# Patient Record
Sex: MALE | Race: WHITE | Employment: UNEMPLOYED | URBAN - METROPOLITAN AREA
[De-identification: names, ages, dates, MRNs, and addresses within clinical notes are randomized per-mention and may not be internally consistent; named-entity substitution may affect disease eponyms.]

---

## 2018-12-28 ENCOUNTER — HOSPITAL ENCOUNTER (OUTPATIENT)
Age: 68
Discharge: HOME | End: 2018-12-30
Payer: MEDICARE

## 2018-12-28 LAB — PROSTATE SPECIFIC ANTIGEN: 4.37 NG/ML (ref 0–4)

## 2018-12-28 PROCEDURE — 84153 ASSAY OF PSA TOTAL: CPT

## 2019-01-18 ENCOUNTER — HOSPITAL ENCOUNTER (EMERGENCY)
Age: 69
Discharge: HOME | End: 2019-01-19
Attending: EMERGENCY MEDICINE
Payer: MEDICARE

## 2019-01-18 ENCOUNTER — APPOINTMENT (OUTPATIENT)
Dept: GENERAL RADIOLOGY | Age: 69
End: 2019-01-18
Payer: MEDICARE

## 2019-01-18 ENCOUNTER — APPOINTMENT (OUTPATIENT)
Dept: CT IMAGING | Age: 69
End: 2019-01-18
Payer: MEDICARE

## 2019-01-18 DIAGNOSIS — R55 SYNCOPE AND COLLAPSE: Primary | ICD-10-CM

## 2019-01-18 DIAGNOSIS — E86.0 DEHYDRATION: ICD-10-CM

## 2019-01-18 LAB
ANION GAP SERPL CALCULATED.3IONS-SCNC: 12 MMOL/L (ref 7–16)
BASOPHILS ABSOLUTE: 0.06 E9/L (ref 0–0.2)
BASOPHILS RELATIVE PERCENT: 0.4 % (ref 0–2)
BUN BLDV-MCNC: 18 MG/DL (ref 8–23)
CALCIUM SERPL-MCNC: 9.2 MG/DL (ref 8.6–10.2)
CHLORIDE BLD-SCNC: 101 MMOL/L (ref 98–107)
CO2: 26 MMOL/L (ref 22–29)
CREAT SERPL-MCNC: 1 MG/DL (ref 0.7–1.2)
D DIMER: <200 NG/ML DDU
EKG ATRIAL RATE: 107 BPM
EKG P AXIS: 77 DEGREES
EKG P-R INTERVAL: 168 MS
EKG Q-T INTERVAL: 430 MS
EKG QRS DURATION: 146 MS
EKG QTC CALCULATION (BAZETT): 574 MS
EKG R AXIS: 138 DEGREES
EKG T AXIS: 47 DEGREES
EKG VENTRICULAR RATE: 107 BPM
EOSINOPHILS ABSOLUTE: 0.06 E9/L (ref 0.05–0.5)
EOSINOPHILS RELATIVE PERCENT: 0.4 % (ref 0–6)
GFR AFRICAN AMERICAN: >60
GFR NON-AFRICAN AMERICAN: >60 ML/MIN/1.73
GLUCOSE BLD-MCNC: 96 MG/DL (ref 74–99)
HCT VFR BLD CALC: 46.9 % (ref 37–54)
HEMOGLOBIN: 15.2 G/DL (ref 12.5–16.5)
IMMATURE GRANULOCYTES #: 0.1 E9/L
IMMATURE GRANULOCYTES %: 0.7 % (ref 0–5)
LYMPHOCYTES ABSOLUTE: 1.44 E9/L (ref 1.5–4)
LYMPHOCYTES RELATIVE PERCENT: 10.8 % (ref 20–42)
MCH RBC QN AUTO: 29.6 PG (ref 26–35)
MCHC RBC AUTO-ENTMCNC: 32.4 % (ref 32–34.5)
MCV RBC AUTO: 91.4 FL (ref 80–99.9)
MONOCYTES ABSOLUTE: 1.01 E9/L (ref 0.1–0.95)
MONOCYTES RELATIVE PERCENT: 7.6 % (ref 2–12)
NEUTROPHILS ABSOLUTE: 10.7 E9/L (ref 1.8–7.3)
NEUTROPHILS RELATIVE PERCENT: 80.1 % (ref 43–80)
PDW BLD-RTO: 15.9 FL (ref 11.5–15)
PLATELET # BLD: 162 E9/L (ref 130–450)
PMV BLD AUTO: 10.9 FL (ref 7–12)
POTASSIUM REFLEX MAGNESIUM: 4.4 MMOL/L (ref 3.5–5)
RBC # BLD: 5.13 E12/L (ref 3.8–5.8)
SODIUM BLD-SCNC: 139 MMOL/L (ref 132–146)
TROPONIN: <0.01 NG/ML (ref 0–0.03)
WBC # BLD: 13.4 E9/L (ref 4.5–11.5)

## 2019-01-18 PROCEDURE — 70450 CT HEAD/BRAIN W/O DYE: CPT

## 2019-01-18 PROCEDURE — 85025 COMPLETE CBC W/AUTO DIFF WBC: CPT

## 2019-01-18 PROCEDURE — 99284 EMERGENCY DEPT VISIT MOD MDM: CPT

## 2019-01-18 PROCEDURE — 84484 ASSAY OF TROPONIN QUANT: CPT

## 2019-01-18 PROCEDURE — 36415 COLL VENOUS BLD VENIPUNCTURE: CPT

## 2019-01-18 PROCEDURE — 80048 BASIC METABOLIC PNL TOTAL CA: CPT

## 2019-01-18 PROCEDURE — 71045 X-RAY EXAM CHEST 1 VIEW: CPT

## 2019-01-18 PROCEDURE — 2580000003 HC RX 258: Performed by: EMERGENCY MEDICINE

## 2019-01-18 PROCEDURE — 85378 FIBRIN DEGRADE SEMIQUANT: CPT

## 2019-01-18 RX ORDER — 0.9 % SODIUM CHLORIDE 0.9 %
1000 INTRAVENOUS SOLUTION INTRAVENOUS ONCE
Status: COMPLETED | OUTPATIENT
Start: 2019-01-18 | End: 2019-01-19

## 2019-01-18 RX ADMIN — SODIUM CHLORIDE 1000 ML: 9 INJECTION, SOLUTION INTRAVENOUS at 21:20

## 2019-01-19 VITALS
TEMPERATURE: 97 F | HEART RATE: 98 BPM | RESPIRATION RATE: 16 BRPM | SYSTOLIC BLOOD PRESSURE: 132 MMHG | DIASTOLIC BLOOD PRESSURE: 88 MMHG | WEIGHT: 220 LBS | BODY MASS INDEX: 32.58 KG/M2 | HEIGHT: 69 IN | OXYGEN SATURATION: 96 %

## 2019-01-30 ENCOUNTER — APPOINTMENT (OUTPATIENT)
Dept: CT IMAGING | Age: 69
DRG: 194 | End: 2019-01-30
Payer: MEDICARE

## 2019-01-30 ENCOUNTER — HOSPITAL ENCOUNTER (INPATIENT)
Age: 69
LOS: 1 days | Discharge: HOME | DRG: 194 | End: 2019-02-01
Attending: EMERGENCY MEDICINE | Admitting: INTERNAL MEDICINE
Payer: MEDICARE

## 2019-01-30 ENCOUNTER — APPOINTMENT (OUTPATIENT)
Dept: GENERAL RADIOLOGY | Age: 69
DRG: 194 | End: 2019-01-30
Payer: MEDICARE

## 2019-01-30 DIAGNOSIS — J18.9 PNEUMONIA DUE TO ORGANISM: ICD-10-CM

## 2019-01-30 DIAGNOSIS — R07.81 PLEURITIC CHEST PAIN: Primary | ICD-10-CM

## 2019-01-30 LAB
ALBUMIN SERPL-MCNC: 4.1 G/DL (ref 3.5–5.2)
ALP BLD-CCNC: 99 U/L (ref 40–129)
ALT SERPL-CCNC: 34 U/L (ref 0–40)
ANION GAP SERPL CALCULATED.3IONS-SCNC: 15 MMOL/L (ref 7–16)
AST SERPL-CCNC: 19 U/L (ref 0–39)
BILIRUB SERPL-MCNC: 0.6 MG/DL (ref 0–1.2)
BUN BLDV-MCNC: 18 MG/DL (ref 8–23)
CALCIUM SERPL-MCNC: 9.3 MG/DL (ref 8.6–10.2)
CHLORIDE BLD-SCNC: 99 MMOL/L (ref 98–107)
CO2: 25 MMOL/L (ref 22–29)
CREAT SERPL-MCNC: 1 MG/DL (ref 0.7–1.2)
EKG ATRIAL RATE: 125 BPM
EKG P AXIS: 67 DEGREES
EKG P-R INTERVAL: 154 MS
EKG Q-T INTERVAL: 298 MS
EKG QRS DURATION: 138 MS
EKG QTC CALCULATION (BAZETT): 430 MS
EKG R AXIS: 133 DEGREES
EKG T AXIS: -12 DEGREES
EKG VENTRICULAR RATE: 125 BPM
GFR AFRICAN AMERICAN: >60
GFR NON-AFRICAN AMERICAN: >60 ML/MIN/1.73
GLUCOSE BLD-MCNC: 110 MG/DL (ref 74–99)
HCT VFR BLD CALC: 46.2 % (ref 37–54)
HEMOGLOBIN: 14.9 G/DL (ref 12.5–16.5)
INR BLD: 1
MCH RBC QN AUTO: 29.6 PG (ref 26–35)
MCHC RBC AUTO-ENTMCNC: 32.3 % (ref 32–34.5)
MCV RBC AUTO: 91.7 FL (ref 80–99.9)
PDW BLD-RTO: 15.9 FL (ref 11.5–15)
PLATELET # BLD: 157 E9/L (ref 130–450)
PMV BLD AUTO: 10.3 FL (ref 7–12)
POTASSIUM SERPL-SCNC: 4.3 MMOL/L (ref 3.5–5)
PRO-BNP: 111 PG/ML (ref 0–125)
PROTHROMBIN TIME: 11.2 SEC (ref 9.3–12.4)
RBC # BLD: 5.04 E12/L (ref 3.8–5.8)
SODIUM BLD-SCNC: 139 MMOL/L (ref 132–146)
TOTAL PROTEIN: 6.9 G/DL (ref 6.4–8.3)
TROPONIN: <0.01 NG/ML (ref 0–0.03)
WBC # BLD: 20.4 E9/L (ref 4.5–11.5)

## 2019-01-30 PROCEDURE — 36415 COLL VENOUS BLD VENIPUNCTURE: CPT

## 2019-01-30 PROCEDURE — 71045 X-RAY EXAM CHEST 1 VIEW: CPT

## 2019-01-30 PROCEDURE — 99285 EMERGENCY DEPT VISIT HI MDM: CPT

## 2019-01-30 PROCEDURE — 87040 BLOOD CULTURE FOR BACTERIA: CPT

## 2019-01-30 PROCEDURE — 85610 PROTHROMBIN TIME: CPT

## 2019-01-30 PROCEDURE — 83880 ASSAY OF NATRIURETIC PEPTIDE: CPT

## 2019-01-30 PROCEDURE — 71275 CT ANGIOGRAPHY CHEST: CPT

## 2019-01-30 PROCEDURE — 2580000003 HC RX 258: Performed by: EMERGENCY MEDICINE

## 2019-01-30 PROCEDURE — 6360000004 HC RX CONTRAST MEDICATION: Performed by: RADIOLOGY

## 2019-01-30 PROCEDURE — 83605 ASSAY OF LACTIC ACID: CPT

## 2019-01-30 PROCEDURE — 85027 COMPLETE CBC AUTOMATED: CPT

## 2019-01-30 PROCEDURE — 84484 ASSAY OF TROPONIN QUANT: CPT

## 2019-01-30 PROCEDURE — 80053 COMPREHEN METABOLIC PANEL: CPT

## 2019-01-30 PROCEDURE — 93005 ELECTROCARDIOGRAM TRACING: CPT | Performed by: EMERGENCY MEDICINE

## 2019-01-30 PROCEDURE — 87502 INFLUENZA DNA AMP PROBE: CPT

## 2019-01-30 RX ORDER — SODIUM CHLORIDE 9 MG/ML
INJECTION, SOLUTION INTRAVENOUS CONTINUOUS
Status: DISCONTINUED | OUTPATIENT
Start: 2019-01-30 | End: 2019-02-01 | Stop reason: HOSPADM

## 2019-01-30 RX ADMIN — IOPAMIDOL 75 ML: 755 INJECTION, SOLUTION INTRAVENOUS at 23:50

## 2019-01-30 RX ADMIN — SODIUM CHLORIDE: 9 INJECTION, SOLUTION INTRAVENOUS at 23:07

## 2019-01-31 PROBLEM — N40.0 BPH (BENIGN PROSTATIC HYPERPLASIA): Status: ACTIVE | Noted: 2019-01-31

## 2019-01-31 PROBLEM — E78.5 HLD (HYPERLIPIDEMIA): Status: ACTIVE | Noted: 2019-01-31

## 2019-01-31 PROBLEM — J44.1 COPD EXACERBATION (HCC): Status: ACTIVE | Noted: 2019-01-31

## 2019-01-31 PROBLEM — I10 HTN (HYPERTENSION): Status: ACTIVE | Noted: 2019-01-31

## 2019-01-31 PROBLEM — J18.9 PNEUMONIA: Status: ACTIVE | Noted: 2019-01-31

## 2019-01-31 LAB
ANION GAP SERPL CALCULATED.3IONS-SCNC: 14 MMOL/L (ref 7–16)
BUN BLDV-MCNC: 14 MG/DL (ref 8–23)
CALCIUM SERPL-MCNC: 8.8 MG/DL (ref 8.6–10.2)
CHLORIDE BLD-SCNC: 99 MMOL/L (ref 98–107)
CO2: 22 MMOL/L (ref 22–29)
CREAT SERPL-MCNC: 0.9 MG/DL (ref 0.7–1.2)
FILM ARRAY ADENOVIRUS: NORMAL
FILM ARRAY BORDETELLA PERTUSSIS: NORMAL
FILM ARRAY CHLAMYDOPHILIA PNEUMONIAE: NORMAL
FILM ARRAY CORONAVIRUS 229E: NORMAL
FILM ARRAY CORONAVIRUS HKU1: NORMAL
FILM ARRAY CORONAVIRUS NL63: NORMAL
FILM ARRAY CORONAVIRUS OC43: NORMAL
FILM ARRAY INFLUENZA A VIRUS 09H1: NORMAL
FILM ARRAY INFLUENZA A VIRUS H1: NORMAL
FILM ARRAY INFLUENZA A VIRUS H3: NORMAL
FILM ARRAY INFLUENZA A VIRUS: NORMAL
FILM ARRAY INFLUENZA B: NORMAL
FILM ARRAY METAPNEUMOVIRUS: NORMAL
FILM ARRAY MYCOPLASMA PNEUMONIAE: NORMAL
FILM ARRAY PARAINFLUENZA VIRUS 1: NORMAL
FILM ARRAY PARAINFLUENZA VIRUS 2: NORMAL
FILM ARRAY PARAINFLUENZA VIRUS 3: NORMAL
FILM ARRAY PARAINFLUENZA VIRUS 4: NORMAL
FILM ARRAY RESPIRATORY SYNCITIAL VIRUS: NORMAL
FILM ARRAY RHINOVIRUS/ENTEROVIRUS: NORMAL
GFR AFRICAN AMERICAN: >60
GFR NON-AFRICAN AMERICAN: >60 ML/MIN/1.73
GLUCOSE BLD-MCNC: 148 MG/DL (ref 74–99)
HCT VFR BLD CALC: 40.5 % (ref 37–54)
HEMOGLOBIN: 13.3 G/DL (ref 12.5–16.5)
INFLUENZA A BY PCR: NOT DETECTED
INFLUENZA B BY PCR: NOT DETECTED
LACTIC ACID: 2.1 MMOL/L (ref 0.5–2.2)
LACTIC ACID: 4.3 MMOL/L (ref 0.5–2.2)
LV EF: 70 %
LVEF MODALITY: NORMAL
MCH RBC QN AUTO: 29.4 PG (ref 26–35)
MCHC RBC AUTO-ENTMCNC: 32.8 % (ref 32–34.5)
MCV RBC AUTO: 89.6 FL (ref 80–99.9)
PDW BLD-RTO: 16 FL (ref 11.5–15)
PLATELET # BLD: 147 E9/L (ref 130–450)
PMV BLD AUTO: 11 FL (ref 7–12)
POTASSIUM REFLEX MAGNESIUM: 4.4 MMOL/L (ref 3.5–5)
RBC # BLD: 4.52 E12/L (ref 3.8–5.8)
SODIUM BLD-SCNC: 135 MMOL/L (ref 132–146)
WBC # BLD: 18.6 E9/L (ref 4.5–11.5)

## 2019-01-31 PROCEDURE — 2580000003 HC RX 258: Performed by: INTERNAL MEDICINE

## 2019-01-31 PROCEDURE — 87581 M.PNEUMON DNA AMP PROBE: CPT

## 2019-01-31 PROCEDURE — 6370000000 HC RX 637 (ALT 250 FOR IP): Performed by: INTERNAL MEDICINE

## 2019-01-31 PROCEDURE — 94640 AIRWAY INHALATION TREATMENT: CPT

## 2019-01-31 PROCEDURE — 87798 DETECT AGENT NOS DNA AMP: CPT

## 2019-01-31 PROCEDURE — 94664 DEMO&/EVAL PT USE INHALER: CPT

## 2019-01-31 PROCEDURE — 93306 TTE W/DOPPLER COMPLETE: CPT

## 2019-01-31 PROCEDURE — 2500000003 HC RX 250 WO HCPCS: Performed by: EMERGENCY MEDICINE

## 2019-01-31 PROCEDURE — 6360000002 HC RX W HCPCS: Performed by: EMERGENCY MEDICINE

## 2019-01-31 PROCEDURE — 87486 CHLMYD PNEUM DNA AMP PROBE: CPT

## 2019-01-31 PROCEDURE — 2500000003 HC RX 250 WO HCPCS: Performed by: INTERNAL MEDICINE

## 2019-01-31 PROCEDURE — 80048 BASIC METABOLIC PNL TOTAL CA: CPT

## 2019-01-31 PROCEDURE — 2060000000 HC ICU INTERMEDIATE R&B

## 2019-01-31 PROCEDURE — 36415 COLL VENOUS BLD VENIPUNCTURE: CPT

## 2019-01-31 PROCEDURE — 87633 RESP VIRUS 12-25 TARGETS: CPT

## 2019-01-31 PROCEDURE — 96374 THER/PROPH/DIAG INJ IV PUSH: CPT

## 2019-01-31 PROCEDURE — 83605 ASSAY OF LACTIC ACID: CPT

## 2019-01-31 PROCEDURE — 2580000003 HC RX 258: Performed by: EMERGENCY MEDICINE

## 2019-01-31 PROCEDURE — 85027 COMPLETE CBC AUTOMATED: CPT

## 2019-01-31 RX ORDER — SODIUM CHLORIDE 0.9 % (FLUSH) 0.9 %
10 SYRINGE (ML) INJECTION PRN
Status: DISCONTINUED | OUTPATIENT
Start: 2019-01-31 | End: 2019-02-01 | Stop reason: HOSPADM

## 2019-01-31 RX ORDER — ALBUTEROL SULFATE 90 UG/1
2 AEROSOL, METERED RESPIRATORY (INHALATION) EVERY 6 HOURS PRN
Status: ON HOLD | COMMUNITY
End: 2019-05-18 | Stop reason: HOSPADM

## 2019-01-31 RX ORDER — SODIUM CHLORIDE 0.9 % (FLUSH) 0.9 %
10 SYRINGE (ML) INJECTION EVERY 12 HOURS SCHEDULED
Status: DISCONTINUED | OUTPATIENT
Start: 2019-01-31 | End: 2019-02-01 | Stop reason: HOSPADM

## 2019-01-31 RX ORDER — MULTIVITAMIN WITH IRON
250 TABLET ORAL DAILY
COMMUNITY
End: 2021-05-24

## 2019-01-31 RX ORDER — SIMVASTATIN 40 MG
40 TABLET ORAL DAILY
COMMUNITY
End: 2022-09-03

## 2019-01-31 RX ORDER — PREDNISONE 20 MG/1
20 TABLET ORAL 2 TIMES DAILY
Status: DISCONTINUED | OUTPATIENT
Start: 2019-01-31 | End: 2019-02-01 | Stop reason: HOSPADM

## 2019-01-31 RX ORDER — SIMVASTATIN 10 MG
10 TABLET ORAL DAILY
Status: DISCONTINUED | OUTPATIENT
Start: 2019-01-31 | End: 2019-02-01 | Stop reason: HOSPADM

## 2019-01-31 RX ORDER — TAMSULOSIN HYDROCHLORIDE 0.4 MG/1
0.4 CAPSULE ORAL DAILY
Status: DISCONTINUED | OUTPATIENT
Start: 2019-01-31 | End: 2019-02-01 | Stop reason: HOSPADM

## 2019-01-31 RX ORDER — TAMSULOSIN HYDROCHLORIDE 0.4 MG/1
0.4 CAPSULE ORAL DAILY
COMMUNITY
End: 2019-05-17

## 2019-01-31 RX ORDER — MAGNESIUM 30 MG
30 TABLET ORAL DAILY
Status: DISCONTINUED | OUTPATIENT
Start: 2019-01-31 | End: 2019-02-01 | Stop reason: HOSPADM

## 2019-01-31 RX ORDER — SIMVASTATIN 10 MG
10 TABLET ORAL DAILY
Status: DISCONTINUED | OUTPATIENT
Start: 2019-01-31 | End: 2019-01-31 | Stop reason: CLARIF

## 2019-01-31 RX ORDER — 0.9 % SODIUM CHLORIDE 0.9 %
1000 INTRAVENOUS SOLUTION INTRAVENOUS ONCE
Status: COMPLETED | OUTPATIENT
Start: 2019-01-31 | End: 2019-01-31

## 2019-01-31 RX ORDER — LISINOPRIL 10 MG/1
10 TABLET ORAL DAILY
Status: DISCONTINUED | OUTPATIENT
Start: 2019-01-31 | End: 2019-02-01 | Stop reason: HOSPADM

## 2019-01-31 RX ORDER — ALBUTEROL SULFATE 0.63 MG/3ML
1 SOLUTION RESPIRATORY (INHALATION) EVERY 6 HOURS PRN
Status: DISCONTINUED | OUTPATIENT
Start: 2019-01-31 | End: 2019-02-01 | Stop reason: HOSPADM

## 2019-01-31 RX ORDER — IPRATROPIUM BROMIDE AND ALBUTEROL SULFATE 2.5; .5 MG/3ML; MG/3ML
1 SOLUTION RESPIRATORY (INHALATION)
Status: DISCONTINUED | OUTPATIENT
Start: 2019-01-31 | End: 2019-02-01 | Stop reason: HOSPADM

## 2019-01-31 RX ORDER — ALBUTEROL SULFATE 2.5 MG/3ML
1 SOLUTION RESPIRATORY (INHALATION) EVERY 6 HOURS PRN
Status: ON HOLD | COMMUNITY
End: 2022-05-27 | Stop reason: HOSPADM

## 2019-01-31 RX ORDER — LISINOPRIL 20 MG/1
20 TABLET ORAL DAILY
Status: ON HOLD | COMMUNITY
End: 2021-07-20 | Stop reason: SDUPTHER

## 2019-01-31 RX ORDER — HYDROCODONE BITARTRATE AND ACETAMINOPHEN 5; 325 MG/1; MG/1
1 TABLET ORAL EVERY 4 HOURS PRN
Status: DISCONTINUED | OUTPATIENT
Start: 2019-01-31 | End: 2019-02-01 | Stop reason: HOSPADM

## 2019-01-31 RX ORDER — ACETAMINOPHEN 325 MG/1
650 TABLET ORAL EVERY 4 HOURS PRN
Status: DISCONTINUED | OUTPATIENT
Start: 2019-01-31 | End: 2019-02-01 | Stop reason: HOSPADM

## 2019-01-31 RX ORDER — GUAIFENESIN/DEXTROMETHORPHAN 100-10MG/5
5 SYRUP ORAL EVERY 4 HOURS PRN
Status: DISCONTINUED | OUTPATIENT
Start: 2019-01-31 | End: 2019-02-01 | Stop reason: HOSPADM

## 2019-01-31 RX ADMIN — LISINOPRIL 10 MG: 10 TABLET ORAL at 09:39

## 2019-01-31 RX ADMIN — SIMVASTATIN 10 MG: 10 TABLET, FILM COATED ORAL at 09:39

## 2019-01-31 RX ADMIN — DOXYCYCLINE 100 MG: 100 INJECTION, POWDER, LYOPHILIZED, FOR SOLUTION INTRAVENOUS at 01:32

## 2019-01-31 RX ADMIN — DOXYCYCLINE 100 MG: 100 INJECTION, POWDER, LYOPHILIZED, FOR SOLUTION INTRAVENOUS at 14:30

## 2019-01-31 RX ADMIN — PREDNISONE 20 MG: 20 TABLET ORAL at 11:06

## 2019-01-31 RX ADMIN — IPRATROPIUM BROMIDE AND ALBUTEROL SULFATE 1 AMPULE: .5; 3 SOLUTION RESPIRATORY (INHALATION) at 21:47

## 2019-01-31 RX ADMIN — IPRATROPIUM BROMIDE AND ALBUTEROL SULFATE 1 AMPULE: .5; 3 SOLUTION RESPIRATORY (INHALATION) at 17:33

## 2019-01-31 RX ADMIN — HYDROCODONE BITARTRATE AND ACETAMINOPHEN 1 TABLET: 5; 325 TABLET ORAL at 02:59

## 2019-01-31 RX ADMIN — IPRATROPIUM BROMIDE AND ALBUTEROL SULFATE 1 AMPULE: .5; 3 SOLUTION RESPIRATORY (INHALATION) at 10:04

## 2019-01-31 RX ADMIN — IPRATROPIUM BROMIDE AND ALBUTEROL SULFATE 1 AMPULE: .5; 3 SOLUTION RESPIRATORY (INHALATION) at 13:53

## 2019-01-31 RX ADMIN — PREDNISONE 20 MG: 20 TABLET ORAL at 20:16

## 2019-01-31 RX ADMIN — GUAIFENESIN AND DEXTROMETHORPHAN 5 ML: 100; 10 SYRUP ORAL at 22:11

## 2019-01-31 RX ADMIN — TAMSULOSIN HYDROCHLORIDE 0.4 MG: 0.4 CAPSULE ORAL at 09:39

## 2019-01-31 RX ADMIN — SODIUM CHLORIDE 1000 ML: 9 INJECTION, SOLUTION INTRAVENOUS at 00:44

## 2019-01-31 RX ADMIN — HYDROCODONE BITARTRATE AND ACETAMINOPHEN 1 TABLET: 5; 325 TABLET ORAL at 11:06

## 2019-01-31 RX ADMIN — CEFTRIAXONE SODIUM 1 G: 1 INJECTION, POWDER, FOR SOLUTION INTRAMUSCULAR; INTRAVENOUS at 00:43

## 2019-01-31 ASSESSMENT — PAIN DESCRIPTION - PAIN TYPE
TYPE: ACUTE PAIN
TYPE: ACUTE PAIN

## 2019-01-31 ASSESSMENT — PAIN SCALES - GENERAL
PAINLEVEL_OUTOF10: 6
PAINLEVEL_OUTOF10: 8
PAINLEVEL_OUTOF10: 0

## 2019-01-31 ASSESSMENT — PAIN - FUNCTIONAL ASSESSMENT
PAIN_FUNCTIONAL_ASSESSMENT: PREVENTS OR INTERFERES SOME ACTIVE ACTIVITIES AND ADLS
PAIN_FUNCTIONAL_ASSESSMENT: PREVENTS OR INTERFERES SOME ACTIVE ACTIVITIES AND ADLS

## 2019-01-31 ASSESSMENT — PAIN DESCRIPTION - ORIENTATION
ORIENTATION: LEFT;UPPER
ORIENTATION: LEFT;UPPER

## 2019-01-31 ASSESSMENT — PAIN DESCRIPTION - LOCATION
LOCATION: ABDOMEN
LOCATION: ABDOMEN

## 2019-01-31 ASSESSMENT — PAIN DESCRIPTION - FREQUENCY: FREQUENCY: CONTINUOUS

## 2019-02-01 VITALS
WEIGHT: 188.31 LBS | OXYGEN SATURATION: 98 % | TEMPERATURE: 97.8 F | SYSTOLIC BLOOD PRESSURE: 126 MMHG | RESPIRATION RATE: 16 BRPM | BODY MASS INDEX: 28.54 KG/M2 | HEIGHT: 68 IN | HEART RATE: 102 BPM | DIASTOLIC BLOOD PRESSURE: 77 MMHG

## 2019-02-01 LAB
ANION GAP SERPL CALCULATED.3IONS-SCNC: 14 MMOL/L (ref 7–16)
BUN BLDV-MCNC: 16 MG/DL (ref 8–23)
CALCIUM SERPL-MCNC: 9.2 MG/DL (ref 8.6–10.2)
CHLORIDE BLD-SCNC: 100 MMOL/L (ref 98–107)
CO2: 22 MMOL/L (ref 22–29)
CREAT SERPL-MCNC: 0.9 MG/DL (ref 0.7–1.2)
GFR AFRICAN AMERICAN: >60
GFR NON-AFRICAN AMERICAN: >60 ML/MIN/1.73
GLUCOSE BLD-MCNC: 147 MG/DL (ref 74–99)
HCT VFR BLD CALC: 37.2 % (ref 37–54)
HEMOGLOBIN: 12.1 G/DL (ref 12.5–16.5)
MCH RBC QN AUTO: 29.6 PG (ref 26–35)
MCHC RBC AUTO-ENTMCNC: 32.5 % (ref 32–34.5)
MCV RBC AUTO: 91 FL (ref 80–99.9)
PDW BLD-RTO: 15.9 FL (ref 11.5–15)
PLATELET # BLD: 138 E9/L (ref 130–450)
PMV BLD AUTO: 11.2 FL (ref 7–12)
POTASSIUM REFLEX MAGNESIUM: 4.6 MMOL/L (ref 3.5–5)
RBC # BLD: 4.09 E12/L (ref 3.8–5.8)
SODIUM BLD-SCNC: 136 MMOL/L (ref 132–146)
WBC # BLD: 13.8 E9/L (ref 4.5–11.5)

## 2019-02-01 PROCEDURE — 6370000000 HC RX 637 (ALT 250 FOR IP): Performed by: INTERNAL MEDICINE

## 2019-02-01 PROCEDURE — 2580000003 HC RX 258: Performed by: INTERNAL MEDICINE

## 2019-02-01 PROCEDURE — 94640 AIRWAY INHALATION TREATMENT: CPT

## 2019-02-01 PROCEDURE — 2500000003 HC RX 250 WO HCPCS: Performed by: INTERNAL MEDICINE

## 2019-02-01 PROCEDURE — 85027 COMPLETE CBC AUTOMATED: CPT

## 2019-02-01 PROCEDURE — 80048 BASIC METABOLIC PNL TOTAL CA: CPT

## 2019-02-01 PROCEDURE — 6360000002 HC RX W HCPCS: Performed by: INTERNAL MEDICINE

## 2019-02-01 PROCEDURE — 36415 COLL VENOUS BLD VENIPUNCTURE: CPT

## 2019-02-01 RX ORDER — GUAIFENESIN/DEXTROMETHORPHAN 100-10MG/5
5 SYRUP ORAL EVERY 4 HOURS PRN
Qty: 120 ML | Refills: 0 | Status: SHIPPED | OUTPATIENT
Start: 2019-02-01 | End: 2019-02-11

## 2019-02-01 RX ORDER — AMOXICILLIN 250 MG
2 CAPSULE ORAL DAILY PRN
Qty: 60 TABLET | Refills: 1 | Status: SHIPPED | OUTPATIENT
Start: 2019-02-01 | End: 2019-05-17

## 2019-02-01 RX ORDER — SODIUM PHOSPHATE, DIBASIC AND SODIUM PHOSPHATE, MONOBASIC 7; 19 G/133ML; G/133ML
1 ENEMA RECTAL
Status: DISCONTINUED | OUTPATIENT
Start: 2019-02-01 | End: 2019-02-01 | Stop reason: HOSPADM

## 2019-02-01 RX ORDER — POLYETHYLENE GLYCOL 3350 17 G/17G
17 POWDER, FOR SOLUTION ORAL ONCE
Status: COMPLETED | OUTPATIENT
Start: 2019-02-01 | End: 2019-02-01

## 2019-02-01 RX ORDER — PREDNISONE 20 MG/1
20 TABLET ORAL 2 TIMES DAILY
Qty: 10 TABLET | Refills: 0 | Status: SHIPPED | OUTPATIENT
Start: 2019-02-01 | End: 2019-02-11

## 2019-02-01 RX ORDER — LEVOFLOXACIN 750 MG/1
750 TABLET ORAL DAILY
Qty: 5 TABLET | Refills: 0 | Status: SHIPPED | OUTPATIENT
Start: 2019-02-01 | End: 2019-02-06

## 2019-02-01 RX ADMIN — SIMVASTATIN 10 MG: 10 TABLET, FILM COATED ORAL at 09:12

## 2019-02-01 RX ADMIN — LISINOPRIL 10 MG: 10 TABLET ORAL at 09:12

## 2019-02-01 RX ADMIN — DOXYCYCLINE 100 MG: 100 INJECTION, POWDER, LYOPHILIZED, FOR SOLUTION INTRAVENOUS at 01:04

## 2019-02-01 RX ADMIN — POLYETHYLENE GLYCOL 3350 17 G: 17 POWDER, FOR SOLUTION ORAL at 12:52

## 2019-02-01 RX ADMIN — DOXYCYCLINE 100 MG: 100 INJECTION, POWDER, LYOPHILIZED, FOR SOLUTION INTRAVENOUS at 12:52

## 2019-02-01 RX ADMIN — TAMSULOSIN HYDROCHLORIDE 0.4 MG: 0.4 CAPSULE ORAL at 09:12

## 2019-02-01 RX ADMIN — GUAIFENESIN AND DEXTROMETHORPHAN 5 ML: 100; 10 SYRUP ORAL at 17:45

## 2019-02-01 RX ADMIN — GUAIFENESIN AND DEXTROMETHORPHAN 5 ML: 100; 10 SYRUP ORAL at 02:00

## 2019-02-01 RX ADMIN — IPRATROPIUM BROMIDE AND ALBUTEROL SULFATE 1 AMPULE: .5; 3 SOLUTION RESPIRATORY (INHALATION) at 09:31

## 2019-02-01 RX ADMIN — IPRATROPIUM BROMIDE AND ALBUTEROL SULFATE 1 AMPULE: .5; 3 SOLUTION RESPIRATORY (INHALATION) at 17:26

## 2019-02-01 RX ADMIN — CEFTRIAXONE SODIUM 1 G: 1 INJECTION, POWDER, FOR SOLUTION INTRAMUSCULAR; INTRAVENOUS at 01:04

## 2019-02-01 RX ADMIN — GUAIFENESIN AND DEXTROMETHORPHAN 5 ML: 100; 10 SYRUP ORAL at 06:07

## 2019-02-01 RX ADMIN — PREDNISONE 20 MG: 20 TABLET ORAL at 09:12

## 2019-02-01 RX ADMIN — IPRATROPIUM BROMIDE AND ALBUTEROL SULFATE 1 AMPULE: .5; 3 SOLUTION RESPIRATORY (INHALATION) at 13:44

## 2019-02-01 ASSESSMENT — PAIN SCALES - GENERAL
PAINLEVEL_OUTOF10: 0
PAINLEVEL_OUTOF10: 0

## 2019-02-02 ENCOUNTER — CARE COORDINATION (OUTPATIENT)
Dept: CASE MANAGEMENT | Age: 69
End: 2019-02-02

## 2019-02-05 LAB
BLOOD CULTURE, ROUTINE: NORMAL
CULTURE, BLOOD 2: NORMAL

## 2019-02-15 ENCOUNTER — CARE COORDINATION (OUTPATIENT)
Dept: CASE MANAGEMENT | Age: 69
End: 2019-02-15

## 2019-02-17 PROBLEM — E86.0 DEHYDRATION: Status: RESOLVED | Noted: 2019-01-18 | Resolved: 2019-02-17

## 2019-03-05 ENCOUNTER — CARE COORDINATION (OUTPATIENT)
Dept: CASE MANAGEMENT | Age: 69
End: 2019-03-05

## 2019-03-27 ENCOUNTER — CARE COORDINATION (OUTPATIENT)
Dept: CASE MANAGEMENT | Age: 69
End: 2019-03-27

## 2019-04-01 ENCOUNTER — HOSPITAL ENCOUNTER (OUTPATIENT)
Age: 69
Discharge: HOME | End: 2019-04-03
Payer: MEDICARE

## 2019-04-01 LAB — PROSTATE SPECIFIC ANTIGEN: 5.32 NG/ML (ref 0–4)

## 2019-04-01 PROCEDURE — 84153 ASSAY OF PSA TOTAL: CPT

## 2019-04-09 ENCOUNTER — CARE COORDINATION (OUTPATIENT)
Dept: CASE MANAGEMENT | Age: 69
End: 2019-04-09

## 2019-04-09 NOTE — CARE COORDINATION
[facility] Transitions Follow Up Call    2019    Patient: Griffin Louis  Patient : 1950   MRN: <[id number]>  Reason for Admission:   Discharge Date: 19 RARS: Readmission Risk Score: 13             Care Transitions Subsequent and Final Call    Subsequent and Final Calls  Care Transitions Interventions  Other Interventions:          Attempted to contact Pt for BPCI transitions call. Contact information left to  requesting call back at the earliest convenience. Adan Kramer RN BSN   Care Transitions Coordinator  [phone]       Follow Up  No future appointments.     Adan Kramer RN

## 2019-04-22 ENCOUNTER — CARE COORDINATION (OUTPATIENT)
Dept: CASE MANAGEMENT | Age: 69
End: 2019-04-22

## 2019-05-01 ENCOUNTER — CARE COORDINATION (OUTPATIENT)
Dept: CASE MANAGEMENT | Age: 69
End: 2019-05-01

## 2019-05-01 NOTE — CARE COORDINATION
Attempted to reach pt for BPCI-A follow up call. Left message requesting call back.     Niyah Garcia RN  Care Transition Coordinator  [phone]

## 2019-05-08 ENCOUNTER — CARE COORDINATION (OUTPATIENT)
Dept: CASE MANAGEMENT | Age: 69
End: 2019-05-08

## 2019-05-17 ENCOUNTER — HOSPITAL ENCOUNTER (OUTPATIENT)
Age: 69
Setting detail: OBSERVATION
Discharge: HOME | End: 2019-05-18
Attending: EMERGENCY MEDICINE | Admitting: INTERNAL MEDICINE
Payer: MEDICARE

## 2019-05-17 ENCOUNTER — APPOINTMENT (OUTPATIENT)
Dept: GENERAL RADIOLOGY | Age: 69
End: 2019-05-17
Payer: MEDICARE

## 2019-05-17 ENCOUNTER — APPOINTMENT (OUTPATIENT)
Dept: CT IMAGING | Age: 69
End: 2019-05-17
Payer: MEDICARE

## 2019-05-17 DIAGNOSIS — J18.9 PNEUMONIA DUE TO ORGANISM: ICD-10-CM

## 2019-05-17 DIAGNOSIS — R55 SYNCOPE AND COLLAPSE: Primary | ICD-10-CM

## 2019-05-17 PROBLEM — J44.9 COPD (CHRONIC OBSTRUCTIVE PULMONARY DISEASE) (HCC): Status: ACTIVE | Noted: 2019-01-31

## 2019-05-17 LAB
ANION GAP SERPL CALCULATED.3IONS-SCNC: 14 MMOL/L (ref 7–16)
BASOPHILS ABSOLUTE: 0 E9/L (ref 0–0.2)
BASOPHILS RELATIVE PERCENT: 0.3 % (ref 0–2)
BUN BLDV-MCNC: 25 MG/DL (ref 8–23)
CALCIUM SERPL-MCNC: 9.1 MG/DL (ref 8.6–10.2)
CHLORIDE BLD-SCNC: 98 MMOL/L (ref 98–107)
CO2: 24 MMOL/L (ref 22–29)
CREAT SERPL-MCNC: 1.1 MG/DL (ref 0.7–1.2)
EOSINOPHILS ABSOLUTE: 0.18 E9/L (ref 0.05–0.5)
EOSINOPHILS RELATIVE PERCENT: 0.9 % (ref 0–6)
GFR AFRICAN AMERICAN: >60
GFR NON-AFRICAN AMERICAN: >60 ML/MIN/1.73
GLUCOSE BLD-MCNC: 148 MG/DL (ref 74–99)
HCT VFR BLD CALC: 46.2 % (ref 37–54)
HEMOGLOBIN: 15.4 G/DL (ref 12.5–16.5)
LYMPHOCYTES ABSOLUTE: 0.39 E9/L (ref 1.5–4)
LYMPHOCYTES RELATIVE PERCENT: 1.8 % (ref 20–42)
MCH RBC QN AUTO: 29.6 PG (ref 26–35)
MCHC RBC AUTO-ENTMCNC: 33.3 % (ref 32–34.5)
MCV RBC AUTO: 88.8 FL (ref 80–99.9)
MONOCYTES ABSOLUTE: 0.79 E9/L (ref 0.1–0.95)
MONOCYTES RELATIVE PERCENT: 3.5 % (ref 2–12)
NEUTROPHILS ABSOLUTE: 18.52 E9/L (ref 1.8–7.3)
NEUTROPHILS RELATIVE PERCENT: 93.9 % (ref 43–80)
PDW BLD-RTO: 14.6 FL (ref 11.5–15)
PLATELET # BLD: 208 E9/L (ref 130–450)
PMV BLD AUTO: 11.1 FL (ref 7–12)
POTASSIUM REFLEX MAGNESIUM: 4.4 MMOL/L (ref 3.5–5)
PROCALCITONIN: 0.07 NG/ML (ref 0–0.08)
RBC # BLD: 5.2 E12/L (ref 3.8–5.8)
RBC # BLD: NORMAL 10*6/UL
SODIUM BLD-SCNC: 136 MMOL/L (ref 132–146)
TROPONIN: <0.01 NG/ML (ref 0–0.03)
WBC # BLD: 19.7 E9/L (ref 4.5–11.5)

## 2019-05-17 PROCEDURE — 96361 HYDRATE IV INFUSION ADD-ON: CPT

## 2019-05-17 PROCEDURE — 84145 PROCALCITONIN (PCT): CPT

## 2019-05-17 PROCEDURE — 84484 ASSAY OF TROPONIN QUANT: CPT

## 2019-05-17 PROCEDURE — 93010 ELECTROCARDIOGRAM REPORT: CPT | Performed by: INTERNAL MEDICINE

## 2019-05-17 PROCEDURE — 96367 TX/PROPH/DG ADDL SEQ IV INF: CPT

## 2019-05-17 PROCEDURE — 85025 COMPLETE CBC W/AUTO DIFF WBC: CPT

## 2019-05-17 PROCEDURE — G0378 HOSPITAL OBSERVATION PER HR: HCPCS

## 2019-05-17 PROCEDURE — 36415 COLL VENOUS BLD VENIPUNCTURE: CPT

## 2019-05-17 PROCEDURE — 99285 EMERGENCY DEPT VISIT HI MDM: CPT

## 2019-05-17 PROCEDURE — 2580000003 HC RX 258: Performed by: STUDENT IN AN ORGANIZED HEALTH CARE EDUCATION/TRAINING PROGRAM

## 2019-05-17 PROCEDURE — 80048 BASIC METABOLIC PNL TOTAL CA: CPT

## 2019-05-17 PROCEDURE — 71045 X-RAY EXAM CHEST 1 VIEW: CPT

## 2019-05-17 PROCEDURE — 6360000002 HC RX W HCPCS: Performed by: STUDENT IN AN ORGANIZED HEALTH CARE EDUCATION/TRAINING PROGRAM

## 2019-05-17 PROCEDURE — 87040 BLOOD CULTURE FOR BACTERIA: CPT

## 2019-05-17 PROCEDURE — 2580000003 HC RX 258: Performed by: INTERNAL MEDICINE

## 2019-05-17 PROCEDURE — 93005 ELECTROCARDIOGRAM TRACING: CPT | Performed by: STUDENT IN AN ORGANIZED HEALTH CARE EDUCATION/TRAINING PROGRAM

## 2019-05-17 PROCEDURE — 96365 THER/PROPH/DIAG IV INF INIT: CPT

## 2019-05-17 PROCEDURE — 70450 CT HEAD/BRAIN W/O DYE: CPT

## 2019-05-17 PROCEDURE — 6370000000 HC RX 637 (ALT 250 FOR IP): Performed by: INTERNAL MEDICINE

## 2019-05-17 PROCEDURE — 94640 AIRWAY INHALATION TREATMENT: CPT

## 2019-05-17 RX ORDER — ADENOSINE 3 MG/ML
6 INJECTION, SOLUTION INTRAVENOUS ONCE
Status: DISCONTINUED | OUTPATIENT
Start: 2019-05-17 | End: 2019-05-17

## 2019-05-17 RX ORDER — ACETAMINOPHEN 325 MG/1
650 TABLET ORAL EVERY 4 HOURS PRN
Status: DISCONTINUED | OUTPATIENT
Start: 2019-05-17 | End: 2019-05-18 | Stop reason: HOSPADM

## 2019-05-17 RX ORDER — SODIUM CHLORIDE 0.9 % (FLUSH) 0.9 %
10 SYRINGE (ML) INJECTION PRN
Status: DISCONTINUED | OUTPATIENT
Start: 2019-05-17 | End: 2019-05-18 | Stop reason: HOSPADM

## 2019-05-17 RX ORDER — IPRATROPIUM BROMIDE AND ALBUTEROL SULFATE 2.5; .5 MG/3ML; MG/3ML
1 SOLUTION RESPIRATORY (INHALATION)
Status: DISCONTINUED | OUTPATIENT
Start: 2019-05-17 | End: 2019-05-18 | Stop reason: HOSPADM

## 2019-05-17 RX ORDER — ONDANSETRON 2 MG/ML
4 INJECTION INTRAMUSCULAR; INTRAVENOUS EVERY 6 HOURS PRN
Status: DISCONTINUED | OUTPATIENT
Start: 2019-05-17 | End: 2019-05-18 | Stop reason: HOSPADM

## 2019-05-17 RX ORDER — SODIUM CHLORIDE 9 MG/ML
INJECTION, SOLUTION INTRAVENOUS CONTINUOUS
Status: DISCONTINUED | OUTPATIENT
Start: 2019-05-17 | End: 2019-05-18 | Stop reason: HOSPADM

## 2019-05-17 RX ORDER — PREDNISONE 10 MG/1
10 TABLET ORAL DAILY
Status: ON HOLD | COMMUNITY
End: 2019-05-18 | Stop reason: HOSPADM

## 2019-05-17 RX ORDER — SODIUM CHLORIDE 0.9 % (FLUSH) 0.9 %
10 SYRINGE (ML) INJECTION EVERY 12 HOURS SCHEDULED
Status: DISCONTINUED | OUTPATIENT
Start: 2019-05-17 | End: 2019-05-18 | Stop reason: HOSPADM

## 2019-05-17 RX ADMIN — SODIUM CHLORIDE: 9 INJECTION, SOLUTION INTRAVENOUS at 16:05

## 2019-05-17 RX ADMIN — IPRATROPIUM BROMIDE AND ALBUTEROL SULFATE 1 AMPULE: .5; 3 SOLUTION RESPIRATORY (INHALATION) at 18:01

## 2019-05-17 RX ADMIN — AZITHROMYCIN DIHYDRATE 500 MG: 500 INJECTION, POWDER, LYOPHILIZED, FOR SOLUTION INTRAVENOUS at 14:50

## 2019-05-17 RX ADMIN — CEFTRIAXONE SODIUM 1 G: 1 INJECTION, POWDER, FOR SOLUTION INTRAMUSCULAR; INTRAVENOUS at 14:05

## 2019-05-17 RX ADMIN — IPRATROPIUM BROMIDE AND ALBUTEROL SULFATE 1 AMPULE: .5; 3 SOLUTION RESPIRATORY (INHALATION) at 22:37

## 2019-05-17 ASSESSMENT — ENCOUNTER SYMPTOMS
DIFFICULTY BREATHING: 0
COUGH: 1
SHORTNESS OF BREATH: 0
VOMITING: 0
RECTAL BLEEDING: 0
COLOR CHANGE: 0
BACK PAIN: 0
NAUSEA: 0
VISUAL CHANGE: 0

## 2019-05-17 ASSESSMENT — PAIN SCALES - GENERAL
PAINLEVEL_OUTOF10: 0
PAINLEVEL_OUTOF10: 0

## 2019-05-17 NOTE — CARE COORDINATION
Social Work /Discharge Planning:    Pt presents to the ED secondary to loss of consciousness at home. SW met with pt who was alert and oriented x3. Pt reports he was painting his deck at home, passed out, and woke up on the ground. Pt called EMS and came to ED. Pt reports this also happened while he was painting 1 week ago. Pt reports hx of syncopal episodes in January, came to this hospital and also went to [facility] and no one was really able to find what was causing him to lose consciousness. Pt and wife live in a 1 floor home with 1 step at entrance. Pt reports he has no DME and no hx of HHC/BOY. Pt reports he is normally independent and drives. Pt's PCP  Is Dr Danica Gottlieb and he uses [facility] for short term prescriptions and [facility] for long term prescriptions. Pt reports plan will be to return home. Assigned SW/CM to follow for any discharge needs.

## 2019-05-17 NOTE — PROGRESS NOTES
Orthostatic blood pressures completed. 11/76 and 108 lying, 107/73 and 107 sitting and 120/68 and 115 standing.

## 2019-05-17 NOTE — ED PROVIDER NOTES
Patient is a 76-year-old gentleman presents to ER today after having syncopal event at home. Patient's wife is at bedside and states that she witnessed the event. Per patient's wife, patient was painting the wall when he lost consciousness. She states that he had similar issues years ago and was diagnosed with \"neurocardiogenic\" issues. Patient states that he was fine for many years but that for the last 2 weeks or so she's been having in her minute episodes once again. Patient also states that he has been coughing on and off for the last 2 weeks. Patient denies any other symptoms at this time including nausea, vomiting, diarrhea, chest pain, lightheadedness, shortness of breath. Loss of Consciousness   Episode history:  Multiple  Most recent episode: Today  Timing:  Intermittent  Progression:  Waxing and waning  Chronicity:  Recurrent  Witnessed: yes    Relieved by:  None tried  Worsened by:  Nothing  Ineffective treatments:  None tried  Associated symptoms: no anxiety, no chest pain, no confusion, no diaphoresis, no difficulty breathing, no dizziness, no fever, no focal sensory loss, no focal weakness, no headaches, no malaise/fatigue, no nausea, no palpitations, no recent fall, no recent injury, no recent surgery, no rectal bleeding, no seizures, no shortness of breath, no visual change, no vomiting and no weakness        Review of Systems   Constitutional: Negative for diaphoresis, fever and malaise/fatigue. HENT: Negative for drooling. Eyes: Negative for visual disturbance. Respiratory: Positive for cough. Negative for shortness of breath. Cardiovascular: Positive for syncope. Negative for chest pain and palpitations. Gastrointestinal: Negative for nausea and vomiting. Endocrine: Negative for polyuria. Genitourinary: Negative for flank pain. Musculoskeletal: Negative for arthralgias and back pain. Skin: Negative for color change.    Allergic/Immunologic: Negative for immunocompromised state.   Neurological: Positive for syncope. Negative for dizziness, focal weakness, seizures, weakness and headaches. Hematological: Negative for adenopathy. Does not bruise/bleed easily. Psychiatric/Behavioral: Negative. Negative for confusion. Physical Exam   Constitutional: He is oriented to person, place, and time. He appears well-developed and well-nourished. He is cooperative. Patient covered in paint   HENT:   Head: Normocephalic and atraumatic. Right Ear: Hearing and external ear normal.   Left Ear: Hearing and external ear normal.   Nose: Nose normal.   Mouth/Throat: Uvula is midline and oropharynx is clear and moist.   Eyes: Conjunctivae and lids are normal.   Neck: Trachea normal, full passive range of motion without pain and phonation normal.   Cardiovascular: Normal rate, regular rhythm and normal heart sounds. Pulmonary/Chest: Effort normal and breath sounds normal.   Abdominal: Soft. Normal appearance. Neurological: He is alert and oriented to person, place, and time. Skin: Skin is warm, dry and intact. Psychiatric: He has a normal mood and affect. His speech is normal and behavior is normal. Judgment and thought content normal. Cognition and memory are normal.       Procedures    MDM  Number of Diagnoses or Management Options  Pneumonia due to organism:   Syncope and collapse:   Diagnosis management comments: Patient's labwork is a refill for leukocytosis, patient's chest x-ray also didn't show some bibasilar infiltrates, patient to be treated empirically for community acquired pneumonia. Patient also admitted for syncope workup. I spoken to Dr. Unruly Armas and he is agreeable to this at this time. I spoke to patient and his wife and they both verbalized understanding of plan and are agreeable to admission at this time. ED Course as of May 17 1454   Fri May 17, 2019   1301 Pt feeling better with IVF. Informed of all results so far and need for admit.  Pt states he is hungry - will have nurse order a lunch tray. Await labs and call back from Hospitalist     [MP]      ED Course User Index  [MP] [place] WallaceandriabettymaryDO       ED Course as of May 17 1714   Fri May 17, 2019   1301 Pt feeling better with IVF. Informed of all results so far and need for admit. Pt states he is hungry - will have nurse order a lunch tray. Await labs and call back from Hospitalist     [MP]      ED Course User Index  [MP] [place] AquinobettyDO mary       --------------------------------------------- PAST HISTORY ---------------------------------------------  Past Medical History:  has a past medical history of COPD (chronic obstructive pulmonary disease) ([facility] [address] Past Surgical History:  has no past surgical history on file. Social History:  reports that he has never smoked. He has never used smokeless tobacco. He reports that he does not drink alcohol or use drugs. Family History: family history is not on file. The patients home medications have been reviewed. Allergies: Patient has no known allergies.     -------------------------------------------------- RESULTS -------------------------------------------------    LABS:  Results for orders placed or performed during the hospital encounter of 05/17/19   CBC auto differential   Result Value Ref Range    WBC 19.7 (H) 4.5 - 11.5 E9/L    RBC 5.20 3.80 - 5.80 E12/L    Hemoglobin 15.4 12.5 - 16.5 g/dL    Hematocrit 46.2 37.0 - 54.0 %    MCV 88.8 80.0 - 99.9 fL    MCH 29.6 26.0 - 35.0 pg    MCHC 33.3 32.0 - 34.5 %    RDW 14.6 11.5 - 15.0 fL    Platelets 507 505 - 423 E9/L    MPV 11.1 7.0 - 12.0 fL    Neutrophils % 93.9 (H) 43.0 - 80.0 %    Lymphocytes % 1.8 (L) 20.0 - 42.0 %    Monocytes % 3.5 2.0 - 12.0 %    Eosinophils % 0.9 0.0 - 6.0 %    Basophils % 0.3 0.0 - 2.0 %    Neutrophils # 18.52 (H) 1.80 - 7.30 E9/L    Lymphocytes # 0.39 (L) 1.50 - 4.00 E9/L    Monocytes # 0.79 0.10 - 0.95 E9/L    Eosinophils # 0.18 0.05 - 0.50 E9/L    Basophils # 0.00 0.00 - 0.20 E9/L    RBC Morphology Normal    Basic Metabolic Panel w/ Reflex to MG   Result Value Ref Range    Sodium 136 132 - 146 mmol/L    Potassium reflex Magnesium 4.4 3.5 - 5.0 mmol/L    Chloride 98 98 - 107 mmol/L    CO2 24 22 - 29 mmol/L    Anion Gap 14 7 - 16 mmol/L    Glucose 148 (H) 74 - 99 mg/dL    BUN 25 (H) 8 - 23 mg/dL    CREATININE 1.1 0.7 - 1.2 mg/dL    GFR Non-African American >60 >=60 mL/min/1.73    GFR African American >60     Calcium 9.1 8.6 - 10.2 mg/dL   Troponin   Result Value Ref Range    Troponin <0.01 0.00 - 0.03 ng/mL       RADIOLOGY:  CT HEAD WO CONTRAST   Final Result   Mild to moderate atrophy. Nothing active otherwise. XR CHEST PORTABLE   Final Result   1. Nonspecific bibasilar airspace disease, findings can be seen in   infiltrate/pneumonia and/or atelectasis. 2.. Vascular calcifications thoracic aorta. EKG: This EKG is signed and interpreted by me. Rate: 101  Rhythm: Sinus with occasional PVCs  Interpretation: right bundle branch block and sinus rhythm  Comparison: stable as compared to patient's most recent EKG and no previous EKG available      ------------------------- NURSING NOTES AND VITALS REVIEWED ---------------------------  Date / Time Roomed:  5/17/2019 10:48 AM  ED Bed Assignment:  [id number]    The nursing notes within the ED encounter and vital signs as below have been reviewed.      Patient Vitals for the past 24 hrs:   BP Temp Temp src Pulse Resp SpO2 Height Weight   05/17/19 1452 128/74 98.1 °F (36.7 °C) Oral 108 18 96 % -- --   05/17/19 1228 -- -- -- 95 16 96 % -- --   05/17/19 1049 (!) 148/102 96.5 °F (35.8 °C) Infrared 105 20 95 % 5' 8\" (1.727 m) 192 lb (87.1 kg)       Oxygen Saturation Interpretation: Normal    ------------------------------------------ PROGRESS NOTES ------------------------------------------  Re-evaluation(s):  Time: 1400  Patients symptoms show no change  Repeat physical examination is not changed    Counseling:  I have spoken with the patient and discussed todays results, in addition to providing specific details for the plan of care and counseling regarding the diagnosis and prognosis. Their questions are answered at this time and they are agreeable with the plan of admission.    --------------------------------- ADDITIONAL PROVIDER NOTES ---------------------------------  Consultations:  Time: 1430. Spoke with Dr. Chip Jarrell. Discussed case. They will admit the patient. This patient's ED course included: a personal history and physicial examination    This patient has remained hemodynamically stable during their ED course. Diagnosis:  1. Syncope and collapse    2. Pneumonia due to organism        Disposition:  Patient's disposition: Admit to telemetry  Patient's condition is stable.          Lalita Course, DO  Resident  05/17/19 0764

## 2019-05-17 NOTE — ED NOTES
Bed: 16  Expected date:   Expected time:   Means of arrival:   Comments:  ems     Margret Bosworth, RN  05/17/19 1047

## 2019-05-18 VITALS
SYSTOLIC BLOOD PRESSURE: 147 MMHG | DIASTOLIC BLOOD PRESSURE: 85 MMHG | WEIGHT: 192 LBS | TEMPERATURE: 97.8 F | BODY MASS INDEX: 29.1 KG/M2 | OXYGEN SATURATION: 96 % | RESPIRATION RATE: 18 BRPM | HEART RATE: 89 BPM | HEIGHT: 68 IN

## 2019-05-18 LAB
ANION GAP SERPL CALCULATED.3IONS-SCNC: 6 MMOL/L (ref 7–16)
BUN BLDV-MCNC: 21 MG/DL (ref 8–23)
CALCIUM SERPL-MCNC: 8.7 MG/DL (ref 8.6–10.2)
CHLORIDE BLD-SCNC: 100 MMOL/L (ref 98–107)
CHOLESTEROL, TOTAL: 148 MG/DL (ref 0–199)
CO2: 30 MMOL/L (ref 22–29)
CREAT SERPL-MCNC: 1.1 MG/DL (ref 0.7–1.2)
EKG ATRIAL RATE: 101 BPM
EKG P AXIS: 77 DEGREES
EKG P-R INTERVAL: 178 MS
EKG Q-T INTERVAL: 372 MS
EKG QRS DURATION: 140 MS
EKG QTC CALCULATION (BAZETT): 482 MS
EKG R AXIS: 133 DEGREES
EKG T AXIS: 37 DEGREES
EKG VENTRICULAR RATE: 101 BPM
GFR AFRICAN AMERICAN: >60
GFR NON-AFRICAN AMERICAN: >60 ML/MIN/1.73
GLUCOSE BLD-MCNC: 118 MG/DL (ref 74–99)
HBA1C MFR BLD: 5.8 % (ref 4–5.6)
HCT VFR BLD CALC: 43.5 % (ref 37–54)
HDLC SERPL-MCNC: 74 MG/DL
HEMOGLOBIN: 13.9 G/DL (ref 12.5–16.5)
LDL CHOLESTEROL CALCULATED: 64 MG/DL (ref 0–99)
MCH RBC QN AUTO: 29 PG (ref 26–35)
MCHC RBC AUTO-ENTMCNC: 32 % (ref 32–34.5)
MCV RBC AUTO: 90.6 FL (ref 80–99.9)
PDW BLD-RTO: 14.8 FL (ref 11.5–15)
PLATELET # BLD: 178 E9/L (ref 130–450)
PMV BLD AUTO: 10.9 FL (ref 7–12)
POTASSIUM REFLEX MAGNESIUM: 4.8 MMOL/L (ref 3.5–5)
PROCALCITONIN: 0.2 NG/ML (ref 0–0.08)
RBC # BLD: 4.8 E12/L (ref 3.8–5.8)
SODIUM BLD-SCNC: 136 MMOL/L (ref 132–146)
T4 FREE: 1.21 NG/DL (ref 0.93–1.7)
TRIGL SERPL-MCNC: 52 MG/DL (ref 0–149)
TSH SERPL DL<=0.05 MIU/L-ACNC: 0.8 UIU/ML (ref 0.27–4.2)
VLDLC SERPL CALC-MCNC: 10 MG/DL
WBC # BLD: 15.7 E9/L (ref 4.5–11.5)

## 2019-05-18 PROCEDURE — 80061 LIPID PANEL: CPT

## 2019-05-18 PROCEDURE — 6370000000 HC RX 637 (ALT 250 FOR IP): Performed by: INTERNAL MEDICINE

## 2019-05-18 PROCEDURE — 97165 OT EVAL LOW COMPLEX 30 MIN: CPT

## 2019-05-18 PROCEDURE — 99223 1ST HOSP IP/OBS HIGH 75: CPT | Performed by: INTERNAL MEDICINE

## 2019-05-18 PROCEDURE — 94640 AIRWAY INHALATION TREATMENT: CPT

## 2019-05-18 PROCEDURE — 80048 BASIC METABOLIC PNL TOTAL CA: CPT

## 2019-05-18 PROCEDURE — 84145 PROCALCITONIN (PCT): CPT

## 2019-05-18 PROCEDURE — G0378 HOSPITAL OBSERVATION PER HR: HCPCS

## 2019-05-18 PROCEDURE — APPSS180 APP SPLIT SHARED TIME > 60 MINUTES: Performed by: NURSE PRACTITIONER

## 2019-05-18 PROCEDURE — 87088 URINE BACTERIA CULTURE: CPT

## 2019-05-18 PROCEDURE — 96361 HYDRATE IV INFUSION ADD-ON: CPT

## 2019-05-18 PROCEDURE — 92610 EVALUATE SWALLOWING FUNCTION: CPT

## 2019-05-18 PROCEDURE — 36415 COLL VENOUS BLD VENIPUNCTURE: CPT

## 2019-05-18 PROCEDURE — 85027 COMPLETE CBC AUTOMATED: CPT

## 2019-05-18 PROCEDURE — 84439 ASSAY OF FREE THYROXINE: CPT

## 2019-05-18 PROCEDURE — 83036 HEMOGLOBIN GLYCOSYLATED A1C: CPT

## 2019-05-18 PROCEDURE — 84443 ASSAY THYROID STIM HORMONE: CPT

## 2019-05-18 RX ORDER — PREDNISONE 10 MG/1
10 TABLET ORAL DAILY
Status: DISCONTINUED | OUTPATIENT
Start: 2019-05-18 | End: 2019-05-18 | Stop reason: HOSPADM

## 2019-05-18 RX ORDER — LANOLIN ALCOHOL/MO/W.PET/CERES
400 CREAM (GRAM) TOPICAL DAILY
Status: DISCONTINUED | OUTPATIENT
Start: 2019-05-18 | End: 2019-05-18 | Stop reason: HOSPADM

## 2019-05-18 RX ORDER — METOPROLOL SUCCINATE 50 MG/1
50 TABLET, EXTENDED RELEASE ORAL DAILY
Status: DISCONTINUED | OUTPATIENT
Start: 2019-05-18 | End: 2019-05-18 | Stop reason: HOSPADM

## 2019-05-18 RX ORDER — METOPROLOL SUCCINATE 50 MG/1
50 TABLET, EXTENDED RELEASE ORAL DAILY
Qty: 30 TABLET | Refills: 3 | Status: ON HOLD | OUTPATIENT
Start: 2019-05-18 | End: 2021-07-21 | Stop reason: HOSPADM

## 2019-05-18 RX ORDER — LISINOPRIL 20 MG/1
20 TABLET ORAL DAILY
Status: DISCONTINUED | OUTPATIENT
Start: 2019-05-19 | End: 2019-05-18 | Stop reason: HOSPADM

## 2019-05-18 RX ORDER — ALBUTEROL SULFATE 0.63 MG/3ML
1 SOLUTION RESPIRATORY (INHALATION) EVERY 6 HOURS PRN
Status: DISCONTINUED | OUTPATIENT
Start: 2019-05-18 | End: 2019-05-18 | Stop reason: HOSPADM

## 2019-05-18 RX ORDER — AMOXICILLIN AND CLAVULANATE POTASSIUM 875; 125 MG/1; MG/1
1 TABLET, FILM COATED ORAL 2 TIMES DAILY
Qty: 20 TABLET | Refills: 0 | Status: SHIPPED | OUTPATIENT
Start: 2019-05-18 | End: 2019-05-28

## 2019-05-18 RX ADMIN — MAGNESIUM GLUCONATE 500 MG ORAL TABLET 400 MG: 500 TABLET ORAL at 11:26

## 2019-05-18 RX ADMIN — GLYCOPYRROLATE AND FORMOTEROL FUMARATE 2 PUFF: 9; 4.8 AEROSOL, METERED RESPIRATORY (INHALATION) at 11:26

## 2019-05-18 RX ADMIN — METOPROLOL SUCCINATE 50 MG: 50 TABLET, FILM COATED, EXTENDED RELEASE ORAL at 14:13

## 2019-05-18 RX ADMIN — IPRATROPIUM BROMIDE AND ALBUTEROL SULFATE 1 AMPULE: .5; 3 SOLUTION RESPIRATORY (INHALATION) at 09:13

## 2019-05-18 ASSESSMENT — PAIN SCALES - GENERAL: PAINLEVEL_OUTOF10: 0

## 2019-05-18 NOTE — PROGRESS NOTES
SPEECH/LANGUAGE PATHOLOGY  BEDSIDE SWALLOWING EVALUATION    PATIENT NAME:  Anaya Morales      :  1950      TODAY'S DATE:  2019    SUMMARY OF EVALUATION     DYSPHAGIA DIAGNOSIS:  With functional limits      DIET RECOMMENDATIONS: Regular consistency solids with regular consistency liquids     FEEDING RECOMMENDATIONS:     Assistance level:  Set-up is required for all oral intake      Compensatory strategies recommended: No strategies are recommended at this time    THERAPY RECOMMENDATIONS:      Dysphagia therapy is not recommended                  PROCEDURE     Consistencies Administered During the Evaluation   Liquids: thin liquid   Solids:  pureed foods and solid foods      Method of Intake:   cup, straw, spoon  Self fed, Fed by clinician      Position:   Seated, upright                  RESULTS     Oral Stage: The oral stage of swallowing was within functional limits      Pharyngeal Stage:      No signs of aspiration were noted during this evaluation however, silent aspiration cannot be ruled out at bedside. If silent aspiration is suspected, a Videofluoroscopic Study of Swallowing (MBS) is recommended and requires a physician order. The Speech Language Pathologist (SLP) completed education with the patient regarding results of evaluation. Explained that Speech Pathology intervention is not warranted at this time      [x]The admitting diagnosis and active problem list, as listed below have been reviewed prior to initiation of this evaluation.      ADMITTING DIAGNOSIS: Syncope and collapse [R55]     ACTIVE PROBLEM LIST:   Patient Active Problem List   Diagnosis    Syncope    Pneumonia    COPD (chronic obstructive pulmonary disease) ([facility] [address]    BPH (benign prostatic hyperplasia)    HTN (hypertension)    HLD (hyperlipidemia)

## 2019-05-18 NOTE — PROGRESS NOTES
Physical Therapy    PT order received and chart reviewed. Per OT evaluation and RN report, pt is Independent with functional mobility and does not demonstrate any skilled PT needs. Thank you for the opportunity to assist in the care of this patient.   Denisse Boyd, PT, DPT  License PT [id number]

## 2019-05-18 NOTE — CONSULTS
Inpatient Cardiology Consultation      Reason for Consult:  Syncope    Consulting Physician: Dr Maynard Sickle    Requesting Physician:  Dr Chen Tate    Date of Consultation: 5/18/2019    HISTORY OF PRESENT ILLNESS: 77 yo  male seen at [facility] in 4/2019 for syncope with negative tilt table at that time. Evaluation at [facility] in 2012 resulted in diagnosis of neurocardiogenic syncope was told to keep hydrated, and wear compression hose neither of which patient followed. PMH: HTN, HLD, COPD, ex heavy smoker quit in 2012, emphysema, BPH (Flomax stopped 1/2019 thought to be contributing to syncope/dizziness), L hip injection (Cirtisone) 5/2019, TTE 1/2019 EF 70%, stage I DD and no VHD. Had syncopal episode 1/18/2019 while sitting at restaurant was seen in ED and given IVF's and discharged to home. Followed up at [facility] 3/2019 for 48 Holter (unremarkable) and titlt table in 4/2019 negative. 5/10/2018 after doing yard work (not well hydrated) sat down became diaphoretic, dizzy, and shaky and then \"passed out. \" Did not seek medical attention. On 5/17/2019 was on his hands and knees painting deck (ate only toast and drank coffee) became dizzy when trying to get up, diaphoretic and shaky was able to crawl to his phone and MAGO called 911. Received IVF's in ambulance upon arrival to [facility] ED 5/17/2019 was not orthostatic. Bun/Cr 25/1.1--> 21/1.1, pro-calcitonin 0.20, troponin <0.01, WBC 19.7-->15.7, TSH 0/802, HgbA1c 5.8,  Infiltrate on CXR per ED was placed on ATB and told he had pneumonia. Cardiology consulted for syncope. Currently patient sitting at side of bed eating. Reports excessive caffeine intake, minimal water intake and does not wear his compression hose as instructed. Please note: past medical records were reviewed per electronic medical record (EMR) - see detailed reports under Past Medical/ Surgical History. Past Medical/Surgical History:    1. 2 ppd x 30 years quit in 2012  2.  COPD/Emphysema uses Prednisone 10 mg almost daily since CAP in 1/2019  3. HTN  4. HLD  5. Snores but no history of sleep study  6. [facility] 2012 diagnosed with neurocardiogenic syncope  7. BPH previously on Flomax (stopped 1/2019 due to dizziness)  8. L hip injection (cortisone) 5/2019  9. TTE 1/2019 Dr Ivette Renteria: EF 70% Stage I DD. No VHD. TAPSE 1.9  10. 1/18/2019 Sitting at restaurant felt dizzy and passed out (felt similar to episode in 2012)  11. [facility] 1/30/2019-2/1/2019 CAP treated with ATB's  12. 48 hour Holter 3/25/2019 [facility] SR frequent ST with IVCD. Average rate 99. No arrhythmias  13. 4/9/2019 Tilt Table [facility]: negative    Medications Prior to admit:  Prior to Admission medications    Medication Sig Start Date End Date Taking?  Authorizing Provider   predniSONE (DELTASONE) 10 MG tablet Take 10 mg by mouth daily   Yes Historical Provider, MD   simvastatin (ZOCOR) 40 MG tablet Take 40 mg by mouth daily    Yes Historical Provider, MD   lisinopril (PRINIVIL;ZESTRIL) 20 MG tablet Take 20 mg by mouth daily    Yes Historical Provider, MD   albuterol (ACCUNEB) 0.63 MG/3ML nebulizer solution Take 1 ampule by nebulization every 6 hours as needed for Wheezing   Yes Historical Provider, MD   albuterol sulfate HFA (VENTOLIN HFA) 108 (90 Base) MCG/ACT inhaler Inhale 2 puffs into the lungs every 6 hours as needed for Wheezing   Yes Historical Provider, MD   magnesium (MAGNESIUM-OXIDE) 250 MG TABS tablet Take 250 mg by mouth daily    Yes Historical Provider, MD   umeclidinium-vilanterol (ANORO ELLIPTA) 62.5-25 MCG/INH AEPB inhaler Inhale 1 puff into the lungs daily   Yes Historical Provider, MD       Current Medications:    Current Facility-Administered Medications: albuterol (ACCUNEB) nebulizer solution 0.63 mg, 1 ampule, Nebulization, Q6H PRN  magnesium oxide (MAG-OX) tablet 400 mg, 400 mg, Oral, Daily  predniSONE (DELTASONE) tablet 10 mg, 10 mg, Oral, Daily  glycopyrrolate-formoterol (BEVESPI) 9-4.8 MCG/ACT inhaler 2 puff, 2 puff, Inhalation, BID  0.9 % sodium chloride infusion, , Intravenous, Continuous  sodium chloride flush 0.9 % injection 10 mL, 10 mL, Intravenous, 2 times per day  sodium chloride flush 0.9 % injection 10 mL, 10 mL, Intravenous, PRN  magnesium hydroxide (MILK OF MAGNESIA) 400 MG/5ML suspension 30 mL, 30 mL, Oral, Daily PRN  ondansetron (ZOFRAN) injection 4 mg, 4 mg, Intravenous, Q6H PRN  enoxaparin (LOVENOX) injection 40 mg, 40 mg, Subcutaneous, Daily  ipratropium-albuterol (DUONEB) nebulizer solution 1 ampule, 1 ampule, Inhalation, Q4H WA  acetaminophen (TYLENOL) tablet 650 mg, 650 mg, Oral, Q4H PRN    Allergies:  NKDA per patient report    Social History:    Tobacco 2 ppd x 30 years quit in 3053  ETOH: Wine  Illicit Drugs: Denies  Caffeine: 6+ cups of coffee a day. Very little water  Activity: Lives with wife in one story home with basement. Works afternoons at call center. No Assistive devices. Drives. No CURTIS or CP with ADL's  Code Status: Full Code      Family History: Non contributory secondary to age    REVIEW OF SYSTEMS:     · Constitutional: Denies fatigue, fevers, chills or night sweats  · Eyes: Denies visual changes or drainage  · ENT: Denies headaches or hearing loss. No mouth sores or sore throat. No epistaxis   · Cardiovascular: Denies chest pain, pressure or palpitations. No lower extremity swelling. · Respiratory: Denies CURTIS, cough, orthopnea or PND. No hemoptysis   · Gastrointestinal: Denies hematemesis or anorexia. No hematochezia or melena    · Genitourinary: Denies urgency, dysuria or hematuria. · Musculoskeletal: Denies gait disturbance, weakness or joint complaints  · Integumentary: Denies rash, hives or pruritis   · Neurological: +dizziness/diaphorstic, shaky. + LOC. Denies headaches or seizures. No numbness or tingling  · Psychiatric: Denies anxiety or depression. · Endocrine: Denies temperature intolerance. No recent weight change. .  · Hematologic/Lymphatic: Denies abnormal bruising or bleeding.  No swollen lymph CREATININE 1.1 1.1   LABGLOM >60 >60   CALCIUM 9.1 8.7     CARDIAC ENZYMES:  Recent Labs     05/17/19  1124   TROPONINI <0.01   A&P per Dr Howard Nichols  Electronically signed by Carlos Nava. DANIELA Denny on 5/18/2019 at 12:33 PM      I personally and independently saw and examined patient and reviewed all done pertinent laboratory data, imaging studies, ECGs and rhythm strips. I have reviewed and agree with the APN history and physical exam as documented in the above note. Electronically signed by Sue Ashley MD on 5/19/2019 at 7:49 AM     Consulted for Syncope     IMPRESSION:  1. Recurrent syncopal episodes with diagnosis of neurocardiogenic syncope ([facility]) in 2012. With recurrent syncope 1/2019 (4 episodes since). No episodes from 2012 until 1/2019. Negative tilt table ([facility]) 4/2019 and unremarkable 48 hour Holter  2. TTE 1/2019 EF 70% Stage I DD, no VHD. 3. cRBBB with RAD  4. Ex heavy smoker. COPD/Emphysema   5. Chronic prednisone therapy  6. BPH taken off Flomax in 1/2019 (thought to be contributing to syncope)   7. HTN  8. HLD on Zocor  9. 1/30/2019 CAP treated with ATB's. 10. Pre-renal azotemia  11. Excessive caffeine intake  12. Leukocytosis  13. L Hip injection (cortisone) 5/2019     PLAN:   1. Agree with IV hydration  2. Resume Lisinopril  3. Start Toprol XL  4. Patient strongly advised to wear compression hose as previously reccommended  5. Strongly advised to cutting back significantly of caffeine intake  6. Strongly advised to keep self hydrated  7. No additional recommendations from cardiologystadnpoint. No plans for further cardiac testing  8. May be discharged from cardiology standpoint  9.  Cardiology to sign off, please call if needed     Electronically signed by Sue Ashley MD on 5/18/2019 at 12:37 PM

## 2019-05-18 NOTE — H&P
[facility]  History and Physical      CHIEF COMPLAINT:  syncope    Reason for Admission:  As above    History Obtained From:  Patient and wife    PCP :  Awilda Wan MD    [address] [facility] [address] / [place] [address]      HISTORY OF PRESENT ILLNESS:      The patient is a 78 y.o. male passed out at home. He had to call EMS via karli on his iphone. He was painting and was in a sitting position when he felt sweaty with tremors. He the passed out and could not move. He has had no fevers or chills. No purulent cough. He was taking prednisone because his copd was acting up. He sees dr Master Estrada for his copd. He had a similar episode 1 week ago. He also was evaluated 4 years ago for syncope and everything was negative. He was noted to have possible pneumonia on chest x ray and elevated wbc count. Past Medical History:        Diagnosis Date    COPD (chronic obstructive pulmonary disease) ([facility] [address]      Past Surgical History:    History reviewed. No pertinent surgical history. Medications Prior to Admission:    Medications Prior to Admission: predniSONE (DELTASONE) 10 MG tablet, Take 10 mg by mouth daily  simvastatin (ZOCOR) 40 MG tablet, Take 40 mg by mouth daily   lisinopril (PRINIVIL;ZESTRIL) 20 MG tablet, Take 20 mg by mouth daily   albuterol (ACCUNEB) 0.63 MG/3ML nebulizer solution, Take 1 ampule by nebulization every 6 hours as needed for Wheezing  albuterol sulfate HFA (VENTOLIN HFA) 108 (90 Base) MCG/ACT inhaler, Inhale 2 puffs into the lungs every 6 hours as needed for Wheezing  magnesium (MAGNESIUM-OXIDE) 250 MG TABS tablet, Take 250 mg by mouth daily   umeclidinium-vilanterol (ANORO ELLIPTA) 62.5-25 MCG/INH AEPB inhaler, Inhale 1 puff into the lungs daily    Allergies:  Patient has no known allergies.     Social History:   Social History     Socioeconomic History    Marital status:      Spouse name: Not on file    Number of children: Not on file    Years of education: Not on file    Highest education level: Not on file   Occupational History    Not on file   Social Needs    Financial resource strain: Not on file    Food insecurity:     Worry: Not on file     Inability: Not on file    Transportation needs:     Medical: Not on file     Non-medical: Not on file   Tobacco Use    Smoking status: Never Smoker    Smokeless tobacco: Never Used   Substance and Sexual Activity    Alcohol use: No    Drug use: No    Sexual activity: Not on file   Lifestyle    Physical activity:     Days per week: Not on file     Minutes per session: Not on file    Stress: Not on file   Relationships    Social connections:     Talks on phone: Not on file     Gets together: Not on file     Attends Hinduism service: Not on file     Active member of club or organization: Not on file     Attends meetings of clubs or organizations: Not on file     Relationship status: Not on file    Intimate partner violence:     Fear of current or ex partner: Not on file     Emotionally abused: Not on file     Physically abused: Not on file     Forced sexual activity: Not on file   Other Topics Concern    Not on file   Social History Narrative    Not on file         Family History:   History reviewed. No pertinent family history. REVIEW OF SYSTEMS:    General ROS: negative  Hematological and Lymphatic ROS: negative  Endocrine ROS: negative  Respiratory ROS: no cough,  wheezing  or shortness of breath,   Cardiovascular ROS: positive for syncope  Gastrointestinal ROS: no abdominal pain, change in bowel habits, or black or bloody stools  Genito-Urinary ROS: no dysuria, trouble voiding, or hematuria  Neurological ROS: no TIA or stroke symptoms  negative    Vitals:  /66   Pulse 98   Temp 97.4 °F (36.3 °C) (Temporal)   Resp 18   Ht 5' 8\" (1.727 m)   Wt 192 lb (87.1 kg)   SpO2 96%   BMI 29.19 kg/m²     PHYSICAL EXAM:  General:  Awake, alert, oriented X 3. Well developed, well nourished, well groomed.   No apparent distress. HEENT:  Normocephalic, atraumatic. Pupils equal, round, reactive to light. No scleral icterus. No conjunctival injection. Neck:  Supple, no carotid bruits  Heart:  RRR,   Lungs:  CTA bilaterally, bilat symmetrical expansion, no wheeze, rales, or rhonchi  Abdomen: Bowel sounds present, soft, nontender, no masses, no organomegaly, no peritoneal signs  Extremities:  No clubbing, cyanosis, or edema  Skin:  Warm and dry, no open lesions or rash  Neuro:  Cranial nerves 2-12 intact, no focal deficits      DATA:     Recent Results (from the past 24 hour(s))   CBC    Collection Time: 05/18/19  4:19 AM   Result Value Ref Range    WBC 15.7 (H) 4.5 - 11.5 E9/L    RBC 4.80 3.80 - 5.80 E12/L    Hemoglobin 13.9 12.5 - 16.5 g/dL    Hematocrit 43.5 37.0 - 54.0 %    MCV 90.6 80.0 - 99.9 fL    MCH 29.0 26.0 - 35.0 pg    MCHC 32.0 32.0 - 34.5 %    RDW 14.8 11.5 - 15.0 fL    Platelets 714 983 - 870 E9/L    MPV 10.9 7.0 - 12.0 fL   Procalcitonin    Collection Time: 05/18/19  4:19 AM   Result Value Ref Range    Procalcitonin 0.20 (H) 0.00 - 0.08 ng/mL   Basic Metabolic Panel w/ Reflex to MG    Collection Time: 05/18/19  4:19 AM   Result Value Ref Range    Sodium 136 132 - 146 mmol/L    Potassium reflex Magnesium 4.8 3.5 - 5.0 mmol/L    Chloride 100 98 - 107 mmol/L    CO2 30 (H) 22 - 29 mmol/L    Anion Gap 6 (L) 7 - 16 mmol/L    Glucose 118 (H) 74 - 99 mg/dL    BUN 21 8 - 23 mg/dL    CREATININE 1.1 0.7 - 1.2 mg/dL    GFR Non-African American >60 >=60 mL/min/1.73    GFR African American >60     Calcium 8.7 8.6 - 10.2 mg/dL       CT HEAD WO CONTRAST   Final Result   Mild to moderate atrophy. Nothing active otherwise. XR CHEST PORTABLE   Final Result   1. Nonspecific bibasilar airspace disease, findings can be seen in   infiltrate/pneumonia and/or atelectasis. 2.. Vascular calcifications thoracic aorta.               ASSESSMENT :      Principal Problem:    Syncope  Active Problems:    COPD (chronic obstructive

## 2019-05-18 NOTE — CONSULTS
intake  12. Leukocytosis  13. L Hip injection (cortisone) 5/2019    PLAN:   1. Agree with IV hydration  2. Resume Lisinopril  3. Start Toprol XL  4. Patient strongly advised to wear compression hose as previously reccommended  5. Strongly advised to cutting back significantly of caffeine intake  6. Strongly advised to keep self hydrated  7. No additional recommendations from cardiologystadnpoint. No plans for further cardiac testing  8. May be discharged from cardiology standpoint  9.  Cardiology to sign off, please call if needed    Electronically signed by Iker Lou MD on 5/18/2019 at 12:37 PM

## 2019-05-20 LAB
ORGANISM: ABNORMAL
URINE CULTURE, ROUTINE: ABNORMAL
URINE CULTURE, ROUTINE: ABNORMAL

## 2019-05-22 LAB
BLOOD CULTURE, ROUTINE: NORMAL
CULTURE, BLOOD 2: NORMAL

## 2019-06-06 ENCOUNTER — HOSPITAL ENCOUNTER (OUTPATIENT)
Dept: GENERAL RADIOLOGY | Age: 69
Discharge: HOME | End: 2019-06-08
Payer: MEDICARE

## 2019-06-06 ENCOUNTER — HOSPITAL ENCOUNTER (OUTPATIENT)
Age: 69
Discharge: HOME | End: 2019-06-08
Payer: MEDICARE

## 2019-06-06 DIAGNOSIS — R06.02 SOB (SHORTNESS OF BREATH): ICD-10-CM

## 2019-06-06 PROCEDURE — 71046 X-RAY EXAM CHEST 2 VIEWS: CPT

## 2019-06-12 ENCOUNTER — TELEPHONE (OUTPATIENT)
Dept: ADMINISTRATIVE | Age: 69
End: 2019-06-12

## 2019-06-12 NOTE — TELEPHONE ENCOUNTER
Message noted. The Dx of \"neurocardiogenic\" or \"vasovagal\" syncope is not a primary neurologic diagnosis nor is this treated by a neurologist.      Syncope is not normally considered a primary sign of a neurologic disorder (NINDS).

## 2019-06-21 ENCOUNTER — HOSPITAL ENCOUNTER (OUTPATIENT)
Age: 69
Discharge: HOME | End: 2019-06-23
Payer: MEDICARE

## 2019-06-21 LAB — PROSTATE SPECIFIC ANTIGEN: 4.36 NG/ML (ref 0–4)

## 2019-06-21 PROCEDURE — 84153 ASSAY OF PSA TOTAL: CPT

## 2019-08-19 ENCOUNTER — HOSPITAL ENCOUNTER (OUTPATIENT)
Age: 69
Discharge: HOME | End: 2019-08-21
Payer: MEDICARE

## 2019-08-19 LAB — PROSTATE SPECIFIC ANTIGEN: 4.18 NG/ML (ref 0–4)

## 2019-08-19 PROCEDURE — 84153 ASSAY OF PSA TOTAL: CPT

## 2020-01-30 ENCOUNTER — HOSPITAL ENCOUNTER (OUTPATIENT)
Age: 70
Discharge: HOME | End: 2020-01-30
Payer: MEDICARE

## 2020-01-30 LAB
BASOPHILS ABSOLUTE: 0.09 E9/L (ref 0–0.2)
BASOPHILS RELATIVE PERCENT: 0.8 % (ref 0–2)
EOSINOPHILS ABSOLUTE: 0.35 E9/L (ref 0.05–0.5)
EOSINOPHILS RELATIVE PERCENT: 3.3 % (ref 0–6)
HCT VFR BLD CALC: 46 % (ref 37–54)
HEMOGLOBIN: 14.6 G/DL (ref 12.5–16.5)
IMMATURE GRANULOCYTES #: 0.05 E9/L
IMMATURE GRANULOCYTES %: 0.5 % (ref 0–5)
LYMPHOCYTES ABSOLUTE: 1.8 E9/L (ref 1.5–4)
LYMPHOCYTES RELATIVE PERCENT: 16.9 % (ref 20–42)
MCH RBC QN AUTO: 29.2 PG (ref 26–35)
MCHC RBC AUTO-ENTMCNC: 31.7 % (ref 32–34.5)
MCV RBC AUTO: 92 FL (ref 80–99.9)
MONOCYTES ABSOLUTE: 1.15 E9/L (ref 0.1–0.95)
MONOCYTES RELATIVE PERCENT: 10.8 % (ref 2–12)
NEUTROPHILS ABSOLUTE: 7.24 E9/L (ref 1.8–7.3)
NEUTROPHILS RELATIVE PERCENT: 67.7 % (ref 43–80)
PDW BLD-RTO: 14.3 FL (ref 11.5–15)
PLATELET # BLD: 199 E9/L (ref 130–450)
PMV BLD AUTO: 11.4 FL (ref 7–12)
RBC # BLD: 5 E12/L (ref 3.8–5.8)
WBC # BLD: 10.7 E9/L (ref 4.5–11.5)

## 2020-01-30 PROCEDURE — 85025 COMPLETE CBC W/AUTO DIFF WBC: CPT

## 2020-01-30 PROCEDURE — 36415 COLL VENOUS BLD VENIPUNCTURE: CPT

## 2020-10-29 ENCOUNTER — APPOINTMENT (OUTPATIENT)
Dept: CT IMAGING | Age: 70
End: 2020-10-29
Payer: MEDICARE

## 2020-10-29 ENCOUNTER — APPOINTMENT (OUTPATIENT)
Dept: GENERAL RADIOLOGY | Age: 70
End: 2020-10-29
Payer: MEDICARE

## 2020-10-29 ENCOUNTER — HOSPITAL ENCOUNTER (EMERGENCY)
Age: 70
Discharge: HOME | End: 2020-10-29
Attending: EMERGENCY MEDICINE
Payer: MEDICARE

## 2020-10-29 VITALS
SYSTOLIC BLOOD PRESSURE: 132 MMHG | BODY MASS INDEX: 26.98 KG/M2 | WEIGHT: 178 LBS | TEMPERATURE: 96.8 F | OXYGEN SATURATION: 96 % | HEIGHT: 68 IN | RESPIRATION RATE: 16 BRPM | DIASTOLIC BLOOD PRESSURE: 83 MMHG | HEART RATE: 84 BPM

## 2020-10-29 LAB
ACETAMINOPHEN LEVEL: <5 MCG/ML (ref 10–30)
ALBUMIN SERPL-MCNC: 4 G/DL (ref 3.5–5.2)
ALP BLD-CCNC: 93 U/L (ref 40–129)
ALT SERPL-CCNC: 33 U/L (ref 0–40)
AMPHETAMINE SCREEN, URINE: NOT DETECTED
ANION GAP SERPL CALCULATED.3IONS-SCNC: 9 MMOL/L (ref 7–16)
AST SERPL-CCNC: 22 U/L (ref 0–39)
BACTERIA: ABNORMAL /HPF
BARBITURATE SCREEN URINE: NOT DETECTED
BASOPHILS ABSOLUTE: 0.05 E9/L (ref 0–0.2)
BASOPHILS RELATIVE PERCENT: 0.6 % (ref 0–2)
BENZODIAZEPINE SCREEN, URINE: NOT DETECTED
BILIRUB SERPL-MCNC: 0.4 MG/DL (ref 0–1.2)
BILIRUBIN URINE: NEGATIVE
BLOOD, URINE: ABNORMAL
BUN BLDV-MCNC: 20 MG/DL (ref 8–23)
CALCIUM SERPL-MCNC: 9.5 MG/DL (ref 8.6–10.2)
CANNABINOID SCREEN URINE: NOT DETECTED
CHLORIDE BLD-SCNC: 101 MMOL/L (ref 98–107)
CLARITY: CLEAR
CO2: 27 MMOL/L (ref 22–29)
COCAINE METABOLITE SCREEN URINE: NOT DETECTED
COLOR: YELLOW
CREAT SERPL-MCNC: 1 MG/DL (ref 0.7–1.2)
CRYSTALS, UA: ABNORMAL /HPF
EKG ATRIAL RATE: 86 BPM
EKG P AXIS: 74 DEGREES
EKG P-R INTERVAL: 176 MS
EKG Q-T INTERVAL: 390 MS
EKG QRS DURATION: 152 MS
EKG QTC CALCULATION (BAZETT): 466 MS
EKG R AXIS: 127 DEGREES
EKG T AXIS: 38 DEGREES
EKG VENTRICULAR RATE: 86 BPM
EOSINOPHILS ABSOLUTE: 0.1 E9/L (ref 0.05–0.5)
EOSINOPHILS RELATIVE PERCENT: 1.1 % (ref 0–6)
ETHANOL: <10 MG/DL (ref 0–0.08)
FENTANYL SCREEN, URINE: NOT DETECTED
GFR AFRICAN AMERICAN: >60
GFR NON-AFRICAN AMERICAN: >60 ML/MIN/1.73
GLUCOSE BLD-MCNC: 100 MG/DL (ref 74–99)
GLUCOSE URINE: NEGATIVE MG/DL
HCT VFR BLD CALC: 43.4 % (ref 37–54)
HEMOGLOBIN: 14.2 G/DL (ref 12.5–16.5)
IMMATURE GRANULOCYTES #: 0.06 E9/L
IMMATURE GRANULOCYTES %: 0.7 % (ref 0–5)
KETONES, URINE: NEGATIVE MG/DL
LACTIC ACID: 1.8 MMOL/L (ref 0.5–2.2)
LEUKOCYTE ESTERASE, URINE: ABNORMAL
LYMPHOCYTES ABSOLUTE: 1.52 E9/L (ref 1.5–4)
LYMPHOCYTES RELATIVE PERCENT: 17 % (ref 20–42)
Lab: NORMAL
MCH RBC QN AUTO: 30.4 PG (ref 26–35)
MCHC RBC AUTO-ENTMCNC: 32.7 % (ref 32–34.5)
MCV RBC AUTO: 92.9 FL (ref 80–99.9)
METHADONE SCREEN, URINE: NOT DETECTED
MONOCYTES ABSOLUTE: 0.82 E9/L (ref 0.1–0.95)
MONOCYTES RELATIVE PERCENT: 9.2 % (ref 2–12)
NEUTROPHILS ABSOLUTE: 6.4 E9/L (ref 1.8–7.3)
NEUTROPHILS RELATIVE PERCENT: 71.4 % (ref 43–80)
NITRITE, URINE: NEGATIVE
OPIATE SCREEN URINE: NOT DETECTED
OXYCODONE URINE: NOT DETECTED
PDW BLD-RTO: 15.2 FL (ref 11.5–15)
PH UA: 7 (ref 5–9)
PHENCYCLIDINE SCREEN URINE: NOT DETECTED
PLATELET # BLD: 171 E9/L (ref 130–450)
PMV BLD AUTO: 10.9 FL (ref 7–12)
POTASSIUM REFLEX MAGNESIUM: 4.1 MMOL/L (ref 3.5–5)
PROTEIN UA: NEGATIVE MG/DL
RBC # BLD: 4.67 E12/L (ref 3.8–5.8)
RBC UA: ABNORMAL /HPF (ref 0–2)
SALICYLATE, SERUM: <0.3 MG/DL (ref 0–30)
SODIUM BLD-SCNC: 137 MMOL/L (ref 132–146)
SPECIFIC GRAVITY UA: 1.01 (ref 1–1.03)
TOTAL PROTEIN: 6.8 G/DL (ref 6.4–8.3)
TRICYCLIC ANTIDEPRESSANTS SCREEN SERUM: NEGATIVE NG/ML
TROPONIN: <0.01 NG/ML (ref 0–0.03)
UROBILINOGEN, URINE: 0.2 E.U./DL
WBC # BLD: 9 E9/L (ref 4.5–11.5)
WBC UA: ABNORMAL /HPF (ref 0–5)

## 2020-10-29 PROCEDURE — 85025 COMPLETE CBC W/AUTO DIFF WBC: CPT

## 2020-10-29 PROCEDURE — 80307 DRUG TEST PRSMV CHEM ANLYZR: CPT

## 2020-10-29 PROCEDURE — 80053 COMPREHEN METABOLIC PANEL: CPT

## 2020-10-29 PROCEDURE — 83605 ASSAY OF LACTIC ACID: CPT

## 2020-10-29 PROCEDURE — 81001 URINALYSIS AUTO W/SCOPE: CPT

## 2020-10-29 PROCEDURE — 93005 ELECTROCARDIOGRAM TRACING: CPT | Performed by: FAMILY MEDICINE

## 2020-10-29 PROCEDURE — 70450 CT HEAD/BRAIN W/O DYE: CPT

## 2020-10-29 PROCEDURE — 99283 EMERGENCY DEPT VISIT LOW MDM: CPT

## 2020-10-29 PROCEDURE — G0480 DRUG TEST DEF 1-7 CLASSES: HCPCS

## 2020-10-29 PROCEDURE — 71046 X-RAY EXAM CHEST 2 VIEWS: CPT

## 2020-10-29 PROCEDURE — 84484 ASSAY OF TROPONIN QUANT: CPT

## 2020-10-29 ASSESSMENT — ENCOUNTER SYMPTOMS
SHORTNESS OF BREATH: 1
APNEA: 0
PHOTOPHOBIA: 0
DIARRHEA: 0
COUGH: 0
TROUBLE SWALLOWING: 0
SORE THROAT: 0
CONSTIPATION: 0
ABDOMINAL PAIN: 0
BACK PAIN: 0
NAUSEA: 0
CHEST TIGHTNESS: 0
WHEEZING: 0
VOMITING: 0

## 2020-10-29 ASSESSMENT — PAIN - FUNCTIONAL ASSESSMENT: PAIN_FUNCTIONAL_ASSESSMENT: ACTIVITIES ARE NOT PREVENTED

## 2020-10-29 ASSESSMENT — PAIN SCALES - GENERAL: PAINLEVEL_OUTOF10: 4

## 2020-10-29 ASSESSMENT — PAIN DESCRIPTION - DESCRIPTORS: DESCRIPTORS: HEADACHE

## 2020-10-29 ASSESSMENT — PAIN DESCRIPTION - PAIN TYPE: TYPE: ACUTE PAIN

## 2020-10-29 ASSESSMENT — PAIN DESCRIPTION - LOCATION: LOCATION: HEAD

## 2020-10-29 ASSESSMENT — PAIN DESCRIPTION - FREQUENCY: FREQUENCY: CONTINUOUS

## 2020-10-29 NOTE — ED PROVIDER NOTES
Constitutional: Negative for chills and fever. HENT: Negative for drooling, sore throat and trouble swallowing. Eyes: Negative for photophobia and visual disturbance. Respiratory: Positive for shortness of breath (At baseline). Negative for apnea, cough, chest tightness and wheezing. Cardiovascular: Negative for chest pain, palpitations and leg swelling. Gastrointestinal: Negative for abdominal pain, constipation, diarrhea, nausea and vomiting. Genitourinary: Negative for dysuria and hematuria. Musculoskeletal: Negative for arthralgias and back pain. Skin: Negative for wound. Neurological: Positive for seizures (Questionable), syncope and headaches (Mild). Negative for dizziness, tremors, facial asymmetry, speech difficulty, weakness, light-headedness and numbness. Psychiatric/Behavioral: Negative for agitation and confusion. Physical Exam  Vitals signs reviewed. Constitutional:       General: He is not in acute distress. Appearance: Normal appearance. He is not ill-appearing. HENT:      Head: Normocephalic and atraumatic. Mouth/Throat:      Mouth: Mucous membranes are moist.      Pharynx: Oropharynx is clear. No oropharyngeal exudate or posterior oropharyngeal erythema. Comments: No signs of tongue trauma  Eyes:      Extraocular Movements: Extraocular movements intact. Conjunctiva/sclera: Conjunctivae normal.      Pupils: Pupils are equal, round, and reactive to light. Neck:      Musculoskeletal: Normal range of motion. No neck rigidity. Cardiovascular:      Rate and Rhythm: Normal rate and regular rhythm. Heart sounds: Normal heart sounds. No murmur. Pulmonary:      Effort: Pulmonary effort is normal.      Breath sounds: Normal breath sounds. No wheezing. Abdominal:      General: Bowel sounds are normal.      Palpations: Abdomen is soft. Tenderness: There is no abdominal tenderness. There is no guarding.    Musculoskeletal: Normal range of motion. General: No swelling, deformity or signs of injury. Right lower leg: No edema. Left lower leg: No edema. Skin:     General: Skin is warm and dry. Findings: No lesion or rash. Neurological:      General: No focal deficit present. Mental Status: He is alert and oriented to person, place, and time. Cranial Nerves: Cranial nerves are intact. No cranial nerve deficit, dysarthria or facial asymmetry. Sensory: Sensation is intact. No sensory deficit. Motor: Motor function is intact. No weakness, tremor, abnormal muscle tone, seizure activity or pronator drift. Coordination: Coordination normal. Finger-Nose-Finger Test and Heel to Shin Test normal.      Gait: Gait normal.          Patient presents to the ED for possible seizure. Differential diagnoses included but not limited to syncope, seizures, arrhythmia. Workup in the ED revealed EKG unchanged from previous. Lab work unremarkable with a normal lactate. Head CT and chest x-ray showed no acute abnormalities. Patient was asymptomatic in the department and had no further syncopal or seizure-like activity. Drug screen was negative. UDS was sent but likely negative. Urinalysis unremarkable for any infectious process. Given the normal lactate and patient's immediate orientation following the event this was likely a syncopal episode. Patient has a known history of this and does not require inpatient evaluation of the same. Patient continues to be non-toxic on re-evaluation. Findings were discussed with the patient and reasons to immediately return to the ED were articulated to them. They will follow-up with their PMD.             --------------------------------------------- PAST HISTORY ---------------------------------------------  Past Medical History:  has a past medical history of COPD (chronic obstructive pulmonary disease) ([facility] [address] Past Surgical History:  has no past surgical history on file.     Social History:  reports that he has never smoked. He has never used smokeless tobacco. He reports that he does not drink alcohol or use drugs. Family History: family history is not on file. The patients home medications have been reviewed. Allergies: Patient has no known allergies.     -------------------------------------------------- RESULTS -------------------------------------------------  Labs:  Results for orders placed or performed during the hospital encounter of 10/29/20   Lactic Acid, Plasma   Result Value Ref Range    Lactic Acid 1.8 0.5 - 2.2 mmol/L   Comprehensive Metabolic Panel w/ Reflex to MG   Result Value Ref Range    Sodium 137 132 - 146 mmol/L    Potassium reflex Magnesium 4.1 3.5 - 5.0 mmol/L    Chloride 101 98 - 107 mmol/L    CO2 27 22 - 29 mmol/L    Anion Gap 9 7 - 16 mmol/L    Glucose 100 (H) 74 - 99 mg/dL    BUN 20 8 - 23 mg/dL    CREATININE 1.0 0.7 - 1.2 mg/dL    GFR Non-African American >60 >=60 mL/min/1.73    GFR African American >60     Calcium 9.5 8.6 - 10.2 mg/dL    Total Protein 6.8 6.4 - 8.3 g/dL    Alb 4.0 3.5 - 5.2 g/dL    Total Bilirubin 0.4 0.0 - 1.2 mg/dL    Alkaline Phosphatase 93 40 - 129 U/L    ALT 33 0 - 40 U/L    AST 22 0 - 39 U/L   Troponin   Result Value Ref Range    Troponin <0.01 0.00 - 0.03 ng/mL   CBC Auto Differential   Result Value Ref Range    WBC 9.0 4.5 - 11.5 E9/L    RBC 4.67 3.80 - 5.80 E12/L    Hemoglobin 14.2 12.5 - 16.5 g/dL    Hematocrit 43.4 37.0 - 54.0 %    MCV 92.9 80.0 - 99.9 fL    MCH 30.4 26.0 - 35.0 pg    MCHC 32.7 32.0 - 34.5 %    RDW 15.2 (H) 11.5 - 15.0 fL    Platelets 032 359 - 561 E9/L    MPV 10.9 7.0 - 12.0 fL    Neutrophils % 71.4 43.0 - 80.0 %    Immature Granulocytes % 0.7 0.0 - 5.0 %    Lymphocytes % 17.0 (L) 20.0 - 42.0 %    Monocytes % 9.2 2.0 - 12.0 %    Eosinophils % 1.1 0.0 - 6.0 %    Basophils % 0.6 0.0 - 2.0 %    Neutrophils Absolute 6.40 1.80 - 7.30 E9/L    Immature Granulocytes # 0.06 E9/L    Lymphocytes Absolute 1.52 1.50 - 4.00 E9/L    Monocytes Absolute 0.82 0.10 - 0.95 E9/L    Eosinophils Absolute 0.10 0.05 - 0.50 E9/L    Basophils Absolute 0.05 0.00 - 0.20 E9/L   EKG 12 Lead   Result Value Ref Range    Ventricular Rate 86 BPM    Atrial Rate 86 BPM    P-R Interval 176 ms    QRS Duration 152 ms    Q-T Interval 390 ms    QTc Calculation (Bazett) 466 ms    P Axis 74 degrees    R Axis 127 degrees    T Axis 38 degrees       Radiology:  XR CHEST (2 VW)   Final Result   Moderate COPD, unchanged         CT Head WO Contrast   Final Result   1. There is no acute intracranial abnormality. Specifically, there is no   intracranial hemorrhage. 2. Atrophy and periventricular leukomalacia,             ------------------------- NURSING NOTES AND VITALS REVIEWED ---------------------------  Date / Time Roomed:  10/29/2020  8:53 AM  ED Bed Assignment:  22/22    The nursing notes within the ED encounter and vital signs as below have been reviewed. /83   Pulse 84   Temp 96.8 °F (36 °C) (Temporal)   Resp 16   Ht 5' 8\" (1.727 m)   Wt 178 lb (80.7 kg)   SpO2 96%   BMI 27.06 kg/m²   Oxygen Saturation Interpretation: Normal      ------------------------------------------ PROGRESS NOTES ------------------------------------------  11:11 AM EDT  I have spoken with the patient and discussed todays results, in addition to providing specific details for the plan of care and counseling regarding the diagnosis and prognosis. Their questions are answered at this time and they are agreeable with the plan. I discussed at length with them reasons for immediate return here for re evaluation. They will followup with their primary care physician by calling their office tomorrow. --------------------------------- ADDITIONAL PROVIDER NOTES ---------------------------------  At this time the patient is without objective evidence of an acute process requiring hospitalization or inpatient management.   They have remained hemodynamically stable throughout their entire ED visit and are stable for discharge with outpatient follow-up. The plan has been discussed in detail and they are aware of the specific conditions for emergent return, as well as the importance of follow-up. New Prescriptions    No medications on file       Diagnosis:  1. Syncope and collapse        Disposition:  Patient's disposition: Discharge to home  Patient's condition is stable.        Rafia Szymanski MD  Resident  10/29/20 [id number]

## 2020-10-29 NOTE — ED NOTES
Discharge instructions given. Patient verbalizes understanding. No other noted or stated problems at this time. Patient will follow up with primary care.      Jayla Ramos RN  10/29/20 [id number]

## 2021-05-24 NOTE — PROGRESS NOTES
are to spend the night in the hospital.     PARKING INSTRUCTIONS:   [x] Arrival Time:______1130_______  · [x] Parking lot '\"I\"  is located on [facility] (the corner of [facility] and [facility]). To enter, press the button and the gate will lift. A free token will be provided to exit the lot. One car per patient is allowed to park in this lot. All other cars are to park on [address] [place] [address] either in the parking garage or the handicap lot. [] To reach the [facility] lobby from [address] [place] [address], upon entering the hospital, take elevator B to the 3rd floor. EDUCATION INSTRUCTIONS:      [] Knee or hip replacement booklet & exercise pamphlets given. [] Margarita [address] placed in chart. [] Pre-admission Testing educational folder given  [] Incentive Spirometry,coughing & deep breathing exercises reviewed. []Medication information sheet(s)   []Fluoroscopy-Xray used in surgery reviewed with patient. Educational pamphlet placed in chart. []Pain: Post-op pain is normal and to be expected. You will be asked to rate your pain from 0-10(a zero is not acceptable-education is needed). Your post-op pain goal is:  [] Ask your nurse for your pain medication. [] Joint camp offered. [] Joint replacement booklets given. [] Other:___________________________    MEDICATION INSTRUCTIONS:   [x]Bring a complete list of your medications, please write the last time you took the medicine, give this list to the nurse. [x] Take the following medications the morning of surgery with 1-2 ounces of water: toprol [] Stop herbal supplements and vitamins 5 days before your surgery. [] DO NOT take any diabetic medicine the morning of surgery. Follow instructions for insulin the day before surgery. [] If you are diabetic and your blood sugar is low or you feel symptomatic, you may drink 1-2 ounces of apple juice or take a glucose tablet.   The morning of your procedure, you may call the pre-op area if you have concerns about your blood sugar [phone]. [x] Use your inhalers the morning of surgery. Bring your emergency inhaler with you day of surgery. [] Follow physician instructions regarding any blood thinners you may be taking. WHAT TO EXPECT:  [x] The day of surgery you will be greeted and checked in by the [employer].  In addition, you will be registered in the [place] by a Patient Access Representative. Please bring your photo ID and insurance card. A nurse will greet you in accordance to the time you are needed in the pre-op area to prepare you for surgery. Please do not be discouraged if you are not greeted in the order you arrive as there are many variables that are involved in patient preparation. Your patience is greatly appreciated as you wait for your nurse. Please bring in items such as: books, magazines, newspapers, electronics, or any other items  to occupy your time in the waiting area. []  Delays may occur with surgery and staff will make a sincere effort to keep you informed of delays. If any delays occur with your procedure, we apologize ahead of time for your inconvenience as we recognize the value of your time.

## 2021-05-27 ENCOUNTER — ANESTHESIA EVENT (OUTPATIENT)
Dept: ENDOSCOPY | Age: 71
End: 2021-05-27
Payer: MEDICARE

## 2021-05-27 ENCOUNTER — APPOINTMENT (OUTPATIENT)
Dept: GENERAL RADIOLOGY | Age: 71
End: 2021-05-27
Attending: INTERNAL MEDICINE
Payer: MEDICARE

## 2021-05-27 ENCOUNTER — HOSPITAL ENCOUNTER (OUTPATIENT)
Age: 71
Setting detail: OUTPATIENT SURGERY
Discharge: HOME | End: 2021-05-27
Attending: INTERNAL MEDICINE | Admitting: INTERNAL MEDICINE
Payer: MEDICARE

## 2021-05-27 ENCOUNTER — ANESTHESIA (OUTPATIENT)
Dept: ENDOSCOPY | Age: 71
End: 2021-05-27
Payer: MEDICARE

## 2021-05-27 VITALS
SYSTOLIC BLOOD PRESSURE: 134 MMHG | HEART RATE: 82 BPM | WEIGHT: 180 LBS | HEIGHT: 67 IN | OXYGEN SATURATION: 93 % | BODY MASS INDEX: 28.25 KG/M2 | RESPIRATION RATE: 14 BRPM | TEMPERATURE: 97 F | DIASTOLIC BLOOD PRESSURE: 68 MMHG

## 2021-05-27 VITALS — DIASTOLIC BLOOD PRESSURE: 115 MMHG | TEMPERATURE: 98 F | SYSTOLIC BLOOD PRESSURE: 157 MMHG | OXYGEN SATURATION: 92 %

## 2021-05-27 LAB
INR BLD: 1
PROTHROMBIN TIME: 10.9 SEC (ref 9.3–12.4)

## 2021-05-27 PROCEDURE — 2720000010 HC SURG SUPPLY STERILE: Performed by: INTERNAL MEDICINE

## 2021-05-27 PROCEDURE — 7100000001 HC PACU RECOVERY - ADDTL 15 MIN: Performed by: INTERNAL MEDICINE

## 2021-05-27 PROCEDURE — 2500000003 HC RX 250 WO HCPCS

## 2021-05-27 PROCEDURE — 88173 CYTOPATH EVAL FNA REPORT: CPT

## 2021-05-27 PROCEDURE — 6360000002 HC RX W HCPCS

## 2021-05-27 PROCEDURE — 88342 IMHCHEM/IMCYTCHM 1ST ANTB: CPT

## 2021-05-27 PROCEDURE — 2580000003 HC RX 258

## 2021-05-27 PROCEDURE — 7100000011 HC PHASE II RECOVERY - ADDTL 15 MIN: Performed by: INTERNAL MEDICINE

## 2021-05-27 PROCEDURE — 3609020000 HC BRONCHOSCOPY W/EBUS FNA: Performed by: INTERNAL MEDICINE

## 2021-05-27 PROCEDURE — 6370000000 HC RX 637 (ALT 250 FOR IP): Performed by: ANESTHESIOLOGY

## 2021-05-27 PROCEDURE — 94640 AIRWAY INHALATION TREATMENT: CPT

## 2021-05-27 PROCEDURE — 3609155400 HC ADD ON COMPUTER ASSISTED NAVIGATION: Performed by: INTERNAL MEDICINE

## 2021-05-27 PROCEDURE — 71045 X-RAY EXAM CHEST 1 VIEW: CPT

## 2021-05-27 PROCEDURE — 7100000000 HC PACU RECOVERY - FIRST 15 MIN: Performed by: INTERNAL MEDICINE

## 2021-05-27 PROCEDURE — 2709999900 HC NON-CHARGEABLE SUPPLY: Performed by: INTERNAL MEDICINE

## 2021-05-27 PROCEDURE — 3700000000 HC ANESTHESIA ATTENDED CARE: Performed by: INTERNAL MEDICINE

## 2021-05-27 PROCEDURE — 85610 PROTHROMBIN TIME: CPT

## 2021-05-27 PROCEDURE — 3700000001 HC ADD 15 MINUTES (ANESTHESIA): Performed by: INTERNAL MEDICINE

## 2021-05-27 PROCEDURE — 36415 COLL VENOUS BLD VENIPUNCTURE: CPT

## 2021-05-27 PROCEDURE — 88341 IMHCHEM/IMCYTCHM EA ADD ANTB: CPT

## 2021-05-27 PROCEDURE — 7100000010 HC PHASE II RECOVERY - FIRST 15 MIN: Performed by: INTERNAL MEDICINE

## 2021-05-27 PROCEDURE — 3609027000 HC BRONCHOSCOPY: Performed by: INTERNAL MEDICINE

## 2021-05-27 PROCEDURE — 88305 TISSUE EXAM BY PATHOLOGIST: CPT

## 2021-05-27 RX ORDER — ONDANSETRON 2 MG/ML
INJECTION INTRAMUSCULAR; INTRAVENOUS PRN
Status: DISCONTINUED | OUTPATIENT
Start: 2021-05-27 | End: 2021-05-27 | Stop reason: SDUPTHER

## 2021-05-27 RX ORDER — ROCURONIUM BROMIDE 10 MG/ML
INJECTION, SOLUTION INTRAVENOUS PRN
Status: DISCONTINUED | OUTPATIENT
Start: 2021-05-27 | End: 2021-05-27 | Stop reason: SDUPTHER

## 2021-05-27 RX ORDER — IPRATROPIUM BROMIDE AND ALBUTEROL SULFATE 2.5; .5 MG/3ML; MG/3ML
1 SOLUTION RESPIRATORY (INHALATION) ONCE
Status: COMPLETED | OUTPATIENT
Start: 2021-05-27 | End: 2021-05-27

## 2021-05-27 RX ORDER — MEPERIDINE HYDROCHLORIDE 25 MG/ML
12.5 INJECTION INTRAMUSCULAR; INTRAVENOUS; SUBCUTANEOUS EVERY 5 MIN PRN
Status: DISCONTINUED | OUTPATIENT
Start: 2021-05-27 | End: 2021-05-27 | Stop reason: HOSPADM

## 2021-05-27 RX ORDER — DEXAMETHASONE SODIUM PHOSPHATE 10 MG/ML
INJECTION, SOLUTION INTRAMUSCULAR; INTRAVENOUS PRN
Status: DISCONTINUED | OUTPATIENT
Start: 2021-05-27 | End: 2021-05-27 | Stop reason: SDUPTHER

## 2021-05-27 RX ORDER — HYDROCODONE BITARTRATE AND ACETAMINOPHEN 5; 325 MG/1; MG/1
1 TABLET ORAL PRN
Status: DISCONTINUED | OUTPATIENT
Start: 2021-05-27 | End: 2021-05-27 | Stop reason: HOSPADM

## 2021-05-27 RX ORDER — PROMETHAZINE HYDROCHLORIDE 25 MG/ML
6.25 INJECTION, SOLUTION INTRAMUSCULAR; INTRAVENOUS EVERY 10 MIN PRN
Status: DISCONTINUED | OUTPATIENT
Start: 2021-05-27 | End: 2021-05-27 | Stop reason: HOSPADM

## 2021-05-27 RX ORDER — HYDROCODONE BITARTRATE AND ACETAMINOPHEN 5; 325 MG/1; MG/1
2 TABLET ORAL PRN
Status: DISCONTINUED | OUTPATIENT
Start: 2021-05-27 | End: 2021-05-27 | Stop reason: HOSPADM

## 2021-05-27 RX ORDER — PROPOFOL 10 MG/ML
INJECTION, EMULSION INTRAVENOUS PRN
Status: DISCONTINUED | OUTPATIENT
Start: 2021-05-27 | End: 2021-05-27 | Stop reason: SDUPTHER

## 2021-05-27 RX ORDER — SODIUM CHLORIDE 9 MG/ML
INJECTION, SOLUTION INTRAVENOUS CONTINUOUS PRN
Status: DISCONTINUED | OUTPATIENT
Start: 2021-05-27 | End: 2021-05-27 | Stop reason: SDUPTHER

## 2021-05-27 RX ORDER — LIDOCAINE HYDROCHLORIDE 20 MG/ML
INJECTION, SOLUTION INTRAVENOUS PRN
Status: DISCONTINUED | OUTPATIENT
Start: 2021-05-27 | End: 2021-05-27 | Stop reason: SDUPTHER

## 2021-05-27 RX ORDER — NEOSTIGMINE METHYLSULFATE 1 MG/ML
INJECTION, SOLUTION INTRAVENOUS PRN
Status: DISCONTINUED | OUTPATIENT
Start: 2021-05-27 | End: 2021-05-27 | Stop reason: SDUPTHER

## 2021-05-27 RX ORDER — MORPHINE SULFATE 2 MG/ML
2 INJECTION, SOLUTION INTRAMUSCULAR; INTRAVENOUS EVERY 5 MIN PRN
Status: DISCONTINUED | OUTPATIENT
Start: 2021-05-27 | End: 2021-05-27 | Stop reason: HOSPADM

## 2021-05-27 RX ORDER — FENTANYL CITRATE 50 UG/ML
INJECTION, SOLUTION INTRAMUSCULAR; INTRAVENOUS PRN
Status: DISCONTINUED | OUTPATIENT
Start: 2021-05-27 | End: 2021-05-27 | Stop reason: SDUPTHER

## 2021-05-27 RX ORDER — MIDAZOLAM HYDROCHLORIDE 1 MG/ML
INJECTION INTRAMUSCULAR; INTRAVENOUS PRN
Status: DISCONTINUED | OUTPATIENT
Start: 2021-05-27 | End: 2021-05-27 | Stop reason: SDUPTHER

## 2021-05-27 RX ORDER — GLYCOPYRROLATE 1 MG/5 ML
SYRINGE (ML) INTRAVENOUS PRN
Status: DISCONTINUED | OUTPATIENT
Start: 2021-05-27 | End: 2021-05-27 | Stop reason: SDUPTHER

## 2021-05-27 RX ORDER — MORPHINE SULFATE 2 MG/ML
1 INJECTION, SOLUTION INTRAMUSCULAR; INTRAVENOUS EVERY 5 MIN PRN
Status: DISCONTINUED | OUTPATIENT
Start: 2021-05-27 | End: 2021-05-27 | Stop reason: HOSPADM

## 2021-05-27 RX ORDER — PHENYLEPHRINE HCL IN 0.9% NACL 1 MG/10 ML
SYRINGE (ML) INTRAVENOUS PRN
Status: DISCONTINUED | OUTPATIENT
Start: 2021-05-27 | End: 2021-05-27 | Stop reason: SDUPTHER

## 2021-05-27 RX ADMIN — Medication 100 MCG: at 13:52

## 2021-05-27 RX ADMIN — DEXAMETHASONE SODIUM PHOSPHATE 10 MG: 10 INJECTION INTRAMUSCULAR; INTRAVENOUS at 13:45

## 2021-05-27 RX ADMIN — Medication 0.6 MG: at 14:16

## 2021-05-27 RX ADMIN — MIDAZOLAM 2 MG: 1 INJECTION INTRAMUSCULAR; INTRAVENOUS at 14:23

## 2021-05-27 RX ADMIN — Medication 3 MG: at 14:16

## 2021-05-27 RX ADMIN — ROCURONIUM BROMIDE 50 MG: 10 INJECTION, SOLUTION INTRAVENOUS at 13:45

## 2021-05-27 RX ADMIN — IPRATROPIUM BROMIDE AND ALBUTEROL SULFATE 1 AMPULE: .5; 3 SOLUTION RESPIRATORY (INHALATION) at 14:50

## 2021-05-27 RX ADMIN — LIDOCAINE HYDROCHLORIDE 100 MG: 20 INJECTION, SOLUTION INTRAVENOUS at 13:45

## 2021-05-27 RX ADMIN — SODIUM CHLORIDE: 0.9 INJECTION, SOLUTION INTRAVENOUS at 13:42

## 2021-05-27 RX ADMIN — FENTANYL CITRATE 100 MCG: 50 INJECTION, SOLUTION INTRAMUSCULAR; INTRAVENOUS at 13:45

## 2021-05-27 RX ADMIN — PROPOFOL 200 MG: 10 INJECTION, EMULSION INTRAVENOUS at 13:45

## 2021-05-27 RX ADMIN — ONDANSETRON HYDROCHLORIDE 4 MG: 2 INJECTION, SOLUTION INTRAMUSCULAR; INTRAVENOUS at 13:45

## 2021-05-27 RX ADMIN — SUGAMMADEX 163 MG: 100 INJECTION, SOLUTION INTRAVENOUS at 14:19

## 2021-05-27 ASSESSMENT — PULMONARY FUNCTION TESTS
PIF_VALUE: 18
PIF_VALUE: 0
PIF_VALUE: 1
PIF_VALUE: 3
PIF_VALUE: 19
PIF_VALUE: 17
PIF_VALUE: 17
PIF_VALUE: 20
PIF_VALUE: 1
PIF_VALUE: 21
PIF_VALUE: 18
PIF_VALUE: 3
PIF_VALUE: 17
PIF_VALUE: 21
PIF_VALUE: 0
PIF_VALUE: 21
PIF_VALUE: 11
PIF_VALUE: 3
PIF_VALUE: 17
PIF_VALUE: 18
PIF_VALUE: 17
PIF_VALUE: 14

## 2021-05-27 ASSESSMENT — PAIN SCALES - GENERAL
PAINLEVEL_OUTOF10: 0

## 2021-05-27 ASSESSMENT — PAIN - FUNCTIONAL ASSESSMENT: PAIN_FUNCTIONAL_ASSESSMENT: 0-10

## 2021-05-27 NOTE — PROGRESS NOTES
Reviewed discharge instructions with patient and wife. Verbalized understanding. No further questions at this time.

## 2021-05-27 NOTE — ANESTHESIA POSTPROCEDURE EVALUATION
Department of Anesthesiology  Postprocedure Note    Patient: Ramses Mantilla  MRN: [id number]  YOB: 1950  Date of evaluation: 5/28/2021  Time:  10:31 AM     Procedure Summary     Date: 05/27/21 Room / Location: [facility] 03 / [facility]    Anesthesia Start:  Anesthesia Stop:     Procedures:       BRONCHOSCOPY EBUS (N/A )      BRONCHOSCOPY NAVIGATIONAL--CYTOLOGY (N/A ) Diagnosis: (LUNG MASS)    Surgeons: Graham Jackson MD Responsible Provider:     Anesthesia Type: general ASA Status: 2          Anesthesia Type: general    Shin Phase I: Shin Score: 10    Shin Phase II: Shin Score: 10    Last vitals: Reviewed and per EMR flowsheets.        Anesthesia Post Evaluation    Patient location during evaluation: PACU  Patient participation: complete - patient participated  Level of consciousness: awake  Pain score: 3  Airway patency: patent  Nausea & Vomiting: no nausea and no vomiting  Complications: no  Cardiovascular status: blood pressure returned to baseline  Respiratory status: acceptable  Hydration status: euvolemic

## 2021-05-27 NOTE — ANESTHESIA PRE PROCEDURE
Department of Anesthesiology  Preprocedure Note       Name:  Itzel Stevenson   Age:  66 y.o.  :  1950                                          MRN:  [id number]         Date:  2021      Surgeon: Gt Llanes):  Juan Dawkins MD    Procedure: Procedure(s):  BRONCHOSCOPY EBUS  BRONCHOSCOPY NAVIGATIONAL--CYTOLOGY    Medications prior to admission:   Prior to Admission medications    Medication Sig Start Date End Date Taking? Authorizing Provider   metoprolol succinate (TOPROL XL) 50 MG extended release tablet Take 1 tablet by mouth daily 19  Yes Janet Perez MD   simvastatin (ZOCOR) 40 MG tablet Take 40 mg by mouth daily    Yes Historical Provider, MD   lisinopril (PRINIVIL;ZESTRIL) 20 MG tablet Take 20 mg by mouth daily    Yes Historical Provider, MD   umeclidinium-vilanterol (ANORO ELLIPTA) 62.5-25 MCG/INH AEPB inhaler Inhale 1 puff into the lungs daily   Yes Historical Provider, MD   albuterol (ACCUNEB) 0.63 MG/3ML nebulizer solution Take 1 ampule by nebulization every 6 hours as needed for Wheezing    Historical Provider, MD       Current medications:    No current facility-administered medications for this encounter.        Allergies:  No Known Allergies    Problem List:    Patient Active Problem List   Diagnosis Code    Syncope and collapse R55    Pneumonia J18.9    COPD (chronic obstructive pulmonary disease) ([facility] [address] J44.9    BPH (benign prostatic hyperplasia) N40.0    HTN (hypertension) I10    HLD (hyperlipidemia) E78.5       Past Medical History:        Diagnosis Date    COPD (chronic obstructive pulmonary disease) ([facility] [address]     Syncope 2020    dr Ohara Fairly wearing a monitor       Past Surgical History:        Procedure Laterality Date    COLONOSCOPY      HERNIA REPAIR Bilateral 2011    groin        Social History:    Social History     Tobacco Use    Smoking status: Former Smoker     Years: 20.00    Smokeless tobacco: Never Used    Tobacco comment: stop 2011   Substance Use Topics    Alcohol use: Yes     Alcohol/week: 7.0 standard drinks     Types: 7 Glasses of wine per week     Comment: daily                                Counseling given: Not Answered  Comment: stop 2011      Vital Signs (Current):   Vitals:    05/24/21 1133 05/27/21 1205   BP:  (!) 140/89   Pulse:  85   Resp:  16   Temp:  96.9 °F (36.1 °C)   TempSrc:  Temporal   SpO2:  96%   Weight: 180 lb (81.6 kg) 180 lb (81.6 kg)   Height: 5' 7\" (1.702 m) 5' 7\" (1.702 m)                                              BP Readings from Last 3 Encounters:   05/27/21 (!) 140/89   10/29/20 132/83   05/18/19 (!) 147/85       NPO Status: Time of last liquid consumption: 1900                        Time of last solid consumption: 1800                        Date of last liquid consumption: 05/26/21                        Date of last solid food consumption: 05/26/21    BMI:   Wt Readings from Last 3 Encounters:   05/27/21 180 lb (81.6 kg)   10/29/20 178 lb (80.7 kg)   05/17/19 192 lb (87.1 kg)     Body mass index is 28.19 kg/m². CBC:   Lab Results   Component Value Date    WBC 9.0 10/29/2020    RBC 4.67 10/29/2020    HGB 14.2 10/29/2020    HCT 43.4 10/29/2020    MCV 92.9 10/29/2020    RDW 15.2 10/29/2020     10/29/2020       CMP:   Lab Results   Component Value Date     10/29/2020    K 4.1 10/29/2020     10/29/2020    CO2 27 10/29/2020    BUN 20 10/29/2020    CREATININE 1.0 10/29/2020    GFRAA >60 10/29/2020    LABGLOM >60 10/29/2020    GLUCOSE 100 10/29/2020    PROT 6.8 10/29/2020    CALCIUM 9.5 10/29/2020    BILITOT 0.4 10/29/2020    ALKPHOS 93 10/29/2020    AST 22 10/29/2020    ALT 33 10/29/2020       POC Tests: No results for input(s): POCGLU, POCNA, POCK, POCCL, POCBUN, POCHEMO, POCHCT in the last 72 hours.     Coags:   Lab Results   Component Value Date    PROTIME 10.9 05/27/2021    INR 1.0 05/27/2021       HCG (If Applicable): No results found for: PREGTESTUR, PREGSERUM, HCG, HCGQUANT     ABGs: No results

## 2021-05-27 NOTE — PROGRESS NOTES
Message to Dr. Yanique Ugarte regarding CXR result. [id number] [address] Butts for discharge per Dr. Yanique Ugarte.

## 2021-05-27 NOTE — PROGRESS NOTES
Patient and family updated after procedure completion. We will await final pathology and set up for both medical and radiation oncology visits outpatient.     Benton Bhat MD  5/27/2021  3:05 PM

## 2021-05-27 NOTE — H&P
ELLIPTA) 62.5-25 MCG/INH AEPB inhaler Inhale 1 puff into the lungs daily   Yes Historical Provider, MD   albuterol (ACCUNEB) 0.63 MG/3ML nebulizer solution Take 1 ampule by nebulization every 6 hours as needed for Wheezing    Historical Provider, MD       CURRENT MEDICATIONS:  No current facility-administered medications for this encounter.     IV MEDICATIONS:      ALLERGIES:  No Known Allergies    REVIEW OF SYSTEMS:  General ROS:     - Positive For:     - Negative For: chills, fatigue, fever, malaise, night sweats or sleep disturbance   ENT ROS:      - Positive For:     - Negative For: epistaxis, headaches, sinus pain, sneezing or sore throat   Allergy and Immunology ROS:     - Negative For: nasal congestion, postnasal drip or seasonal allergies   Hematological and Lymphatic ROS:      - Negative For: bleeding problems, bruising, fatigue, night sweats or pallor   Respiratory ROS:      - Positive For:       - Negative For: hemoptysis, pleuritic type chest pains  Cardiovascular ROS:      - Positive For:      - Negative For: chest pain, dyspnea on exertion, irregular heartbeat, loss of consciousness, murmur, orthopnea or palpitations   Gastrointestinal ROS:      - Positive For:     - Negative For: abdominal pain, appetite loss, blood in stools, change in bowel habits, change in stools, constipation, diarrhea, hematemesis, melena, nausea/vomiting or stool incontinence   Genito-Urinary ROS:      - Negative For: change in urinary stream, dysuria, hematuria or incontinence   Musculoskeletal ROS:      - Negative For: joint pain, joint stiffness, joint swelling or muscle pain   Neurological ROS:     - Negative For: behavioral changes, confusion, dizziness, headaches, impaired coordination/balance or memory loss   Dermatological ROS:      - Negative For: hair changes, lumps, mole changes, nail changes or pruritus    PHYSICAL EXAMINATION:     VITAL SIGNS:  BP (!) 140/89   Pulse 85   Temp 96.9 °F (36.1 °C) (Temporal)   Resp 16   Ht 5' 7\" (1.702 m)   Wt 180 lb (81.6 kg)   SpO2 96%   BMI 28.19 kg/m²   Wt Readings from Last 3 Encounters:   21 180 lb (81.6 kg)   10/29/20 178 lb (80.7 kg)   19 192 lb (87.1 kg)     Temp Readings from Last 3 Encounters:   21 96.9 °F (36.1 °C) (Temporal)   10/29/20 96.8 °F (36 °C) (Temporal)   19 97.8 °F (36.6 °C) (Temporal)     TMAX:  BP Readings from Last 3 Encounters:   21 (!) 140/89   10/29/20 132/83   19 (!) 147/85     Pulse Readings from Last 3 Encounters:   21 85   10/29/20 84   19 89       CURRENT PULSE OXIMETRY: SpO2: 96 %  24HR PULSE OXIMETRY RANGE: SpO2  Av %  Min: 96 %  Max: 96 %  CVP:      ________________________________________________________________________    VENTILATOR SETTINGS (if applicable):         Vent Information  SpO2: 96 %  Additional Respiratory  Assessments  Pulse: 85  Resp: 16  SpO2: 96 %  ETCO2:  Peak Inspiratory Pressure:  End-Inspiratory Plateau Pressure:    ABG:  No results for input(s): PH, PO2, PCO2, HCO3, BE, O2SAT, METHB, O2HB, COHB, O2CON, HHB, THB in the last 72 hours. ________________________________________________________________________    IV ACCESS:    NUTRITION: No diet orders on file    INTAKE/OUTPUTS:  No intake/output data recorded.   No intake or output data in the 24 hours ending 21 1228    General Appearance: alert and oriented to person, place and time, well-developed and   well-nourished, in no acute distress   Eyes: pupils equal, round, and reactive to light, extraocular eye movements intact, conjunctivae normal and sclera anicteric   Neck: neck supple and non tender without mass, no thyromegaly, no thyroid nodules and no cervical adenopathy   Pulmonary/Chest: decreased breath sounds, no accessory muscles of inspiration, no focal wheezes  Cardiovascular: normal rate, regular rhythm, normal S1 and S2, no murmurs, rubs, clicks or gallops, distal pulses intact, no carotid bruits, no murmurs, no gallops, no carotid bruits and no JVD   Abdomen: soft, non-tender, non-distended, normal bowel sounds, no masses or organomegaly   Extremities: no cyanosis, no clubbing, no edema  Musculoskeletal: normal range of motion, no joint swelling, deformity or tenderness   Neurologic: reflexes normal and symmetric, no cranial nerve deficit noted    LABS/IMAGING:    CBC:  Lab Results   Component Value Date    WBC 9.0 10/29/2020    HGB 14.2 10/29/2020    HCT 43.4 10/29/2020    MCV 92.9 10/29/2020     10/29/2020    LYMPHOPCT 17.0 (L) 10/29/2020    RBC 4.67 10/29/2020    MCH 30.4 10/29/2020    MCHC 32.7 10/29/2020    RDW 15.2 (H) 10/29/2020    NEUTOPHILPCT 71.4 10/29/2020    MONOPCT 9.2 10/29/2020    BASOPCT 0.6 10/29/2020    NEUTROABS 6.40 10/29/2020    LYMPHSABS 1.52 10/29/2020    MONOSABS 0.82 10/29/2020    EOSABS 0.10 10/29/2020    BASOSABS 0.05 10/29/2020       No results for input(s): WBC, HGB, HCT, MCV, PLT in the last 720 hours. BMP:   No results for input(s): NA, K, CL, CO2, PHOS, BUN, CREATININE in the last 72 hours. Invalid input(s): CA    MG: No results found for: MG  Ca/Phos:   Lab Results   Component Value Date    CALCIUM 9.5 10/29/2020     Amylase: No results found for: AMYLASE  Lipase: No results found for: LIPASE  LIVER PROFILE: No results for input(s): AST, ALT, LIPASE, BILIDIR, BILITOT, ALKPHOS in the last 72 hours. Invalid input(s): AMYLASE,  ALB    PT/INR: No results for input(s): PROTIME, INR in the last 72 hours. APTT: No results for input(s): APTT in the last 72 hours.     Cardiac Enzymes:  Lab Results   Component Value Date    TROPONINI <0.01 10/29/2020       Hgb A1C:   Lab Results   Component Value Date    LABA1C 5.8 (H) 05/18/2019     No results found for: EAG  PAOLA: No results found for: PAOLA  ESR: No results found for: SEDRATE  CRP: No results found for: CRP  D Dimer:   Lab Results   Component Value Date    DDIMER <200 01/18/2019     Folate and B12: No results found for: [id number], No results found for: FOLATE    Lactic Acid:   Lab Results   Component Value Date    LACTA 1.8 10/29/2020     Ammonia:   Cortisol:  Thyroid Studies:  Lab Results   Component Value Date    TSH 0.802 05/18/2019     CT Chest 5/6/2021:  3.4 cm left hilar mass noted. ASSESSMENT:  1.) Left Hilar Lymphadenopathy     PLAN:  1.) EBUS bronchoscopy with 11L lymph node sampling    Thank you Susan Newton MD very much for allowing me to see this patient in consultation and follow up. Care reviewed with nursing staff, medical and surgical specialty care, primary care and the patient's family as available. Restraints are ordered when the patient can do harm to him/herself by pulling out devices.     Ajit Carl MD, M.D.

## 2021-05-27 NOTE — OP NOTE
[facility]    Pulmonary/critical care procedure note    Flexible Fiberoptic Bronchoscopy NOTE    Patient: Lee Mckeon    MRN: [id number]  : 1950    Date: 2021  Time: 2:34 PM     Primary Care Physician:      Yareli Moran MD     [phone]    Laboratory Work:  Lab Results   Component Value Date    INR 1.0 2021    INR 1.0 2019    PROTIME 10.9 2021    PROTIME 11.2 2019     Lab Results   Component Value Date    WBC 9.0 10/29/2020    HGB 14.2 10/29/2020    HCT 43.4 10/29/2020    MCV 92.9 10/29/2020     10/29/2020    LYMPHOPCT 17.0 (L) 10/29/2020    RBC 4.67 10/29/2020    MCH 30.4 10/29/2020    MCHC 32.7 10/29/2020    RDW 15.2 (H) 10/29/2020    NEUTOPHILPCT 71.4 10/29/2020    MONOPCT 9.2 10/29/2020    BASOPCT 0.6 10/29/2020    NEUTROABS 6.40 10/29/2020    LYMPHSABS 1.52 10/29/2020    MONOSABS 0.82 10/29/2020    EOSABS 0.10 10/29/2020    BASOSABS 0.05 10/29/2020     Lab Results   Component Value Date     10/29/2020    K 4.1 10/29/2020     10/29/2020    CO2 27 10/29/2020    BUN 20 10/29/2020    CREATININE 1.0 10/29/2020    GLUCOSE 100 10/29/2020    CALCIUM 9.5 10/29/2020      Attending Physician: Dr. Trang Mendez    Assistant(s): Anesthesia Dept, Cytology, Pathology, Endoscopy Team     Pre-Operative Medications: Per Anesthesia Dept     Intra-Operative Medications: Per Anesthesia Dept     Anesthesia: General Anesthesia    Pre-Procedure Diagnosis: Left Hilar Lymphadenopathy     Operation/Procedure:   1.) Flexible Fiberoptic Bronchoscopy  2.) EBUS Bronchoscopy with Lymph Node FNA x Station 11L x 6 Passes    Post-Procedure Diagnosis: Left Hilar Lymphadenopathy, Non Small Cell Carcinoma     Consent: Consent was obtained prior to procedure. Indications, risks, benefits were explained at length.     Indications: Left Hilar Lymphadenopathy    Purpose: Diagnostic, Therapeutic    Procedure Summary:   A time out was performed to confirm both patient and procedure. I had worn a gown with hat, mask, gloves prior to initiation of procedure. The patient was pre-medicated with medications per anesthesia dept. Intra-operatively, any additional medications were given under instruction of anesthesia dept. Bronchoscope was introduced via adapter at end of ET tube.   The trachea was inspected and found to be normal.  The main kenny was sharp.     - The right bronchial tree was inspected and the following were noted:   [a] Right Upper Lobe contained three segments [apical segmental bronchus, posterior segmental bronchus, and anterior segmental bronchus] and was found to be normal.   [b] Bronchus Intermedius was examined and found to be normal.   [c] Right Middle Lobe had 2 segments [lateral segmental bronchus and medial segmental bronchus] and was found to be normal.   [d] Right Lower Lobe including apical segment and basal segmental branches [anterior basal branch, lateral basal branch, and posterior basal branch] was also inspected to the sub-segmental levels and was found to be normal.   [e] The right bronchial tree was found to be without stenosis, circumferential narrowing, intrinsic compressing mass, extrinsic compressing mass, obstruction in the area of the RUL, RML, RLL.     - The left bronchial tree was inspected and the following were noted:   [a] Left Upper Lobe including the apical posterior segmental bronchus and the anterior segmental bronchus were inspected and found to be normal.  [b] Lingula including the superior, inferior bronchopulmonary segments were inspected and found to be normal.   [c] Left Lower Lobe with superior segment was inspected to sub-segmental level [anterior basal segment, lateral basal segment, and posterior basal segment] and found to be normal.   [d] The left bronchial tree was found to be without stenosis, circumferential narrowing, intrinsic compressing mass, extrinsic compressing mass, obstruction in the area of the

## 2021-06-16 ENCOUNTER — HOSPITAL ENCOUNTER (OUTPATIENT)
Dept: RADIATION ONCOLOGY | Age: 71
Discharge: HOME | End: 2021-06-16
Payer: MEDICARE

## 2021-06-16 ENCOUNTER — TELEPHONE (OUTPATIENT)
Dept: CASE MANAGEMENT | Age: 71
End: 2021-06-16

## 2021-06-16 VITALS
HEART RATE: 99 BPM | WEIGHT: 187 LBS | TEMPERATURE: 97.6 F | BODY MASS INDEX: 29.29 KG/M2 | RESPIRATION RATE: 18 BRPM | SYSTOLIC BLOOD PRESSURE: 124 MMHG | DIASTOLIC BLOOD PRESSURE: 86 MMHG | OXYGEN SATURATION: 96 %

## 2021-06-16 DIAGNOSIS — C34.90 MALIGNANT NEOPLASM OF LUNG, UNSPECIFIED LATERALITY, UNSPECIFIED PART OF LUNG (HCC): Primary | ICD-10-CM

## 2021-06-16 PROCEDURE — 99205 OFFICE O/P NEW HI 60 MIN: CPT | Performed by: RADIOLOGY

## 2021-06-16 PROCEDURE — 99205 OFFICE O/P NEW HI 60 MIN: CPT

## 2021-06-16 RX ORDER — PREDNISONE 10 MG/1
10 TABLET ORAL DAILY
Status: ON HOLD | COMMUNITY
End: 2022-05-27 | Stop reason: HOSPADM

## 2021-06-16 SDOH — ECONOMIC STABILITY: HOUSING INSECURITY: PLEASE ASSESS YOUR PATIENT'S LEVEL OF DISTRESS CONCERNING HOUSING (SCALE FROM 1-10): 0 (NONE)

## 2021-06-16 NOTE — PROGRESS NOTES
Jian Horn  1950 66 y.o. Referring Physician:  Dr. Dominic Gordillo    PCP: Sammi Turner MD     Vitals:    06/16/21 0811   BP: 124/86   Pulse: 99   Resp: 18   Temp: 97.6 °F (36.4 °C)   SpO2: 96%        Wt Readings from Last 3 Encounters:   06/16/21 187 lb (84.8 kg)   05/27/21 180 lb (81.6 kg)   10/29/20 178 lb (80.7 kg)        Body mass index is 29.29 kg/m². Chief Complaint: No chief complaint on file. Cancer Staging  No matching staging information was found for the patient. Prior Radiation Therapy? NO    Concurrent Chemo/radiation? NO    Prior Chemotherapy? NO    Prior Hormonal Therapy? NO    Head and Neck Cancer? No, patient does NOT have HN cancer. Current Outpatient Medications   Medication Sig Dispense Refill    predniSONE (DELTASONE) 10 MG tablet Take 10 mg by mouth daily      metoprolol succinate (TOPROL XL) 50 MG extended release tablet Take 1 tablet by mouth daily 30 tablet 3    simvastatin (ZOCOR) 40 MG tablet Take 40 mg by mouth daily       lisinopril (PRINIVIL;ZESTRIL) 20 MG tablet Take 20 mg by mouth daily       albuterol (ACCUNEB) 0.63 MG/3ML nebulizer solution Take 1 ampule by nebulization every 6 hours as needed for Wheezing      umeclidinium-vilanterol (ANORO ELLIPTA) 62.5-25 MCG/INH AEPB inhaler Inhale 1 puff into the lungs daily       No current facility-administered medications for this encounter.        Past Medical History:   Diagnosis Date    COPD (chronic obstructive pulmonary disease) ([facility] [address]     Syncope 09/2020    dr Ivonne Souza wearing a monitor       Past Surgical History:   Procedure Laterality Date    BRONCHOSCOPY N/A 5/27/2021    BRONCHOSCOPY W/EBUS FNA performed by Tania Gilmore MD at [address] N/A 5/27/2021    BRONCHOSCOPY DIAGNOSTIC OR CELL [address] ONLY performed by Tania Gilmore MD at [address] N/A 5/27/2021    BRONCHOSCOPY ADD ON COMPUTER ASSISTED performed by Tania Gilmore MD at [facility]    COLONOSCOPY      HERNIA REPAIR Bilateral 2011 2001    groin        Family History   Problem Relation Age of Onset    Cancer Father         prostate    Cancer Paternal Uncle         anal       Social History     Socioeconomic History    Marital status:      Spouse name: Not on file    Number of children: Not on file    Years of education: Not on file    Highest education level: Not on file   Occupational History    Not on file   Tobacco Use    Smoking status: Former Smoker     Years: 20.00    Smokeless tobacco: Never Used    Tobacco comment: stop 2011   Vaping Use    Vaping Use: Never used   Substance and Sexual Activity    Alcohol use: Yes     Alcohol/week: 1.0 standard drinks     Types: 1 Glasses of wine per week     Comment: daily    Drug use: No    Sexual activity: Not on file   Other Topics Concern    Not on file   Social History Narrative    Not on file     Social Determinants of Health     Financial Resource Strain:     Difficulty of Paying Living Expenses:    Food Insecurity:     Worried About Running Out of Food in the Last Year:     [address] in the Last Year:    Transportation Needs:     Lack of Transportation (Medical):  Lack of Transportation (Non-Medical):    Physical Activity:     Days of Exercise per Week:     Minutes of Exercise per Session:    Stress:     Feeling of Stress :    Social Connections:     Frequency of Communication with Friends and Family:     Frequency of Social Gatherings with Friends and Family:     Attends Hinduism Services:     Active Member of Clubs or Organizations:     Attends Club or Organization Meetings:     Marital Status:    Intimate Partner Violence:     Fear of Current or Ex-Partner:     Emotionally Abused:     Physically Abused:     Sexually Abused:            Occupation: [employer]  Retired:  YES: Patient is retired from [employer]         REVIEW OF SYSTEMS: <<For Level 5, 10 or more systems>>   Approximately 20 0-3 Little or  No Risk 1. Provide assistance as indicated for ambulation activities  2. Reorient confused/cognitively impaired patient  3. Call-light/bell within patient's reach  4. Chair/bed in low position, stretcher/bed with siderails up except when performing patient care activities  5. Educate patient/family/caregiver on falls prevention  6.  Reassess in 12 weeks or with any noted change in patient condition which places them at a risk for a fall   4-6 Moderate Risk 1. Provide assistance as indicated for ambulation activities  2. Reorient confused/cognitively impaired patient  3. Call-light/bell within patient's reach  4. Chair/bed in low position, stretcher/bed with siderails up except when performing patient care activities  5. Educate patient/family/caregiver on falls prevention  6. Falls risk precaution (Yellow sticker Level II) placed on patient chart   7 or   Higher High Risk 1. Place patient in easily observable treatment room  2. Patient attended at all times by family member or staff  3. Provide assistance as indicated for ambulation activities  4. Reorient confused/cognitively impaired patient  5. Call-light/bell within patient's reach  6. Chair/bed in low position, stretcher/bed with siderails up except when performing patient care activities  7. Educate patient/family/caregiver on falls prevention  8. Falls risk precaution (Yellow sticker Level III) placed on patient chart           MALNUTRITION RISK SCREENING ASSESSMENT    Instructions:  Assess the patient and enter the appropriate indicators that are present for nutrition risk identification. Total the numbers entered and assign a risk score. Follow the appropriate action for total score listed below. Assessment   Date  6/16/2021     1. Have you lost weight without trying? 0- No     2. Have you been eating poorly because of a decreased appetite? 0- No   3. Do you have a diagnosis of head and neck cancer? 0- No                                                                                    TOTAL 0          Score of 0-1: No action  Score 2 or greater:  · For Non-Diabetic Patient: Recommend adding Ensure Complete 2 x daily and provide patient with Ensure wellness bag with coupons  · For Diabetic Patient: Recommend adding Glucerna Shake 2 x daily and provide patient with Glucerna Wellness bag with coupons  · Route to the dietitian via [address]    · Are you having difficulty performing daily routine tasks due to fatigue or weakness (ie: bathing/showering, dressing, housework, meal prep, work, , etc): No     · Do you have any arm flexibility/ROM restrictions, swelling or pain that limit activity: No     · Any changes in memory, attention/focus that impact daily activities: No     · Do you avoid participation in leisure/social activity due to weakness, fatigue or pain: No     ARE ANY OF THE ABOVE ARE ANSWERED YES: No          PT ASSESSMENT FOR REFERRAL    · Have you had any recent falls in the past 2 months: No     · Do you have difficulty going up/down stairs:  No     · Are you having difficulty walking: No     · Do you often hold onto furniture/environmental supports or feel off balance when you are walking: No     · Do you need to take rest breaks when you are walking: No     · Any pain on a scale of 1-10 that limits your mobility: No 0/10    ARE ANY OF THE ABOVE ARE ANSWERED YES: No                   PREHAB AUDIOLOGY REFERRAL    - Is patient planned to receive Cisplatin? No. This patient is not planned to start Cisplatin. - Is patient planned to receive radiation therapy that may be directed toward auditory canals or nerves? No. Patient is not planned to start radiation therapy to auditory canals or nerves. - Is patient complaining of new onset hearing loss? No. Patient is not complaining of new onset hearing loss.           Patient education given on radiation therapy to chest. The patient expresses understanding and acceptance of instructions.  Thang Steven RN 6/16/2021 8:16 AM           Thang Steven RN

## 2021-06-16 NOTE — TELEPHONE ENCOUNTER
Met with patient and wife during his initial consultation with Dr. Linda Louis for his recent Lung cancer diagnosis. Introduced myself and explained my role with patients receiving treatment at our center. Patient was friendly and receptive. He will see Dr Fozia Foreman for Medical Oncology consult on 6/17/21. He had PET and CT scans done at [email or web address] Reception working on requesting copy of results. They deny any needs at this time. Copy of his pathology findings given including cancer type. Instructed on next steps including Medical Oncology consult and potential treatments per Dr. Kristen Celeste recommendations and follow up care. Provided patient with  literature  on Patient Resource Lung Cancer, Understanding Lung Cancer, Lung Cancer Nutrition Tips and Community Transportation Resource List. Reviewed resources available including Social Work, Dietician, and Financial Navigator. Provided with my contact information and instructed patient to call me with questions or concerns. Verbalizes understanding. Patient appreciative of visit. Will continue to follow.

## 2021-06-17 ENCOUNTER — TELEPHONE (OUTPATIENT)
Dept: RADIATION ONCOLOGY | Age: 71
End: 2021-06-17

## 2021-06-17 NOTE — TELEPHONE ENCOUNTER
Rad onc nurse called and left a voicemail at the [facility] to request Dr. Ubaldo Odonnell consult note from today. Rad onc nurse Essie Louis  Detailed message with patient name, , the office fax number and phone number. Rad onc nurse asked once they get the VM they can call back to let us know.

## 2021-06-19 NOTE — PROGRESS NOTES
Radiation Oncology      Héctor Méndez  [facility]      Referring Physician: Dr. Nieves Patino MD   Primary Oncologist: pending    Diagnosis: cT2 cN2 NSCLC      Service:  Radiation Oncology consultation performed on 6/19/21        HPI:      Lorena Alejandro is a very pleasant 66year old with lung cancer. Pt has a hx of COPD and in 2019 patient was hospitalized for pneumonia. In April 2021 patient said he started developing the same symptoms he had in 2019 so went and saw his PCP. His PCP ordered a CT of the chest on 5/6/2021 which showed a 3.4cm left hilar mass. On 5/27/2021 Dr. Elidia Cash did a bronchoscopy and did a FNA pathology showing positive for malignant cells poorly differentiated carcinoma consistent with squamous cell carcinoma. Pt had a PET scan at [facility] on 6/11/2021. The patient presents today to discuss fractionated external beam radiation therapy as a component of multidisciplinary, definative management. We reviewed the available medical records including the complete medical history of this pt today prior to consultation. Epic -CE and available scanned documents per the Epic Media tab were reviewed PRN. A complete ROS was also performed today and is noted below. During consultation today I personally discussed the pts workup to date; including but not limited to applicable imaging studies, Pathology reports, and interventions. The NCCN guidelines, as pertaining to the above diagnosis were also recapped for the pt today in brief. Today, Ysabel Flores  notes Sx that include SOB and cough. KPS 70.        -----    Per [address]:      OSH records reviewed. -----    Pathology reviewed:      OSH PATH reviewed.     -----      Past Medical History:   Diagnosis Date    COPD (chronic obstructive pulmonary disease) ([facility] [address]     Syncope 09/2020    dr Ny Smith wearing a monitor       Past Surgical History:   Procedure Laterality Date    BRONCHOSCOPY N/A 5/27/2021    BRONCHOSCOPY W/EBUS FNA performed by Prudence Crawley MD at [address] [facility] N/A 5/27/2021    BRONCHOSCOPY DIAGNOSTIC OR CELL [address] ONLY performed by Prudence Crawley MD at [address] [facility] N/A 5/27/2021    BRONCHOSCOPY ADD ON COMPUTER ASSISTED performed by Prudence Crawley MD at [address] [facility] 2011 2001    groin        Family History   Problem Relation Age of Onset    Cancer Father         prostate    Cancer Paternal Uncle         anal       Current Outpatient Medications   Medication Sig Dispense Refill    predniSONE (DELTASONE) 10 MG tablet Take 10 mg by mouth daily      metoprolol succinate (TOPROL XL) 50 MG extended release tablet Take 1 tablet by mouth daily 30 tablet 3    simvastatin (ZOCOR) 40 MG tablet Take 40 mg by mouth daily       lisinopril (PRINIVIL;ZESTRIL) 20 MG tablet Take 20 mg by mouth daily       albuterol (ACCUNEB) 0.63 MG/3ML nebulizer solution Take 1 ampule by nebulization every 6 hours as needed for Wheezing      umeclidinium-vilanterol (ANORO ELLIPTA) 62.5-25 MCG/INH AEPB inhaler Inhale 1 puff into the lungs daily       No current facility-administered medications for this encounter. No Known Allergies    Social History     Socioeconomic History    Marital status:      Spouse name: None    Number of children: None    Years of education: None    Highest education level: None   Occupational History    None   Tobacco Use    Smoking status: Former Smoker     Years: 20.00    Smokeless tobacco: Never Used    Tobacco comment: stop 2011   Vaping Use    Vaping Use: Never used   Substance and Sexual Activity    Alcohol use:  Yes     Alcohol/week: 1.0 standard drinks     Types: 1 Glasses of wine per week     Comment: daily    Drug use: No    Sexual activity: None   Other Topics Concern    None   Social History Narrative    None     Social Determinants of Health     Financial Resource Strain:     Difficulty of Paying Living Expenses:    Food Insecurity:     Worried About Running Out of Food in the Last Year:     [address] in the Last Year:    Transportation Needs:     Lack of Transportation (Medical):  Lack of Transportation (Non-Medical):    Physical Activity:     Days of Exercise per Week:     Minutes of Exercise per Session:    Stress:     Feeling of Stress :    Social Connections:     Frequency of Communication with Friends and Family:     Frequency of Social Gatherings with Friends and Family:     Attends Alevism Services:     Active Member of Clubs or Organizations:     Attends Club or Organization Meetings:     Marital Status:    Intimate Partner Violence:     Fear of Current or Ex-Partner:     Emotionally Abused:     Physically Abused:     Sexually Abused:            Review of Systems - History obtained from chart review and the patient  General ROS: positive for  - fatigue  Psychological ROS: negative  Ophthalmic ROS: negative  ENT ROS: negative  Allergy and Immunology ROS: negative  Hematological and Lymphatic ROS: negative  Endocrine ROS: negative  Respiratory ROS: positive for - cough and shortness of breath  Cardiovascular ROS: no chest pain or dyspnea on exertion  Gastrointestinal ROS: no abdominal pain, change in bowel habits, or black or bloody stools  Genito-Urinary ROS: no dysuria, trouble voiding, or hematuria  Musculoskeletal ROS: negative  Neurological ROS: no TIA or stroke symptoms  Dermatological ROS: negative        Physical Exam  HENT:      Head: Normocephalic. Right Ear: External ear normal.      Left Ear: External ear normal.      Nose: Nose normal.      Mouth/Throat:      Mouth: Mucous membranes are moist.   Eyes:      Pupils: Pupils are equal, round, and reactive to light. Cardiovascular:      Rate and Rhythm: Normal rate and regular rhythm.       Pulses: based on the Auperin metanalysis. These data demonstrate a benefit of concomitant chemo-RT over sequential chemo->RT on OS (HR 0.84, SS), with absolute benefit at 3-years of 5.7% (18% to 24%), 5-years 4.5% (11% to 15%). Interestingly, there was no difference in PFS (HR 0.9, p=0.07). However a decrease in locoregional progression (HR 0.777, SS), with absolute decrease of 6% at 5 years (35% to 29%) was shown. There was no difference on distant progression (HR 1.04, NS), with 5-year rate of about 40% Inderjit Eaves Oncol 2010 May 1;28(13):8717-9972). Regarding the radiation dose, 60 Gy in 2Gy/fx was established long ago in RTOG 73-01, with several other trials showing a feasibility of much higher doses however with RT alone. Interestingly in the concurrent era, RTOG 0617 tested higher dose arms (Concurrent RT + Carbo/Taxol +/- Cetuximab) these were closed early with \"negative\" results (longer term results and POFA needed), therefor 60 Gy in daily fractions with dual agent chemotherapy can be considered the standard (the Jarred and LAMP trials also assisted in the development of this paradigm). Fractionated external beam radiation therapy may or may not be delivered with intensity modulation +/- image guidance per daily cone beam depending on the specific patient anatomy and dose constraints as described per the RTOG and QUANTEC ---Guy Newton Int J Radiat Oncol Biol Phys. 2010 Mar 1;76(3 Suppl):S10-9. The risks, benefits, alternatives, process and logistics of external beam radiation were reviewed (risks include but are not limited to worsening PULM function, fibrosis, esophagitis, esophageal structure, perforations, bleeding, paralysis and death). We answered all of the patient's questions to the best of our ability. Mone Lynch verbalized understanding and seemed satisfied.  Radiation planning will commence within 3 days; the next step in management being the simulation scan, with external beam radiation to commence in a timely fashion thereafter. It was a pleasure meeting Keller today and we appreciate the referral and opportunity to be involved in his care. We had an extensive discussion today regarding the course to date (including a focused review of theapplicable radiographic and laboratory information), multidisciplinary approach to cancer care, and indications for external beam radiation therapy as a component therein. A literature review and multidisciplinary discussion was performed after seeing this patient due to the complexity of the medical decision making in this case. I personally spent greater than 90 minutes on this case and with this patient. I performed the complete history and physical as above at today's visit, at least 45 minutes was in direct discussion and  regarding disease management.        -sim  -[address] ASAP, concurrent (see PET)        Snoa Mora. Minda Lopez MD [place] [address] Oncology  Cell: [phone]    [facility]:  [facility]: [phone]  [facility]:  [phone]   FAX:    [phone]  [address] [place] [address] Road:  [phone]   FAX:  [phone]        NOTE: This report was transcribed using voice recognition software. Every effort was made to ensure accuracy; however, inadvertent computerized transcription errors may be present.

## 2021-06-21 ENCOUNTER — HOSPITAL ENCOUNTER (OUTPATIENT)
Dept: RADIATION ONCOLOGY | Age: 71
Discharge: HOME | End: 2021-06-21
Attending: RADIOLOGY
Payer: MEDICARE

## 2021-06-21 ENCOUNTER — TELEPHONE (OUTPATIENT)
Dept: RADIATION ONCOLOGY | Age: 71
End: 2021-06-21

## 2021-06-21 PROCEDURE — 77334 RADIATION TREATMENT AID(S): CPT | Performed by: RADIOLOGY

## 2021-06-21 PROCEDURE — 77263 THER RADIOLOGY TX PLNG CPLX: CPT | Performed by: RADIOLOGY

## 2021-06-21 NOTE — TELEPHONE ENCOUNTER
Rad Onc nurse called Dr. Cedrick Rucker office to obtain the office note from 6/17/2021. I left a voicemail with patient information and our office number to please call back at the earliest convenience.

## 2021-07-02 ENCOUNTER — HOSPITAL ENCOUNTER (OUTPATIENT)
Dept: RADIATION ONCOLOGY | Age: 71
Discharge: HOME | End: 2021-07-02
Attending: RADIOLOGY
Payer: MEDICARE

## 2021-07-02 ENCOUNTER — TELEPHONE (OUTPATIENT)
Dept: CASE MANAGEMENT | Age: 71
End: 2021-07-02

## 2021-07-02 PROCEDURE — 77300 RADIATION THERAPY DOSE PLAN: CPT | Performed by: RADIOLOGY

## 2021-07-02 PROCEDURE — 77301 RADIOTHERAPY DOSE PLAN IMRT: CPT | Performed by: RADIOLOGY

## 2021-07-02 PROCEDURE — 77338 DESIGN MLC DEVICE FOR IMRT: CPT | Performed by: RADIOLOGY

## 2021-07-02 NOTE — TELEPHONE ENCOUNTER
Received a call from Aric at the [facility] checking status of start date for concurrent treatment. Talked to [facility], Dosimetry and Dr Angel Ross updated. He reviewed and approved plan. Plan being printed and QA will be run at lunch by Physics and ready for Tuesday start. Updated Aric at [facility] on start date for Tuesday 7/6. Updated RT Doris to schedule patient for start on Tuesday.

## 2021-07-06 ENCOUNTER — HOSPITAL ENCOUNTER (OUTPATIENT)
Dept: RADIATION ONCOLOGY | Age: 71
Discharge: HOME | End: 2021-07-06
Attending: RADIOLOGY
Payer: MEDICARE

## 2021-07-06 PROCEDURE — 77014 PR CT GUIDANCE PLACEMENT RAD THERAPY FIELDS: CPT | Performed by: RADIOLOGY

## 2021-07-06 PROCEDURE — 77386 HC NTSTY MODUL RAD TX DLVR CPLX: CPT | Performed by: RADIOLOGY

## 2021-07-07 ENCOUNTER — HOSPITAL ENCOUNTER (OUTPATIENT)
Dept: RADIATION ONCOLOGY | Age: 71
Discharge: HOME | End: 2021-07-07
Attending: RADIOLOGY
Payer: MEDICARE

## 2021-07-07 ENCOUNTER — TELEPHONE (OUTPATIENT)
Dept: ONCOLOGY | Age: 71
End: 2021-07-07

## 2021-07-07 VITALS
WEIGHT: 187.5 LBS | SYSTOLIC BLOOD PRESSURE: 126 MMHG | BODY MASS INDEX: 29.37 KG/M2 | OXYGEN SATURATION: 97 % | TEMPERATURE: 97.6 F | RESPIRATION RATE: 18 BRPM | HEART RATE: 80 BPM | DIASTOLIC BLOOD PRESSURE: 78 MMHG

## 2021-07-07 DIAGNOSIS — C34.90 MALIGNANT NEOPLASM OF LUNG, UNSPECIFIED LATERALITY, UNSPECIFIED PART OF LUNG (HCC): Primary | ICD-10-CM

## 2021-07-07 PROCEDURE — 77014 PR CT GUIDANCE PLACEMENT RAD THERAPY FIELDS: CPT | Performed by: RADIOLOGY

## 2021-07-07 PROCEDURE — 77386 HC NTSTY MODUL RAD TX DLVR CPLX: CPT | Performed by: RADIOLOGY

## 2021-07-07 PROCEDURE — 99999 PR OFFICE/OUTPT VISIT,PROCEDURE ONLY: CPT | Performed by: RADIOLOGY

## 2021-07-07 NOTE — TELEPHONE ENCOUNTER
Met with pt and pt's wife in conjunction with OTV re: positive distress screen. Pt is 71-year-old male being treated for lung CA. Pt's mood appeared euthymic with full affect, he appeared  A&Ox4, and he was willing/able to participate in session. He appeared appropriately dressed/groomed and was seated in exam room throughout session. Pt reported starting chemo previous date and just completing 2nd radiation tx. Stated that he is feeling well at this time and is doing well managing appointments. Noted that he has good social support system and has no needs at this time. Reviewed role of oncology SW and encouraged pt to notify this provider if needs arise.     Rohit Lopez, MSW, FLORYW-S  Oncology Social Worker

## 2021-07-07 NOTE — PROGRESS NOTES
Yessenia Reyes  7/7/2021  Wt Readings from Last 3 Encounters:   07/07/21 187 lb 8 oz (85 kg)   06/16/21 187 lb (84.8 kg)   05/27/21 180 lb (81.6 kg)     Body mass index is 29.37 kg/m². Treatment Area:CTV L LEFT Lung + nodes    Patient was seen today for weekly visit. Comfort Alteration  KPS:90%  Fatigue: None    Ventilation Alterations  Cough: No  Hemoptysis: No  Mucus Color: na  Dyspnea:  No  O2 Sat: 97%    Nutritional Alteration  Anorexia: No  Nausea: No   Vomiting: No     Skin Alteration   Sensation:no    Radiation Dermatitis:  no    Mucous Membrane Alteration  Voice Changes/ Stridor/Larynx: no  Pharynx & Esophagus: no    Elimination Alterations  Constipation: no  Diarrhea:  no      Emotional  Coping: effective      Injury, potential bleeding or infection: no    Other:no    Lab Results   Component Value Date    WBC 9.0 10/29/2020     10/29/2020         /78   Pulse 80   Temp 97.6 °F (36.4 °C) (Temporal)   Resp 18   Wt 187 lb 8 oz (85 kg)   SpO2 97%   BMI 29.37 kg/m²   BP within normal range? yes     Assessment/Plan: Pt completed 1/30fx and 200/6000cGy. Pt is tolerating tx well thus far.      Alexsandra Chun RN

## 2021-07-08 ENCOUNTER — HOSPITAL ENCOUNTER (OUTPATIENT)
Dept: RADIATION ONCOLOGY | Age: 71
Discharge: HOME | End: 2021-07-08
Attending: RADIOLOGY
Payer: MEDICARE

## 2021-07-08 PROCEDURE — 77014 PR CT GUIDANCE PLACEMENT RAD THERAPY FIELDS: CPT | Performed by: RADIOLOGY

## 2021-07-08 PROCEDURE — 77386 HC NTSTY MODUL RAD TX DLVR CPLX: CPT | Performed by: RADIOLOGY

## 2021-07-09 ENCOUNTER — HOSPITAL ENCOUNTER (OUTPATIENT)
Dept: RADIATION ONCOLOGY | Age: 71
Discharge: HOME | End: 2021-07-09
Attending: RADIOLOGY
Payer: MEDICARE

## 2021-07-09 PROCEDURE — 77014 PR CT GUIDANCE PLACEMENT RAD THERAPY FIELDS: CPT | Performed by: RADIOLOGY

## 2021-07-09 PROCEDURE — 77386 HC NTSTY MODUL RAD TX DLVR CPLX: CPT | Performed by: RADIOLOGY

## 2021-07-12 ENCOUNTER — HOSPITAL ENCOUNTER (OUTPATIENT)
Dept: RADIATION ONCOLOGY | Age: 71
Discharge: HOME | End: 2021-07-12
Attending: RADIOLOGY
Payer: MEDICARE

## 2021-07-12 PROCEDURE — 77427 RADIATION TX MANAGEMENT X5: CPT | Performed by: RADIOLOGY

## 2021-07-12 PROCEDURE — 77014 PR CT GUIDANCE PLACEMENT RAD THERAPY FIELDS: CPT | Performed by: RADIOLOGY

## 2021-07-12 PROCEDURE — 77386 HC NTSTY MODUL RAD TX DLVR CPLX: CPT | Performed by: RADIOLOGY

## 2021-07-12 PROCEDURE — 77336 RADIATION PHYSICS CONSULT: CPT | Performed by: RADIOLOGY

## 2021-07-13 ENCOUNTER — HOSPITAL ENCOUNTER (OUTPATIENT)
Dept: RADIATION ONCOLOGY | Age: 71
Discharge: HOME | End: 2021-07-13
Attending: RADIOLOGY
Payer: MEDICARE

## 2021-07-13 PROCEDURE — 77014 PR CT GUIDANCE PLACEMENT RAD THERAPY FIELDS: CPT | Performed by: RADIOLOGY

## 2021-07-13 PROCEDURE — 77386 HC NTSTY MODUL RAD TX DLVR CPLX: CPT | Performed by: RADIOLOGY

## 2021-07-14 ENCOUNTER — HOSPITAL ENCOUNTER (OUTPATIENT)
Dept: RADIATION ONCOLOGY | Age: 71
Discharge: HOME | End: 2021-07-14
Attending: RADIOLOGY
Payer: MEDICARE

## 2021-07-14 VITALS
WEIGHT: 190.5 LBS | RESPIRATION RATE: 18 BRPM | SYSTOLIC BLOOD PRESSURE: 126 MMHG | HEART RATE: 81 BPM | TEMPERATURE: 97.6 F | BODY MASS INDEX: 29.84 KG/M2 | OXYGEN SATURATION: 98 % | DIASTOLIC BLOOD PRESSURE: 82 MMHG

## 2021-07-14 DIAGNOSIS — C34.90 MALIGNANT NEOPLASM OF LUNG, UNSPECIFIED LATERALITY, UNSPECIFIED PART OF LUNG (HCC): Primary | ICD-10-CM

## 2021-07-14 PROCEDURE — 77386 HC NTSTY MODUL RAD TX DLVR CPLX: CPT | Performed by: RADIOLOGY

## 2021-07-14 PROCEDURE — 99999 PR OFFICE/OUTPT VISIT,PROCEDURE ONLY: CPT | Performed by: RADIOLOGY

## 2021-07-14 PROCEDURE — 77014 PR CT GUIDANCE PLACEMENT RAD THERAPY FIELDS: CPT | Performed by: RADIOLOGY

## 2021-07-14 RX ORDER — SUCRALFATE 1 G/1
1 TABLET ORAL 4 TIMES DAILY
COMMUNITY
End: 2021-09-17

## 2021-07-14 RX ORDER — SUCRALFATE 1 G/1
1 TABLET ORAL 4 TIMES DAILY
Qty: 120 TABLET | Refills: 2 | Status: SHIPPED | OUTPATIENT
Start: 2021-07-14 | End: 2021-09-17

## 2021-07-14 ASSESSMENT — PAIN DESCRIPTION - LOCATION: LOCATION: CHEST

## 2021-07-14 ASSESSMENT — PAIN DESCRIPTION - DESCRIPTORS: DESCRIPTORS: PINS AND NEEDLES

## 2021-07-14 ASSESSMENT — PAIN DESCRIPTION - FREQUENCY: FREQUENCY: INTERMITTENT

## 2021-07-14 ASSESSMENT — PAIN SCALES - GENERAL: PAINLEVEL_OUTOF10: 4

## 2021-07-14 ASSESSMENT — PAIN DESCRIPTION - ORIENTATION: ORIENTATION: LEFT

## 2021-07-14 NOTE — PATIENT INSTRUCTIONS
Continue daily fractionated radiation therapy as scheduled. Please see weekly OTV note and intial consultation letter in [facility] for clinical details. Erica Cogan. Gabriela Evans MD MS [facility]:  [phone]   FAX: [phone]  [address] [place] [address]:  [phone]   FAX:    [phone]  [facility]:  [phone]   FAX:  [phone]  Email: [email or web address]

## 2021-07-14 NOTE — PROGRESS NOTES
DEPARTMENT OF RADIATION ONCOLOGY ON TREATMENT VISIT         7/14/2021      NAME:  Fawn Laguerre    YOB: 1950    Diagnosis: lung cancer    SUBJECTIVE:   Fawn Laguerre has now received fractionated external beam radiation therapy - ongoing. Past medical, surgical, social and family histories reviewed and updated as indicated. Pain: controlled    ALLERGIES:  Patient has no known allergies. Current Outpatient Medications   Medication Sig Dispense Refill    sucralfate (CARAFATE) 1 GM tablet Take 1 tablet by mouth 4 times daily Dissolve tab in 1 tbsp H2O & drink 120 tablet 2    sucralfate (CARAFATE) 1 GM tablet Take 1 g by mouth 4 times daily Dissolve tab in 1 tbsp & drink   120 tabs with 2 refills      predniSONE (DELTASONE) 10 MG tablet Take 10 mg by mouth daily      metoprolol succinate (TOPROL XL) 50 MG extended release tablet Take 1 tablet by mouth daily 30 tablet 3    simvastatin (ZOCOR) 40 MG tablet Take 40 mg by mouth daily       lisinopril (PRINIVIL;ZESTRIL) 20 MG tablet Take 20 mg by mouth daily       albuterol (ACCUNEB) 0.63 MG/3ML nebulizer solution Take 1 ampule by nebulization every 6 hours as needed for Wheezing      umeclidinium-vilanterol (ANORO ELLIPTA) 62.5-25 MCG/INH AEPB inhaler Inhale 1 puff into the lungs daily       No current facility-administered medications for this encounter. OBJECTIVE:  Alert and fully ambulatory. Pleasant and conversant. Physical Examination: General appearance - alert, well appearing, and in no distress. Wt Readings from Last 3 Encounters:   07/14/21 190 lb 8 oz (86.4 kg)   07/07/21 187 lb 8 oz (85 kg)   06/16/21 187 lb (84.8 kg)         ASSESSMENT/PLAN:     Patient is tolerating treatments well with expected toxicities. RBA were reviewed prior to first fraction and PRN. Current and planned dose reviewed. Goals of treatment and potential side effects were reviewed with the patient PRN.  Treatment imaging has been personally reviewed for accuracy and precision. Questions answered to apparent satisfaction. Treatments will continue as planned. Marylen Pass.  Essence Ordonez MD MS JOHNNYR  Radiation Oncologist        [facility] ([address] [place] [address]): [phone] /// FAX: [phone]  [facility]): [phone] /// FAX: [phone]  [address] [place] [address]): [phone] /// FAX: [phone]

## 2021-07-14 NOTE — PROGRESS NOTES
Cristina Ice  7/14/2021  Wt Readings from Last 3 Encounters:   07/14/21 190 lb 8 oz (86.4 kg)   07/07/21 187 lb 8 oz (85 kg)   06/16/21 187 lb (84.8 kg)     Body mass index is 29.84 kg/m². Treatment Area:CTV L Lung + nodes    Patient was seen today for weekly visit. Comfort Alteration  KPS:90%  Fatigue: Mild    Ventilation Alterations  Cough: Yes  Hemoptysis: No  Mucus Color: clear  Dyspnea: No  O2 Sat: 97%    Nutritional Alteration  Anorexia: No  Nausea: No   Vomiting: No     Skin Alteration   Sensation:no    Radiation Dermatitis:  no    Mucous Membrane Alteration  Voice Changes/ Stridor/Larynx: yes - hoarsed  Pharynx & Esophagus: yes, difficulty swallowing liquids    Elimination Alterations  Constipation: no  Diarrhea:  no      Emotional  Coping: effective      Injury, potential bleeding or infection: no    Other:no    Lab Results   Component Value Date    WBC 9.0 10/29/2020     10/29/2020         /82   Pulse 81   Temp 97.6 °F (36.4 °C) (Temporal)   Resp 18   Wt 190 lb 8 oz (86.4 kg)   SpO2 98%   BMI 29.84 kg/m²   BP within normal range? yes      Assessment/Plan: Pt completed 7/30fx and 1400/6000cGy. Pt is toelrating tx well thus far.     Che Garcia RN

## 2021-07-15 ENCOUNTER — HOSPITAL ENCOUNTER (OUTPATIENT)
Dept: RADIATION ONCOLOGY | Age: 71
Discharge: HOME | End: 2021-07-15
Attending: RADIOLOGY
Payer: MEDICARE

## 2021-07-15 PROCEDURE — 77386 HC NTSTY MODUL RAD TX DLVR CPLX: CPT | Performed by: RADIOLOGY

## 2021-07-15 PROCEDURE — 77014 PR CT GUIDANCE PLACEMENT RAD THERAPY FIELDS: CPT | Performed by: RADIOLOGY

## 2021-07-16 ENCOUNTER — HOSPITAL ENCOUNTER (OUTPATIENT)
Dept: RADIATION ONCOLOGY | Age: 71
Discharge: HOME | End: 2021-07-16
Attending: RADIOLOGY
Payer: MEDICARE

## 2021-07-16 PROCEDURE — 77014 PR CT GUIDANCE PLACEMENT RAD THERAPY FIELDS: CPT | Performed by: RADIOLOGY

## 2021-07-16 PROCEDURE — 77386 HC NTSTY MODUL RAD TX DLVR CPLX: CPT | Performed by: RADIOLOGY

## 2021-07-19 ENCOUNTER — HOSPITAL ENCOUNTER (INPATIENT)
Age: 71
LOS: 2 days | Discharge: HOME | DRG: 243 | End: 2021-07-21
Attending: EMERGENCY MEDICINE | Admitting: INTERNAL MEDICINE
Payer: MEDICARE

## 2021-07-19 ENCOUNTER — APPOINTMENT (OUTPATIENT)
Dept: CT IMAGING | Age: 71
DRG: 243 | End: 2021-07-19
Payer: MEDICARE

## 2021-07-19 ENCOUNTER — APPOINTMENT (OUTPATIENT)
Dept: GENERAL RADIOLOGY | Age: 71
DRG: 243 | End: 2021-07-19
Payer: MEDICARE

## 2021-07-19 ENCOUNTER — HOSPITAL ENCOUNTER (OUTPATIENT)
Dept: RADIATION ONCOLOGY | Age: 71
Discharge: HOME | End: 2021-07-19
Attending: RADIOLOGY
Payer: MEDICARE

## 2021-07-19 DIAGNOSIS — R55 SYNCOPE AND COLLAPSE: ICD-10-CM

## 2021-07-19 DIAGNOSIS — I44.2 INTERMITTENT COMPLETE HEART BLOCK (HCC): Primary | ICD-10-CM

## 2021-07-19 LAB
ALBUMIN SERPL-MCNC: 3.6 G/DL (ref 3.5–5.2)
ALP BLD-CCNC: 100 U/L (ref 40–129)
ALT SERPL-CCNC: 98 U/L (ref 0–40)
ANION GAP SERPL CALCULATED.3IONS-SCNC: 14 MMOL/L (ref 7–16)
ANISOCYTOSIS: ABNORMAL
AST SERPL-CCNC: 124 U/L (ref 0–39)
BASOPHILS ABSOLUTE: 0.01 E9/L (ref 0–0.2)
BASOPHILS RELATIVE PERCENT: 0.2 % (ref 0–2)
BILIRUB SERPL-MCNC: 0.5 MG/DL (ref 0–1.2)
BUN BLDV-MCNC: 28 MG/DL (ref 6–23)
CALCIUM SERPL-MCNC: 8.9 MG/DL (ref 8.6–10.2)
CHLORIDE BLD-SCNC: 97 MMOL/L (ref 98–107)
CO2: 22 MMOL/L (ref 22–29)
CREAT SERPL-MCNC: 1.3 MG/DL (ref 0.7–1.2)
EOSINOPHILS ABSOLUTE: 0.02 E9/L (ref 0.05–0.5)
EOSINOPHILS RELATIVE PERCENT: 0.4 % (ref 0–6)
GFR AFRICAN AMERICAN: >60
GFR NON-AFRICAN AMERICAN: 54 ML/MIN/1.73
GLUCOSE BLD-MCNC: 223 MG/DL (ref 74–99)
HCT VFR BLD CALC: 38.4 % (ref 37–54)
HEMOGLOBIN: 12.7 G/DL (ref 12.5–16.5)
IMMATURE GRANULOCYTES #: 0.09 E9/L
IMMATURE GRANULOCYTES %: 1.7 % (ref 0–5)
LYMPHOCYTES ABSOLUTE: 0.29 E9/L (ref 1.5–4)
LYMPHOCYTES RELATIVE PERCENT: 5.4 % (ref 20–42)
MAGNESIUM: 1.6 MG/DL (ref 1.6–2.6)
MCH RBC QN AUTO: 29.7 PG (ref 26–35)
MCHC RBC AUTO-ENTMCNC: 33.1 % (ref 32–34.5)
MCV RBC AUTO: 89.9 FL (ref 80–99.9)
MONOCYTES ABSOLUTE: 0.24 E9/L (ref 0.1–0.95)
MONOCYTES RELATIVE PERCENT: 4.5 % (ref 2–12)
NEUTROPHILS ABSOLUTE: 4.71 E9/L (ref 1.8–7.3)
NEUTROPHILS RELATIVE PERCENT: 87.8 % (ref 43–80)
PDW BLD-RTO: 16.2 FL (ref 11.5–15)
PLATELET # BLD: 115 E9/L (ref 130–450)
PMV BLD AUTO: 11.7 FL (ref 7–12)
POLYCHROMASIA: ABNORMAL
POTASSIUM SERPL-SCNC: 4.5 MMOL/L (ref 3.5–5)
PRO-BNP: 129 PG/ML (ref 0–125)
RBC # BLD: 4.27 E12/L (ref 3.8–5.8)
SODIUM BLD-SCNC: 133 MMOL/L (ref 132–146)
TOTAL PROTEIN: 5.9 G/DL (ref 6.4–8.3)
TROPONIN, HIGH SENSITIVITY: 12 NG/L (ref 0–11)
WBC # BLD: 5.4 E9/L (ref 4.5–11.5)

## 2021-07-19 PROCEDURE — 71045 X-RAY EXAM CHEST 1 VIEW: CPT

## 2021-07-19 PROCEDURE — 77336 RADIATION PHYSICS CONSULT: CPT | Performed by: RADIOLOGY

## 2021-07-19 PROCEDURE — 2140000000 HC CCU INTERMEDIATE R&B

## 2021-07-19 PROCEDURE — 83880 ASSAY OF NATRIURETIC PEPTIDE: CPT

## 2021-07-19 PROCEDURE — 77386 HC NTSTY MODUL RAD TX DLVR CPLX: CPT | Performed by: RADIOLOGY

## 2021-07-19 PROCEDURE — 72125 CT NECK SPINE W/O DYE: CPT

## 2021-07-19 PROCEDURE — 70450 CT HEAD/BRAIN W/O DYE: CPT

## 2021-07-19 PROCEDURE — 99284 EMERGENCY DEPT VISIT MOD MDM: CPT

## 2021-07-19 PROCEDURE — 80053 COMPREHEN METABOLIC PANEL: CPT

## 2021-07-19 PROCEDURE — 85025 COMPLETE CBC W/AUTO DIFF WBC: CPT

## 2021-07-19 PROCEDURE — 83735 ASSAY OF MAGNESIUM: CPT

## 2021-07-19 PROCEDURE — 93005 ELECTROCARDIOGRAM TRACING: CPT | Performed by: EMERGENCY MEDICINE

## 2021-07-19 PROCEDURE — 77014 PR CT GUIDANCE PLACEMENT RAD THERAPY FIELDS: CPT | Performed by: RADIOLOGY

## 2021-07-19 PROCEDURE — 84484 ASSAY OF TROPONIN QUANT: CPT

## 2021-07-19 PROCEDURE — 77427 RADIATION TX MANAGEMENT X5: CPT | Performed by: RADIOLOGY

## 2021-07-19 NOTE — ED PROVIDER NOTES
[facility]  Department of Emergency Medicine   ED  Encounter Note  Admit Date/RoomTime: 2021  5:50 PM  ED Room: 6306/6306-A    NAME: Meg Angulo  : 1950  MRN: [id number]     Chief Complaint:  Loss of Consciousness (x2 once in front of ems. fall at home after syncope. unknown head injury. had radiation today for lung CA. Per ems patient was also went into SVT. )    History of Present Illness       Meg Angulo is a 66 y.o. old male who presents to the emergency department for evaluation of syncope. He is currently being treated for lung disease and had a radiation treatment today. He states he is supposed to have chemotherapy tomorrow does have a Mediport. He sees Dr. Berenice Mann at the [facility] and Dr. Ledesma for radiation. He states he has had recurrent issues with vasovagal syncope and had a loop recorder placed by Dr. Tessie Giraldo several months ago. He states that today, he walked into the kitchen to get some ice cream and next thing he knew, he woke up on the ground. He had no prodromal symptoms. He had no chest pain or shortness of breath. No abdominal pain, vomiting or diarrhea. He states that he must of hit his head on the tile but does not have a headache and denies any pain from the fall. His wife called EMS and he had an additional syncopal event en route. Syncopal Phase:     []   At Rest     [x]   With Exetion     []   With Standing     []   Incontinence     []   Seizure Activity           Post-Syncopal / Recovery Phase:  rapid. ..  ROS   Pertinent positives and negatives are stated within HPI, all other systems reviewed and are negative. Past Medical History:  has a past medical history of COPD (chronic obstructive pulmonary disease) (HCC) and Syncope.     Surgical History:  has a past surgical history that includes Colonoscopy; hernia repair (Bilateral, 2011); bronchoscopy (N/A, 2021); bronchoscopy (N/A, 2021); and bronchoscopy 0.4 0.0 - 6.0 %    Basophils % 0.2 0.0 - 2.0 %    Neutrophils Absolute 4.71 1.80 - 7.30 E9/L    Immature Granulocytes # 0.09 E9/L    Lymphocytes Absolute 0.29 (L) 1.50 - 4.00 E9/L    Monocytes Absolute 0.24 0.10 - 0.95 E9/L    Eosinophils Absolute 0.02 (L) 0.05 - 0.50 E9/L    Basophils Absolute 0.01 0.00 - 0.20 E9/L    Anisocytosis 1+     Polychromasia 1+    Comprehensive Metabolic Panel   Result Value Ref Range    Sodium 133 132 - 146 mmol/L    Potassium 4.5 3.5 - 5.0 mmol/L    Chloride 97 (L) 98 - 107 mmol/L    CO2 22 22 - 29 mmol/L    Anion Gap 14 7 - 16 mmol/L    Glucose 223 (H) 74 - 99 mg/dL    BUN 28 (H) 6 - 23 mg/dL    CREATININE 1.3 (H) 0.7 - 1.2 mg/dL    GFR Non-African American 54 >=60 mL/min/1.73    GFR African American >60     Calcium 8.9 8.6 - 10.2 mg/dL    Total Protein 5.9 (L) 6.4 - 8.3 g/dL    Albumin 3.6 3.5 - 5.2 g/dL    Total Bilirubin 0.5 0.0 - 1.2 mg/dL    Alkaline Phosphatase 100 40 - 129 U/L    ALT 98 (H) 0 - 40 U/L     (H) 0 - 39 U/L   Troponin   Result Value Ref Range    Troponin, High Sensitivity 12 (H) 0 - 11 ng/L   Brain Natriuretic Peptide   Result Value Ref Range    Pro- (H) 0 - 125 pg/mL   Magnesium   Result Value Ref Range    Magnesium 1.6 1.6 - 2.6 mg/dL     Imaging: All Radiology results interpreted by Radiologist unless otherwise noted. CT HEAD WO CONTRAST   Final Result   No acute intracranial abnormality. CT CERVICAL SPINE WO CONTRAST   Final Result   No evidence of acute cervical spine fracture. Chronic and degenerative changes of the cervical spine. XR CHEST PORTABLE   Final Result   No radiographic evidence of acute cardiopulmonary disease. Mild hyperinflation, consistent with COPD. EKG #1:  Interpreted by emergency department attending physician unless otherwise noted.     7/19/21  Time: 18:07    Rhythm: sinus tachycardia  Rate: 100-110  Conduction: right bundle branch block (complete)  ST Segments: no acute change  T Waves: no acute change    Clinical Impression: non-specific EKG  Comparison to Prior tracings:  Today's ECG is unchanged from previous tracings. ED Course / Medical Decision Making     Medications   albuterol (ACCUNEB) nebulizer solution 0.63 mg (has no administration in time range)   lisinopril (PRINIVIL;ZESTRIL) tablet 20 mg (has no administration in time range)   predniSONE (DELTASONE) tablet 10 mg (has no administration in time range)   atorvastatin (LIPITOR) tablet 20 mg (has no administration in time range)   sucralfate (CARAFATE) tablet 1 g (has no administration in time range)   ipratropium (ATROVENT) 0.02 % nebulizer solution 0.5 mg (has no administration in time range)   Arformoterol Tartrate (BROVANA) nebulizer solution 15 mcg (has no administration in time range)        Re-Evaluations:  7/19/21      Patients condition remains unchanged. Consultations:             IP CONSULT TO ELECTROPHYSIOLOGY    Procedures:   none    MDM: Patient presents to the ED for evaluation after 2 syncopal events today. His loop recorder was interrogated and he actually was in complete heart block during the episodes. Currently, he is in sinus tachycardia. Blood pressure stable. He is completely asymptomatic at this time. CT head and neck negative for acute traumatic injury. Likely, he will need a pacemaker. Patient admitted by Dr. Kimberly Palacio for further management. Plan of Care/Counseling:  Denis Lazo DO reviewed today's visit with the patient in addition to providing specific details for the plan of care and counseling regarding the diagnosis and prognosis. Questions are answered at this time and are agreeable with the plan. Assessment      1. Intermittent complete heart block ([address]    2. Syncope and collapse      This patient's ED course included: a personal history and physicial examination  This patient has remained hemodynamically stable during their ED course. Plan   Admit to Telemetry Unit.   Patient condition is stable. New Medications     Current Discharge Medication List        Electronically signed by Daniella Lee DO   DD: 7/19/21  **This report was transcribed using voice recognition software. Every effort was made to ensure accuracy; however, inadvertent computerized transcription errors may be present.   END OF PROVIDER NOTE        Daniella Lee DO  07/20/21 [id number]

## 2021-07-19 NOTE — ED NOTES
Bed: 21  Expected date:   Expected time:   Means of arrival:   Comments:  ems     Torri Sexton RN  07/19/21 [id number]

## 2021-07-20 ENCOUNTER — TELEPHONE (OUTPATIENT)
Dept: CASE MANAGEMENT | Age: 71
End: 2021-07-20

## 2021-07-20 ENCOUNTER — ANESTHESIA (OUTPATIENT)
Dept: CARDIAC CATH/INVASIVE PROCEDURES | Age: 71
DRG: 243 | End: 2021-07-20
Payer: MEDICARE

## 2021-07-20 ENCOUNTER — ANESTHESIA EVENT (OUTPATIENT)
Dept: CARDIAC CATH/INVASIVE PROCEDURES | Age: 71
DRG: 243 | End: 2021-07-20
Payer: MEDICARE

## 2021-07-20 ENCOUNTER — APPOINTMENT (OUTPATIENT)
Dept: GENERAL RADIOLOGY | Age: 71
DRG: 243 | End: 2021-07-20
Payer: MEDICARE

## 2021-07-20 VITALS — DIASTOLIC BLOOD PRESSURE: 84 MMHG | OXYGEN SATURATION: 97 % | SYSTOLIC BLOOD PRESSURE: 116 MMHG

## 2021-07-20 PROBLEM — Z95.0 S/P CARDIAC PACEMAKER PROCEDURE: Status: ACTIVE | Noted: 2021-07-20

## 2021-07-20 LAB
EKG ATRIAL RATE: 108 BPM
EKG P AXIS: 75 DEGREES
EKG P-R INTERVAL: 182 MS
EKG Q-T INTERVAL: 352 MS
EKG QRS DURATION: 146 MS
EKG QTC CALCULATION (BAZETT): 471 MS
EKG R AXIS: 105 DEGREES
EKG T AXIS: 36 DEGREES
EKG VENTRICULAR RATE: 108 BPM
LV EF: 71 %
LVEF MODALITY: NORMAL

## 2021-07-20 PROCEDURE — 33208 INSRT HEART PM ATRIAL & VENT: CPT | Performed by: INTERNAL MEDICINE

## 2021-07-20 PROCEDURE — 93010 ELECTROCARDIOGRAM REPORT: CPT | Performed by: INTERNAL MEDICINE

## 2021-07-20 PROCEDURE — 6360000002 HC RX W HCPCS: Performed by: NURSE ANESTHETIST, CERTIFIED REGISTERED

## 2021-07-20 PROCEDURE — C1898 LEAD, PMKR, OTHER THAN TRANS: HCPCS

## 2021-07-20 PROCEDURE — 6360000002 HC RX W HCPCS: Performed by: INTERNAL MEDICINE

## 2021-07-20 PROCEDURE — 02HK0JZ INSERTION OF PACEMAKER LEAD INTO RIGHT VENTRICLE, OPEN APPROACH: ICD-10-PCS | Performed by: INTERNAL MEDICINE

## 2021-07-20 PROCEDURE — 0JPT32Z REMOVAL OF MONITORING DEVICE FROM TRUNK SUBCUTANEOUS TISSUE AND FASCIA, PERCUTANEOUS APPROACH: ICD-10-PCS | Performed by: INTERNAL MEDICINE

## 2021-07-20 PROCEDURE — 2780000010 HC IMPLANT OTHER

## 2021-07-20 PROCEDURE — 2580000003 HC RX 258: Performed by: INTERNAL MEDICINE

## 2021-07-20 PROCEDURE — 94640 AIRWAY INHALATION TREATMENT: CPT

## 2021-07-20 PROCEDURE — 6360000002 HC RX W HCPCS

## 2021-07-20 PROCEDURE — 2580000003 HC RX 258: Performed by: NURSE ANESTHETIST, CERTIFIED REGISTERED

## 2021-07-20 PROCEDURE — 2500000003 HC RX 250 WO HCPCS

## 2021-07-20 PROCEDURE — C1785 PMKR, DUAL, RATE-RESP: HCPCS

## 2021-07-20 PROCEDURE — 71045 X-RAY EXAM CHEST 1 VIEW: CPT

## 2021-07-20 PROCEDURE — 93306 TTE W/DOPPLER COMPLETE: CPT

## 2021-07-20 PROCEDURE — 33286 RMVL SUBQ CAR RHYTHM MNTR: CPT | Performed by: INTERNAL MEDICINE

## 2021-07-20 PROCEDURE — C1894 INTRO/SHEATH, NON-LASER: HCPCS

## 2021-07-20 PROCEDURE — 02H63JZ INSERTION OF PACEMAKER LEAD INTO RIGHT ATRIUM, PERCUTANEOUS APPROACH: ICD-10-PCS | Performed by: INTERNAL MEDICINE

## 2021-07-20 PROCEDURE — 2709999900 HC NON-CHARGEABLE SUPPLY

## 2021-07-20 PROCEDURE — 2140000000 HC CCU INTERMEDIATE R&B

## 2021-07-20 PROCEDURE — 0JH606Z INSERTION OF PACEMAKER, DUAL CHAMBER INTO CHEST SUBCUTANEOUS TISSUE AND FASCIA, OPEN APPROACH: ICD-10-PCS | Performed by: INTERNAL MEDICINE

## 2021-07-20 PROCEDURE — 6370000000 HC RX 637 (ALT 250 FOR IP): Performed by: INTERNAL MEDICINE

## 2021-07-20 RX ORDER — ARFORMOTEROL TARTRATE 15 UG/2ML
15 SOLUTION RESPIRATORY (INHALATION) 2 TIMES DAILY
Status: DISCONTINUED | OUTPATIENT
Start: 2021-07-20 | End: 2021-07-21 | Stop reason: HOSPADM

## 2021-07-20 RX ORDER — SODIUM CHLORIDE 0.9 % (FLUSH) 0.9 %
5-40 SYRINGE (ML) INJECTION EVERY 12 HOURS SCHEDULED
Status: DISCONTINUED | OUTPATIENT
Start: 2021-07-20 | End: 2021-07-21 | Stop reason: HOSPADM

## 2021-07-20 RX ORDER — DOXYCYCLINE HYCLATE 100 MG
100 TABLET ORAL 2 TIMES DAILY
Qty: 14 TABLET | Refills: 0 | Status: SHIPPED | OUTPATIENT
Start: 2021-07-20 | End: 2021-07-27

## 2021-07-20 RX ORDER — LISINOPRIL 20 MG/1
10 TABLET ORAL DAILY
Qty: 30 TABLET | Refills: 3 | Status: SHIPPED | OUTPATIENT
Start: 2021-07-20 | End: 2021-07-21 | Stop reason: HOSPADM

## 2021-07-20 RX ORDER — ACETAMINOPHEN 325 MG/1
650 TABLET ORAL EVERY 4 HOURS PRN
Status: DISCONTINUED | OUTPATIENT
Start: 2021-07-20 | End: 2021-07-21 | Stop reason: HOSPADM

## 2021-07-20 RX ORDER — SODIUM CHLORIDE 0.9 % (FLUSH) 0.9 %
5-40 SYRINGE (ML) INJECTION PRN
Status: DISCONTINUED | OUTPATIENT
Start: 2021-07-20 | End: 2021-07-21 | Stop reason: HOSPADM

## 2021-07-20 RX ORDER — CEFAZOLIN SODIUM 1 G/3ML
INJECTION, POWDER, FOR SOLUTION INTRAMUSCULAR; INTRAVENOUS PRN
Status: DISCONTINUED | OUTPATIENT
Start: 2021-07-20 | End: 2021-07-20 | Stop reason: SDUPTHER

## 2021-07-20 RX ORDER — SODIUM CHLORIDE 9 MG/ML
25 INJECTION, SOLUTION INTRAVENOUS PRN
Status: DISCONTINUED | OUTPATIENT
Start: 2021-07-20 | End: 2021-07-21 | Stop reason: HOSPADM

## 2021-07-20 RX ORDER — MIDAZOLAM HYDROCHLORIDE 1 MG/ML
INJECTION INTRAMUSCULAR; INTRAVENOUS PRN
Status: DISCONTINUED | OUTPATIENT
Start: 2021-07-20 | End: 2021-07-20 | Stop reason: SDUPTHER

## 2021-07-20 RX ORDER — ALBUTEROL SULFATE 0.63 MG/3ML
1 SOLUTION RESPIRATORY (INHALATION) EVERY 6 HOURS PRN
Status: DISCONTINUED | OUTPATIENT
Start: 2021-07-20 | End: 2021-07-21 | Stop reason: HOSPADM

## 2021-07-20 RX ORDER — SODIUM CHLORIDE 9 MG/ML
INJECTION, SOLUTION INTRAVENOUS CONTINUOUS PRN
Status: DISCONTINUED | OUTPATIENT
Start: 2021-07-20 | End: 2021-07-20 | Stop reason: SDUPTHER

## 2021-07-20 RX ORDER — ATORVASTATIN CALCIUM 20 MG/1
20 TABLET, FILM COATED ORAL DAILY
Status: DISCONTINUED | OUTPATIENT
Start: 2021-07-20 | End: 2021-07-21 | Stop reason: HOSPADM

## 2021-07-20 RX ORDER — METOPROLOL SUCCINATE 50 MG/1
50 TABLET, EXTENDED RELEASE ORAL DAILY
Status: DISCONTINUED | OUTPATIENT
Start: 2021-07-20 | End: 2021-07-21

## 2021-07-20 RX ORDER — SUCRALFATE 1 G/1
1 TABLET ORAL 4 TIMES DAILY
Status: DISCONTINUED | OUTPATIENT
Start: 2021-07-20 | End: 2021-07-21 | Stop reason: HOSPADM

## 2021-07-20 RX ORDER — LISINOPRIL 20 MG/1
20 TABLET ORAL DAILY
Status: DISCONTINUED | OUTPATIENT
Start: 2021-07-20 | End: 2021-07-21 | Stop reason: HOSPADM

## 2021-07-20 RX ORDER — PREDNISONE 10 MG/1
10 TABLET ORAL DAILY
Status: DISCONTINUED | OUTPATIENT
Start: 2021-07-20 | End: 2021-07-21 | Stop reason: HOSPADM

## 2021-07-20 RX ORDER — VANCOMYCIN HYDROCHLORIDE 1 G/20ML
INJECTION, POWDER, LYOPHILIZED, FOR SOLUTION INTRAVENOUS PRN
Status: DISCONTINUED | OUTPATIENT
Start: 2021-07-20 | End: 2021-07-20 | Stop reason: SDUPTHER

## 2021-07-20 RX ADMIN — SODIUM CHLORIDE, PRESERVATIVE FREE 10 ML: 5 INJECTION INTRAVENOUS at 23:01

## 2021-07-20 RX ADMIN — SODIUM CHLORIDE: 9 INJECTION, SOLUTION INTRAVENOUS at 15:17

## 2021-07-20 RX ADMIN — IPRATROPIUM BROMIDE 0.5 MG: 0.5 SOLUTION RESPIRATORY (INHALATION) at 10:53

## 2021-07-20 RX ADMIN — CEFAZOLIN 2000 MG: 1 INJECTION, POWDER, FOR SOLUTION INTRAMUSCULAR; INTRAVENOUS at 15:27

## 2021-07-20 RX ADMIN — SUCRALFATE 1 G: 1 TABLET ORAL at 18:09

## 2021-07-20 RX ADMIN — METOPROLOL SUCCINATE 50 MG: 50 TABLET, EXTENDED RELEASE ORAL at 18:09

## 2021-07-20 RX ADMIN — VANCOMYCIN HYDROCHLORIDE 1000 MG: 1 INJECTION, POWDER, LYOPHILIZED, FOR SOLUTION INTRAVENOUS at 15:30

## 2021-07-20 RX ADMIN — MIDAZOLAM 1 MG: 1 INJECTION INTRAMUSCULAR; INTRAVENOUS at 15:36

## 2021-07-20 RX ADMIN — CEFAZOLIN SODIUM 1000 MG: 1 INJECTION, POWDER, FOR SOLUTION INTRAMUSCULAR; INTRAVENOUS at 23:00

## 2021-07-20 RX ADMIN — ATORVASTATIN CALCIUM 20 MG: 20 TABLET, FILM COATED ORAL at 10:33

## 2021-07-20 RX ADMIN — ARFORMOTEROL TARTRATE 15 MCG: 15 SOLUTION RESPIRATORY (INHALATION) at 10:53

## 2021-07-20 RX ADMIN — PREDNISONE 10 MG: 10 TABLET ORAL at 10:33

## 2021-07-20 RX ADMIN — SUCRALFATE 1 G: 1 TABLET ORAL at 23:00

## 2021-07-20 RX ADMIN — SUCRALFATE 1 G: 1 TABLET ORAL at 09:23

## 2021-07-20 RX ADMIN — MIDAZOLAM 1 MG: 1 INJECTION INTRAMUSCULAR; INTRAVENOUS at 15:27

## 2021-07-20 ASSESSMENT — PAIN SCALES - GENERAL
PAINLEVEL_OUTOF10: 0

## 2021-07-20 ASSESSMENT — LIFESTYLE VARIABLES: SMOKING_STATUS: 0

## 2021-07-20 ASSESSMENT — PULMONARY FUNCTION TESTS
PIF_VALUE: 3
PIF_VALUE: 1

## 2021-07-20 NOTE — H&P
was placed in November 2020 at [facility].  Tilt table testing at that time was negative    Past Medical History:   has a past medical history of COPD (chronic obstructive pulmonary disease) (HCC) and Syncope. Past Surgical History:   has a past surgical history that includes Colonoscopy; hernia repair (Bilateral, 2011 2001); bronchoscopy (N/A, 5/27/2021); bronchoscopy (N/A, 5/27/2021); and bronchoscopy (N/A, 5/27/2021). Home Medications:    Prior to Admission medications    Medication Sig Start Date End Date Taking? Authorizing Provider   sucralfate (CARAFATE) 1 GM tablet Take 1 tablet by mouth 4 times daily Dissolve tab in 1 tbsp H2O & drink 7/14/21   Valerie Dotson III, MD   sucralfate (CARAFATE) 1 GM tablet Take 1 g by mouth 4 times daily Dissolve tab in 1 tbsp & drink   120 tabs with 2 refills    Valerie Dotson III, MD   predniSONE (DELTASONE) 10 MG tablet Take 10 mg by mouth daily    Historical Provider, MD   metoprolol succinate (TOPROL XL) 50 MG extended release tablet Take 1 tablet by mouth daily 5/18/19   Sawyer Borrego MD   simvastatin (ZOCOR) 40 MG tablet Take 40 mg by mouth daily     Historical Provider, MD   lisinopril (PRINIVIL;ZESTRIL) 20 MG tablet Take 20 mg by mouth daily     Historical Provider, MD   albuterol (ACCUNEB) 0.63 MG/3ML nebulizer solution Take 1 ampule by nebulization every 6 hours as needed for Wheezing    Historical Provider, MD   umeclidinium-vilanterol (ANORO ELLIPTA) 62.5-25 MCG/INH AEPB inhaler Inhale 1 puff into the lungs daily    Historical Provider, MD       Allergies:  Patient has no known allergies. Social History:   reports that he has quit smoking. He quit after 20.00 years of use. He has never used smokeless tobacco. He reports current alcohol use of about 1.0 standard drinks of alcohol per week. He reports that he does not use drugs. Family History: family history includes Cancer in his father and paternal uncle.  No for premature CAD. No for h/o sudden cardiac death    REVIEW OF SYSTEMS:    · Constitutional: there has been no unanticipated weight loss. There's been No change in energy level, No change in activity level. · Eyes: No visual changes or diplopia. No scleral icterus. · ENT: No Headaches, hearing loss or vertigo. No mouth sores or sore throat. · Cardiovascular: No chest pain, No dyspnea on exertion, No palpitations or Yes loss of consciousness. No cough, hemoptysis, No pleuritic pain, or phlebitis. · Respiratory: No cough or wheezing, no sputum production. No hematemesis. · Gastrointestinal: No abdominal pain, appetite loss, blood in stools. No change in bowel or bladder habits. · Genitourinary: No dysuria, trouble voiding, or hematuria. · Musculoskeletal:  No gait disturbance, No weakness or joint complaints. · Integumentary: No rash or pruritis. · Neurological: No headache, diplopia, change in muscle strength, numbness or tingling. No change in gait, balance, coordination, mood, affect, memory, mentation, behavior. · Psychiatric: No anxiety, or depression. · Endocrine: No temperature intolerance. No excessive thirst, fluid intake, or urination. No tremor. · Hematologic/Lymphatic: No abnormal bruising or bleeding, blood clots or swollen lymph nodes. · Allergic/Immunologic: No nasal congestion or hives. PHYSICAL EXAM:    Physical Examination:    /67   Pulse 92   Temp 97.5 °F (36.4 °C) (Temporal)   Resp 18   Ht 5' 7\" (1.702 m)   Wt 184 lb 6.4 oz (83.6 kg)   SpO2 95%   BMI 28.88 kg/m²    Constitutional and General Appearance: alert, cooperative, no distress and appears stated age  HEENT: PERRL, no cervical lymphadenopathy. No masses palpable. Normal oral mucosa  Respiratory:  · Normal excursion and expansion without use of accessory muscles  · Resp Auscultation: Good respiratory effort. No for increased work of breathing.  On auscultation: clear to auscultation bilaterally  Cardiovascular:  · The apical impulse is not displaced  · Heart tones are crisp and normal. regular S1 and S2.  · Jugular venous pulsation Normal  · The carotid upstroke is normal in amplitude and contour without delay or bruit  · Peripheral pulses are symmetrical and full   Abdomen:  · No masses or tenderness  · Bowel sounds present  Extremities:  ·  No Cyanosis or Clubbing  ·  Lower extremity edema: No  ·  Skin: Warm and dry  Neurological:  · Alert and oriented. · Moves all extremities well  · No abnormalities of mood, affect, memory, mentation, or behavior are noted    DATA:    Diagnostics:    EKG: normal sinus rhythm with first-degree AV block and right bundle branch block  ECHO: reviewed. Ejection fraction: 60%  Stress Test: not obtained. Cardiac Angiography: not obtained. ILR interrogation    Complete heart block with asystole lasting for more than 30 seconds    Labs:   CBC:   Recent Labs     07/19/21 1828   WBC 5.4   HGB 12.7   HCT 38.4   *     BMP:   Recent Labs     07/19/21 1828      K 4.5   CO2 22   BUN 28*   CREATININE 1.3*   LABGLOM 54   GLUCOSE 223*     BNP: No results for input(s): BNP in the last 72 hours. PT/INR: No results for input(s): PROTIME, INR in the last 72 hours. APTT:No results for input(s): APTT in the last 72 hours. CARDIAC ENZYMES:No results for input(s): CKTOTAL, CKMB, CKMBINDEX, TROPONINI in the last 72 hours.   FASTING LIPID PANEL:  Lab Results   Component Value Date    HDL 74 05/18/2019    LDLCALC 64 05/18/2019    TRIG 52 05/18/2019     LIVER PROFILE:  Recent Labs     07/19/21 1828   *   ALT 98*   LABALBU 3.6         IMPRESSION:    Patient Active Problem List   Diagnosis    Syncope and collapse--related to complete heart block which resolved spontaneously on presentation to the hospital.  The long episode of asystole was noted on loop recorder transmission    COPD (chronic obstructive pulmonary disease) (HCC)    BPH (benign prostatic hyperplasia)    HTN (hypertension)    HLD (hyperlipidemia)   Squamous cell cancer of the lung diagnosed about a month ago. Patient is currently  on chemotherapy and radiation treatment  Echocardiography today shows normal LV function and no major valvular heart disease  Acute renal insufficiency and elevation of liver enzymes possibly related to hypotension from transient heart block and asystole      RECOMMENDATIONS:    Urgent pacemaker implantation discussed with patient and wife in detail. Risks and benefits explained to them. Risks including those of pericardial tamponade, deep venous thrombosis, mechanical malfunction, infection but not limited to the above all explained at length. They understand and are agreeable to proceed  Will remove loop recorder at the same time  Monitor renal function post procedure  Further recommendations will depend on his response to the above      Discussed with patient and nursing.     Irineo Arzola MD,FACC

## 2021-07-20 NOTE — ANESTHESIA PRE PROCEDURE
Department of Anesthesiology  Preprocedure Note       Name:  Mindi De La Cruz   Age:  66 y.o.  :  1950                                          MRN:  [id number]         Date:  2021      Surgeon: Colette Isra    Procedure: implant PPM, removal loop recorder    Medications prior to admission:   Prior to Admission medications    Medication Sig Start Date End Date Taking?  Authorizing Provider   sucralfate (CARAFATE) 1 GM tablet Take 1 tablet by mouth 4 times daily Dissolve tab in 1 tbsp H2O & drink 21   Kimberly Stack III, MD   sucralfate (CARAFATE) 1 GM tablet Take 1 g by mouth 4 times daily Dissolve tab in 1 tbsp & drink   120 tabs with 2 refills    Kimberly Stack III, MD   predniSONE (DELTASONE) 10 MG tablet Take 10 mg by mouth daily    Historical Provider, MD   metoprolol succinate (TOPROL XL) 50 MG extended release tablet Take 1 tablet by mouth daily 19   Chuyita Acosta MD   simvastatin (ZOCOR) 40 MG tablet Take 40 mg by mouth daily     Historical Provider, MD   lisinopril (PRINIVIL;ZESTRIL) 20 MG tablet Take 20 mg by mouth daily     Historical Provider, MD   albuterol (ACCUNEB) 0.63 MG/3ML nebulizer solution Take 1 ampule by nebulization every 6 hours as needed for Wheezing    Historical Provider, MD   umeclidinium-vilanterol (ANORO ELLIPTA) 62.5-25 MCG/INH AEPB inhaler Inhale 1 puff into the lungs daily    Historical Provider, MD       Current medications:    Current Facility-Administered Medications   Medication Dose Route Frequency Provider Last Rate Last Admin    albuterol (ACCUNEB) nebulizer solution 0.63 mg  1 ampule Nebulization Q6H PRN Zeina Newman MD        [Held by provider] lisinopril (PRINIVIL;ZESTRIL) tablet 20 mg  20 mg Oral Daily Zeina Newman MD        predniSONE (DELTASONE) tablet 10 mg  10 mg Oral Daily Zeina Newman MD   10 mg at 21 1033    atorvastatin (LIPITOR) tablet 20 mg  20 mg Oral Daily Zeina Newman MD   20 mg at 21 1033    sucralfate (Elizabeth Aretha) tablet 1 g  1 g Oral 4x Daily Cynthia Ortiz MD   1 g at 07/20/21 8765    ipratropium (ATROVENT) 0.02 % nebulizer solution 0.5 mg  0.5 mg Nebulization 4x daily Cynthia Ortiz MD   0.5 mg at 07/20/21 1053    Arformoterol Tartrate (BROVANA) nebulizer solution 15 mcg  15 mcg Nebulization BID Cynthia Ortiz MD   15 mcg at 07/20/21 1053    perflutren lipid microspheres (DEFINITY) injection 1.65 mg  1.5 mL Intravenous ONCE PRN Cytnhia Ortiz MD           Allergies:  No Known Allergies    Problem List:    Patient Active Problem List   Diagnosis Code    Syncope and collapse R55    Pneumonia J18.9    [facility] J44.9    BPH (benign prostatic hyperplasia) N40.0    HTN (hypertension) I10    HLD (hyperlipidemia) E78.5    Intermittent complete heart block (HCC) I44.2       Past Medical History:        Diagnosis Date    COPD (chronic obstructive pulmonary disease) ([facility]     Syncope 09/2020    dr Hubbard Prudent wearing a monitor       Past Surgical History:        Procedure Laterality Date    BRONCHOSCOPY N/A 5/27/2021    BRONCHOSCOPY W/EBUS FNA performed by Sabrina Stanley MD at [address] [place] [address] N/A 5/27/2021    BRONCHOSCOPY DIAGNOSTIC OR CELL [address] ONLY performed by Sabrina Stanley MD at [address] [place] [address] N/A 5/27/2021    BRONCHOSCOPY ADD ON COMPUTER ASSISTED performed by Sabrina Stanley MD at [address] [facility] 2011 2001    groin        Social History:    Social History     Tobacco Use    Smoking status: Former Smoker     Years: 20.00    Smokeless tobacco: Never Used    Tobacco comment: stop 2011   Substance Use Topics    Alcohol use:  Yes     Alcohol/week: 1.0 standard drinks     Types: 1 Glasses of wine per week     Comment: daily                                Counseling given: Not Answered  Comment: stop 2011      Vital Signs (Current):   Vitals:    07/19/21 2348 07/20/21 0300 07/20/21 0745 07/20/21 1146   BP: 131/79 125/60 109/66 111/67   Pulse: 104 91 86 92   Resp: 18 17 18 18   Temp: 36.2 °C (97.1 °F) 36.9 °C (98.5 °F) 36.3 °C (97.3 °F) 36.4 °C (97.5 °F)   TempSrc: Temporal Temporal Temporal Temporal   SpO2: 95% 96% 96% 95%   Weight:       Height:                                                  BP Readings from Last 3 Encounters:   07/20/21 111/67   07/14/21 126/82   07/07/21 126/78       NPO Status:  >8 hrs                                                                               BMI:   Wt Readings from Last 3 Encounters:   07/19/21 184 lb 6.4 oz (83.6 kg)   07/14/21 190 lb 8 oz (86.4 kg)   07/07/21 187 lb 8 oz (85 kg)     Body mass index is 28.88 kg/m². CBC:   Lab Results   Component Value Date    WBC 5.4 07/19/2021    RBC 4.27 07/19/2021    HGB 12.7 07/19/2021    HCT 38.4 07/19/2021    MCV 89.9 07/19/2021    RDW 16.2 07/19/2021     07/19/2021       CMP:   Lab Results   Component Value Date     07/19/2021    K 4.5 07/19/2021    K 4.1 10/29/2020    CL 97 07/19/2021    CO2 22 07/19/2021    BUN 28 07/19/2021    CREATININE 1.3 07/19/2021    GFRAA >60 07/19/2021    LABGLOM 54 07/19/2021    GLUCOSE 223 07/19/2021    PROT 5.9 07/19/2021    CALCIUM 8.9 07/19/2021    BILITOT 0.5 07/19/2021    ALKPHOS 100 07/19/2021     07/19/2021    ALT 98 07/19/2021       POC Tests: No results for input(s): POCGLU, POCNA, POCK, POCCL, POCBUN, POCHEMO, POCHCT in the last 72 hours.     Coags:   Lab Results   Component Value Date    PROTIME 10.9 05/27/2021    INR 1.0 05/27/2021       HCG (If Applicable): No results found for: PREGTESTUR, PREGSERUM, HCG, HCGQUANT     ABGs: No results found for: PHART, PO2ART, YUO6ATX, WDO0CCT, BEART, V9BCZLZU     Type & Screen (If Applicable):  No results found for: LABABO, LABRH    Drug/Infectious Status (If Applicable):  No results found for: HIV, HEPCAB    COVID-19 Screening (If Applicable): No results found for: COVID19        Anesthesia Evaluation  Patient summary reviewed and Nursing notes reviewed history of anesthetic complications:   Airway: Mallampati: II  TM distance: >3 FB   Neck ROM: full  Mouth opening: > = 3 FB Dental: normal exam         Pulmonary: breath sounds clear to auscultation  (+) pneumonia:  COPD:      (-) not a current smoker                           Cardiovascular:    (+) hypertension:, dysrhythmias (asystolic episodes):,         Rhythm: regular  Rate: normal      Cleared by cardiology              Neuro/Psych:                ROS comment: Syncopal episodes GI/Hepatic/Renal:             Endo/Other:    (+) malignancy/cancer (lung). Abdominal:       Abdomen: soft. Vascular: negative vascular ROS. Other Findings:             Anesthesia Plan      MAC     ASA 4       Induction: intravenous. Anesthetic plan and risks discussed with patient. Use of blood products discussed with patient whom. Plan discussed with attending. LELO Lozano - CRNA   7/20/2021      Pt seen, examined, chart reviewed, plan discussed.   Josh Kemp MD  7/20/2021  3:09 PM

## 2021-07-20 NOTE — ANESTHESIA POSTPROCEDURE EVALUATION
Department of Anesthesiology  Postprocedure Note    Patient: Shalonda Birch  MRN: [id number]  YOB: 1950  Date of evaluation: 7/21/2021  Time:  8:31 AM     Procedure Summary     Date: 07/20/21 Room / Location: [facility] CATH LAB    Anesthesia Start:  Anesthesia Stop:     Procedure: PACEMAKER IMPLANT W/ANES Diagnosis:     Scheduled Providers:  Responsible Provider:     Anesthesia Type: MAC ASA Status: 4          Anesthesia Type: MAC    Shin Phase I:      Shin Phase II:      Last vitals: Reviewed and per EMR flowsheets.        Anesthesia Post Evaluation    Patient location during evaluation: PACU  Patient participation: complete - patient participated  Level of consciousness: awake  Pain score: 3  Airway patency: patent  Nausea & Vomiting: no nausea and no vomiting  Complications: no  Cardiovascular status: blood pressure returned to baseline  Respiratory status: acceptable  Hydration status: euvolemic

## 2021-07-20 NOTE — TELEPHONE ENCOUNTER
Received a call from patient's wife Christy Armijo. Patient passed out 3 times yesterday and went to ER. He is currently admitted and scheduled for a Pacemaker today. She wanted to update our office on his inpatient status and procedure and cancel his treatment for today. She also wasn't sure what to do about the [facility] appointment. Offered to call their office for her to update them and cancel his appointment. She was appreciative and stated that would be very helpful. Encouraged to call us with any updates, questions or concerns. She verbalized understanding. Court Friday, RTT. Will Update Dr Danny Morataya. [facility] and updated their office on patient being inpatient and need for Pacemaker. They will cancel his appointment for today.

## 2021-07-20 NOTE — CONSULTS
[address] [facility]  Initial Consult Note      REASON FOR CONSULTATION: Medical management    ATTENDING/ADMITTING PHYSICIAN: Electrophysiology    HISTORY OF PRESENT ILLNESS:      The patient is a 66 y.o. male patient of Dr. Warden Cross who presents with complaints of sudden syncope at home. Patient was being monitored on an outpatient basis for ongoing arrhythmias causing recurrent syncopal episodes. His most recent syncope episode prior to yesterday was in October 2020. On my evaluation he is resting comfortably and feels well. He is awaiting pacemaker placement at this point. Patient does have a recent diagnosis of lung carcinoma for which she is undergoing chemotherapy every Tuesday along with radiation therapy. He has a right-sided Mediport in place. No current complaints of fevers chills shortness of breath nausea or vomiting. Wife is at bedside and patient able to answer all questions without issues. He denies any chest pain.   Trauma work-up is negative in the ER        Past Medical History:   Diagnosis Date    COPD [facility]chronic obstructive [facility]) ([facility]     Syncope 09/2020    dr Berta Scott wearing a monitor           Past Surgical History:   Procedure Laterality Date    BRONCHOSCOPY N/A 5/27/2021    BRONCHOSCOPY W/EBUS FNA performed by Bhupendra Olvera MD at [facility] N/A 5/27/2021    BRONCHOSCOPY DIAGNOSTIC OR CELL [address] ONLY performed by Bhupendra Olvera MD at [facility] N/A 5/27/2021    BRONCHOSCOPY ADD ON COMPUTER ASSISTED performed by Bhupendra Olvera MD at [address] [facility] 2011 2001    groin        Medications Prior to Admission:    Medications Prior to Admission: sucralfate (CARAFATE) 1 GM tablet, Take 1 tablet by mouth 4 times daily Dissolve tab in 1 tbsp H2O & drink  sucralfate (CARAFATE) 1 GM tablet, Take 1 g by mouth 4 times daily Dissolve tab in 1 tbsp & drink  120 tabs with 2 refills  predniSONE (DELTASONE) 10 MG tablet, Take 10 mg by mouth daily  metoprolol succinate (TOPROL XL) 50 MG extended release tablet, Take 1 tablet by mouth daily  simvastatin (ZOCOR) 40 MG tablet, Take 40 mg by mouth daily   lisinopril (PRINIVIL;ZESTRIL) 20 MG tablet, Take 20 mg by mouth daily   albuterol (ACCUNEB) 0.63 MG/3ML nebulizer solution, Take 1 ampule by nebulization every 6 hours as needed for Wheezing  umeclidinium-vilanterol (ANORO ELLIPTA) 62.5-25 MCG/INH AEPB inhaler, Inhale 1 puff into the lungs daily    Allergies:    Patient has no known allergies. Social History:    reports that he has quit smoking. He quit after 20.00 years of use. He has never used smokeless tobacco. He reports current alcohol use of about 1.0 standard drinks of alcohol per week. He reports that he does not use drugs. Family History:   family history includes Cancer in his father and paternal uncle. REVIEW OF SYSTEMS:  As above in the HPI, otherwise negative    PHYSICAL EXAM:    Vitals:  /67   Pulse 92   Temp 97.5 °F (36.4 °C) (Temporal)   Resp 18   Ht 5' 7\" (1.702 m)   Wt 184 lb 6.4 oz (83.6 kg)   SpO2 95%   BMI 28.88 kg/m²     General:  Awake, alert, oriented X 3. Well developed, well nourished, well groomed. No apparent distress. HEENT:  Normocephalic, atraumatic. Pupils equal, round, reactive to light. No scleral icterus. No conjunctival injection. Normal lips, teeth, and gums. No nasal discharge. Neck:  Supple  Heart:  RRR, no murmurs, gallops, rubs-right-sided Mediport port in place  Lungs:  CTA bilaterally, bilat symmetrical expansion, no wheeze, rales, or rhonchi  Abdomen:   Bowel sounds present, soft, nontender, no masses, no organomegaly, no peritoneal signs  Extremities:  No clubbing, cyanosis, or edema  Skin:  Warm and dry, no open lesions or rash  Neuro:  Cranial nerves 2-12 intact, no focal deficits  Breast: deferred  Rectal: deferred  Genitalia:  deferred    LABS:    CBC with Differential:    Lab Results   Component Value Date    WBC 5.4 07/19/2021    RBC 4.27 07/19/2021    HGB 12.7 07/19/2021    HCT 38.4 07/19/2021     07/19/2021    MCV 89.9 07/19/2021    MCH 29.7 07/19/2021    MCHC 33.1 07/19/2021    RDW 16.2 07/19/2021    LYMPHOPCT 5.4 07/19/2021    MONOPCT 4.5 07/19/2021    BASOPCT 0.2 07/19/2021    MONOSABS 0.24 07/19/2021    LYMPHSABS 0.29 07/19/2021    EOSABS 0.02 07/19/2021    BASOSABS 0.01 07/19/2021     BMP:    Lab Results   Component Value Date     07/19/2021    K 4.5 07/19/2021    K 4.1 10/29/2020    CL 97 07/19/2021    CO2 22 07/19/2021    BUN 28 07/19/2021    LABALBU 3.6 07/19/2021    CREATININE 1.3 07/19/2021    CALCIUM 8.9 07/19/2021    GFRAA >60 07/19/2021    LABGLOM 54 07/19/2021    GLUCOSE 223 07/19/2021       ASSESSMENT:      Patient Active Problem List   Diagnosis    Syncope and collapse    Pneumonia    [facility]    BPH (benign prostatic hyperplasia)    HTN (hypertension)    HLD (hyperlipidemia)    Intermittent complete heart block (HCC)       PLAN:    Admitted for syncope  For pacemaker placement today  Electrophysiology team following  Monitor electrolytes and blood pressure  Continue telemetry monitoring for 24 hours post pacemaker placement    Mild renal insufficiency/mild elevation in LFTs  Continue to monitor  No acute interventions at this point    Recent diagnosis of lung cancer  On chemotherapy regimen every Tuesday along with radiation therapy  To be continued as outpatient        Note that over 50 minutes was spent in evaluation of the patient, review of the chart and pertinent records, discussion with family/staff, etc    Raymon Frost MD MD  11:58 AM  7/20/2021

## 2021-07-20 NOTE — CARE COORDINATION
Met with pt and spouse. Pt is pending echo, possible pacemaker scheduled for 7/20. Pt lives with spouse in a 1 erwin, 1 story home. Pt owns no dme. No ramírez/hh hx. Dr. Nael Sierra is pcp. [facility] for meds. Electrophysiology pending consult. Pt and spouse anticipate discharge home with medically stable with no needs. Cm/ss will continue to follow for any potential discharge needs.     Bill, [address] [place] [address]

## 2021-07-21 ENCOUNTER — HOSPITAL ENCOUNTER (OUTPATIENT)
Dept: RADIATION ONCOLOGY | Age: 71
Discharge: HOME | End: 2021-07-21
Attending: RADIOLOGY
Payer: MEDICARE

## 2021-07-21 ENCOUNTER — APPOINTMENT (OUTPATIENT)
Dept: RADIATION ONCOLOGY | Age: 71
End: 2021-07-21
Attending: RADIOLOGY
Payer: MEDICARE

## 2021-07-21 ENCOUNTER — APPOINTMENT (OUTPATIENT)
Dept: GENERAL RADIOLOGY | Age: 71
DRG: 243 | End: 2021-07-21
Payer: MEDICARE

## 2021-07-21 VITALS
TEMPERATURE: 97.9 F | SYSTOLIC BLOOD PRESSURE: 124 MMHG | RESPIRATION RATE: 18 BRPM | HEIGHT: 67 IN | BODY MASS INDEX: 28.41 KG/M2 | WEIGHT: 181 LBS | HEART RATE: 102 BPM | DIASTOLIC BLOOD PRESSURE: 79 MMHG | OXYGEN SATURATION: 98 %

## 2021-07-21 DIAGNOSIS — C34.90 MALIGNANT NEOPLASM OF LUNG, UNSPECIFIED LATERALITY, UNSPECIFIED PART OF LUNG (HCC): Primary | ICD-10-CM

## 2021-07-21 LAB
ALBUMIN SERPL-MCNC: 3.8 G/DL (ref 3.5–5.2)
ALP BLD-CCNC: 77 U/L (ref 40–129)
ALT SERPL-CCNC: 58 U/L (ref 0–40)
ANION GAP SERPL CALCULATED.3IONS-SCNC: 10 MMOL/L (ref 7–16)
AST SERPL-CCNC: 25 U/L (ref 0–39)
BILIRUB SERPL-MCNC: 0.6 MG/DL (ref 0–1.2)
BUN BLDV-MCNC: 16 MG/DL (ref 6–23)
CALCIUM SERPL-MCNC: 8.7 MG/DL (ref 8.6–10.2)
CHLORIDE BLD-SCNC: 98 MMOL/L (ref 98–107)
CO2: 25 MMOL/L (ref 22–29)
CREAT SERPL-MCNC: 0.9 MG/DL (ref 0.7–1.2)
GFR AFRICAN AMERICAN: >60
GFR NON-AFRICAN AMERICAN: >60 ML/MIN/1.73
GLUCOSE BLD-MCNC: 100 MG/DL (ref 74–99)
HCT VFR BLD CALC: 39.1 % (ref 37–54)
HEMOGLOBIN: 12.8 G/DL (ref 12.5–16.5)
MCH RBC QN AUTO: 29.6 PG (ref 26–35)
MCHC RBC AUTO-ENTMCNC: 32.7 % (ref 32–34.5)
MCV RBC AUTO: 90.5 FL (ref 80–99.9)
PDW BLD-RTO: 16.5 FL (ref 11.5–15)
PLATELET # BLD: 130 E9/L (ref 130–450)
PMV BLD AUTO: 11.3 FL (ref 7–12)
POTASSIUM SERPL-SCNC: 3.8 MMOL/L (ref 3.5–5)
RBC # BLD: 4.32 E12/L (ref 3.8–5.8)
SODIUM BLD-SCNC: 133 MMOL/L (ref 132–146)
TOTAL PROTEIN: 6.3 G/DL (ref 6.4–8.3)
TSH SERPL DL<=0.05 MIU/L-ACNC: 0.77 UIU/ML (ref 0.27–4.2)
WBC # BLD: 5.8 E9/L (ref 4.5–11.5)

## 2021-07-21 PROCEDURE — 36415 COLL VENOUS BLD VENIPUNCTURE: CPT

## 2021-07-21 PROCEDURE — 80053 COMPREHEN METABOLIC PANEL: CPT

## 2021-07-21 PROCEDURE — 2580000003 HC RX 258: Performed by: INTERNAL MEDICINE

## 2021-07-21 PROCEDURE — 84443 ASSAY THYROID STIM HORMONE: CPT

## 2021-07-21 PROCEDURE — 85027 COMPLETE CBC AUTOMATED: CPT

## 2021-07-21 PROCEDURE — 6370000000 HC RX 637 (ALT 250 FOR IP): Performed by: INTERNAL MEDICINE

## 2021-07-21 PROCEDURE — 6360000002 HC RX W HCPCS: Performed by: INTERNAL MEDICINE

## 2021-07-21 PROCEDURE — 77334 RADIATION TREATMENT AID(S): CPT | Performed by: RADIOLOGY

## 2021-07-21 PROCEDURE — 71046 X-RAY EXAM CHEST 2 VIEWS: CPT

## 2021-07-21 PROCEDURE — 99999 PR OFFICE/OUTPT VISIT,PROCEDURE ONLY: CPT | Performed by: RADIOLOGY

## 2021-07-21 PROCEDURE — 94640 AIRWAY INHALATION TREATMENT: CPT

## 2021-07-21 RX ORDER — METOPROLOL SUCCINATE 50 MG/1
50 TABLET, EXTENDED RELEASE ORAL 2 TIMES DAILY WITH MEALS
Qty: 180 TABLET | Refills: 3 | Status: ON HOLD | OUTPATIENT
Start: 2021-07-22 | End: 2022-09-03 | Stop reason: HOSPADM

## 2021-07-21 RX ORDER — METOPROLOL SUCCINATE 50 MG/1
50 TABLET, EXTENDED RELEASE ORAL 2 TIMES DAILY WITH MEALS
Status: DISCONTINUED | OUTPATIENT
Start: 2021-07-21 | End: 2021-07-21 | Stop reason: HOSPADM

## 2021-07-21 RX ADMIN — CEFAZOLIN SODIUM 1000 MG: 1 INJECTION, POWDER, FOR SOLUTION INTRAMUSCULAR; INTRAVENOUS at 06:31

## 2021-07-21 RX ADMIN — METOPROLOL SUCCINATE 50 MG: 50 TABLET, EXTENDED RELEASE ORAL at 08:54

## 2021-07-21 RX ADMIN — CEFAZOLIN SODIUM 1000 MG: 1 INJECTION, POWDER, FOR SOLUTION INTRAMUSCULAR; INTRAVENOUS at 13:43

## 2021-07-21 RX ADMIN — SODIUM CHLORIDE, PRESERVATIVE FREE 10 ML: 5 INJECTION INTRAVENOUS at 09:05

## 2021-07-21 RX ADMIN — METOPROLOL SUCCINATE 50 MG: 50 TABLET, EXTENDED RELEASE ORAL at 17:25

## 2021-07-21 RX ADMIN — ATORVASTATIN CALCIUM 20 MG: 20 TABLET, FILM COATED ORAL at 08:53

## 2021-07-21 RX ADMIN — SUCRALFATE 1 G: 1 TABLET ORAL at 17:25

## 2021-07-21 RX ADMIN — SUCRALFATE 1 G: 1 TABLET ORAL at 13:44

## 2021-07-21 RX ADMIN — PREDNISONE 10 MG: 10 TABLET ORAL at 08:53

## 2021-07-21 RX ADMIN — IPRATROPIUM BROMIDE 0.5 MG: 0.5 SOLUTION RESPIRATORY (INHALATION) at 13:05

## 2021-07-21 RX ADMIN — IPRATROPIUM BROMIDE 0.5 MG: 0.5 SOLUTION RESPIRATORY (INHALATION) at 15:43

## 2021-07-21 RX ADMIN — SODIUM CHLORIDE, PRESERVATIVE FREE 10 ML: 5 INJECTION INTRAVENOUS at 06:32

## 2021-07-21 RX ADMIN — SUCRALFATE 1 G: 1 TABLET ORAL at 08:54

## 2021-07-21 RX ADMIN — SODIUM CHLORIDE, PRESERVATIVE FREE 10 ML: 5 INJECTION INTRAVENOUS at 13:44

## 2021-07-21 ASSESSMENT — PAIN SCALES - GENERAL
PAINLEVEL_OUTOF10: 0
PAINLEVEL_OUTOF10: 0

## 2021-07-21 NOTE — PROGRESS NOTES
Electrophysiology progress note         Date:  7/21/2021  Patient: aFwn Laguerre  Admission:  7/19/2021  5:50 PM  Admit DX: Intermittent complete heart block ([address] [I44.2]  Age:  66 y.o., 1950     LOS: 2 days     Reason for evaluation:   Syncope, complete heart block, s/p pacemaker implantation      SUBJECTIVE:    The patient was seen and examined. Notes and labs reviewed. There were not complications over night. Patient's cardiac review of systems: negative. The patient is generally feeling gradually improving. OBJECTIVE:    Telemetry: Sinus rhythm  /79   Pulse 102   Temp 97.9 °F (36.6 °C) (Oral)   Resp 18   Ht 5' 7\" (1.702 m)   Wt 181 lb (82.1 kg)   SpO2 98%   BMI 28.35 kg/m²     Intake/Output Summary (Last 24 hours) at 7/21/2021 1944  Last data filed at 7/21/2021 1850  Gross per 24 hour   Intake 780 ml   Output 1200 ml   Net -420 ml       EXAM:   CONSTITUTIONAL:  awake, alert, cooperative, no apparent distress, and appears stated age. HEENT: Normal jugular venous pulsations,  Head is atraumatic, normocephalic. Eyes and oral mucosa are normal.  LUNGS: Good respiratory effort. No for increased work of breathing. On auscultation: clear to auscultation bilaterally  CARDIOVASCULAR:  Normal apical impulse, regular rate and rhythm, normal S1 and S2, no S3  ABDOMEN: Soft, nontender, nondistended. SKIN: Warm and dry. Pacemaker site looks dry and well-healing. No swelling  EXTREMITIES: No lower extremity edema. Motor movement grossly intact. No cyanosis or clubbing.     Current Inpatient Medications:   metoprolol succinate  50 mg Oral BID WC    [Held by provider] lisinopril  20 mg Oral Daily    predniSONE  10 mg Oral Daily    atorvastatin  20 mg Oral Daily    sucralfate  1 g Oral 4x Daily    ipratropium  0.5 mg Nebulization 4x daily    Arformoterol Tartrate  15 mcg Nebulization BID    sodium chloride flush  5-40 mL Intravenous 2 times per day       IV Infusions (if any):   sodium chloride         Diagnostics:    EKG: normal sinus rhythm, RBBB, unchanged from previous tracings. ECHO: reviewed. Ejection fraction: 60%  Stress Test: not obtained. Cardiac Angiography: not obtained. Device Evaluation    Make/model   Abbott dual-chamber pacemaker, Assurity MRI 2272  Mode DDD 60-1 20  P waves   more than 5    mV     Impedance   440      ohms   Pacing threshold 0.5    [email or web address]   msec  R waves    more than 12   MV     Impedance     660    ohms   Pacing threshold 0.5 [email or web address]    msec   Pacing percentage  A    0%           V   5.5%    Battery longevity   5.5 to 10.5 years  Arrhythmias   none      Evaluation and reprogramming included   Overall device function is normal  All  device programmable settings were evaluated per above, along with iterative adjustments (capture thresholds) to assess and select the most appropriate final programming for consistent delivery off the appropriate therapy and to verify function of the device. Labs:   CBC:   Recent Labs     07/19/21 1828 07/21/21 0613   WBC 5.4 5.8   HGB 12.7 12.8   HCT 38.4 39.1   * 130     BMP:   Recent Labs     07/19/21 1828 07/21/21 0613    133   K 4.5 3.8   CO2 22 25   BUN 28* 16   CREATININE 1.3* 0.9   LABGLOM 54 >60   GLUCOSE 223* 100*     BNP: No results for input(s): BNP in the last 72 hours. PT/INR: No results for input(s): PROTIME, INR in the last 72 hours. APTT:No results for input(s): APTT in the last 72 hours. CARDIAC ENZYMES:No results for input(s): CKTOTAL, CKMB, CKMBINDEX, TROPONINI in the last 72 hours.   FASTING LIPID PANEL:  Lab Results   Component Value Date    HDL 74 05/18/2019    LDLCALC 64 05/18/2019    TRIG 52 05/18/2019     LIVER PROFILE:  Recent Labs     07/19/21 1828 07/21/21  0613   * 25   ALT 98* 58*   LABALBU 3.6 3.8       ASSESSMENT:    Patient Active Problem List   Diagnosis    Syncope and collapse    COPD (chronic obstructive pulmonary disease) (HCC)    BPH (benign prostatic hyperplasia)    HTN (hypertension)    HLD (hyperlipidemia)    Intermittent complete heart block (HCC)    S/P cardiac pacemaker procedure--satisfactory parameters, lead position on x-ray and site healing well       PLAN:    Discharged home on metoprolol 50 mg twice a day  Blood pressure measurements at home before restarting lisinopril  Continue all other medications  Follow-up in the office in 1 week  Incision care and left arm use explained in detail  No driving for a week      Please see orders. Discussed with patient and nursing.     Malissa Bernard MD, [address] [facility]

## 2021-07-21 NOTE — PROGRESS NOTES
Rad ONc nurse saw patient and he is an inpatient because he is having a pacemaker inserted. Spoke with Dr. Esperanza Headley.

## 2021-07-21 NOTE — PROCEDURES
large to place both the leads. The leads utilized were as follows: The atrial lead was an Abbott [id number] serial [id number]. The RV lead was  a [id number], serial [id number]. The leads were placed in the high right  atrium and the RV septum with P waves of 5 millivolts, R waves of 12  millivolts, pacing thresholds of 1 Volt at 0.5 msec in the atrium and  0.5 Volts at 0.5 msec in the RV. Impedances were 510 ohms in the  atrium and 730 ohms in the RV. The two leads were secured to the  underlying tissues and attached to the pacemaker which was an [facility] [facility] [id number], serial [id number]. Leads and device placed in the  subcutaneous pocket inside an antibacterial coated envelope and the  pocket was closed in multiple layers. At this point, we turned our attention to the loop recorder. Using 1%  lidocaine for local anesthesia, an incision was made near the loop  recorder site. Using blunt dissection, the loop recorder was located  and removed from the pocket. The incision was closed with 4-0 suture  and Steri-Strips. CONCLUSION:  1. Successful implantation of a dual-chamber pacemaker utilizing two  active fixed leads. 2.  Removal of the loop recorder. PLAN:  Recovery in the hospital and discharge home to be followed up as  outpatient.         Darrell Ellis MD    D: 07/20/2021 16:53:08       T: 07/20/2021 23:40:42     [email or web address]  Job#: [id number]     Doc#: [id number]    CC:  Kaci Navarrete MD

## 2021-07-21 NOTE — CARE COORDINATION
Pt had a pacemaker on 7/20, spouse and pt discharge plan remains home with no needs. Spouse will transport home when medically stable.      Silva Khan

## 2021-07-22 ENCOUNTER — APPOINTMENT (OUTPATIENT)
Dept: RADIATION ONCOLOGY | Age: 71
End: 2021-07-22
Attending: RADIOLOGY
Payer: MEDICARE

## 2021-07-23 ENCOUNTER — APPOINTMENT (OUTPATIENT)
Dept: RADIATION ONCOLOGY | Age: 71
End: 2021-07-23
Attending: RADIOLOGY
Payer: MEDICARE

## 2021-07-23 NOTE — DISCHARGE SUMMARY
Patient read discharge instructions, verbilized understanding. Packed up belongings, helped patient get dressed, put patient's immobilizer back on, removed IV's on right and left, took monitor off patient and leads, and stickers. Wife came up to the room to go with patient to the car, went over home instructions for wound and immobilizer with wife and patient, patient left floor in a wheelchair to go home.
twice a day, off his lisinopril,  and on his other medications which include his inhalers, atorvastatin 20  mg daily, prednisone 10 mg daily, sucralfate four times a day 1 gm. DISCHARGE INSTRUCTIONS:  He will follow up in the office with me in one  week. Discharge instructions were given in detail. DISCHARGE CONDITION:  Stable. DISPOSITION:  Discharged to home.         Bibiana Gomez MD    D: 07/22/2021 10:40:28       T: 07/22/2021 12:40:28     /[email or web address]  Job#: [id number]     Doc#: [id number]    CC:  Daily Shanks MD

## 2021-07-26 ENCOUNTER — HOSPITAL ENCOUNTER (OUTPATIENT)
Dept: RADIATION ONCOLOGY | Age: 71
End: 2021-07-26
Attending: RADIOLOGY
Payer: MEDICARE

## 2021-07-26 NOTE — PROGRESS NOTES
DEPARTMENT OF RADIATION ONCOLOGY ON TREATMENT VISIT       7/7/21      NAME:  Zahida Gutiérrez    YOB: 1950    Diagnosis: lung CA    SUBJECTIVE:   Zahida Gutiérrez has now received fractionated external beam radiation therapy - ongoing. Past medical, surgical, social and family histories reviewed and updated as indicated. Pain: controlled    ALLERGIES:  Patient has no known allergies. Current Outpatient Medications   Medication Sig Dispense Refill    metoprolol succinate (TOPROL XL) 50 MG extended release tablet Take 1 tablet by mouth 2 times daily (with meals) 180 tablet 3    doxycycline hyclate (VIBRA-TABS) 100 MG tablet Take 1 tablet by mouth 2 times daily for 7 days 14 tablet 0    sucralfate (CARAFATE) 1 GM tablet Take 1 tablet by mouth 4 times daily Dissolve tab in 1 tbsp H2O & drink 120 tablet 2    sucralfate (CARAFATE) 1 GM tablet Take 1 g by mouth 4 times daily Dissolve tab in 1 tbsp & drink   120 tabs with 2 refills      predniSONE (DELTASONE) 10 MG tablet Take 10 mg by mouth daily      simvastatin (ZOCOR) 40 MG tablet Take 40 mg by mouth daily       albuterol (ACCUNEB) 0.63 MG/3ML nebulizer solution Take 1 ampule by nebulization every 6 hours as needed for Wheezing      umeclidinium-vilanterol (ANORO ELLIPTA) 62.5-25 MCG/INH AEPB inhaler Inhale 1 puff into the lungs daily       No current facility-administered medications for this encounter. OBJECTIVE:  Alert and fully ambulatory. Pleasant and conversant. Physical Examination: General appearance - alert, well appearing, and in no distress. Wt Readings from Last 3 Encounters:   07/21/21 181 lb (82.1 kg)   07/14/21 190 lb 8 oz (86.4 kg)   07/07/21 187 lb 8 oz (85 kg)         ASSESSMENT/PLAN:     Patient is tolerating treatments well with expected toxicities. RBA were reviewed prior to first fraction and PRN. Current and planned dose reviewed.  Goals of treatment and potential side effects were reviewed with the patient PRN. Treatment imaging has been personally reviewed for accuracy and precision. Questions answered to apparent satisfaction. Treatments will continue as planned. Katie Uribe.  Melva Mccormick MD MS TIM  Radiation Oncologist        [facility]): [phone] /// FAX: [phone]  [facility]): [phone] /// FAX: [phone]  [facility]): [phone] /// FAX: [phone]

## 2021-07-26 NOTE — PROGRESS NOTES
DEPARTMENT OF RADIATION ONCOLOGY ON TREATMENT VISIT         7/26/2021      NAME:  Cheko Sims    YOB: 1950    Diagnosis: lung cancer    SUBJECTIVE:   Cheko Sims has now received fractionated external beam radiation therapy - ongoing. Past medical, surgical, social and family histories reviewed and updated as indicated. Pain: controlled    ALLERGIES:  Patient has no known allergies. Current Outpatient Medications   Medication Sig Dispense Refill    metoprolol succinate (TOPROL XL) 50 MG extended release tablet Take 1 tablet by mouth 2 times daily (with meals) 180 tablet 3    doxycycline hyclate (VIBRA-TABS) 100 MG tablet Take 1 tablet by mouth 2 times daily for 7 days 14 tablet 0    sucralfate (CARAFATE) 1 GM tablet Take 1 tablet by mouth 4 times daily Dissolve tab in 1 tbsp H2O & drink 120 tablet 2    sucralfate (CARAFATE) 1 GM tablet Take 1 g by mouth 4 times daily Dissolve tab in 1 tbsp & drink   120 tabs with 2 refills      predniSONE (DELTASONE) 10 MG tablet Take 10 mg by mouth daily      simvastatin (ZOCOR) 40 MG tablet Take 40 mg by mouth daily       albuterol (ACCUNEB) 0.63 MG/3ML nebulizer solution Take 1 ampule by nebulization every 6 hours as needed for Wheezing      umeclidinium-vilanterol (ANORO ELLIPTA) 62.5-25 MCG/INH AEPB inhaler Inhale 1 puff into the lungs daily       No current facility-administered medications for this encounter. OBJECTIVE:  Alert and fully ambulatory. Pleasant and conversant. Physical Examination: General appearance - alert, well appearing, and in no distress. Wt Readings from Last 3 Encounters:   07/21/21 181 lb (82.1 kg)   07/14/21 190 lb 8 oz (86.4 kg)   07/07/21 187 lb 8 oz (85 kg)         ASSESSMENT/PLAN:     Patient is tolerating treatments well with expected toxicities. RBA were reviewed prior to first fraction and PRN. Current and planned dose reviewed.  Goals of treatment and potential side effects were reviewed with the patient PRN. Treatment imaging has been personally reviewed for accuracy and precision. Questions answered to apparent satisfaction. Treatments will continue as planned. Katie Uribe.  Melva Mccormick MD MS TIM  Radiation Oncologist        [facility]): [phone] /// FAX: [phone]  [facility]): [phone] /// FAX: [phone]  [facility]): [phone] /// FAX: [phone]

## 2021-07-26 NOTE — PATIENT INSTRUCTIONS
Continue daily fractionated radiation therapy as scheduled. Please see weekly OTV note and intial consultation letter in [facility] for clinical details. Vishal Perez. Ana Arce MD MS Lisa Carreon:  [phone]   FAX: [phone]  [address] [place] [address]:  [phone]   FAX:    [phone]  [facility]:  [phone]   FAX:  [phone]  Email: [email or web address]

## 2021-07-26 NOTE — PATIENT INSTRUCTIONS
Continue daily fractionated radiation therapy as scheduled. Please see weekly OTV note and intial consultation letter in [facility] for clinical details. Cecelia Santa. Jose Bautista MD MS Maryann Graf:  [phone]   FAX: [phone]  [facility]:  [phone]   FAX:    [phone]  [facility]:  [phone]   FAX:  [phone]  Email: [email or web address]

## 2021-07-27 ENCOUNTER — APPOINTMENT (OUTPATIENT)
Dept: RADIATION ONCOLOGY | Age: 71
End: 2021-07-27
Attending: RADIOLOGY
Payer: MEDICARE

## 2021-07-28 ENCOUNTER — APPOINTMENT (OUTPATIENT)
Dept: RADIATION ONCOLOGY | Age: 71
End: 2021-07-28
Attending: RADIOLOGY
Payer: MEDICARE

## 2021-07-28 ENCOUNTER — HOSPITAL ENCOUNTER (OUTPATIENT)
Dept: RADIATION ONCOLOGY | Age: 71
Discharge: HOME | End: 2021-07-28
Attending: RADIOLOGY
Payer: MEDICARE

## 2021-07-28 DIAGNOSIS — C34.90 MALIGNANT NEOPLASM OF LUNG, UNSPECIFIED LATERALITY, UNSPECIFIED PART OF LUNG (HCC): Primary | ICD-10-CM

## 2021-07-28 PROCEDURE — 99999 PR OFFICE/OUTPT VISIT,PROCEDURE ONLY: CPT | Performed by: RADIOLOGY

## 2021-07-29 ENCOUNTER — APPOINTMENT (OUTPATIENT)
Dept: RADIATION ONCOLOGY | Age: 71
End: 2021-07-29
Attending: RADIOLOGY
Payer: MEDICARE

## 2021-07-30 ENCOUNTER — APPOINTMENT (OUTPATIENT)
Dept: RADIATION ONCOLOGY | Age: 71
End: 2021-07-30
Attending: RADIOLOGY
Payer: MEDICARE

## 2021-08-02 ENCOUNTER — HOSPITAL ENCOUNTER (OUTPATIENT)
Dept: RADIATION ONCOLOGY | Age: 71
Discharge: HOME | End: 2021-08-02
Attending: RADIOLOGY
Payer: MEDICARE

## 2021-08-02 ENCOUNTER — APPOINTMENT (OUTPATIENT)
Dept: RADIATION ONCOLOGY | Age: 71
End: 2021-08-02
Attending: RADIOLOGY
Payer: MEDICARE

## 2021-08-02 PROCEDURE — 77301 RADIOTHERAPY DOSE PLAN IMRT: CPT | Performed by: RADIOLOGY

## 2021-08-02 PROCEDURE — 77300 RADIATION THERAPY DOSE PLAN: CPT | Performed by: RADIOLOGY

## 2021-08-02 PROCEDURE — 77338 DESIGN MLC DEVICE FOR IMRT: CPT | Performed by: RADIOLOGY

## 2021-08-03 ENCOUNTER — APPOINTMENT (OUTPATIENT)
Dept: RADIATION ONCOLOGY | Age: 71
End: 2021-08-03
Attending: RADIOLOGY
Payer: MEDICARE

## 2021-08-04 ENCOUNTER — APPOINTMENT (OUTPATIENT)
Dept: RADIATION ONCOLOGY | Age: 71
End: 2021-08-04
Attending: RADIOLOGY
Payer: MEDICARE

## 2021-08-05 ENCOUNTER — APPOINTMENT (OUTPATIENT)
Dept: RADIATION ONCOLOGY | Age: 71
End: 2021-08-05
Attending: RADIOLOGY
Payer: MEDICARE

## 2021-08-06 ENCOUNTER — APPOINTMENT (OUTPATIENT)
Dept: RADIATION ONCOLOGY | Age: 71
End: 2021-08-06
Attending: RADIOLOGY
Payer: MEDICARE

## 2021-08-09 ENCOUNTER — APPOINTMENT (OUTPATIENT)
Dept: RADIATION ONCOLOGY | Age: 71
End: 2021-08-09
Attending: RADIOLOGY
Payer: MEDICARE

## 2021-08-10 ENCOUNTER — HOSPITAL ENCOUNTER (OUTPATIENT)
Dept: RADIATION ONCOLOGY | Age: 71
Discharge: HOME | End: 2021-08-10
Attending: RADIOLOGY
Payer: MEDICARE

## 2021-08-10 PROCEDURE — 77386 HC NTSTY MODUL RAD TX DLVR CPLX: CPT | Performed by: RADIOLOGY

## 2021-08-10 PROCEDURE — 77014 PR CT GUIDANCE PLACEMENT RAD THERAPY FIELDS: CPT | Performed by: RADIOLOGY

## 2021-08-11 ENCOUNTER — HOSPITAL ENCOUNTER (OUTPATIENT)
Dept: RADIATION ONCOLOGY | Age: 71
Discharge: HOME | End: 2021-08-11
Attending: RADIOLOGY
Payer: MEDICARE

## 2021-08-11 VITALS
RESPIRATION RATE: 18 BRPM | SYSTOLIC BLOOD PRESSURE: 130 MMHG | HEART RATE: 67 BPM | WEIGHT: 187.5 LBS | TEMPERATURE: 97.4 F | DIASTOLIC BLOOD PRESSURE: 82 MMHG | OXYGEN SATURATION: 98 % | BODY MASS INDEX: 29.37 KG/M2

## 2021-08-11 DIAGNOSIS — C34.90 MALIGNANT NEOPLASM OF LUNG, UNSPECIFIED LATERALITY, UNSPECIFIED PART OF LUNG (HCC): Primary | ICD-10-CM

## 2021-08-11 PROCEDURE — 77386 HC NTSTY MODUL RAD TX DLVR CPLX: CPT | Performed by: RADIOLOGY

## 2021-08-11 PROCEDURE — 77014 PR CT GUIDANCE PLACEMENT RAD THERAPY FIELDS: CPT | Performed by: RADIOLOGY

## 2021-08-11 PROCEDURE — 99999 PR OFFICE/OUTPT VISIT,PROCEDURE ONLY: CPT | Performed by: RADIOLOGY

## 2021-08-11 NOTE — PROGRESS NOTES
Americo Jeanine  8/11/2021  Wt Readings from Last 3 Encounters:   08/11/21 187 lb 8 oz (85 kg)   07/21/21 181 lb (82.1 kg)   07/14/21 190 lb 8 oz (86.4 kg)     Body mass index is 29.37 kg/m². Treatment Area:CTV L LUNG + nodes    Patient was seen today for weekly visit. Comfort Alteration  KPS:90%  Fatigue: Mild    Ventilation Alterations  Cough: No  Hemoptysis: No  Mucus Color: no  Dyspnea: No  O2 Sat: 90%    Nutritional Alteration  Anorexia: No  Nausea: No   Vomiting: No     Skin Alteration   Sensation:no    Radiation Dermatitis:  no    Mucous Membrane Alteration  Voice Changes/ Stridor/Larynx: no  Pharynx & Esophagus: no    Elimination Alterations  Constipation: no  Diarrhea:  no      Emotional  Coping: effective      Injury, potential bleeding or infection: no    Other:no    Lab Results   Component Value Date    WBC 5.8 07/21/2021     07/21/2021         /82   Pulse 67   Temp 97.4 °F (36.3 °C) (Temporal)   Resp 18   Wt 187 lb 8 oz (85 kg)   SpO2 98%   BMI 29.37 kg/m²   BP within normal range? yes         Assessment/Plan:  Pt completed 12/30fx and 2400/6000fx. Pt is tolerating tx well thus far.   Fabiano Liang RN

## 2021-08-12 ENCOUNTER — HOSPITAL ENCOUNTER (OUTPATIENT)
Dept: RADIATION ONCOLOGY | Age: 71
Discharge: HOME | End: 2021-08-12
Attending: RADIOLOGY
Payer: MEDICARE

## 2021-08-12 PROCEDURE — 77386 HC NTSTY MODUL RAD TX DLVR CPLX: CPT | Performed by: RADIOLOGY

## 2021-08-12 PROCEDURE — 77014 PR CT GUIDANCE PLACEMENT RAD THERAPY FIELDS: CPT | Performed by: RADIOLOGY

## 2021-08-13 ENCOUNTER — HOSPITAL ENCOUNTER (OUTPATIENT)
Dept: RADIATION ONCOLOGY | Age: 71
Discharge: HOME | End: 2021-08-13
Attending: RADIOLOGY
Payer: MEDICARE

## 2021-08-13 PROCEDURE — 77014 PR CT GUIDANCE PLACEMENT RAD THERAPY FIELDS: CPT | Performed by: RADIOLOGY

## 2021-08-13 PROCEDURE — 77386 HC NTSTY MODUL RAD TX DLVR CPLX: CPT | Performed by: RADIOLOGY

## 2021-08-16 ENCOUNTER — HOSPITAL ENCOUNTER (OUTPATIENT)
Dept: RADIATION ONCOLOGY | Age: 71
Discharge: HOME | End: 2021-08-16
Attending: RADIOLOGY
Payer: MEDICARE

## 2021-08-16 PROCEDURE — 77386 HC NTSTY MODUL RAD TX DLVR CPLX: CPT | Performed by: RADIOLOGY

## 2021-08-16 PROCEDURE — 77014 PR CT GUIDANCE PLACEMENT RAD THERAPY FIELDS: CPT | Performed by: RADIOLOGY

## 2021-08-16 PROCEDURE — 77336 RADIATION PHYSICS CONSULT: CPT | Performed by: RADIOLOGY

## 2021-08-16 PROCEDURE — 77427 RADIATION TX MANAGEMENT X5: CPT | Performed by: RADIOLOGY

## 2021-08-17 ENCOUNTER — HOSPITAL ENCOUNTER (OUTPATIENT)
Dept: RADIATION ONCOLOGY | Age: 71
Discharge: HOME | End: 2021-08-17
Attending: RADIOLOGY
Payer: MEDICARE

## 2021-08-17 PROCEDURE — 77386 HC NTSTY MODUL RAD TX DLVR CPLX: CPT | Performed by: RADIOLOGY

## 2021-08-17 PROCEDURE — 77014 PR CT GUIDANCE PLACEMENT RAD THERAPY FIELDS: CPT | Performed by: RADIOLOGY

## 2021-08-18 ENCOUNTER — HOSPITAL ENCOUNTER (OUTPATIENT)
Dept: RADIATION ONCOLOGY | Age: 71
Discharge: HOME | End: 2021-08-18
Attending: RADIOLOGY
Payer: MEDICARE

## 2021-08-18 VITALS
RESPIRATION RATE: 18 BRPM | DIASTOLIC BLOOD PRESSURE: 84 MMHG | TEMPERATURE: 97.4 F | OXYGEN SATURATION: 96 % | SYSTOLIC BLOOD PRESSURE: 128 MMHG | HEART RATE: 68 BPM | BODY MASS INDEX: 29.44 KG/M2 | WEIGHT: 188 LBS

## 2021-08-18 DIAGNOSIS — C34.90 MALIGNANT NEOPLASM OF LUNG, UNSPECIFIED LATERALITY, UNSPECIFIED PART OF LUNG (HCC): Primary | ICD-10-CM

## 2021-08-18 PROCEDURE — 77014 PR CT GUIDANCE PLACEMENT RAD THERAPY FIELDS: CPT | Performed by: RADIOLOGY

## 2021-08-18 PROCEDURE — 77386 HC NTSTY MODUL RAD TX DLVR CPLX: CPT | Performed by: RADIOLOGY

## 2021-08-18 PROCEDURE — 99999 PR OFFICE/OUTPT VISIT,PROCEDURE ONLY: CPT | Performed by: RADIOLOGY

## 2021-08-18 NOTE — PROGRESS NOTES
Anisa Rater  8/18/2021  Wt Readings from Last 3 Encounters:   08/18/21 188 lb (85.3 kg)   08/11/21 187 lb 8 oz (85 kg)   07/21/21 181 lb (82.1 kg)     Body mass index is 29.44 kg/m². Treatment Area:CTV L Lung& nodes    Patient was seen today for weekly visit. Comfort Alteration  KPS:90%  Fatigue: Mild    Ventilation Alterations  Cough: Yes  Hemoptysis: No  Mucus Color: clear  Dyspnea: No  O2 Sat: 96%    Nutritional Alteration  Anorexia: No  Nausea: No   Vomiting: No     Skin Alteration   Sensation:no    Radiation Dermatitis:  no    Mucous Membrane Alteration  Voice Changes/ Stridor/Larynx: no  Pharynx & Esophagus: yes, trouble swallowing and pt is taking carafate    Elimination Alterations  Constipation: no  Diarrhea:  no      Emotional  Coping: effective      Injury, potential bleeding or infection: no    Other:no    Lab Results   Component Value Date    WBC 5.8 07/21/2021     07/21/2021         /84   Pulse 68   Temp 97.4 °F (36.3 °C) (Temporal)   Resp 18   Wt 188 lb (85.3 kg)   SpO2 96%   BMI 29.44 kg/m²   BP within normal range? yes      Assessment/Plan: Pt completed 7/20fx 1400/4000cGy. Pt is tolerating tx well thus far.     Lorri Whitlock RN

## 2021-08-19 ENCOUNTER — HOSPITAL ENCOUNTER (OUTPATIENT)
Dept: RADIATION ONCOLOGY | Age: 71
Discharge: HOME | End: 2021-08-19
Attending: RADIOLOGY
Payer: MEDICARE

## 2021-08-19 PROCEDURE — 77386 HC NTSTY MODUL RAD TX DLVR CPLX: CPT | Performed by: RADIOLOGY

## 2021-08-19 PROCEDURE — 77014 PR CT GUIDANCE PLACEMENT RAD THERAPY FIELDS: CPT | Performed by: RADIOLOGY

## 2021-08-20 ENCOUNTER — HOSPITAL ENCOUNTER (OUTPATIENT)
Dept: RADIATION ONCOLOGY | Age: 71
Discharge: HOME | End: 2021-08-20
Attending: RADIOLOGY
Payer: MEDICARE

## 2021-08-20 PROCEDURE — 77014 PR CT GUIDANCE PLACEMENT RAD THERAPY FIELDS: CPT | Performed by: RADIOLOGY

## 2021-08-20 PROCEDURE — 77386 HC NTSTY MODUL RAD TX DLVR CPLX: CPT | Performed by: RADIOLOGY

## 2021-08-23 ENCOUNTER — HOSPITAL ENCOUNTER (OUTPATIENT)
Dept: RADIATION ONCOLOGY | Age: 71
Discharge: HOME | End: 2021-08-23
Attending: RADIOLOGY
Payer: MEDICARE

## 2021-08-23 PROCEDURE — 77386 HC NTSTY MODUL RAD TX DLVR CPLX: CPT | Performed by: RADIOLOGY

## 2021-08-23 PROCEDURE — 77014 PR CT GUIDANCE PLACEMENT RAD THERAPY FIELDS: CPT | Performed by: RADIOLOGY

## 2021-08-23 PROCEDURE — 77336 RADIATION PHYSICS CONSULT: CPT | Performed by: RADIOLOGY

## 2021-08-24 ENCOUNTER — HOSPITAL ENCOUNTER (EMERGENCY)
Age: 71
Discharge: HOME | End: 2021-08-24
Attending: EMERGENCY MEDICINE
Payer: MEDICARE

## 2021-08-24 ENCOUNTER — HOSPITAL ENCOUNTER (OUTPATIENT)
Dept: RADIATION ONCOLOGY | Age: 71
Discharge: HOME | End: 2021-08-24
Attending: RADIOLOGY
Payer: MEDICARE

## 2021-08-24 VITALS
OXYGEN SATURATION: 96 % | WEIGHT: 178 LBS | SYSTOLIC BLOOD PRESSURE: 140 MMHG | HEART RATE: 76 BPM | HEIGHT: 66 IN | TEMPERATURE: 96.7 F | BODY MASS INDEX: 28.61 KG/M2 | RESPIRATION RATE: 18 BRPM | DIASTOLIC BLOOD PRESSURE: 90 MMHG

## 2021-08-24 DIAGNOSIS — T50.901A ACCIDENTAL DRUG OVERDOSE, INITIAL ENCOUNTER: Primary | ICD-10-CM

## 2021-08-24 LAB
ANION GAP SERPL CALCULATED.3IONS-SCNC: 7 MMOL/L (ref 7–16)
BUN BLDV-MCNC: 14 MG/DL (ref 6–23)
CALCIUM SERPL-MCNC: 9.2 MG/DL (ref 8.6–10.2)
CHLORIDE BLD-SCNC: 101 MMOL/L (ref 98–107)
CO2: 28 MMOL/L (ref 22–29)
CREAT SERPL-MCNC: 0.8 MG/DL (ref 0.7–1.2)
GFR AFRICAN AMERICAN: >60
GFR NON-AFRICAN AMERICAN: >60 ML/MIN/1.73
GLUCOSE BLD-MCNC: 167 MG/DL (ref 74–99)
MAGNESIUM: 1.9 MG/DL (ref 1.6–2.6)
POTASSIUM SERPL-SCNC: 4.9 MMOL/L (ref 3.5–5)
SODIUM BLD-SCNC: 136 MMOL/L (ref 132–146)

## 2021-08-24 PROCEDURE — 80048 BASIC METABOLIC PNL TOTAL CA: CPT

## 2021-08-24 PROCEDURE — 77014 PR CT GUIDANCE PLACEMENT RAD THERAPY FIELDS: CPT | Performed by: RADIOLOGY

## 2021-08-24 PROCEDURE — 83735 ASSAY OF MAGNESIUM: CPT

## 2021-08-24 PROCEDURE — 77386 HC NTSTY MODUL RAD TX DLVR CPLX: CPT | Performed by: RADIOLOGY

## 2021-08-24 PROCEDURE — 93005 ELECTROCARDIOGRAM TRACING: CPT | Performed by: EMERGENCY MEDICINE

## 2021-08-24 PROCEDURE — 99284 EMERGENCY DEPT VISIT MOD MDM: CPT

## 2021-08-24 PROCEDURE — 36415 COLL VENOUS BLD VENIPUNCTURE: CPT

## 2021-08-24 NOTE — ED NOTES
Pt alert oriented skin warm dry resp easy lungs cta pt denies dizziness or painor sob at this time     Ajith Coello RN  08/24/21 2931

## 2021-08-24 NOTE — ED NOTES
Tried several times to reach Dr Crystal Dozier [facility] the phone just rings and no answer dr Luis Phillips notified     Ping Alvarenga RN  08/24/21 8280

## 2021-08-24 NOTE — ED PROVIDER NOTES
Department of Emergency Medicine   ED  Provider Note  Admit Date/RoomTime: 8/24/2021  5:28 AM  ED Room: 11/11          History of Present Illness:  8/24/21, Time: 5:57 AM EDT  Chief Complaint   Patient presents with    Drug Overdose     Patient accidentally took 5-8mg Zofran tabs instead taking 5-4mg Decadron tabs; Currently receiving Chemo tx's; pacemaker placed 07/20/21; called poison control @ home who advised to come to ED; denies chest pain SOB, n/v/d                Manjula Manuel is a 66 y.o. male presenting to the ED for accidental overdose, beginning 415am today. The complaint has been constant, mild in severity, and worsened by nothing. Reports he accidentally took 40mg of Zofran. He took 5, 8mg Zofran tablets instead of his Decadron tablets. [facility]. He denies any complaints. He denies any symptoms at all. He has a Pacemaker. 4:15am was time of accidental ingestions. He denies chest pain or shortness of breath  Denies dizziness    Review of Systems:   A complete review of systems was performed and pertinent positives and negatives are stated within HPI, all other systems reviewed and are negative.        --------------------------------------------- PAST HISTORY ---------------------------------------------  Past Medical History:  has a past medical history of CHB (complete heart block) ([facility] [address] COPD (chronic obstructive pulmonary disease) ([facility] [address] Syncope, and Syncope. Past Surgical History:  has a past surgical history that includes Colonoscopy; hernia repair (Bilateral, 2011 2001); bronchoscopy (N/A, 05/27/2021); bronchoscopy (N/A, 05/27/2021); bronchoscopy (N/A, 05/27/2021); and Pacemaker insertion (07/20/2021). Social History:  reports that he has quit smoking. He quit after 20.00 years of use. He has never used smokeless tobacco. He reports current alcohol use of about 1.0 standard drinks of alcohol per week.  He reports that he does not use drugs. Family History: family history includes Cancer in his father and paternal uncle. . Unless otherwise noted, family history is non contributory    The patients home medications have been reviewed. Allergies: Patient has no known allergies. I have reviewed the past medical history, past surgical history, social history, and family history    ---------------------------------------------------PHYSICAL EXAM--------------------------------------    Constitutional/General: Alert and oriented x3  Head: Normocephalic and atraumatic  Eyes: PERRL, EOMI, sclera non icteric  ENT: Oropharynx clear, handling secretions   Neck: Supple, full ROM, no stridor, no meningeal signs  Respiratory: Lungs clear to auscultation bilaterally, no wheezes, rales, or rhonchi. Not in respiratory distress  Cardiovascular:  Regular rate. Regular rhythm. No murmurs, no gallops, no rubs. 2+ distal pulses. Equal extremity pulses. Gastrointestinal:  Abdomen Soft, Non tender, Non distended. No rebound, guarding, or rigidity. No pulsatile masses. Musculoskeletal: Moves all extremities x 4. Warm and well perfused, no clubbing, no cyanosis, no edema. Capillary refill <3 seconds  Skin: skin warm and dry. No rashes. Neurologic: GCS 15, no focal deficits  Psychiatric: Normal Affect          -------------------------------------------------- RESULTS -------------------------------------------------  I have personally reviewed all laboratory and imaging results for this patient. Results are listed below.      LABS: (Lab results interpreted by me)  Results for orders placed or performed during the hospital encounter of 77/05/78   Basic Metabolic Panel   Result Value Ref Range    Sodium 136 132 - 146 mmol/L    Potassium 4.9 3.5 - 5.0 mmol/L    Chloride 101 98 - 107 mmol/L    CO2 28 22 - 29 mmol/L    Anion Gap 7 7 - 16 mmol/L    Glucose 167 (H) 74 - 99 mg/dL    BUN 14 6 - 23 mg/dL    CREATININE 0.8 0.7 - 1.2 mg/dL    GFR Non- American >60 >=60 mL/min/1.73    GFR African American >60     Calcium 9.2 8.6 - 10.2 mg/dL   Magnesium   Result Value Ref Range    Magnesium 1.9 1.6 - 2.6 mg/dL   ,       RADIOLOGY:  Interpreted by Radiologist unless otherwise specified  No orders to display         EKG Interpretation  Interpreted by emergency department physician, Dr. Neli Miller     Date of EK21  Time: 535    Rhythm: normal sinus   Rate: normal  Axis: right  Conduction: right bundle branch block (complete)  ST Segments: no acute change  T Waves: no acute change    Clinical Impression: sinus rhythm, stable RBBB QTc 473 (baseline QTc 471)  Comparison to prior EKG: stable as compared to patient's most recent EKG    EKG Interpretation  Interpreted by emergency department physician, Dr. Neil Miller     Date of EK21  Time: 801    Rhythm: normal sinus   Rate: normal  Axis: right  Conduction: right bundle branch block (complete)  ST Segments: no acute change  T Waves: no acute change    Clinical Impression: sinus rhythm, stable RBBB QTc 480  Comparison to prior EKG: stable as compared to patient's most recent EKG      EKG Interpretation  Interpreted by emergency department physician, Dr. Neil Miller     Date of EK21  Time: 801    Rhythm: normal sinus   Rate: normal  Axis: right  Conduction: right bundle branch block (complete)  ST Segments: no acute change  T Waves: no acute change    Clinical Impression: sinus rhythm, stable RBBB QTc 488  Comparison to prior EKG: stable as compared to patient's most recent EKG      ------------------------- NURSING NOTES AND VITALS REVIEWED ---------------------------   The nursing notes within the ED encounter and vital signs as below have been reviewed by myself  BP (!) 131/95   Pulse 79   Temp 96.7 °F (35.9 °C) (Tympanic)   Resp 18   Ht 5' 6\" (1.676 m)   Wt 178 lb (80.7 kg)   SpO2 96%   BMI 28.73 kg/m²     Oxygen Saturation Interpretation: Normal    The patients available past medical records and past encounters were reviewed. ------------------------------ ED COURSE/MEDICAL DECISION MAKING----------------------  Medications - No data to display        The cardiac monitor revealed sinus rhythm with a heart rate in the 70s as interpreted by me. The cardiac monitor was ordered secondary to the patient's overdose and to monitor the patient for dysrhythmia. CPT [id number]       I, Dr. Robson Ventura, am the primary provider of record    Medical Decision Making:   Accidental overdose  Poison center recommends 4-6 hrs observation from time of ingestion, wsa observed in ED 4h 45min (which is nearly 7 hrs post ingestion). Absolutely no sx. QTc ok. He is appropriate for dc home    Oxygen Saturation Interpretation: 96 % on RA. Normal      Re-Evaluations:       improving      This patient's ED course included: a personal history and physicial examination, re-evaluation prior to disposition, multiple bedside re-evaluations, cardiac monitoring, continuous pulse oximetry and complex medical decision making and emergency management    This patient has remained hemodynamically stable during their ED course. Consultations:  6:05 AM EDT  Poison Center--discussed case, they said that for ingestions over 32mg, they recommend observing on the monitor for 4-6 hrs to make sure no QTc prolongation or torsades etc.               Counseling: The emergency provider has spoken with the patient and spouse/SO and discussed todays results, in addition to providing specific details for the plan of care and counseling regarding the diagnosis and prognosis. Questions are answered at this time and they are agreeable with the plan.       --------------------------------- IMPRESSION AND DISPOSITION ---------------------------------    IMPRESSION  1.  Accidental drug overdose, initial encounter        DISPOSITION  Disposition: Discharge to home  Patient condition is good         NOTE: This report was transcribed using voice recognition software.  Every effort was made to ensure accuracy; however, inadvertent computerized transcription errors may be present       Jennifer Gifford MD  08/24/21 9622

## 2021-08-25 ENCOUNTER — HOSPITAL ENCOUNTER (OUTPATIENT)
Dept: RADIATION ONCOLOGY | Age: 71
Discharge: HOME | End: 2021-08-25
Attending: RADIOLOGY
Payer: MEDICARE

## 2021-08-25 VITALS
BODY MASS INDEX: 29.55 KG/M2 | HEART RATE: 97 BPM | OXYGEN SATURATION: 98 % | DIASTOLIC BLOOD PRESSURE: 76 MMHG | SYSTOLIC BLOOD PRESSURE: 128 MMHG | TEMPERATURE: 97.1 F | WEIGHT: 183.1 LBS | RESPIRATION RATE: 18 BRPM

## 2021-08-25 DIAGNOSIS — C34.90 MALIGNANT NEOPLASM OF LUNG, UNSPECIFIED LATERALITY, UNSPECIFIED PART OF LUNG (HCC): Primary | ICD-10-CM

## 2021-08-25 LAB
EKG ATRIAL RATE: 73 BPM
EKG ATRIAL RATE: 75 BPM
EKG ATRIAL RATE: 77 BPM
EKG P AXIS: 68 DEGREES
EKG P AXIS: 72 DEGREES
EKG P AXIS: 74 DEGREES
EKG P-R INTERVAL: 184 MS
EKG Q-T INTERVAL: 418 MS
EKG Q-T INTERVAL: 424 MS
EKG Q-T INTERVAL: 436 MS
EKG QRS DURATION: 136 MS
EKG QRS DURATION: 140 MS
EKG QRS DURATION: 144 MS
EKG QTC CALCULATION (BAZETT): 473 MS
EKG QTC CALCULATION (BAZETT): 473 MS
EKG QTC CALCULATION (BAZETT): 480 MS
EKG R AXIS: 126 DEGREES
EKG R AXIS: 127 DEGREES
EKG R AXIS: 128 DEGREES
EKG T AXIS: 52 DEGREES
EKG T AXIS: 57 DEGREES
EKG T AXIS: 60 DEGREES
EKG VENTRICULAR RATE: 73 BPM
EKG VENTRICULAR RATE: 75 BPM
EKG VENTRICULAR RATE: 77 BPM

## 2021-08-25 PROCEDURE — 77014 PR CT GUIDANCE PLACEMENT RAD THERAPY FIELDS: CPT | Performed by: RADIOLOGY

## 2021-08-25 PROCEDURE — 77427 RADIATION TX MANAGEMENT X5: CPT | Performed by: RADIOLOGY

## 2021-08-25 PROCEDURE — 93010 ELECTROCARDIOGRAM REPORT: CPT | Performed by: INTERNAL MEDICINE

## 2021-08-25 PROCEDURE — 77386 HC NTSTY MODUL RAD TX DLVR CPLX: CPT | Performed by: RADIOLOGY

## 2021-08-25 PROCEDURE — 99999 PR OFFICE/OUTPT VISIT,PROCEDURE ONLY: CPT | Performed by: RADIOLOGY

## 2021-08-25 NOTE — PATIENT INSTRUCTIONS
Continue daily fractionated radiation therapy as scheduled. Please see weekly OTV note and intial consultation letter in [facility] for clinical details. Renata Kwon. James Sifuentes MD MS Junior Samano:  [phone]   FAX: [phone]  [facility]:  [phone]   FAX:    [phone]  [facility]:  [phone]   FAX:  [phone]  Email: [email or web address]

## 2021-08-25 NOTE — PROGRESS NOTES
were reviewed with the patient PRN. Treatment imaging has been personally reviewed for accuracy and precision. Questions answered to apparent satisfaction. Treatments will continue as planned. Bonny Jarrell.  Joaquín Colby MD MS JOHNNYR  Radiation Oncologist        [facility] ([address] [place] [address]): [phone] /// FAX: [phone]  [facility]): [phone] /// FAX: [phone]  [address] [place] [address]): [phone] /// FAX: [phone]

## 2021-08-25 NOTE — PATIENT INSTRUCTIONS
Continue daily fractionated radiation therapy as scheduled. Please see weekly OTV note and intial consultation letter in [facility] for clinical details. Mirian Barron MD MS Jose Maoar:  [phone]   FAX: [phone]  [address]:  [phone]   FAX:    [phone]  [address] [place] [address] Road:  [phone]   FAX:  [phone]  Email: [email or web address]

## 2021-08-25 NOTE — PROGRESS NOTES
DEPARTMENT OF RADIATION ONCOLOGY ON TREATMENT VISIT         8/11/21    NAME:  Yessenia Reyes    YOB: 1950    Diagnosis: lung cancer    SUBJECTIVE:   Yessenia Reyes has now received fractionated external beam radiation therapy - ongoing. Past medical, surgical, social and family histories reviewed and updated as indicated. Pain: controlled    ALLERGIES:  Patient has no known allergies. Current Outpatient Medications   Medication Sig Dispense Refill    Magic Mouthwash (MIRACLE MOUTHWASH) Swish and spit 5 mLs 4 times daily as needed for Irritation 480 mL 0    metoprolol succinate (TOPROL XL) 50 MG extended release tablet Take 1 tablet by mouth 2 times daily (with meals) 180 tablet 3    sucralfate (CARAFATE) 1 GM tablet Take 1 tablet by mouth 4 times daily Dissolve tab in 1 tbsp H2O & drink 120 tablet 2    sucralfate (CARAFATE) 1 GM tablet Take 1 g by mouth 4 times daily Dissolve tab in 1 tbsp & drink   120 tabs with 2 refills      predniSONE (DELTASONE) 10 MG tablet Take 10 mg by mouth daily      simvastatin (ZOCOR) 40 MG tablet Take 40 mg by mouth daily       albuterol (ACCUNEB) 0.63 MG/3ML nebulizer solution Take 1 ampule by nebulization every 6 hours as needed for Wheezing      umeclidinium-vilanterol (ANORO ELLIPTA) 62.5-25 MCG/INH AEPB inhaler Inhale 1 puff into the lungs daily       No current facility-administered medications for this encounter. OBJECTIVE:  Alert and fully ambulatory. Pleasant and conversant. Physical Examination: General appearance - alert, well appearing, and in no distress. Wt Readings from Last 3 Encounters:   08/25/21 183 lb 1.6 oz (83.1 kg)   08/24/21 178 lb (80.7 kg)   08/18/21 188 lb (85.3 kg)         ASSESSMENT/PLAN:     Patient is tolerating treatments well with expected toxicities. RBA were reviewed prior to first fraction and PRN. Current and planned dose reviewed.  Goals of treatment and potential side effects were reviewed with the patient PRN. Treatment imaging has been personally reviewed for accuracy and precision. Questions answered to apparent satisfaction. Treatments will continue as planned. Aravind Cifuentes.  Queen Adi MD MS JOHNNYR  Radiation Oncologist        [facility] ([address] [place] [address]): [phone] /// FAX: [phone]  [facility]): [phone] /// FAX: [phone]  [address] [place] [address]): [phone] /// FAX: [phone]

## 2021-08-26 ENCOUNTER — HOSPITAL ENCOUNTER (OUTPATIENT)
Dept: RADIATION ONCOLOGY | Age: 71
Discharge: HOME | End: 2021-08-26
Attending: RADIOLOGY
Payer: MEDICARE

## 2021-08-26 PROCEDURE — 77014 PR CT GUIDANCE PLACEMENT RAD THERAPY FIELDS: CPT | Performed by: RADIOLOGY

## 2021-08-26 PROCEDURE — 77386 HC NTSTY MODUL RAD TX DLVR CPLX: CPT | Performed by: RADIOLOGY

## 2021-08-27 ENCOUNTER — HOSPITAL ENCOUNTER (OUTPATIENT)
Dept: RADIATION ONCOLOGY | Age: 71
Discharge: HOME | End: 2021-08-27
Attending: RADIOLOGY
Payer: MEDICARE

## 2021-08-27 PROCEDURE — 77014 PR CT GUIDANCE PLACEMENT RAD THERAPY FIELDS: CPT | Performed by: RADIOLOGY

## 2021-08-27 PROCEDURE — 77386 HC NTSTY MODUL RAD TX DLVR CPLX: CPT | Performed by: RADIOLOGY

## 2021-08-30 ENCOUNTER — HOSPITAL ENCOUNTER (OUTPATIENT)
Dept: RADIATION ONCOLOGY | Age: 71
Discharge: HOME | End: 2021-08-30
Attending: RADIOLOGY
Payer: MEDICARE

## 2021-08-30 PROCEDURE — 77386 HC NTSTY MODUL RAD TX DLVR CPLX: CPT | Performed by: RADIOLOGY

## 2021-08-30 PROCEDURE — 77014 PR CT GUIDANCE PLACEMENT RAD THERAPY FIELDS: CPT | Performed by: RADIOLOGY

## 2021-08-30 PROCEDURE — 77336 RADIATION PHYSICS CONSULT: CPT | Performed by: RADIOLOGY

## 2021-08-31 ENCOUNTER — HOSPITAL ENCOUNTER (OUTPATIENT)
Dept: RADIATION ONCOLOGY | Age: 71
Discharge: HOME | End: 2021-08-31
Attending: RADIOLOGY
Payer: MEDICARE

## 2021-08-31 PROCEDURE — 77014 PR CT GUIDANCE PLACEMENT RAD THERAPY FIELDS: CPT | Performed by: RADIOLOGY

## 2021-08-31 PROCEDURE — 77386 HC NTSTY MODUL RAD TX DLVR CPLX: CPT | Performed by: RADIOLOGY

## 2021-09-01 ENCOUNTER — HOSPITAL ENCOUNTER (OUTPATIENT)
Dept: RADIATION ONCOLOGY | Age: 71
Discharge: HOME | End: 2021-09-01
Attending: RADIOLOGY
Payer: MEDICARE

## 2021-09-01 VITALS
TEMPERATURE: 97.6 F | BODY MASS INDEX: 29.78 KG/M2 | HEART RATE: 82 BPM | OXYGEN SATURATION: 97 % | RESPIRATION RATE: 18 BRPM | WEIGHT: 184.5 LBS | DIASTOLIC BLOOD PRESSURE: 82 MMHG | SYSTOLIC BLOOD PRESSURE: 138 MMHG

## 2021-09-01 DIAGNOSIS — C34.90 MALIGNANT NEOPLASM OF LUNG, UNSPECIFIED LATERALITY, UNSPECIFIED PART OF LUNG (HCC): Primary | ICD-10-CM

## 2021-09-01 PROCEDURE — 99999 PR OFFICE/OUTPT VISIT,PROCEDURE ONLY: CPT | Performed by: RADIOLOGY

## 2021-09-01 PROCEDURE — 77427 RADIATION TX MANAGEMENT X5: CPT | Performed by: RADIOLOGY

## 2021-09-01 PROCEDURE — 77386 HC NTSTY MODUL RAD TX DLVR CPLX: CPT | Performed by: RADIOLOGY

## 2021-09-01 PROCEDURE — 77014 PR CT GUIDANCE PLACEMENT RAD THERAPY FIELDS: CPT | Performed by: RADIOLOGY

## 2021-09-02 ENCOUNTER — HOSPITAL ENCOUNTER (OUTPATIENT)
Dept: RADIATION ONCOLOGY | Age: 71
Discharge: HOME | End: 2021-09-02
Attending: RADIOLOGY
Payer: MEDICARE

## 2021-09-02 PROCEDURE — 77014 PR CT GUIDANCE PLACEMENT RAD THERAPY FIELDS: CPT | Performed by: RADIOLOGY

## 2021-09-02 PROCEDURE — 77386 HC NTSTY MODUL RAD TX DLVR CPLX: CPT | Performed by: RADIOLOGY

## 2021-09-02 NOTE — PROGRESS NOTES
DEPARTMENT OF RADIATION ONCOLOGY ON TREATMENT VISIT         9/2/2021      NAME:  Stalin Brown    YOB: 1950    Diagnosis: lung cancer    SUBJECTIVE:   Stalin Brown has now received fractionated external beam radiation therapy - ongoing. Past medical, surgical, social and family histories reviewed and updated as indicated. Pain: controlled    ALLERGIES:  Patient has no known allergies. Current Outpatient Medications   Medication Sig Dispense Refill    Magic Mouthwash (MIRACLE MOUTHWASH) Swish and spit 5 mLs 4 times daily as needed for Irritation 480 mL 0    metoprolol succinate (TOPROL XL) 50 MG extended release tablet Take 1 tablet by mouth 2 times daily (with meals) 180 tablet 3    sucralfate (CARAFATE) 1 GM tablet Take 1 tablet by mouth 4 times daily Dissolve tab in 1 tbsp H2O & drink 120 tablet 2    sucralfate (CARAFATE) 1 GM tablet Take 1 g by mouth 4 times daily Dissolve tab in 1 tbsp & drink   120 tabs with 2 refills      predniSONE (DELTASONE) 10 MG tablet Take 10 mg by mouth daily      simvastatin (ZOCOR) 40 MG tablet Take 40 mg by mouth daily       albuterol (ACCUNEB) 0.63 MG/3ML nebulizer solution Take 1 ampule by nebulization every 6 hours as needed for Wheezing      umeclidinium-vilanterol (ANORO ELLIPTA) 62.5-25 MCG/INH AEPB inhaler Inhale 1 puff into the lungs daily       No current facility-administered medications for this encounter. OBJECTIVE:  Alert and fully ambulatory. Pleasant and conversant. Physical Examination: General appearance - alert, well appearing, and in no distress. Wt Readings from Last 3 Encounters:   09/01/21 184 lb 8 oz (83.7 kg)   08/25/21 183 lb 1.6 oz (83.1 kg)   08/24/21 178 lb (80.7 kg)         ASSESSMENT/PLAN:     Patient is tolerating treatments well with expected toxicities. RBA were reviewed prior to first fraction and PRN. Current and planned dose reviewed.  Goals of treatment and potential side effects were reviewed with the patient PRN. Treatment imaging has been personally reviewed for accuracy and precision. Questions answered to apparent satisfaction. Treatments will continue as planned. Sekou Jason.  Jeanne Dave MD MS DABR  Radiation Oncologist        [facility] ([address] [place] [address]): [phone] /// FAX: [phone]  [facility]): [phone] /// FAX: [phone]  [address] [place] [address]): [phone] /// FAX: [phone]

## 2021-09-02 NOTE — PROGRESS NOTES
DEPARTMENT OF RADIATION ONCOLOGY ON TREATMENT VISIT         9/2/2021      NAME:  Anastasia Estrada    YOB: 1950    Diagnosis: lung cancer    SUBJECTIVE:   Anastasia Estrada has now received fractionated external beam radiation therapy - ongoing. Past medical, surgical, social and family histories reviewed and updated as indicated. Pain: controlled    ALLERGIES:  Patient has no known allergies. Current Outpatient Medications   Medication Sig Dispense Refill    Magic Mouthwash (MIRACLE MOUTHWASH) Swish and spit 5 mLs 4 times daily as needed for Irritation 480 mL 0    metoprolol succinate (TOPROL XL) 50 MG extended release tablet Take 1 tablet by mouth 2 times daily (with meals) 180 tablet 3    sucralfate (CARAFATE) 1 GM tablet Take 1 tablet by mouth 4 times daily Dissolve tab in 1 tbsp H2O & drink 120 tablet 2    sucralfate (CARAFATE) 1 GM tablet Take 1 g by mouth 4 times daily Dissolve tab in 1 tbsp & drink   120 tabs with 2 refills      predniSONE (DELTASONE) 10 MG tablet Take 10 mg by mouth daily      simvastatin (ZOCOR) 40 MG tablet Take 40 mg by mouth daily       albuterol (ACCUNEB) 0.63 MG/3ML nebulizer solution Take 1 ampule by nebulization every 6 hours as needed for Wheezing      umeclidinium-vilanterol (ANORO ELLIPTA) 62.5-25 MCG/INH AEPB inhaler Inhale 1 puff into the lungs daily       No current facility-administered medications for this encounter. OBJECTIVE:  Alert and fully ambulatory. Pleasant and conversant. Physical Examination: General appearance - alert, well appearing, and in no distress. Wt Readings from Last 3 Encounters:   09/01/21 184 lb 8 oz (83.7 kg)   08/25/21 183 lb 1.6 oz (83.1 kg)   08/24/21 178 lb (80.7 kg)         ASSESSMENT/PLAN:     Patient is tolerating treatments well with expected toxicities. RBA were reviewed prior to first fraction and PRN. Current and planned dose reviewed.  Goals of treatment and potential side effects were reviewed with the patient PRN. Treatment imaging has been personally reviewed for accuracy and precision. Questions answered to apparent satisfaction. Treatments will continue as planned. Prince García.  Luci Pyle MD MS JOHNNYR  Radiation Oncologist        [facility] ([address] [place] [address]): [phone] /// FAX: [phone]  [facility]): [phone] /// FAX: [phone]  [address] [place] [address]): [phone] /// FAX: [phone]

## 2021-09-02 NOTE — PATIENT INSTRUCTIONS
Continue daily fractionated radiation therapy as scheduled. Please see weekly OTV note and intial consultation letter in [facility] for clinical details. Lalo Oliveira. Florencia Peterson MD MS Contreras Brochure:  [phone]   FAX: [phone]  [facility] [place]:  [phone]   FAX:    [phone]  Bennie Finger:  [phone]   FAX:  [phone]  Email: [email or web address]

## 2021-09-02 NOTE — PATIENT INSTRUCTIONS
Continue daily fractionated radiation therapy as scheduled. Please see weekly OTV note and intial consultation letter in [facility] for clinical details. Mirian Barron MD MS Jose Maoar:  [phone]   FAX: [phone] [address]:  [phone]   FAX:    [phone] [address]:  [phone]   FAX:  [phone]  Email: [email or web address]

## 2021-09-03 ENCOUNTER — HOSPITAL ENCOUNTER (OUTPATIENT)
Dept: RADIATION ONCOLOGY | Age: 71
Discharge: HOME | End: 2021-09-03
Attending: RADIOLOGY
Payer: MEDICARE

## 2021-09-03 PROCEDURE — 77014 PR CT GUIDANCE PLACEMENT RAD THERAPY FIELDS: CPT | Performed by: RADIOLOGY

## 2021-09-03 PROCEDURE — 77386 HC NTSTY MODUL RAD TX DLVR CPLX: CPT | Performed by: RADIOLOGY

## 2021-09-03 NOTE — PROGRESS NOTES
DEPARTMENT OF RADIATION ONCOLOGY ON TREATMENT VISIT         9/3/2021      NAME:  Fawn Laguerre    YOB: 1950    Diagnosis: lung cancer    SUBJECTIVE:   Fawn Laguerre has now received fractionated external beam radiation therapy - ongoing. Past medical, surgical, social and family histories reviewed and updated as indicated. Pain: controlled    ALLERGIES:  Patient has no known allergies. Current Outpatient Medications   Medication Sig Dispense Refill    Magic Mouthwash (MIRACLE MOUTHWASH) Swish and spit 5 mLs 4 times daily as needed for Irritation 480 mL 0    metoprolol succinate (TOPROL XL) 50 MG extended release tablet Take 1 tablet by mouth 2 times daily (with meals) 180 tablet 3    sucralfate (CARAFATE) 1 GM tablet Take 1 tablet by mouth 4 times daily Dissolve tab in 1 tbsp H2O & drink 120 tablet 2    sucralfate (CARAFATE) 1 GM tablet Take 1 g by mouth 4 times daily Dissolve tab in 1 tbsp & drink   120 tabs with 2 refills      predniSONE (DELTASONE) 10 MG tablet Take 10 mg by mouth daily      simvastatin (ZOCOR) 40 MG tablet Take 40 mg by mouth daily       albuterol (ACCUNEB) 0.63 MG/3ML nebulizer solution Take 1 ampule by nebulization every 6 hours as needed for Wheezing      umeclidinium-vilanterol (ANORO ELLIPTA) 62.5-25 MCG/INH AEPB inhaler Inhale 1 puff into the lungs daily       No current facility-administered medications for this encounter. OBJECTIVE:  Alert and fully ambulatory. Pleasant and conversant. Physical Examination: General appearance - alert, well appearing, and in no distress. Wt Readings from Last 3 Encounters:   09/01/21 184 lb 8 oz (83.7 kg)   08/25/21 183 lb 1.6 oz (83.1 kg)   08/24/21 178 lb (80.7 kg)         ASSESSMENT/PLAN:     Patient is tolerating treatments well with expected toxicities. RBA were reviewed prior to first fraction and PRN. Current and planned dose reviewed.  Goals of treatment and potential side effects were reviewed with the patient PRN. Treatment imaging has been personally reviewed for accuracy and precision. Questions answered to apparent satisfaction. Treatments will continue as planned. Bonny Jarrell.  Joaquín Colby MD MS JOHNNYR  Radiation Oncologist        [facility] ([address] [place] [address]): [phone] /// FAX: [phone]  [facility]): [phone] /// FAX: [phone]  [address] [place] [address]): [phone] /// FAX: [phone]

## 2021-09-03 NOTE — PATIENT INSTRUCTIONS
Continue daily fractionated radiation therapy as scheduled. Please see weekly OTV note and intial consultation letter in [facility] for clinical details. Yanci Bell. dEilma Hernandez MD MS Maya Lasso:  [phone]   FAX: [phone]  [facility]:  [phone]   FAX:    [phone]  [address] [place] [address] Road:  [phone]   FAX:  [phone]  Email: [email or web address]

## 2021-09-07 ENCOUNTER — HOSPITAL ENCOUNTER (OUTPATIENT)
Dept: RADIATION ONCOLOGY | Age: 71
Discharge: HOME | End: 2021-09-07
Attending: RADIOLOGY
Payer: MEDICARE

## 2021-09-07 PROCEDURE — 77336 RADIATION PHYSICS CONSULT: CPT | Performed by: RADIOLOGY

## 2021-09-07 PROCEDURE — 77386 HC NTSTY MODUL RAD TX DLVR CPLX: CPT | Performed by: RADIOLOGY

## 2021-09-07 PROCEDURE — 77427 RADIATION TX MANAGEMENT X5: CPT | Performed by: RADIOLOGY

## 2021-09-07 PROCEDURE — 77014 PR CT GUIDANCE PLACEMENT RAD THERAPY FIELDS: CPT | Performed by: RADIOLOGY

## 2021-09-07 NOTE — PROGRESS NOTES
Heather Debbie  9/7/2021  7:40 AM          Current Outpatient Medications   Medication Sig Dispense Refill    Magic Mouthwash (MIRACLE MOUTHWASH) Swish and spit 5 mLs 4 times daily as needed for Irritation 480 mL 0    metoprolol succinate (TOPROL XL) 50 MG extended release tablet Take 1 tablet by mouth 2 times daily (with meals) 180 tablet 3    sucralfate (CARAFATE) 1 GM tablet Take 1 tablet by mouth 4 times daily Dissolve tab in 1 tbsp H2O & drink 120 tablet 2    sucralfate (CARAFATE) 1 GM tablet Take 1 g by mouth 4 times daily Dissolve tab in 1 tbsp & drink   120 tabs with 2 refills      predniSONE (DELTASONE) 10 MG tablet Take 10 mg by mouth daily      simvastatin (ZOCOR) 40 MG tablet Take 40 mg by mouth daily       albuterol (ACCUNEB) 0.63 MG/3ML nebulizer solution Take 1 ampule by nebulization every 6 hours as needed for Wheezing      umeclidinium-vilanterol (ANORO ELLIPTA) 62.5-25 MCG/INH AEPB inhaler Inhale 1 puff into the lungs daily       No current facility-administered medications for this encounter. This is an up-to-date medication list.    Please take this list to your next care provider, and discard any previous medication lists.

## 2021-09-17 ENCOUNTER — HOSPITAL ENCOUNTER (INPATIENT)
Age: 71
LOS: 5 days | Discharge: HOME | DRG: 175 | End: 2021-09-22
Attending: EMERGENCY MEDICINE | Admitting: INTERNAL MEDICINE
Payer: MEDICARE

## 2021-09-17 ENCOUNTER — APPOINTMENT (OUTPATIENT)
Dept: CT IMAGING | Age: 71
DRG: 175 | End: 2021-09-17
Payer: MEDICARE

## 2021-09-17 ENCOUNTER — APPOINTMENT (OUTPATIENT)
Dept: GENERAL RADIOLOGY | Age: 71
DRG: 175 | End: 2021-09-17
Payer: MEDICARE

## 2021-09-17 DIAGNOSIS — J96.01 ACUTE RESPIRATORY FAILURE WITH HYPOXIA (HCC): Primary | ICD-10-CM

## 2021-09-17 DIAGNOSIS — C34.90 MALIGNANT NEOPLASM OF LUNG, UNSPECIFIED LATERALITY, UNSPECIFIED PART OF LUNG (HCC): ICD-10-CM

## 2021-09-17 DIAGNOSIS — J43.9 PULMONARY EMPHYSEMA, UNSPECIFIED EMPHYSEMA TYPE (HCC): ICD-10-CM

## 2021-09-17 DIAGNOSIS — E87.1 HYPONATREMIA: ICD-10-CM

## 2021-09-17 DIAGNOSIS — I26.99 BILATERAL PULMONARY EMBOLISM (HCC): ICD-10-CM

## 2021-09-17 PROBLEM — J96.21 ACUTE ON CHRONIC RESPIRATORY FAILURE WITH HYPOXIA (HCC): Status: ACTIVE | Noted: 2021-09-17

## 2021-09-17 LAB
ALBUMIN SERPL-MCNC: 3.2 G/DL (ref 3.5–5.2)
ALP BLD-CCNC: 67 U/L (ref 40–129)
ALT SERPL-CCNC: 43 U/L (ref 0–40)
ANION GAP SERPL CALCULATED.3IONS-SCNC: 11 MMOL/L (ref 7–16)
ANISOCYTOSIS: ABNORMAL
AST SERPL-CCNC: 28 U/L (ref 0–39)
BASOPHILS ABSOLUTE: 0 E9/L (ref 0–0.2)
BASOPHILS RELATIVE PERCENT: 0.1 % (ref 0–2)
BILIRUB SERPL-MCNC: 0.4 MG/DL (ref 0–1.2)
BUN BLDV-MCNC: 12 MG/DL (ref 6–23)
CALCIUM SERPL-MCNC: 8.5 MG/DL (ref 8.6–10.2)
CHLORIDE BLD-SCNC: 92 MMOL/L (ref 98–107)
CO2: 25 MMOL/L (ref 22–29)
CREAT SERPL-MCNC: 1.2 MG/DL (ref 0.7–1.2)
EKG ATRIAL RATE: 66 BPM
EKG P AXIS: 70 DEGREES
EKG P-R INTERVAL: 432 MS
EKG Q-T INTERVAL: 264 MS
EKG QRS DURATION: 84 MS
EKG QTC CALCULATION (BAZETT): 406 MS
EKG R AXIS: 58 DEGREES
EKG T AXIS: -82 DEGREES
EKG VENTRICULAR RATE: 142 BPM
EOSINOPHILS ABSOLUTE: 0 E9/L (ref 0.05–0.5)
EOSINOPHILS RELATIVE PERCENT: 0 % (ref 0–6)
GFR AFRICAN AMERICAN: >60
GFR NON-AFRICAN AMERICAN: 59 ML/MIN/1.73
GLUCOSE BLD-MCNC: 147 MG/DL (ref 74–99)
HBA1C MFR BLD: 6.5 % (ref 4–5.6)
HCT VFR BLD CALC: 31 % (ref 37–54)
HEMOGLOBIN: 10.5 G/DL (ref 12.5–16.5)
LYMPHOCYTES ABSOLUTE: 0.14 E9/L (ref 1.5–4)
LYMPHOCYTES RELATIVE PERCENT: 1.7 % (ref 20–42)
MAGNESIUM: 1.5 MG/DL (ref 1.6–2.6)
MAGNESIUM: 2 MG/DL (ref 1.6–2.6)
MCH RBC QN AUTO: 32.1 PG (ref 26–35)
MCHC RBC AUTO-ENTMCNC: 33.9 % (ref 32–34.5)
MCV RBC AUTO: 94.8 FL (ref 80–99.9)
METER GLUCOSE: 130 MG/DL (ref 74–99)
MONOCYTES ABSOLUTE: 0.21 E9/L (ref 0.1–0.95)
MONOCYTES RELATIVE PERCENT: 2.6 % (ref 2–12)
MYELOCYTE PERCENT: 0.9 % (ref 0–0)
NEUTROPHILS ABSOLUTE: 6.62 E9/L (ref 1.8–7.3)
NEUTROPHILS RELATIVE PERCENT: 94.8 % (ref 43–80)
OVALOCYTES: ABNORMAL
PDW BLD-RTO: 19.5 FL (ref 11.5–15)
PLATELET # BLD: 88 E9/L (ref 130–450)
PLATELET CONFIRMATION: NORMAL
PMV BLD AUTO: 11.8 FL (ref 7–12)
POIKILOCYTES: ABNORMAL
POLYCHROMASIA: ABNORMAL
POTASSIUM SERPL-SCNC: 3.5 MMOL/L (ref 3.5–5)
POTASSIUM SERPL-SCNC: 3.9 MMOL/L (ref 3.5–5)
PRO-BNP: 2475 PG/ML (ref 0–125)
RBC # BLD: 3.27 E12/L (ref 3.8–5.8)
SARS-COV-2, NAAT: NOT DETECTED
SODIUM BLD-SCNC: 128 MMOL/L (ref 132–146)
TOTAL PROTEIN: 5.7 G/DL (ref 6.4–8.3)
TROPONIN, HIGH SENSITIVITY: 31 NG/L (ref 0–11)
TROPONIN, HIGH SENSITIVITY: 33 NG/L (ref 0–11)
TROPONIN, HIGH SENSITIVITY: 44 NG/L (ref 0–11)
WBC # BLD: 6.9 E9/L (ref 4.5–11.5)

## 2021-09-17 PROCEDURE — 71275 CT ANGIOGRAPHY CHEST: CPT

## 2021-09-17 PROCEDURE — 6370000000 HC RX 637 (ALT 250 FOR IP): Performed by: INTERNAL MEDICINE

## 2021-09-17 PROCEDURE — 82962 GLUCOSE BLOOD TEST: CPT

## 2021-09-17 PROCEDURE — 6360000002 HC RX W HCPCS: Performed by: EMERGENCY MEDICINE

## 2021-09-17 PROCEDURE — 93005 ELECTROCARDIOGRAM TRACING: CPT | Performed by: INTERNAL MEDICINE

## 2021-09-17 PROCEDURE — 93005 ELECTROCARDIOGRAM TRACING: CPT | Performed by: EMERGENCY MEDICINE

## 2021-09-17 PROCEDURE — 94664 DEMO&/EVAL PT USE INHALER: CPT

## 2021-09-17 PROCEDURE — 2060000000 HC ICU INTERMEDIATE R&B

## 2021-09-17 PROCEDURE — 2580000003 HC RX 258: Performed by: RADIOLOGY

## 2021-09-17 PROCEDURE — 80053 COMPREHEN METABOLIC PANEL: CPT

## 2021-09-17 PROCEDURE — 83036 HEMOGLOBIN GLYCOSYLATED A1C: CPT

## 2021-09-17 PROCEDURE — 6360000002 HC RX W HCPCS: Performed by: INTERNAL MEDICINE

## 2021-09-17 PROCEDURE — 85025 COMPLETE CBC W/AUTO DIFF WBC: CPT

## 2021-09-17 PROCEDURE — 83880 ASSAY OF NATRIURETIC PEPTIDE: CPT

## 2021-09-17 PROCEDURE — 87635 SARS-COV-2 COVID-19 AMP PRB: CPT

## 2021-09-17 PROCEDURE — 2580000003 HC RX 258: Performed by: INTERNAL MEDICINE

## 2021-09-17 PROCEDURE — 36415 COLL VENOUS BLD VENIPUNCTURE: CPT

## 2021-09-17 PROCEDURE — 94640 AIRWAY INHALATION TREATMENT: CPT

## 2021-09-17 PROCEDURE — 71046 X-RAY EXAM CHEST 2 VIEWS: CPT

## 2021-09-17 PROCEDURE — 2700000000 HC OXYGEN THERAPY PER DAY

## 2021-09-17 PROCEDURE — 84484 ASSAY OF TROPONIN QUANT: CPT

## 2021-09-17 PROCEDURE — 83735 ASSAY OF MAGNESIUM: CPT

## 2021-09-17 PROCEDURE — 93010 ELECTROCARDIOGRAM REPORT: CPT | Performed by: INTERNAL MEDICINE

## 2021-09-17 PROCEDURE — 6360000004 HC RX CONTRAST MEDICATION: Performed by: RADIOLOGY

## 2021-09-17 PROCEDURE — 99284 EMERGENCY DEPT VISIT MOD MDM: CPT

## 2021-09-17 PROCEDURE — 84132 ASSAY OF SERUM POTASSIUM: CPT

## 2021-09-17 RX ORDER — NICOTINE POLACRILEX 4 MG
15 LOZENGE BUCCAL PRN
Status: DISCONTINUED | OUTPATIENT
Start: 2021-09-17 | End: 2021-09-22 | Stop reason: HOSPADM

## 2021-09-17 RX ORDER — SODIUM CHLORIDE, SODIUM LACTATE, POTASSIUM CHLORIDE, AND CALCIUM CHLORIDE .6; .31; .03; .02 G/100ML; G/100ML; G/100ML; G/100ML
500 INJECTION, SOLUTION INTRAVENOUS ONCE
Status: COMPLETED | OUTPATIENT
Start: 2021-09-17 | End: 2021-09-17

## 2021-09-17 RX ORDER — ACETAMINOPHEN 650 MG/1
650 SUPPOSITORY RECTAL EVERY 6 HOURS PRN
Status: DISCONTINUED | OUTPATIENT
Start: 2021-09-17 | End: 2021-09-22 | Stop reason: HOSPADM

## 2021-09-17 RX ORDER — ARFORMOTEROL TARTRATE 15 UG/2ML
15 SOLUTION RESPIRATORY (INHALATION) 2 TIMES DAILY
Status: DISCONTINUED | OUTPATIENT
Start: 2021-09-17 | End: 2021-09-22 | Stop reason: HOSPADM

## 2021-09-17 RX ORDER — ONDANSETRON 4 MG/1
4 TABLET, ORALLY DISINTEGRATING ORAL EVERY 8 HOURS PRN
Status: DISCONTINUED | OUTPATIENT
Start: 2021-09-17 | End: 2021-09-22 | Stop reason: HOSPADM

## 2021-09-17 RX ORDER — ONDANSETRON 2 MG/ML
4 INJECTION INTRAMUSCULAR; INTRAVENOUS EVERY 6 HOURS PRN
Status: DISCONTINUED | OUTPATIENT
Start: 2021-09-17 | End: 2021-09-22 | Stop reason: HOSPADM

## 2021-09-17 RX ORDER — SUCRALFATE 1 G/1
1 TABLET ORAL 4 TIMES DAILY
COMMUNITY
End: 2021-10-14

## 2021-09-17 RX ORDER — SODIUM CHLORIDE 0.9 % (FLUSH) 0.9 %
5-40 SYRINGE (ML) INJECTION PRN
Status: DISCONTINUED | OUTPATIENT
Start: 2021-09-17 | End: 2021-09-22 | Stop reason: HOSPADM

## 2021-09-17 RX ORDER — GLIMEPIRIDE 2 MG/1
2 TABLET ORAL
Status: ON HOLD | COMMUNITY
End: 2021-09-22 | Stop reason: HOSPADM

## 2021-09-17 RX ORDER — SODIUM CHLORIDE 0.9 % (FLUSH) 0.9 %
10 SYRINGE (ML) INJECTION
Status: COMPLETED | OUTPATIENT
Start: 2021-09-17 | End: 2021-09-17

## 2021-09-17 RX ORDER — SODIUM CHLORIDE 0.9 % (FLUSH) 0.9 %
5-40 SYRINGE (ML) INJECTION EVERY 12 HOURS SCHEDULED
Status: DISCONTINUED | OUTPATIENT
Start: 2021-09-17 | End: 2021-09-22 | Stop reason: HOSPADM

## 2021-09-17 RX ORDER — METOPROLOL SUCCINATE 50 MG/1
50 TABLET, EXTENDED RELEASE ORAL 2 TIMES DAILY WITH MEALS
Status: DISCONTINUED | OUTPATIENT
Start: 2021-09-17 | End: 2021-09-22 | Stop reason: HOSPADM

## 2021-09-17 RX ORDER — PREDNISONE 10 MG/1
10 TABLET ORAL DAILY
Status: DISCONTINUED | OUTPATIENT
Start: 2021-09-17 | End: 2021-09-22 | Stop reason: HOSPADM

## 2021-09-17 RX ORDER — POLYETHYLENE GLYCOL 3350 17 G/17G
17 POWDER, FOR SOLUTION ORAL DAILY PRN
Status: DISCONTINUED | OUTPATIENT
Start: 2021-09-17 | End: 2021-09-22 | Stop reason: HOSPADM

## 2021-09-17 RX ORDER — ALBUTEROL SULFATE 2.5 MG/3ML
2.5 SOLUTION RESPIRATORY (INHALATION) EVERY 6 HOURS PRN
Status: DISCONTINUED | OUTPATIENT
Start: 2021-09-17 | End: 2021-09-22 | Stop reason: HOSPADM

## 2021-09-17 RX ORDER — ACETAMINOPHEN 325 MG/1
650 TABLET ORAL EVERY 6 HOURS PRN
Status: DISCONTINUED | OUTPATIENT
Start: 2021-09-17 | End: 2021-09-22 | Stop reason: HOSPADM

## 2021-09-17 RX ORDER — ALBUTEROL SULFATE 90 UG/1
2 AEROSOL, METERED RESPIRATORY (INHALATION) EVERY 6 HOURS PRN
COMMUNITY

## 2021-09-17 RX ORDER — SODIUM CHLORIDE 9 MG/ML
25 INJECTION, SOLUTION INTRAVENOUS PRN
Status: DISCONTINUED | OUTPATIENT
Start: 2021-09-17 | End: 2021-09-22 | Stop reason: HOSPADM

## 2021-09-17 RX ORDER — DEXTROSE MONOHYDRATE 25 G/50ML
12.5 INJECTION, SOLUTION INTRAVENOUS PRN
Status: DISCONTINUED | OUTPATIENT
Start: 2021-09-17 | End: 2021-09-22 | Stop reason: HOSPADM

## 2021-09-17 RX ORDER — MAGNESIUM SULFATE IN WATER 40 MG/ML
2000 INJECTION, SOLUTION INTRAVENOUS ONCE
Status: COMPLETED | OUTPATIENT
Start: 2021-09-17 | End: 2021-09-17

## 2021-09-17 RX ORDER — DEXTROSE MONOHYDRATE 50 MG/ML
100 INJECTION, SOLUTION INTRAVENOUS PRN
Status: DISCONTINUED | OUTPATIENT
Start: 2021-09-17 | End: 2021-09-22 | Stop reason: HOSPADM

## 2021-09-17 RX ADMIN — Medication 10 ML: at 13:45

## 2021-09-17 RX ADMIN — SODIUM CHLORIDE, POTASSIUM CHLORIDE, SODIUM LACTATE AND CALCIUM CHLORIDE 500 ML: 600; 310; 30; 20 INJECTION, SOLUTION INTRAVENOUS at 15:22

## 2021-09-17 RX ADMIN — METOPROLOL SUCCINATE 50 MG: 50 TABLET, EXTENDED RELEASE ORAL at 19:36

## 2021-09-17 RX ADMIN — IPRATROPIUM BROMIDE 0.5 MG: 0.5 SOLUTION RESPIRATORY (INHALATION) at 20:41

## 2021-09-17 RX ADMIN — PREDNISONE 10 MG: 10 TABLET ORAL at 23:05

## 2021-09-17 RX ADMIN — MAGNESIUM SULFATE HEPTAHYDRATE 2000 MG: 40 INJECTION, SOLUTION INTRAVENOUS at 15:14

## 2021-09-17 RX ADMIN — ENOXAPARIN SODIUM 80 MG: 80 INJECTION SUBCUTANEOUS at 15:14

## 2021-09-17 RX ADMIN — IOPAMIDOL 75 ML: 755 INJECTION, SOLUTION INTRAVENOUS at 13:45

## 2021-09-17 RX ADMIN — ARFORMOTEROL TARTRATE 15 MCG: 15 SOLUTION RESPIRATORY (INHALATION) at 20:41

## 2021-09-17 ASSESSMENT — ENCOUNTER SYMPTOMS
SHORTNESS OF BREATH: 1
CHEST TIGHTNESS: 1

## 2021-09-17 NOTE — ED PROVIDER NOTES
48-year-old male presenting home with shortness of breath began yesterday. History of lung cancer, 2 weeks ago finished his last chemotherapy and radiation. He also has COPD, 1 month ago he had a pacemaker placed as well. He also has a port for his cancer. Awake,, oriented x4, using some extensor muscles, he is 94% on 2 L nasal cannula. Slightly tachycardic at 104. Chronically ill-appearing, but no obvious respiratory distress currently. This is a pretty quick onset, 1 day duration, persistent, associated with his known cancer. Family History   Problem Relation Age of Onset    Cancer Father         prostate    Cancer Paternal Uncle         anal     Past Surgical History:   Procedure Laterality Date    BRONCHOSCOPY N/A 05/27/2021    BRONCHOSCOPY W/EBUS FNA performed by Gloria Gómez MD at [address] [place] [address] N/A 05/27/2021    BRONCHOSCOPY DIAGNOSTIC OR CELL [address] ONLY performed by Gloria Gómez MD at [address] [place] [address] N/A 05/27/2021    BRONCHOSCOPY ADD ON COMPUTER ASSISTED performed by Gloria Gómez MD at [address] [facility] 2011 2001    groin     PACEMAKER INSERTION  07/20/2021    DUAL PPM  (ABDULLAHI)  DR. Nika Moreno       Review of Systems   Constitutional: Positive for fatigue. Negative for chills and fever. Respiratory: Positive for chest tightness and shortness of breath. All other systems reviewed and are negative. Physical Exam  Constitutional:       General: He is not in acute distress. Appearance: He is well-developed. HENT:      Head: Normocephalic and atraumatic. Eyes:      Pupils: Pupils are equal, round, and reactive to light. Neck:      Thyroid: No thyromegaly. Cardiovascular:      Rate and Rhythm: Normal rate and regular rhythm. Pulmonary:      Effort: Accessory muscle usage present. No respiratory distress. Breath sounds: Wheezing present. Chest:      Chest wall: No deformity. Abdominal:      General: There is no distension. Palpations: Abdomen is soft. There is no mass. Tenderness: There is no abdominal tenderness. There is no guarding or rebound. Musculoskeletal:         General: No tenderness. Normal range of motion. Cervical back: Normal range of motion and neck supple. Skin:     General: Skin is warm and dry. Findings: No erythema. Neurological:      Mental Status: He is alert and oriented to person, place, and time. Cranial Nerves: No cranial nerve deficit. Psychiatric:         Mood and Affect: Mood is not anxious. Behavior: Behavior is not agitated. Procedures     MDM     ED Course as of Sep 17 1520   Fri Sep 17, 2021   1022 EKG: Interpreted by meSinus rhythm, first-degree AV block, rightward axis, right bundle branch block, nonspecific ST and T wave changes.    [SO]      ED Course User Index  [SO] Jose Juan Gutierrez DO      ED Course as of Sep 17 1520   Fri Sep 17, 2021   1022 EKG: Interpreted by meSinus rhythm, first-degree AV block, rightward axis, right bundle branch block, nonspecific ST and T wave changes.    [SO]      ED Course User Index  [SO] Jose Juan Gutierrez DO       --------------------------------------------- PAST HISTORY ---------------------------------------------  Past Medical History:  has a past medical history of CHB (complete heart block) ([facility] [address] obstructive [facility] disease) [facility] Syncope, and Syncope. Past Surgical History:  has a past surgical history that includes Colonoscopy; hernia repair (Bilateral, 2011 2001); bronchoscopy (N/A, 05/27/2021); bronchoscopy (N/A, 05/27/2021); bronchoscopy (N/A, 05/27/2021); and Pacemaker insertion (07/20/2021). Social History:  reports that he has quit smoking. He quit after 20.00 years of use. He has never used smokeless tobacco. He reports current alcohol use of about 1.0 standard drinks of alcohol per week. He reports that he does not use drugs.     Family History: family history includes Cancer in his father and paternal uncle. The patients home medications have been reviewed. Allergies: Patient has no known allergies.     -------------------------------------------------- RESULTS -------------------------------------------------    LABS:  Results for orders placed or performed during the hospital encounter of 09/17/21   COVID-19, Rapid    Specimen: Nasopharyngeal Swab   Result Value Ref Range    SARS-CoV-2, NAAT Not Detected Not Detected   CBC auto differential   Result Value Ref Range    WBC 6.9 4.5 - 11.5 E9/L    RBC 3.27 (L) 3.80 - 5.80 E12/L    Hemoglobin 10.5 (L) 12.5 - 16.5 g/dL    Hematocrit 31.0 (L) 37.0 - 54.0 %    MCV 94.8 80.0 - 99.9 fL    MCH 32.1 26.0 - 35.0 pg    MCHC 33.9 32.0 - 34.5 %    RDW 19.5 (H) 11.5 - 15.0 fL    Platelets 88 (L) 486 - 450 E9/L    MPV 11.8 7.0 - 12.0 fL    Neutrophils % 94.8 (H) 43.0 - 80.0 %    Lymphocytes % 1.7 (L) 20.0 - 42.0 %    Monocytes % 2.6 2.0 - 12.0 %    Eosinophils % 0.0 0.0 - 6.0 %    Basophils % 0.1 0.0 - 2.0 %    Neutrophils Absolute 6.62 1.80 - 7.30 E9/L    Lymphocytes Absolute 0.14 (L) 1.50 - 4.00 E9/L    Monocytes Absolute 0.21 0.10 - 0.95 E9/L    Eosinophils Absolute 0.00 (L) 0.05 - 0.50 E9/L    Basophils Absolute 0.00 0.00 - 0.20 E9/L    Myelocyte Percent 0.9 0 - 0 %    Anisocytosis 2+     Polychromasia 1+     Poikilocytes 1+     Ovalocytes 1+    Comprehensive metabolic panel   Result Value Ref Range    Sodium 128 (L) 132 - 146 mmol/L    Potassium 3.5 3.5 - 5.0 mmol/L    Chloride 92 (L) 98 - 107 mmol/L    CO2 25 22 - 29 mmol/L    Anion Gap 11 7 - 16 mmol/L    Glucose 147 (H) 74 - 99 mg/dL    BUN 12 6 - 23 mg/dL    CREATININE 1.2 0.7 - 1.2 mg/dL    GFR Non-African American 59 >=60 mL/min/1.73    GFR African American >60     Calcium 8.5 (L) 8.6 - 10.2 mg/dL    Total Protein 5.7 (L) 6.4 - 8.3 g/dL    Albumin 3.2 (L) 3.5 - 5.2 g/dL    Total Bilirubin 0.4 0.0 - 1.2 mg/dL    Alkaline Phosphatase 67 40 - 129 U/L    ALT 43 (H) 0 - 40 U/L    AST 28 0 - 39 U/L   Troponin   Result Value Ref Range    Troponin, High Sensitivity 44 (H) 0 - 11 ng/L   Brain Natriuretic Peptide   Result Value Ref Range    Pro-BNP 2,475 (H) 0 - 125 pg/mL   Magnesium   Result Value Ref Range    Magnesium 1.5 (L) 1.6 - 2.6 mg/dL   Platelet Confirmation   Result Value Ref Range    Platelet Confirmation CONFIRMED    EKG 12 Lead   Result Value Ref Range    Ventricular Rate 142 BPM    Atrial Rate 66 BPM    P-R Interval 432 ms    QRS Duration 84 ms    Q-T Interval 264 ms    QTc Calculation (Bazett) 406 ms    P Axis 70 degrees    R Axis 58 degrees    T Axis -82 degrees       RADIOLOGY:  CTA CHEST W CONTRAST   Final Result   1. There are some limitations due motion artifacts in the evaluation of the   lower lobe pulmonary arterial circulation but there is indication of a   subsegmental acute pulmonary embolus in the right lower lobe pulmonary artery   circulation, as above described. 2.  Signs for malignancy in the left lower lobe with 2 mass lesions   compatible with primary lung neoplasm. Final interpretation requires   correlation with histopathology. Prior interventional procedure consider   correlation with PET CT scan of the chest.      3.  Areas of pulmonary fibrosis in both lower lobes more on the left. 4.  Advanced emphysematous changes lung parenchyma more prominent towards the   upper lobes. 5.  See above other comments. Preliminary report was given to Dr. Genevieve Hernandez, ER physician in [facility] at the time of the interpretation. XR CHEST (2 VW)   Final Result   There is opacity left lung base could represent minimal pleural fluid and   atelectasis as there is some volume loss with shift of heart mediastinum to   the left. Clinical correlation and follow-up to resolution recommended. Alternatively further evaluation with CT recommended.                ------------------------- NURSING NOTES AND VITALS REVIEWED ---------------------------  Date / Time Roomed:  9/17/2021  9:04 AM  ED Bed Assignment:  REGINA RAM    The nursing notes within the ED encounter and vital signs as below have been reviewed. Patient Vitals for the past 24 hrs:   BP Temp Temp src Pulse Resp SpO2 Height Weight   09/17/21 0905 102/75 98.4 °F (36.9 °C) Infrared 104 22 94 % 5' 7\" (1.702 m) 173 lb (78.5 kg)       Oxygen Saturation Interpretation: Abnormal and Improved after treatment    ------------------------------------------ PROGRESS NOTES ------------------------------------------  Re-evaluation(s):    Patients symptoms show no change  Repeat physical examination is not changed    Counseling:  I have spoken with the patient and spouse and discussed todays results, in addition to providing specific details for the plan of care and counseling regarding the diagnosis and prognosis. Their questions are answered at this time and they are agreeable with the plan of admission.    --------------------------------- ADDITIONAL PROVIDER NOTES ---------------------------------  Consultations:   Spoke with Dr. Muriel Alas. Discussed case. They will admit the patient. This patient's ED course included: a personal history and physicial examination, re-evaluation prior to disposition, multiple bedside re-evaluations, IV medications, cardiac monitoring, continuous pulse oximetry and complex medical decision making and emergency management    This patient has remained hemodynamically stable during their ED course. Diagnosis:  1. Acute respiratory failure with hypoxia ([address]    2. Hyponatremia    3. Bilateral pulmonary embolism (HCC)    4. Pulmonary emphysema, unspecified emphysema type ([address]    5. Malignant neoplasm of lung, unspecified laterality, unspecified part of lung ([facility] [facility]        Disposition:  Patient's disposition: Admit to telemetry  Patient's condition is stable.                Teryl Schaumann,   09/17/21 1520

## 2021-09-18 PROBLEM — I26.99 PULMONARY EMBOLISM (HCC): Status: ACTIVE | Noted: 2021-09-18

## 2021-09-18 PROBLEM — C34.90 SQUAMOUS CARCINOMA OF LUNG (HCC): Chronic | Status: ACTIVE | Noted: 2021-09-18

## 2021-09-18 PROBLEM — E87.1 HYPONATREMIA: Status: ACTIVE | Noted: 2021-09-18

## 2021-09-18 PROBLEM — E83.42 HYPOMAGNESEMIA: Status: ACTIVE | Noted: 2021-09-18

## 2021-09-18 PROBLEM — R00.0 WIDE-COMPLEX TACHYCARDIA: Status: ACTIVE | Noted: 2021-09-18

## 2021-09-18 LAB
ADENOVIRUS BY PCR: NOT DETECTED
ALBUMIN SERPL-MCNC: 2.7 G/DL (ref 3.5–5.2)
ALP BLD-CCNC: 59 U/L (ref 40–129)
ALT SERPL-CCNC: 33 U/L (ref 0–40)
ANION GAP SERPL CALCULATED.3IONS-SCNC: 10 MMOL/L (ref 7–16)
ANISOCYTOSIS: ABNORMAL
AST SERPL-CCNC: 26 U/L (ref 0–39)
BASOPHILS ABSOLUTE: 0 E9/L (ref 0–0.2)
BASOPHILS RELATIVE PERCENT: 0 % (ref 0–2)
BILIRUB SERPL-MCNC: 0.5 MG/DL (ref 0–1.2)
BORDETELLA PARAPERTUSSIS BY PCR: NOT DETECTED
BORDETELLA PERTUSSIS BY PCR: NOT DETECTED
BUN BLDV-MCNC: 13 MG/DL (ref 6–23)
CALCIUM SERPL-MCNC: 8.9 MG/DL (ref 8.6–10.2)
CHLAMYDOPHILIA PNEUMONIAE BY PCR: NOT DETECTED
CHLORIDE BLD-SCNC: 95 MMOL/L (ref 98–107)
CO2: 26 MMOL/L (ref 22–29)
CORONAVIRUS 229E BY PCR: NOT DETECTED
CORONAVIRUS HKU1 BY PCR: NOT DETECTED
CORONAVIRUS NL63 BY PCR: NOT DETECTED
CORONAVIRUS OC43 BY PCR: NOT DETECTED
CREAT SERPL-MCNC: 0.9 MG/DL (ref 0.7–1.2)
EKG ATRIAL RATE: 128 BPM
EKG P AXIS: 53 DEGREES
EKG P-R INTERVAL: 434 MS
EKG Q-T INTERVAL: 200 MS
EKG QRS DURATION: 90 MS
EKG QTC CALCULATION (BAZETT): 292 MS
EKG R AXIS: 66 DEGREES
EKG T AXIS: -96 DEGREES
EKG VENTRICULAR RATE: 128 BPM
EOSINOPHILS ABSOLUTE: 0.01 E9/L (ref 0.05–0.5)
EOSINOPHILS RELATIVE PERCENT: 0.2 % (ref 0–6)
GFR AFRICAN AMERICAN: >60
GFR NON-AFRICAN AMERICAN: >60 ML/MIN/1.73
GLUCOSE BLD-MCNC: 128 MG/DL (ref 74–99)
HCT VFR BLD CALC: 29.9 % (ref 37–54)
HEMOGLOBIN: 9.8 G/DL (ref 12.5–16.5)
HUMAN METAPNEUMOVIRUS BY PCR: NOT DETECTED
HUMAN RHINOVIRUS/ENTEROVIRUS BY PCR: NOT DETECTED
IMMATURE GRANULOCYTES #: 0.05 E9/L
IMMATURE GRANULOCYTES %: 1.1 % (ref 0–5)
INFLUENZA A BY PCR: NOT DETECTED
INFLUENZA B BY PCR: NOT DETECTED
LYMPHOCYTES ABSOLUTE: 0.15 E9/L (ref 1.5–4)
LYMPHOCYTES RELATIVE PERCENT: 3.2 % (ref 20–42)
MCH RBC QN AUTO: 31.3 PG (ref 26–35)
MCHC RBC AUTO-ENTMCNC: 32.8 % (ref 32–34.5)
MCV RBC AUTO: 95.5 FL (ref 80–99.9)
METER GLUCOSE: 140 MG/DL (ref 74–99)
METER GLUCOSE: 146 MG/DL (ref 74–99)
METER GLUCOSE: 148 MG/DL (ref 74–99)
METER GLUCOSE: 149 MG/DL (ref 74–99)
METER GLUCOSE: 157 MG/DL (ref 74–99)
MONOCYTES ABSOLUTE: 0.26 E9/L (ref 0.1–0.95)
MONOCYTES RELATIVE PERCENT: 5.6 % (ref 2–12)
MYCOPLASMA PNEUMONIAE BY PCR: NOT DETECTED
NEUTROPHILS ABSOLUTE: 4.15 E9/L (ref 1.8–7.3)
NEUTROPHILS RELATIVE PERCENT: 89.9 % (ref 43–80)
OVALOCYTES: ABNORMAL
PARAINFLUENZA VIRUS 1 BY PCR: NOT DETECTED
PARAINFLUENZA VIRUS 2 BY PCR: NOT DETECTED
PARAINFLUENZA VIRUS 3 BY PCR: NOT DETECTED
PARAINFLUENZA VIRUS 4 BY PCR: NOT DETECTED
PDW BLD-RTO: 19.1 FL (ref 11.5–15)
PLATELET # BLD: 74 E9/L (ref 130–450)
PLATELET CONFIRMATION: NORMAL
PMV BLD AUTO: 12.5 FL (ref 7–12)
POIKILOCYTES: ABNORMAL
POLYCHROMASIA: ABNORMAL
POTASSIUM REFLEX MAGNESIUM: 4.1 MMOL/L (ref 3.5–5)
RBC # BLD: 3.13 E12/L (ref 3.8–5.8)
RESPIRATORY SYNCYTIAL VIRUS BY PCR: NOT DETECTED
SARS-COV-2, PCR: NOT DETECTED
SODIUM BLD-SCNC: 131 MMOL/L (ref 132–146)
TEAR DROP CELLS: ABNORMAL
TOTAL PROTEIN: 5.2 G/DL (ref 6.4–8.3)
WBC # BLD: 4.6 E9/L (ref 4.5–11.5)

## 2021-09-18 PROCEDURE — 80053 COMPREHEN METABOLIC PANEL: CPT

## 2021-09-18 PROCEDURE — 6370000000 HC RX 637 (ALT 250 FOR IP): Performed by: INTERNAL MEDICINE

## 2021-09-18 PROCEDURE — 2580000003 HC RX 258: Performed by: INTERNAL MEDICINE

## 2021-09-18 PROCEDURE — 6360000002 HC RX W HCPCS: Performed by: INTERNAL MEDICINE

## 2021-09-18 PROCEDURE — 82962 GLUCOSE BLOOD TEST: CPT

## 2021-09-18 PROCEDURE — 94640 AIRWAY INHALATION TREATMENT: CPT

## 2021-09-18 PROCEDURE — 0202U NFCT DS 22 TRGT SARS-COV-2: CPT

## 2021-09-18 PROCEDURE — 36415 COLL VENOUS BLD VENIPUNCTURE: CPT

## 2021-09-18 PROCEDURE — 2060000000 HC ICU INTERMEDIATE R&B

## 2021-09-18 PROCEDURE — 93010 ELECTROCARDIOGRAM REPORT: CPT | Performed by: INTERNAL MEDICINE

## 2021-09-18 PROCEDURE — 93005 ELECTROCARDIOGRAM TRACING: CPT | Performed by: INTERNAL MEDICINE

## 2021-09-18 PROCEDURE — 85025 COMPLETE CBC W/AUTO DIFF WBC: CPT

## 2021-09-18 PROCEDURE — 2700000000 HC OXYGEN THERAPY PER DAY

## 2021-09-18 RX ADMIN — SODIUM CHLORIDE, PRESERVATIVE FREE 10 ML: 5 INJECTION INTRAVENOUS at 20:43

## 2021-09-18 RX ADMIN — METOPROLOL SUCCINATE 50 MG: 50 TABLET, EXTENDED RELEASE ORAL at 09:33

## 2021-09-18 RX ADMIN — INSULIN LISPRO 1 UNITS: 100 INJECTION, SOLUTION INTRAVENOUS; SUBCUTANEOUS at 17:58

## 2021-09-18 RX ADMIN — METOPROLOL SUCCINATE 50 MG: 50 TABLET, EXTENDED RELEASE ORAL at 17:58

## 2021-09-18 RX ADMIN — INSULIN LISPRO 1 UNITS: 100 INJECTION, SOLUTION INTRAVENOUS; SUBCUTANEOUS at 12:31

## 2021-09-18 RX ADMIN — SODIUM CHLORIDE, PRESERVATIVE FREE 10 ML: 5 INJECTION INTRAVENOUS at 09:34

## 2021-09-18 RX ADMIN — APIXABAN 10 MG: 5 TABLET, FILM COATED ORAL at 20:43

## 2021-09-18 RX ADMIN — IPRATROPIUM BROMIDE 0.5 MG: 0.5 SOLUTION RESPIRATORY (INHALATION) at 18:02

## 2021-09-18 RX ADMIN — ONDANSETRON 4 MG: 2 INJECTION INTRAMUSCULAR; INTRAVENOUS at 14:26

## 2021-09-18 RX ADMIN — ENOXAPARIN SODIUM 80 MG: 80 INJECTION SUBCUTANEOUS at 09:34

## 2021-09-18 RX ADMIN — PREDNISONE 10 MG: 10 TABLET ORAL at 09:33

## 2021-09-18 RX ADMIN — INSULIN LISPRO 1 UNITS: 100 INJECTION, SOLUTION INTRAVENOUS; SUBCUTANEOUS at 20:43

## 2021-09-18 NOTE — H&P
[address] [facility]  History and Physical      CHIEF COMPLAINT:    Chief Complaint   Patient presents with    Shortness of Breath        Patient of Sandra Ortiz MD presents with:  Pulmonary embolism [facility])    History of Present Illness:   Patient states that yesterday he was on the toilet when he felt acutely short of breath and quite weak. Denies chest pain. No fevers yesterday, but a few days ago his wife states he did have a fever of 103 Fahrenheit. No nausea vomiting or diarrhea. No other associated symptoms. No recent long car rides or plane rides but he has been quite immobilized due to his general state of debility with his lung cancer. His breathing does feel a bit better today. No clear modifying factors. REVIEW OF SYSTEMS:  Pertinent negatives are above in HPI. 10 point ROS otherwise negative.       Past Medical History:   Diagnosis Date    CHB (complete heart block) ([facility] 07/2021    COPD (chronic obstructive pulmonary disease) ([facility] [address] [facility]     Syncope 09/2020    dr Rob Barton wearing a monitor    Syncope 07/2021         Past Surgical History:   Procedure Laterality Date    BRONCHOSCOPY N/A 05/27/2021    BRONCHOSCOPY W/EBUS FNA performed by Sage Rob MD at [facility] 05/27/2021    BRONCHOSCOPY DIAGNOSTIC OR CELL [address] ONLY performed by Sage Rob MD at [address] [place] [address] N/A 05/27/2021    BRONCHOSCOPY ADD ON COMPUTER ASSISTED performed by Sage Rob MD at [facility] 2011 2001    groin     PACEMAKER INSERTION  07/20/2021    DUAL PPM  (ABDULLAHI)  DR. Rosa Isela Kwon       Medications Prior to Admission:    Medications Prior to Admission: glimepiride (AMARYL) 2 MG tablet, Take 2 mg by mouth every morning (before breakfast)  albuterol sulfate HFA (VENTOLIN HFA) 108 (90 Base) MCG/ACT inhaler, Inhale 2 puffs into the lungs every 6 hours as needed for Wheezing  sucralfate (CARAFATE) 1 GM tablet, Take 1 g by mouth 4 times daily  metoprolol succinate (TOPROL XL) 50 MG extended release tablet, Take 1 tablet by mouth 2 times daily (with meals)  predniSONE (DELTASONE) 10 MG tablet, Take 10 mg by mouth daily  albuterol (PROVENTIL) (2.5 MG/3ML) 0.083% nebulizer solution, Take 1 ampule by nebulization every 6 hours as needed for Wheezing   umeclidinium-vilanterol (ANORO ELLIPTA) 62.5-25 MCG/INH AEPB inhaler, Inhale 1 puff into the lungs daily  simvastatin (ZOCOR) 40 MG tablet, Take 40 mg by mouth daily     Note that the patient's home medications were reviewed and the above list is accurate to the best of my knowledge at the time of the exam.    Allergies:    Patient has no known allergies. Social History:    reports that he has quit smoking. He quit after 20.00 years of use. He has never used smokeless tobacco. He reports current alcohol use of about 1.0 standard drinks of alcohol per week. He reports that he does not use drugs. Family History:   family history includes Cancer in his father and paternal uncle. PHYSICAL EXAM:    Vitals:  BP 91/60   Pulse 119   Temp 97 °F (36.1 °C) (Temporal)   Resp 22   Ht 5' 7\" (1.702 m)   Wt 173 lb (78.5 kg)   SpO2 95%   BMI 27.10 kg/m²       General appearance: NAD, conversant, somewhat frail appearing  Eyes: Sclerae anicteric, PERRLA  HEENT: AT/NC, MMM  Neck: FROM, supple, no thyromegaly  Lymph: No cervical / supraclavicular lymphadenopathy  Lungs: Mild rales b/l, WOB mildly elevated  CV: RRR, no MRGs, no lower extremity edema  Abdomen: Soft, non-tender; no masses or HSM, +BS  Extremities: FROM without synovitis. No clubbing or cyanosis of the hands. Skin: no rash, induration, lesions, or ulcers  Psych: Calm and cooperative. Normal judgement and insight. Normal mood and affect. Neuro: Alert and interactive, face symmetric, speech fluent. LABS:  All labs reviewed.   Of note:  CBC with Differential:    Lab Results   Component Value Date    WBC 6.9 09/17/2021    RBC 3.27 09/17/2021    HGB 10.5 09/17/2021    HCT 31.0 09/17/2021    PLT 88 09/17/2021    MCV 94.8 09/17/2021    MCH 32.1 09/17/2021    MCHC 33.9 09/17/2021    RDW 19.5 09/17/2021    LYMPHOPCT 1.7 09/17/2021    MONOPCT 2.6 09/17/2021    MYELOPCT 0.9 09/17/2021    BASOPCT 0.1 09/17/2021    MONOSABS 0.21 09/17/2021    LYMPHSABS 0.14 09/17/2021    EOSABS 0.00 09/17/2021    BASOSABS 0.00 09/17/2021     CMP:    Lab Results   Component Value Date     09/17/2021    K 3.9 09/17/2021    K 4.1 10/29/2020    CL 92 09/17/2021    CO2 25 09/17/2021    BUN 12 09/17/2021    CREATININE 1.2 09/17/2021    GFRAA >60 09/17/2021    LABGLOM 59 09/17/2021    GLUCOSE 147 09/17/2021    PROT 5.7 09/17/2021    LABALBU 3.2 09/17/2021    CALCIUM 8.5 09/17/2021    BILITOT 0.4 09/17/2021    ALKPHOS 67 09/17/2021    AST 28 09/17/2021    ALT 43 09/17/2021       Imaging:  I've personally reviewed the patient's CTA chest  1.  There are some limitations due motion artifacts in the evaluation of the   lower lobe pulmonary arterial circulation but there is indication of a   subsegmental acute pulmonary embolus in the right lower lobe pulmonary artery   circulation, as above described. 2.  Signs for malignancy in the left lower lobe with 2 mass lesions   compatible with primary lung neoplasm.  Final interpretation requires   correlation with histopathology.  Prior interventional procedure consider   correlation with PET CT scan of the chest.     3.  Areas of pulmonary fibrosis in both lower lobes more on the left. 4.  Advanced emphysematous changes lung parenchyma more prominent towards the   upper lobes. 5.  See above other comments. Preliminary report was given to Dr. Jess Beaulieu, ER physician in [facility] at the time of the interpretation.      EKG:  I've personally reviewed the patient's EKG:  Sinus rhythm no acute ischemic changes RBBB, occasionally a-paced    Telemetry:  I've personally reviewed the patient's telemetry:  NSR, sometimes paced, several episodes slow WCT HR around 120    ASSESSMENT/PLAN:  Principal Problem:    Pulmonary embolism (HCC)  Active Problems:    COPD (chronic obstructive pulmonary disease) (HCC)    HTN (hypertension)    Acute on chronic respiratory failure with hypoxia (HCC)    Squamous carcinoma of lung (HCC)    Hyponatremia    Hypomagnesemia    Wide-complex tachycardia (HCC)  Resolved Problems:    * No resolved hospital problems. *      Transition to Eliquis today    COPD stable, continue bronchodilators    Small amt IV fluids and recheck sodium    Unsure of significance of these tachyarrhythmias.   Will ask his EP doc to weigh in    BP well controlled    Code status: Full  Requires inpatient level of care  Micky Navarro MD    7:45 AM  9/18/2021

## 2021-09-18 NOTE — PROGRESS NOTES
Patient had couplets of PVCs and 4 beats vtach. Patient was asymptomatic and sleeping with elevated HR. EKG was done and showed \"suspect unspecified pacemaker failure\" and possible anterolateral infarct.  Notified April, attending NP on call

## 2021-09-19 LAB
ANION GAP SERPL CALCULATED.3IONS-SCNC: 5 MMOL/L (ref 7–16)
ANISOCYTOSIS: ABNORMAL
BASOPHILS ABSOLUTE: 0 E9/L (ref 0–0.2)
BASOPHILS RELATIVE PERCENT: 0 % (ref 0–2)
BUN BLDV-MCNC: 15 MG/DL (ref 6–23)
CALCIUM SERPL-MCNC: 9.1 MG/DL (ref 8.6–10.2)
CHLORIDE BLD-SCNC: 91 MMOL/L (ref 98–107)
CO2: 30 MMOL/L (ref 22–29)
CREAT SERPL-MCNC: 1 MG/DL (ref 0.7–1.2)
EOSINOPHILS ABSOLUTE: 0 E9/L (ref 0.05–0.5)
EOSINOPHILS RELATIVE PERCENT: 0.3 % (ref 0–6)
GFR AFRICAN AMERICAN: >60
GFR NON-AFRICAN AMERICAN: >60 ML/MIN/1.73
GLUCOSE BLD-MCNC: 216 MG/DL (ref 74–99)
HCT VFR BLD CALC: 28.2 % (ref 37–54)
HEMOGLOBIN: 9.5 G/DL (ref 12.5–16.5)
LYMPHOCYTES ABSOLUTE: 0.12 E9/L (ref 1.5–4)
LYMPHOCYTES RELATIVE PERCENT: 4.3 % (ref 20–42)
MAGNESIUM: 1.7 MG/DL (ref 1.6–2.6)
MCH RBC QN AUTO: 32.1 PG (ref 26–35)
MCHC RBC AUTO-ENTMCNC: 33.7 % (ref 32–34.5)
MCV RBC AUTO: 95.3 FL (ref 80–99.9)
METER GLUCOSE: 150 MG/DL (ref 74–99)
METER GLUCOSE: 160 MG/DL (ref 74–99)
METER GLUCOSE: 66 MG/DL (ref 74–99)
MONOCYTES ABSOLUTE: 0.22 E9/L (ref 0.1–0.95)
MONOCYTES RELATIVE PERCENT: 7 % (ref 2–12)
NEUTROPHILS ABSOLUTE: 2.76 E9/L (ref 1.8–7.3)
NEUTROPHILS RELATIVE PERCENT: 88.7 % (ref 43–80)
NUCLEATED RED BLOOD CELLS: 0.9 /100 WBC
PDW BLD-RTO: 18.4 FL (ref 11.5–15)
PLATELET # BLD: 75 E9/L (ref 130–450)
PLATELET CONFIRMATION: NORMAL
PMV BLD AUTO: 12.6 FL (ref 7–12)
POLYCHROMASIA: ABNORMAL
POTASSIUM SERPL-SCNC: 4.1 MMOL/L (ref 3.5–5)
PROCALCITONIN: 0.67 NG/ML (ref 0–0.08)
RBC # BLD: 2.96 E12/L (ref 3.8–5.8)
SODIUM BLD-SCNC: 126 MMOL/L (ref 132–146)
WBC # BLD: 3.1 E9/L (ref 4.5–11.5)

## 2021-09-19 PROCEDURE — 2060000000 HC ICU INTERMEDIATE R&B

## 2021-09-19 PROCEDURE — 6370000000 HC RX 637 (ALT 250 FOR IP): Performed by: INTERNAL MEDICINE

## 2021-09-19 PROCEDURE — 85025 COMPLETE CBC W/AUTO DIFF WBC: CPT

## 2021-09-19 PROCEDURE — 36592 COLLECT BLOOD FROM PICC: CPT

## 2021-09-19 PROCEDURE — 94640 AIRWAY INHALATION TREATMENT: CPT

## 2021-09-19 PROCEDURE — 80048 BASIC METABOLIC PNL TOTAL CA: CPT

## 2021-09-19 PROCEDURE — 2580000003 HC RX 258: Performed by: INTERNAL MEDICINE

## 2021-09-19 PROCEDURE — 36591 DRAW BLOOD OFF VENOUS DEVICE: CPT

## 2021-09-19 PROCEDURE — 6360000002 HC RX W HCPCS: Performed by: INTERNAL MEDICINE

## 2021-09-19 PROCEDURE — 84145 PROCALCITONIN (PCT): CPT

## 2021-09-19 PROCEDURE — 37799 UNLISTED PX VASCULAR SURGERY: CPT

## 2021-09-19 PROCEDURE — 36415 COLL VENOUS BLD VENIPUNCTURE: CPT

## 2021-09-19 PROCEDURE — 82962 GLUCOSE BLOOD TEST: CPT

## 2021-09-19 PROCEDURE — 83735 ASSAY OF MAGNESIUM: CPT

## 2021-09-19 RX ORDER — BENZONATATE 100 MG/1
100 CAPSULE ORAL 3 TIMES DAILY PRN
Status: DISCONTINUED | OUTPATIENT
Start: 2021-09-19 | End: 2021-09-22 | Stop reason: HOSPADM

## 2021-09-19 RX ADMIN — ONDANSETRON 4 MG: 4 TABLET, ORALLY DISINTEGRATING ORAL at 08:38

## 2021-09-19 RX ADMIN — APIXABAN 10 MG: 5 TABLET, FILM COATED ORAL at 23:48

## 2021-09-19 RX ADMIN — ARFORMOTEROL TARTRATE 15 MCG: 15 SOLUTION RESPIRATORY (INHALATION) at 10:00

## 2021-09-19 RX ADMIN — IPRATROPIUM BROMIDE 0.5 MG: 0.5 SOLUTION RESPIRATORY (INHALATION) at 13:29

## 2021-09-19 RX ADMIN — BENZONATATE 100 MG: 100 CAPSULE ORAL at 19:35

## 2021-09-19 RX ADMIN — IPRATROPIUM BROMIDE 0.5 MG: 0.5 SOLUTION RESPIRATORY (INHALATION) at 22:19

## 2021-09-19 RX ADMIN — METOPROLOL SUCCINATE 50 MG: 50 TABLET, EXTENDED RELEASE ORAL at 17:16

## 2021-09-19 RX ADMIN — PREDNISONE 10 MG: 10 TABLET ORAL at 08:38

## 2021-09-19 RX ADMIN — ARFORMOTEROL TARTRATE 15 MCG: 15 SOLUTION RESPIRATORY (INHALATION) at 22:18

## 2021-09-19 RX ADMIN — SODIUM CHLORIDE, PRESERVATIVE FREE 10 ML: 5 INJECTION INTRAVENOUS at 08:39

## 2021-09-19 RX ADMIN — APIXABAN 10 MG: 5 TABLET, FILM COATED ORAL at 08:38

## 2021-09-19 RX ADMIN — IPRATROPIUM BROMIDE 0.5 MG: 0.5 SOLUTION RESPIRATORY (INHALATION) at 10:01

## 2021-09-19 RX ADMIN — METOPROLOL SUCCINATE 50 MG: 50 TABLET, EXTENDED RELEASE ORAL at 08:38

## 2021-09-19 RX ADMIN — IPRATROPIUM BROMIDE 0.5 MG: 0.5 SOLUTION RESPIRATORY (INHALATION) at 17:29

## 2021-09-19 NOTE — PROGRESS NOTES
Chief Complaint:  Chief Complaint   Patient presents with    Shortness of Breath     Pulmonary embolism ([address]     Subjective:    He feels weaker and breathing more difficult today    No fevers    No leg swelling/pain    No CP/palpitations    Objective:    /87   Pulse 73   Temp 99.3 °F (37.4 °C) (Oral)   Resp 18   Ht 5' 7\" (1.702 m)   Wt 173 lb (78.5 kg)   SpO2 97%   BMI 27.10 kg/m²     Current medications that patient is taking have been reviewed.     General appearance: NAD, conversant, quite frail appearing  HEENT: AT/NC, MMM  Neck: FROM, supple  Lungs: Clear to auscultation, breath sounds are distant, WOB minimally elevated  CV: RRR, no MRGs  Abdomen: Soft, non-tender; no masses or HSM, +BS  Extremities: No peripheral edema or digital cyanosis  Skin: no rash, lesions or ulcers  Psych: Calm and cooperative  Neuro: Alert and interactive, face symmetric, moving all extremities, speech fluent    Labs:  CBC with Differential:    Lab Results   Component Value Date    WBC 4.6 09/18/2021    RBC 3.13 09/18/2021    HGB 9.8 09/18/2021    HCT 29.9 09/18/2021    PLT 74 09/18/2021    MCV 95.5 09/18/2021    MCH 31.3 09/18/2021    MCHC 32.8 09/18/2021    RDW 19.1 09/18/2021    LYMPHOPCT 3.2 09/18/2021    MONOPCT 5.6 09/18/2021    MYELOPCT 0.9 09/17/2021    BASOPCT 0.0 09/18/2021    MONOSABS 0.26 09/18/2021    LYMPHSABS 0.15 09/18/2021    EOSABS 0.01 09/18/2021    BASOSABS 0.00 09/18/2021     CMP:    Lab Results   Component Value Date     09/18/2021    K 4.1 09/18/2021    CL 95 09/18/2021    CO2 26 09/18/2021    BUN 13 09/18/2021    CREATININE 0.9 09/18/2021    GFRAA >60 09/18/2021    LABGLOM >60 09/18/2021    GLUCOSE 128 09/18/2021    PROT 5.2 09/18/2021    LABALBU 2.7 09/18/2021    CALCIUM 8.9 09/18/2021    BILITOT 0.5 09/18/2021    ALKPHOS 59 09/18/2021    AST 26 09/18/2021    ALT 33 09/18/2021          Assessment/Plan:  Principal Problem:    Pulmonary embolism (HCC)  Active Problems:    COPD (chronic obstructive pulmonary disease) (HCC)    HTN (hypertension)    Acute on chronic respiratory failure with hypoxia (HCC)    Squamous carcinoma of lung (HCC)    Hyponatremia    Hypomagnesemia    Wide-complex tachycardia (HCC)  Resolved Problems:    * No resolved hospital problems. *       No signs of other pulmonary pathology. His PE is quite small. I think he has very poor cardiopulmonary reserve    Continue Eliquis    EP consult appreciated - they will adjust PPM settings. Continue metoprolol    AM labs still pending, it's now 5:26 PM.  Reordered stat    No further fevers. Resp panel negative    Suspect he will need placement. PT/OT haven't seen him yet.     Requires continued inpatient level of care     Idania Silver MD    5:25 PM  9/19/2021

## 2021-09-19 NOTE — CONSULTS
Today's Date: 9/18/2021  Patient Name: Margaret Ann  Date of admission: 9/17/2021  9:04 AM  Patient's age: 66 y.o., 1950  Admission Dx: Hyponatremia [E87.1]  Bilateral pulmonary embolism (HCC) [I26.99]  Acute respiratory failure with hypoxia (HCC) [J96.01]  Acute on chronic respiratory failure with hypoxia (HCC) [J96.21]  Malignant neoplasm of lung, unspecified laterality, unspecified part of lung ([facility] [address] [C34.90]  Pulmonary emphysema, unspecified emphysema type ([facility] [address] [J43.9]    Reason for Consult: Wide QRS tachycardia  Requesting Physician: Mendy Smith MD    CHIEF COMPLAINT: Generalized weakness    History Obtained From:  patient    HISTORY OF PRESENT ILLNESS:      The patient is a 66 y.o.  male who is admitted to the hospital for c/o generalized weakness for 2 days. Since hospitalization he was found to have acute pulmonary embolism and is currently on systemic anticoagulation with apixaban. He is well-known to me for history of recurrent syncope. He underwent a tilt test as well as a loop recorder insertion last year and after an episode of syncope this year caused by 30 seconds of asystole the patient underwent a pacemaker implant in July 2021. He was also diagnosed with  squamous cell cancer of the lung for which she completed radiation and chemotherapy recently. He now presents to the hospital with complains of generalized weakness and was found to have pulmonary embolism  Telemetry monitoring has shown episodes of a wide QRS tachycardia for which I was asked to see him. The patient himself denies any cardiac symptoms. No chest pain or shortness of breath. No symptoms of paroxysmal nocturnal dyspnea, orthopnea or leg edema. No palpitations either. No syncope or near syncope    Past Medical History:   has a past medical history of CHB (complete heart block) ([facility] [address] COPD (chronic obstructive pulmonary disease) ([facility] [address] Syncope, and Syncope.     Past Surgical History:   has a past surgical history that includes Colonoscopy; hernia repair (Bilateral, 2011 2001); bronchoscopy (N/A, 05/27/2021); bronchoscopy (N/A, 05/27/2021); bronchoscopy (N/A, 05/27/2021); and Pacemaker insertion (07/20/2021). Home Medications:    Prior to Admission medications    Medication Sig Start Date End Date Taking? Authorizing Provider   glimepiride (AMARYL) 2 MG tablet Take 2 mg by mouth every morning (before breakfast)   Yes Historical Provider, MD   albuterol sulfate HFA (VENTOLIN HFA) 108 (90 Base) MCG/ACT inhaler Inhale 2 puffs into the lungs every 6 hours as needed for Wheezing   Yes Historical Provider, MD   sucralfate (CARAFATE) 1 GM tablet Take 1 g by mouth 4 times daily   Yes Historical Provider, MD   metoprolol succinate (TOPROL XL) 50 MG extended release tablet Take 1 tablet by mouth 2 times daily (with meals) 7/22/21  Yes Jerzy Waller MD   predniSONE (DELTASONE) 10 MG tablet Take 10 mg by mouth daily   Yes Historical Provider, MD   albuterol (PROVENTIL) (2.5 MG/3ML) 0.083% nebulizer solution Take 1 ampule by nebulization every 6 hours as needed for Wheezing    Yes Historical Provider, MD   umeclidinium-vilanterol (ANORO ELLIPTA) 62.5-25 MCG/INH AEPB inhaler Inhale 1 puff into the lungs daily   Yes Historical Provider, MD   simvastatin (ZOCOR) 40 MG tablet Take 40 mg by mouth daily     Historical Provider, MD       Allergies:  Patient has no known allergies. Social History:   reports that he has quit smoking. He quit after 20.00 years of use. He has never used smokeless tobacco. He reports current alcohol use of about 1.0 standard drinks of alcohol per week. He reports that he does not use drugs. Family History: family history includes Cancer in his father and paternal uncle. No h/o sudden cardiac death. No for premature CAD    REVIEW OF SYSTEMS:    · Constitutional: there has been no unanticipated weight loss. There's been Yes change in energy level, Yes change in activity level.      · Eyes: No visual changes or diplopia. No scleral icterus. · ENT: No Headaches, hearing loss or vertigo. No mouth sores or sore throat. · Cardiovascular: No chest pain, No dyspnea on exertion, No palpitations or No loss of consciousness. No cough, hemoptysis, No pleuritic pain, or phlebitis. · Respiratory: No cough or wheezing, no sputum production. No hematemesis. · Gastrointestinal: No abdominal pain, appetite loss, blood in stools. No change in bowel or bladder habits. · Genitourinary: No dysuria, trouble voiding, or hematuria. · Musculoskeletal:  No gait disturbance, yes weakness or joint complaints. · Integumentary: No rash or pruritis. · Neurological: No headache, diplopia, change in muscle strength, numbness or tingling. No change in gait, balance, coordination, mood, affect, memory, mentation, behavior. · Psychiatric: No anxiety, or depression. · Endocrine: No temperature intolerance. No excessive thirst, fluid intake, or urination. No tremor. · Hematologic/Lymphatic: No abnormal bruising or bleeding, blood clots or swollen lymph nodes. · Allergic/Immunologic: No nasal congestion or hives. PHYSICAL EXAM:      /68   Pulse 100   Temp 96.9 °F (36.1 °C) (Temporal)   Resp 20   Ht 5' 7\" (1.702 m)   Wt 173 lb (78.5 kg)   SpO2 96%   BMI 27.10 kg/m²    Constitutional and General Appearance: alert, cooperative, no distress and appears stated age  HEENT: PERRL, no cervical lymphadenopathy. No masses palpable. Normal oral mucosa  Respiratory:  · Normal excursion and expansion without use of accessory muscles  · Resp Auscultation: Good respiratory effort. No for increased work of breathing.  On auscultation: clear to auscultation bilaterally  Cardiovascular:  · The apical impulse is laterally displaced  · Heart tones are  normal. regular S1 and S2, no gallops noted  · Jugular venous pulsation Normal  · The carotid upstroke is normal in amplitude and contour without delay or bruit  · Peripheral pulses are symmetrical and full  Abdomen:  · No masses or tenderness  · Bowel sounds present  Extremities:  ·  No Cyanosis or Clubbing  ·  Lower extremity edema: No  · Skin: Warm and dry  Neurological:  · Alert and oriented. · Moves all extremities well  · No abnormalities of mood, affect, memory, mentation, or behavior are noted    DATA:    Diagnostics:    EKG: normal sinus rhythm, first-degree AV block, right bundle branch block,  ECHO: reviewed. Conclusions      Summary   Left ventricular size is normal.   No regional wall motion abnormalities seen. Normal ejection fraction. Structurally normal mitral valve. Physiologic and/or trace mitral regurgitation is present. Structurally normal aortic valve. Aortic valve opens well. Normal tricuspid valve structure and function. Physiologic and/or trace tricuspid regurgitation. No evidence of pericardial effusion. Signature      ----------------------------------------------------------------   Electronically signed by Alexandra Pike MD(Interpreting   physician) on 07/27/2021 06:51 PM  Ejection fraction: 55%  Stress Test: not obtained. Cardiac Angiography: not obtained. CT chest  1.  There are some limitations due motion artifacts in the evaluation of the   lower lobe pulmonary arterial circulation but there is indication of a   subsegmental acute pulmonary embolus in the right lower lobe pulmonary artery   circulation, as above described.       2.  Signs for malignancy in the left lower lobe with 2 mass lesions   compatible with primary lung neoplasm.  Final interpretation requires   correlation with histopathology.  Prior interventional procedure consider   correlation with PET CT scan of the chest.       3.  Areas of pulmonary fibrosis in both lower lobes more on the left.       4.  Advanced emphysematous changes lung parenchyma more prominent towards the   upper lobes.       5.  See above other comments.          Device Evaluation    Make/model [employer] Assurity MRI 2272   Mode DDD 60-1 20  P waves more than 5     mV     Impedance 400       ohms   Pacing threshold 0.75 [email or web address]   msec  R waves   more than 12    MV     Impedance    490     ohms   Pacing threshold 0.75 [email or web address]    msec   Pacing percentage  A    LESS THAN 1%           V   11%    Battery longevity   8.3 to 10.3 years  Arrhythmias  Episodes of pacemaker mediated tachycardia  For episodes of high ventricular rates on September 17 lasting from between 4 to 13 minutes. Electrograms show atrial tachycardia with ventricular pacing,NOT VT      Evaluation and reprogramming included   Overall device function is normal  All  device programmable settings were evaluated per above, along with iterative adjustments (capture thresholds) to assess and select the most appropriate final programming for consistent delivery off the appropriate therapy and to verify function of the device. Labs:   CBC:   Recent Labs     09/17/21  1020 09/18/21  0724   WBC 6.9 4.6   HGB 10.5* 9.8*   HCT 31.0* 29.9*   PLT 88* 74*     BMP:   Recent Labs     09/17/21  1020 09/17/21  1020 09/17/21  2157 09/18/21  0724   *  --   --  131*   K 3.5   < > 3.9 4.1   CO2 25  --   --  26   BUN 12  --   --  13   CREATININE 1.2  --   --  0.9   LABGLOM 59  --   --  >60   GLUCOSE 147*  --   --  128*    < > = values in this interval not displayed. BNP: No results for input(s): BNP in the last 72 hours. PT/INR: No results for input(s): PROTIME, INR in the last 72 hours. APTT:No results for input(s): APTT in the last 72 hours. CARDIAC ENZYMES:No results for input(s): CKTOTAL, CKMB, CKMBINDEX, TROPONINI in the last 72 hours.   FASTING LIPID PANEL:  Lab Results   Component Value Date    HDL 74 05/18/2019    LDLCALC 64 05/18/2019    TRIG 52 05/18/2019     LIVER PROFILE:  Recent Labs     09/17/21  1020 09/18/21  0724   AST 28 26   ALT 43* 33   LABALBU 3.2* 2.7*       IMPRESSION:    Patient Active Problem List   Diagnosis    [facility] disease) [facility] [facility]    BPH (benign prostatic hyperplasia)    HTN (hypertension)    HLD (hyperlipidemia)      Intermittent complete heart block (HCC)--causing syncope    S/P cardiac pacemaker procedure--July 2021  Pacemaker function is appropriate      Squamous carcinoma of lung (HCC)--patient is post radiation and chemotherapy      Pulmonary embolism (HCC)-acute, patient currently on apixaban      Hyponatremia    Hypomagnesemia      Wide-complex tachycardia (HCC)--atrial tachycardia with RV pacing       RECOMMENDATIONS:    Will reprogram pacemaker to eliminate/decrease RV pacing  Continue metoprolol  Treatment of pulmonary embolism and hyponatremia as is ongoing      Discussed with patient and Nurse.     Cynthia Ortiz MD, [facility]

## 2021-09-20 ENCOUNTER — APPOINTMENT (OUTPATIENT)
Dept: GENERAL RADIOLOGY | Age: 71
DRG: 175 | End: 2021-09-20
Payer: MEDICARE

## 2021-09-20 LAB
ANION GAP SERPL CALCULATED.3IONS-SCNC: 11 MMOL/L (ref 7–16)
ANISOCYTOSIS: ABNORMAL
BASOPHILS ABSOLUTE: 0 E9/L (ref 0–0.2)
BASOPHILS RELATIVE PERCENT: 0.3 % (ref 0–2)
BUN BLDV-MCNC: 11 MG/DL (ref 6–23)
CALCIUM SERPL-MCNC: 8.8 MG/DL (ref 8.6–10.2)
CHLORIDE BLD-SCNC: 92 MMOL/L (ref 98–107)
CO2: 28 MMOL/L (ref 22–29)
CREAT SERPL-MCNC: 0.9 MG/DL (ref 0.7–1.2)
EOSINOPHILS ABSOLUTE: 0.05 E9/L (ref 0.05–0.5)
EOSINOPHILS RELATIVE PERCENT: 1.7 % (ref 0–6)
GFR AFRICAN AMERICAN: >60
GFR NON-AFRICAN AMERICAN: >60 ML/MIN/1.73
GLUCOSE BLD-MCNC: 120 MG/DL (ref 74–99)
HCT VFR BLD CALC: 28 % (ref 37–54)
HEMOGLOBIN: 9.4 G/DL (ref 12.5–16.5)
HYPOCHROMIA: ABNORMAL
LYMPHOCYTES ABSOLUTE: 0.03 E9/L (ref 1.5–4)
LYMPHOCYTES RELATIVE PERCENT: 0.9 % (ref 20–42)
MAGNESIUM: 1.5 MG/DL (ref 1.6–2.6)
MCH RBC QN AUTO: 31.5 PG (ref 26–35)
MCHC RBC AUTO-ENTMCNC: 33.6 % (ref 32–34.5)
MCV RBC AUTO: 94 FL (ref 80–99.9)
MONOCYTES ABSOLUTE: 0.12 E9/L (ref 0.1–0.95)
MONOCYTES RELATIVE PERCENT: 4.3 % (ref 2–12)
NEUTROPHILS ABSOLUTE: 2.79 E9/L (ref 1.8–7.3)
NEUTROPHILS RELATIVE PERCENT: 93 % (ref 43–80)
NUCLEATED RED BLOOD CELLS: 0.9 /100 WBC
OVALOCYTES: ABNORMAL
PDW BLD-RTO: 18.5 FL (ref 11.5–15)
PLATELET # BLD: 70 E9/L (ref 130–450)
PLATELET CONFIRMATION: NORMAL
PMV BLD AUTO: 12.6 FL (ref 7–12)
POIKILOCYTES: ABNORMAL
POLYCHROMASIA: ABNORMAL
POTASSIUM SERPL-SCNC: 3.8 MMOL/L (ref 3.5–5)
RBC # BLD: 2.98 E12/L (ref 3.8–5.8)
SODIUM BLD-SCNC: 131 MMOL/L (ref 132–146)
WBC # BLD: 3 E9/L (ref 4.5–11.5)

## 2021-09-20 PROCEDURE — 94640 AIRWAY INHALATION TREATMENT: CPT

## 2021-09-20 PROCEDURE — 2700000000 HC OXYGEN THERAPY PER DAY

## 2021-09-20 PROCEDURE — 71045 X-RAY EXAM CHEST 1 VIEW: CPT

## 2021-09-20 PROCEDURE — 6360000002 HC RX W HCPCS: Performed by: INTERNAL MEDICINE

## 2021-09-20 PROCEDURE — 2060000000 HC ICU INTERMEDIATE R&B

## 2021-09-20 PROCEDURE — 36415 COLL VENOUS BLD VENIPUNCTURE: CPT

## 2021-09-20 PROCEDURE — 97535 SELF CARE MNGMENT TRAINING: CPT

## 2021-09-20 PROCEDURE — 83735 ASSAY OF MAGNESIUM: CPT

## 2021-09-20 PROCEDURE — 97530 THERAPEUTIC ACTIVITIES: CPT

## 2021-09-20 PROCEDURE — 97165 OT EVAL LOW COMPLEX 30 MIN: CPT

## 2021-09-20 PROCEDURE — 2580000003 HC RX 258: Performed by: INTERNAL MEDICINE

## 2021-09-20 PROCEDURE — 80048 BASIC METABOLIC PNL TOTAL CA: CPT

## 2021-09-20 PROCEDURE — 97161 PT EVAL LOW COMPLEX 20 MIN: CPT

## 2021-09-20 PROCEDURE — 85025 COMPLETE CBC W/AUTO DIFF WBC: CPT

## 2021-09-20 PROCEDURE — 6370000000 HC RX 637 (ALT 250 FOR IP): Performed by: INTERNAL MEDICINE

## 2021-09-20 RX ORDER — MAGNESIUM SULFATE IN WATER 40 MG/ML
2000 INJECTION, SOLUTION INTRAVENOUS ONCE
Status: COMPLETED | OUTPATIENT
Start: 2021-09-20 | End: 2021-09-20

## 2021-09-20 RX ADMIN — IPRATROPIUM BROMIDE 0.5 MG: 0.5 SOLUTION RESPIRATORY (INHALATION) at 15:49

## 2021-09-20 RX ADMIN — IPRATROPIUM BROMIDE 0.5 MG: 0.5 SOLUTION RESPIRATORY (INHALATION) at 10:17

## 2021-09-20 RX ADMIN — ARFORMOTEROL TARTRATE 15 MCG: 15 SOLUTION RESPIRATORY (INHALATION) at 20:37

## 2021-09-20 RX ADMIN — MAGNESIUM SULFATE HEPTAHYDRATE 2000 MG: 40 INJECTION, SOLUTION INTRAVENOUS at 13:57

## 2021-09-20 RX ADMIN — APIXABAN 10 MG: 5 TABLET, FILM COATED ORAL at 09:30

## 2021-09-20 RX ADMIN — PREDNISONE 10 MG: 10 TABLET ORAL at 09:30

## 2021-09-20 RX ADMIN — APIXABAN 10 MG: 5 TABLET, FILM COATED ORAL at 21:32

## 2021-09-20 RX ADMIN — METOPROLOL SUCCINATE 50 MG: 50 TABLET, EXTENDED RELEASE ORAL at 09:30

## 2021-09-20 RX ADMIN — METOPROLOL SUCCINATE 50 MG: 50 TABLET, EXTENDED RELEASE ORAL at 17:55

## 2021-09-20 RX ADMIN — BENZONATATE 100 MG: 100 CAPSULE ORAL at 09:30

## 2021-09-20 RX ADMIN — IPRATROPIUM BROMIDE 0.5 MG: 0.5 SOLUTION RESPIRATORY (INHALATION) at 20:37

## 2021-09-20 RX ADMIN — SODIUM CHLORIDE, PRESERVATIVE FREE 10 ML: 5 INJECTION INTRAVENOUS at 21:32

## 2021-09-20 RX ADMIN — BENZONATATE 100 MG: 100 CAPSULE ORAL at 21:32

## 2021-09-20 RX ADMIN — ARFORMOTEROL TARTRATE 15 MCG: 15 SOLUTION RESPIRATORY (INHALATION) at 10:18

## 2021-09-20 NOTE — PLAN OF CARE
Problem: Falls - Risk of:  Goal: Will remain free from falls  Description: Will remain free from falls  Outcome: Met This Shift  Goal: Absence of physical injury  Description: Absence of physical injury  Outcome: Met This Shift     Problem: Fluid Volume:  Goal: Ability to achieve a balanced intake and output will improve  Description: Ability to achieve a balanced intake and output will improve  Outcome: Met This Shift     Problem: Physical Regulation:  Goal: Ability to maintain clinical measurements within normal limits will improve  Description: Ability to maintain clinical measurements within normal limits will improve  Outcome: Met This Shift  Goal: Will show no signs and symptoms of electrolyte imbalance  Description: Will show no signs and symptoms of electrolyte imbalance  Outcome: Met This Shift

## 2021-09-20 NOTE — PROGRESS NOTES
Chief Complaint:  Chief Complaint   Patient presents with    Shortness of Breath     Pulmonary embolism ([address]     Subjective:    He continues to feel weaker day by day    Feels dizzy when he walks    Dyspnea slightly worse today    Objective:    /76   Pulse 92   Temp 99.7 °F (37.6 °C) (Temporal)   Resp 19   Ht 5' 7\" (1.702 m)   Wt 173 lb (78.5 kg)   SpO2 93%   BMI 27.10 kg/m²     Current medications that patient is taking have been reviewed.     General appearance: NAD, conversant, quite frail appearing  HEENT: AT/NC, MMM  Neck: FROM, supple  Lungs: Clear to auscultation, breath sounds are distant, WOB normal  CV: RRR, no MRGs  Abdomen: Soft, non-tender; no masses or HSM, +BS  Extremities: No peripheral edema or digital cyanosis  Skin: no rash, lesions or ulcers  Psych: Calm and cooperative  Neuro: Alert and interactive, face symmetric, moving all extremities, speech fluent    Labs:  CBC with Differential:    Lab Results   Component Value Date    WBC 3.0 09/20/2021    RBC 2.98 09/20/2021    HGB 9.4 09/20/2021    HCT 28.0 09/20/2021    PLT 70 09/20/2021    MCV 94.0 09/20/2021    MCH 31.5 09/20/2021    MCHC 33.6 09/20/2021    RDW 18.5 09/20/2021    NRBC 0.9 09/19/2021    LYMPHOPCT 4.3 09/19/2021    MONOPCT 7.0 09/19/2021    MYELOPCT 0.9 09/17/2021    BASOPCT 0.0 09/19/2021    MONOSABS 0.22 09/19/2021    LYMPHSABS 0.12 09/19/2021    EOSABS 0.00 09/19/2021    BASOSABS 0.00 09/19/2021     CMP:    Lab Results   Component Value Date     09/20/2021    K 3.8 09/20/2021    K 4.1 09/18/2021    CL 92 09/20/2021    CO2 28 09/20/2021    BUN 11 09/20/2021    CREATININE 0.9 09/20/2021    GFRAA >60 09/20/2021    LABGLOM >60 09/20/2021    GLUCOSE 120 09/20/2021    PROT 5.2 09/18/2021    LABALBU 2.7 09/18/2021    CALCIUM 8.8 09/20/2021    BILITOT 0.5 09/18/2021    ALKPHOS 59 09/18/2021    AST 26 09/18/2021    ALT 33 09/18/2021          Assessment/Plan:  Principal Problem:    Pulmonary embolism (HCC)  Active

## 2021-09-20 NOTE — PROGRESS NOTES
Electrophysiology progress note         Date:  9/20/2021  Patient: Americo Solorzano  Admission:  9/17/2021  9:04 AM  Admit DX: Hyponatremia [E87.1]  Bilateral pulmonary embolism (HCC) [I26.99]  Acute respiratory failure with hypoxia (HCC) [J96.01]  Acute on chronic respiratory failure with hypoxia (HCC) [J96.21]  Malignant neoplasm of lung, unspecified laterality, unspecified part of lung (HCC) [C34.90]  Pulmonary emphysema, unspecified emphysema type ([facility] [address] [J43.9]  Age:  66 y.o., 1950     LOS: 3 days     Reason for evaluation:   Pacemaker in situ, atrial tachycardia with RV pacing  Pulmonary embolism      SUBJECTIVE:    The patient was seen and examined. Notes and labs reviewed. There were no complications over night. Patient's cardiac review of systems: negative. The patient is  gradually improving symptomatically    OBJECTIVE:    Telemetry: Sinus with frequent PACs and nonsustained atrial tachycardia  /84   Pulse 87   Temp 99.7 °F (37.6 °C) (Oral)   Resp 15   Ht 5' 7\" (1.702 m)   Wt 173 lb (78.5 kg)   SpO2 96%   BMI 27.10 kg/m²       EXAM:   CONSTITUTIONAL:  awake, alert, cooperative, no apparent distress, and appears stated age. HEENT: Normal jugular venous pulsations,  Head is atraumatic, normocephalic. Eyes and oral mucosa are normal.  LUNGS: Good respiratory effort. No for increased work of breathing. On auscultation: clear to auscultation bilaterally  CARDIOVASCULAR:  Normal apical impulse, regular rate and rhythm, normal S1 and S2, no S3   ABDOMEN: Soft, nontender, nondistended. Bowel sounds present. No masses or tenderness. SKIN: Warm and dry. EXTREMITIES: No lower extremity edema. Motor movement grossly intact. No cyanosis or clubbing.     Current Inpatient Medications:   apixaban  10 mg Oral BID    metoprolol succinate  50 mg Oral BID WC    predniSONE  10 mg Oral Daily    sodium chloride flush  5-40 mL IntraVENous 2 times per day    ipratropium  0.5 mg Nebulization 4x daily And    Arformoterol Tartrate  15 mcg Nebulization BID       IV Infusions (if any):   dextrose      sodium chloride         Diagnostics:    EKG: normal sinus rhythm, left bundle branch block  ECHO: reviewed. Ejection fraction: 55%  Stress Test: not obtained. Cardiac Angiography: not obtained. Labs:   CBC:   Recent Labs     09/19/21 1842 09/20/21  0652   WBC 3.1* 3.0*   HGB 9.5* 9.4*   HCT 28.2* 28.0*   PLT 75* 70*     BMP:   Recent Labs     09/19/21 1842 09/20/21  0652   * 131*   K 4.1 3.8   CO2 30* 28   BUN 15 11   CREATININE 1.0 0.9   LABGLOM >60 >60   GLUCOSE 216* 120*     BNP: No results for input(s): BNP in the last 72 hours. PT/INR: No results for input(s): PROTIME, INR in the last 72 hours. APTT:No results for input(s): APTT in the last 72 hours. CARDIAC ENZYMES:No results for input(s): CKTOTAL, CKMB, CKMBINDEX, TROPONINI in the last 72 hours. FASTING LIPID PANEL:  Lab Results   Component Value Date    HDL 74 05/18/2019    [address] 64 05/18/2019    TRIG 52 05/18/2019     LIVER PROFILE:  Recent Labs     09/18/21  0724   AST 26   ALT 33   LABALBU 2.7*       ASSESSMENT:    Patient Active Problem List   Diagnosis    COPD (chronic obstructive pulmonary disease) ([facility] [address]    BPH (benign prostatic hyperplasia)    HTN (hypertension)    HLD (hyperlipidemia)      Intermittent complete heart block (HCC)    S/P cardiac pacemaker procedure      Acute on chronic respiratory failure with hypoxia (HCC)    Squamous carcinoma of lung (HCC)--patient now post chemotherapy and radiation treatment  Pancytopenia related to chemotherapy    Pulmonary embolism (HCC)      Wide-complex tachycardia (HCC)--atrial tachycardia with RV pacing, related to his pulmonary disease----pacemaker reprogrammed to  reduce RV pacing       PLAN:    Beta-blockade as prior to admission  Systemic anticoagulation  Other respiratory interventions per pulmonology      Please see orders. Discussed with patient and nursing.     Mamadou Maher

## 2021-09-20 NOTE — CONSULTS
[address] NOTE    Patient: Heather Lopez  MRN: [id number]  : 1950    Encounter Date: 2021  Encounter Time: 1:22 PM     Date of Admission: .2021  9:04 AM    Consulting Physician:  Primary Care Physician:      Hang Roche MD     [phone]    PROBLEM LIST:  Patient Active Problem List   Diagnosis    Syncope and collapse    Pneumonia    COPD (chronic obstructive pulmonary disease) ([address]    BPH (benign prostatic hyperplasia)    HTN (hypertension)    HLD (hyperlipidemia)    Intermittent complete heart block ([address]    S/P cardiac pacemaker procedure    Acute on chronic respiratory failure with hypoxia ([address]    Squamous carcinoma of lung ([address]    Pulmonary embolism ([address]    Hyponatremia    Hypomagnesemia    Wide-complex tachycardia ([address]     Reason for Consultation: Pulmonary Embolism     HPI:   Mr. Korey Lombardo is a 65 y/o male with past medical history noted for COPD, cardiac arrhythmia (previously wore event monitor per EP), left hilar lymphadenopathy noted on CT Chest 2021 that is here for pulmonary embolism found on CTA chest 2021. He had moderate shortness of breath prior to admission which prompted him to seek medical attention. EBUS bronchoscopy on 2021 noted left hilar station 11L Stage III squamous cell carcinoma.     Patient attained COVID vaccine about December with suspicion for possible link but it is unsure. Patient has no increased work of breathing, weight changes, fevers, chills noted. Patient has oncology following and is on supplemental oxygen with 2L used. Patient on PO eliquis at this time.        PAST MEDICAL HISTORY:   Past Medical History:   Diagnosis Date    CHB (complete heart block) ([address] 2021    COPD (chronic obstructive pulmonary disease) ([address]     Syncope 2020    dr Evone Holter wearing a monitor    Syncope 2021       PAST SURGICAL HISTORY:   Past Surgical History: Procedure Laterality Date    BRONCHOSCOPY N/A 05/27/2021    BRONCHOSCOPY W/EBUS FNA performed by Elke Meraz MD at [address] [place] [address] N/A 05/27/2021    BRONCHOSCOPY DIAGNOSTIC OR CELL [address] ONLY performed by Elke Meraz MD at [address] [place] [address] N/A 05/27/2021    BRONCHOSCOPY ADD ON COMPUTER ASSISTED performed by Elke Meraz MD at [address] [facility] 2011 2001    groin     PACEMAKER INSERTION  07/20/2021    DUAL PPM  ([email or web address])  DR. Severiano Pickler       FAMILY HISTORY:   Family History   Problem Relation Age of Onset    Cancer Father         prostate    Cancer Paternal Uncle         anal       SOCIAL HISTORY:   Social History     Socioeconomic History    Marital status:      Spouse name: Not on file    Number of children: Not on file    Years of education: Not on file    Highest education level: Not on file   Occupational History    Not on file   Tobacco Use    Smoking status: Former Smoker     Years: 20.00    Smokeless tobacco: Never Used    Tobacco comment: stop 2011   Vaping Use    Vaping Use: Never used   Substance and Sexual Activity    Alcohol use: Yes     Alcohol/week: 1.0 standard drinks     Types: 1 Glasses of wine per week     Comment: daily    Drug use: No    Sexual activity: Not on file   Other Topics Concern    Not on file   Social History Narrative    Not on file     Social Determinants of Health     Financial Resource Strain:     Difficulty of Paying Living Expenses:    Food Insecurity:     Worried About Running Out of Food in the Last Year:     [address] in the Last Year:    Transportation Needs:     Lack of Transportation (Medical):      Lack of Transportation (Non-Medical):    Physical Activity:     Days of Exercise per Week:     Minutes of Exercise per Session:    Stress:     Feeling of Stress :    Social Connections:     Frequency of Communication with Friends and Family:     Frequency of Social Gatherings with Friends and Family:     Attends Jew Services:     Active Member of Clubs or Organizations:     Attends Club or Organization Meetings:     Marital Status:    Intimate Partner Violence:     Fear of Current or Ex-Partner:     Emotionally Abused:     Physically Abused:     Sexually Abused:      Social History     Tobacco Use   Smoking Status Former Smoker    Years: 20.00   Smokeless Tobacco Never Used   Tobacco Comment    stop 2011     Social History     Substance and Sexual Activity   Alcohol Use Yes    Alcohol/week: 1.0 standard drinks    Types: 1 Glasses of wine per week    Comment: daily     Social History     Substance and Sexual Activity   Drug Use No     HOME MEDICATIONS:  Prior to Admission medications    Medication Sig Start Date End Date Taking?  Authorizing Provider   glimepiride (AMARYL) 2 MG tablet Take 2 mg by mouth every morning (before breakfast)   Yes Historical Provider, MD   albuterol sulfate HFA (VENTOLIN HFA) 108 (90 Base) MCG/ACT inhaler Inhale 2 puffs into the lungs every 6 hours as needed for Wheezing   Yes Historical Provider, MD   sucralfate (CARAFATE) 1 GM tablet Take 1 g by mouth 4 times daily   Yes Historical Provider, MD   metoprolol succinate (TOPROL XL) 50 MG extended release tablet Take 1 tablet by mouth 2 times daily (with meals) 7/22/21  Yes Maxim Monaco MD   predniSONE (DELTASONE) 10 MG tablet Take 10 mg by mouth daily   Yes Historical Provider, MD   albuterol (PROVENTIL) (2.5 MG/3ML) 0.083% nebulizer solution Take 1 ampule by nebulization every 6 hours as needed for Wheezing    Yes Historical Provider, MD   umeclidinium-vilanterol (ANORO ELLIPTA) 62.5-25 MCG/INH AEPB inhaler Inhale 1 puff into the lungs daily   Yes Historical Provider, MD   simvastatin (ZOCOR) 40 MG tablet Take 40 mg by mouth daily     Historical Provider, MD       CURRENT MEDICATIONS:  Current Facility-Administered Medications: magnesium sulfate 2000 mg in 50 mL IVPB premix, 2,000 mg, IntraVENous, Once  benzonatate (TESSALON) capsule 100 mg, 100 mg, Oral, TID PRN  apixaban (ELIQUIS) tablet 10 mg, 10 mg, Oral, BID  albuterol (PROVENTIL) nebulizer solution 2.5 mg, 2.5 mg, Nebulization, Q6H PRN  metoprolol succinate (TOPROL XL) extended release tablet 50 mg, 50 mg, Oral, BID WC  predniSONE (DELTASONE) tablet 10 mg, 10 mg, Oral, Daily  glucose (GLUTOSE) 40 % oral gel 15 g, 15 g, Oral, PRN  dextrose 50 % IV solution, 12.5 g, IntraVENous, PRN  glucagon (rDNA) injection 1 mg, 1 mg, IntraMUSCular, PRN  dextrose 5 % solution, 100 mL/hr, IntraVENous, PRN  sodium chloride flush 0.9 % injection 5-40 mL, 5-40 mL, IntraVENous, 2 times per day  sodium chloride flush 0.9 % injection 5-40 mL, 5-40 mL, IntraVENous, PRN  0.9 % sodium chloride infusion, 25 mL, IntraVENous, PRN  ondansetron (ZOFRAN-ODT) disintegrating tablet 4 mg, 4 mg, Oral, Q8H PRN **OR** ondansetron (ZOFRAN) injection 4 mg, 4 mg, IntraVENous, Q6H PRN  polyethylene glycol (GLYCOLAX) packet 17 g, 17 g, Oral, Daily PRN  acetaminophen (TYLENOL) tablet 650 mg, 650 mg, Oral, Q6H PRN **OR** acetaminophen (TYLENOL) suppository 650 mg, 650 mg, Rectal, Q6H PRN  ipratropium (ATROVENT) 0.02 % nebulizer solution 0.5 mg, 0.5 mg, Nebulization, 4x daily **AND** Arformoterol Tartrate (BROVANA) nebulizer solution 15 mcg, 15 mcg, Nebulization, BID    IV MEDICATIONS:   dextrose      sodium chloride         ALLERGIES:  No Known Allergies    REVIEW OF SYSTEMS:  General ROS:     - Positive For:     - Negative For: chills, fatigue, fever, malaise, night sweats or sleep disturbance   ENT ROS:      - Positive For:     - Negative For: epistaxis, headaches, sinus pain, sneezing or sore throat   Allergy and Immunology ROS:     - Negative For: nasal congestion, postnasal drip or seasonal allergies   Hematological and Lymphatic ROS:      - Negative For: bleeding problems, bruising, fatigue, night sweats or pallor   Respiratory ROS:      - Positive For: - Negative For: hemoptysis, pleuritic type chest pains  Cardiovascular ROS:      - Positive For:      - Negative For: chest pain, dyspnea on exertion, irregular heartbeat, loss of consciousness, murmur, orthopnea or palpitations   Gastrointestinal ROS:      - Positive For:     - Negative For: abdominal pain, appetite loss, blood in stools, change in bowel habits, change in stools, constipation, diarrhea, hematemesis, melena, nausea/vomiting or stool incontinence   Genito-Urinary ROS:      - Negative For: change in urinary stream, dysuria, hematuria or incontinence   Musculoskeletal ROS:      - Negative For: joint pain, joint stiffness, joint swelling or muscle pain   Neurological ROS:     - Negative For: behavioral changes, confusion, dizziness, headaches, impaired coordination/balance or memory loss   Dermatological ROS:      - Negative For: hair changes, lumps, mole changes, nail changes or pruritus    PHYSICAL EXAMINATION:     VITAL SIGNS:  /76   Pulse 92   Temp 98.9 °F (37.2 °C) (Temporal)   Resp 19   Ht 5' 7\" (1.702 m)   Wt 173 lb (78.5 kg)   SpO2 93%   BMI 27.10 kg/m²   Wt Readings from Last 3 Encounters:   21 173 lb (78.5 kg)   21 184 lb 8 oz (83.7 kg)   21 183 lb 1.6 oz (83.1 kg)     Temp Readings from Last 3 Encounters:   21 98.9 °F (37.2 °C) (Temporal)   21 97.6 °F (36.4 °C) (Temporal)   21 97.1 °F (36.2 °C) (Skin)     TMAX:  BP Readings from Last 3 Encounters:   21 107/76   21 138/82   21 128/76     Pulse Readings from Last 3 Encounters:   21 92   21 82   21 97       CURRENT PULSE OXIMETRY: SpO2: 93 %  24HR PULSE OXIMETRY RANGE: SpO2  Av %  Min: 93 %  Max: 97 %  CVP:      ________________________________________________________________________    VENTILATOR SETTINGS (if applicable):         Vent Information  SpO2: 93 %  Additional Respiratory  Assessments  Pulse: 92  Resp: 19  SpO2: 93 %  ETCO2:  Peak Inspiratory Pressure:  End-Inspiratory Plateau Pressure:    ABG:  No results for input(s): PH, PO2, PCO2, HCO3, BE, O2SAT, METHB, O2HB, COHB, O2CON, HHB, THB in the last 72 hours. ________________________________________________________________________    IV ACCESS:    NUTRITION: ADULT DIET; Regular; 4 carb choices (60 gm/meal)    INTAKE/OUTPUTS:  I/O last 3 completed shifts:   In: 180 [P.O.:180]  Out: -   No intake or output data in the 24 hours ending 09/20/21 1322    General Appearance: alert and oriented to person, place and time, well-developed and   well-nourished, in no acute distress   Eyes: pupils equal, round, and reactive to light, extraocular eye movements intact, conjunctivae normal and sclera anicteric   Neck: neck supple and non tender without mass, no thyromegaly, no thyroid nodules and no cervical adenopathy   Pulmonary/Chest: decreased breath sounds, no accessory muscles of inspiration, no focal wheezes  Cardiovascular: normal rate, regular rhythm, normal S1 and S2, no murmurs, rubs, clicks or gallops, distal pulses intact, no carotid bruits, no murmurs, no gallops, no carotid bruits and no JVD   Abdomen: soft, non-tender, non-distended, normal bowel sounds, no masses or organomegaly   Extremities: no cyanosis, no clubbing, no edema  Musculoskeletal: normal range of motion, no joint swelling, deformity or tenderness   Neurologic: reflexes normal and symmetric, no cranial nerve deficit noted    LABS/IMAGING:    CBC:  Lab Results   Component Value Date    WBC 3.0 (L) 09/20/2021    HGB 9.4 (L) 09/20/2021    HCT 28.0 (L) 09/20/2021    MCV 94.0 09/20/2021    PLT 70 (L) 09/20/2021    LYMPHOPCT 0.9 (L) 09/20/2021    RBC 2.98 (L) 09/20/2021    MCH 31.5 09/20/2021    MCHC 33.6 09/20/2021    RDW 18.5 (H) 09/20/2021    NEUTOPHILPCT 93.0 (H) 09/20/2021    MONOPCT 4.3 09/20/2021    BASOPCT 0.3 09/20/2021    NEUTROABS 2.79 09/20/2021    LYMPHSABS 0.03 (L) 09/20/2021    MONOSABS 0.12 09/20/2021    EOSABS 0.05 09/20/2021    BASOSABS 0.00 09/20/2021       Recent Labs     09/20/21  0652 09/19/21  1842 09/18/21  0724   WBC 3.0* 3.1* 4.6   HGB 9.4* 9.5* 9.8*   HCT 28.0* 28.2* 29.9*   MCV 94.0 95.3 95.5   PLT 70* 75* 74*       BMP:   Recent Labs     09/18/21  0724 09/19/21  1842 09/20/21  0652   * 126* 131*   K 4.1 4.1 3.8   CL 95* 91* 92*   CO2 26 30* 28   BUN 13 15 11   CREATININE 0.9 1.0 0.9       MG:   Lab Results   Component Value Date    MG 1.5 09/20/2021     Ca/Phos:   Lab Results   Component Value Date    CALCIUM 8.8 09/20/2021     Amylase: No results found for: AMYLASE  Lipase: No results found for: LIPASE  LIVER PROFILE:   Recent Labs     09/18/21  0724   AST 26   ALT 33   BILITOT 0.5   ALKPHOS 59       PT/INR: No results for input(s): PROTIME, INR in the last 72 hours. APTT: No results for input(s): APTT in the last 72 hours. Cardiac Enzymes:  Lab Results   Component Value Date    TROPONINI <0.01 10/29/2020       Hgb A1C:   Lab Results   Component Value Date    LABA1C 6.5 (H) 09/17/2021     No results found for: EAG  PAOLA: No results found for: PAOLA  ESR: No results found for: SEDRATE  CRP: No results found for: CRP  D Dimer:   Lab Results   Component Value Date    DDIMER <200 01/18/2019     Folate and B12: No results found for: [id number], No results found for: FOLATE    Lactic Acid:   Lab Results   Component Value Date    LACTA 1.8 10/29/2020     Ammonia:   Cortisol:  Thyroid Studies:  Lab Results   Component Value Date    TSH 0.771 07/21/2021     XR CHEST PORTABLE   Final Result   No significant change since the prior study         CTA CHEST W CONTRAST   Final Result   1. There are some limitations due motion artifacts in the evaluation of the   lower lobe pulmonary arterial circulation but there is indication of a   subsegmental acute pulmonary embolus in the right lower lobe pulmonary artery   circulation, as above described.       2.  Signs for malignancy in the left lower lobe with 2 mass lesions compatible with primary lung neoplasm. Final interpretation requires   correlation with histopathology. Prior interventional procedure consider   correlation with PET CT scan of the chest.      3.  Areas of pulmonary fibrosis in both lower lobes more on the left. 4.  Advanced emphysematous changes lung parenchyma more prominent towards the   upper lobes. 5.  See above other comments. Preliminary report was given to Dr. Reyes Pagan, ER physician in [address] [place] [address] at the time of the interpretation. XR CHEST (2 VW)   Final Result   There is opacity left lung base could represent minimal pleural fluid and   atelectasis as there is some volume loss with shift of heart mediastinum to   the left. Clinical correlation and follow-up to resolution recommended. Alternatively further evaluation with CT recommended. ASSESSMENT:  1.) RLL Subsegmental Pulmonary Embolism    2.) Stage III Squamous Cell Carcinoma of Lung   3.) Radiation Fibrosis Left Lung   4.) COPD, No Acute Exacerbation   5.) Hypomagnesemia   6.) Pancytopenia   7.) Wide-complex tachycardia (HCC)--atrial tachycardia with RV pacing    PLAN:  1.) eliquis  2.) O2, IS  3.) consultant notes reviewed  4.) per EP, reprogram pacemaker to eliminate/decrease RV pacing  5.) metoprolol to continue     Thank you Bam Smith MD very much for allowing me to see this patient in consultation and follow up. Care reviewed with nursing staff, medical and surgical specialty care, primary care and the patient's family as available. Restraints are ordered when the patient can do harm to him/herself by pulling out devices.     Connor Arora MD, M.CYNTHIA.

## 2021-09-20 NOTE — CARE COORDINATION
Met with patient at the bedside to discuss transition of care planning. Patient sitting at the side of the bed, short of breath, oxygen currently off. When questioned about why O2 was off, patient stated he just took it off for a minute. Patient continued to leave O2 off throughout our conversation. Patient lives with his wife Adilene Sims in a one story home with two step entry. Patient stated he does have a shower chair at home. Patient has no home O2 but is currently requiring 4L O2 here for a sat of 91%. Patient has a remote history of outpatient rehab. Patient's PCP is Dr Gina Almanza and he uses [facility] in [place] for his medications. His wife Adilene Sims can provide transportation home if medically stable to transition there. Await input from PT. Patient will likely need O2 at discharge. Discussed need with patient and he is agreeable. Will have to discuss further with his wife. Will continue to follow.      Arnoldo Lora RN.  [facility]  [phone]

## 2021-09-20 NOTE — PROGRESS NOTES
Physical Therapy  Physical Therapy Initial Assessment     Name: Ari Martinez  : 1950  MRN: [id number]      Date of Service: 2021    Evaluating PT:  Daniella Reyes, PT, DPT [id number]    Room #:  1797/8275-P  Diagnosis:  Hyponatremia [E87.1]  Bilateral pulmonary embolism (HCC) [I26.99]  Acute respiratory failure with hypoxia ([facility] [J96.01]  Acute on chronic respiratory failure with hypoxia (HCC) [J96.21]  Malignant neoplasm of lung, unspecified laterality, unspecified part of lung ([facility] [C34.90]  Pulmonary emphysema, unspecified emphysema type ([facility] [J43.9]  PMHx/PSHx:  COPD, syncope, CHB, lung CA, pacemaker insertion 2021  Precautions:  Falls, O2 via NC  Equipment Needs:  Front [employer]    SUBJECTIVE:    Pt lives with wife in a 1 story home with 1+2 stairs to enter and 0 rail. Bed is on first floor and bath is on first floor. Pt ambulated with no AD independently PTA. Pt reports progressive weakness starting 2021. Brought to ED and was found to hypoxic. Pt currently reports feeling better and is on 2L via NC. OBJECTIVE:   Initial Evaluation  Date: 2021 Treatment Short Term/ Long Term   Goals   AM-PAC 6 Clicks 34     Was pt agreeable to Eval/treatment? yes     Does pt have pain? No c/o pain     Bed Mobility  Rolling: SBA  Supine to sit: SBA  Sit to supine: SBA  Scooting: SBA  Rolling: Independent  Supine to sit: Independent  Sit to supine: Independent  Scooting: Independent   Transfers Sit to stand: Blayne  Stand to sit: Blayne  Stand pivot: Blayne front [employer]  Sit to stand: Independent  Stand to sit: Independent  Stand pivot: Fredy front [employer]   Ambulation    30 feet with front [employer] Blayne x 2 reps. Significant dizziness on second bout limiting further ambulation.   150 feet with front [employer] Fredy   Stair negotiation: ascended and descended  NT  12 steps with 2 rail Fredy   ROM BUE:  Per OT note  BLE:  WNL     Strength BUE:  Per OT note  BLE:  WNL     Balance Sitting EOB:  SBA  Dynamic Standing:  Blayne front [employer] Sitting EOB:  Independent  Dynamic Standing:  Fredy front [employer]     Pt is A & O x 4  Sensation:  Pt denies numbness and tingling to extremities  Edema:  unremarkable    Vitals:  SPo2 monitored throughout session:    2L rest 95%  2L ambulating 94%   seated   ambulating  Therapeutic Exercises:  hip flexion, LAQ, ankle df/pf x 10 reps each bilaterally    Patient education  Pt educated on role of PT intervention. Pt educated on safety in room with utilization of call light for assistance with mobility. Pt educated on importance of maximizing OOB time by transferring to bedside chair for meals and ambulating to bathroom/transferring to bedside commode with assistance from nursing and therapy staff to increase functional activity tolerance and overall functional independence. Pt educated on importance of independent performance of therapeutic exercises designated above for improved strength, activity tolerance, and ROM. Spouse at bedside had questions regarding BOY vs HH. All questions answered to pt and pt's spouse's satisfaction. Patient response to education:   Pt verbalized understanding Pt demonstrated skill Pt requires further education in this area   yes yes yes     ASSESSMENT:    Conditions Requiring Skilled Therapeutic Intervention:    [x]Decreased strength     []Decreased ROM  [x]Decreased functional mobility  [x]Decreased balance   [x]Decreased endurance   []Decreased posture  []Decreased sensation  []Decreased coordination   []Decreased vision  []Decreased safety awareness   []Increased pain       Comments:  RN cleared pt for activity prior to session. Pt received supine in bed and agreeable to PT intervention at this time. Pt performed all functional mobility as noted above. Pt's wife present at bedside throughout session. Pt's primarily limitation is poor activity tolerance. Pt requires 2L O2 at this time and is experiencing dizziness with ambulation.   HR noted to be elevated to 134 with short bout of ambulation. HR improved with seated rest.  Unable to ambulate further d/t dizziness. Pt returned to seated in bedside chair at end of session and left with all needs met and call light in reach. Pt requires continued skilled PT intervention for the purposes of maximizing functional mobility and independence by addressing deficits described above. Treatment:  Patient practiced and was instructed in the following treatment:     Therapeutic Activities Completed:  o Functional mobility as noted above:   - Bed mobility: SBA for safety only. - Transfer training: Sit<>stand:  Blayne. min VC for proper hand placement and sequencing to promote safe and more independent completion of sit<>stand transition. Stand pivot with front [employer] Blayne. Min VC for proper sequencing when turning to sit with WW to promote safe and independent execution of stand pivot transfer. - Ambulation: 30 feet front [employer] Blayne x 2 reps. Standing rest break between bouts. Dizziness with second bout of ambulation limiting further activity. Symptoms resolved with seated rest.   o Skilled repositioning in seated position for comfort and good posture to promote improved cardiorespiratory function. o Skilled vitals monitoring as noted above.  o Pt education as noted above. Pt's/ family goals   1. Return home. Prognosis is good for reaching above PT goals. Patient and or family understand(s) diagnosis, prognosis, and plan of care.   yes    PHYSICAL THERAPY PLAN OF CARE:    PT POC is established based on physician order and patient diagnosis     Referring provider/PT Order:    09/17/21 1845  PT eval and treat Start: 09/17/21 1845, End: 09/17/21 1845, ONE TIME, Standing Count: 1 Occurrences, R      Jacob Kincaid MD     Diagnosis:  Hyponatremia [E87.1]  Bilateral pulmonary embolism ([address] [I26.99]  Acute respiratory failure with hypoxia ([address] [J96.01]  Acute on chronic respiratory failure with hypoxia ([address] Nick Domingo, DPT  [id number]

## 2021-09-20 NOTE — PROGRESS NOTES
2,000 mg at 09/20/21 1357    benzonatate (TESSALON) capsule 100 mg, 100 mg, Oral, TID PRN, Alicia Carias MD, 100 mg at 09/20/21 0930    apixaban (ELIQUIS) tablet 10 mg, 10 mg, Oral, BID, Alicia Carias MD, 10 mg at 09/20/21 0930    albuterol (PROVENTIL) nebulizer solution 2.5 mg, 2.5 mg, Nebulization, Q6H PRN, Alicia Carias MD    metoprolol succinate (TOPROL XL) extended release tablet 50 mg, 50 mg, Oral, BID WC, Alicia Carias MD, 50 mg at 09/20/21 0930    predniSONE (DELTASONE) tablet 10 mg, 10 mg, Oral, Daily, Alicia Carias MD, 10 mg at 09/20/21 0930    glucose (GLUTOSE) 40 % oral gel 15 g, 15 g, Oral, PRN, Alicia Carias MD    dextrose 50 % IV solution, 12.5 g, IntraVENous, PRN, Alicia Carias MD    glucagon (rDNA) injection 1 mg, 1 mg, IntraMUSCular, PRN, Alicia Carias MD    dextrose 5 % solution, 100 mL/hr, IntraVENous, PRN, Alicia Carias MD    sodium chloride flush 0.9 % injection 5-40 mL, 5-40 mL, IntraVENous, 2 times per day, Alicia Carias MD, 10 mL at 09/19/21 0839    sodium chloride flush 0.9 % injection 5-40 mL, 5-40 mL, IntraVENous, PRN, Alicia Carias MD    0.9 % sodium chloride infusion, 25 mL, IntraVENous, PRN, Alicia Carias MD    ondansetron (ZOFRAN-ODT) disintegrating tablet 4 mg, 4 mg, Oral, Q8H PRN, 4 mg at 09/19/21 0838 **OR** ondansetron (ZOFRAN) injection 4 mg, 4 mg, IntraVENous, Q6H PRN, Alicia Carias MD, 4 mg at 09/18/21 1426    polyethylene glycol (GLYCOLAX) packet 17 g, 17 g, Oral, Daily PRN, Alicia Carias MD    acetaminophen (TYLENOL) tablet 650 mg, 650 mg, Oral, Q6H PRN **OR** acetaminophen (TYLENOL) suppository 650 mg, 650 mg, Rectal, Q6H PRN, Alicia Carias MD    ipratropium (ATROVENT) 0.02 % nebulizer solution 0.5 mg, 0.5 mg, Nebulization, 4x daily, 0.5 mg at 09/20/21 1017 **AND** Arformoterol Tartrate (BROVANA) nebulizer solution 15 mcg, 15 mcg, Nebulization, BID, Alicia Carias MD, 15 mcg at 09/20/21 1018    9/17/2031 CTA chest  Impression   1. Rossi Jon are some limitations due motion artifacts in the evaluation of the   lower lobe pulmonary arterial circulation but there is indication of a   subsegmental acute pulmonary embolus in the right lower lobe pulmonary artery   circulation, as above described.       2.  Signs for malignancy in the left lower lobe with 2 mass lesions   compatible with primary lung neoplasm.  Final interpretation requires   correlation with histopathology.  Prior interventional procedure consider   correlation with PET CT scan of the chest.       3.  Areas of pulmonary fibrosis in both lower lobes more on the left.       4.  Advanced emphysematous changes lung parenchyma more prominent towards the   upper lobes.       5.  See above other comments.       Preliminary report was given to Dr. Dereck Huff, ER physician in [facility] at the time of the interpretation. Assessment:    Principal Problem:    Pulmonary embolism (HCC)  Active Problems:    COPD (chronic obstructive pulmonary disease) (HCC)    HTN (hypertension)    Acute on chronic respiratory failure with hypoxia (HCC)    Squamous carcinoma of lung (HCC)    Hyponatremia    Hypomagnesemia    Wide-complex tachycardia (HCC)  Resolved Problems:    * No resolved hospital problems. *    Patient is a 78-year-old gentleman who is followed by Dr. Sherryle Holter at the [facility] for diagnosis of early stage non-small cell lung cancer. He was diagnosed with stage III disease in May 2021. He completed concurrent chemoradiation therapy 9/7/2021 when he recently received the last dose of weekly carboplatin Taxol. Patient came to the emergency room 9/17/2021 after he experienced other weakness as well as worsening of  dyspnea. CT of the chest showed indication of subsegmental acute PE in the right lower lobe.   He was started on anticoagulation and he is currently on Eliquis loading dose of 10 mg p.o. twice daily      Plan:  I reviewed with the patient and his wife the results of recent CT of the chest.  The reported worsening dyspnea is related to new pulmonary embolism as well as possible COPD exacerbation. Because of history of lung cancer, patient will require indefinite anticoagulation and as long as tolerated. I encouraged him to request albuterol  nebulizer as needed. Labs reviewed. He has mild pancytopenia due to chemo XRT. Keep hemoglobin above 8 g/dL. Transfuse as needed. Check iron panel  Okay to continue Eliquis as long as platelet count is above 50. Keep same follow-up with Dr. Mendel Arab  following discharge. Continue current supportive care.     Electronically signed by Bert Smith MD on 9/20/2021 at 2:06 PM

## 2021-09-20 NOTE — PROGRESS NOTES
[facility] [address] [address]       Date:2021                                                  Patient Name: Dorothy Ramos  MRN: [id number]  : 1950  Room: [address] [place] [address]    Evaluating OT: Wendi Edouard, [place] [address]    Referring Provider[de-identified] Merritt Fields MD     Specific Provider Orders/Date: OT evaluation and treatment 21    Diagnosis:  Hyponatremia [E87.1]  Bilateral pulmonary embolism ([address] [I26.99]  Acute respiratory failure with hypoxia ([address] [J96.01]  Acute on chronic respiratory failure with hypoxia (HCC) [J96.21]  Malignant neoplasm of lung, unspecified laterality, unspecified part of lung ([address] [C34.90]  Pulmonary emphysema, unspecified emphysema type ([address] [J43.9]      Pertinent Medical History:  has a past medical history of CHB (complete heart block) ([address] COPD (chronic obstructive pulmonary disease) ([address] Syncope, and Syncope.        Precautions:  Fall Risk,     Assessment of current deficits   [x] Functional mobility   [x]ADLs  [x] Strength               []Cognition   [x] Functional transfers   [] IADLs         [x] Safety Awareness   [x]Endurance   [] Fine Coordination              [x] Balance      [] Vision/perception   []Sensation    []Gross Motor Coordination  [] ROM  [] Delirium                   [] Motor Control     OT PLAN OF CARE   OT POC based on physician orders, patient diagnosis and results of clinical assessment    Frequency/Duration  2-5 days/wk for 2 weeks PRN   Specific OT Treatment to include:   * Instruction/training on adapted ADL techniques and AE recommendations to increase functional independence within precautions       * Training on energy conservation strategies, correct breathing pattern and techniques to improve independence/tolerance for self-care routine  * Functional transfer/mobility training/DME recommendations for increased independence, safety, and fall prevention  * Patient/Family education to increase follow through with safety techniques and functional independence  * Therapeutic exercise to improve motor endurance, ROM, and functional strength for ADLs/functional transfers  * Therapeutic activities to facilitate/challenge dynamic balance, stand tolerance for increased safety and independence with ADLs      Recommended Adaptive Equipment:  TBD     Home Living:  Pt lives with wife in a 1 story with 1 +1 step(s) to enter and 0 rail(s); bed/bath on 1st and shower in basement   Bathroom setup: tub shower  With seat   Equipment owned: shower chair    Prior Level of Function: Independent  with ADLs , Independent  with IADLs; ambulated with no AD  Driving: not recently since pacemaker  Occupation/leisure: retired    Pain Level: none  Cognition: A&O: 4/4;   Follows 2 step directions: good    Memory:  good    Sequencing:  fair    Problem solving:  fair    Judgement/safety:  fair     Functional Assessment:   AM-PAC Daily Activity Raw Score: 16/24     Initial Eval Status  Date: 9/20/21 Treatment Status  Date: STG=LTG  Time frame: 10-14 days   Feeding Independent   Independent    Grooming Stand by Assist   Independent    UB Dressing Minimal Assist   Independent    LB Dressing Moderate Assist   To brandon pants, cues for energy conservation tech  Modified Bureau    Bathing Moderate Assist  Limited by very poor activity tolerance  Modified Bureau    Toileting Moderate Assist   Modified Bureau    Bed Mobility  Supine to sit: Stand by Assist   Sit to supine: Stand by Assist   Supine to sit: Independent   Sit to supine: Independent    Functional Transfers Sit to stand: Minimal Assist   Stand to sit: Minimal Assist   Stand pivot: NT   Modified Bureau    Functional Mobility Min A with ww  2-3 Side steps only d/t pt complaining of feeling shaky and very SOB.   Mod indep with AAD  Functional household distance   Balance Sitting: SBA     Standing: min A  Sitting: indep      Standing: mod indep   Activity Tolerance Very poor  Difficult to get a reading on pulse oximeter. Pt was SOB throughout session. fair   Visual/  Perceptual Glasses: yes          BUE  ROM/Strength/  Fine motor Coordination Hand dominance: L    RUE: ROM WFL     Strength: grossly 3+/5      Strength:  WFL     Coordination:   WFL    LUE: ROM  WFL     Strength: grossly 3+/5      Strength:  WFL     Coordination: WFL       Hearing: WFL   Sensation:  No c/o numbness or tingling   Tone:  WFL   Edema:  None noted    Comments:   RN cleared patient for OT. Upon arrival patient in bed with wife present in room. Pt appeared SOB at rest., nasal canula was off. Therapist had difficulty getting a reading on pulse oximeter. Therapist facilitated and instructed pt on adapted  techniques & compensatory strategies to improve safety and independence with basic ADLs, bed mobility,  functional transfers & mobility to allow pt to achieve highest level of independence and safely. At end of session, patient sitting EOB with call light and phone within reach, all lines and tubes intact. Overall, patient demonstrated  decreased independence and safety during completion of ADL/functional transfer/mobility tasks. Pt would benefit from continued skilled OT to increase safety and independence with completion of ADL tasks and functional mobility for improved quality of life. Treatment: OT treatment provided this date includes:    ADL-  Instruction/training on safety and adapted techniques for completion of LE dressing . Cues for energy conservation tech and pacing during ADLs    Mobility-  Instruction/training on safety and improved independence with bed mobility  functional transfers  and functional mobility . poor mobility tolerance. Pt c/o feeling shaky and SOB  RN notified.   Sitting EOB x 35 minutes to improve dynamic sitting balance and activity tolerance during ADLs.      Skilled monitoring of O2 sats-   SpO2 at rest on room air 75% (OT questions accuracy d/t monitor not maintaining reading. , SpO2 was 87% on 2 L at rest. (this reading did stay consistant). RN recommended that OT increase O2 to 4L . spO2 91-92% on 4L. HR fluctuating  bpm.          Rehab Potential: Good  for established goals     Patient / Family Goal:  Not stated    Patient and/or family were instructed on functional diagnosis, prognosis/goals and OT plan of care. Demonstrated good understanding. Eval Complexity: Low    Time In: 8:05  Time Out: 8:45  Total Treatment Time: 25 minutes    Min Units   OT Eval Low 73270  x     OT Eval Medium 66661      OT Eval High 09193       OT Re-Eval [id number]       Therapeutic Ex 45805       Therapeutic Activities 62441  15 1   ADL/Self Care 91453  10 1   Orthotic Management 80000       Neuro Re-Ed 70692       Non-Billable Time          Evaluation Time includes thorough review of current medical information, gathering information on past medical history/social history and prior level of function, completion of standardized testing/informal observation of tasks, assessment of data and education on plan of care and goals.             [place], [place] [address]

## 2021-09-21 LAB
ANION GAP SERPL CALCULATED.3IONS-SCNC: 6 MMOL/L (ref 7–16)
ANISOCYTOSIS: ABNORMAL
BASOPHILS ABSOLUTE: 0.03 E9/L (ref 0–0.2)
BASOPHILS RELATIVE PERCENT: 0.9 % (ref 0–2)
BUN BLDV-MCNC: 9 MG/DL (ref 6–23)
CALCIUM SERPL-MCNC: 8.7 MG/DL (ref 8.6–10.2)
CHLORIDE BLD-SCNC: 94 MMOL/L (ref 98–107)
CO2: 33 MMOL/L (ref 22–29)
CREAT SERPL-MCNC: 0.8 MG/DL (ref 0.7–1.2)
EKG ATRIAL RATE: 138 BPM
EKG P AXIS: -3 DEGREES
EKG Q-T INTERVAL: 354 MS
EKG QRS DURATION: 140 MS
EKG QTC CALCULATION (BAZETT): 534 MS
EKG R AXIS: 127 DEGREES
EKG T AXIS: -7 DEGREES
EKG VENTRICULAR RATE: 137 BPM
EOSINOPHILS ABSOLUTE: 0.03 E9/L (ref 0.05–0.5)
EOSINOPHILS RELATIVE PERCENT: 0.9 % (ref 0–6)
GFR AFRICAN AMERICAN: >60
GFR NON-AFRICAN AMERICAN: >60 ML/MIN/1.73
GLUCOSE BLD-MCNC: 93 MG/DL (ref 74–99)
HCT VFR BLD CALC: 27.5 % (ref 37–54)
HEMOGLOBIN: 9.2 G/DL (ref 12.5–16.5)
HYPOCHROMIA: ABNORMAL
LYMPHOCYTES ABSOLUTE: 0.15 E9/L (ref 1.5–4)
LYMPHOCYTES RELATIVE PERCENT: 3.5 % (ref 20–42)
MAGNESIUM: 1.8 MG/DL (ref 1.6–2.6)
MCH RBC QN AUTO: 31.6 PG (ref 26–35)
MCHC RBC AUTO-ENTMCNC: 33.5 % (ref 32–34.5)
MCV RBC AUTO: 94.5 FL (ref 80–99.9)
METER GLUCOSE: 143 MG/DL (ref 74–99)
METER GLUCOSE: 158 MG/DL (ref 74–99)
METER GLUCOSE: 203 MG/DL (ref 74–99)
MONOCYTES ABSOLUTE: 0.15 E9/L (ref 0.1–0.95)
MONOCYTES RELATIVE PERCENT: 3.5 % (ref 2–12)
NEUTROPHILS ABSOLUTE: 3.46 E9/L (ref 1.8–7.3)
NEUTROPHILS RELATIVE PERCENT: 91.3 % (ref 43–80)
OVALOCYTES: ABNORMAL
PDW BLD-RTO: 18.5 FL (ref 11.5–15)
PLATELET # BLD: 62 E9/L (ref 130–450)
PLATELET CONFIRMATION: NORMAL
PMV BLD AUTO: 12.1 FL (ref 7–12)
POIKILOCYTES: ABNORMAL
POLYCHROMASIA: ABNORMAL
POTASSIUM SERPL-SCNC: 3.5 MMOL/L (ref 3.5–5)
RBC # BLD: 2.91 E12/L (ref 3.8–5.8)
SODIUM BLD-SCNC: 133 MMOL/L (ref 132–146)
WBC # BLD: 3.8 E9/L (ref 4.5–11.5)

## 2021-09-21 PROCEDURE — 82962 GLUCOSE BLOOD TEST: CPT

## 2021-09-21 PROCEDURE — 94640 AIRWAY INHALATION TREATMENT: CPT

## 2021-09-21 PROCEDURE — 6370000000 HC RX 637 (ALT 250 FOR IP): Performed by: INTERNAL MEDICINE

## 2021-09-21 PROCEDURE — 85025 COMPLETE CBC W/AUTO DIFF WBC: CPT

## 2021-09-21 PROCEDURE — 94760 N-INVAS EAR/PLS OXIMETRY 1: CPT

## 2021-09-21 PROCEDURE — 2580000003 HC RX 258: Performed by: INTERNAL MEDICINE

## 2021-09-21 PROCEDURE — 97530 THERAPEUTIC ACTIVITIES: CPT

## 2021-09-21 PROCEDURE — 36415 COLL VENOUS BLD VENIPUNCTURE: CPT

## 2021-09-21 PROCEDURE — 6360000002 HC RX W HCPCS: Performed by: INTERNAL MEDICINE

## 2021-09-21 PROCEDURE — 2060000000 HC ICU INTERMEDIATE R&B

## 2021-09-21 PROCEDURE — 97535 SELF CARE MNGMENT TRAINING: CPT

## 2021-09-21 PROCEDURE — 80048 BASIC METABOLIC PNL TOTAL CA: CPT

## 2021-09-21 PROCEDURE — 83735 ASSAY OF MAGNESIUM: CPT

## 2021-09-21 PROCEDURE — 2700000000 HC OXYGEN THERAPY PER DAY

## 2021-09-21 RX ORDER — SODIUM CHLORIDE, SODIUM LACTATE, POTASSIUM CHLORIDE, CALCIUM CHLORIDE 600; 310; 30; 20 MG/100ML; MG/100ML; MG/100ML; MG/100ML
INJECTION, SOLUTION INTRAVENOUS CONTINUOUS
Status: DISCONTINUED | OUTPATIENT
Start: 2021-09-21 | End: 2021-09-21

## 2021-09-21 RX ADMIN — PREDNISONE 10 MG: 10 TABLET ORAL at 08:10

## 2021-09-21 RX ADMIN — BENZONATATE 100 MG: 100 CAPSULE ORAL at 14:37

## 2021-09-21 RX ADMIN — IPRATROPIUM BROMIDE 0.5 MG: 0.5 SOLUTION RESPIRATORY (INHALATION) at 20:13

## 2021-09-21 RX ADMIN — METOPROLOL SUCCINATE 50 MG: 50 TABLET, EXTENDED RELEASE ORAL at 16:05

## 2021-09-21 RX ADMIN — ARFORMOTEROL TARTRATE 15 MCG: 15 SOLUTION RESPIRATORY (INHALATION) at 12:00

## 2021-09-21 RX ADMIN — ARFORMOTEROL TARTRATE 15 MCG: 15 SOLUTION RESPIRATORY (INHALATION) at 20:12

## 2021-09-21 RX ADMIN — IPRATROPIUM BROMIDE 0.5 MG: 0.5 SOLUTION RESPIRATORY (INHALATION) at 16:29

## 2021-09-21 RX ADMIN — SODIUM CHLORIDE, PRESERVATIVE FREE 10 ML: 5 INJECTION INTRAVENOUS at 08:11

## 2021-09-21 RX ADMIN — SODIUM CHLORIDE, POTASSIUM CHLORIDE, SODIUM LACTATE AND CALCIUM CHLORIDE: 600; 310; 30; 20 INJECTION, SOLUTION INTRAVENOUS at 06:46

## 2021-09-21 RX ADMIN — SODIUM CHLORIDE, PRESERVATIVE FREE 10 ML: 5 INJECTION INTRAVENOUS at 20:47

## 2021-09-21 RX ADMIN — IPRATROPIUM BROMIDE 0.5 MG: 0.5 SOLUTION RESPIRATORY (INHALATION) at 12:01

## 2021-09-21 RX ADMIN — BENZONATATE 100 MG: 100 CAPSULE ORAL at 20:46

## 2021-09-21 RX ADMIN — METOPROLOL SUCCINATE 50 MG: 50 TABLET, EXTENDED RELEASE ORAL at 08:10

## 2021-09-21 RX ADMIN — APIXABAN 10 MG: 5 TABLET, FILM COATED ORAL at 20:46

## 2021-09-21 RX ADMIN — BENZONATATE 100 MG: 100 CAPSULE ORAL at 05:16

## 2021-09-21 RX ADMIN — APIXABAN 10 MG: 5 TABLET, FILM COATED ORAL at 08:10

## 2021-09-21 ASSESSMENT — PAIN SCALES - GENERAL: PAINLEVEL_OUTOF10: 0

## 2021-09-21 NOTE — CARE COORDINATION
Patient sleeping soundly at the bedside. Call placed to the patient's wife Gopi Davila to follow-up for transition of care planning choice. Per Gopi Davila, the patient would like to discuss options with Pulmonology prior to making a final decision. Explained that the Pulmonology NP was in this morning and they likely would not be back this afternoon. Gopi Sextonsundeep said she was coming back this evening and would discuss options further with the patient at that time. Will follow-up with the patient and his wife in the morning for decision of BOY vs Home with [facility]. Will continue to follow.      Bam Leone RN.  [place];  [phone]

## 2021-09-21 NOTE — CARE COORDINATION
Notified by nursing in morning rounds that patient would like to transition to rehab prior to returning home at this time. Met with the patient and his wife Aury Palacios at the bedside to discuss transition of care planning. Discussed BOY vs C and the level of rehab and assistance the patient will have with each. Patient will likely require O2 at discharge either way. Patient's wife requested list for BOY for [address] for them to review and discuss their options. Will follow-up with the patient and his family to discuss choices.      Nora Mccabe RN.  [facility]  [phone]

## 2021-09-21 NOTE — PROGRESS NOTES
Electrophysiology progress note         Date:  9/21/2021  Patient: [facility]  Admission:  9/17/2021  9:04 AM  Admit DX: Hyponatremia [E87.1]  Bilateral pulmonary embolism (HCC) [I26.99]  Acute respiratory failure with hypoxia (HCC) [J96.01]  Acute on chronic respiratory failure with hypoxia (HCC) [J96.21]  Malignant neoplasm of lung, unspecified laterality, unspecified part of lung (HCC) [C34.90]  Pulmonary emphysema, unspecified emphysema type ([facility] [J43.9]  Age:  66 y.o., 1950     LOS: 4 days     Reason for evaluation:   Nonsustained atrial tachycardia, dual-chamber pacemaker in situ      SUBJECTIVE:    The patient was seen and examined. Notes and labs reviewed. There were no complications over night. Patient's cardiac review of systems: negative. The patient is generally feeling gradually improving. OBJECTIVE:    Telemetry: Sinus  With PACs,nonsustained AT  /81   Pulse 71   Temp 97.6 °F (36.4 °C) (Temporal)   Resp 18   Ht 5' 7\" (1.702 m)   Wt 173 lb (78.5 kg)   SpO2 96%   BMI 27.10 kg/m²   No intake or output data in the 24 hours ending 09/21/21 1446    EXAM:   CONSTITUTIONAL:  awake, alert, cooperative, no apparent distress, and appears stated age. HEENT: Normal jugular venous pulsations, Head is atraumatic, normocephalic. Eyes and oral mucosa are normal.  LUNGS: Good respiratory effort. No for increased work of breathing. On auscultation: clear to auscultation bilaterally  CARDIOVASCULAR:  irregular rate and rhythm, normal S1 and S2, no S3   ABDOMEN: Soft, nontender, nondistended. SKIN: Warm and dry. EXTREMITIES: No lower extremity edema.      Current Inpatient Medications:   apixaban  10 mg Oral BID    metoprolol succinate  50 mg Oral BID WC    predniSONE  10 mg Oral Daily    sodium chloride flush  5-40 mL IntraVENous 2 times per day    ipratropium  0.5 mg Nebulization 4x daily    And    Arformoterol Tartrate  15 mcg Nebulization BID       IV Infusions (if any):   lactated ringers 100 mL/hr at 09/21/21 0646    dextrose      sodium chloride         Diagnostics:    EKG: normal sinus rhythm, RBBB. ECHO: reviewed. Ejection fraction: 55%  Stress Test: not obtained. Cardiac Angiography: not obtained. Labs:   CBC:   Recent Labs     09/20/21  0652 09/21/21  0616   WBC 3.0* 3.8*   HGB 9.4* 9.2*   HCT 28.0* 27.5*   PLT 70* 62*     BMP:   Recent Labs     09/20/21  0652 09/21/21  0616   * 133   K 3.8 3.5   CO2 28 33*   BUN 11 9   CREATININE 0.9 0.8   LABGLOM >60 >60   GLUCOSE 120* 93     BNP: No results for input(s): BNP in the last 72 hours. PT/INR: No results for input(s): PROTIME, INR in the last 72 hours. APTT:No results for input(s): APTT in the last 72 hours. CARDIAC ENZYMES:No results for input(s): CKTOTAL, CKMB, CKMBINDEX, TROPONINI in the last 72 hours. FASTING LIPID PANEL:  Lab Results   Component Value Date    HDL 74 05/18/2019    LDLCALC 64 05/18/2019    TRIG 52 05/18/2019     LIVER PROFILE:No results for input(s): AST, ALT, LABALBU in the last 72 hours. ASSESSMENT:    Patient Active Problem List   Diagnosis    COPD [facility]chronic [facility] disease) [facility]    BPH (benign prostatic hyperplasia)    HTN (hypertension)    HLD (hyperlipidemia)      Intermittent complete heart block ([address]    S/P cardiac pacemaker procedure      Squamous carcinoma of lung ([facility]      Pulmonary embolism ([facility]--- this admission      Hyponatremia--resolved    Hypomagnesemia      Wide-complex tachycardia (HCC)--atrial tach with RV pacing       PLAN:    Reduce IV fluid infusion to 50 cc an hour  Patient possibly going to subacute rehab soon  Will follow  as needed       Please see orders. Discussed with patient and nursing.     Maxim Monaco MD, [facility]

## 2021-09-21 NOTE — PROGRESS NOTES
[id number] [address] PROGRESS NOTE    Patient: Rocio Taylor  MRN: [id number]  : 1950    Encounter Date: 2021  Encounter Time: 7:06 AM     Date of Admission: .2021  9:04 AM    Consulting Physician:  Primary Care Physician:      Kimi Carey MD     [phone]    PROBLEM LIST:  Patient Active Problem List   Diagnosis    Syncope and collapse    Pneumonia    [facility]    BPH (benign prostatic hyperplasia)    HTN (hypertension)    HLD (hyperlipidemia)    Intermittent complete heart block ([address]    S/P cardiac pacemaker procedure    Acute on chronic respiratory failure with hypoxia [facility]    Squamous carcinoma of lung ([facility]    Pulmonary embolism ([facility]    Hyponatremia    Hypomagnesemia    Wide-complex tachycardia ([facility]     SUBJECTIVE:  Complains of increased dyspnea in the am about 5am then improves throughout the day  On room air currently         HOME MEDICATIONS:  Prior to Admission medications    Medication Sig Start Date End Date Taking?  Authorizing Provider   glimepiride (AMARYL) 2 MG tablet Take 2 mg by mouth every morning (before breakfast)   Yes Historical Provider, MD   albuterol sulfate HFA (VENTOLIN HFA) 108 (90 Base) MCG/ACT inhaler Inhale 2 puffs into the lungs every 6 hours as needed for Wheezing   Yes Historical Provider, MD   sucralfate (CARAFATE) 1 GM tablet Take 1 g by mouth 4 times daily   Yes Historical Provider, MD   metoprolol succinate (TOPROL XL) 50 MG extended release tablet Take 1 tablet by mouth 2 times daily (with meals) 21  Yes Jonathan Alcantar MD   predniSONE (DELTASONE) 10 MG tablet Take 10 mg by mouth daily   Yes Historical Provider, MD   albuterol (PROVENTIL) (2.5 MG/3ML) 0.083% nebulizer solution Take 1 ampule by nebulization every 6 hours as needed for Wheezing    Yes Historical Provider, MD   umeclidinium-vilanterol (ANORO ELLIPTA) 62.5-25 MCG/INH AEPB inhaler Inhale 1 puff into the lungs daily   Yes Historical Provider, MD   simvastatin (ZOCOR) 40 MG tablet Take 40 mg by mouth daily     Historical Provider, MD       CURRENT MEDICATIONS:  Current Facility-Administered Medications: lactated ringers infusion, , IntraVENous, Continuous  benzonatate (TESSALON) capsule 100 mg, 100 mg, Oral, TID PRN  apixaban (ELIQUIS) tablet 10 mg, 10 mg, Oral, BID  albuterol (PROVENTIL) nebulizer solution 2.5 mg, 2.5 mg, Nebulization, Q6H PRN  metoprolol succinate (TOPROL XL) extended release tablet 50 mg, 50 mg, Oral, BID WC  predniSONE (DELTASONE) tablet 10 mg, 10 mg, Oral, Daily  glucose (GLUTOSE) 40 % oral gel 15 g, 15 g, Oral, PRN  dextrose 50 % IV solution, 12.5 g, IntraVENous, PRN  glucagon (rDNA) injection 1 mg, 1 mg, IntraMUSCular, PRN  dextrose 5 % solution, 100 mL/hr, IntraVENous, PRN  sodium chloride flush 0.9 % injection 5-40 mL, 5-40 mL, IntraVENous, 2 times per day  sodium chloride flush 0.9 % injection 5-40 mL, 5-40 mL, IntraVENous, PRN  0.9 % sodium chloride infusion, 25 mL, IntraVENous, PRN  ondansetron (ZOFRAN-ODT) disintegrating tablet 4 mg, 4 mg, Oral, Q8H PRN **OR** ondansetron (ZOFRAN) injection 4 mg, 4 mg, IntraVENous, Q6H PRN  polyethylene glycol (GLYCOLAX) packet 17 g, 17 g, Oral, Daily PRN  acetaminophen (TYLENOL) tablet 650 mg, 650 mg, Oral, Q6H PRN **OR** acetaminophen (TYLENOL) suppository 650 mg, 650 mg, Rectal, Q6H PRN  ipratropium (ATROVENT) 0.02 % nebulizer solution 0.5 mg, 0.5 mg, Nebulization, 4x daily **AND** Arformoterol Tartrate (BROVANA) nebulizer solution 15 mcg, 15 mcg, Nebulization, BID    IV MEDICATIONS:   lactated ringers 100 mL/hr at 09/21/21 0646    dextrose      sodium chloride         ALLERGIES:  No Known Allergies      PHYSICAL EXAMINATION:     VITAL SIGNS:  /60   Pulse 76   Temp 97.1 °F (36.2 °C) (Temporal)   Resp 14   Ht 5' 7\" (1.702 m)   Wt 173 lb (78.5 kg)   SpO2 96%   BMI 27.10 kg/m²   Wt Readings from Last 3 Encounters: 21 173 lb (78.5 kg)   21 184 lb 8 oz (83.7 kg)   21 183 lb 1.6 oz (83.1 kg)     Temp Readings from Last 3 Encounters:   21 97.1 °F (36.2 °C) (Temporal)   21 97.6 °F (36.4 °C) (Temporal)   21 97.1 °F (36.2 °C) (Skin)     TMAX:  BP Readings from Last 3 Encounters:   21 121/60   21 138/82   21 128/76     Pulse Readings from Last 3 Encounters:   21 76   21 82   21 97       CURRENT PULSE OXIMETRY: SpO2: 96 %  24HR PULSE OXIMETRY RANGE: SpO2  Av.7 %  Min: 95 %  Max: 96 %  CVP:      ________________________________________________________________________    VENTILATOR SETTINGS (if applicable):         Vent Information  SpO2: 96 %  Additional Respiratory  Assessments  Pulse: 76  Resp: 14  SpO2: 96 %  ETCO2:  Peak Inspiratory Pressure:  End-Inspiratory Plateau Pressure:    ABG:  No results for input(s): PH, PO2, PCO2, HCO3, BE, O2SAT, METHB, O2HB, COHB, O2CON, HHB, THB in the last 72 hours. ________________________________________________________________________    IV ACCESS:    NUTRITION: ADULT DIET; Regular; 4 carb choices (60 gm/meal)    INTAKE/OUTPUTS:  No intake/output data recorded.   No intake or output data in the 24 hours ending 21 0706    General Appearance: alert and oriented to person, place and time, well-developed and   well-nourished, in no acute distress   Eyes: pupils equal, round, and reactive to light, extraocular eye movements intact, conjunctivae normal and sclera anicteric   Neck: neck supple and non tender without mass, no thyromegaly, no thyroid nodules and no cervical adenopathy   Pulmonary/Chest: decreased breath sounds, no accessory muscles of inspiration, no focal wheezes  Cardiovascular: normal rate, regular rhythm, normal S1 and S2, no murmurs, rubs, clicks or gallops, distal pulses intact, no carotid bruits,  and no JVD   Abdomen: soft, non-tender, non-distended, normal bowel sounds, no masses or organomegaly   Extremities: no cyanosis, no clubbing, no edema  Musculoskeletal: normal range of motion, no joint swelling, deformity or tenderness   Neurologic: reflexes normal and symmetric, no cranial nerve deficit noted    LABS/IMAGING:    CBC:  Lab Results   Component Value Date    WBC 3.0 (L) 09/20/2021    HGB 9.4 (L) 09/20/2021    HCT 28.0 (L) 09/20/2021    MCV 94.0 09/20/2021    PLT 70 (L) 09/20/2021    LYMPHOPCT 0.9 (L) 09/20/2021    RBC 2.98 (L) 09/20/2021    MCH 31.5 09/20/2021    MCHC 33.6 09/20/2021    RDW 18.5 (H) 09/20/2021    NEUTOPHILPCT 93.0 (H) 09/20/2021    MONOPCT 4.3 09/20/2021    BASOPCT 0.3 09/20/2021    NEUTROABS 2.79 09/20/2021    LYMPHSABS 0.03 (L) 09/20/2021    MONOSABS 0.12 09/20/2021    EOSABS 0.05 09/20/2021    BASOSABS 0.00 09/20/2021       Recent Labs     09/20/21  0652 09/19/21 1842 09/18/21  0724   WBC 3.0* 3.1* 4.6   HGB 9.4* 9.5* 9.8*   HCT 28.0* 28.2* 29.9*   MCV 94.0 95.3 95.5   PLT 70* 75* 74*       BMP:   Recent Labs     09/18/21  0724 09/19/21 1842 09/20/21  0652   * 126* 131*   K 4.1 4.1 3.8   CL 95* 91* 92*   CO2 26 30* 28   BUN 13 15 11   CREATININE 0.9 1.0 0.9       MG:   Lab Results   Component Value Date    MG 1.5 09/20/2021     Ca/Phos:   Lab Results   Component Value Date    CALCIUM 8.8 09/20/2021     Amylase: No results found for: AMYLASE  Lipase: No results found for: LIPASE  LIVER PROFILE:   Recent Labs     09/18/21  0724   AST 26   ALT 33   BILITOT 0.5   ALKPHOS 59       PT/INR: No results for input(s): PROTIME, INR in the last 72 hours. APTT: No results for input(s): APTT in the last 72 hours.     Cardiac Enzymes:  Lab Results   Component Value Date    TROPONINI <0.01 10/29/2020       Hgb A1C:   Lab Results   Component Value Date    LABA1C 6.5 (H) 09/17/2021     No results found for: EAG  PAOLA: No results found for: PAOLA  ESR: No results found for: SEDRATE  CRP: No results found for: CRP  D Dimer:   Lab Results   Component Value Date    DDIMER <200 diagnosis as long as platelets >51   2.) O2, IS  3.) Oncology following - appreciate input, to keep appt with Dr. Cassandra Szymanski  4.) per EP note, reprogram pacemaker to eliminate/decrease RV pacing  5.) metoprolol to continue   6.) Hopefully home soon      LELO Currie - CNP    Attending Attestation Note:    Patient seen and examined with NP. I agree with above.     In addition, the following apply:    - eliquis for PE, watch for platelets if they drop < 50K, indefinite treatment    - O2, IS  - supportive care  - PT/OT  - possible short term stay at rehabilitation     [facility] MD Rose Marie  9/21/2021  2:34 PM

## 2021-09-21 NOTE — PROGRESS NOTES
OCCUPATIONAL THERAPY BEDSIDE TREATMENT NOTE   [place] [address] [address]     Date:2021  Patient Name: Stalin Brown  MRN: [id number]  : 1950  Room: [address] [place] [address]     Evaluating OT: [place] [id number]     Referring Provider[de-identified] Jacob Kincaid MD                                   Specific Provider Orders/Date: OT evaluation and treatment 21     Diagnosis:  Hyponatremia [E87.1]  Bilateral pulmonary embolism ([address] [I26.99]  Acute respiratory failure with hypoxia ([address] [J96.01]  Acute on chronic respiratory failure with hypoxia (HCC) [J96.21]  Malignant neoplasm of lung, unspecified laterality, unspecified part of lung ([address] [C34.90]  Pulmonary emphysema, unspecified emphysema type ([address] [J43.9]       Pertinent Medical History:  has a past medical history of CHB (complete heart block) ([address] COPD (chronic obstructive pulmonary disease) ([address] Syncope, and Syncope.         Precautions:  Fall Risk, 2L supplemental O2 via NC.      Assessment of current deficits   [x]? Functional mobility             [x]?ADLs           [x]? Strength                  []?Cognition   [x]? Functional transfers           []? IADLs         [x]? Safety Awareness   [x]? Endurance   []? Fine Coordination              [x]? Balance      []? Vision/perception   []? Sensation     []? Gross Motor Coordination  []? ROM           []?  Delirium                   []? Motor Control      OT PLAN OF CARE   OT POC based on physician orders, patient diagnosis and results of clinical assessment     Frequency/Duration  2-5 days/wk for 2 weeks PRN   Specific OT Treatment to include:   * Instruction/training on adapted ADL techniques and AE recommendations to increase functional independence within precautions       * Training on energy conservation strategies, correct breathing pattern and techniques to improve independence/tolerance for self-care routine  * Functional transfer/mobility training/DME recommendations for increased independence, safety, and fall prevention  * Patient/Family education to increase follow through with safety techniques and functional independence  * Therapeutic exercise to improve motor endurance, ROM, and functional strength for ADLs/functional transfers  * Therapeutic activities to facilitate/challenge dynamic balance, stand tolerance for increased safety and independence with ADLs        Recommended Adaptive Equipment:  wheeled walker, 3:1 commode      Home Living:  Pt lives with wife in a 1 story with 1 +1 step(s) to enter and 0 rail(s); bed/bath on 1st and shower in basement   Bathroom setup: tub shower  With seat   Equipment owned: shower chair     Prior Level of Function: Independent  with ADLs , Independent  with IADLs; ambulated with no AD  Driving: not recently since pacemaker  Occupation/leisure: retired     Pain Level: none  Cognition: A&O: 4/4;   Follows 2 step directions: good               Memory:  good               Sequencing:  fair               Problem solving:  fair               Judgement/safety:  fair     Functional Assessment:  AM-PAC Daily Activity Raw Score: 16/24   Initial Eval Status  Date: 9/20/21 Treatment Status  Date: 9/21/21 STG=LTG  Time frame: 10-14 days   Feeding Independent   Independent Independent    Grooming Stand by [employer] by Assist  Simulated task  Independent    UB Dressing Minimal Assist   NT Independent    LB Dressing Moderate Assist   To brandon pants, cues for energy conservation tech Moderate Assist initially to doff/don socks. Pt limited by LE edema. AE trialed and pt able to don socks with Minimal Assist and cueing for technique.   Modified [place]    Bathing Moderate Assist  Limited by very poor activity tolerance  NT Modified [place]    Toileting Moderate Assist   NT Modified [place]    Bed Mobility  Supine to sit: Stand by Assist   Sit to supine: Stand by Assist   NT Supine to sit: Independent   Sit to supine: Independent    Functional Transfers Sit to stand: Minimal Assist   Stand to sit: Minimal Assist   Stand pivot: NT   Minimal Assist for sit to stand transfer from chair. Verbal cueing for hand placement to prevent pulling up on walker. Minimal Assist on/off commode and cueing for use of grab bar for safe commode transfer. Modified [place]    Functional Mobility Min A with ww  2-3 Side steps only d/t pt complaining of feeling shaky and very SOB.  Minimal Assist to/from bathroom with use of walker. Mod indep with AAD  Functional household distance   Balance Sitting: SBA     Standing: min A Sitting: SBA  Standing: Min A/CGA Sitting: indep      Standing: mod indep   Activity Tolerance Very poor  Difficult to get a reading on pulse oximeter. Pt was SOB throughout session.   Pt displayed improved activity tolerance this date. Pt reported mild SOB with functional mobility. O2 sats remained >90% throughout, HR WNL. fair   Visual/  Perceptual Glasses: yes             BUE  ROM/Strength/  Fine motor Coordination Hand dominance: L     RUE: ROM WFL     Strength: grossly 3+/5      Strength:  WFL     Coordination:   WFL     LUE: ROM  WFL     Strength: grossly 3+/5      Strength:  WFL     Coordination: WFL          Hearing: WFL   Sensation:  No c/o numbness or tingling   Tone:  WFL   Edema:  None noted    Comments: Upon arrival to the room the patient was sitting in chair. At end of the session, the patient was returned to chair with wife at bedside. Call light and phone within reach. Pt required verbal cues and instruction as noted above for safe facilitation and completion of tasks. Therapist provided skilled monitoring of the patient's response during treatment session. Prior to and at the end of session, environmental modifications/line management completed for patients safety and efficiency of treatment session.  OT services provided to include instruction/training on safety and adapted techniques for completion of transfers and ADL's. Overall, the patient demonstrates difficulties with completion of BADL's and IADL's. Factors contributing to these difficulties includes decreased endurance and generalized weakness. Pt would benefit from continued skilled OT to increase independence with BADL's and IADL's. · Treatment: OT treatment provided this date includes:     Instruction/training on safety and adapted techniques for completion of ADLs; AE trialed this date, DME recommendation discussed    Instruction/training on safe functional mobility/transfer techniques    Instruction/training on energy conservation/work simplification for completion of ADLs    Proper Positioning/Alignment for edema mgmt and improved breathing   Skilled monitoring of O2 sats- see section above          Rehab Potential: Good for established goals developed with patient and family. Treatment time includes thorough review of current medical information, gathering information on past medical history/social history and prior level of function, completion of standardized testing/informal observation of tasks, assessment of data, and development of POC/Goals.     Time In: 11:03 AM    Time Out: 11:32 AM        Min Units   OT Eval Low 48815     OT Eval Medium 64953     OT Eval High 62023     OT Re-Eval 10199          ADL/Self Care 03684 17 1   Therapeutic Activities 23998 23 1   Therapeutic Ex 71037     Orthotic Management 38980     Neuro Re-Ed 16440     Non-Billable Time     TOTAL TIMED TREATMENT 29 2     Whitney Jones OTR/L #[id number]

## 2021-09-22 VITALS
BODY MASS INDEX: 27.15 KG/M2 | SYSTOLIC BLOOD PRESSURE: 110 MMHG | TEMPERATURE: 98.8 F | OXYGEN SATURATION: 98 % | WEIGHT: 173 LBS | DIASTOLIC BLOOD PRESSURE: 60 MMHG | HEART RATE: 66 BPM | RESPIRATION RATE: 16 BRPM | HEIGHT: 67 IN

## 2021-09-22 LAB
ANION GAP SERPL CALCULATED.3IONS-SCNC: 11 MMOL/L (ref 7–16)
ANISOCYTOSIS: ABNORMAL
BASOPHILS ABSOLUTE: 0.01 E9/L (ref 0–0.2)
BASOPHILS RELATIVE PERCENT: 0.2 % (ref 0–2)
BUN BLDV-MCNC: 8 MG/DL (ref 6–23)
CALCIUM SERPL-MCNC: 8.7 MG/DL (ref 8.6–10.2)
CHLORIDE BLD-SCNC: 94 MMOL/L (ref 98–107)
CO2: 28 MMOL/L (ref 22–29)
CREAT SERPL-MCNC: 0.8 MG/DL (ref 0.7–1.2)
EOSINOPHILS ABSOLUTE: 0.02 E9/L (ref 0.05–0.5)
EOSINOPHILS RELATIVE PERCENT: 0.5 % (ref 0–6)
GFR AFRICAN AMERICAN: >60
GFR NON-AFRICAN AMERICAN: >60 ML/MIN/1.73
GLUCOSE BLD-MCNC: 94 MG/DL (ref 74–99)
HCT VFR BLD CALC: 25.8 % (ref 37–54)
HEMOGLOBIN: 8.6 G/DL (ref 12.5–16.5)
HYPOCHROMIA: ABNORMAL
IMMATURE GRANULOCYTES #: 0.06 E9/L
IMMATURE GRANULOCYTES %: 1.4 % (ref 0–5)
LYMPHOCYTES ABSOLUTE: 0.2 E9/L (ref 1.5–4)
LYMPHOCYTES RELATIVE PERCENT: 4.8 % (ref 20–42)
MAGNESIUM: 1.5 MG/DL (ref 1.6–2.6)
MCH RBC QN AUTO: 32.1 PG (ref 26–35)
MCHC RBC AUTO-ENTMCNC: 33.3 % (ref 32–34.5)
MCV RBC AUTO: 96.3 FL (ref 80–99.9)
METER GLUCOSE: 109 MG/DL (ref 74–99)
MONOCYTES ABSOLUTE: 0.22 E9/L (ref 0.1–0.95)
MONOCYTES RELATIVE PERCENT: 5.2 % (ref 2–12)
NEUTROPHILS ABSOLUTE: 3.69 E9/L (ref 1.8–7.3)
NEUTROPHILS RELATIVE PERCENT: 87.9 % (ref 43–80)
OVALOCYTES: ABNORMAL
PDW BLD-RTO: 18.9 FL (ref 11.5–15)
PLATELET # BLD: 58 E9/L (ref 130–450)
PLATELET CONFIRMATION: NORMAL
PMV BLD AUTO: 11.9 FL (ref 7–12)
POIKILOCYTES: ABNORMAL
POLYCHROMASIA: ABNORMAL
POTASSIUM SERPL-SCNC: 3.6 MMOL/L (ref 3.5–5)
RBC # BLD: 2.68 E12/L (ref 3.8–5.8)
SODIUM BLD-SCNC: 133 MMOL/L (ref 132–146)
TEAR DROP CELLS: ABNORMAL
WBC # BLD: 4.2 E9/L (ref 4.5–11.5)

## 2021-09-22 PROCEDURE — 6360000002 HC RX W HCPCS: Performed by: INTERNAL MEDICINE

## 2021-09-22 PROCEDURE — 2580000003 HC RX 258: Performed by: INTERNAL MEDICINE

## 2021-09-22 PROCEDURE — 6370000000 HC RX 637 (ALT 250 FOR IP): Performed by: INTERNAL MEDICINE

## 2021-09-22 PROCEDURE — 2700000000 HC OXYGEN THERAPY PER DAY

## 2021-09-22 PROCEDURE — 85025 COMPLETE CBC W/AUTO DIFF WBC: CPT

## 2021-09-22 PROCEDURE — 83735 ASSAY OF MAGNESIUM: CPT

## 2021-09-22 PROCEDURE — 36415 COLL VENOUS BLD VENIPUNCTURE: CPT

## 2021-09-22 PROCEDURE — 94640 AIRWAY INHALATION TREATMENT: CPT

## 2021-09-22 PROCEDURE — 82962 GLUCOSE BLOOD TEST: CPT

## 2021-09-22 PROCEDURE — 80048 BASIC METABOLIC PNL TOTAL CA: CPT

## 2021-09-22 RX ORDER — GUAIFENESIN 400 MG/1
400 TABLET ORAL 3 TIMES DAILY
Status: DISCONTINUED | OUTPATIENT
Start: 2021-09-22 | End: 2021-09-22 | Stop reason: HOSPADM

## 2021-09-22 RX ADMIN — ARFORMOTEROL TARTRATE 15 MCG: 15 SOLUTION RESPIRATORY (INHALATION) at 10:26

## 2021-09-22 RX ADMIN — APIXABAN 10 MG: 5 TABLET, FILM COATED ORAL at 08:02

## 2021-09-22 RX ADMIN — BENZONATATE 100 MG: 100 CAPSULE ORAL at 08:01

## 2021-09-22 RX ADMIN — METOPROLOL SUCCINATE 50 MG: 50 TABLET, EXTENDED RELEASE ORAL at 08:02

## 2021-09-22 RX ADMIN — SODIUM CHLORIDE, PRESERVATIVE FREE 10 ML: 5 INJECTION INTRAVENOUS at 08:02

## 2021-09-22 RX ADMIN — IPRATROPIUM BROMIDE 0.5 MG: 0.5 SOLUTION RESPIRATORY (INHALATION) at 10:27

## 2021-09-22 RX ADMIN — PREDNISONE 10 MG: 10 TABLET ORAL at 08:01

## 2021-09-22 NOTE — PROGRESS NOTES
Chief Complaint:  Chief Complaint   Patient presents with    Shortness of Breath     Pulmonary embolism ([address]     Subjective:    He feels better today    Dizziness and dyspnea have improved. Orthostatics were positive yesterday, but have improved after getting IV fluids overnight. Objective:    /63   Pulse 88   Temp 97.2 °F (36.2 °C) (Temporal)   Resp 16   Ht 5' 7\" (1.702 m)   Wt 173 lb (78.5 kg)   SpO2 95%   BMI 27.10 kg/m²     Current medications that patient is taking have been reviewed. General appearance: NAD, conversant, quite frail appearing.   Looks perkier today  HEENT: AT/NC, MMM  Neck: FROM, supple  Lungs: Clear to auscultation, breath sounds are distant, WOB normal  CV: RRR, no MRGs  Abdomen: Soft, non-tender; no masses or HSM, +BS  Extremities: Trace b/l leg edema  Skin: no rash, lesions or ulcers  Psych: Calm and cooperative  Neuro: Alert and interactive, face symmetric, moving all extremities, speech fluent    Labs:  CBC with Differential:    Lab Results   Component Value Date    WBC 3.8 09/21/2021    RBC 2.91 09/21/2021    HGB 9.2 09/21/2021    HCT 27.5 09/21/2021    PLT 62 09/21/2021    MCV 94.5 09/21/2021    MCH 31.6 09/21/2021    MCHC 33.5 09/21/2021    RDW 18.5 09/21/2021    NRBC 0.9 09/20/2021    LYMPHOPCT 3.5 09/21/2021    MONOPCT 3.5 09/21/2021    MYELOPCT 0.9 09/17/2021    BASOPCT 0.9 09/21/2021    MONOSABS 0.15 09/21/2021    LYMPHSABS 0.15 09/21/2021    EOSABS 0.03 09/21/2021    BASOSABS 0.03 09/21/2021     CMP:    Lab Results   Component Value Date     09/21/2021    K 3.5 09/21/2021    K 4.1 09/18/2021    CL 94 09/21/2021    CO2 33 09/21/2021    BUN 9 09/21/2021    CREATININE 0.8 09/21/2021    GFRAA >60 09/21/2021    LABGLOM >60 09/21/2021    GLUCOSE 93 09/21/2021    PROT 5.2 09/18/2021    LABALBU 2.7 09/18/2021    CALCIUM 8.7 09/21/2021    BILITOT 0.5 09/18/2021    ALKPHOS 59 09/18/2021    AST 26 09/18/2021    ALT 33 09/18/2021          Assessment/Plan:  Principal Problem:    Pulmonary embolism (HCC)  Active Problems:    COPD (chronic obstructive pulmonary disease) (HCC)    HTN (hypertension)    Acute on chronic respiratory failure with hypoxia (HCC)    Squamous carcinoma of lung (HCC)    Hyponatremia    Hypomagnesemia    Wide-complex tachycardia (HCC)  Resolved Problems:    * No resolved hospital problems. *       No signs of other pulmonary pathology. His PE is quite small. I think he has very poor cardiopulmonary reserve    Cell counts seem to have stabilized    COPD stable    Repeat CXR personally reviewed, no acute process    Orthostatics were positive yesterday. Gave IV fluids. Pt complains of mild leg edema today. Dizziness resolved. Fluids d/c'ed. Continue Eliquis    EP consult appreciated - they have adjusted PPM settings.   Continue metoprolol    Looking better    If does OK with PT tomorrow, possibly home    Requires continued inpatient level of care     Rani Goff MD    10:32 PM  9/21/2021

## 2021-09-22 NOTE — PROGRESS NOTES
Patient discharge papers reviewed with patient and Home o2 was delivered to room. IV removed and heart monitor removed and placed in storage. Educated patient and wife the importance of taking prescribed medication. All questions answered and informed patient to follow up with pulmomnary and family doctor on discharge.  Patient discharged in stable condition via wheelchair

## 2021-09-22 NOTE — DISCHARGE SUMMARY
Physician Discharge Summary     Patient ID:  Edward Snyder  [id number]  89 y.o.  1950    Admit date: 9/17/2021    Discharge date and time:  9/22/2021     Admission Diagnoses:   Chief Complaint   Patient presents with    Shortness of Breath      Pulmonary embolism [facility])     Discharge Diagnoses:   Principal Problem:    Pulmonary embolism ([facility]  Active Problems:    COPD (chronic obstructive pulmonary disease) ([facility]    HTN (hypertension)    Acute on chronic respiratory failure with hypoxia ([facility]    Squamous carcinoma of lung (HCC)    Hyponatremia    Hypomagnesemia    Wide-complex tachycardia ([facility]  Resolved Problems:    * No resolved hospital problems. *       Consults: Pulm, heme/onc, EP    Procedures: None    Hospital Course:   Patient presented with weakness. He is undergoing chemotherapy and radiation for lung cancer. He was found to have a very small subsegmental pulmonary embolism. He was started on Eliquis. He was very weak. An extensive work-up did not reveal any other acute process, but I did notice his cell counts were dropping slowly, consistent with recent treatment with chemotherapy. He seems to have reached his cell trina and did not require transfusions. He is a little anemic so that might contribute to his weakness. He was noted to have intermittent runs of wide-complex tachycardia. EP was consulted and they felt these are intermittent sinus tachycardia as well as atrial tachycardia. They have adjusted his pacemaker settings. The bottom line is that the PE is not too significant in terms of its size but his underlying health is very poor. His chest CT shows extensive scarring of both lungs. He does not have a lot of cardiopulmonary reserve. He did gradually feel better and felt strong enough to go home, though still somewhat weak. He will need oxygen at time of discharge.      Discharge Exam:  Vitals:    09/21/21 1500 09/21/21 1629 09/21/21 2330 09/22/21 0740   BP: 110/63  118/72 110/60   Pulse: 88  76 66   Resp: 16 16 16 16   Temp: 97.2 °F (36.2 °C)  97.7 °F (36.5 °C) 98.8 °F (37.1 °C)   TempSrc: Temporal  Temporal    SpO2: 98% 95% 97% 98%   Weight:       Height:            General appearance: NAD, conversant, quite chronically ill appearing  HEENT: AT/NC, MMM  Neck: FROM, supple  Lungs: Clear to auscultation, WOB normal  CV: RRR, no MRGs  Abdomen: Soft, non-tender; no masses or HSM, +BS  Extremities: Mild b/l LE edema  Skin: no rash, lesions or ulcers  Psych: Calm and cooperative  Neuro: Alert and interactive, nonfocal     Condition:  Stable    Disposition: home    Patient Instructions:   Current Discharge Medication List      START taking these medications    Details   apixaban (ELIQUIS) 5 MG TABS tablet Take 2 tablets by mouth 2 times daily for 2 days, THEN 1 tablet 2 times daily. Qty: 60 tablet, Refills: 11         CONTINUE these medications which have NOT CHANGED    Details   albuterol sulfate HFA (VENTOLIN HFA) 108 (90 Base) MCG/ACT inhaler Inhale 2 puffs into the lungs every 6 hours as needed for Wheezing      sucralfate (CARAFATE) 1 GM tablet Take 1 g by mouth 4 times daily      metoprolol succinate (TOPROL XL) 50 MG extended release tablet Take 1 tablet by mouth 2 times daily (with meals)  Qty: 180 tablet, Refills: 3      predniSONE (DELTASONE) 10 MG tablet Take 10 mg by mouth daily      albuterol (PROVENTIL) (2.5 MG/3ML) 0.083% nebulizer solution Take 1 ampule by nebulization every 6 hours as needed for Wheezing       umeclidinium-vilanterol (ANORO ELLIPTA) 62.5-25 MCG/INH AEPB inhaler Inhale 1 puff into the lungs daily      simvastatin (ZOCOR) 40 MG tablet Take 40 mg by mouth daily          STOP taking these medications       glimepiride (AMARYL) 2 MG tablet Comments:   Reason for Stopping:         Magic Mouthwash (MIRACLE MOUTHWASH) Comments:   Reason for Stopping:             Activity: activity as tolerated  Diet: diabetic diet    Follow-up with PCP in 1 week.     Note that over 30 minutes was spent in preparing discharge papers, discussing discharge with patient, medication review, etc.    Signed:  Bam Smith MD    9/22/2021  12:39 PM

## 2021-09-22 NOTE — CARE COORDINATION
Call placed to the patient's wife Maryanne Brunner to discuss transition of care planning. She and the patient have decided the best option is to transition home with [facility]. They have reviewed the [facility]. provider list and they would like 1.) [facility], 2.) [address], or 3.) [facility] HC. Patient will also need a WW and a BSC for transition of care to home. Patient remains on 2L O2 per NC and will need an ambulatory POX to determine need for home O2 at discharge as well. Discussed DME providers with Maryanne Brunner. If the patient does not need Home O2, Maryanne Brunner would like [facility] DME. If the patient need home O2, they would like [facility] for DME. Call placed to Crissy Mcdowell, liaison with [facility] with referral.  They can accept and will see the patient when he discharges to home. AVS updated with [facility]. information. Nursing aware of need for ambulatory pulse ox testing for transition of care planning. Will continue to follow.      Steff Garcia RN.  [facility]  [phone]

## 2021-09-22 NOTE — PROGRESS NOTES
Pulse ox was 96% on room air at rest.  Ambulated patient on room air. Oxygen saturation was 86 % on room air while ambulating. Oxygen applied  Recovery pulse ox was 92% on 2 liters of oxygen while ambulating.

## 2021-09-22 NOTE — PROGRESS NOTES
[address] [address] PROGRESS NOTE    Patient: Alyssa Larson  MRN: [id number]  : 1950    Encounter Date: 2021  Encounter Time: 9:42 AM     Date of Admission: .2021  9:04 AM    Consulting Physician:  Primary Care Physician:      Jennifer Son MD     [phone]    PROBLEM LIST:  Patient Active Problem List   Diagnosis    Syncope and collapse    Pneumonia    COPD (chronic obstructive pulmonary disease) ([address]    BPH (benign prostatic hyperplasia)    HTN (hypertension)    HLD (hyperlipidemia)    Intermittent complete heart block ([address]    S/P cardiac pacemaker procedure    Acute on chronic respiratory failure with hypoxia ([address]    [facility]    Pulmonary embolism ([address]    Hyponatremia    Hypomagnesemia    Wide-complex tachycardia ([address]     SUBJECTIVE:  Sitting in chair at bedside. On 2L n/c. Denies any dyspnea but still with cough despite albuterol qid and tessalon perrles. HOME MEDICATIONS:  Prior to Admission medications    Medication Sig Start Date End Date Taking?  Authorizing Provider   glimepiride (AMARYL) 2 MG tablet Take 2 mg by mouth every morning (before breakfast)   Yes Historical Provider, MD   albuterol sulfate HFA (VENTOLIN HFA) 108 (90 Base) MCG/ACT inhaler Inhale 2 puffs into the lungs every 6 hours as needed for Wheezing   Yes Historical Provider, MD   sucralfate (CARAFATE) 1 GM tablet Take 1 g by mouth 4 times daily   Yes Historical Provider, MD   metoprolol succinate (TOPROL XL) 50 MG extended release tablet Take 1 tablet by mouth 2 times daily (with meals) 21  Yes Brendon Queen MD   predniSONE (DELTASONE) 10 MG tablet Take 10 mg by mouth daily   Yes Historical Provider, MD   albuterol (PROVENTIL) (2.5 MG/3ML) 0.083% nebulizer solution Take 1 ampule by nebulization every 6 hours as needed for Wheezing    Yes Historical Provider, MD   umeclidinium-vilanterol (ANORO ELLIPTA) 62.5-25 MCG/INH AEPB inhaler Inhale 1 puff into the lungs daily   Yes Historical Provider, MD   simvastatin (ZOCOR) 40 MG tablet Take 40 mg by mouth daily     Historical Provider, MD       CURRENT MEDICATIONS:  Current Facility-Administered Medications: benzonatate (TESSALON) capsule 100 mg, 100 mg, Oral, TID PRN  apixaban (ELIQUIS) tablet 10 mg, 10 mg, Oral, BID  albuterol (PROVENTIL) nebulizer solution 2.5 mg, 2.5 mg, Nebulization, Q6H PRN  metoprolol succinate (TOPROL XL) extended release tablet 50 mg, 50 mg, Oral, BID WC  predniSONE (DELTASONE) tablet 10 mg, 10 mg, Oral, Daily  glucose (GLUTOSE) 40 % oral gel 15 g, 15 g, Oral, PRN  dextrose 50 % IV solution, 12.5 g, IntraVENous, PRN  glucagon (rDNA) injection 1 mg, 1 mg, IntraMUSCular, PRN  dextrose 5 % solution, 100 mL/hr, IntraVENous, PRN  sodium chloride flush 0.9 % injection 5-40 mL, 5-40 mL, IntraVENous, 2 times per day  sodium chloride flush 0.9 % injection 5-40 mL, 5-40 mL, IntraVENous, PRN  0.9 % sodium chloride infusion, 25 mL, IntraVENous, PRN  ondansetron (ZOFRAN-ODT) disintegrating tablet 4 mg, 4 mg, Oral, Q8H PRN **OR** ondansetron (ZOFRAN) injection 4 mg, 4 mg, IntraVENous, Q6H PRN  polyethylene glycol (GLYCOLAX) packet 17 g, 17 g, Oral, Daily PRN  acetaminophen (TYLENOL) tablet 650 mg, 650 mg, Oral, Q6H PRN **OR** acetaminophen (TYLENOL) suppository 650 mg, 650 mg, Rectal, Q6H PRN  ipratropium (ATROVENT) 0.02 % nebulizer solution 0.5 mg, 0.5 mg, Nebulization, 4x daily **AND** Arformoterol Tartrate (BROVANA) nebulizer solution 15 mcg, 15 mcg, Nebulization, BID    IV MEDICATIONS:   dextrose      sodium chloride         ALLERGIES:  No Known Allergies      PHYSICAL EXAMINATION:     VITAL SIGNS:  /60   Pulse 66   Temp 98.8 °F (37.1 °C)   Resp 16   Ht 5' 7\" (1.702 m)   Wt 173 lb (78.5 kg)   SpO2 98%   BMI 27.10 kg/m²   Wt Readings from Last 3 Encounters:   09/17/21 173 lb (78.5 kg)   09/01/21 184 lb 8 oz (83.7 kg)   08/25/21 183 lb 1.6 oz (83.1 kg) Temp Readings from Last 3 Encounters:   21 98.8 °F (37.1 °C)   21 97.6 °F (36.4 °C) (Temporal)   21 97.1 °F (36.2 °C) (Skin)     TMAX:  BP Readings from Last 3 Encounters:   21 110/60   21 138/82   21 128/76     Pulse Readings from Last 3 Encounters:   21 66   21 82   21 97       CURRENT PULSE OXIMETRY: SpO2: 98 %  24HR PULSE OXIMETRY RANGE: SpO2  Av.8 %  Min: 95 %  Max: 98 %  CVP:      ________________________________________________________________________    VENTILATOR SETTINGS (if applicable):         Vent Information  SpO2: 98 %  Additional Respiratory  Assessments  Pulse: 66  Resp: 16  SpO2: 98 %  ETCO2:  Peak Inspiratory Pressure:  End-Inspiratory Plateau Pressure:    ABG:  No results for input(s): PH, PO2, PCO2, HCO3, BE, O2SAT, METHB, O2HB, COHB, O2CON, HHB, THB in the last 72 hours. ________________________________________________________________________    IV ACCESS:    NUTRITION: ADULT DIET; Regular; 4 carb choices (60 gm/meal)    INTAKE/OUTPUTS:  No intake/output data recorded.   No intake or output data in the 24 hours ending 21 0942    General Appearance: alert, in no acute distress   Neck: neck supple  Pulmonary/Chest: decreased breath sounds, no accessory muscles use  Cardiovascular: normal rate, regular rhythm, normal S1 and S2, no murmurs, no JVD   Abdomen: soft, non-tender,   Extremities: 2-3+ BLE  Musculoskeletal: normal range of motion,   Neurologic: no obvious deficit noted    LABS/IMAGING:    CBC:  Lab Results   Component Value Date    WBC 4.2 (L) 2021    HGB 8.6 (L) 2021    HCT 25.8 (L) 2021    MCV 96.3 2021    PLT 58 (L) 2021    LYMPHOPCT 3.5 (L) 2021    RBC 2.68 (L) 2021    MCH 32.1 2021    MCHC 33.3 2021    RDW 18.9 (H) 2021    NEUTOPHILPCT 91.3 (H) 2021    MONOPCT 3.5 2021    BASOPCT 0.9 2021    NEUTROABS 3.46 2021    LYMPHSABS 0.15 (L) 09/21/2021    MONOSABS 0.15 09/21/2021    EOSABS 0.03 (L) 09/21/2021    BASOSABS 0.03 09/21/2021       Recent Labs     09/22/21  0528 09/21/21  0616 09/20/21  0652   WBC 4.2* 3.8* 3.0*   HGB 8.6* 9.2* 9.4*   HCT 25.8* 27.5* 28.0*   MCV 96.3 94.5 94.0   PLT 58* 62* 70*       BMP:   Recent Labs     09/20/21  0652 09/21/21  0616 09/22/21  0528   * 133 133   K 3.8 3.5 3.6   CL 92* 94* 94*   CO2 28 33* 28   BUN 11 9 8   CREATININE 0.9 0.8 0.8       MG:   Lab Results   Component Value Date    MG 1.5 09/22/2021     Ca/Phos:   Lab Results   Component Value Date    CALCIUM 8.7 09/22/2021     Amylase: No results found for: AMYLASE  Lipase: No results found for: LIPASE  LIVER PROFILE:   No results for input(s): AST, ALT, LIPASE, BILIDIR, BILITOT, ALKPHOS in the last 72 hours. Invalid input(s): AMYLASE,  ALB    PT/INR: No results for input(s): PROTIME, INR in the last 72 hours. APTT: No results for input(s): APTT in the last 72 hours. Cardiac Enzymes:  Lab Results   Component Value Date    TROPONINI <0.01 10/29/2020       Hgb A1C:   Lab Results   Component Value Date    LABA1C 6.5 (H) 09/17/2021     No results found for: EAG  PAOLA: No results found for: PAOLA  ESR: No results found for: SEDRATE  CRP: No results found for: CRP  D Dimer:   Lab Results   Component Value Date    DDIMER <200 01/18/2019     Folate and B12: No results found for: [id number], No results found for: FOLATE    Lactic Acid:   Lab Results   Component Value Date    LACTA 1.8 10/29/2020     Ammonia:   Cortisol:  Thyroid Studies:  Lab Results   Component Value Date    TSH 0.771 07/21/2021     XR CHEST PORTABLE   Final Result   No significant change since the prior study         CTA CHEST W CONTRAST   Final Result   1.   There are some limitations due motion artifacts in the evaluation of the   lower lobe pulmonary arterial circulation but there is indication of a   subsegmental acute pulmonary embolus in the right lower lobe pulmonary artery circulation, as above described. 2.  Signs for malignancy in the left lower lobe with 2 mass lesions   compatible with primary lung neoplasm. Final interpretation requires   correlation with histopathology. Prior interventional procedure consider   correlation with PET CT scan of the chest.      3.  Areas of pulmonary fibrosis in both lower lobes more on the left. 4.  Advanced emphysematous changes lung parenchyma more prominent towards the   upper lobes. 5.  See above other comments. Preliminary report was given to Dr. Rivka Birmingham, ER physician in [facility] at the time of the interpretation. XR CHEST (2 VW)   Final Result   There is opacity left lung base could represent minimal pleural fluid and   atelectasis as there is some volume loss with shift of heart mediastinum to   the left. Clinical correlation and follow-up to resolution recommended. Alternatively further evaluation with CT recommended. ASSESSMENT:  1.) RLL Subsegmental Pulmonary Embolism    2.) Stage III Squamous Cell Carcinoma of Lung   3.) Radiation Fibrosis Left Lung   4.) COPD, No Acute Exacerbation   5.) Hypomagnesemia   6.) Pancytopenia   7.) Wide-complex tachycardia (HCC)--atrial tachycardia with RV pacing  8.) acute respiratory failure secondary to PE    PLAN:  1.) Continue eliquis - indefinite with lung ca diagnosis as long as platelets >25   2.) O2, IS  3.) Oncology following - appreciate input, to keep appt with Dr. Schuyler Martinez  4.) per EP note, reprogram pacemaker to eliminate/decrease RV pacing    Nolberto Barrios for discharge from a pulmonary standpoint when Nolberto Barrios with primary team.   Will need home O2 evaluation prior to determine O2 needs at home. Discussed with patient and spouse.       Amaris Maciel, LELO - CNP

## 2021-09-22 NOTE — PROGRESS NOTES
Patient is not oxygen dependent at home and is currently on 2 lpm nasal cannula with SpO2 ~95%. Patient took O2 off when he used the restroom, this RT came into the room after he returned to bed and checked his SpO2. The patient was 87% on room air so the oxygen has been replaced. A longer O2 tubing was supplied so that the patient does not have to remove O2 to ambulate to the restroom.

## 2021-09-22 NOTE — CARE COORDINATION
Patient will need new Home O2. Nursing with message out to Dr Vaughn re: needs DME orders. Call placed to Batsheva Fofana, liaison with [address] [place] [address] for Equipment needed for discharge to home. Referral information provided and he will review information and get a potable tank out to the patient for transition to home when medically stable to discharge. AVS updated with contact information. Will continue to follow.      Zakiya Patino RN.  [facility]  [phone]

## 2021-10-14 RX ORDER — SUCRALFATE 1 G/1
TABLET ORAL
Qty: 120 TABLET | Refills: 2 | Status: SHIPPED
Start: 2021-10-14 | End: 2021-10-22 | Stop reason: ALTCHOICE

## 2021-10-22 ENCOUNTER — HOSPITAL ENCOUNTER (OUTPATIENT)
Dept: RADIATION ONCOLOGY | Age: 71
Discharge: HOME | End: 2021-10-22
Attending: RADIOLOGY
Payer: MEDICARE

## 2021-10-22 ENCOUNTER — HOSPITAL ENCOUNTER (OUTPATIENT)
Dept: GENERAL RADIOLOGY | Age: 71
Discharge: HOME | End: 2021-10-24
Payer: MEDICARE

## 2021-10-22 ENCOUNTER — HOSPITAL ENCOUNTER (OUTPATIENT)
Age: 71
Discharge: HOME | End: 2021-10-24
Payer: MEDICARE

## 2021-10-22 VITALS
RESPIRATION RATE: 18 BRPM | HEART RATE: 87 BPM | BODY MASS INDEX: 26.63 KG/M2 | WEIGHT: 170 LBS | DIASTOLIC BLOOD PRESSURE: 74 MMHG | OXYGEN SATURATION: 89 % | SYSTOLIC BLOOD PRESSURE: 132 MMHG | TEMPERATURE: 97.2 F

## 2021-10-22 DIAGNOSIS — C34.82 MALIGNANT NEOPLASM OF OVERLAPPING SITES OF LEFT LUNG (HCC): Primary | ICD-10-CM

## 2021-10-22 DIAGNOSIS — M25.552 LEFT HIP PAIN: ICD-10-CM

## 2021-10-22 PROCEDURE — 73502 X-RAY EXAM HIP UNI 2-3 VIEWS: CPT

## 2021-10-22 PROCEDURE — 99999 PR OFFICE/OUTPT VISIT,PROCEDURE ONLY: CPT | Performed by: NURSE PRACTITIONER

## 2021-10-22 NOTE — PROGRESS NOTES
Mati Silverman  10/22/2021  11:02 AM      Vitals:    10/22/21 1100   BP: 132/74   Pulse: 87   Resp: 18   Temp: 97.2 °F (36.2 °C)   SpO2: (!) 89%    : Wt Readings from Last 3 Encounters:   10/22/21 170 lb (77.1 kg)   09/17/21 173 lb (78.5 kg)   09/01/21 184 lb 8 oz (83.7 kg)                Current Outpatient Medications:     sucralfate (CARAFATE) 1 GM tablet, TAKE 1 TABLET BY MOUTH 4 TIMES DAILY (DISSOLVE TABLET IN 1 TABLESPOON WATER & DRINK), Disp: 120 tablet, Rfl: 2    apixaban (ELIQUIS) 5 MG TABS tablet, Take 2 tablets by mouth 2 times daily for 2 days, THEN 1 tablet 2 times daily. , Disp: 60 tablet, Rfl: 11    albuterol sulfate HFA (VENTOLIN HFA) 108 (90 Base) MCG/ACT inhaler, Inhale 2 puffs into the lungs every 6 hours as needed for Wheezing, Disp: , Rfl:     metoprolol succinate (TOPROL XL) 50 MG extended release tablet, Take 1 tablet by mouth 2 times daily (with meals), Disp: 180 tablet, Rfl: 3    predniSONE (DELTASONE) 10 MG tablet, Take 10 mg by mouth daily, Disp: , Rfl:     simvastatin (ZOCOR) 40 MG tablet, Take 40 mg by mouth daily , Disp: , Rfl:     albuterol (PROVENTIL) (2.5 MG/3ML) 0.083% nebulizer solution, Take 1 ampule by nebulization every 6 hours as needed for Wheezing , Disp: , Rfl:     umeclidinium-vilanterol (ANORO ELLIPTA) 62.5-25 MCG/INH AEPB inhaler, Inhale 1 puff into the lungs daily, Disp: , Rfl:       Patient is seen today in follow up with Jean Ribeiro NP. He received RT CTV left lung +LNs 7/6/21-9/7/2021  30fx/6000cGY. Pt tolerated well. He is on 2liters O2 prn. He said he is starting to feel better. FALLS RISK SCREENING ASSESSMENT    Instructions:  Assess the patient and enter the appropriate indicators that are present for fall risk identification. Total the numbers entered and assign a fall risk score from Table 2.  Reassess patient at a minimum every 12 weeks or with status change. Assessment   Date  10/22/2021     1.   Mental Ability: confusion/cognitively impaired No - 0       2.  Elimination Issues: incontinence, frequency No - 0       3. Ambulatory: use of assistive devices (walker, cane, off-loading devices), attached to equipment (IV pole, oxygen) Yes - 2     4. Sensory Limitations: dizziness, vertigo, impaired vision No - 0       5. Age 72 years or greater - 1       10. Medication: diuretics, strong analgesics, hypnotics, sedatives, antihypertensive agents   Yes - 3   7. Falls:  recent history of falls within the last 3 months (not to include slipping or tripping)   No - 0   TOTAL 6    If score of 4 or greater was education given? No       TABLE 2   Risk Score Risk Level Plan of Care   0-3 Little or  No Risk 1. Provide assistance as indicated for ambulation activities  2. Reorient confused/cognitively impaired patient  3. Call-light/bell within patient's reach  4. Chair/bed in low position, stretcher/bed with siderails up except when performing patient care activities  5. Educate patient/family/caregiver on falls prevention  6.  Reassess in 12 weeks or with any noted change in patient condition which places them at a risk for a fall   4-6 Moderate Risk 1. Provide assistance as indicated for ambulation activities  2. Reorient confused/cognitively impaired patient  3. Call-light/bell within patient's reach  4. Chair/bed in low position, stretcher/bed with siderails up except when performing patient care activities  5. Educate patient/family/caregiver on falls prevention  6. Falls risk precaution (Yellow sticker Level II) placed on patient chart   7 or   Higher High Risk 1. Place patient in easily observable treatment room  2. Patient attended at all times by family member or staff  3. Provide assistance as indicated for ambulation activities  4. Reorient confused/cognitively impaired patient  5. Call-light/bell within patient's reach  6. Chair/bed in low position, stretcher/bed with siderails up except when performing patient care activities  7.   Educate patient/family/caregiver on falls prevention  8. Falls risk precaution (Yellow sticker Level III) placed on patient chart           MALNUTRITION RISK SCREENING ASSESSMENT    10/22/2021   Patient:  Edison Noonan  Sex:  male    Instructions:  Assess the patient and enter the appropriate indicators that are present for nutrition risk identification. Total the numbers entered and assign a risk score. Follow the appropriate action for total score listed below. Assessment   Date  10/22/2021     1. Have you lost weight without trying? 0- No     2. Have you been eating poorly because of a decreased appetite? 0- No   3. Do you have a diagnosis of head and neck cancer?       0- No                                                                                    TOTAL 0          Score of 0-1: No action  Score 2 or greater:  · For Non-Diabetic Patient: Recommend adding Ensure Complete 2 x daily and provide patient with Ensure wellness bag with coupons  · For Diabetic Patient: Recommend adding Glucerna Shake 2 x daily and provide patient with Glucerna Wellness bag with coupons  · Route to the dietitian via Keya Dorado RN

## 2021-10-22 NOTE — PROGRESS NOTES
RADIATION ONCOLOGY  6 week follow up       10/22/2021      NAME:  Caridad Arvizu    YOB: 1950    Diagnosis:  cT2 cN2 NSCLC     Subjective: On 09/07/2021, Caridad Arvizu completed 6000 cGy in 30 fractions directed to the left lung + LN. The patient seen today in 6 week post XRT completion symptom management check. The patient denies skin complaints or pain complaints. The patient presented to ER on 09/17/2021 due to complaints of shortness of breath, CTA chest revealed subsegmental acute pulmonary embolus in the RLL pulmonary arterial circulation. Also 2 focal mass lesions in the LLL, one focal mass lesion is more centrally located in the posterior lateral basal segment of the LLL, it measures 2 x 1.5 cm. The other focal lesion is located in the left infrahilar region relate with this abnormal segment of the LLL, it measures 2 x 1.5 cm (Left hilar mass previously measured 3.4 x 3.3 x 2.6 cm on CT scan 05/06/2021). The patient admitted to hospital with diagnosis of pulmonary embolism started on anticoagulation with Eliquis, discharged to home on  09/22/2021. The patient reports SOB with activity at baseline no progressive changes. The patient reports sputum productive cough of clear mucous. No hemoptysis. No wheezing or pleuritic pain. No fevers, chills, nausea, vomiting or diarrhea. No change in appetite. Patient is following with     Roland Mark- Dr. Abel Pace- Dr. Amando Rodriges     Primary care- Dr. Schuyler Fountain    Pain: No pain complaints. Past medical, surgical, social and family histories reviewed and updated as indicated. ALLERGIES:  Patient has no known allergies. Current Outpatient Medications   Medication Sig Dispense Refill    apixaban (ELIQUIS) 5 MG TABS tablet Take 2 tablets by mouth 2 times daily for 2 days, THEN 1 tablet 2 times daily.  60 tablet 11    albuterol sulfate HFA (VENTOLIN HFA) 108 (90 Base) MCG/ACT inhaler Inhale 2 puffs into the lungs every 6 hours as needed for Wheezing      metoprolol succinate (TOPROL XL) 50 MG extended release tablet Take 1 tablet by mouth 2 times daily (with meals) 180 tablet 3    simvastatin (ZOCOR) 40 MG tablet Take 40 mg by mouth daily       albuterol (PROVENTIL) (2.5 MG/3ML) 0.083% nebulizer solution Take 1 ampule by nebulization every 6 hours as needed for Wheezing       umeclidinium-vilanterol (ANORO ELLIPTA) 62.5-25 MCG/INH AEPB inhaler Inhale 1 puff into the lungs daily      predniSONE (DELTASONE) 10 MG tablet Take 10 mg by mouth daily (Patient not taking: Reported on 10/22/2021)       No current facility-administered medications for this encounter. Physical Examination:   Vitals:    10/22/21 1100   BP: 132/74   Pulse: 87   Resp: 18   Temp: 97.2 °F (36.2 °C)   SpO2: (!) 89%       Wt Readings from Last 3 Encounters:   10/22/21 170 lb (77.1 kg)   09/17/21 173 lb (78.5 kg)   09/01/21 184 lb 8 oz (83.7 kg)       Alert and fully ambulatory. Pleasant and conversant. Lungs CTA bilaterally. Heart regular rate and rhythm. Abdomen soft, round and non-tender. BS active x 4. BLE with no edema noted. ASSESSMENT/PLAN:     cT2 cN2 NSCLC. Post XRT completion 09/07/2021. Patient is doing well post-radiation completion. Skin care discussed. Oncology imaging per Medical Oncology. I discussed follow up plans with Rosa M Tomlinson. Radiation Oncology follow-up visit 6 months. Rosa M Tomlinson is to follow up with other physicians/ APPs involved in their care as directed (including but not limited to Medical Oncology, Primary Care, Pulmonary, and Surgery). The patient was given our contact number in the event that if at any time they change their mind and would like to return to the clinic to see either myself or one of the Radiation Oncologists, they can simply call us and we would be happy to see them sooner.         Thank you for involving us in the management of this extremely pleasant patient. More than 15 min was in direct contact with pt coordinating/giving care. >50% of the visit was spent in counseling the pt on the following: Follow up care    The nurses notes were reviewed and incorporated into this assessment and plan. Questions answered to apparent satisfaction.       Siddhartha Downey, MSN, APRN, CNP  Certified Nurse Practitioner for [address] [place] [address]: [phone]/ F: [phone]   [address] [facility]: [phone] / F: [phone]

## 2022-03-25 ENCOUNTER — HOSPITAL ENCOUNTER (OUTPATIENT)
Dept: RADIATION ONCOLOGY | Age: 72
Discharge: HOME | End: 2022-03-25
Attending: RADIOLOGY
Payer: MEDICARE

## 2022-04-01 ENCOUNTER — HOSPITAL ENCOUNTER (OUTPATIENT)
Dept: RADIATION ONCOLOGY | Age: 72
Discharge: HOME | End: 2022-04-01
Attending: RADIOLOGY
Payer: MEDICARE

## 2022-04-01 ENCOUNTER — TELEPHONE (OUTPATIENT)
Dept: CASE MANAGEMENT | Age: 72
End: 2022-04-01

## 2022-04-01 VITALS
SYSTOLIC BLOOD PRESSURE: 134 MMHG | RESPIRATION RATE: 20 BRPM | BODY MASS INDEX: 29.38 KG/M2 | WEIGHT: 187.6 LBS | OXYGEN SATURATION: 92 % | DIASTOLIC BLOOD PRESSURE: 80 MMHG | TEMPERATURE: 97.2 F | HEART RATE: 86 BPM

## 2022-04-01 DIAGNOSIS — C34.82 MALIGNANT NEOPLASM OF OVERLAPPING SITES OF LEFT LUNG (HCC): Primary | ICD-10-CM

## 2022-04-01 DIAGNOSIS — Z92.3 S/P RADIATION THERAPY > 12 WKS AGO: ICD-10-CM

## 2022-04-01 PROCEDURE — 99213 OFFICE O/P EST LOW 20 MIN: CPT

## 2022-04-01 PROCEDURE — 99213 OFFICE O/P EST LOW 20 MIN: CPT | Performed by: NURSE PRACTITIONER

## 2022-04-01 NOTE — PROGRESS NOTES
Alyssa Larson  4/1/2022  10:42 AM      Vitals:    04/01/22 1036   BP: 134/80   Pulse: 86   Resp: 20   Temp: 97.2 °F (36.2 °C)   SpO2: 92%    : Wt Readings from Last 3 Encounters:   04/01/22 187 lb 9.6 oz (85.1 kg)   10/22/21 170 lb (77.1 kg)   09/17/21 173 lb (78.5 kg)                Current Outpatient Medications:     albuterol sulfate HFA (VENTOLIN HFA) 108 (90 Base) MCG/ACT inhaler, Inhale 2 puffs into the lungs every 6 hours as needed for Wheezing, Disp: , Rfl:     metoprolol succinate (TOPROL XL) 50 MG extended release tablet, Take 1 tablet by mouth 2 times daily (with meals), Disp: 180 tablet, Rfl: 3    predniSONE (DELTASONE) 10 MG tablet, Take 10 mg by mouth daily (Patient not taking: Reported on 10/22/2021), Disp: , Rfl:     simvastatin (ZOCOR) 40 MG tablet, Take 40 mg by mouth daily , Disp: , Rfl:     albuterol (PROVENTIL) (2.5 MG/3ML) 0.083% nebulizer solution, Take 1 ampule by nebulization every 6 hours as needed for Wheezing , Disp: , Rfl:     umeclidinium-vilanterol (ANORO ELLIPTA) 62.5-25 MCG/INH AEPB inhaler, Inhale 1 puff into the lungs daily, Disp: , Rfl:       Patient is seen today in follow up with Wil Washington NP. Patient received CTV left lung and nodes 7/6-9/7/21  30fx/6000cGY. Follows with Dr. Petar Herr. Had a scan a month ago. Here with no complaints and feeling well. FALLS RISK SCREENING ASSESSMENT    Instructions:  Assess the patient and enter the appropriate indicators that are present for fall risk identification. Total the numbers entered and assign a fall risk score from Table 2.  Reassess patient at a minimum every 12 weeks or with status change. Assessment   Date  4/1/2022     1. Mental Ability: confusion/cognitively impaired No - 0       2. Elimination Issues: incontinence, frequency No - 0       3. Ambulatory: use of assistive devices (walker, cane, off-loading devices), attached to equipment (IV pole, oxygen) No - 0     4.   Sensory Limitations: dizziness, vertigo, impaired vision No - 0       5. Age 72 years or greater - 1       10. Medication: diuretics, strong analgesics, hypnotics, sedatives, antihypertensive agents   Yes - 3   7. Falls:  recent history of falls within the last 3 months (not to include slipping or tripping)   No - 0   TOTAL 4    If score of 4 or greater was education given? Yes       TABLE 2   Risk Score Risk Level Plan of Care   0-3 Little or  No Risk 1. Provide assistance as indicated for ambulation activities  2. Reorient confused/cognitively impaired patient  3. Call-light/bell within patient's reach  4. Chair/bed in low position, stretcher/bed with siderails up except when performing patient care activities  5. Educate patient/family/caregiver on falls prevention  6.  Reassess in 12 weeks or with any noted change in patient condition which places them at a risk for a fall   4-6 Moderate Risk 1. Provide assistance as indicated for ambulation activities  2. Reorient confused/cognitively impaired patient  3. Call-light/bell within patient's reach  4. Chair/bed in low position, stretcher/bed with siderails up except when performing patient care activities  5. Educate patient/family/caregiver on falls prevention  6. Falls risk precaution (Yellow sticker Level II) placed on patient chart   7 or   Higher High Risk 1. Place patient in easily observable treatment room  2. Patient attended at all times by family member or staff  3. Provide assistance as indicated for ambulation activities  4. Reorient confused/cognitively impaired patient  5. Call-light/bell within patient's reach  6. Chair/bed in low position, stretcher/bed with siderails up except when performing patient care activities  7. Educate patient/family/caregiver on falls prevention  8.   Falls risk precaution (Yellow sticker Level III) placed on patient chart           MALNUTRITION RISK SCREENING ASSESSMENT    4/1/2022   Patient:  Shalonda Birch  Sex:  male    Instructions: Assess the patient and enter the appropriate indicators that are present for nutrition risk identification. Total the numbers entered and assign a risk score. Follow the appropriate action for total score listed below. Assessment   Date  4/1/2022     1. Have you lost weight without trying? 0- No     2. Have you been eating poorly because of a decreased appetite? 0- No   3. Do you have a diagnosis of head and neck cancer?       0- No                                                                                    TOTAL 0          Score of 0-1: No action  Score 2 or greater:  · For Non-Diabetic Patient: Recommend adding Ensure Complete 2 x daily and provide patient with Ensure wellness bag with coupons  · For Diabetic Patient: Recommend adding Glucerna Shake 2 x daily and provide patient with Glucerna Wellness bag with coupons  · Route to the dietitian via Isabella Mendez RN

## 2022-04-01 NOTE — TELEPHONE ENCOUNTER
Met with patient and wife after his follow up appointment with MAKENZIE Earl today. Patient completed his active treatment September 2021 for his Lung cancer. Patient appears in good spirits. States he is doing well. Provided support and encouragement. Instructed patient in detail on his Cancer Treatment Summary and Survivorship Care Plan. Copies sent to patient's PCP. Provided written copies of Treatment Summary and Survivorship Care Plan, Patient Resource: Cancer Survivorship: A Guide for Patients and Their Families booklet,  [facility] [employer], [facility], [employer] [facility] and [employer]. Patient continues to follow with Dr Kenrick Adkins for Medical Oncology and Imfinzi infusions every 4 weeks. Instructed to call with any questions or concerns. Verbally agreed. Patient appreciative of visit and information.

## 2022-04-01 NOTE — PROGRESS NOTES
Radiation Oncology   6 Month Follow Up Note    04/01/2022    Lenny Cabrera  Vitals:    04/01/22 1036   BP: 134/80   Pulse: 86   Resp: 20   Temp: 97.2 °F (36.2 °C)   SpO2: 92%     Wt Readings from Last 1 Encounters:   04/01/22 187 lb 9.6 oz (85.1 kg)       CC: Patient is here for 6 month Radiation Oncology follow-up visit. HPI:  Lenny Cabrera is a pleasant 66 y.o. male with a past medical history HTN, syncope, CHB, S/p pacemaker insertion, former tobacco smoker, COPD, RLL PE and anticoagulation with Eliquis. The patient had abnormal CXR 05/03/2021. CT chest  05/06/2021 showed a 3.4 cm left hilar mass with mild mediastinal adenopathy. No additional nodules. Severe emphysema changes. On 05/272021 underwent EBUS bronchoscopy: the left bronchial tree revealed circumferential narrowing, intrinsic compressing mass, obstruction in the area of the EAN, LINGULA and LLL. 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 06/11/2021 PET/CT scan abnormal metabolic activity within the know left perihilar lung region associated with multiple essentially normal sized mediastinal lymph nodes but with abnormal metabolic activity extending from the right paratracheal region inferiorly to the precarinal region as well as subcarinal. The patient treated with concurrent chemoradiation therapy from 07/06/2021- 09/07/2021. XRT directed to left lung mass + LNs, total dose 6000 cGy / 30 fractions. After completing concurrent chemoradiation therapy the patient presented to ER on 09/17/2021 with complaints of shortness of breath and weakness. CTA chest revealed subsegmental acute pulmonary embolus in the RLL pulmonary arterial circulation. Also 2 focal mass lesions in the LLL, one focal mass lesion is more centrally located in the posterior lateral basal segment of the LLL, it measures 2 x 1.5 cm.  The other focal lesion is located in the left infrahilar region relate with this abnormal segment of the LLL, it measures 2 x 1.5 cm (Left hilar mass previously measured 3.4 x 3.3 x 2.6 cm on CT scan 05/06/2021). Diagnosed with RLL pulmonary emboli, started on anticoagulation with Eliquis. Discharged to home on 09/22/2021. The patient is seen today in 6 month Radiation Oncology follow-up visit. The patient reports SOB with exertion at baseline, no progressive changes in breathing. No productive coughing, no hemoptysis. No wheezing or pleuritic pain. No fevers or chills. No nausea, vomiting, diarrhea or abdominal pain. No dysphagia, esophageal reflux, change in appetite or weight loss. No bleeding or excessive bruising on Eliquis. Patient is following with:    Geoff Khan. Most recent visit 02/22/2022. Most recent imaging CT C/A/P on 02/16/2022 (see below).      Past Medical History:   Diagnosis Date    [facility]B (complete heart block) ([facility] [address] 07/2021    COPD (chronic obstructive pulmonary disease) ([facility] [address]     Syncope 09/2020    dr Tanya Florence wearing a monitor    Syncope 07/2021     Past Surgical History:   Procedure Laterality Date    BRONCHOSCOPY N/A 05/27/2021    BRONCHOSCOPY W/EBUS FNA performed by Santosh Hunt MD at [address] [place] [address] N/A 05/27/2021    BRONCHOSCOPY DIAGNOSTIC OR CELL [address] ONLY performed by Santosh Hunt MD at [address] [place] [address] N/A 05/27/2021    BRONCHOSCOPY ADD ON COMPUTER ASSISTED performed by Santosh Hunt MD at [facility] 2011 2001    groin     PACEMAKER INSERTION  07/20/2021    DUAL PPM  ([email or web address])  DR. Phil Bridges       Social History     Socioeconomic History    Marital status:      Spouse name: Not on file    Number of children: Not on file    Years of education: Not on file    Highest education level: Not on file   Occupational History    Not on file   Tobacco Use    Smoking status: Former Smoker     Packs/day: 2.00     Years: 25.00     Pack years: 50.00 Dispense Refill    apixaban (ELIQUIS) 5 MG TABS tablet Take by mouth      albuterol sulfate HFA (VENTOLIN HFA) 108 (90 Base) MCG/ACT inhaler Inhale 2 puffs into the lungs every 6 hours as needed for Wheezing      metoprolol succinate (TOPROL XL) 50 MG extended release tablet Take 1 tablet by mouth 2 times daily (with meals) 180 tablet 3    predniSONE (DELTASONE) 10 MG tablet Take 10 mg by mouth daily       simvastatin (ZOCOR) 40 MG tablet Take 40 mg by mouth daily       albuterol (PROVENTIL) (2.5 MG/3ML) 0.083% nebulizer solution Take 1 ampule by nebulization every 6 hours as needed for Wheezing       umeclidinium-vilanterol (ANORO ELLIPTA) 62.5-25 MCG/INH AEPB inhaler Inhale 1 puff into the lungs daily       No current facility-administered medications for this encounter. REVIEW OF SYSTEMS:     Constitutional:  No fever, chills or rigors.  Eyes: No changes in vision. No eye discharge or pain   ENT: No headaches, hearing loss or vertigo. No mouth sores or sore throat. No change in taste or smell.  Cardiovascular: No chest discomfort, palpitations or loss of consciousness or phlebitis.  Respiratory: + CURTIS at baseline. No productive cough or hemoptysis. No wheezing or pleuritic pain.  Gastrointestinal: No abdominal pain, appetite loss, blood in stools. No change in bowel habits. No hematemesis    Genitourinary: Patient acknowledges no dysuria, trouble voiding, or hematuria. No increased frequency.  Musculoskeletal: No gait disturbance, weakness or joint complaints.  Integumentary: No rash or pruritis.  Neurological: No headache, diplopia, change in muscle strength, numbness or tingling. No change in gait, balance, coordination, mood, affect, memory, mentation, behavior.  Psychiatric: No anxiety, or depression.  Endocrine: No temperature intolerance. No excessive thirst, fluid intake, or urination. No tremor.     Hematologic/Lymphatic: No abnormal bruising or bleeding, blood clots or swollen lymph nodes.  Allergic/Immunologic: No nasal congestion or hives. PHYSICAL EXAMINATION:   Vitals:    04/01/22 1036   BP: 134/80   Pulse: 86   Resp: 20   Temp: 97.2 °F (36.2 °C)   TempSrc: Temporal   SpO2: 92%   Weight: 187 lb 9.6 oz (85.1 kg)       Body mass index is 29.38 kg/m². Appearance: Well-developed, well-nourished 66year old male. Skin: Irradiated skin intact, no erythema. Head: Normocephalic, atraumatic  Eyes: EOMI, no conjunctival erythema  ENMT: No pharyngeal erythema, MMM, no rhinorrhea. Neck: Supple, no elevated JVP  Lungs: Clear to auscultation bilaterally. No wheezes, rales or rhonchi. Cardiac: Regular rate and rhythm, +S1S2, no murmurs apparent  Abdomen: Soft, round and non-tender. Bowel sounds present x 4. Extremities: Moves all extremities x 4, no lower extremity edema  Neurologic: Alert and oriented x 3. No focal motor deficits apparent         IMAGING:    Outside facility reports/ ordering physician Dr. Oumar Brasher  02/16/2022 CT C/A/P: Improvement with a decrease size of left hilar mass now measuring 2.0 x 1.7 cm (previously 3.6 x 3.5 cm). No new lung nodules. Increased interstitial changes in the left perihilar region, sequela of radiation therapy. Very tiny left pleural effusion slightly smaller than prior CT. No masses or LAD in the abdomen/pelvis. Bladder diverticulum and bladder calculi similar to prior exam. Severe diverticulosis involving the descending and sigmoid colon. ASSESSMENT/PLAN:    cT2 cN2 NSCLC. Post concurrent chemoradiation therapy completion 09/0/7/2021. Patient is doing well post-radiation completion. The patient is receiving consolidation immunotherapy Imfinzi as per Medical Oncology. Imaging as per Medical Oncology. I discussed follow up plans with Alyssa Larson. Radiation Oncology follow-up visit  6 months.      Alyssa Larson instructed to follow up with other physicians involved in their care as directed (including but not limited to Medical Oncology, Primary Care, Pulmonary, and Surgery). The patient was given our contact number in the event that if at any time they change their mind and would like to return to the clinic to see either myself or one of the Radiation Oncologists, they can simply call us and we would be happy to see them. Thank you for involving us in the management of this extremely pleasant patient. More than 25 min was in direct contact with pt coordinating/giving care. >50% of the visit was spent in counseling the pt on the following: Follow up care    The nurses notes were reviewed and incorporated into this assessment and plan.         Dang Mcadams, MSN, APRN-CNP  Certified Nurse Practitioner for [address] [place] [address]: [phone]/ F: [phone]   [facility]: [phone] / F: [phone]

## 2022-05-24 ENCOUNTER — HOSPITAL ENCOUNTER (OUTPATIENT)
Age: 72
Setting detail: OBSERVATION
Discharge: HOME | End: 2022-05-27
Attending: STUDENT IN AN ORGANIZED HEALTH CARE EDUCATION/TRAINING PROGRAM | Admitting: INTERNAL MEDICINE
Payer: MEDICARE

## 2022-05-24 ENCOUNTER — APPOINTMENT (OUTPATIENT)
Dept: CT IMAGING | Age: 72
End: 2022-05-24
Payer: MEDICARE

## 2022-05-24 ENCOUNTER — APPOINTMENT (OUTPATIENT)
Dept: GENERAL RADIOLOGY | Age: 72
End: 2022-05-24
Payer: MEDICARE

## 2022-05-24 DIAGNOSIS — J18.9 PNEUMONIA DUE TO INFECTIOUS ORGANISM, UNSPECIFIED LATERALITY, UNSPECIFIED PART OF LUNG: ICD-10-CM

## 2022-05-24 DIAGNOSIS — R94.31 ECG ABNORMALITY: ICD-10-CM

## 2022-05-24 DIAGNOSIS — U07.1 COVID: Primary | ICD-10-CM

## 2022-05-24 LAB
ALBUMIN SERPL-MCNC: 4.2 G/DL (ref 3.5–5.2)
ALP BLD-CCNC: 108 U/L (ref 40–129)
ALT SERPL-CCNC: 18 U/L (ref 0–40)
ANION GAP SERPL CALCULATED.3IONS-SCNC: 14 MMOL/L (ref 7–16)
ANISOCYTOSIS: ABNORMAL
AST SERPL-CCNC: 21 U/L (ref 0–39)
BASOPHILS ABSOLUTE: 0.04 E9/L (ref 0–0.2)
BASOPHILS RELATIVE PERCENT: 0.3 % (ref 0–2)
BILIRUB SERPL-MCNC: 0.7 MG/DL (ref 0–1.2)
BUN BLDV-MCNC: 11 MG/DL (ref 6–23)
C-REACTIVE PROTEIN: 9.6 MG/DL (ref 0–0.4)
CALCIUM SERPL-MCNC: 9.4 MG/DL (ref 8.6–10.2)
CHLORIDE BLD-SCNC: 98 MMOL/L (ref 98–107)
CO2: 23 MMOL/L (ref 22–29)
CREAT SERPL-MCNC: 0.9 MG/DL (ref 0.7–1.2)
D DIMER: 233 NG/ML DDU
EKG ATRIAL RATE: 105 BPM
EKG P AXIS: 74 DEGREES
EKG P-R INTERVAL: 206 MS
EKG Q-T INTERVAL: 386 MS
EKG QRS DURATION: 150 MS
EKG QTC CALCULATION (BAZETT): 510 MS
EKG R AXIS: 122 DEGREES
EKG T AXIS: 13 DEGREES
EKG VENTRICULAR RATE: 105 BPM
EOSINOPHILS ABSOLUTE: 0.03 E9/L (ref 0.05–0.5)
EOSINOPHILS RELATIVE PERCENT: 0.2 % (ref 0–6)
GFR AFRICAN AMERICAN: >60
GFR NON-AFRICAN AMERICAN: >60 ML/MIN/1.73
GLUCOSE BLD-MCNC: 103 MG/DL (ref 74–99)
HCT VFR BLD CALC: 43.8 % (ref 37–54)
HEMOGLOBIN: 14.1 G/DL (ref 12.5–16.5)
IMMATURE GRANULOCYTES #: 0.05 E9/L
IMMATURE GRANULOCYTES %: 0.4 % (ref 0–5)
INFLUENZA A BY PCR: NOT DETECTED
INFLUENZA B BY PCR: NOT DETECTED
LACTATE DEHYDROGENASE: 255 U/L (ref 135–225)
LACTIC ACID: 1.4 MMOL/L (ref 0.5–2.2)
LYMPHOCYTES ABSOLUTE: 0.45 E9/L (ref 1.5–4)
LYMPHOCYTES RELATIVE PERCENT: 3.6 % (ref 20–42)
MCH RBC QN AUTO: 28.1 PG (ref 26–35)
MCHC RBC AUTO-ENTMCNC: 32.2 % (ref 32–34.5)
MCV RBC AUTO: 87.3 FL (ref 80–99.9)
MONOCYTES ABSOLUTE: 1.46 E9/L (ref 0.1–0.95)
MONOCYTES RELATIVE PERCENT: 11.8 % (ref 2–12)
NEUTROPHILS ABSOLUTE: 10.39 E9/L (ref 1.8–7.3)
NEUTROPHILS RELATIVE PERCENT: 83.7 % (ref 43–80)
PDW BLD-RTO: 16.4 FL (ref 11.5–15)
PLATELET # BLD: 142 E9/L (ref 130–450)
PMV BLD AUTO: 11.3 FL (ref 7–12)
POLYCHROMASIA: ABNORMAL
POTASSIUM REFLEX MAGNESIUM: 3.8 MMOL/L (ref 3.5–5)
PRO-BNP: 239 PG/ML (ref 0–125)
PROCALCITONIN: 0.21 NG/ML (ref 0–0.08)
RBC # BLD: 5.02 E12/L (ref 3.8–5.8)
REASON FOR REJECTION: NORMAL
REJECTED TEST: NORMAL
SARS-COV-2, NAAT: DETECTED
SODIUM BLD-SCNC: 135 MMOL/L (ref 132–146)
TOTAL PROTEIN: 7.2 G/DL (ref 6.4–8.3)
TROPONIN, HIGH SENSITIVITY: 11 NG/L (ref 0–11)
TROPONIN, HIGH SENSITIVITY: 13 NG/L (ref 0–11)
WBC # BLD: 12.4 E9/L (ref 4.5–11.5)

## 2022-05-24 PROCEDURE — 84484 ASSAY OF TROPONIN QUANT: CPT

## 2022-05-24 PROCEDURE — 87040 BLOOD CULTURE FOR BACTERIA: CPT

## 2022-05-24 PROCEDURE — 36415 COLL VENOUS BLD VENIPUNCTURE: CPT

## 2022-05-24 PROCEDURE — 96365 THER/PROPH/DIAG IV INF INIT: CPT

## 2022-05-24 PROCEDURE — 84145 PROCALCITONIN (PCT): CPT

## 2022-05-24 PROCEDURE — 71275 CT ANGIOGRAPHY CHEST: CPT

## 2022-05-24 PROCEDURE — 87502 INFLUENZA DNA AMP PROBE: CPT

## 2022-05-24 PROCEDURE — 96375 TX/PRO/DX INJ NEW DRUG ADDON: CPT

## 2022-05-24 PROCEDURE — 6360000002 HC RX W HCPCS: Performed by: STUDENT IN AN ORGANIZED HEALTH CARE EDUCATION/TRAINING PROGRAM

## 2022-05-24 PROCEDURE — 99285 EMERGENCY DEPT VISIT HI MDM: CPT

## 2022-05-24 PROCEDURE — 85025 COMPLETE CBC W/AUTO DIFF WBC: CPT

## 2022-05-24 PROCEDURE — 6370000000 HC RX 637 (ALT 250 FOR IP): Performed by: STUDENT IN AN ORGANIZED HEALTH CARE EDUCATION/TRAINING PROGRAM

## 2022-05-24 PROCEDURE — G0378 HOSPITAL OBSERVATION PER HR: HCPCS

## 2022-05-24 PROCEDURE — 2580000003 HC RX 258: Performed by: STUDENT IN AN ORGANIZED HEALTH CARE EDUCATION/TRAINING PROGRAM

## 2022-05-24 PROCEDURE — 85378 FIBRIN DEGRADE SEMIQUANT: CPT

## 2022-05-24 PROCEDURE — 86140 C-REACTIVE PROTEIN: CPT

## 2022-05-24 PROCEDURE — 96376 TX/PRO/DX INJ SAME DRUG ADON: CPT

## 2022-05-24 PROCEDURE — 83615 LACTATE (LD) (LDH) ENZYME: CPT

## 2022-05-24 PROCEDURE — 82728 ASSAY OF FERRITIN: CPT

## 2022-05-24 PROCEDURE — 2500000003 HC RX 250 WO HCPCS: Performed by: STUDENT IN AN ORGANIZED HEALTH CARE EDUCATION/TRAINING PROGRAM

## 2022-05-24 PROCEDURE — 83880 ASSAY OF NATRIURETIC PEPTIDE: CPT

## 2022-05-24 PROCEDURE — 87635 SARS-COV-2 COVID-19 AMP PRB: CPT

## 2022-05-24 PROCEDURE — 6370000000 HC RX 637 (ALT 250 FOR IP): Performed by: INTERNAL MEDICINE

## 2022-05-24 PROCEDURE — 6360000004 HC RX CONTRAST MEDICATION: Performed by: RADIOLOGY

## 2022-05-24 PROCEDURE — 71046 X-RAY EXAM CHEST 2 VIEWS: CPT

## 2022-05-24 PROCEDURE — 83605 ASSAY OF LACTIC ACID: CPT

## 2022-05-24 PROCEDURE — 80053 COMPREHEN METABOLIC PANEL: CPT

## 2022-05-24 PROCEDURE — 93005 ELECTROCARDIOGRAM TRACING: CPT | Performed by: PHYSICIAN ASSISTANT

## 2022-05-24 RX ORDER — METOPROLOL SUCCINATE 50 MG/1
50 TABLET, EXTENDED RELEASE ORAL 2 TIMES DAILY
Status: DISCONTINUED | OUTPATIENT
Start: 2022-05-24 | End: 2022-05-27 | Stop reason: HOSPADM

## 2022-05-24 RX ORDER — 0.9 % SODIUM CHLORIDE 0.9 %
500 INTRAVENOUS SOLUTION INTRAVENOUS ONCE
Status: COMPLETED | OUTPATIENT
Start: 2022-05-24 | End: 2022-05-24

## 2022-05-24 RX ORDER — ALBUTEROL SULFATE 90 UG/1
2 AEROSOL, METERED RESPIRATORY (INHALATION) EVERY 6 HOURS PRN
Status: DISCONTINUED | OUTPATIENT
Start: 2022-05-24 | End: 2022-05-27 | Stop reason: HOSPADM

## 2022-05-24 RX ORDER — ATORVASTATIN CALCIUM 20 MG/1
20 TABLET, FILM COATED ORAL DAILY
Status: DISCONTINUED | OUTPATIENT
Start: 2022-05-25 | End: 2022-05-27 | Stop reason: HOSPADM

## 2022-05-24 RX ORDER — SIMVASTATIN 40 MG
40 TABLET ORAL DAILY
Status: DISCONTINUED | OUTPATIENT
Start: 2022-05-25 | End: 2022-05-24 | Stop reason: CLARIF

## 2022-05-24 RX ORDER — IPRATROPIUM BROMIDE AND ALBUTEROL SULFATE 2.5; .5 MG/3ML; MG/3ML
1 SOLUTION RESPIRATORY (INHALATION) ONCE
Status: COMPLETED | OUTPATIENT
Start: 2022-05-24 | End: 2022-05-24

## 2022-05-24 RX ADMIN — CEFTRIAXONE 1000 MG: 1 INJECTION, POWDER, FOR SOLUTION INTRAMUSCULAR; INTRAVENOUS at 18:20

## 2022-05-24 RX ADMIN — IPRATROPIUM BROMIDE AND ALBUTEROL SULFATE 1 AMPULE: .5; 3 SOLUTION RESPIRATORY (INHALATION) at 16:41

## 2022-05-24 RX ADMIN — IOPAMIDOL 75 ML: 755 INJECTION, SOLUTION INTRAVENOUS at 18:37

## 2022-05-24 RX ADMIN — SODIUM CHLORIDE 500 ML: 9 INJECTION, SOLUTION INTRAVENOUS at 18:19

## 2022-05-24 RX ADMIN — APIXABAN 5 MG: 5 TABLET, FILM COATED ORAL at 23:00

## 2022-05-24 RX ADMIN — DOXYCYCLINE 100 MG: 100 INJECTION, POWDER, LYOPHILIZED, FOR SOLUTION INTRAVENOUS at 19:21

## 2022-05-24 RX ADMIN — METOPROLOL SUCCINATE 50 MG: 50 TABLET, EXTENDED RELEASE ORAL at 23:00

## 2022-05-24 NOTE — ED PROVIDER NOTES
Department of Emergency Medicine   ED  Provider Note  Admit Date/RoomTime: 5/24/2022  3:10 PM  ED Room: [facility]          History of Present Illness:  5/24/22, Time: 3:26 PM EDT  Chief Complaint   Patient presents with    Shortness of Breath     x 2 days     Cough     states coughed up blood, on elliquis     Chills       Cheko Sims is a 67 y.o. male presenting to the ED for shortness of breath cough and possible COVID. The patient has past medical history of lung cancer. He last received chemotherapy and radiation in September. He is on immunotherapy every 3 weeks, imfinzi, last receiving this approximately 2 weeks ago. Last night he developed nasal congestion and a cough. His cough is productive of clear sputum which is intermittently streaked with blood. Has not had any blood clot in the sputum. He notes that he does have history of PE and is on Eliquis. He has been compliant with this medication. He does feel chronically short of breath which is unchanged from baseline. Typically this is worse with exertion. He has not used an inhaler today and does not feel this is similar to his COPD. He is having subjective fevers. No abdominal pain or chest pain. No nausea, vomiting or diarrhea. Patient denies any leg swelling or recent travel. Of note, his wife tested positive for COVID 3 days ago. They are both vaccinated. No recent steroid use. No history of intubation.     Review of Systems:   Constitutional symptoms: Positive for subjective fevers  Eyes: Denies eye pain  Ears, Nose, Mouth, Throat: Denies ear pain  Cardiovascular: Denies chest pain  Respiratory: Positive for shortness of breath and cough  Gastrointestinal: Denies blood per rectum  Genitourinary: Denies hematuria  Skin: Denies rashes  Neurological: Denies headache  Musculoskeletal: Denies extremity pain    --------------------------------------------- PAST HISTORY ---------------------------------------------  Past Medical History:  has a past medical history of CHB (complete heart block) ([facility] [address] COPD (chronic obstructive pulmonary disease) ([facility] [address] Syncope, and Syncope. Past Surgical History:  has a past surgical history that includes Colonoscopy; hernia repair (Bilateral, 2011 2001); bronchoscopy (N/A, 05/27/2021); bronchoscopy (N/A, 05/27/2021); bronchoscopy (N/A, 05/27/2021); and Pacemaker insertion (07/20/2021). Social History:  reports that he quit smoking about 10 years ago. He has a 50.00 pack-year smoking history. He has never used smokeless tobacco. He reports previous alcohol use of about 1.0 standard drink of alcohol per week. He reports that he does not use drugs. Family History: family history includes Cancer in his father and paternal uncle. . Unless otherwise noted, family history is non contributory    The patients home medications have been reviewed. Allergies: Patient has no known allergies. I have reviewed the past medical history, past surgical history, social history, and family history    ---------------------------------------------------PHYSICAL EXAM--------------------------------------    General: The patient is conversational and lying comfortably in bed. Head: Normocephalic and atraumatic. Eyes: Sclera non-icteric and no conjunctival injection  ENT: Nasal and oral membranes minimally dry  Neck: Trachea midline. No JVD  Respiratory: Lungs clear to auscultation bilaterally. Patient speaking in full sentences. Cardiovascular: Tachycardic but regular. No pedal edema. Patient has palpable pacemaker in the left anterior chest wall without tenderness  Gastrointestinal:  Abdomen is soft and nondistended. Bowel sounds present. There is no tenderness. There is no guarding or rebound. Musculoskeletal: No deformity  Skin: Skin warm and dry. No rashes. Neurologic: No gross motor deficits. No aphasia.   Psychiatric: Normal affect.    -------------------------------------------------- RESULTS -------------------------------------------------  I have personally reviewed all laboratory and imaging results for this patient. Results are listed below.      LABS: (Lab results interpreted by me)  Results for orders placed or performed during the hospital encounter of 05/24/22   COVID-19, Rapid    Specimen: Nasopharyngeal Swab   Result Value Ref Range    SARS-CoV-2, NAAT DETECTED (A) Not Detected   Rapid influenza A/B antigens    Specimen: Nares   Result Value Ref Range    Influenza A by PCR Not Detected Not Detected    Influenza B by PCR Not Detected Not Detected   CBC with Auto Differential   Result Value Ref Range    WBC 12.4 (H) 4.5 - 11.5 E9/L    RBC 5.02 3.80 - 5.80 E12/L    Hemoglobin 14.1 12.5 - 16.5 g/dL    Hematocrit 43.8 37.0 - 54.0 %    MCV 87.3 80.0 - 99.9 fL    MCH 28.1 26.0 - 35.0 pg    MCHC 32.2 32.0 - 34.5 %    RDW 16.4 (H) 11.5 - 15.0 fL    Platelets 887 571 - 184 E9/L    MPV 11.3 7.0 - 12.0 fL    Neutrophils % 83.7 (H) 43.0 - 80.0 %    Immature Granulocytes % 0.4 0.0 - 5.0 %    Lymphocytes % 3.6 (L) 20.0 - 42.0 %    Monocytes % 11.8 2.0 - 12.0 %    Eosinophils % 0.2 0.0 - 6.0 %    Basophils % 0.3 0.0 - 2.0 %    Neutrophils Absolute 10.39 (H) 1.80 - 7.30 E9/L    Immature Granulocytes # 0.05 E9/L    Lymphocytes Absolute 0.45 (L) 1.50 - 4.00 E9/L    Monocytes Absolute 1.46 (H) 0.10 - 0.95 E9/L    Eosinophils Absolute 0.03 (L) 0.05 - 0.50 E9/L    Basophils Absolute 0.04 0.00 - 0.20 E9/L    Anisocytosis 1+     Polychromasia 1+    Comprehensive Metabolic Panel w/ Reflex to MG   Result Value Ref Range    Sodium 135 132 - 146 mmol/L    Potassium reflex Magnesium 3.8 3.5 - 5.0 mmol/L    Chloride 98 98 - 107 mmol/L    CO2 23 22 - 29 mmol/L    Anion Gap 14 7 - 16 mmol/L    Glucose 103 (H) 74 - 99 mg/dL    BUN 11 6 - 23 mg/dL    CREATININE 0.9 0.7 - 1.2 mg/dL    GFR Non-African American >60 >=60 mL/min/1.73    GFR African American >60     Calcium 9.4 8.6 - 10.2 mg/dL    Total Protein 7.2 6.4 - 8.3 g/dL Albumin 4.2 3.5 - 5.2 g/dL    Total Bilirubin 0.7 0.0 - 1.2 mg/dL    Alkaline Phosphatase 108 40 - 129 U/L    ALT 18 0 - 40 U/L    AST 21 0 - 39 U/L   Troponin   Result Value Ref Range    Troponin, High Sensitivity 11 0 - 11 ng/L   Brain Natriuretic Peptide   Result Value Ref Range    Pro- (H) 0 - 125 pg/mL   Lactic Acid   Result Value Ref Range    Lactic Acid 1.4 0.5 - 2.2 mmol/L   Procalcitonin   Result Value Ref Range    Procalcitonin 0.21 (H) 0.00 - 0.08 ng/mL   Troponin   Result Value Ref Range    Troponin, High Sensitivity 13 (H) 0 - 11 ng/L   Lactate Dehydrogenase   Result Value Ref Range     (H) 135 - 225 U/L   C-Reactive Protein   Result Value Ref Range    CRP 9.6 (H) 0.0 - 0.4 mg/dL   SPECIMEN REJECTION   Result Value Ref Range    Rejected Test dimer     Reason for Rejection see below    D-Dimer, Quantitative   Result Value Ref Range    D-Dimer, Quant 233 ng/mL DDU   EKG 12 Lead   Result Value Ref Range    Ventricular Rate 105 BPM    Atrial Rate 105 BPM    P-R Interval 206 ms    QRS Duration 150 ms    Q-T Interval 386 ms    QTc Calculation (Bazett) 510 ms    P Axis 74 degrees    R Axis 122 degrees    T Axis 13 degrees   ,     RADIOLOGY:  Interpreted by Radiologist unless otherwise specified  CTA PULMONARY W CONTRAST   Final Result   No central pulmonary embolism or aortic dissection. A lobulated 3.4 x 5.4 cm left hilar and perihilar mass extending to left   lower lobe encircling the pulmonary artery and the bronchi consistent with   progressive malignancy with occlusion of left lower lobe bronchi with   postobstructive pneumonitis. RECOMMENDATIONS:   Unavailable         XR CHEST (2 VW)   Final Result   1. Left perihilar pneumonia   2.  Advanced emphysematous changes             ------------------------- NURSING NOTES AND VITALS REVIEWED ---------------------------   The nursing notes within the ED encounter and vital signs as below have been reviewed by myself  /73   Pulse 75 III and aVF as well as V1. Mild ST depression in V2 and V3 with biphasic T wave. No acute ST elevation. This is sinus tachycardia with right bundle branch block and concerns for ischemia. Laboratory studies show electrolytes within normal limits. Procalcitonin mildly elevated. BNP minimally elevated and troponin is 11. LFTs normal.  Patient does have a leukocytosis but no anemia. COVID positive but influenza negative. D-dimer mildly positive. LDH and other inflammatory markers increased. Repeat troponin 13. CT PE shows no blood clot however there is mass with encircling pulmonary artery and bronchi consistent with malignancy and occlusion. Postobstructive pneumonitis. This is interpreted radiology. Patient is saturating 93% at the bedside, with his risk factors, leukocytosis, COVID and postobstructive pneumonitis he is started on antibiotics and will require admission. Primary care physician is Dr. Chirag Hill.  I spoke with Dr. Vladimir France who has accepted the patient under the care of Dr. Chirag Hill.    This patient's ED course included: a personal history and physicial examination and re-evaluation prior to disposition    This patient has been closely monitored during their ED course. Consultations:  Internal medicine    The cardiac monitor revealed sinus tachycardia with a heart rate in the 70s as interpreted by me. The cardiac monitor was ordered secondary to the patient's shortness of breath and to monitor the patient for dysrhythmia. CPT [id number]    Oxygen Saturation Interpretation: 95 % on room air. Normal    Counseling:   I have spoken with the patient and spouse/SO and discussed todays results, in addition to providing specific details for the plan of care and counseling regarding the diagnosis and prognosis.   Questions are answered at this time and they are agreeable with the plan.     --------------------------------- IMPRESSION AND DISPOSITION ---------------------------------    IMPRESSION  1. COVID    2. Pneumonia due to infectious organism, unspecified laterality, unspecified part of lung    3. ECG abnormality        DISPOSITION  Disposition: Admit to telemetry  Patient condition is fair    IDr. Alejandra, am the primary provider of record    NOTE: This report was transcribed using voice recognition software.  Every effort was made to ensure accuracy; however, inadvertent computerized transcription errors may be present        Alejandra Chen MD  05/24/22 [id number]

## 2022-05-24 NOTE — ED PROVIDER NOTES
FIRST PROVIDER CONTACT ASSESSMENT NOTE           Department of Emergency Medicine                 First Provider Note            22  12:49 PM EDT    Date of Encounter: No admission date for patient encounter. Patient Name: Rocio Taylor  : 1950  MRN: [id number]    Chief Complaint: Shortness of Breath (x 2 days ), Cough (states coughed up blood, on elliquis ), and Chills      History of Present Illness:   Rocio Taylor is a 67 y.o. male who presents to the ED for cough, SOB, weakness. Known lung cancer. Getting immunotherapy at this time. Already done with chemo and radiation. Has been vaccinated for Covid 19. Tests negative at home. Feeling very bad at this time. Coughing up blood. Pt is on Eliquis       Focused Physical Exam:  VS:    ED Triage Vitals [22 1207]   BP Temp Temp src Pulse Resp SpO2 Height Weight   -- -- -- 110 16 95 % -- --        Physical Ex: Constitutional: Alert and non-toxic. Medical History:  has a past medical history of CHB (complete heart block) ([facility] [address] COPD (chronic obstructive pulmonary disease) ([facility] [address] Syncope, and Syncope. Surgical History:  has a past surgical history that includes Colonoscopy; hernia repair (Bilateral, 2011); bronchoscopy (N/A, 2021); bronchoscopy (N/A, 2021); bronchoscopy (N/A, 2021); and Pacemaker insertion (2021). Social History:  reports that he quit smoking about 10 years ago. He has a 50.00 pack-year smoking history. He has never used smokeless tobacco. He reports previous alcohol use of about 1.0 standard drink of alcohol per week. He reports that he does not use drugs. Family History: family history includes Cancer in his father and paternal uncle. Allergies: Patient has no known allergies.      Initial Plan of Care: Initiate Treatment-Testing, Proceed toTreatment Area When Bed Available for ED Attending/MLP to Continue Care      ---END OF FIRST PROVIDER CONTACT ASSESSMENT NOTE---  Electronically signed by Nat Sol PA-C   DD: 5/24/22       Nat Sol PA-C  05/24/22 1250       Nat Sol PA-C  05/24/22 1256

## 2022-05-25 PROBLEM — U07.1 COVID-19: Status: ACTIVE | Noted: 2022-01-01

## 2022-05-25 LAB
ANION GAP SERPL CALCULATED.3IONS-SCNC: 10 MMOL/L (ref 7–16)
BUN BLDV-MCNC: 14 MG/DL (ref 6–23)
CALCIUM SERPL-MCNC: 8.8 MG/DL (ref 8.6–10.2)
CHLORIDE BLD-SCNC: 101 MMOL/L (ref 98–107)
CO2: 25 MMOL/L (ref 22–29)
CREAT SERPL-MCNC: 1 MG/DL (ref 0.7–1.2)
FERRITIN: 154 NG/ML
GFR AFRICAN AMERICAN: >60
GFR NON-AFRICAN AMERICAN: >60 ML/MIN/1.73
GLUCOSE BLD-MCNC: 110 MG/DL (ref 74–99)
HCT VFR BLD CALC: 39.1 % (ref 37–54)
HEMOGLOBIN: 12.5 G/DL (ref 12.5–16.5)
MCH RBC QN AUTO: 28.1 PG (ref 26–35)
MCHC RBC AUTO-ENTMCNC: 32 % (ref 32–34.5)
MCV RBC AUTO: 87.9 FL (ref 80–99.9)
PDW BLD-RTO: 16.6 FL (ref 11.5–15)
PLATELET # BLD: 119 E9/L (ref 130–450)
PMV BLD AUTO: 11 FL (ref 7–12)
POTASSIUM REFLEX MAGNESIUM: 3.8 MMOL/L (ref 3.5–5)
RBC # BLD: 4.45 E12/L (ref 3.8–5.8)
SODIUM BLD-SCNC: 136 MMOL/L (ref 132–146)
WBC # BLD: 8 E9/L (ref 4.5–11.5)

## 2022-05-25 PROCEDURE — G0378 HOSPITAL OBSERVATION PER HR: HCPCS

## 2022-05-25 PROCEDURE — 2500000003 HC RX 250 WO HCPCS: Performed by: STUDENT IN AN ORGANIZED HEALTH CARE EDUCATION/TRAINING PROGRAM

## 2022-05-25 PROCEDURE — 85027 COMPLETE CBC AUTOMATED: CPT

## 2022-05-25 PROCEDURE — 6370000000 HC RX 637 (ALT 250 FOR IP): Performed by: INTERNAL MEDICINE

## 2022-05-25 PROCEDURE — 96375 TX/PRO/DX INJ NEW DRUG ADDON: CPT

## 2022-05-25 PROCEDURE — 2580000003 HC RX 258: Performed by: SPECIALIST

## 2022-05-25 PROCEDURE — 6360000002 HC RX W HCPCS: Performed by: SPECIALIST

## 2022-05-25 PROCEDURE — 96366 THER/PROPH/DIAG IV INF ADDON: CPT

## 2022-05-25 PROCEDURE — 96376 TX/PRO/DX INJ SAME DRUG ADON: CPT

## 2022-05-25 PROCEDURE — 96365 THER/PROPH/DIAG IV INF INIT: CPT

## 2022-05-25 PROCEDURE — 2580000003 HC RX 258: Performed by: INTERNAL MEDICINE

## 2022-05-25 PROCEDURE — 2580000003 HC RX 258: Performed by: STUDENT IN AN ORGANIZED HEALTH CARE EDUCATION/TRAINING PROGRAM

## 2022-05-25 PROCEDURE — 36415 COLL VENOUS BLD VENIPUNCTURE: CPT

## 2022-05-25 PROCEDURE — 6360000002 HC RX W HCPCS: Performed by: INTERNAL MEDICINE

## 2022-05-25 PROCEDURE — 96367 TX/PROPH/DG ADDL SEQ IV INF: CPT

## 2022-05-25 PROCEDURE — 80048 BASIC METABOLIC PNL TOTAL CA: CPT

## 2022-05-25 RX ORDER — DEXAMETHASONE SODIUM PHOSPHATE 10 MG/ML
6 INJECTION INTRAMUSCULAR; INTRAVENOUS EVERY 12 HOURS
Status: DISCONTINUED | OUTPATIENT
Start: 2022-05-25 | End: 2022-05-27

## 2022-05-25 RX ORDER — SODIUM CHLORIDE 0.9 % (FLUSH) 0.9 %
10 SYRINGE (ML) INJECTION 2 TIMES DAILY
Status: DISCONTINUED | OUTPATIENT
Start: 2022-05-25 | End: 2022-05-27 | Stop reason: HOSPADM

## 2022-05-25 RX ORDER — SODIUM CHLORIDE 0.9 % (FLUSH) 0.9 %
10 SYRINGE (ML) INJECTION PRN
Status: DISCONTINUED | OUTPATIENT
Start: 2022-05-25 | End: 2022-05-27 | Stop reason: HOSPADM

## 2022-05-25 RX ADMIN — Medication 10 ML: at 21:08

## 2022-05-25 RX ADMIN — METOPROLOL SUCCINATE 50 MG: 50 TABLET, EXTENDED RELEASE ORAL at 09:26

## 2022-05-25 RX ADMIN — APIXABAN 5 MG: 5 TABLET, FILM COATED ORAL at 09:26

## 2022-05-25 RX ADMIN — DEXAMETHASONE SODIUM PHOSPHATE 6 MG: 10 INJECTION INTRAMUSCULAR; INTRAVENOUS at 15:25

## 2022-05-25 RX ADMIN — Medication 10 ML: at 09:47

## 2022-05-25 RX ADMIN — APIXABAN 5 MG: 5 TABLET, FILM COATED ORAL at 21:08

## 2022-05-25 RX ADMIN — CEFTRIAXONE 1000 MG: 1 INJECTION, POWDER, FOR SOLUTION INTRAMUSCULAR; INTRAVENOUS at 08:20

## 2022-05-25 RX ADMIN — PIPERACILLIN SODIUM AND TAZOBACTAM SODIUM 4500 MG: 4; .5 INJECTION, POWDER, LYOPHILIZED, FOR SOLUTION INTRAVENOUS at 15:31

## 2022-05-25 RX ADMIN — ATORVASTATIN CALCIUM 20 MG: 20 TABLET, FILM COATED ORAL at 09:26

## 2022-05-25 RX ADMIN — PIPERACILLIN SODIUM AND TAZOBACTAM SODIUM 4500 MG: 4; .5 INJECTION, POWDER, LYOPHILIZED, FOR SOLUTION INTRAVENOUS at 23:44

## 2022-05-25 RX ADMIN — DOXYCYCLINE 100 MG: 100 INJECTION, POWDER, LYOPHILIZED, FOR SOLUTION INTRAVENOUS at 05:35

## 2022-05-25 RX ADMIN — METOPROLOL SUCCINATE 50 MG: 50 TABLET, EXTENDED RELEASE ORAL at 21:08

## 2022-05-25 NOTE — CONSULTS
[address] [place] [address] [facility]    Consultation Note     Admit Date: 5/24/2022  3:10 PM    Reason for Consult:   Immunocompromised host/COVID positive    Attending Physician:  Hero Zeng MD     Chief Complaint:, Shortness of breath and generalized weakness    HISTORY OF PRESENT ILLNESS:   The patient is a 67 y.o. man not known to the Infectious Diseases service. The patient is with shortness of breath that has been worsening over the last 2 days with cough positive for hemoptysis as well as chills and generalized weakness. He is wife's been tested positive for COVID and he has been vaccinated x2 but not boosted. Underlying lung CAD and getting treated with immunotherapy every 3 weeks with Imfinzi. Previously he had chemo and radiation. His cough is productive of clear sputum with streaks of blood. He is on Eliquis. He has a history of PE in the past and he does admit to dyspnea on exertion. Known to have underlying COPD this exacerbation seems to be worse. T-max of 99.9 room air satting at 94 to 95%. CTA pulmonary that did not reveal any PE however left lower lobe there is a mass in the perihilar region strangling the pulmonary artery and causing postobstructive occlusion left lower. Ceftriaxone and Vibramycin and IDs been asked to evaluate. Creatinine 11 and 0.9 white count was 12 on admission and C-reactive protein 9.6 with .             Past Medical History:        Diagnosis Date    CHB (complete heart block) ([facility] [address] 07/2021    COPD (chronic obstructive pulmonary disease) ([facility] [address]     Syncope 09/2020    dr Erika Colon wearing a monitor    Syncope 07/2021     Past Surgical History:        Procedure Laterality Date    BRONCHOSCOPY N/A 05/27/2021    BRONCHOSCOPY W/EBUS FNA performed by John Ghotra MD at [facility] 05/27/2021    BRONCHOSCOPY DIAGNOSTIC OR CELL [address] ONLY performed by John Ghotra MD at [address] [place] [address] N/A 05/27/2021 BRONCHOSCOPY ADD ON COMPUTER ASSISTED performed by Gloria Gómez MD at [address] Bilateral 2011 2001    groin     PACEMAKER INSERTION  07/20/2021    DUAL PPM  (ABDULLAHI)  DR. Nika Moreno     Current Medications:   Scheduled Meds:   sodium chloride flush  10 mL IntraVENous BID    doxycycline (VIBRAMYCIN) IV  100 mg IntraVENous Q12H    cefTRIAXone (ROCEPHIN) IV  1,000 mg IntraVENous Q24H    apixaban  5 mg Oral BID    metoprolol succinate  50 mg Oral BID    atorvastatin  20 mg Oral Daily     Continuous Infusions:  PRN Meds:sodium chloride flush **AND** sodium chloride flush, albuterol sulfate HFA    Allergies:  Patient has no known allergies. Social History:   Social History     Socioeconomic History    Marital status:      Spouse name: None    Number of children: None    Years of education: None    Highest education level: None   Occupational History    None   Tobacco Use    Smoking status: Former Smoker     Packs/day: 2.00     Years: 25.00     Pack years: 50.00     Quit date: 12/26/2011     Years since quitting: 10.4    Smokeless tobacco: Never Used   Vaping Use    Vaping Use: Never used   Substance and Sexual Activity    Alcohol use: Not Currently     Alcohol/week: 1.0 standard drink     Types: 1 Glasses of wine per week     Comment: 1 glass of wine per day prior    Drug use: No    Sexual activity: None   Other Topics Concern    None   Social History Narrative    None     Social Determinants of Health     Financial Resource Strain:     Difficulty of Paying Living Expenses: Not on file   Food Insecurity:     Worried About Running Out of Food in the Last Year: Not on file    Yumiko of Food in the Last Year: Not on file   Transportation Needs:     Lack of Transportation (Medical): Not on file    Lack of Transportation (Non-Medical):  Not on file   Physical Activity:     Days of Exercise per Week: Not on file    Minutes of Exercise per Session: Not on file   Stress:     Feeling of Stress : Not on file   Social Connections:     Frequency of Communication with Friends and Family: Not on file    Frequency of Social Gatherings with Friends and Family: Not on file    Attends Confucianist Services: Not on file    Active Member of [address] [place] [address] or Organizations: Not on file    Attends Club or Organization Meetings: Not on file    Marital Status: Not on file   Intimate Partner Violence:     Fear of Current or Ex-Partner: Not on file    Emotionally Abused: Not on file    Physically Abused: Not on file    Sexually Abused: Not on file   Housing Stability:     Unable to Pay for Housing in the Last Year: Not on file    Number of [place] in the Last Year: Not on file    Unstable Housing in the Last Year: Not on file       Family History:       Problem Relation Age of Onset    Cancer Father         prostate    Cancer Paternal Uncle         anal   . Otherwise non-pertinent to the chief complaint. REVIEW OF SYSTEMS:    CONSTITUTIONAL:  No chills, fevers or night sweats. No loss of weight. EYES:  No double vision or drainage from eyes, ears or throat. HEENT:  No neck stiffness. No dysphagia. No drainage from eyes, ears or throat  RESPIRATORY:   cough, positive productive sputum positive hemoptysis. CARDIOVASCULAR:  No chest pain, palpitations, orthopnea or dyspnea on exertion. GASTROINTESTINAL:  No nausea, vomiting, diarrhea or constipation or hematochezia   GENITOURINARY:  No frequency burning dysuria or hematuria. INTEGUMENT/BREAST:  No rash or breast masses. HEMATOLOGIC/LYMPHATIC:  No lymphadenopathy or blood dyscrasics. ALLERGIC/IMMUNOLOGIC:  No anaphylaxis. ENDOCRINE:  No polyuria or polydipsia or temperature intolerance. MUSCULOSKELETAL:  No myalgia or arthralgia. Full ROM. NEUROLOGICAL:  No focal motor sensory deficit. BEHAVIOR/PSYCH:  No psychosis.      PHYSICAL EXAM:    Vitals:    /64   Pulse 98   Temp 99.5 °F (37.5 °C) (Oral)   Resp 18   Ht 5' 7\" (1.702 m)   Wt 190 lb (86.2 kg)   SpO2 94%   BMI 29.76 kg/m²   Constitutional: The patient is awake, alert, and oriented. Skin: Warm and dry. No rashes were noted. No jaundice. HEENT: Eyes show round, and reactive pupils. Moist mucous membranes, no ulcerations, no thrush. Neck: Supple to movements. No lymphadenopathy. Chest: No use of accessory muscles to breathe. Symmetrical expansion. Auscultation reveals no wheezing, crackles, or rhonchi to the left lower lobe with air showed crackles and some inspiratory rhonchi  Cardiovascular: S1 and S2 are rhythmic and regular. No murmurs appreciated. Abdomen: Positive bowel sounds to auscultation. Benign to palpation. No masses felt. No hepatosplenomegaly. Genitourinary: Male  Extremities: No clubbing, no cyanosis, no edema. Musculoskeletal: Equal and symmetrical  Neurological: No focal  Lines: peripheral      CBC+dif:  Recent Labs     05/24/22  1505 05/24/22  1505 05/25/22  0425   WBC 12.4*  --  8.0   HGB 14.1   < > 12.5   HCT 43.8   < > 39.1   MCV 87.3   < > 87.9      < > 119*   NEUTROABS 10.39*  --   --     < > = values in this interval not displayed.      Lab Results   Component Value Date    CRP 9.6 (H) 05/24/2022     No results found for: CRPHS  No results found for: SEDRATE  Lab Results   Component Value Date    ALT 18 05/24/2022    AST 21 05/24/2022    ALKPHOS 108 05/24/2022    BILITOT 0.7 05/24/2022     Lab Results   Component Value Date     05/25/2022    K 3.8 05/25/2022     05/25/2022    CO2 25 05/25/2022    BUN 14 05/25/2022    CREATININE 1.0 05/25/2022    GFRAA >60 05/25/2022    LABGLOM >60 05/25/2022    GLUCOSE 110 05/25/2022    PROT 7.2 05/24/2022    LABALBU 4.2 05/24/2022    CALCIUM 8.8 05/25/2022    BILITOT 0.7 05/24/2022    ALKPHOS 108 05/24/2022    AST 21 05/24/2022    ALT 18 05/24/2022       Lab Results   Component Value Date    PROTIME 10.9 05/27/2021    INR 1.0 05/27/2021       Lab Results Component Value Date    TSH 0.771 07/21/2021       Lab Results   Component Value Date    COLORU Yellow 10/29/2020    PHUR 7.0 10/29/2020    WBCUA 5-10 10/29/2020    RBCUA 10-20 10/29/2020    BACTERIA FEW 10/29/2020    CLARITYU Clear 10/29/2020    SPECGRAV 1.010 10/29/2020    LEUKOCYTESUR MODERATE 10/29/2020    UROBILINOGEN 0.2 10/29/2020    BILIRUBINUR Negative 10/29/2020    BLOODU MODERATE 10/29/2020    GLUCOSEU Negative 10/29/2020       No results found for: Hasmukh Shoulder, PHART, THGBART, EXT1QTP, PO2ART, JBR9GAU  Radiology:  CTA PULMONARY W CONTRAST   Final Result   No central pulmonary embolism or aortic dissection. A lobulated 3.4 x 5.4 cm left hilar and perihilar mass extending to left   lower lobe encircling the pulmonary artery and the bronchi consistent with   progressive malignancy with occlusion of left lower lobe bronchi with   postobstructive pneumonitis. RECOMMENDATIONS:   Unavailable         XR CHEST (2 VW)   Final Result   1. Left perihilar pneumonia   2. Advanced emphysematous changes             Microbiology:  Pending  No results for input(s): BC in the last 72 hours. No results for input(s): ORG in the last 72 hours. No results for input(s): [place] in the last 72 hours. No results for input(s): STREPNEUMAGU in the last 72 hours. No results for input(s): LP1UAG in the last 72 hours. No results for input(s): ASO in the last 72 hours. No results for input(s): CULTRESP in the last 72 hours.     Assessment:  · Pulmonary left lower lobe in a patient who is got she has a long having had radiation, chemo and is now on immunotherapy  · COVID-19 positive most likely omicron upper respiratory tract involvement    Plan:    · Cont Zosyn  · No direct therapy for COVID at this point  · Pulmonary needs to be involved to assess the significance of the left lower lobe issue in light of the mass in the left hilum colonoscopy  · Check cultures  · Baseline ESR, CRP  · Monitor labs  · Will follow with you    Thank you for having us see this patient in consultation. I will be discussing this case with the treating physicians.       Electronically signed by Linsey Rivera MD on 5/25/2022 at 12:57 PM

## 2022-05-25 NOTE — PROGRESS NOTES
Call placed to [facility] for new consult- no callback regarding which physician is accepting case. Call placed to Dr. Genesis Carbajal office for diet-added Cardiac diet until Dr. Katia Powers returns call.     Electronically signed by Yasmine Reyes RN on 5/25/2022 at 11:11 AM

## 2022-05-25 NOTE — ED NOTES
Patient ambulated on room air with pulse ox dropping to 87%      Marilia Whittaker, MARKO  05/24/22 2022

## 2022-05-25 NOTE — PROGRESS NOTES
[facility] Quality Flow/Interdisciplinary Rounds Progress Note        Quality Flow Rounds held on May 25, 2022    Disciplines Attending:  Bedside Nurse, ,  and Nursing Unit Leadership    Steven Osborn was admitted on 5/24/2022  3:10 PM    Anticipated Discharge Date:       Disposition:    Kojo Score:  Kojo Scale Score: 22    Readmission Risk              Risk of Unplanned Readmission:  0           Discussed patient goal for the day, patient clinical progression, and barriers to discharge. The following Goal(s) of the Day/Commitment(s) have been identified: Check ID plan, monitor SpO2.       Neelima Gar RN  May 25, 2022

## 2022-05-25 NOTE — CONSULTS
[address] NOTE    Patient: Dorothy Ramos  MRN: [id number]  : 1950    Encounter Date: 2022  Encounter Time: 1:53 PM     Date of Admission: .2022  3:10 PM    Consulting Physician:  Primary Care Physician:      Amelia Najera MD     [phone]    PROBLEM LIST:  Patient Active Problem List   Diagnosis    Syncope and collapse    Pneumonia    COPD (chronic obstructive pulmonary disease) ([address]    BPH (benign prostatic hyperplasia)    HTN (hypertension)    HLD (hyperlipidemia)    Intermittent complete heart block ([address]    S/P cardiac pacemaker procedure    Acute on chronic respiratory failure with hypoxia ([address]    Squamous carcinoma of lung ([address]    Pulmonary embolism ([address]    Hyponatremia    Hypomagnesemia    Wide-complex tachycardia ([address]    COVID-19     Reason for Consultation: Pneumonia, Hemoptysis     HPI:   Mr. Kasey Melton is a 66 y/o male with past medical history noted for COPD, cardiac arrhythmia (previously wore event monitor per EP), left hilar lymphadenopathy noted on CT Chest 2021, pulmonary embolism that presented to EB with weakness, cough, shortness of breath found to be COVID positive. He had moderate shortness of breath prior to admission which prompted him to seek medical attention.     EBUS bronchoscopy on 2021 noted left hilar station 11L Stage III squamous cell carcinoma.     Patient attained COVID vaccine about 2021. Patient has no increased work of breathing, weight changes, fevers, chills noted.     Patient has oncology following and is on supplemental oxygen with 2L used. Patient on PO eliquis at this time. Patient on decadron at this time with ceftriaxone and doxycycline x 1 given. Infectious disease is on in consultation for patient.      PAST MEDICAL HISTORY:   Past Medical History:   Diagnosis Date    CHB (complete heart block) ([address] 2021    [facility]     Syncope 09/2020    dr Brianda Lara wearing a monitor    Syncope 07/2021       PAST SURGICAL HISTORY:   Past Surgical History:   Procedure Laterality Date    BRONCHOSCOPY N/A 05/27/2021    BRONCHOSCOPY W/EBUS FNA performed by Lina Lara MD at [address] [place] [address] N/A 05/27/2021    BRONCHOSCOPY DIAGNOSTIC OR CELL [address] ONLY performed by Lina Lara MD at [address] [place] [address] N/A 05/27/2021    BRONCHOSCOPY ADD ON COMPUTER ASSISTED performed by Lina Lara MD at [address] Bilateral 2011 2001    groin     PACEMAKER INSERTION  07/20/2021    DUAL PPM  (ABDULLAHI)  DR. Michael Nugent       FAMILY HISTORY:   Family History   Problem Relation Age of Onset    Cancer Father         prostate    Cancer Paternal Uncle         anal       SOCIAL HISTORY:   Social History     Socioeconomic History    Marital status:      Spouse name: Not on file    Number of children: Not on file    Years of education: Not on file    Highest education level: Not on file   Occupational History    Not on file   Tobacco Use    Smoking status: Former Smoker     Packs/day: 2.00     Years: 25.00     Pack years: 50.00     Quit date: 12/26/2011     Years since quitting: 10.4    Smokeless tobacco: Never Used   Vaping Use    Vaping Use: Never used   Substance and Sexual Activity    Alcohol use: Not Currently     Alcohol/week: 1.0 standard drink     Types: 1 Glasses of wine per week     Comment: 1 glass of wine per day prior    Drug use: No    Sexual activity: Not on file   Other Topics Concern    Not on file   Social History Narrative    Not on file     Social Determinants of Health     Financial Resource Strain:     Difficulty of Paying Living Expenses: Not on file   Food Insecurity:     Worried About Running Out of Food in the Last Year: Not on file    Yumiko of Food in the Last Year: Not on file   Transportation Needs:     orthopnea or palpitations   Gastrointestinal ROS:      - Positive For:     - Negative For: abdominal pain, appetite loss, blood in stools, change in bowel habits, change in stools, constipation, diarrhea, hematemesis, melena, nausea/vomiting or stool incontinence   Genito-Urinary ROS:      - Negative For: change in urinary stream, dysuria, hematuria or incontinence   Musculoskeletal ROS:      - Negative For: joint pain, joint stiffness, joint swelling or muscle pain   Neurological ROS:     - Negative For: behavioral changes, confusion, dizziness, headaches, impaired coordination/balance or memory loss   Dermatological ROS:      - Negative For: hair changes, lumps, mole changes, nail changes or pruritus    PHYSICAL EXAMINATION:     VITAL SIGNS:  /64   Pulse 98   Temp 99.5 °F (37.5 °C) (Oral)   Resp 18   Ht 5' 7\" (1.702 m)   Wt 190 lb (86.2 kg)   SpO2 94%   BMI 29.76 kg/m²   Wt Readings from Last 3 Encounters:   22 190 lb (86.2 kg)   22 187 lb 9.6 oz (85.1 kg)   10/22/21 170 lb (77.1 kg)     Temp Readings from Last 3 Encounters:   22 99.5 °F (37.5 °C) (Oral)   22 97.2 °F (36.2 °C) (Temporal)   10/22/21 97.2 °F (36.2 °C) (Skin)     TMAX:  BP Readings from Last 3 Encounters:   22 114/64   22 134/80   10/22/21 132/74     Pulse Readings from Last 3 Encounters:   22 98   22 86   10/22/21 87       CURRENT PULSE OXIMETRY: SpO2: 94 %  24HR PULSE OXIMETRY RANGE: SpO2  Av.7 %  Min: 94 %  Max: 95 %  CVP:      ________________________________________________________________________    VENTILATOR SETTINGS (if applicable): Additional Respiratory Assessments  Heart Rate: 98  Resp: 18  SpO2: 94 %  ETCO2:  Peak Inspiratory Pressure:  End-Inspiratory Plateau Pressure:    ABG:  No results for input(s): PH, PO2, PCO2, HCO3, BE, O2SAT, METHB, O2HB, COHB, O2CON, HHB, THB in the last 72 hours. ________________________________________________________________________    IV ACCESS:    NUTRITION: ADULT DIET; Regular; Low Fat/Low Chol/High Fiber/BOBBY; Low Sodium (2 gm)    INTAKE/OUTPUTS:  I/O last 3 completed shifts:   In: 500 [IV Piggyback:500]  Out: -     Intake/Output Summary (Last 24 hours) at 5/25/2022 1353  Last data filed at 5/24/2022 1849  Gross per 24 hour   Intake 500 ml   Output --   Net 500 ml     General Appearance: alert and oriented to person, place and time, well-developed and   well-nourished, in no acute distress   Eyes: pupils equal, round, and reactive to light, extraocular eye movements intact, conjunctivae normal and sclera anicteric   Neck: neck supple and non tender without mass, no thyromegaly, no thyroid nodules and no cervical adenopathy   Pulmonary/Chest: decreased breath sounds, no accessory muscles of inspiration, no focal wheezes  - crackles at lung bases noted  Cardiovascular: normal rate, regular rhythm, normal S1 and S2, no murmurs, rubs, clicks or gallops, distal pulses intact, no carotid bruits, no murmurs, no gallops, no carotid bruits and no JVD   Abdomen: soft, non-tender, non-distended, normal bowel sounds, no masses or organomegaly   Extremities: no cyanosis, no clubbing, no edema  Musculoskeletal: normal range of motion, no joint swelling, deformity or tenderness   Neurologic: reflexes normal and symmetric, no cranial nerve deficit noted    LABS/IMAGING:    CBC:  Lab Results   Component Value Date    WBC 8.0 05/25/2022    HGB 12.5 05/25/2022    HCT 39.1 05/25/2022    MCV 87.9 05/25/2022     (L) 05/25/2022    LYMPHOPCT 3.6 (L) 05/24/2022    RBC 4.45 05/25/2022    MCH 28.1 05/25/2022    MCHC 32.0 05/25/2022    RDW 16.6 (H) 05/25/2022    NEUTOPHILPCT 83.7 (H) 05/24/2022    MONOPCT 11.8 05/24/2022    BASOPCT 0.3 05/24/2022    NEUTROABS 10.39 (H) 05/24/2022    LYMPHSABS 0.45 (L) 05/24/2022    MONOSABS 1.46 (H) 05/24/2022    EOSABS 0.03 (L) 05/24/2022    BASOSABS 0.04 05/24/2022       Recent Labs     05/25/22  0425 05/24/22  1505   WBC 8.0 12.4*   HGB 12.5 14.1   HCT 39.1 43.8   MCV 87.9 87.3   * 142       BMP:   Recent Labs     05/24/22  1505 05/25/22  0425    136   K 3.8 3.8   CL 98 101   CO2 23 25   BUN 11 14   CREATININE 0.9 1.0       MG:   Lab Results   Component Value Date    MG 1.5 09/22/2021     Ca/Phos:   Lab Results   Component Value Date    CALCIUM 8.8 05/25/2022     Amylase: No results found for: AMYLASE  Lipase: No results found for: LIPASE  LIVER PROFILE:   Recent Labs     05/24/22  1505   AST 21   ALT 18   BILITOT 0.7   ALKPHOS 108       PT/INR: No results for input(s): PROTIME, INR in the last 72 hours. APTT: No results for input(s): APTT in the last 72 hours. Cardiac Enzymes:  Lab Results   Component Value Date    TROPONINI <0.01 10/29/2020       Hgb A1C:   Lab Results   Component Value Date    LABA1C 6.5 (H) 09/17/2021     No results found for: EAG  PAOLA: No results found for: PAOLA  ESR: No results found for: SEDRATE  CRP:   Lab Results   Component Value Date    CRP 9.6 (H) 05/24/2022     D Dimer:   Lab Results   Component Value Date    DDIMER 233 05/24/2022     Folate and B12: No results found for: DDQASMBM27, No results found for: FOLATE    Lactic Acid:   Lab Results   Component Value Date    LACTA 1.4 05/24/2022     Ammonia:   Cortisol:  Thyroid Studies:  Lab Results   Component Value Date    TSH 0.771 07/21/2021     CTA PULMONARY W CONTRAST   Final Result   No central pulmonary embolism or aortic dissection. A lobulated 3.4 x 5.4 cm left hilar and perihilar mass extending to left   lower lobe encircling the pulmonary artery and the bronchi consistent with   progressive malignancy with occlusion of left lower lobe bronchi with   postobstructive pneumonitis. RECOMMENDATIONS:   Unavailable         XR CHEST (2 VW)   Final Result   1. Left perihilar pneumonia   2.  Advanced emphysematous changes             CTA Chest 9/17/2021:  1. Amedeo Nails are some limitations due motion artifacts in the evaluation of the   lower lobe pulmonary arterial circulation but there is indication of a   subsegmental acute pulmonary embolus in the right lower lobe pulmonary artery   circulation, as above described.       2.  Signs for malignancy in the left lower lobe with 2 mass lesions   compatible with primary lung neoplasm.  Final interpretation requires   correlation with histopathology.  Prior interventional procedure consider   correlation with PET CT scan of the chest.       3.  Areas of pulmonary fibrosis in both lower lobes more on the left.       4.  Advanced emphysematous changes lung parenchyma more prominent towards the   upper lobes.       5.  See above other comments.            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   - diagnosis 5/27/2021, PD-L1 0%  - treatment with concurrent chemotherapy/radiation therapy    - consolidated with Imfinzi (Durvalumab) per ID IV every 4 weeks   4.) H/O RLL Subsegmental Pulmonary Embolism - CTA Chest 9/17/2021  5.) Radiation Pneumonitis/Fibrosis Left Lung   6.) COPD, No Acute Exacerbation   7.) Hypomagnesemia   8.) Pancytopenia   9.) H/O Wide-complex tachycardia (HCC)--atrial tachycardia with RV pacing  10.) H/O Thrombocytopenia - secondary to chemotherapy   11.) H/O Tobacco Abuse     PLAN:  1.) decadron, O2, IS, zosyn per ID  2.) defer on bronchoscopy at this time as the LLL changes are thgouth to be inflammatory/pneumonitis related   3.) no role for remdesivir at this time as patient on room air   4.) paxlovid contraindicated as patient on eliquis and statin (REFERENCE)  4.) symptoms management and control   5.) look to D/C with decadron taper soon   6.) abx per ID  7.) supportive care to continue   8.) DVT Prophylaxis     - defer on bronchoscopy at this time  - ESR, CRP per ID  - case reviewed and discussed with Dr. Malu Orlando    Thank you Balaji Barahona MD very much for allowing me to see this patient in consultation and follow up. Care reviewed with nursing staff, medical and surgical specialty care, primary care and the patient's family as available. Restraints are ordered when the patient can do harm to him/herself by pulling out devices.     Darryl Gilford, MD, MMICHAEL.

## 2022-05-25 NOTE — CARE COORDINATION
Social Work:    Reviewed chart notes. Covid + 5-24, history of lung cancer, presently room air, on zoysn per ID, pulmonary following for left perihilar pneumonia, lung carcinoma, etc. Social service placed a call to [facility], advised him about social service &  roles, as well as discussed discharge planning. Yves's PCP is Dr. Khushboo Marroquin and he uses [facility] pharmacy in [place]. He resides independently with his wife in a one floor home and has a walker, cane, & 02 (PRN). Yves cannot recall the name of the company that supplied the 02. [facility] is receiving immunotherapy at the [facility] every three weeks. [facility] but has never had a need for skilled rehab. Upon discharge from [address] [place] [address] plans to return home. He has no present discharge needs and is receptive to recommendations.     Electronically signed by XAVI Fitzgerald on 5/25/2022 at 2:15 PM

## 2022-05-25 NOTE — H&P
History and Physical  Internal Medicine  Yarely Jane MD    CHIEF COMPLAINT:  SOB      HISTORY OF PRESENT ILLNESS:      The patient is a 67 y.o. male patient of Dr Ricardo Prasad who presents with as per ER - Annette Davis is a 67 y.o. male who presents to the ED for cough, SOB, weakness. Known lung cancer. Getting immunotherapy at this time. Already done with chemo and radiation. Has been vaccinated for Covid 19. Tests negative at home. Feeling very bad at this time. Coughing up blood.    Pt is on Eliquis             Past Medical History:   Diagnosis Date    [facility] 07/2021    COPD (chronic obstructive pulmonary disease) [facility]     Syncope 09/2020    dr Dasha Hicks wearing a monitor    Syncope 07/2021           Past Surgical History:   Procedure Laterality Date    BRONCHOSCOPY N/A 05/27/2021    BRONCHOSCOPY W/EBUS FNA [facility] by Irvin [facility]ira MD at [facility] 05/27/2021    BRONCHOSCOPY DIAGNOSTIC OR CELL [facility] ONLY performed by Irvin Moreira MD at [address] [place] [address] N/A 05/27/2021    BRONCHOSCOPY ADD ON COMPUTER ASSISTED performed by Irvin Moreira MD at [facility] [facility] 2011 2001    groin     PACEMAKER INSERTION  07/20/2021    DUAL PPM  (ABDULLAHI)  DR. Sammy Swain       Medications Prior to Admission:    Medications Prior to Admission: apixaban (ELIQUIS) 5 MG TABS tablet, Take by mouth  albuterol sulfate HFA (VENTOLIN HFA) 108 (90 Base) MCG/ACT inhaler, Inhale 2 puffs into the lungs every 6 hours as needed for Wheezing  metoprolol succinate (TOPROL XL) 50 MG extended release tablet, Take 1 tablet by mouth 2 times daily (with meals)  predniSONE (DELTASONE) 10 MG tablet, Take 10 mg by mouth daily   simvastatin (ZOCOR) 40 MG tablet, Take 40 mg by mouth daily   albuterol (PROVENTIL) (2.5 MG/3ML) 0.083% nebulizer solution, Take 1 ampule by nebulization every 6 hours as needed for Wheezing umeclidinium-vilanterol (ANORO ELLIPTA) 62.5-25 MCG/INH AEPB inhaler, Inhale 1 puff into the lungs daily    Allergies:    Patient has no known allergies. Social History:    reports that he quit smoking about 10 years ago. He has a 50.00 pack-year smoking history. He has never used smokeless tobacco. He reports previous alcohol use of about 1.0 standard drink of alcohol per week. He reports that he does not use drugs. Family History:   family history includes Cancer in his father and paternal uncle. REVIEW OF SYSTEMS:  As above in the HPI, otherwise negative    Vital Signs:  /73   Pulse 69   Temp 98.5 °F (36.9 °C) (Oral)   Resp 18   Ht 5' 7\" (1.702 m)   Wt 190 lb (86.2 kg)   SpO2 95%   BMI 29.76 kg/m²     Physical Exam:    General appearance: alert, appears stated age and cooperative  Head: Normocephalic, without obvious abnormality, atraumatic  Eyes: conjunctivae/corneas clear. PERRL, EOM's intact. Fundi benign.   Throat: lips, mucosa, and tongue normal; teeth and gums normal  Neck: no adenopathy, no carotid bruit, no JVD and supple, symmetrical, trachea midline  Back: negative  Lungs: diminished breath sounds bilaterally  Chest wall: no tenderness  Heart: regular rate and rhythm  Abdomen: soft, non-tender; bowel sounds normal; no masses,  no organomegaly  Extremities: extremities normal, atraumatic, no cyanosis or edema  Pulses: 2+ and symmetric  Skin: Skin color, texture, turgor normal. No rashes or lesions  Lymph nodes: Cervical, supraclavicular, and axillary nodes normal.  Neurologic: Grossly normal  Rectal: deferred    LABS:    Recent Results (from the past 24 hour(s))   EKG 12 Lead    Collection Time: 05/24/22  2:56 PM   Result Value Ref Range    Ventricular Rate 105 BPM    Atrial Rate 105 BPM    P-R Interval 206 ms    QRS Duration 150 ms    Q-T Interval 386 ms    QTc Calculation (Bazett) 510 ms    P Axis 74 degrees    R Axis 122 degrees    T Axis 13 degrees   CBC with Auto Differential Collection Time: 05/24/22  3:05 PM   Result Value Ref Range    WBC 12.4 (H) 4.5 - 11.5 E9/L    RBC 5.02 3.80 - 5.80 E12/L    Hemoglobin 14.1 12.5 - 16.5 g/dL    Hematocrit 43.8 37.0 - 54.0 %    MCV 87.3 80.0 - 99.9 fL    MCH 28.1 26.0 - 35.0 pg    MCHC 32.2 32.0 - 34.5 %    RDW 16.4 (H) 11.5 - 15.0 fL    Platelets 312 177 - 657 E9/L    MPV 11.3 7.0 - 12.0 fL    Neutrophils % 83.7 (H) 43.0 - 80.0 %    Immature Granulocytes % 0.4 0.0 - 5.0 %    Lymphocytes % 3.6 (L) 20.0 - 42.0 %    Monocytes % 11.8 2.0 - 12.0 %    Eosinophils % 0.2 0.0 - 6.0 %    Basophils % 0.3 0.0 - 2.0 %    Neutrophils Absolute 10.39 (H) 1.80 - 7.30 E9/L    Immature Granulocytes # 0.05 E9/L    Lymphocytes Absolute 0.45 (L) 1.50 - 4.00 E9/L    Monocytes Absolute 1.46 (H) 0.10 - 0.95 E9/L    Eosinophils Absolute 0.03 (L) 0.05 - 0.50 E9/L    Basophils Absolute 0.04 0.00 - 0.20 E9/L    Anisocytosis 1+     Polychromasia 1+    Comprehensive Metabolic Panel w/ Reflex to MG    Collection Time: 05/24/22  3:05 PM   Result Value Ref Range    Sodium 135 132 - 146 mmol/L    Potassium reflex Magnesium 3.8 3.5 - 5.0 mmol/L    Chloride 98 98 - 107 mmol/L    CO2 23 22 - 29 mmol/L    Anion Gap 14 7 - 16 mmol/L    Glucose 103 (H) 74 - 99 mg/dL    BUN 11 6 - 23 mg/dL    CREATININE 0.9 0.7 - 1.2 mg/dL    GFR Non-African American >60 >=60 mL/min/1.73    GFR African American >60     Calcium 9.4 8.6 - 10.2 mg/dL    Total Protein 7.2 6.4 - 8.3 g/dL    Albumin 4.2 3.5 - 5.2 g/dL    Total Bilirubin 0.7 0.0 - 1.2 mg/dL    Alkaline Phosphatase 108 40 - 129 U/L    ALT 18 0 - 40 U/L    AST 21 0 - 39 U/L   Troponin    Collection Time: 05/24/22  3:05 PM   Result Value Ref Range    Troponin, High Sensitivity 11 0 - 11 ng/L   Brain Natriuretic Peptide    Collection Time: 05/24/22  3:05 PM   Result Value Ref Range    Pro- (H) 0 - 125 pg/mL   COVID-19, Rapid    Collection Time: 05/24/22  3:05 PM    Specimen: Nasopharyngeal Swab   Result Value Ref Range    SARS-CoV-2, NAAT DETECTED (A) Not Detected   Rapid influenza A/B antigens    Collection Time: 05/24/22  3:05 PM    Specimen: Nares   Result Value Ref Range    Influenza A by PCR Not Detected Not Detected    Influenza B by PCR Not Detected Not Detected   Procalcitonin    Collection Time: 05/24/22  3:05 PM   Result Value Ref Range    Procalcitonin 0.21 (H) 0.00 - 0.08 ng/mL   Lactic Acid    Collection Time: 05/24/22  3:30 PM   Result Value Ref Range    Lactic Acid 1.4 0.5 - 2.2 mmol/L   SPECIMEN REJECTION    Collection Time: 05/24/22  5:33 PM   Result Value Ref Range    Rejected Test dimer     Reason for Rejection see below    Troponin    Collection Time: 05/24/22  5:37 PM   Result Value Ref Range    Troponin, High Sensitivity 13 (H) 0 - 11 ng/L   Lactate Dehydrogenase    Collection Time: 05/24/22  5:37 PM   Result Value Ref Range     (H) 135 - 225 U/L   C-Reactive Protein    Collection Time: 05/24/22  5:37 PM   Result Value Ref Range    CRP 9.6 (H) 0.0 - 0.4 mg/dL   Ferritin    Collection Time: 05/24/22  5:37 PM   Result Value Ref Range    Ferritin 154 ng/mL   D-Dimer, Quantitative    Collection Time: 05/24/22  5:37 PM   Result Value Ref Range    D-Dimer, Quant 233 ng/mL DDU   CBC    Collection Time: 05/25/22  4:25 AM   Result Value Ref Range    WBC 8.0 4.5 - 11.5 E9/L    RBC 4.45 3.80 - 5.80 E12/L    Hemoglobin 12.5 12.5 - 16.5 g/dL    Hematocrit 39.1 37.0 - 54.0 %    MCV 87.9 80.0 - 99.9 fL    MCH 28.1 26.0 - 35.0 pg    MCHC 32.0 32.0 - 34.5 %    RDW 16.6 (H) 11.5 - 15.0 fL    Platelets 326 (L) 060 - 450 E9/L    MPV 11.0 7.0 - 12.0 fL   Basic Metabolic Panel w/ Reflex to MG    Collection Time: 05/25/22  4:25 AM   Result Value Ref Range    Sodium 136 132 - 146 mmol/L    Potassium reflex Magnesium 3.8 3.5 - 5.0 mmol/L    Chloride 101 98 - 107 mmol/L    CO2 25 22 - 29 mmol/L    Anion Gap 10 7 - 16 mmol/L    Glucose 110 (H) 74 - 99 mg/dL    BUN 14 6 - 23 mg/dL    CREATININE 1.0 0.7 - 1.2 mg/dL    GFR Non-African American >60 >=60 mL/min/1.73    GFR African American >60     Calcium 8.8 8.6 - 10.2 mg/dL       Radiology:     Chest  Xray:  Results for orders placed during the hospital encounter of 05/24/22    XR CHEST (2 VW)    Narrative  EXAMINATION:  TWO XRAY VIEWS OF THE CHEST    5/24/2022 1:53 pm    COMPARISON:  Previous CT of the chest dated 09/17/2021 and previous chest x-ray of  09/20/2021    HISTORY:  ORDERING SYSTEM PROVIDED HISTORY: Cough/SOB  TECHNOLOGIST PROVIDED HISTORY:  Reason for exam:->Cough/SOB    FINDINGS:  There are advanced emphysematous changes. There is an infiltrate seen within the right perihilar region. There is no  right or left pleural effusion. Note is made of a right MediPort catheter. There is a dual lead cardiac  pacer on the left. The heart is not enlarged. Impression  1. Left perihilar pneumonia  2. Advanced emphysematous changes    Results for orders placed during the hospital encounter of 09/17/21    XR CHEST PORTABLE    Narrative  EXAMINATION:  ONE XRAY VIEW OF THE CHEST    9/20/2021 11:05 am    COMPARISON:  09/17/2021    HISTORY:  ORDERING SYSTEM PROVIDED HISTORY: worsening dyspnea  TECHNOLOGIST PROVIDED HISTORY:  Reason for exam:->worsening dyspnea  What reading provider will be dictating this exam?->CRC    FINDINGS:  Stable position of right MediPort catheter. Left pacemaker. Cardiomediastinal silhouette is stable. Stable patchy left lung base  opacity. Possible small left pleural effusion versus pleural thickening. No  pneumothorax. No acute osseous abnormality. Impression  No significant change since the prior study      Other:  none      ASSESSMENT:      Principal Problem:    Pneumonia  Active Problems:    COVID-19    COPD (chronic obstructive pulmonary disease) (HCC)    HTN (hypertension)    HLD (hyperlipidemia)    Squamous carcinoma of lung (HCC)    Pulmonary embolism (HCC)  Resolved Problems:    * No resolved hospital problems.  *      PLAN:    Pneumonia on top of COVID  ON ATB  Asked ID to see as well    Willis Manning MD  6:11 AM  5/25/2022

## 2022-05-26 LAB
ANION GAP SERPL CALCULATED.3IONS-SCNC: 13 MMOL/L (ref 7–16)
BASOPHILS ABSOLUTE: 0 E9/L (ref 0–0.2)
BASOPHILS RELATIVE PERCENT: 0 % (ref 0–2)
BUN BLDV-MCNC: 19 MG/DL (ref 6–23)
BURR CELLS: ABNORMAL
CALCIUM SERPL-MCNC: 9.3 MG/DL (ref 8.6–10.2)
CHLORIDE BLD-SCNC: 100 MMOL/L (ref 98–107)
CO2: 21 MMOL/L (ref 22–29)
CREAT SERPL-MCNC: 0.8 MG/DL (ref 0.7–1.2)
EOSINOPHILS ABSOLUTE: 0 E9/L (ref 0.05–0.5)
EOSINOPHILS RELATIVE PERCENT: 0 % (ref 0–6)
GFR AFRICAN AMERICAN: >60
GFR NON-AFRICAN AMERICAN: >60 ML/MIN/1.73
GLUCOSE BLD-MCNC: 292 MG/DL (ref 74–99)
HCT VFR BLD CALC: 38.1 % (ref 37–54)
HEMOGLOBIN: 12.2 G/DL (ref 12.5–16.5)
LYMPHOCYTES ABSOLUTE: 0.19 E9/L (ref 1.5–4)
LYMPHOCYTES RELATIVE PERCENT: 4.4 % (ref 20–42)
MCH RBC QN AUTO: 28 PG (ref 26–35)
MCHC RBC AUTO-ENTMCNC: 32 % (ref 32–34.5)
MCV RBC AUTO: 87.4 FL (ref 80–99.9)
MONOCYTES ABSOLUTE: 0.09 E9/L (ref 0.1–0.95)
MONOCYTES RELATIVE PERCENT: 1.7 % (ref 2–12)
NEUTROPHILS ABSOLUTE: 4.42 E9/L (ref 1.8–7.3)
NEUTROPHILS RELATIVE PERCENT: 93.9 % (ref 43–80)
NUCLEATED RED BLOOD CELLS: 0 /100 WBC
PDW BLD-RTO: 15.9 FL (ref 11.5–15)
PLATELET # BLD: 112 E9/L (ref 130–450)
PMV BLD AUTO: 10.9 FL (ref 7–12)
POIKILOCYTES: ABNORMAL
POTASSIUM SERPL-SCNC: 4 MMOL/L (ref 3.5–5)
RBC # BLD: 4.36 E12/L (ref 3.8–5.8)
SODIUM BLD-SCNC: 134 MMOL/L (ref 132–146)
WBC # BLD: 4.7 E9/L (ref 4.5–11.5)

## 2022-05-26 PROCEDURE — 6370000000 HC RX 637 (ALT 250 FOR IP): Performed by: INTERNAL MEDICINE

## 2022-05-26 PROCEDURE — G0378 HOSPITAL OBSERVATION PER HR: HCPCS

## 2022-05-26 PROCEDURE — 36415 COLL VENOUS BLD VENIPUNCTURE: CPT

## 2022-05-26 PROCEDURE — 96366 THER/PROPH/DIAG IV INF ADDON: CPT

## 2022-05-26 PROCEDURE — 85025 COMPLETE CBC W/AUTO DIFF WBC: CPT

## 2022-05-26 PROCEDURE — 6360000002 HC RX W HCPCS: Performed by: SPECIALIST

## 2022-05-26 PROCEDURE — 96376 TX/PRO/DX INJ SAME DRUG ADON: CPT

## 2022-05-26 PROCEDURE — 2580000003 HC RX 258: Performed by: SPECIALIST

## 2022-05-26 PROCEDURE — 2580000003 HC RX 258: Performed by: INTERNAL MEDICINE

## 2022-05-26 PROCEDURE — 80048 BASIC METABOLIC PNL TOTAL CA: CPT

## 2022-05-26 RX ADMIN — METOPROLOL SUCCINATE 50 MG: 50 TABLET, EXTENDED RELEASE ORAL at 08:28

## 2022-05-26 RX ADMIN — Medication 10 ML: at 21:23

## 2022-05-26 RX ADMIN — METOPROLOL SUCCINATE 50 MG: 50 TABLET, EXTENDED RELEASE ORAL at 21:21

## 2022-05-26 RX ADMIN — DEXAMETHASONE SODIUM PHOSPHATE 6 MG: 10 INJECTION INTRAMUSCULAR; INTRAVENOUS at 14:22

## 2022-05-26 RX ADMIN — SODIUM CHLORIDE, PRESERVATIVE FREE 10 ML: 5 INJECTION INTRAVENOUS at 14:22

## 2022-05-26 RX ADMIN — Medication 10 ML: at 08:30

## 2022-05-26 RX ADMIN — DEXAMETHASONE SODIUM PHOSPHATE 6 MG: 10 INJECTION INTRAMUSCULAR; INTRAVENOUS at 04:51

## 2022-05-26 RX ADMIN — ATORVASTATIN CALCIUM 20 MG: 20 TABLET, FILM COATED ORAL at 08:28

## 2022-05-26 RX ADMIN — PIPERACILLIN SODIUM AND TAZOBACTAM SODIUM 4500 MG: 4; .5 INJECTION, POWDER, LYOPHILIZED, FOR SOLUTION INTRAVENOUS at 08:29

## 2022-05-26 RX ADMIN — PIPERACILLIN SODIUM AND TAZOBACTAM SODIUM 4500 MG: 4; .5 INJECTION, POWDER, LYOPHILIZED, FOR SOLUTION INTRAVENOUS at 16:43

## 2022-05-26 RX ADMIN — APIXABAN 5 MG: 5 TABLET, FILM COATED ORAL at 08:28

## 2022-05-26 RX ADMIN — APIXABAN 5 MG: 5 TABLET, FILM COATED ORAL at 21:21

## 2022-05-26 ASSESSMENT — PAIN SCALES - GENERAL
PAINLEVEL_OUTOF10: 0
PAINLEVEL_OUTOF10: 0

## 2022-05-26 NOTE — PROGRESS NOTES
[address] [place] [address] [facility]  Progress Note    SUBJECTIVE:  Chief Complaint   Patient presents with    Shortness of Breath     x 2 days     Cough     states coughed up blood, on elliquis     Chills     Patient sitting on edge of the bed, awake and alert on RA in no distress  States he is feeling well today  Patient is tolerating medications. No reported adverse drug reactions. No nausea, vomiting, diarrhea. No fevers     Review of systems:  As stated above in the chief complaint, otherwise negative. Medications:  Scheduled Meds:   sodium chloride flush  10 mL IntraVENous BID    piperacillin-tazobactam  4,500 mg IntraVENous Q8H    dexamethasone  6 mg IntraVENous Q12H    apixaban  5 mg Oral BID    metoprolol succinate  50 mg Oral BID    atorvastatin  20 mg Oral Daily     Continuous Infusions:  PRN Meds:sodium chloride flush **AND** sodium chloride flush, albuterol sulfate HFA    OBJECTIVE:  /85   Pulse 73   Temp 98 °F (36.7 °C) (Oral)   Resp 16   Ht 5' 7\" (1.702 m)   Wt 187 lb (84.8 kg)   SpO2 95%   BMI 29.29 kg/m²   Temp  Av °F (36.7 °C)  Min: 98 °F (36.7 °C)  Max: 98.1 °F (36.7 °C)  Constitutional: The patient is awake, alert, and oriented. Sitting on edge of bed  Skin: Warm and dry. No rashes were noted. HEENT: Round and reactive pupils. Moist mucous membranes. No ulcerations or thrush. Neck: Supple to movements. Chest: No use of accessory muscles to breathe. Symmetrical expansion. L side crackles and rhonchi. Cardiovascular: S1 and S2 are rhythmic and regular. No murmurs appreciated. Abdomen: Positive bowel sounds to auscultation. Benign to palpation. No masses felt. No hepatosplenomegaly. Extremities: No clubbing, no cyanosis, no edema.   Lines: peripheral    Laboratory and Tests Review:  Lab Results   Component Value Date    WBC 4.7 2022    WBC 8.0 2022    WBC 12.4 (H) 2022    HGB 12.2 (L) 2022    HCT 38.1 2022 MCV 87.4 05/26/2022     (L) 05/26/2022     Lab Results   Component Value Date    NEUTROABS 4.42 05/26/2022    NEUTROABS 10.39 (H) 05/24/2022    NEUTROABS 3.69 09/22/2021     No results found for: CRP  Lab Results   Component Value Date    ALT 18 05/24/2022    AST 21 05/24/2022    ALKPHOS 108 05/24/2022    BILITOT 0.7 05/24/2022     Lab Results   Component Value Date     05/26/2022    K 4.0 05/26/2022    K 3.8 05/25/2022     05/26/2022    CO2 21 05/26/2022    BUN 19 05/26/2022    CREATININE 0.8 05/26/2022    CREATININE 1.0 05/25/2022    CREATININE 0.9 05/24/2022    GFRAA >60 05/26/2022    LABGLOM >60 05/26/2022    GLUCOSE 292 05/26/2022    PROT 7.2 05/24/2022    LABALBU 4.2 05/24/2022    CALCIUM 9.3 05/26/2022    BILITOT 0.7 05/24/2022    ALKPHOS 108 05/24/2022    AST 21 05/24/2022    ALT 18 05/24/2022     Lab Results   Component Value Date    CRP 9.6 (H) 05/24/2022     No results found for: [address]  Radiology:      Microbiology:   Blood Cultures 5/24/22: negative so far  Rapid COVID-19: positive  Recent Labs     05/24/22  1505   PROCAL 0.21*       ASSESSMENT:  · Pulmonary left lower lobe in a patient who is got she has a long having had radiation, chemo and is now on immunotherapy  · COVID-19 positive most likely omicron upper respiratory tract involvement    PLAN:  · Continue Zosyn  · Discussed with pulm  · No direct therapy for COVID at this point  · Pulm following: defer on bronchoscopy at this time as the LLL changes are thought to be inflammatory/pneumonitis related   · Check final cultures  · Monitor labs    LELO Grey CNP  12:27 PM  5/26/2022   Pt seen and examined. Above discussed agree with advanced practice nurse. Labs, cultures, and radiographs reviewed. Face to Face encounter occurred. Changes made as necessary.      Neisha Alvarado MD

## 2022-05-26 NOTE — PROGRESS NOTES
Pulmonary Progress Note    Admit Date: 2022                            PCP: Sourav Stephen MD  Principal Problem:    Pneumonia  Active Problems:    COVID-19    COPD (chronic obstructive pulmonary disease) ([facility]    HTN (hypertension)    HLD (hyperlipidemia)    Squamous carcinoma of lung ([facility]    Pulmonary embolism ([facility]  Resolved Problems:    * No resolved hospital problems. *      Subjective:  Sitting on side of bed on room air. Feels breathing is improved. Still coughing up pinkish sputum . Medications:       sodium chloride flush  10 mL IntraVENous BID    piperacillin-tazobactam  4,500 mg IntraVENous Q8H    dexamethasone  6 mg IntraVENous Q12H    apixaban  5 mg Oral BID    metoprolol succinate  50 mg Oral BID    atorvastatin  20 mg Oral Daily       Vitals:  VITALS:  /85   Pulse 73   Temp 98 °F (36.7 °C) (Oral)   Resp 16   Ht 5' 7\" (1.702 m)   Wt 187 lb (84.8 kg)   SpO2 95%   BMI 29.29 kg/m²   24HR INTAKE/OUTPUT:  No intake or output data in the 24 hours ending 22 1336  CURRENT PULSE OXIMETRY:  SpO2: 95 %  24HR PULSE OXIMETRY RANGE:  SpO2  Av.7 %  Min: 94 %  Max: 95 %  CVP:    VENT SETTINGS:      Additional Respiratory Assessments  Heart Rate: 73  Resp: 16  SpO2: 95 %      EXAM:  Called into the room for Covid safety precautions     I/O: No intake/output data recorded. No intake/output data recorded. Results:  CBC:   Recent Labs     22  1505 22  0425 22  0945   WBC 12.4* 8.0 4.7   HGB 14.1 12.5 12.2*   HCT 43.8 39.1 38.1   MCV 87.3 87.9 87.4    119* 112*     BMP:   Recent Labs     22  1505 22  0425 22  0945    136 134   K 3.8 3.8 4.0   CL 98 101 100   CO2 23 25 21*   BUN 11 14 19   CREATININE 0.9 1.0 0.8     LFT:   Recent Labs     22  1505   ALKPHOS 108   ALT 18   AST 21   PROT 7.2   BILITOT 0.7   LABALBU 4.2     PT/INR: No results for input(s): PROTIME, INR in the last 72 hours.   Procalcitonin:   Recent Labs 05/24/22  1505   PROCAL 0.21*     Cultures:  No results for input(s): CULTRESP in the last 72 hours. ABG:   No results for input(s): PH, PO2, PCO2, HCO3, BE, O2SAT in the last 72 hours. Films:  XR CHEST (2 VW)    Result Date: 5/24/2022  EXAMINATION: TWO XRAY VIEWS OF THE CHEST 5/24/2022 1:53 pm COMPARISON: Previous CT of the chest dated 09/17/2021 and previous chest x-ray of 09/20/2021 HISTORY: ORDERING SYSTEM PROVIDED HISTORY: Cough/SOB TECHNOLOGIST PROVIDED HISTORY: Reason for exam:->Cough/SOB FINDINGS: There are advanced emphysematous changes. There is an infiltrate seen within the right perihilar region. There is no right or left pleural effusion. Note is made of a right MediPort catheter. There is a dual lead cardiac pacer on the left. The heart is not enlarged. 1. Left perihilar pneumonia 2. Advanced emphysematous changes     CTA PULMONARY W CONTRAST    Result Date: 5/24/2022  EXAMINATION: CTA OF THE CHEST 5/24/2022 6:35 pm TECHNIQUE: CTA of the chest was performed after the administration of intravenous contrast.  Multiplanar reformatted images are provided for review. MIP images are provided for review. Automated exposure control, iterative reconstruction, and/or weight based adjustment of the mA/kV was utilized to reduce the radiation dose to as low as reasonably achievable. COMPARISON: September 17, 2021 HISTORY: ORDERING SYSTEM PROVIDED HISTORY: elevated d dimer TECHNOLOGIST PROVIDED HISTORY: Reason for exam:->elevated d dimer Decision Support Exception - unselect if not a suspected or confirmed emergency medical condition->Emergency Medical Condition (MA) FINDINGS: The heart and the great vessels are normal.  Trachea and major bronchi are patent. 9 mm subcarinal lymph node is identified. There are no filling defects in the main pulmonary artery and the central branches.   There is an infiltrative left hilar mass measuring 3.4 x 5.1 cm encircling the pulmonary artery and bronchi with occlusion of left lower lobe bronchi. There may be adjacent infiltrates in the superior left lower lobe concerning for postobstructive pneumonia. There is advanced emphysema. There is no pleural effusion. The liver is of normal architecture. No central pulmonary embolism or aortic dissection. A lobulated 3.4 x 5.4 cm left hilar and perihilar mass extending to left lower lobe encircling the pulmonary artery and the bronchi consistent with progressive malignancy with occlusion of left lower lobe bronchi with postobstructive pneumonitis. RECOMMENDATIONS: Unavailable        ASSESSMENT:  1. )SARS-CoV 2 Pneumonitis - suspected Omicron BA-2  - vaccinated x 2 and with booster against COVID  - sick contact wife  2.) Submassive Hemoptysis - streaky, known lung mass and on eliquis for h/o pulmonary embolism  3.) Stage IIIB T2aN3 Squamous Cell Carcinoma of Lung   - diagnosis 5/27/2021, PD-L1 0%  - treatment with concurrent chemotherapy/radiation therapy    - consolidated with Imfinzi (Durvalumab) per ID IV every 4 weeks   4.) H/O RLL Subsegmental Pulmonary Embolism - CTA Chest 9/17/2021  5.) Radiation Pneumonitis/Fibrosis Left Lung   6.) COPD, No Acute Exacerbation   7.) Hypomagnesemia   8.) Pancytopenia   9.) H/O Wide-complex tachycardia (HCC)--atrial tachycardia with RV pacing  10.) H/O Thrombocytopenia - secondary to chemotherapy   11.) H/O Tobacco Abuse      PLAN:  1.) Continue decadron,   2.) O2 if needed remains on room air, maintain POX >90%   3.) IS,   4.) zosyn per ID  5.) defer on bronchoscopy at this time as the LLL changes are thought to be inflammatory/pneumonitis related   6.) no role for remdesivir at this time as patient on room air   7.) paxlovid contraindicated as patient on eliquis and statin (REFERENCE)  8.) look to D/C with decadron taper soon   9.) supportive care to continue   10.) DVT Prophylaxis   Stable from a pulmonary standpoint            Electronically signed by LELO Macario - SEVERO on 5/26/2022 at 1:36 PM    Attending Attestation Note:    Patient seen and examined with NP. I agree with above.     In addition, the following apply:    - patient on room air  - decadron with taper, no directed therapy for COVID warranted at this time   - defer on bronchoscopy at this time  - await D/C planning  - follow up outpatient with pulmonary [facility] office in [facility] Mateo Rudolph MD  5/26/2022  1:58 PM

## 2022-05-26 NOTE — PLAN OF CARE
Problem: Safety - Adult  Goal: Free from fall injury  5/26/2022 2281 by Josse Hernandez RN  Outcome: Progressing  5/26/2022 8350 by Josse Hernandez RN  Outcome: Progressing  Flowsheets (Taken 5/25/2022 2101)  Free From Fall Injury: Instruct family/caregiver on patient safety     Problem: ABCDS Injury Assessment  Goal: Absence of physical injury  5/26/2022 1276 by Josse Hernandez RN  Outcome: Progressing  5/26/2022 0632 by Josse Hernandez RN  Outcome: Progressing  Flowsheets (Taken 5/25/2022 2101)  Absence of Physical Injury: Implement safety measures based on patient assessment

## 2022-05-26 NOTE — PLAN OF CARE
Problem: Safety - Adult  Goal: Free from fall injury  5/26/2022 1400 by Arcelia Benitez RN  Outcome: Progressing  5/26/2022 1359 by Arcelia Benitez RN  Outcome: Progressing  5/26/2022 0632 by Adela Edouard RN  Outcome: Progressing  5/26/2022 7837 by Adela Edouard RN  Outcome: Progressing  Flowsheets (Taken 5/25/2022 2101)  Free From Fall Injury: Instruct family/caregiver on patient safety     Problem: ABCDS Injury Assessment  Goal: Absence of physical injury  5/26/2022 1359 by Arcelia Benitez RN  Outcome: Progressing  5/26/2022 0632 by Adela Edouard RN  Outcome: Progressing  5/26/2022 0632 by Adela Edouard RN  Outcome: Progressing  Flowsheets (Taken 5/25/2022 2101)  Absence of Physical Injury: Implement safety measures based on patient assessment

## 2022-05-27 VITALS
SYSTOLIC BLOOD PRESSURE: 127 MMHG | WEIGHT: 187 LBS | TEMPERATURE: 97.3 F | OXYGEN SATURATION: 95 % | RESPIRATION RATE: 14 BRPM | BODY MASS INDEX: 29.35 KG/M2 | DIASTOLIC BLOOD PRESSURE: 62 MMHG | HEART RATE: 69 BPM | HEIGHT: 67 IN

## 2022-05-27 LAB
ANION GAP SERPL CALCULATED.3IONS-SCNC: 12 MMOL/L (ref 7–16)
ANISOCYTOSIS: ABNORMAL
BASOPHILS ABSOLUTE: 0.01 E9/L (ref 0–0.2)
BASOPHILS RELATIVE PERCENT: 0.2 % (ref 0–2)
BUN BLDV-MCNC: 20 MG/DL (ref 6–23)
CALCIUM SERPL-MCNC: 9.2 MG/DL (ref 8.6–10.2)
CHLORIDE BLD-SCNC: 102 MMOL/L (ref 98–107)
CO2: 25 MMOL/L (ref 22–29)
CREAT SERPL-MCNC: 1 MG/DL (ref 0.7–1.2)
EOSINOPHILS ABSOLUTE: 0 E9/L (ref 0.05–0.5)
EOSINOPHILS RELATIVE PERCENT: 0 % (ref 0–6)
GFR AFRICAN AMERICAN: >60
GFR NON-AFRICAN AMERICAN: >60 ML/MIN/1.73
GLUCOSE BLD-MCNC: 152 MG/DL (ref 74–99)
HCT VFR BLD CALC: 38 % (ref 37–54)
HEMOGLOBIN: 12.1 G/DL (ref 12.5–16.5)
IMMATURE GRANULOCYTES #: 0.05 E9/L
IMMATURE GRANULOCYTES %: 0.8 % (ref 0–5)
LYMPHOCYTES ABSOLUTE: 0.3 E9/L (ref 1.5–4)
LYMPHOCYTES RELATIVE PERCENT: 4.8 % (ref 20–42)
MCH RBC QN AUTO: 28.1 PG (ref 26–35)
MCHC RBC AUTO-ENTMCNC: 31.8 % (ref 32–34.5)
MCV RBC AUTO: 88.2 FL (ref 80–99.9)
MONOCYTES ABSOLUTE: 0.27 E9/L (ref 0.1–0.95)
MONOCYTES RELATIVE PERCENT: 4.3 % (ref 2–12)
NEUTROPHILS ABSOLUTE: 5.61 E9/L (ref 1.8–7.3)
NEUTROPHILS RELATIVE PERCENT: 89.9 % (ref 43–80)
PDW BLD-RTO: 15.8 FL (ref 11.5–15)
PLATELET # BLD: 119 E9/L (ref 130–450)
PMV BLD AUTO: 11.3 FL (ref 7–12)
POTASSIUM SERPL-SCNC: 4.3 MMOL/L (ref 3.5–5)
RBC # BLD: 4.31 E12/L (ref 3.8–5.8)
SODIUM BLD-SCNC: 139 MMOL/L (ref 132–146)
WBC # BLD: 6.2 E9/L (ref 4.5–11.5)

## 2022-05-27 PROCEDURE — 36415 COLL VENOUS BLD VENIPUNCTURE: CPT

## 2022-05-27 PROCEDURE — 85025 COMPLETE CBC W/AUTO DIFF WBC: CPT

## 2022-05-27 PROCEDURE — 80048 BASIC METABOLIC PNL TOTAL CA: CPT

## 2022-05-27 PROCEDURE — 6370000000 HC RX 637 (ALT 250 FOR IP): Performed by: INTERNAL MEDICINE

## 2022-05-27 PROCEDURE — G0378 HOSPITAL OBSERVATION PER HR: HCPCS

## 2022-05-27 PROCEDURE — 96376 TX/PRO/DX INJ SAME DRUG ADON: CPT

## 2022-05-27 PROCEDURE — 6360000002 HC RX W HCPCS: Performed by: SPECIALIST

## 2022-05-27 PROCEDURE — 2580000003 HC RX 258: Performed by: INTERNAL MEDICINE

## 2022-05-27 PROCEDURE — 96366 THER/PROPH/DIAG IV INF ADDON: CPT

## 2022-05-27 PROCEDURE — 2580000003 HC RX 258: Performed by: SPECIALIST

## 2022-05-27 RX ORDER — AMOXICILLIN AND CLAVULANATE POTASSIUM 600; 42.9 MG/5ML; MG/5ML
1800 POWDER, FOR SUSPENSION ORAL EVERY 12 HOURS SCHEDULED
Status: DISCONTINUED | OUTPATIENT
Start: 2022-05-27 | End: 2022-05-27 | Stop reason: HOSPADM

## 2022-05-27 RX ORDER — AMOXICILLIN AND CLAVULANATE POTASSIUM 600; 42.9 MG/5ML; MG/5ML
1800 POWDER, FOR SUSPENSION ORAL EVERY 12 HOURS SCHEDULED
Qty: 210 ML | Refills: 0 | Status: SHIPPED | OUTPATIENT
Start: 2022-05-27 | End: 2022-06-03

## 2022-05-27 RX ORDER — DEXAMETHASONE 6 MG/1
6 TABLET ORAL DAILY
Qty: 10 TABLET | Refills: 0 | Status: SHIPPED | OUTPATIENT
Start: 2022-05-27 | End: 2022-06-06

## 2022-05-27 RX ORDER — LEVOFLOXACIN 750 MG/1
750 TABLET ORAL DAILY
Qty: 7 TABLET | Refills: 0 | Status: SHIPPED | OUTPATIENT
Start: 2022-05-27 | End: 2022-06-03

## 2022-05-27 RX ORDER — BENZONATATE 100 MG/1
200 CAPSULE ORAL 3 TIMES DAILY PRN
Status: DISCONTINUED | OUTPATIENT
Start: 2022-05-27 | End: 2022-05-27 | Stop reason: HOSPADM

## 2022-05-27 RX ORDER — BENZONATATE 200 MG/1
200 CAPSULE ORAL 3 TIMES DAILY PRN
Qty: 60 CAPSULE | Refills: 1 | Status: SHIPPED | OUTPATIENT
Start: 2022-05-27 | End: 2022-06-03

## 2022-05-27 RX ADMIN — APIXABAN 5 MG: 5 TABLET, FILM COATED ORAL at 08:41

## 2022-05-27 RX ADMIN — DEXAMETHASONE SODIUM PHOSPHATE 6 MG: 10 INJECTION INTRAMUSCULAR; INTRAVENOUS at 00:07

## 2022-05-27 RX ADMIN — METOPROLOL SUCCINATE 50 MG: 50 TABLET, EXTENDED RELEASE ORAL at 08:41

## 2022-05-27 RX ADMIN — Medication 10 ML: at 08:45

## 2022-05-27 RX ADMIN — PIPERACILLIN SODIUM AND TAZOBACTAM SODIUM 4500 MG: 4; .5 INJECTION, POWDER, LYOPHILIZED, FOR SOLUTION INTRAVENOUS at 08:43

## 2022-05-27 RX ADMIN — ATORVASTATIN CALCIUM 20 MG: 20 TABLET, FILM COATED ORAL at 08:41

## 2022-05-27 RX ADMIN — PIPERACILLIN SODIUM AND TAZOBACTAM SODIUM 4500 MG: 4; .5 INJECTION, POWDER, LYOPHILIZED, FOR SOLUTION INTRAVENOUS at 00:10

## 2022-05-27 NOTE — PLAN OF CARE
Problem: Safety - Adult  Goal: Free from fall injury  5/27/2022 0723 by Rhina Engle RN  Outcome: Progressing  5/27/2022 0723 by Rhina Engle RN  Outcome: Progressing  Flowsheets (Taken 5/26/2022 2115)  Free From Fall Injury: Instruct family/caregiver on patient safety     Problem: ABCDS Injury Assessment  Goal: Absence of physical injury  5/27/2022 0723 by Rhina Engle RN  Outcome: Progressing  5/27/2022 0723 by Rhina Engle RN  Outcome: Progressing  Flowsheets (Taken 5/26/2022 2115)  Absence of Physical Injury: Implement safety measures based on patient assessment     Problem: Pain  Goal: Verbalizes/displays adequate comfort level or baseline comfort level  5/27/2022 0723 by Rhina Engle RN  Outcome: Progressing  5/27/2022 0723 by Rhina Engle RN  Outcome: Progressing

## 2022-05-27 NOTE — CARE COORDINATION
Social Work:    Confirmed with RN [facility] that patient will discharge home possibly today without IVATB & has no 02. [facility] advised patient is independent and will have no needs, as patient indicated upon social work assessment.     Electronically signed by XAVI Walter on 5/27/2022 at 8:22 AM

## 2022-05-27 NOTE — PROGRESS NOTES
Pulmonary Progress Note    Admit Date: 2022                            PCP: Hang Roche MD  Principal Problem:    Pneumonia  Active Problems:    COVID-19    COPD (chronic obstructive pulmonary disease) ([facility]    HTN (hypertension)    HLD (hyperlipidemia)    Squamous carcinoma of lung ([facility]    Pulmonary embolism ([facility]  Resolved Problems:    * No resolved hospital problems. *      Subjective:  Sitting on side of bed on room air. Feels breathing is improved. Still coughing up pinkish sputum . Medications:       dexamethasone  6 mg Oral Daily    levoFLOXacin  750 mg Oral Daily    amoxicillin-clavulanate  1,800 mg Oral 2 times per day    sodium chloride flush  10 mL IntraVENous BID    apixaban  5 mg Oral BID    metoprolol succinate  50 mg Oral BID    atorvastatin  20 mg Oral Daily       Vitals:  VITALS:  /62   Pulse 69   Temp 97.3 °F (36.3 °C) (Oral)   Resp 14   Ht 5' 7\" (1.702 m)   Wt 187 lb (84.8 kg)   SpO2 95%   BMI 29.29 kg/m²   24HR INTAKE/OUTPUT:  No intake or output data in the 24 hours ending 22 1434  CURRENT PULSE OXIMETRY:  SpO2: 95 %  24HR PULSE OXIMETRY RANGE:  SpO2  Av.3 %  Min: 95 %  Max: 96 %  CVP:    VENT SETTINGS:      Additional Respiratory Assessments  Heart Rate: 69  Resp: 14  SpO2: 95 %      EXAM:  Called into the room for Covid safety precautions     I/O: No intake/output data recorded. No intake/output data recorded.      Results:  CBC:   Recent Labs     22  0425 22  0945 22  0507   WBC 8.0 4.7 6.2   HGB 12.5 12.2* 12.1*   HCT 39.1 38.1 38.0   MCV 87.9 87.4 88.2   * 112* 119*     BMP:   Recent Labs     22  0425 22  0945 22  0507    134 139   K 3.8 4.0 4.3    100 102   CO2 25 21* 25   BUN 14 19 20   CREATININE 1.0 0.8 1.0     LFT:   Recent Labs     22  1505   ALKPHOS 108   ALT 18   AST 21   PROT 7.2   BILITOT 0.7   LABALBU 4.2     PT/INR: No results for input(s): PROTIME, INR in the last 72 hours.  Procalcitonin:   Recent Labs     05/24/22  1505   PROCAL 0.21*     Cultures:  No results for input(s): CULTRESP in the last 72 hours. ABG:   No results for input(s): PH, PO2, PCO2, HCO3, BE, O2SAT in the last 72 hours. Films:  XR CHEST (2 VW)    Result Date: 5/24/2022  EXAMINATION: TWO XRAY VIEWS OF THE CHEST 5/24/2022 1:53 pm COMPARISON: Previous CT of the chest dated 09/17/2021 and previous chest x-ray of 09/20/2021 HISTORY: ORDERING SYSTEM PROVIDED HISTORY: Cough/SOB TECHNOLOGIST PROVIDED HISTORY: Reason for exam:->Cough/SOB FINDINGS: There are advanced emphysematous changes. There is an infiltrate seen within the right perihilar region. There is no right or left pleural effusion. Note is made of a right MediPort catheter. There is a dual lead cardiac pacer on the left. The heart is not enlarged. 1. Left perihilar pneumonia 2. Advanced emphysematous changes     CTA PULMONARY W CONTRAST    Result Date: 5/24/2022  EXAMINATION: CTA OF THE CHEST 5/24/2022 6:35 pm TECHNIQUE: CTA of the chest was performed after the administration of intravenous contrast.  Multiplanar reformatted images are provided for review. MIP images are provided for review. Automated exposure control, iterative reconstruction, and/or weight based adjustment of the mA/kV was utilized to reduce the radiation dose to as low as reasonably achievable. COMPARISON: September 17, 2021 HISTORY: ORDERING SYSTEM PROVIDED HISTORY: elevated d dimer TECHNOLOGIST PROVIDED HISTORY: Reason for exam:->elevated d dimer Decision Support Exception - unselect if not a suspected or confirmed emergency medical condition->Emergency Medical Condition (MA) FINDINGS: The heart and the great vessels are normal.  Trachea and major bronchi are patent. 9 mm subcarinal lymph node is identified. There are no filling defects in the main pulmonary artery and the central branches.   There is an infiltrative left hilar mass measuring 3.4 x 5.1 cm encircling the pulmonary artery and bronchi with occlusion of left lower lobe bronchi. There may be adjacent infiltrates in the superior left lower lobe concerning for postobstructive pneumonia. There is advanced emphysema. There is no pleural effusion. The liver is of normal architecture. No central pulmonary embolism or aortic dissection. A lobulated 3.4 x 5.4 cm left hilar and perihilar mass extending to left lower lobe encircling the pulmonary artery and the bronchi consistent with progressive malignancy with occlusion of left lower lobe bronchi with postobstructive pneumonitis. RECOMMENDATIONS: Unavailable        ASSESSMENT:  1. )SARS-CoV 2 Pneumonitis - suspected Omicron BA-2  - vaccinated x 2 and with booster against COVID  - sick contact wife  2.) Submassive Hemoptysis - streaky, known lung mass and on eliquis for h/o pulmonary embolism  3.) Stage IIIB T2aN3 Squamous Cell Carcinoma of Lung   - diagnosis 5/27/2021, PD-L1 0%  - treatment with concurrent chemotherapy/radiation therapy    - consolidated with Imfinzi (Durvalumab) per ID IV every 4 weeks   4.) H/O RLL Subsegmental Pulmonary Embolism - CTA Chest 9/17/2021  5.) Radiation Pneumonitis/Fibrosis Left Lung   6.) COPD, No Acute Exacerbation   7.) Hypomagnesemia   8.) Pancytopenia   9.) H/O Wide-complex tachycardia (HCC)--atrial tachycardia with RV pacing  10.) H/O Thrombocytopenia - secondary to chemotherapy   11.) H/O Tobacco Abuse      PLAN:  1.) Continue decadron,   2.) O2 if needed remains on room air, maintain POX >90%   3.) IS,   4.) zosyn per ID  5.) defer on bronchoscopy at this time as the LLL changes are thought to be inflammatory/pneumonitis related   6.) no role for remdesivir at this time as patient on room air   7.) paxlovid contraindicated as patient on eliquis and statin (REFERENCE)  8.) look to D/C with decadron taper soon   9.) supportive care to continue   10.) DVT Prophylaxis   Stable from a pulmonary standpoint      - patient on room air  - decadron to PO 6 mg d day for 5 more days   - abx per ID  - defer on bronchoscopy at this time  - await D/C planning  - follow up outpatient with pulmonary [facility] office in 1 Ashlee Mckeon MD  5/27/2022  2:34 PM

## 2022-05-27 NOTE — PROGRESS NOTES
[facility] Quality Flow/Interdisciplinary Rounds Progress Note        Quality Flow Rounds held on May 27, 2022    Disciplines Attending:  Bedside Nurse, ,  and Nursing Unit Leadership    Anastasia Estrada was admitted on 5/24/2022  3:10 PM    Anticipated Discharge Date:       Disposition:    Kojo Score:  Kojo Scale Score: 22    Readmission Risk              Risk of Unplanned Readmission:  0           Discussed patient goal for the day, patient clinical progression, and barriers to discharge.   The following Goal(s) of the Day/Commitment(s) have been identified:  IV Antibiotics - Obtain Order to Convert to Oral      Elizabeth Juarez RN  May 27, 2022

## 2022-05-27 NOTE — PROGRESS NOTES
P Quality Flow/Interdisciplinary Rounds Progress Note        Quality Flow Rounds held on May 26, 2022    Disciplines Attending:  Bedside Nurse, ,  and Nursing Unit Leadership    Mindi De La Cruz was admitted on 5/24/2022  3:10 PM    Anticipated Discharge Date:   05/27/2022    Disposition:    Kojo Score:  Kojo Scale Score: 21    Readmission Risk              Risk of Unplanned Readmission:  0           Discussed patient goal for the day, patient clinical progression, and barriers to discharge.   The following Goal(s) of the Day/Commitment(s) have been identified:check ID plan    Estella Sexton RN  May 26, 2022

## 2022-05-27 NOTE — PROGRESS NOTES
Called answering service as patient is requesting cough medication. Awaiting call back from Dr. Jamee Cisneros.

## 2022-05-27 NOTE — PROGRESS NOTES
[address] [place] [address] [facility]  Progress Note    SUBJECTIVE:  Chief Complaint   Patient presents with    Shortness of Breath     x 2 days     Cough     states coughed up blood, on elliquis     Chills     Patient laying in bed, awake and alert on RA in no distress  States he is feeling well today, hopeful to go home today  Patient is tolerating medications. No reported adverse drug reactions. No nausea, vomiting, diarrhea. No fevers     Review of systems:  As stated above in the chief complaint, otherwise negative. Medications:  Scheduled Meds:   sodium chloride flush  10 mL IntraVENous BID    piperacillin-tazobactam  4,500 mg IntraVENous Q8H    dexamethasone  6 mg IntraVENous Q12H    apixaban  5 mg Oral BID    metoprolol succinate  50 mg Oral BID    atorvastatin  20 mg Oral Daily     Continuous Infusions:   PRN Meds:benzonatate, sodium chloride flush **AND** sodium chloride flush, albuterol sulfate HFA    OBJECTIVE:  /62   Pulse 69   Temp 97.3 °F (36.3 °C) (Oral)   Resp 14   Ht 5' 7\" (1.702 m)   Wt 187 lb (84.8 kg)   SpO2 95%   BMI 29.29 kg/m²   Temp  Av.8 °F (36.6 °C)  Min: 97.3 °F (36.3 °C)  Max: 98.1 °F (36.7 °C)  Constitutional: The patient is awake, alert, and oriented. Laying bed  Skin: Warm and dry. No rashes were noted. HEENT: Round and reactive pupils. Moist mucous membranes. No ulcerations or thrush. Neck: Supple to movements. Chest: No use of accessory muscles to breathe. Symmetrical expansion. L side crackles and rhonchi. Cardiovascular: S1 and S2 are rhythmic and regular. No murmurs appreciated. Abdomen: Positive bowel sounds to auscultation. Benign to palpation. No masses felt. No hepatosplenomegaly. Extremities: No clubbing, no cyanosis, no edema.   Lines: peripheral    Laboratory and Tests Review:  Lab Results   Component Value Date    WBC 6.2 2022    WBC 4.7 2022    WBC 8.0 2022    HGB 12.1 (L) 2022    HCT 38.0

## 2022-05-27 NOTE — PROGRESS NOTES
Internal Medicine  Progress Note  Jessica Membreno MD     Subjective:     Patient is alert. Patient reports better. Tolerating diet well no other c/o. Scheduled Meds:   sodium chloride flush  10 mL IntraVENous BID    piperacillin-tazobactam  4,500 mg IntraVENous Q8H    dexamethasone  6 mg IntraVENous Q12H    apixaban  5 mg Oral BID    metoprolol succinate  50 mg Oral BID    atorvastatin  20 mg Oral Daily     Continuous Infusions:  PRN Meds:sodium chloride flush **AND** sodium chloride flush, albuterol sulfate HFA    Objective:      Physical Exam:  Vitals:   /74   Pulse 69   Temp 97.9 °F (36.6 °C) (Oral)   Resp 18   Ht 5' 7\" (1.702 m)   Wt 187 lb (84.8 kg)   SpO2 96%   BMI 29.29 kg/m²   Orthostatic BPs and Heart Rates:     I/O's  No intake or output data in the 24 hours ending 05/27/22 0608  Exam:  General appearance: alert, appears stated age and cooperative  Head: Normocephalic, without obvious abnormality, atraumatic  Eyes: conjunctivae/corneas clear. PERRL, EOM's intact. Fundi benign. Throat: lips, mucosa, and tongue normal; teeth and gums normal  Neck: no adenopathy, no carotid bruit, no JVD, supple, symmetrical, trachea midline and thyroid not enlarged, symmetric, no tenderness/mass/nodules  Back: symmetric, no curvature. ROM normal. No CVA tenderness.   Lungs: clear to auscultation bilaterally  Chest wall: no tenderness  Heart: regular rate and rhythm, S1, S2 normal, no murmur, click, rub or gallop  Abdomen: soft, non-tender; bowel sounds normal; no masses,  no organomegaly  Extremities: extremities normal, atraumatic, no cyanosis or edema  Pulses: 2+ and symmetric  Skin: Skin color, texture, turgor normal. No rashes or lesions  Lymph nodes: Cervical, supraclavicular, and axillary nodes normal.  Neurologic: Grossly normal      Imaging     Chest  Xray:  Results for orders placed during the hospital encounter of 05/24/22    XR CHEST (2 VW)    Narrative  EXAMINATION:  TWO XRAY VIEWS OF THE CHEST    5/24/2022 1:53 pm    COMPARISON:  Previous CT of the chest dated 09/17/2021 and previous chest x-ray of  09/20/2021    HISTORY:  ORDERING SYSTEM PROVIDED HISTORY: Cough/SOB  TECHNOLOGIST PROVIDED HISTORY:  Reason for exam:->Cough/SOB    FINDINGS:  There are advanced emphysematous changes. There is an infiltrate seen within the right perihilar region. There is no  right or left pleural effusion. Note is made of a right MediPort catheter. There is a dual lead cardiac  pacer on the left. The heart is not enlarged. Impression  1. Left perihilar pneumonia  2. Advanced emphysematous changes    Results for orders placed during the hospital encounter of 09/17/21    XR CHEST PORTABLE    Narrative  EXAMINATION:  ONE XRAY VIEW OF THE CHEST    9/20/2021 11:05 am    COMPARISON:  09/17/2021    HISTORY:  ORDERING SYSTEM PROVIDED HISTORY: worsening dyspnea  TECHNOLOGIST PROVIDED HISTORY:  Reason for exam:->worsening dyspnea  What reading provider will be dictating this exam?->CRC    FINDINGS:  Stable position of right MediPort catheter. Left pacemaker. Cardiomediastinal silhouette is stable. Stable patchy left lung base  opacity. Possible small left pleural effusion versus pleural thickening. No  pneumothorax. No acute osseous abnormality.     Impression  No significant change since the prior study      Additional Imaging:  none    Lab Review   Recent Results (from the past 24 hour(s))   CBC with Auto Differential    Collection Time: 05/26/22  9:45 AM   Result Value Ref Range    WBC 4.7 4.5 - 11.5 E9/L    RBC 4.36 3.80 - 5.80 E12/L    Hemoglobin 12.2 (L) 12.5 - 16.5 g/dL    Hematocrit 38.1 37.0 - 54.0 %    MCV 87.4 80.0 - 99.9 fL    MCH 28.0 26.0 - 35.0 pg    MCHC 32.0 32.0 - 34.5 %    RDW 15.9 (H) 11.5 - 15.0 fL    Platelets 368 (L) 195 - 450 E9/L    MPV 10.9 7.0 - 12.0 fL    Neutrophils % 93.9 (H) 43.0 - 80.0 %    Lymphocytes % 4.4 (L) 20.0 - 42.0 %    Monocytes % 1.7 (L) 2.0 - 12.0 %    Eosinophils % 0.0 0.0 - 6.0 %    Basophils % 0.0 0.0 - 2.0 %    Neutrophils Absolute 4.42 1.80 - 7.30 E9/L    Lymphocytes Absolute 0.19 (L) 1.50 - 4.00 E9/L    Monocytes Absolute 0.09 (L) 0.10 - 0.95 E9/L    Eosinophils Absolute 0.00 (L) 0.05 - 0.50 E9/L    Basophils Absolute 0.00 0.00 - 0.20 E9/L    nRBC 0.0 /100 WBC    Poikilocytes 1+     [place] Cells 1+    Basic Metabolic Panel    Collection Time: 05/26/22  9:45 AM   Result Value Ref Range    Sodium 134 132 - 146 mmol/L    Potassium 4.0 3.5 - 5.0 mmol/L    Chloride 100 98 - 107 mmol/L    CO2 21 (L) 22 - 29 mmol/L    Anion Gap 13 7 - 16 mmol/L    Glucose 292 (H) 74 - 99 mg/dL    BUN 19 6 - 23 mg/dL    CREATININE 0.8 0.7 - 1.2 mg/dL    GFR Non-African American >60 >=60 mL/min/1.73    GFR African American >60     Calcium 9.3 8.6 - 10.2 mg/dL   CBC with Auto Differential    Collection Time: 05/27/22  5:07 AM   Result Value Ref Range    WBC 6.2 4.5 - 11.5 E9/L    RBC 4.31 3.80 - 5.80 E12/L    Hemoglobin 12.1 (L) 12.5 - 16.5 g/dL    Hematocrit 38.0 37.0 - 54.0 %    MCV 88.2 80.0 - 99.9 fL    MCH 28.1 26.0 - 35.0 pg    MCHC 31.8 (L) 32.0 - 34.5 %    RDW 15.8 (H) 11.5 - 15.0 fL    Platelets 577 (L) 628 - 450 E9/L    MPV 11.3 7.0 - 12.0 fL   Basic Metabolic Panel    Collection Time: 05/27/22  5:07 AM   Result Value Ref Range    Sodium 139 132 - 146 mmol/L    Potassium 4.3 3.5 - 5.0 mmol/L    Chloride 102 98 - 107 mmol/L    CO2 25 22 - 29 mmol/L    Anion Gap 12 7 - 16 mmol/L    Glucose 152 (H) 74 - 99 mg/dL    BUN 20 6 - 23 mg/dL    CREATININE 1.0 0.7 - 1.2 mg/dL    GFR Non-African American >60 >=60 mL/min/1.73    GFR African American >60     Calcium 9.2 8.6 - 10.2 mg/dL     Assessment:     Principal Problem:    Pneumonia  Active Problems:    COVID-19    COPD (chronic obstructive pulmonary disease) (HCC)    HTN (hypertension)    HLD (hyperlipidemia)    Squamous carcinoma of lung (HCC)    Pulmonary embolism (HCC)  Resolved Problems:    * No resolved hospital problems.  *      Plan:   Looks good possibly home if OK with pulm  Farooq Allison MD  5/27/2022  6:08 AM

## 2022-05-29 LAB
BLOOD CULTURE, ROUTINE: NORMAL
CULTURE, BLOOD 2: NORMAL

## 2022-05-31 ENCOUNTER — CARE COORDINATION (OUTPATIENT)
Dept: CASE MANAGEMENT | Age: 72
End: 2022-05-31

## 2022-05-31 RX ORDER — ALBUTEROL SULFATE 0.63 MG/3ML
1 SOLUTION RESPIRATORY (INHALATION) EVERY 6 HOURS PRN
COMMUNITY

## 2022-05-31 NOTE — CARE COORDINATION
time asked that this CTN speak with his wife, Heladio Rosenberg, as she is the one that schedules his appts. Pt provided CTN with permission to speak to Heladio Rosenberg. Heladio Rosenberg denies needing any assistance with scheduling. She did want to review needed HFU appts with CTN. She voiced understanding of needed f/u appts and has all contact information for each on the pt's AVS. Pt/wife voiced no active needs/concerns at this time. Pt was agreeable to continued outreaches from this CTN. Reviewed New or Changed Meds: yes    Do you have what you need at home?  Durable Medical Equipment ordered at discharge: None. Pt previously ordered O2 prn at home. Has if needed. Pt does not have an IS. CTN encouraged cough and deep breathing exercises.  Home Health/Outpatient orders at discharge: none   Was patient discharged with a pulse oximeter? Pt has his own. Discussed when to notify provider or seek emergency care. Pt states that he is maintaining >92% on RA. Patient education provided: Reviewed appropriate site of care based on symptoms and resources available to patient including: PCP  Specialist  When to call 911. Follow up appointment scheduled within 7 days of discharge: no. If no appointment scheduled, scheduling offered: yes~Both pt and wife declined assistance in scheduling HFU appts. They prefer to call on their own to schedule. Future Appointments   Date Time Provider Zana Muñiz   6/16/2022 10:15 AM SCHEDULE, [employer] DEVICE [facility]   10/5/2022 10:00 AM LELO Sun - CNP [facility]       Interventions: Scheduled appointment with PCP-see above  Scheduled appointment with Specialist-Dr. Zac Smith (ID)~pt prefers to call to schedule. Dr. Robin Kerr (pulm)~Pt prefers to call on his own to schedule  Obtained and reviewed discharge summary and/or continuity of care documents  Assessment and support for treatment adherence and medication management-[id number] not entered, as non MH pcp.  Confirmed pt

## 2022-06-08 ENCOUNTER — CARE COORDINATION (OUTPATIENT)
Dept: CASE MANAGEMENT | Age: 72
End: 2022-06-08

## 2022-06-08 NOTE — CARE COORDINATION
[address]  COVID-19 Monitoring Care Transitions Follow Up Call    2022    Patient: Yessenia Reyes  Patient : 1950   MRN: [id number]  Reason for Admission: COVID-19  Discharge Date: 22 RARS: Readmission Risk Score: 21         Patient contacted regarding COVID-19 diagnosis. +COVID-19 22    Care Transition Nurse contacted the patient by telephone to perform follow-up assessment. Patient has following risk factors of: COPD  Lung Ca, PE, lung fibrosis, emphysema, hld. Symptoms reviewed with patient who verbalized the following symptoms: cough  no new symptoms  no worsening symptoms. Called and spoke with pt for a sub COVID-19 monitoring care transition call. Pt states that he is doing \"good. \" Pt reports that his SaO2 levels have been maintaining >90% on RA. No need to increase nebulizer treatments or use home O2. Pt typically doing nebulizer treatments 1-2 x/day. Pt denies any sob, wheezing, chest tightness, or chest congestion. Pt has a scheduled f/u with his regular pulmonologist, Dr. Belle Figueroa, at the end of . Pt states that he completed his HFU appt with his pcp on 6/3/22. Pt states no new orders/testing at this appt. He was advised to f/u again once he completed his OV with pulmonary. Pt only complaint was that he is not sleeping well at night. He asked CTN suggestions on medications to take to help him sleep. CTN advised that pt contact his pcp and discuss his recommendations, as it is out of this CTN's scope to recommend medications. Pt voiced understanding and states that he can contact his pcp about this. Pt did not voice any other needs/concerns. Pt was agreeable to continued outreaches from this CTN. CTN reviewed medical action plan and red flag symptoms with the patient who verbalized understanding. Patient was given an opportunity to verbalize any questions and concerns and agrees to contact CTN or health care provider for questions related to their healthcare.     Was patient discharged with a pulse oximeter? Pt has his own at home. SaO2 remaining >90% per pt. Has O2 prn at home, but has not had to use since discharged home. Discussed when to notify provider or seek emergency care. CTN provided contact information. Plan for follow-up call in 7-10 days based on severity of symptoms and risk factors.         Follow Up  Future Appointments   Date Time Provider Zana Muñiz   6/16/2022 10:15 AM Mustapha Tiwari DEVICE [facility]   10/5/2022 10:00 AM LELO Ignacio - CNP [facility] Shola Agrawal RN

## 2022-06-14 ENCOUNTER — CARE COORDINATION (OUTPATIENT)
Dept: CASE MANAGEMENT | Age: 72
End: 2022-06-14

## 2022-06-14 NOTE — CARE COORDINATION
[facility]) COVID-19 Monitoring Care Transitions Follow Up Call    2022    Patient: Audrey Cantu  Patient : 1950   MRN: [id number]  Reason for Admission: COVID-19  Discharge Date: 22 RARS: Readmission Risk Score: 21      Patient contacted regarding COVID-19 diagnosis. +COVID-19 22    Care Transition Nurse contacted the patient by telephone to perform follow-up assessment. Patient has following risk factors of: COPD  immunocompromised  lung CA. Symptoms reviewed with patient who verbalized the following symptoms: fatigue  cough  shortness of breath  no new symptoms  no worsening symptoms. Due to no new or worsening symptoms provider was not contacted. Called and spoke with pt for a sub COVID-19 monitoring care transition call. Pt sounded sob upon answering the phone. Pt admits that he just was walking up some steps before answering the phone. Pt reports that he is not doing so good today. Pt reports that he was at his oncology appt today and was told that his immunotherapy was on hold and that his tumor was increasing in size. Pt states that he was told that he would be starting chemotherapy in about 2 weeks and was advised by his oncologist to have a PET scan done. Pt states that he feels that he is at his baseline from a pulmonary standpoint. Pt states that he occasionally does have some blood-tinged sputum that he brings up. Pt does admit to baseline sob with exertion. He states that his SaO2 levels today were 97% at his oncology appt on RA. Pt states that he continues to use his nebulizer at home at least twice daily. Denies any increase in use. Pt states that he follows up with his pulmonologist, Dr. Janene Jaime, in about 2 weeks on 22. CTN advised pt that if he feels that his symptoms are worsening that he should contact his pulmonologist right away. Pt voiced understanding.  Pt states that he would like to keep his appt with Dr. Janene Jaime on 22, as he is hoping that he will get his PET scan done before then. Pt voiced no needs/concerns today. Pt was encouraged to call this CTN if he had any needs/concerns that arise. He voiced understanding and was agreeable to ongoing outreaches from this CTN. CTN reviewed medical action plan and red flag symptoms with the patient who verbalized understanding. Patient was given an opportunity to verbalize any questions and concerns and agrees to contact CTN or health care provider for questions related to their healthcare. Was patient discharged with a pulse oximeter? Pt has his own. Reports was at Dr. Phill Roe office (onc) today and SaO2 readings there on RA was 97%. Discussed when to notify provider or seek emergency care. CTN provided contact information. Plan for follow-up call in 3-5 days based on severity of symptoms and risk factors.

## 2022-06-16 ENCOUNTER — NURSE ONLY (OUTPATIENT)
Dept: NON INVASIVE DIAGNOSTICS | Age: 72
End: 2022-06-16
Payer: MEDICARE

## 2022-06-16 VITALS — SYSTOLIC BLOOD PRESSURE: 142 MMHG | DIASTOLIC BLOOD PRESSURE: 54 MMHG

## 2022-06-16 DIAGNOSIS — I49.5 SINOATRIAL NODE DYSFUNCTION (HCC): Primary | ICD-10-CM

## 2022-06-16 DIAGNOSIS — Z95.0 CARDIAC PACEMAKER IN SITU: ICD-10-CM

## 2022-06-16 PROCEDURE — 93288 INTERROG EVL PM/LDLS PM IP: CPT | Performed by: INTERNAL MEDICINE

## 2022-06-16 RX ORDER — MIRTAZAPINE 15 MG/1
15 TABLET, FILM COATED ORAL DAILY
COMMUNITY
End: 2022-08-17

## 2022-06-16 NOTE — PROGRESS NOTES
History: This is a patient who presents to the office today for evaluation of recurrent syncope. His problem list includes the followin. Hypertension for more than 10 years. 2. Long-term smoking. 3. Chronic obstructive pulmonary disease. 4. Hyperlipidemia. 5. Recurrent episodes of syncope for possibly eight years. The patient's episodes started in . 6. Initial evaluation at the [facility] in , when he was seen by Cardiology and Neurology. 7. Implant of an Abbott confirm [id number] OE#[id number] on 20.  8. Evidence of CHB found on ICM. 9. Implant of an Abbott [id number] [employer] OW#[id number], atrial lead is an [facility]#[id number], ventricular lead is a [facility] XJ#[id number] on 21. 10. Lung cancer, starting chemo in a few weeks    Vaccinated & Boosted    PACEMAKER INTERROGATION:  DDI   1. Battery voltage:  3.01 V  2. Battery longevity:  8.0-10.7 years  3. Impedances:  A:  410 ohms     RV:  510 ohms  4. Amplitudes:  A:  4.7 mV      V: > 12 mV  5. Thresholds: A: 1.0 V @ 0.5  ms       RV: 1.25 V @ 0.5  ms  6. AP  6.5 %          3 %  7. No episode detected. ASSESSMENT:  Appropriate pacemaker function. Plan:  1. Pacemaker follow-up in 6 months. 2.  Merlin transmission in 3 months. 3.  Pt is aware that continuous home monitoring is strongly recommended. Joceline Parks M.D., F.A.C.C.

## 2022-06-17 ENCOUNTER — CARE COORDINATION (OUTPATIENT)
Dept: CASE MANAGEMENT | Age: 72
End: 2022-06-17

## 2022-06-17 NOTE — CARE COORDINATION
[facility] Transitions Follow Up Call    2022    Patient: Vincent Srivastava  Patient : 1950   MRN: [id number]  Reason for Admission: COVID  Discharge Date: 22 RARS: Readmission Risk Score: 21      Patient contacted regarding COVID-19 diagnosis. +COVID-19 22    Care Transition Nurse contacted the patient by telephone to perform follow-up assessment. Patient has following risk factors of: COPD  immunocompromised  Lung Ca, lung fibrosis, htn. Symptoms reviewed with patient who verbalized the following symptoms: cough  shortness of breath  dizziness/lightheadedness  no new symptoms  no worsening symptoms. Due to no new or worsening symptoms provider was not contacted. Called and spoke with pt for a sub COVID-19 monitoring care transition call. CTN checking up on pt today for status. Pt states that he is feeling a little bit better today. States that he feels that he is at his baseline from a pulmonary standpoint. Pt has baseline sob. Pt reports cough is improving and bringing up less phlegm. Pt states that on occasion he does get dizzy if exerting himself too much. He states that he does check his SaO2 readings when this occurs and reports that they are \"good\" with levels still remaining >90%. Pt denies any increased use of his nebulizer. Pt confirms he has a f/u with Dr. Mark Phillips (pulm) on 22. Pt now has his PET scan scheduled for 22. Pt denies any needs/concerns at this time. He was agreeable to continued outreaches from this CTN. CTN reviewed discharge instructions, medical action plan and red flag symptoms with the patient who verbalized understanding. Patient was given an opportunity to verbalize any questions and concerns and agrees to contact CTN or health care provider for questions related to their healthcare. Was patient discharged with a pulse oximeter? Pt has his own. SaO2 remaining >90% on RA. No need for prn O2 recently.  Continues to use nebulizer at least twice daily. No increase in use. Discussed when to notify provider or seek emergency care. CTN provided contact information. Plan for follow-up call in 5-7 days based on severity of symptoms and risk factors.

## 2022-06-23 ENCOUNTER — CARE COORDINATION (OUTPATIENT)
Dept: CASE MANAGEMENT | Age: 72
End: 2022-06-23

## 2022-06-23 RX ORDER — PREDNISONE 1 MG/1
5 TABLET ORAL DAILY
COMMUNITY

## 2022-06-23 NOTE — CARE COORDINATION
[facility]) COVID-19 Monitoring Care Transitions Follow Up Call    2022    Patient: Steven Osborn  Patient : 1950   MRN: [id number]  Reason for Admission: COVID-19  Discharge Date: 22 RARS: Readmission Risk Score: 21       Patient contacted regarding COVID-19 diagnosis. +COVID-19 22     Care Transition Nurse contacted the patient by telephone to perform follow-up assessment. Patient has following risk factors of: COPD  immunocompromised  pulmonary fibrosis, lung CA. Symptoms reviewed with patient who verbalized the following symptoms: shortness of breath  no new symptoms  no worsening symptoms. Due to no new or worsening symptoms provider was not contacted. Spoke to pt for a COVID-19 monitoring care transition call. Pt states that he feels at his baseline today. Pt has baseline sob with exertion. Pt states that when the humidity/heat is up he will have more sob. Pt states that his SaO2 ranges have been \"good\" and ranging between 94-95%. Pt states that his mucus production has decreased. Pt states that he has a scant speck of blood in the sputum. He states that this has almost resolved. Pt states that he takes albuterol via nebulizer at least BID. No recent increase in use. Pt takes Prednisone 5mg daily routinely. Pt has a f/u scheduled with Dr. Rai Obrien on 22 that he intends on going to. Pt reports that he has a PET scan scheduled for tomorrow. Pt voices no needs/concerns at this time. CTN encouraged pt to contact Dr. Rai Obrien if he feels his condition is worsening or proceed to the ED if he is unable to keep SaO2 levels up or is experience increased sob. Pt voiced understanding. Pt is agreeable to a final outreach next week. CTN reviewed medical action plan and red flag symptoms with the patient who verbalized understanding.  Patient was given an opportunity to verbalize any questions and concerns and agrees to contact CTN or health care provider for questions related to their healthcare. Was patient discharged with a pulse oximeter? Pt has his own. Reports SaO2 ranging between 94-95% on RA. No reports of desaturation. Discussed when to notify provider or seek emergency care. CTN provided contact information. Plan for follow-up call in 5-7 days based on severity of symptoms and risk factors.

## 2022-06-29 ENCOUNTER — CARE COORDINATION (OUTPATIENT)
Dept: CASE MANAGEMENT | Age: 72
End: 2022-06-29

## 2022-06-29 NOTE — CARE COORDINATION
[facility]) COVID-19 Monitoring Care Transitions Follow Up Call    2022    Patient: Steven Osborn  Patient : 1950   MRN: [id number]  Reason for Admission: COVID-19  Discharge Date: 22 RARS: Readmission Risk Score: 21       Patient resolved from the Care Transitions episode on 22                Patient currently reports that the following symptoms have improved:  fatigue, cough, shortness of breath and no new/worsening symptoms     Spoke with pt for a final COVID-19 monitoring care transition call. Pt states that he feels about the same. Pt reports that he has been having increased sob over the past week and saw his pulmonologist this week. Pt saw Dr. Rai Obrien (pulm) on 22 and was prescribed a Prednisone taper to help. Pt states that he started this last night. Pt continues to use his nebulizer about twice daily, which is his baseline. Pt reports that SaO2 levels are still maintaining >90%. Has been maintaining between 94-95%. Pt states that he will f/u with Dr. Rai Obrien in about 6 weeks. Pt also had a f/u with his pcp this week and will f/u in about 1 month. Pt states that he completed his PET scan on 22 and was told that his tumor was increasing in size and was advised to restart chemotherapy. Pt states that he starts chemo tomorrow at the [facility]. Pt is established with his oncologist, Dr. Ifeoma Rodriguez. Pt voiced no active needs/concerns at this time. Pt agreeable to today being the final outreach from this CTN. CTN to sign off at this time. No further outreach scheduled with this CTN. Episode of Care resolved. Patient has this CTN contact information if future needs arise.

## 2022-08-16 ENCOUNTER — APPOINTMENT (OUTPATIENT)
Dept: GENERAL RADIOLOGY | Age: 72
DRG: 228 | End: 2022-08-16
Payer: MEDICARE

## 2022-08-16 ENCOUNTER — APPOINTMENT (OUTPATIENT)
Dept: CT IMAGING | Age: 72
DRG: 228 | End: 2022-08-16
Payer: MEDICARE

## 2022-08-16 ENCOUNTER — HOSPITAL ENCOUNTER (INPATIENT)
Age: 72
LOS: 18 days | Discharge: NURSING, REHAB OR LONG-TERM CARE | DRG: 228 | End: 2022-09-03
Attending: STUDENT IN AN ORGANIZED HEALTH CARE EDUCATION/TRAINING PROGRAM | Admitting: INTERNAL MEDICINE
Payer: MEDICARE

## 2022-08-16 DIAGNOSIS — J18.9 PNEUMONIA DUE TO INFECTIOUS ORGANISM, UNSPECIFIED LATERALITY, UNSPECIFIED PART OF LUNG: Primary | ICD-10-CM

## 2022-08-16 DIAGNOSIS — T80.219A INFECTED VENOUS ACCESS PORT, INITIAL ENCOUNTER: ICD-10-CM

## 2022-08-16 DIAGNOSIS — D70.9 NEUTROPENIA, UNSPECIFIED TYPE (HCC): ICD-10-CM

## 2022-08-16 DIAGNOSIS — D69.6 THROMBOCYTOPENIA (HCC): ICD-10-CM

## 2022-08-16 DIAGNOSIS — Z79.01 CHRONIC ANTICOAGULATION: ICD-10-CM

## 2022-08-16 DIAGNOSIS — T82.7XXA PACEMAKER INFECTION, INITIAL ENCOUNTER (HCC): ICD-10-CM

## 2022-08-16 LAB
ALBUMIN SERPL-MCNC: 3.7 G/DL (ref 3.5–5.2)
ALP BLD-CCNC: 92 U/L (ref 40–129)
ALT SERPL-CCNC: 22 U/L (ref 0–40)
ANION GAP SERPL CALCULATED.3IONS-SCNC: 15 MMOL/L (ref 7–16)
ANISOCYTOSIS: ABNORMAL
AST SERPL-CCNC: 18 U/L (ref 0–39)
BACTERIA: ABNORMAL /HPF
BASOPHILS ABSOLUTE: 0 E9/L (ref 0–0.2)
BASOPHILS RELATIVE PERCENT: 0.3 % (ref 0–2)
BILIRUB SERPL-MCNC: 0.8 MG/DL (ref 0–1.2)
BILIRUBIN URINE: NEGATIVE
BLOOD, URINE: ABNORMAL
BUN BLDV-MCNC: 16 MG/DL (ref 6–23)
CALCIUM SERPL-MCNC: 8.9 MG/DL (ref 8.6–10.2)
CHLORIDE BLD-SCNC: 99 MMOL/L (ref 98–107)
CLARITY: CLEAR
CO2: 21 MMOL/L (ref 22–29)
COLOR: YELLOW
CREAT SERPL-MCNC: 0.9 MG/DL (ref 0.7–1.2)
EOSINOPHILS ABSOLUTE: 0 E9/L (ref 0.05–0.5)
EOSINOPHILS RELATIVE PERCENT: 0 % (ref 0–6)
GFR AFRICAN AMERICAN: >60
GFR NON-AFRICAN AMERICAN: >60 ML/MIN/1.73
GLUCOSE BLD-MCNC: 119 MG/DL (ref 74–99)
GLUCOSE URINE: NEGATIVE MG/DL
HCT VFR BLD CALC: 24.4 % (ref 37–54)
HEMOGLOBIN: 8 G/DL (ref 12.5–16.5)
KETONES, URINE: NEGATIVE MG/DL
LACTIC ACID, SEPSIS: 2.2 MMOL/L (ref 0.5–1.9)
LEUKOCYTE ESTERASE, URINE: ABNORMAL
LYMPHOCYTES ABSOLUTE: 0.22 E9/L (ref 1.5–4)
LYMPHOCYTES RELATIVE PERCENT: 7 % (ref 20–42)
MCH RBC QN AUTO: 29.2 PG (ref 26–35)
MCHC RBC AUTO-ENTMCNC: 32.8 % (ref 32–34.5)
MCV RBC AUTO: 89.1 FL (ref 80–99.9)
MONOCYTES ABSOLUTE: 0.03 E9/L (ref 0.1–0.95)
MONOCYTES RELATIVE PERCENT: 0.9 % (ref 2–12)
NEUTROPHILS ABSOLUTE: 2.94 E9/L (ref 1.8–7.3)
NEUTROPHILS RELATIVE PERCENT: 92.1 % (ref 43–80)
NITRITE, URINE: NEGATIVE
PDW BLD-RTO: 19.4 FL (ref 11.5–15)
PH UA: 6 (ref 5–9)
PLATELET # BLD: 7 E9/L (ref 130–450)
PLATELET CONFIRMATION: NORMAL
PMV BLD AUTO: ABNORMAL FL (ref 7–12)
POIKILOCYTES: ABNORMAL
POLYCHROMASIA: ABNORMAL
POTASSIUM REFLEX MAGNESIUM: 3.7 MMOL/L (ref 3.5–5)
PRO-BNP: 1207 PG/ML (ref 0–125)
PROTEIN UA: NEGATIVE MG/DL
RBC # BLD: 2.74 E12/L (ref 3.8–5.8)
RBC UA: >20 /HPF (ref 0–2)
SARS-COV-2, NAAT: NOT DETECTED
SODIUM BLD-SCNC: 135 MMOL/L (ref 132–146)
SPECIFIC GRAVITY UA: <=1.005 (ref 1–1.03)
TEAR DROP CELLS: ABNORMAL
TOTAL PROTEIN: 6.2 G/DL (ref 6.4–8.3)
TROPONIN, HIGH SENSITIVITY: 27 NG/L (ref 0–11)
TROPONIN, HIGH SENSITIVITY: 29 NG/L (ref 0–11)
UROBILINOGEN, URINE: 1 E.U./DL
WBC # BLD: 3.2 E9/L (ref 4.5–11.5)
WBC UA: ABNORMAL /HPF (ref 0–5)

## 2022-08-16 PROCEDURE — 6370000000 HC RX 637 (ALT 250 FOR IP): Performed by: NURSE PRACTITIONER

## 2022-08-16 PROCEDURE — 87040 BLOOD CULTURE FOR BACTERIA: CPT

## 2022-08-16 PROCEDURE — 96365 THER/PROPH/DIAG IV INF INIT: CPT

## 2022-08-16 PROCEDURE — 94664 DEMO&/EVAL PT USE INHALER: CPT

## 2022-08-16 PROCEDURE — 87150 DNA/RNA AMPLIFIED PROBE: CPT

## 2022-08-16 PROCEDURE — 2060000000 HC ICU INTERMEDIATE R&B

## 2022-08-16 PROCEDURE — 96375 TX/PRO/DX INJ NEW DRUG ADDON: CPT

## 2022-08-16 PROCEDURE — 83605 ASSAY OF LACTIC ACID: CPT

## 2022-08-16 PROCEDURE — 99285 EMERGENCY DEPT VISIT HI MDM: CPT

## 2022-08-16 PROCEDURE — 84484 ASSAY OF TROPONIN QUANT: CPT

## 2022-08-16 PROCEDURE — 85025 COMPLETE CBC W/AUTO DIFF WBC: CPT

## 2022-08-16 PROCEDURE — 80053 COMPREHEN METABOLIC PANEL: CPT

## 2022-08-16 PROCEDURE — 81001 URINALYSIS AUTO W/SCOPE: CPT

## 2022-08-16 PROCEDURE — 87186 SC STD MICRODIL/AGAR DIL: CPT

## 2022-08-16 PROCEDURE — 87088 URINE BACTERIA CULTURE: CPT

## 2022-08-16 PROCEDURE — 6360000002 HC RX W HCPCS: Performed by: EMERGENCY MEDICINE

## 2022-08-16 PROCEDURE — 93005 ELECTROCARDIOGRAM TRACING: CPT | Performed by: EMERGENCY MEDICINE

## 2022-08-16 PROCEDURE — 70450 CT HEAD/BRAIN W/O DYE: CPT

## 2022-08-16 PROCEDURE — 2500000003 HC RX 250 WO HCPCS: Performed by: EMERGENCY MEDICINE

## 2022-08-16 PROCEDURE — 83880 ASSAY OF NATRIURETIC PEPTIDE: CPT

## 2022-08-16 PROCEDURE — 2580000003 HC RX 258: Performed by: EMERGENCY MEDICINE

## 2022-08-16 PROCEDURE — 6370000000 HC RX 637 (ALT 250 FOR IP): Performed by: EMERGENCY MEDICINE

## 2022-08-16 PROCEDURE — 87635 SARS-COV-2 COVID-19 AMP PRB: CPT

## 2022-08-16 PROCEDURE — 71045 X-RAY EXAM CHEST 1 VIEW: CPT

## 2022-08-16 RX ORDER — METOPROLOL SUCCINATE 25 MG/1
50 TABLET, EXTENDED RELEASE ORAL 2 TIMES DAILY WITH MEALS
Status: DISCONTINUED | OUTPATIENT
Start: 2022-08-17 | End: 2022-08-29

## 2022-08-16 RX ORDER — SODIUM CHLORIDE 0.9 % (FLUSH) 0.9 %
5-40 SYRINGE (ML) INJECTION EVERY 12 HOURS SCHEDULED
Status: DISCONTINUED | OUTPATIENT
Start: 2022-08-16 | End: 2022-08-29 | Stop reason: SDUPTHER

## 2022-08-16 RX ORDER — 0.9 % SODIUM CHLORIDE 0.9 %
500 INTRAVENOUS SOLUTION INTRAVENOUS ONCE
Status: COMPLETED | OUTPATIENT
Start: 2022-08-16 | End: 2022-08-16

## 2022-08-16 RX ORDER — ATORVASTATIN CALCIUM 20 MG/1
20 TABLET, FILM COATED ORAL DAILY
Status: DISCONTINUED | OUTPATIENT
Start: 2022-08-16 | End: 2022-09-03 | Stop reason: HOSPADM

## 2022-08-16 RX ORDER — ONDANSETRON 4 MG/1
4 TABLET, ORALLY DISINTEGRATING ORAL EVERY 8 HOURS PRN
Status: DISCONTINUED | OUTPATIENT
Start: 2022-08-16 | End: 2022-08-17

## 2022-08-16 RX ORDER — ACETAMINOPHEN 325 MG/1
650 TABLET ORAL EVERY 6 HOURS PRN
Status: DISCONTINUED | OUTPATIENT
Start: 2022-08-16 | End: 2022-09-03 | Stop reason: HOSPADM

## 2022-08-16 RX ORDER — ONDANSETRON 2 MG/ML
4 INJECTION INTRAMUSCULAR; INTRAVENOUS EVERY 6 HOURS PRN
Status: DISCONTINUED | OUTPATIENT
Start: 2022-08-16 | End: 2022-08-17

## 2022-08-16 RX ORDER — SODIUM CHLORIDE 9 MG/ML
INJECTION, SOLUTION INTRAVENOUS PRN
Status: DISCONTINUED | OUTPATIENT
Start: 2022-08-16 | End: 2022-08-29 | Stop reason: SDUPTHER

## 2022-08-16 RX ORDER — ACETAMINOPHEN 325 MG/1
650 TABLET ORAL ONCE
Status: COMPLETED | OUTPATIENT
Start: 2022-08-16 | End: 2022-08-16

## 2022-08-16 RX ORDER — IPRATROPIUM BROMIDE AND ALBUTEROL SULFATE 2.5; .5 MG/3ML; MG/3ML
1 SOLUTION RESPIRATORY (INHALATION)
Status: DISCONTINUED | OUTPATIENT
Start: 2022-08-17 | End: 2022-08-27

## 2022-08-16 RX ORDER — MIRTAZAPINE 15 MG/1
15 TABLET, FILM COATED ORAL NIGHTLY
Status: DISCONTINUED | OUTPATIENT
Start: 2022-08-16 | End: 2022-08-17

## 2022-08-16 RX ORDER — ALBUTEROL SULFATE 0.63 MG/3ML
1 SOLUTION RESPIRATORY (INHALATION) EVERY 6 HOURS PRN
Status: DISCONTINUED | OUTPATIENT
Start: 2022-08-16 | End: 2022-08-27

## 2022-08-16 RX ORDER — ACETAMINOPHEN 650 MG/1
650 SUPPOSITORY RECTAL EVERY 6 HOURS PRN
Status: DISCONTINUED | OUTPATIENT
Start: 2022-08-16 | End: 2022-09-03 | Stop reason: HOSPADM

## 2022-08-16 RX ORDER — SENNA PLUS 8.6 MG/1
1 TABLET ORAL DAILY PRN
Status: DISCONTINUED | OUTPATIENT
Start: 2022-08-16 | End: 2022-09-03 | Stop reason: HOSPADM

## 2022-08-16 RX ORDER — SODIUM CHLORIDE 0.9 % (FLUSH) 0.9 %
5-40 SYRINGE (ML) INJECTION PRN
Status: DISCONTINUED | OUTPATIENT
Start: 2022-08-16 | End: 2022-08-29 | Stop reason: SDUPTHER

## 2022-08-16 RX ADMIN — SODIUM CHLORIDE 500 ML: 9 INJECTION, SOLUTION INTRAVENOUS at 15:20

## 2022-08-16 RX ADMIN — CEFTRIAXONE SODIUM 1000 MG: 1 INJECTION, POWDER, FOR SOLUTION INTRAMUSCULAR; INTRAVENOUS at 17:22

## 2022-08-16 RX ADMIN — DOXYCYCLINE 100 MG: 100 INJECTION, POWDER, LYOPHILIZED, FOR SOLUTION INTRAVENOUS at 17:23

## 2022-08-16 RX ADMIN — ATORVASTATIN CALCIUM 20 MG: 20 TABLET, FILM COATED ORAL at 22:45

## 2022-08-16 RX ADMIN — IPRATROPIUM BROMIDE AND ALBUTEROL SULFATE 1 AMPULE: .5; 2.5 SOLUTION RESPIRATORY (INHALATION) at 22:57

## 2022-08-16 RX ADMIN — ACETAMINOPHEN 650 MG: 325 TABLET, FILM COATED ORAL at 15:12

## 2022-08-16 ASSESSMENT — ENCOUNTER SYMPTOMS
DIARRHEA: 0
WHEEZING: 0
SORE THROAT: 0
SHORTNESS OF BREATH: 0
BACK PAIN: 0
ABDOMINAL PAIN: 0
SINUS PAIN: 0
NAUSEA: 0
EYE REDNESS: 0
EYE PAIN: 0
COUGH: 0
VOMITING: 0

## 2022-08-16 NOTE — ED PROVIDER NOTES
Chief Complaint   Patient presents with    Fatigue     Generalized weakness, headache       26-year-old gentleman with past medical history of squamous cell lung cancer on chemotherapy every other week with his last treatment Tuesday, COPD, hypertension, hyperlipidemia, on Eliquis presenting today with fatigue and fever. He is in becoming increasingly more fatigued and weak, nothing makes it better or worse, associated with a fever today. He has not been having increasing cough from baseline, no nausea, vomiting, no diarrhea. No dysuria or hematuria. He has not fallen at all. He is not having worsening shortness of breath. He was positive for COVID in May. No other acute complaints. Review of Systems   Constitutional:  Positive for fatigue and fever. Negative for chills. HENT:  Negative for ear pain, sinus pain and sore throat. Eyes:  Negative for pain and redness. Respiratory:  Negative for cough, shortness of breath and wheezing. Cardiovascular:  Negative for chest pain. Gastrointestinal:  Negative for abdominal pain, diarrhea, nausea and vomiting. Genitourinary:  Negative for dysuria and flank pain. Musculoskeletal:  Negative for back pain and neck pain. Skin:  Negative for rash. Neurological:  Negative for seizures and headaches. Hematological:  Negative for adenopathy. All other systems reviewed and are negative. Physical Exam  Vitals and nursing note reviewed. Constitutional:       General: He is not in acute distress. Appearance: He is well-developed. HENT:      Head: Normocephalic and atraumatic. Right Ear: External ear normal.      Left Ear: External ear normal.      Mouth/Throat:      Mouth: Mucous membranes are moist.      Pharynx: Oropharynx is clear. Eyes:      Pupils: Pupils are equal, round, and reactive to light. Cardiovascular:      Rate and Rhythm: Normal rate and regular rhythm. Heart sounds: Normal heart sounds.  No murmur heard.  Pulmonary:      Effort: Pulmonary effort is normal.      Breath sounds: Normal breath sounds. Abdominal:      General: Bowel sounds are normal.      Palpations: Abdomen is soft. Tenderness: There is no abdominal tenderness. There is no guarding or rebound. Musculoskeletal:         General: No tenderness. Cervical back: Normal range of motion and neck supple. Skin:     General: Skin is warm and dry. Neurological:      General: No focal deficit present. Mental Status: He is alert and oriented to person, place, and time. Cranial Nerves: No cranial nerve deficit. Motor: No weakness. Procedures     EKG: This EKG is signed and interpreted by me. Rate: 176  Rhythm: ventricular paced rythm  Interpretation: no acute changes, no ST elevations, RBBB, left fasicular block  Comparison: stable as compared to patient's most recent EKG      MDM  Number of Diagnoses or Management Options  Chronic anticoagulation  Neutropenia, unspecified type ([address]  Pneumonia due to infectious organism, unspecified laterality, unspecified part of lung  Thrombocytopenia ([address]  Diagnosis management comments: 69-year-old gentleman with past medical history of squamous colon cancer on chemotherapy every other week with his last treatment Tuesday, COPD, hypertension, hyperlipidemia, on Eliquis presenting with fatigue and fever. On arrival to emergency room he is hemodynamically stable, febrile. Treated with Tylenol and will be given a fluid bolus. Septic protocol initiated due to his immunocompromised state. Labs demonstrate neutropenia and thrombocytopenia, thrombocytopenia greatly decreased from patient's prior baseline. He is not being on arrival and has not been on heparin recently. Delta troponin unremarkable. Patient's x-ray demonstrated evidence of pneumonia and he was treated for CAP as he has not been admitted since May.   With his immunocompromise state and pneumonia, will admit to the [facility] for IV antibiotic therapy and monitoring for deterioration. Cyndy Aguayo excepted the patient for admission for ARNIE. ED Course as of 08/16/22 2219   Tue Aug 16, 2022   1848 Discussed with Cyndy Aguayo, admitting for Dr. Arjun Wilson and he will set the patient for admission management. [MM]      ED Course User Index  [MM] Faiza Cox, DO       --------------------------------------------- PAST HISTORY ---------------------------------------------  Past Medical History:  has a past medical history of CHB (complete heart block) ([facility] COPD (chronic obstructive pulmonary disease) ([facility] Syncope, and Syncope. Past Surgical History:  has a past surgical history that includes Colonoscopy; hernia repair (Bilateral, 2011 2001); bronchoscopy (N/A, 05/27/2021); bronchoscopy (N/A, 05/27/2021); bronchoscopy (N/A, 05/27/2021); and Pacemaker insertion (07/20/2021). Social History:  reports that he quit smoking about 10 years ago. He has a 50.00 pack-year smoking history. He has never used smokeless tobacco. He reports that he does not currently use alcohol after a past usage of about 1.0 standard drink per week. He reports that he does not use drugs. Family History: family history includes Cancer in his father and paternal uncle. The patients home medications have been reviewed. Allergies: Patient has no known allergies.     -------------------------------------------------- RESULTS -------------------------------------------------    LABS:  Results for orders placed or performed during the hospital encounter of 08/16/22   COVID-19, Rapid    Specimen: Nasopharyngeal Swab   Result Value Ref Range    SARS-CoV-2, NAAT Not Detected Not Detected   Lactate, Sepsis   Result Value Ref Range    Lactic Acid, Sepsis 2.2 (H) 0.5 - 1.9 mmol/L   CBC with Auto Differential   Result Value Ref Range    WBC 3.2 (L) 4.5 - 11.5 E9/L    RBC 2.74 (L) 3.80 - 5.80 E12/L    Hemoglobin 8.0 (L) 12.5 - 16.5 g/dL    Hematocrit 24.4 (L) 37.0 - 54.0 %    MCV 89.1 80.0 - 99.9 fL    MCH 29.2 26.0 - 35.0 pg    MCHC 32.8 32.0 - 34.5 %    RDW 19.4 (H) 11.5 - 15.0 fL    Platelets 7 (LL) 075 - 450 E9/L    MPV NOT CALC 7.0 - 12.0 fL    Neutrophils % 92.1 (H) 43.0 - 80.0 %    Lymphocytes % 7.0 (L) 20.0 - 42.0 %    Monocytes % 0.9 (L) 2.0 - 12.0 %    Eosinophils % 0.0 0.0 - 6.0 %    Basophils % 0.3 0.0 - 2.0 %    Neutrophils Absolute 2.94 1.80 - 7.30 E9/L    Lymphocytes Absolute 0.22 (L) 1.50 - 4.00 E9/L    Monocytes Absolute 0.03 (L) 0.10 - 0.95 E9/L    Eosinophils Absolute 0.00 (L) 0.05 - 0.50 E9/L    Basophils Absolute 0.00 0.00 - 0.20 E9/L    Anisocytosis 2+     Polychromasia 1+     Poikilocytes 1+     Tear Drop Cells 1+    Comprehensive Metabolic Panel w/ Reflex to MG   Result Value Ref Range    Sodium 135 132 - 146 mmol/L    Potassium reflex Magnesium 3.7 3.5 - 5.0 mmol/L    Chloride 99 98 - 107 mmol/L    CO2 21 (L) 22 - 29 mmol/L    Anion Gap 15 7 - 16 mmol/L    Glucose 119 (H) 74 - 99 mg/dL    BUN 16 6 - 23 mg/dL    Creatinine 0.9 0.7 - 1.2 mg/dL    GFR Non-African American >60 >=60 mL/min/1.73    GFR African American >60     Calcium 8.9 8.6 - 10.2 mg/dL    Total Protein 6.2 (L) 6.4 - 8.3 g/dL    Albumin 3.7 3.5 - 5.2 g/dL    Total Bilirubin 0.8 0.0 - 1.2 mg/dL    Alkaline Phosphatase 92 40 - 129 U/L    ALT 22 0 - 40 U/L    AST 18 0 - 39 U/L   Troponin   Result Value Ref Range    Troponin, High Sensitivity 27 (H) 0 - 11 ng/L   Brain Natriuretic Peptide   Result Value Ref Range    Pro-BNP 1,207 (H) 0 - 125 pg/mL   Urinalysis with Microscopic   Result Value Ref Range    Color, UA Yellow Straw/Yellow    Clarity, UA Clear Clear    Glucose, Ur Negative Negative mg/dL    Bilirubin Urine Negative Negative    Ketones, Urine Negative Negative mg/dL    Specific Gravity, UA <=1.005 1.005 - 1.030    Blood, Urine LARGE (A) Negative    pH, UA 6.0 5.0 - 9.0    Protein, UA Negative Negative mg/dL    Urobilinogen, Urine 1.0 <2.0 E.U./dL    Nitrite, Urine Negative Negative Leukocyte Esterase, Urine SMALL (A) Negative    WBC, UA 2-5 0 - 5 /HPF    RBC, UA >20 0 - 2 /HPF    Bacteria, UA FEW (A) None Seen /HPF   Platelet Confirmation   Result Value Ref Range    Platelet Confirmation CONFIRMED    Troponin   Result Value Ref Range    Troponin, High Sensitivity 29 (H) 0 - 11 ng/L   EKG 12 Lead   Result Value Ref Range    Ventricular Rate 176 BPM    Atrial Rate 34 BPM    QRS Duration 140 ms    Q-T Interval 420 ms    QTc Calculation (Bazett) 718 ms    R Axis 129 degrees    T Axis -48 degrees       RADIOLOGY:  CT Head WO Contrast   Final Result   No acute intracranial abnormality. Cortical atrophy and periventricular leukomalacia. XR CHEST PORTABLE   Final Result   Continued opacity in the left lower lung and linear scarring in the left   perihilar region. COPD.             ------------------------- NURSING NOTES AND VITALS REVIEWED ---------------------------  Date / Time Roomed:  8/16/2022  2:38 PM  ED Bed Assignment:  08/08    The nursing notes within the ED encounter and vital signs as below have been reviewed. Patient Vitals for the past 24 hrs:   BP Temp Pulse Resp SpO2 Weight   08/16/22 1830 102/64 -- 61 18 97 % --   08/16/22 1715 92/62 98.2 °F (36.8 °C) 60 17 97 % --   08/16/22 1440 127/64 (!) 101.6 °F (38.7 °C) 74 18 96 % 187 lb (84.8 kg)       Oxygen Saturation Interpretation: Normal    ------------------------------------------ PROGRESS NOTES ------------------------------------------  Re-evaluation(s):  Time: 1600  Patients symptoms show no change  Repeat physical examination is not changed    Counseling:  I have spoken with the patient and discussed todays results, in addition to providing specific details for the plan of care and counseling regarding the diagnosis and prognosis.   Their questions are answered at this time and they are agreeable with the plan of admission.    --------------------------------- ADDITIONAL PROVIDER NOTES ---------------------------------  Consultations:  Time: 4721. Spoke with [facility] admitting for Dr. Rashi Sanchez. Discussed case. They will admit the patient. This patient's ED course included: a personal history and physicial examination, re-evaluation prior to disposition, multiple bedside re-evaluations, IV medications, cardiac monitoring, continuous pulse oximetry, and complex medical decision making and emergency management    This patient has remained hemodynamically stable during their ED course. Diagnosis:  1. Pneumonia due to infectious organism, unspecified laterality, unspecified part of lung    2. Neutropenia, unspecified type ([address]    3. Thrombocytopenia ([address]    4. Chronic anticoagulation        Disposition:  Patient's disposition: Admit to telemetry  Patient's condition is stable.            King Rios,   Resident  08/16/22 8655

## 2022-08-17 PROBLEM — D61.818 PANCYTOPENIA (HCC): Status: ACTIVE | Noted: 2022-08-17

## 2022-08-17 LAB
ABO/RH: NORMAL
ACINETOBACTER CALCOAC BAUMANNII COMPLEX BY PCR: NOT DETECTED
ADENOVIRUS BY PCR: NOT DETECTED
ALBUMIN SERPL-MCNC: 3.3 G/DL (ref 3.5–5.2)
ALP BLD-CCNC: 82 U/L (ref 40–129)
ALT SERPL-CCNC: 18 U/L (ref 0–40)
ANION GAP SERPL CALCULATED.3IONS-SCNC: 11 MMOL/L (ref 7–16)
ANISOCYTOSIS: ABNORMAL
ANTIBODY SCREEN: NORMAL
AST SERPL-CCNC: 19 U/L (ref 0–39)
BACTEROIDES FRAGILIS BY PCR: NOT DETECTED
BASOPHILS ABSOLUTE: 0 E9/L (ref 0–0.2)
BASOPHILS RELATIVE PERCENT: 0 % (ref 0–2)
BILIRUB SERPL-MCNC: 0.6 MG/DL (ref 0–1.2)
BILIRUBIN DIRECT: <0.2 MG/DL (ref 0–0.3)
BILIRUBIN, INDIRECT: ABNORMAL MG/DL (ref 0–1)
BLOOD BANK DISPENSE STATUS: NORMAL
BLOOD BANK PRODUCT CODE: NORMAL
BORDETELLA PARAPERTUSSIS BY PCR: NOT DETECTED
BORDETELLA PERTUSSIS BY PCR: NOT DETECTED
BOTTLE TYPE: ABNORMAL
BPU ID: NORMAL
BUN BLDV-MCNC: 13 MG/DL (ref 6–23)
CALCIUM SERPL-MCNC: 8.4 MG/DL (ref 8.6–10.2)
CANDIDA ALBICANS BY PCR: NOT DETECTED
CANDIDA AURIS BY PCR: NOT DETECTED
CANDIDA GLABRATA BY PCR: NOT DETECTED
CANDIDA KRUSEI BY PCR: NOT DETECTED
CANDIDA PARAPSILOSIS BY PCR: NOT DETECTED
CANDIDA TROPICALIS BY PCR: NOT DETECTED
CHLAMYDOPHILIA PNEUMONIAE BY PCR: NOT DETECTED
CHLORIDE BLD-SCNC: 103 MMOL/L (ref 98–107)
CO2: 22 MMOL/L (ref 22–29)
CORONAVIRUS 229E BY PCR: NOT DETECTED
CORONAVIRUS HKU1 BY PCR: NOT DETECTED
CORONAVIRUS NL63 BY PCR: NOT DETECTED
CORONAVIRUS OC43 BY PCR: NOT DETECTED
CREAT SERPL-MCNC: 0.8 MG/DL (ref 0.7–1.2)
CRYPTOCOCCUS NEOFORMANS/GATTII BY PCR: NOT DETECTED
DESCRIPTION BLOOD BANK: NORMAL
EKG ATRIAL RATE: 34 BPM
EKG Q-T INTERVAL: 420 MS
EKG QRS DURATION: 140 MS
EKG QTC CALCULATION (BAZETT): 718 MS
EKG R AXIS: 129 DEGREES
EKG T AXIS: -48 DEGREES
EKG VENTRICULAR RATE: 176 BPM
ENTEROBACTER CLOACAE COMPLEX BY PCR: NOT DETECTED
ENTEROBACTERALES BY PCR: NOT DETECTED
ENTEROCOCCUS FAECALIS BY PCR: DETECTED
ENTEROCOCCUS FAECIUM BY PCR: NOT DETECTED
EOSINOPHILS ABSOLUTE: 0 E9/L (ref 0.05–0.5)
EOSINOPHILS RELATIVE PERCENT: 0 % (ref 0–6)
ESCHERICHIA COLI BY PCR: NOT DETECTED
GFR AFRICAN AMERICAN: >60
GFR NON-AFRICAN AMERICAN: >60 ML/MIN/1.73
GLUCOSE BLD-MCNC: 139 MG/DL (ref 74–99)
HAEMOPHILUS INFLUENZAE BY PCR: NOT DETECTED
HCT VFR BLD CALC: 21.4 % (ref 37–54)
HEMOGLOBIN: 6.9 G/DL (ref 12.5–16.5)
HUMAN METAPNEUMOVIRUS BY PCR: NOT DETECTED
HUMAN RHINOVIRUS/ENTEROVIRUS BY PCR: NOT DETECTED
INFLUENZA A BY PCR: NOT DETECTED
INFLUENZA B BY PCR: NOT DETECTED
KLEBSIELLA AEROGENES BY PCR: NOT DETECTED
KLEBSIELLA OXYTOCA BY PCR: NOT DETECTED
KLEBSIELLA PNEUMONIAE GROUP BY PCR: NOT DETECTED
L. PNEUMOPHILA SEROGP 1 UR AG: NORMAL
LISTERIA MONOCYTOGENES BY PCR: NOT DETECTED
LYMPHOCYTES ABSOLUTE: 0.26 E9/L (ref 1.5–4)
LYMPHOCYTES RELATIVE PERCENT: 9.6 % (ref 20–42)
MCH RBC QN AUTO: 28.8 PG (ref 26–35)
MCHC RBC AUTO-ENTMCNC: 32.2 % (ref 32–34.5)
MCV RBC AUTO: 89.2 FL (ref 80–99.9)
MONOCYTES ABSOLUTE: 0.08 E9/L (ref 0.1–0.95)
MONOCYTES RELATIVE PERCENT: 2.6 % (ref 2–12)
MYCOPLASMA PNEUMONIAE BY PCR: NOT DETECTED
NEISSERIA MENINGITIDIS BY PCR: NOT DETECTED
NEUTROPHILS ABSOLUTE: 2.29 E9/L (ref 1.8–7.3)
NEUTROPHILS RELATIVE PERCENT: 87.8 % (ref 43–80)
ORDER NUMBER: ABNORMAL
OVALOCYTES: ABNORMAL
PARAINFLUENZA VIRUS 1 BY PCR: NOT DETECTED
PARAINFLUENZA VIRUS 2 BY PCR: NOT DETECTED
PARAINFLUENZA VIRUS 3 BY PCR: NOT DETECTED
PARAINFLUENZA VIRUS 4 BY PCR: NOT DETECTED
PDW BLD-RTO: 19.1 FL (ref 11.5–15)
PLATELET # BLD: 4 E9/L (ref 130–450)
PLATELET CONFIRMATION: NORMAL
PMV BLD AUTO: ABNORMAL FL (ref 7–12)
POIKILOCYTES: ABNORMAL
POLYCHROMASIA: ABNORMAL
POTASSIUM REFLEX MAGNESIUM: 3.7 MMOL/L (ref 3.5–5)
PROCALCITONIN: 0.42 NG/ML (ref 0–0.08)
PROTEUS SPECIES BY PCR: NOT DETECTED
PSEUDOMONAS AERUGINOSA BY PCR: NOT DETECTED
RBC # BLD: 2.4 E12/L (ref 3.8–5.8)
REASON FOR REJECTION: NORMAL
REJECTED TEST: NORMAL
RESPIRATORY SYNCYTIAL VIRUS BY PCR: NOT DETECTED
SALMONELLA SPECIES BY PCR: NOT DETECTED
SARS-COV-2, PCR: NOT DETECTED
SCHISTOCYTES: ABNORMAL
SERRATIA MARCESCENS BY PCR: NOT DETECTED
SODIUM BLD-SCNC: 136 MMOL/L (ref 132–146)
SOURCE OF BLOOD CULTURE: ABNORMAL
STAPHYLOCOCCUS AUREUS BY PCR: NOT DETECTED
STAPHYLOCOCCUS EPIDERMIDIS BY PCR: NOT DETECTED
STAPHYLOCOCCUS LUGDUNENSIS BY PCR: NOT DETECTED
STAPHYLOCOCCUS SPECIES BY PCR: NOT DETECTED
STENOTROPHOMONAS MALTOPHILIA BY PCR: NOT DETECTED
STREP PNEUMONIAE ANTIGEN, URINE: NORMAL
STREPTOCOCCUS AGALACTIAE BY PCR: NOT DETECTED
STREPTOCOCCUS PNEUMONIAE BY PCR: NOT DETECTED
STREPTOCOCCUS PYOGENES  BY PCR: NOT DETECTED
STREPTOCOCCUS SPECIES BY PCR: NOT DETECTED
TEAR DROP CELLS: ABNORMAL
TOTAL PROTEIN: 5.6 G/DL (ref 6.4–8.3)
URINE CULTURE, ROUTINE: NORMAL
VANCOMYCIN RESISTANT BY PCR: NOT DETECTED
WBC # BLD: 2.6 E9/L (ref 4.5–11.5)

## 2022-08-17 PROCEDURE — 6360000002 HC RX W HCPCS: Performed by: INTERNAL MEDICINE

## 2022-08-17 PROCEDURE — 86901 BLOOD TYPING SEROLOGIC RH(D): CPT

## 2022-08-17 PROCEDURE — 2580000003 HC RX 258: Performed by: NURSE PRACTITIONER

## 2022-08-17 PROCEDURE — 6370000000 HC RX 637 (ALT 250 FOR IP): Performed by: NURSE PRACTITIONER

## 2022-08-17 PROCEDURE — 94640 AIRWAY INHALATION TREATMENT: CPT

## 2022-08-17 PROCEDURE — 2500000003 HC RX 250 WO HCPCS: Performed by: NURSE PRACTITIONER

## 2022-08-17 PROCEDURE — P9016 RBC LEUKOCYTES REDUCED: HCPCS

## 2022-08-17 PROCEDURE — 80048 BASIC METABOLIC PNL TOTAL CA: CPT

## 2022-08-17 PROCEDURE — 80076 HEPATIC FUNCTION PANEL: CPT

## 2022-08-17 PROCEDURE — 86923 COMPATIBILITY TEST ELECTRIC: CPT

## 2022-08-17 PROCEDURE — 87449 NOS EACH ORGANISM AG IA: CPT

## 2022-08-17 PROCEDURE — P9073 PLATELETS PHERESIS PATH REDU: HCPCS

## 2022-08-17 PROCEDURE — 0202U NFCT DS 22 TRGT SARS-COV-2: CPT

## 2022-08-17 PROCEDURE — 2060000000 HC ICU INTERMEDIATE R&B

## 2022-08-17 PROCEDURE — 85025 COMPLETE CBC W/AUTO DIFF WBC: CPT

## 2022-08-17 PROCEDURE — 84145 PROCALCITONIN (PCT): CPT

## 2022-08-17 PROCEDURE — 2580000003 HC RX 258: Performed by: INTERNAL MEDICINE

## 2022-08-17 PROCEDURE — 86900 BLOOD TYPING SEROLOGIC ABO: CPT

## 2022-08-17 PROCEDURE — 36415 COLL VENOUS BLD VENIPUNCTURE: CPT

## 2022-08-17 PROCEDURE — 87040 BLOOD CULTURE FOR BACTERIA: CPT

## 2022-08-17 PROCEDURE — 86850 RBC ANTIBODY SCREEN: CPT

## 2022-08-17 RX ORDER — SODIUM CHLORIDE 9 MG/ML
INJECTION, SOLUTION INTRAVENOUS PRN
Status: DISCONTINUED | OUTPATIENT
Start: 2022-08-17 | End: 2022-08-21 | Stop reason: SDUPTHER

## 2022-08-17 RX ADMIN — VANCOMYCIN HYDROCHLORIDE 1750 MG: 10 INJECTION, POWDER, LYOPHILIZED, FOR SOLUTION INTRAVENOUS at 16:34

## 2022-08-17 RX ADMIN — IPRATROPIUM BROMIDE AND ALBUTEROL SULFATE 1 AMPULE: .5; 2.5 SOLUTION RESPIRATORY (INHALATION) at 11:56

## 2022-08-17 RX ADMIN — Medication 10 ML: at 21:37

## 2022-08-17 RX ADMIN — IPRATROPIUM BROMIDE AND ALBUTEROL SULFATE 1 AMPULE: .5; 2.5 SOLUTION RESPIRATORY (INHALATION) at 08:39

## 2022-08-17 RX ADMIN — IPRATROPIUM BROMIDE AND ALBUTEROL SULFATE 1 AMPULE: .5; 2.5 SOLUTION RESPIRATORY (INHALATION) at 19:52

## 2022-08-17 RX ADMIN — DOXYCYCLINE 100 MG: 100 INJECTION, POWDER, LYOPHILIZED, FOR SOLUTION INTRAVENOUS at 09:16

## 2022-08-17 RX ADMIN — ATORVASTATIN CALCIUM 20 MG: 20 TABLET, FILM COATED ORAL at 21:37

## 2022-08-17 RX ADMIN — METOPROLOL SUCCINATE 50 MG: 25 TABLET, EXTENDED RELEASE ORAL at 09:07

## 2022-08-17 RX ADMIN — Medication 10 ML: at 13:33

## 2022-08-17 RX ADMIN — IPRATROPIUM BROMIDE AND ALBUTEROL SULFATE 1 AMPULE: .5; 2.5 SOLUTION RESPIRATORY (INHALATION) at 17:01

## 2022-08-17 RX ADMIN — METOPROLOL SUCCINATE 50 MG: 25 TABLET, EXTENDED RELEASE ORAL at 17:56

## 2022-08-17 NOTE — ED NOTES
Nurse to nurse called to [facility]. SBAR faxed.       Cari Ventura RN  08/17/22 3336

## 2022-08-17 NOTE — PROGRESS NOTES
Pharmacy Consultation Note  (Antibiotic Dosing and Monitoring)    Initial consult date: 8/17/22  Consulting physician/provider: Dr. Ramone Gooden  Drug: Vancomycin  Indication: Bloodstream infection    Age/  Gender Height Weight IBW  Allergy Information   72 y.o./male   187 lb (84.8 kg)     Ideal body weight: 66.1 kg (145 lb 11.6 oz)  Adjusted ideal body weight: 73.6 kg (162 lb 3.8 oz)   Patient has no known allergies. Renal Function:  Recent Labs     08/16/22  1505 08/17/22  0417   BUN 16 13   CREATININE 0.9 0.8     No intake or output data in the 24 hours ending 08/17/22 1410    Vancomycin Monitoring:  Trough:  No results for input(s): VANCOTROUGH in the last 72 hours. Random:  No results for input(s): VANCORANDOM in the last 72 hours. Vancomycin Administration Times:  Recent vancomycin administrations        No vancomycin IV orders with administrations found. Orders not given:            vancomycin (VANCOCIN) 1,750 mg in dextrose 5 % 500 mL IVPB    vancomycin (VANCOCIN) 1,000 mg in dextrose 5 % 250 mL IVPB ([id number])                    Assessment:  Patient is a 67 y.o. male who has been initiated on vancomycin  Estimated Creatinine Clearance: 86 mL/min (based on SCr of 0.8 mg/dL). To dose vancomycin, pharmacy will be utilizing [employer] calculation software for goal AUC/YOHAN 400-600 mg/L-hr    Plan: Will continue vancomycin 1750 mg IV x 1 followed by vancomycin 1000 mg every 12 hours.  Estimated  mg/L.hr  Will check vancomycin levels when appropriate  Will continue to monitor renal function   Clinical pharmacy to follow      FLOWER Olvera CYNTHIA [facility] 8/17/2022 2:10 PM    Addendum:    Vancomycin discontinued on 8/17 by ID  Clinical Pharmacy to sign-off  Physician to re-consult pharmacy if future dosing is needed    Thank you for the consult,    Reji Longo PharmD, BCPS 8/18/2022 9:26 AM

## 2022-08-17 NOTE — PROGRESS NOTES
08/16/22 2300   Treatment   Treatment Type HHN   Medications Albuterol/Ipratropium   Pre-Tx Pulse 60   Pre-Tx Resps 15   Breath Sounds Pre-Tx EAN Diminished   Breath Sounds Pre-Tx LLL Diminished   Breath Sounds Pre-Tx RUL Diminished   Breath Sounds Pre-Tx RML Diminished   Breath Sounds Pre-Tx RLL Diminished   Mid-Tx Pulse 60   Mid-Tx Resps 15   Breath Sounds Post-Tx EAN Diminished   Breath Sounds Post-Tx LLL Diminished   Breath Sounds Post-Tx RUL Diminished   Breath Sounds Post-Tx RML Diminished   Breath Sounds Post-Tx RLL Diminished   Post-Tx Pulse 60   Post-Tx Resps 15   Delivery Source Mask;Oxygen   Position Sitting   Treatment Tolerance Well   Duration 10   Is patient on O2?  N   Oxygen Therapy/Pulse Ox   O2 Therapy Room air   Heart Rate 60   SpO2 99 %   RT Misc Charges   $RT Education $HHN/MDI/IPPB Demo or Eval   Patient given treatment no issues heart rate 60 SpO2 99% placed back on room air after

## 2022-08-17 NOTE — PROGRESS NOTES
Infectious Diseases consult called to [address] per ID line office personnel. PT add to 's census.

## 2022-08-17 NOTE — PROGRESS NOTES
Subjective:  Chart reviewed  Patient seen at bedside  Presented to the ED with increasing weakness, fatigue and fever  Admitted with PNA, anemia, thrombocytopenia and blood cultures +, gram positive chains and cocci (enterococcus, final cultures pending)  The patient is awake and alert. Chilled, fever, no nose bleeds, did notice blood in urine  Tolerating diet. Denies chest pain, angina, dyspnea, abdominal discomfort, nausea/vomiting and diarrhea/constipation. Objective:    BP (!) 144/67   Pulse 59   Temp 99.8 °F (37.7 °C) (Oral)   Resp 16   Wt 187 lb (84.8 kg)   SpO2 93%   BMI 29.29 kg/m²     General: NAD, sitting at bedside, just completed PRBC  HEENT: normocephalic/atraumatic, mucosa dry without ulcerations,thrush or mucositis, EOMI, sclera anicteric, conjuntiva pink  NECK: supple, trachea midline  Heart:  RRR, no murmurs, gallops, or rubs.   Lungs:  diminished breath sounds, no distress currently, relatively CTA bilaterally, no wheeze, rales or rhonchi  Abd: BS present, nontender, nondistended, no masses  Extrem:  hands ecchymotic trace edema, some scattered bruising, no clubbing, cyanosis, or petechiae  Skin: Intact, no petechia or purpura    CBC with Differential:    Lab Results   Component Value Date/Time    WBC 2.6 08/17/2022 04:17 AM    RBC 2.40 08/17/2022 04:17 AM    HGB 6.9 08/17/2022 04:17 AM    HCT 21.4 08/17/2022 04:17 AM    PLT 4 08/17/2022 04:17 AM    MCV 89.2 08/17/2022 04:17 AM    MCH 28.8 08/17/2022 04:17 AM    MCHC 32.2 08/17/2022 04:17 AM    RDW 19.1 08/17/2022 04:17 AM    NRBC 0.0 05/26/2022 09:45 AM    LYMPHOPCT 9.6 08/17/2022 04:17 AM    MONOPCT 2.6 08/17/2022 04:17 AM    MYELOPCT 0.9 09/17/2021 10:20 AM    BASOPCT 0.0 08/17/2022 04:17 AM    MONOSABS 0.08 08/17/2022 04:17 AM    LYMPHSABS 0.26 08/17/2022 04:17 AM    EOSABS 0.00 08/17/2022 04:17 AM    BASOSABS 0.00 08/17/2022 04:17 AM     CMP:    Lab Results   Component Value Date/Time     08/17/2022 04:17 AM    K 3.7 08/17/2022 04:17 AM     08/17/2022 04:17 AM    CO2 22 08/17/2022 04:17 AM    BUN 13 08/17/2022 04:17 AM    CREATININE 0.8 08/17/2022 04:17 AM    GFRAA >60 08/17/2022 04:17 AM    LABGLOM >60 08/17/2022 04:17 AM    GLUCOSE 139 08/17/2022 04:17 AM    PROT 5.6 08/17/2022 04:17 AM    LABALBU 3.3 08/17/2022 04:17 AM    CALCIUM 8.4 08/17/2022 04:17 AM    BILITOT 0.6 08/17/2022 04:17 AM    ALKPHOS 82 08/17/2022 04:17 AM    AST 19 08/17/2022 04:17 AM    ALT 18 08/17/2022 04:17 AM          Current Facility-Administered Medications:     0.9 % sodium chloride infusion, , IntraVENous, PRN, LELO Estrella CNP    0.9 % sodium chloride infusion, , IntraVENous, PRN, LELO Estrella CNP    vancomycin (VANCOCIN) 1,750 mg in dextrose 5 % 500 mL IVPB, 20 mg/kg, IntraVENous, Once **FOLLOWED BY** [START ON 8/18/2022] vancomycin (VANCOCIN) 1,000 mg in dextrose 5 % 250 mL IVPB ([id number]), 1,000 mg, IntraVENous, Q12H, Gray Trotter MD    albuterol (ACCUNEB) nebulizer solution 0.63 mg, 1 ampule, Nebulization, Q6H PRN, LELO Talavera CNP    [Held by provider] apixaban (ELIQUIS) tablet 5 mg, 5 mg, Oral, BID, LELO Garnica CNP    metoprolol succinate (TOPROL XL) extended release tablet 50 mg, 50 mg, Oral, BID Liana GARCIA APRN - CNP, 50 mg at 08/17/22 5675    atorvastatin (LIPITOR) tablet 20 mg, 20 mg, Oral, Daily, LELO Talavera CNP, 20 mg at 08/16/22 8285    sodium chloride flush 0.9 % injection 5-40 mL, 5-40 mL, IntraVENous, 2 times per day, LELO Talavera CNP, 10 mL at 08/17/22 1333    sodium chloride flush 0.9 % injection 5-40 mL, 5-40 mL, IntraVENous, PRN, LELO Garnica CNP    0.9 % sodium chloride infusion, , IntraVENous, PRN, LELO Talavera CNP    acetaminophen (TYLENOL) tablet 650 mg, 650 mg, Oral, Q6H PRN **OR** acetaminophen (TYLENOL) suppository 650 mg, 650 mg, Rectal, Q6H PRN, LELO Talavera CNP    senna (SENOKOT) tablet 8.6 mg, 1 tablet, Oral, Daily PRN, LELO Hernandez - CNP    ipratropium-albuterol (DUONEB) nebulizer solution 1 ampule, 1 ampule, Inhalation, Q4H WA, LELO Hernandez CNP, 1 ampule at 08/17/22 1156    cefTRIAXone (ROCEPHIN) 1,000 mg in sterile water 10 mL IV syringe, 1,000 mg, IntraVENous, Q24H, LELO Garnica - CNP    doxycycline (VIBRAMYCIN) 100 mg in dextrose 5 % 100 mL IVPB ([id number]), 100 mg, IntraVENous, Q12H, LELO Hernandez CNP, Stopped at 08/17/22 1050    Current Outpatient Medications:     predniSONE (DELTASONE) 5 MG tablet, Take 5 mg by mouth daily, Disp: , Rfl:     albuterol (ACCUNEB) 0.63 MG/3ML nebulizer solution, Take 1 ampule by nebulization every 6 hours as needed for Wheezing, Disp: , Rfl:     apixaban (ELIQUIS) 5 MG TABS tablet, Take 5 mg by mouth 2 times daily , Disp: , Rfl:     albuterol sulfate HFA (VENTOLIN HFA) 108 (90 Base) MCG/ACT inhaler, Inhale 2 puffs into the lungs every 6 hours as needed for Wheezing, Disp: , Rfl:     metoprolol succinate (TOPROL XL) 50 MG extended release tablet, Take 1 tablet by mouth 2 times daily (with meals), Disp: 180 tablet, Rfl: 3    simvastatin (ZOCOR) 40 MG tablet, Take 40 mg by mouth daily , Disp: , Rfl:     umeclidinium-vilanterol (ANORO ELLIPTA) 62.5-25 MCG/INH AEPB inhaler, Inhale 1 puff into the lungs daily, Disp: , Rfl:     CT Head WO Contrast   Final Result   No acute intracranial abnormality. Cortical atrophy and periventricular leukomalacia. XR CHEST PORTABLE   Final Result   Continued opacity in the left lower lung and linear scarring in the left   perihilar region. COPD. 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 PDL1 0%. Treated with concurrent chemo/XRT from 7/6/21 to 9/7/21. Now on consolidation Imfinzi from 11/2/21. Progression on PET from 6/24/22. Started Tha [place] and Keytruda on 6/30/22.   Patient seen in office last 8/9 and received C2D8 of Carboplatin AUC 2 with Gemzar 1000 mg/m2 IV on D1,8 and Keytruda IV on D1 on a 21 day cycle. A/P:  Anemia, thrombocytopenia - transfusion to maintain Hgb > 7, plt > 10, FOBT (recent transfusion, wait checking iron studies)  History of PE on eliquis, hold with throbocytopenia until platelets recover > 60W  Bacteremia, per primary service, ID consulted  4. Leukopenia without neutropenia, if ANC falls below 1000 can start filgastrim    Thank you for allowing us to participate in the care of Sonya Benito. Zoë Montano PA-C  [phone]    Electronically signed by MATTHEW Owens on 8/17/2022 at 2:45 PM    Note: This report was completed using [employer] voiced recognition software. Every effort has been made to ensure accuracy; however, inadvertent computerized transcription errors may be present.

## 2022-08-17 NOTE — CONSULTS
[address] NOTE    Patient: Noe Tejeda  MRN: [id number]  : 1950    Encounter Date: 2022  Encounter Time: 1:23 PM     Date of Admission: .2022  2:38 PM    Consulting Physician:  Primary Care Physician:      Ghada Montoya MD     [phone]    PROBLEM LIST:  Patient Active Problem List   Diagnosis    Syncope and collapse    Pneumonia    COPD (chronic obstructive pulmonary disease) ([address]    BPH (benign prostatic hyperplasia)    HTN (hypertension)    HLD (hyperlipidemia)    Intermittent complete heart block ([address]    S/P cardiac pacemaker procedure    Acute on chronic respiratory failure with hypoxia ([address]    Squamous carcinoma of lung ([address]    History pulmonary embolism ([address] on Eliquis    Hyponatremia    Hypomagnesemia    Wide-complex tachycardia ([address]    COVID-19    Pancytopenia ([address]     Reason for Consultation: Dyspnea     HPI:   Mr. Nora Griffin is a 68 y/o male with past medical history noted for COPD, cardiac arrhythmia (previously wore event monitor per EP), left hilar lymphadenopathy, squamous cell carcinoma, COVID 2022, pulmonary embolism (on eliquis), port placement, pacemaker placement (2021) that presented to [facility] with weakness, cough, shortness of breath, fatigue, fever. He had moderate shortness of breath prior to admission which prompted him to seek medical attention. EBUS bronchoscopy on 2021 noted left hilar station 11L Stage III squamous cell carcinoma. Patient attained COVID vaccine about 2021. He had COVID 2022 which required in-hospital stay. Patient has no increased work of breathing, weight changes, fevers, chills noted. Patient has oncology following and is on supplemental oxygen with 2L used. Patient on PO eliquis at this time. Patient had blood cultures positive for GPC in chains x 2 bottles, Enterococcus faecalis.   Patient is pancytopenic with WBC 2.6, Hgb 6.9, Plt 4.     PAST MEDICAL HISTORY:     Past Medical History:   Diagnosis Date    CHB (complete heart block) [facility] 07/2021    COPD (chronic obstructive pulmonary disease) ([facility]     Syncope 09/2020    dr Radha Owens wearing a monitor    Syncope 07/2021       PAST SURGICAL HISTORY:   Past Surgical History:   Procedure Laterality Date    BRONCHOSCOPY N/A 05/27/2021    BRONCHOSCOPY W/EBUS FNA performed by Nia Downs MD at [address] [facility] N/A 05/27/2021    BRONCHOSCOPY DIAGNOSTIC OR CELL [address] ONLY performed by Nia Downs MD at [address] [facility] N/A 05/27/2021    BRONCHOSCOPY ADD ON COMPUTER ASSISTED performed by Nia Downs MD at [address] Bilateral 2011 2001    groin     PACEMAKER INSERTION  07/20/2021    DUAL PPM  (ABDULLAHI)  DR. Milla Frost       FAMILY HISTORY:   Family History   Problem Relation Age of Onset    Cancer Father         prostate    Cancer Paternal Uncle         anal       SOCIAL HISTORY:   Social History     Socioeconomic History    Marital status:      Spouse name: Not on file    Number of children: Not on file    Years of education: Not on file    Highest education level: Not on file   Occupational History    Not on file   Tobacco Use    Smoking status: Former     Packs/day: 2.00     Years: 25.00     Pack years: 50.00     Types: Cigarettes     Quit date: 12/26/2011     Years since quitting: 10.6    Smokeless tobacco: Never   Vaping Use    Vaping Use: Never used   Substance and Sexual Activity    Alcohol use: Not Currently     Alcohol/week: 1.0 standard drink     Types: 1 Glasses of wine per week     Comment: 1 glass of wine per day prior    Drug use: No    Sexual activity: Not on file   Other Topics Concern    Not on file   Social History Narrative    Not on file     Social Determinants of Health     Financial Resource Strain: Not on file   Food Insecurity: Not on file   Transportation Needs: Not on file   Physical Activity: Not on file   Stress: Not on file   Social Connections: Not on file   Intimate Partner Violence: Not on file   Housing Stability: Not on file     Social History     Tobacco Use   Smoking Status Former    Packs/day: 2.00    Years: 25.00    Pack years: 50.00    Types: Cigarettes    Quit date: 12/26/2011    Years since quitting: 10.6   Smokeless Tobacco Never     Social History     Substance and Sexual Activity   Alcohol Use Not Currently    Alcohol/week: 1.0 standard drink    Types: 1 Glasses of wine per week    Comment: 1 glass of wine per day prior     Social History     Substance and Sexual Activity   Drug Use No     HOME MEDICATIONS:  Prior to Admission medications    Medication Sig Start Date End Date Taking?  Authorizing Provider   predniSONE (DELTASONE) 5 MG tablet Take 5 mg by mouth daily    Bienvenido Keen MD   albuterol (ACCUNEB) 0.63 MG/3ML nebulizer solution Take 1 ampule by nebulization every 6 hours as needed for Wheezing    Historical Provider, MD   apixaban (ELIQUIS) 5 MG TABS tablet Take 5 mg by mouth 2 times daily  10/1/21   Historical Provider, MD   albuterol sulfate HFA (VENTOLIN HFA) 108 (90 Base) MCG/ACT inhaler Inhale 2 puffs into the lungs every 6 hours as needed for Wheezing    Historical Provider, MD   metoprolol succinate (TOPROL XL) 50 MG extended release tablet Take 1 tablet by mouth 2 times daily (with meals) 7/22/21   Kaltag Bile, MD   simvastatin (ZOCOR) 40 MG tablet Take 40 mg by mouth daily     Historical Provider, MD   umeclidinium-vilanterol (ANORO ELLIPTA) 62.5-25 MCG/INH AEPB inhaler Inhale 1 puff into the lungs daily    Historical Provider, MD       CURRENT MEDICATIONS:  Current Facility-Administered Medications: 0.9 % sodium chloride infusion, , IntraVENous, PRN  0.9 % sodium chloride infusion, , IntraVENous, PRN  albuterol (ACCUNEB) nebulizer solution 0.63 mg, 1 ampule, Nebulization, Q6H PRN  [Held by provider] apixaban (ELIQUIS) tablet 5 mg, 5 mg, Oral, BID  metoprolol succinate (TOPROL XL) extended release tablet 50 mg, 50 mg, Oral, BID WC  atorvastatin (LIPITOR) tablet 20 mg, 20 mg, Oral, Daily  sodium chloride flush 0.9 % injection 5-40 mL, 5-40 mL, IntraVENous, 2 times per day  sodium chloride flush 0.9 % injection 5-40 mL, 5-40 mL, IntraVENous, PRN  0.9 % sodium chloride infusion, , IntraVENous, PRN  acetaminophen (TYLENOL) tablet 650 mg, 650 mg, Oral, Q6H PRN **OR** acetaminophen (TYLENOL) suppository 650 mg, 650 mg, Rectal, Q6H PRN  senna (SENOKOT) tablet 8.6 mg, 1 tablet, Oral, Daily PRN  ipratropium-albuterol (DUONEB) nebulizer solution 1 ampule, 1 ampule, Inhalation, Q4H WA  cefTRIAXone (ROCEPHIN) 1,000 mg in sterile water 10 mL IV syringe, 1,000 mg, IntraVENous, Q24H  doxycycline (VIBRAMYCIN) 100 mg in dextrose 5 % 100 mL IVPB ([id number]), 100 mg, IntraVENous, Q12H    IV MEDICATIONS:   sodium chloride      sodium chloride      sodium chloride         ALLERGIES:  No Known Allergies    REVIEW OF SYSTEMS:  General ROS:     - Positive For:     - Negative For: chills, fatigue, fever, malaise, night sweats or sleep disturbance   ENT ROS:      - Positive For:     - Negative For: epistaxis, headaches, sinus pain, sneezing or sore throat   Allergy and Immunology ROS:     - Negative For: nasal congestion, postnasal drip or seasonal allergies   Hematological and Lymphatic ROS:      - Negative For: bleeding problems, bruising, fatigue, night sweats or pallor   Respiratory ROS:      - Positive For:       - Negative For: hemoptysis, pleuritic type chest pains  Cardiovascular ROS:      - Positive For:      - Negative For: chest pain, dyspnea on exertion, irregular heartbeat, loss of consciousness, murmur, orthopnea or palpitations   Gastrointestinal ROS:      - Positive For:     - Negative For: abdominal pain, appetite loss, blood in stools, change in bowel habits, change in stools, constipation, diarrhea, hematemesis, melena, nausea/vomiting or stool incontinence Genito-Urinary ROS:      - Negative For: change in urinary stream, dysuria, hematuria or incontinence   Musculoskeletal ROS:      - Negative For: joint pain, joint stiffness, joint swelling or muscle pain   Neurological ROS:     - Negative For: behavioral changes, confusion, dizziness, headaches, impaired coordination/balance or memory loss   Dermatological ROS:      - Negative For: hair changes, lumps, mole changes, nail changes or pruritus    PHYSICAL EXAMINATION:     VITAL SIGNS:  /76   Pulse 62   Temp 99 °F (37.2 °C)   Resp 18   Wt 187 lb (84.8 kg)   SpO2 97%   BMI 29.29 kg/m²   Wt Readings from Last 3 Encounters:   22 187 lb (84.8 kg)   22 187 lb (84.8 kg)   22 187 lb 9.6 oz (85.1 kg)     Temp Readings from Last 3 Encounters:   22 99 °F (37.2 °C)   22 97.3 °F (36.3 °C) (Oral)   22 97.2 °F (36.2 °C) (Temporal)     TMAX:  BP Readings from Last 3 Encounters:   22 122/76   22 (!) 142/54   22 127/62     Pulse Readings from Last 3 Encounters:   22 62   22 69   22 86       CURRENT PULSE OXIMETRY: SpO2: 97 %  24HR PULSE OXIMETRY RANGE: SpO2  Av.4 %  Min: 92 %  Max: 99 %  CVP:      ________________________________________________________________________    VENTILATOR SETTINGS (if applicable): Additional Respiratory Assessments  Heart Rate: 62  Resp: 18  SpO2: 97 %  ETCO2:  Peak Inspiratory Pressure:  End-Inspiratory Plateau Pressure:    ABG:  No results for input(s): PH, PO2, PCO2, HCO3, BE, O2SAT, METHB, O2HB, COHB, O2CON, HHB, THB in the last 72 hours. ________________________________________________________________________    IV ACCESS:    NUTRITION: ADULT DIET; Regular; Low Sodium (2 gm)    INTAKE/OUTPUTS:  No intake/output data recorded.   No intake or output data in the 24 hours ending 22 1323    General Appearance: alert and oriented to person, place and time, well-developed and   well-nourished, in no acute distress   Eyes: pupils equal, round, and reactive to light, extraocular eye movements intact, conjunctivae normal and sclera anicteric   Neck: neck supple and non tender without mass, no thyromegaly, no thyroid nodules and no cervical adenopathy   Pulmonary/Chest: decreased breath sounds, no accessory muscles of inspiration, no focal wheezes  Cardiovascular: normal rate, regular rhythm, normal S1 and S2, no murmurs, rubs, clicks or gallops, distal pulses intact, no carotid bruits, no murmurs, no gallops, no carotid bruits and no JVD   Abdomen: soft, non-tender, non-distended, normal bowel sounds, no masses or organomegaly   Extremities: no cyanosis, no clubbing, no edema  Musculoskeletal: normal range of motion, no joint swelling, deformity or tenderness   Neurologic: reflexes normal and symmetric, no cranial nerve deficit noted    LABS/IMAGING:    CBC:  Lab Results   Component Value Date    WBC 2.6 (L) 08/17/2022    HGB 6.9 (LL) 08/17/2022    HCT 21.4 (L) 08/17/2022    MCV 89.2 08/17/2022    PLT 4 (LL) 08/17/2022    LYMPHOPCT 9.6 (L) 08/17/2022    RBC 2.40 (L) 08/17/2022    MCH 28.8 08/17/2022    MCHC 32.2 08/17/2022    RDW 19.1 (H) 08/17/2022    NEUTOPHILPCT 87.8 (H) 08/17/2022    MONOPCT 2.6 08/17/2022    BASOPCT 0.0 08/17/2022    NEUTROABS 2.29 08/17/2022    LYMPHSABS 0.26 (L) 08/17/2022    MONOSABS 0.08 (L) 08/17/2022    EOSABS 0.00 (L) 08/17/2022    BASOSABS 0.00 08/17/2022       Recent Labs     08/17/22  0417 08/16/22  1505   WBC 2.6* 3.2*   HGB 6.9* 8.0*   HCT 21.4* 24.4*   MCV 89.2 89.1   PLT 4* 7*       BMP:   Recent Labs     08/16/22  1505 08/17/22  0417    136   K 3.7 3.7   CL 99 103   CO2 21* 22   BUN 16 13   CREATININE 0.9 0.8       MG:   Lab Results   Component Value Date/Time    MG 1.5 09/22/2021 05:28 AM     Ca/Phos:   Lab Results   Component Value Date    CALCIUM 8.4 (L) 08/17/2022     Amylase: No results found for: AMYLASE  Lipase: No results found for: LIPASE  LIVER PROFILE: tachycardia (HCC)--atrial tachycardia with RV pacing  11.) H/O Thrombocytopenia - secondary to chemotherapy  12.) H/O Tobacco Abuse   13.) H/O Dual-chamber pacemaker and removal of preexistent loop recorder 7/20/2021  14.) H/O Port Placement for Chemotherapy   115.) Lymphopenia    PLAN:  1.) procalcitonin   2.) DuoNeb  3.) no role for bronchoscopy at this time   4.) CT Chest WO contrast for left basal atelectasis  5.) platelet transfusion, PRBC transfusion   6.) await oncology consultation   7.) check fibrinogen, LDH, peripheral smear, haptoglobin, ESR, CRP    - hold eliquis   - doppler US legs to r/o DVT  - await input from consultant teams   - abx per ID    Thank you Mina Baker MD very much for allowing me to see this patient in consultation and follow up. Care reviewed with nursing staff, medical and surgical specialty care, primary care and the patient's family as available. Restraints are ordered when the patient can do harm to him/herself by pulling out devices.     Yue Tran MD, M.D.

## 2022-08-17 NOTE — CARE COORDINATION
Social Work / Transition of Care:    Pt presents to the ED secondary to fatigue, generalized weakness, and headache. Pt is from home. Pt has hx of lung cancer. SW met with pt and wife. Pt was lying in bed, alert and oriented x4. Pt and wife live in a one floor home. Pt has a cane, walker, nebulizer, and home oxygen (3L at night, HCS). Pt is independent and drives at baseline. Pt has no hx at [facility] and has hx of [facility] but was unable to remember name of provider. Pt's PCP is Dr Luis M Bartlett, oncologist is Dr Kassidy Forde, and pulmonologist is Dr Annita Murphy. Pt plan is to return home with no needs upon discharge. NGUYEN/CM to follow.

## 2022-08-17 NOTE — ED NOTES
Called the on call DrPrimitivo For Dr. Trent Hurley for a possible blood product reaction of a temperature increase of 1.1 degree. Patient reports feeling cold at this time. Waiting for Dr. Jessy Bishop call Judit Neff and then call this RN back for further instructions.       Cari Ventura RN  08/17/22 [id number]

## 2022-08-17 NOTE — H&P
[facility]  History and Physical      CHIEF COMPLAINT:    Chief Complaint   Patient presents with    Fatigue     Generalized weakness, headache        Patient of Consuelo Al MD presents with:  Pneumonia    History of Present Illness:   Patient is a 71-year-old male with a past medical history of CHB resulting in a pacemaker insertion 7/21, COPD, Pes on Eliquis, HLD, squamous cell lung cancer actively receiving chemotherapy who presents to the emergency room for fatigue and fever. Patient states that he is receiving chemotherapy for his squamous cell lung carcinoma in which his last treatment was 8/9. Patient presented to the emergency room as he has become more and more increasingly weak and developed a fever yesterday. On arrival to emergency patient had a fever of 101.9. Patient did have a CT head and chest x-ray which were both relatively negative. Lab work however revealed a lactic acidosis of 2.2, proBNP 1,207, slightly elevated troponin at 27, pancytopenia with a platelet count of 7 as well as a significant drop in H&H from 6 weeks ago with an H&H of 12.5/39.1 to 6.9/21.4. Patient ultimately did have blood cultures test positive for Enterococcus faecalis. On assessment patient did state he had some mild chest discomfort on arrival to the emergency yesterday but seem to have resolved on its own, he denies any associated nausea vomiting or radiating of the pain into his jaw or arm. Patient is admitted to intermediate telemetry unit for further work-up and treatment. REVIEW OF SYSTEMS:  Pertinent negatives are above in HPI. 10 point ROS otherwise negative.       Past Medical History:   Diagnosis Date    CHB (complete heart block) ([facility] 07/2021    COPD (chronic obstructive pulmonary disease) ([facility]     Syncope 09/2020    dr Catina Florence wearing a monitor    Syncope 07/2021         Past Surgical History:   Procedure Laterality Date    BRONCHOSCOPY N/A 05/27/2021    BRONCHOSCOPY W/EBUS FNA performed by Anat Piña MD at [address] [facility] N/A 05/27/2021    BRONCHOSCOPY DIAGNOSTIC OR CELL [address] ONLY performed by Anat Piña MD at [address] [facility] N/A 05/27/2021    BRONCHOSCOPY ADD ON COMPUTER ASSISTED performed by Anat Piña MD at [address] Bilateral 2011 2001    groin     PACEMAKER INSERTION  07/20/2021    DUAL PPM  (ABDULLAHI)  DR. Niurka Ochoa       Medications Prior to Admission:    Not in a hospital admission. Note that the patient's home medications were reviewed and the above list is accurate to the best of my knowledge at the time of the exam.    Allergies:    Patient has no known allergies. Social History:    reports that he quit smoking about 10 years ago. He has a 50.00 pack-year smoking history. He has never used smokeless tobacco. He reports that he does not currently use alcohol after a past usage of about 1.0 standard drink per week. He reports that he does not use drugs. Family History:   family history includes Cancer in his father and paternal uncle. PHYSICAL EXAM:    Vitals:  BP (!) 165/91   Pulse 61   Temp 99.7 °F (37.6 °C)   Resp 18   Wt 187 lb (84.8 kg)   SpO2 96%   BMI 29.29 kg/m²       General appearance: conversant, diaphoretic, ill-appearing  Eyes: Sclerae anicteric, PERRLA  HEENT: AT/NC, MMM  Neck: FROM, supple, no thyromegaly  Lymph: No cervical / supraclavicular lymphadenopathy  Lungs: Diminished bilaterally, slightly increased WOB  CV: RRR, no MRGs, no lower extremity edema  Abdomen: Soft, non-tender; no masses or HSM, +BS  Extremities: FROM without synovitis. No clubbing or cyanosis of the hands. Skin: no rash, induration, lesions, or ulcers  Psych: Calm and cooperative. Normal judgement and insight. Normal mood and affect. Neuro: Alert and interactive, face symmetric, speech fluent. LABS:  All labs reviewed.   Of note:  CBC:   Lab Results   Component Value Date/Time WBC 2.6 08/17/2022 04:17 AM    RBC 2.40 08/17/2022 04:17 AM    HGB 6.9 08/17/2022 04:17 AM    HCT 21.4 08/17/2022 04:17 AM    MCV 89.2 08/17/2022 04:17 AM    MCH 28.8 08/17/2022 04:17 AM    MCHC 32.2 08/17/2022 04:17 AM    RDW 19.1 08/17/2022 04:17 AM    PLT 4 08/17/2022 04:17 AM    MPV NOT CALC 08/17/2022 04:17 AM     CMP:    Lab Results   Component Value Date/Time     08/17/2022 04:17 AM    K 3.7 08/17/2022 04:17 AM     08/17/2022 04:17 AM    CO2 22 08/17/2022 04:17 AM    BUN 13 08/17/2022 04:17 AM    CREATININE 0.8 08/17/2022 04:17 AM    GFRAA >60 08/17/2022 04:17 AM    LABGLOM >60 08/17/2022 04:17 AM    GLUCOSE 139 08/17/2022 04:17 AM    PROT 5.6 08/17/2022 04:17 AM    LABALBU 3.3 08/17/2022 04:17 AM    CALCIUM 8.4 08/17/2022 04:17 AM    BILITOT 0.6 08/17/2022 04:17 AM    ALKPHOS 82 08/17/2022 04:17 AM    AST 19 08/17/2022 04:17 AM    ALT 18 08/17/2022 04:17 AM       Imaging:  CXR: Continue pacing the left lower lung linear scarring in the left perihilar region. COPD. CT head: Negative    EKG:  Atrial-paced rhythm with premature supraventricular complexes  Right bundle branch block  Left posterior fascicular block    Telemetry:  Atrial-paced rhythm     ASSESSMENT/PLAN:  Principal Problem:    Pneumonia  Active Problems:    Pancytopenia (HCC)    COPD (chronic obstructive pulmonary disease) (HCC)    HTN (hypertension)    Squamous carcinoma of lung (HCC)    History pulmonary embolism ([address] on Eliquis  Resolved Problems:    * No resolved hospital problems.  *    Patient is a 67-year-old male with a hx of squamous cell lung carcinoma who is active chemo therapy who is admitted to [facility] for  Sepsis/bacteremia/immunocompromise status-acutely ill status  -Monitor labs  -DuoNebs as needed  -Currently room air  -Rocephin and Doxy   -Check Legionella and strep pneumoniae  -Respiratory panel negative   -Check procalcitonin 0.42    Bacteremia Enterococcus  -+BC for Enterococcus faecalis   -Consult ID  -IV Vanco x 1 pending pan culture results  -Patient does have port for chemo and  he does have a pacemaker which was placed in 7/2021  -Continue IV fluid    Pancytopenia  -Consult heme-onc  -h/h 6.9/21.4 > transfuse 1 unit RBC  -Platelets 4 > transfuse secondary to dangerously low numbers at risk for brain versus internal bleeding  -Hold Eliquis    Hematuria  -Check urine culture  -Hold Eliquis     Medication for other comorbidities continue as appropriate dose adjustment as necessary. DVT prophylaxis PCD's  PT OT  Discharge planning    Case discussed with attending and agreed upon plan of care. Code status: Full  Requires Inpatient level of care  LELO Estevez CNP    3:30 PM  8/17/2022     Above note edited to reflect my thoughts     I personally saw, examined and provided care for the patient. Radiographs, labs and medication list were reviewed by me independently. The case was discussed in detail and plans for care were established. Review of MAKENZIE Estevez   , documentation was conducted and revisions were made as appropriate directly by me. I agree with the above documented exam, problem list, and plan of care.      Charles Whitt MD  6:07 PM  8/17/2022

## 2022-08-17 NOTE — CONSULTS
[address] [place] [address] [facility]  [facility]    Consultation Note     Admit Date: 8/16/2022  2:38 PM    Reason for Consult:   E faecalis sepsis    Attending Physician:  Mey Jauregui MD     Chief Complaint: Fatigue and weak associated with fever    HISTORY OF PRESENT ILLNESS:   The patient is a 67 y.o. man  known to the Infectious Diseases service. The patient is admitted through the emergency room. Admitting labs show that his BUN/creatinine was 16 and 0.9 his proBNP was 1207 and his white count was 3.2 with a hemoglobin of 8. His liver function tests were normal.  Blood cultures obtained and growing E faecalis. ID was asked to evaluate. He was given a trifecta of antibiotics including vancomycin and doxycycline and ceftriaxone. His white count is 3.2 and hemoglobin 8 that is dropped to 6.9 and his ANC is 2.94. Patient does have a pacemaker. Wife was present at bedside and he and her both agree that yesterday had chills fevers or rigors. He got blood and platelets through his port. He has had the chills and low-grade fever for the last week or 2. He also had some hematuria today. His last chemo was last week and currently his white count is 3.2 hemoglobin was 8 on admission dropped to 6.9. Unfortunately his platelets were 4574 on admission. Addition he has been on Eliquis. The chemo was for squamous cell lung cancer. May 2022: Patient was seen in consultation at [place] [address] for left lower lung pneumonia as well as COVID 19 omicron. He was continued on antimicrobials because of the concern for left lower lobe pneumonia as well as a mass in the left hilum.   Past Medical History:        Diagnosis Date    CHB (complete heart block) ([facility] [address] 07/2021    COPD (chronic obstructive pulmonary disease) ([facility] [address]     Syncope 09/2020    dr Kvng Velasquez wearing a monitor    Syncope 07/2021     Past Surgical History:        Procedure Laterality Date    BRONCHOSCOPY N/A 05/27/2021 History:       Problem Relation Age of Onset    Cancer Father         prostate    Cancer Paternal Uncle         anal   . Otherwise non-pertinent to the chief complaint. REVIEW OF SYSTEMS:    CONSTITUTIONAL: Positive chills, fevers or night sweats. No loss of weight. EYES:  No double vision or drainage from eyes, ears or throat. HEENT:  No neck stiffness. No dysphagia. No drainage from eyes, ears or throat  RESPIRATORY:  No cough, productive sputum or hemoptysis. CARDIOVASCULAR:  No chest pain, palpitations, orthopnea or dyspnea on exertion. GASTROINTESTINAL:  No nausea, vomiting, diarrhea or constipation or hematochezia   GENITOURINARY:  No frequency burning dysuria positive hematuria. INTEGUMENT/BREAST:  No rash or breast masses. HEMATOLOGIC/LYMPHATIC:  No lymphadenopathy or blood dyscrasics. ALLERGIC/IMMUNOLOGIC:  No anaphylaxis. ENDOCRINE:  No polyuria or polydipsia or temperature intolerance. MUSCULOSKELETAL:  No myalgia or arthralgia. Full ROM. NEUROLOGICAL:  No focal motor sensory deficit. BEHAVIOR/PSYCH:  No psychosis. PHYSICAL EXAM:    Vitals:    BP (!) 141/67   Pulse 60   Temp 98.9 °F (37.2 °C) (Temporal)   Resp 18   Ht 5' 6\" (1.676 m)   Wt 187 lb 9.6 oz (85.1 kg)   SpO2 93%   BMI 30.28 kg/m²   Constitutional: The patient is awake, alert, and oriented. Skin: Warm and dry. No rashes were noted. No jaundice. HEENT: Eyes show round, and reactive pupils. Moist mucous membranes, no ulcerations, no thrush. Neck: Supple to movements. No lymphadenopathy. Chest: No use of accessory muscles to breathe. Symmetrical expansion. Auscultation reveals no wheezing, crackles, or rhonchi. Mild tenderness over the port  Cardiovascular: S1 and S2 are rhythmic and regular. No murmurs appreciated. Abdomen: Positive bowel sounds to auscultation. Benign to palpation. No masses felt. No hepatosplenomegaly.   Genitourinary: Male  Extremities: No clubbing, no cyanosis, no being so low but if high-grade bacteremia exist we must entertain removing the Mediport and potentially even the pacer  Check cultures  Baseline ESR, CRP  Monitor labs  Will follow with you    Thank you for having us see this patient in consultation. I will be discussing this case with the treating physicians.       Electronically signed by Graham Howard MD on 8/17/2022 at 4:21 PM

## 2022-08-18 ENCOUNTER — APPOINTMENT (OUTPATIENT)
Dept: ULTRASOUND IMAGING | Age: 72
DRG: 228 | End: 2022-08-18
Payer: MEDICARE

## 2022-08-18 ENCOUNTER — APPOINTMENT (OUTPATIENT)
Dept: CT IMAGING | Age: 72
DRG: 228 | End: 2022-08-18
Payer: MEDICARE

## 2022-08-18 LAB
ANION GAP SERPL CALCULATED.3IONS-SCNC: 12 MMOL/L (ref 7–16)
BUN BLDV-MCNC: 8 MG/DL (ref 6–23)
CALCIUM SERPL-MCNC: 8.9 MG/DL (ref 8.6–10.2)
CHLORIDE BLD-SCNC: 101 MMOL/L (ref 98–107)
CO2: 25 MMOL/L (ref 22–29)
CREAT SERPL-MCNC: 0.7 MG/DL (ref 0.7–1.2)
GFR AFRICAN AMERICAN: >60
GFR NON-AFRICAN AMERICAN: >60 ML/MIN/1.73
GLUCOSE BLD-MCNC: 99 MG/DL (ref 74–99)
HCT VFR BLD CALC: 22.8 % (ref 37–54)
HCT VFR BLD CALC: 24.4 % (ref 37–54)
HEMOGLOBIN: 7.6 G/DL (ref 12.5–16.5)
HEMOGLOBIN: 8.1 G/DL (ref 12.5–16.5)
MCH RBC QN AUTO: 29.1 PG (ref 26–35)
MCHC RBC AUTO-ENTMCNC: 33.2 % (ref 32–34.5)
MCV RBC AUTO: 87.8 FL (ref 80–99.9)
PDW BLD-RTO: 18.1 FL (ref 11.5–15)
PLATELET # BLD: 21 E9/L (ref 130–450)
PLATELET CONFIRMATION: NORMAL
PMV BLD AUTO: ABNORMAL FL (ref 7–12)
POTASSIUM SERPL-SCNC: 3.6 MMOL/L (ref 3.5–5)
RBC # BLD: 2.78 E12/L (ref 3.8–5.8)
SODIUM BLD-SCNC: 138 MMOL/L (ref 132–146)
WBC # BLD: 3.5 E9/L (ref 4.5–11.5)

## 2022-08-18 PROCEDURE — 97165 OT EVAL LOW COMPLEX 30 MIN: CPT

## 2022-08-18 PROCEDURE — 2580000003 HC RX 258: Performed by: NURSE PRACTITIONER

## 2022-08-18 PROCEDURE — A4216 STERILE WATER/SALINE, 10 ML: HCPCS | Performed by: SPECIALIST

## 2022-08-18 PROCEDURE — 85018 HEMOGLOBIN: CPT

## 2022-08-18 PROCEDURE — 87077 CULTURE AEROBIC IDENTIFY: CPT

## 2022-08-18 PROCEDURE — 97535 SELF CARE MNGMENT TRAINING: CPT

## 2022-08-18 PROCEDURE — 71250 CT THORAX DX C-: CPT

## 2022-08-18 PROCEDURE — 87186 SC STD MICRODIL/AGAR DIL: CPT

## 2022-08-18 PROCEDURE — 2060000000 HC ICU INTERMEDIATE R&B

## 2022-08-18 PROCEDURE — 2580000003 HC RX 258: Performed by: SPECIALIST

## 2022-08-18 PROCEDURE — 80048 BASIC METABOLIC PNL TOTAL CA: CPT

## 2022-08-18 PROCEDURE — 97530 THERAPEUTIC ACTIVITIES: CPT

## 2022-08-18 PROCEDURE — 6360000002 HC RX W HCPCS: Performed by: SPECIALIST

## 2022-08-18 PROCEDURE — 87040 BLOOD CULTURE FOR BACTERIA: CPT

## 2022-08-18 PROCEDURE — 36415 COLL VENOUS BLD VENIPUNCTURE: CPT

## 2022-08-18 PROCEDURE — 97161 PT EVAL LOW COMPLEX 20 MIN: CPT

## 2022-08-18 PROCEDURE — 85014 HEMATOCRIT: CPT

## 2022-08-18 PROCEDURE — 6370000000 HC RX 637 (ALT 250 FOR IP): Performed by: NURSE PRACTITIONER

## 2022-08-18 PROCEDURE — 93970 EXTREMITY STUDY: CPT

## 2022-08-18 PROCEDURE — 94640 AIRWAY INHALATION TREATMENT: CPT

## 2022-08-18 PROCEDURE — 2700000000 HC OXYGEN THERAPY PER DAY

## 2022-08-18 PROCEDURE — 85027 COMPLETE CBC AUTOMATED: CPT

## 2022-08-18 RX ADMIN — IPRATROPIUM BROMIDE AND ALBUTEROL SULFATE 1 AMPULE: .5; 2.5 SOLUTION RESPIRATORY (INHALATION) at 15:52

## 2022-08-18 RX ADMIN — IPRATROPIUM BROMIDE AND ALBUTEROL SULFATE 1 AMPULE: .5; 2.5 SOLUTION RESPIRATORY (INHALATION) at 19:47

## 2022-08-18 RX ADMIN — DAPTOMYCIN 700 MG: 500 INJECTION, POWDER, LYOPHILIZED, FOR SOLUTION INTRAVENOUS at 16:00

## 2022-08-18 RX ADMIN — METOPROLOL SUCCINATE 50 MG: 25 TABLET, EXTENDED RELEASE ORAL at 08:17

## 2022-08-18 RX ADMIN — ATORVASTATIN CALCIUM 20 MG: 20 TABLET, FILM COATED ORAL at 23:34

## 2022-08-18 RX ADMIN — IPRATROPIUM BROMIDE AND ALBUTEROL SULFATE 1 AMPULE: .5; 2.5 SOLUTION RESPIRATORY (INHALATION) at 10:03

## 2022-08-18 RX ADMIN — METOPROLOL SUCCINATE 50 MG: 25 TABLET, EXTENDED RELEASE ORAL at 17:04

## 2022-08-18 RX ADMIN — Medication 10 ML: at 08:18

## 2022-08-18 RX ADMIN — IPRATROPIUM BROMIDE AND ALBUTEROL SULFATE 1 AMPULE: .5; 2.5 SOLUTION RESPIRATORY (INHALATION) at 13:01

## 2022-08-18 RX ADMIN — Medication 10 ML: at 23:34

## 2022-08-18 NOTE — PROGRESS NOTES
Occupational Therapy  OCCUPATIONAL THERAPY INITIAL EVALUATION     [address] [address] [facility]  [address] [facility],  [place], [address]                                                Patient Name: Mati Silverman  MRN: [id number]  : 1950  Room: [facility]57-J    Evaluating [address] [place] [address] [id number]    Referring Provider: LELO Perkins CNP  Specific Provider Orders/Date: OT eval and treat  22    Diagnosis: Pneumonia [J18.9]  Thrombocytopenia ([facility] [address] [D69.6]  Chronic anticoagulation [Z79.01]  Neutropenia, unspecified type ([facility] [address] [D70.9]  Pneumonia due to infectious organism, unspecified laterality, unspecified part of lung [J18.9]   Pt admitted to hospital on 22 for fatigue, generalized weakness, fever      Pertinent Medical History:  has a past medical history of CHB (complete heart block) ([facility] [address] COPD (chronic obstructive pulmonary disease) ([facility] [address] Syncope, and Syncope.        Precautions:  Fall Risk, O2, current chemo (squamous cell lung CA)    Assessment of current deficits    [x] Functional mobility  [x]ADLs  [x] Strength               []Cognition    [x] Functional transfers   [x] IADLs         [x] Safety Awareness   [x]Endurance    [] Fine Coordination              [x] Balance      [] Vision/perception   []Sensation     []Gross Motor Coordination  [] ROM  [] Delirium                   [] Motor Control     OT PLAN OF CARE   OT POC based on physician orders, patient diagnosis and results of clinical assessment    Frequency/Duration 1-3 days/wk for 2 weeks PRN   Specific OT Treatment Interventions to include:   * Instruction/training on adapted ADL techniques and AE recommendations to increase functional independence within precautions       * Training on energy conservation strategies, correct breathing pattern and techniques to improve independence/tolerance for self-care routine  * Functional transfer/mobility training/DME recommendations for increased independence, safety, and fall prevention  * Patient/Family education to increase follow through with safety techniques and functional independence  * Recommendation of environmental modifications for increased safety with functional transfers/mobility and ADLs  * Therapeutic exercise to improve motor endurance, ROM, and functional strength for ADLs/functional transfers  * Therapeutic activities to facilitate/challenge dynamic balance, stand tolerance for increased safety and independence with ADLs  * Therapeutic activities to facilitate gross/fine motor skills for increased independence with ADLs    Recommended Adaptive Equipment: TBD     Home Living: Pt lives with spouse in 1 floor home (+basement).  0 JERRY  Pt prefers to bathe in basement shower - full flight, 2 handrails    Bathroom setup: tub/shower with grab bars    Equipment owned: SPC, w/w, home O2 (at night), transport chair    Prior Level of Function: independent/mod I with ADLs , shares IADLs; ambulated w/o AD   Driving: yes    Pain Level: Pt c/o no pain his session     Cognition: A&O: 4/4; Follows 1-2 step directions   Memory:  good    Sequencing:  good    Problem solving:  fair +   Judgement/safety:  fair +     Functional Assessment:  AM-PAC Daily Activity Raw Score: 19/24   Initial Eval Status  Date: 8/18/22 Treatment Status  Date: STGs = LTGs  Time frame: 10-14 days   Feeding Independent   -   Grooming Stand by Assist   Modified [place]    UB Dressing Supervision   Modified [place]    LB Dressing Contact Guard Assist   For safety  B socks, simulated pants  Modified [place]    Bathing Contact Guard Assist  Modified [place]    Toileting Stand by Assist   Modified [place]    Bed Mobility  Supine to sit:NT  Sit to supine: Supervision   Supine to sit: Modified [place]   Sit to supine: Modified [place]    Functional Transfers Stand by Assist   Modified [place]    Functional Mobility Stand by Assist   In room and bathroom   2 trials w/ rest break between  Modified [place]    Balance Sitting:     Static:  Independent    Dynamic:Supervision  Standing: SBA, no AD     Activity Tolerance Fair   Noted fatigue/+SOB post ambulation task. Reinforced rest and pursed lip breathing. Good   Visual/  Perceptual Glasses: yes                  Hand Dominance R   AROM (PROM) Strength Additional Info:    RUE  WFL WFL good  and wfl FMC/dexterity noted during ADL tasks       LUE WFL WFL good  and wfl FMC/dexterity noted during ADL tasks       Hearing: WFL   Sensation:  c/o numbness/tingling R foot  Tone: WFL   Edema: none noted    Comments: Upon arrival patient seated in chair. Therapist educated pt on role of OT. At end of session, patient lying in bed (per pt request) with call light and phone within reach, all lines and tubes intact. Overall patient demonstrated decreased independence and safety during completion of ADL/functional transfer/mobility tasks. Pt would benefit from continued skilled OT to increase safety and independence with completion of ADL/IADL tasks for functional independence and quality of life. Treatment: OT treatment provided this date includes: Facilitation of unsupported sitting balance (addressing posture, weight shifting, dynamic reaching to prep for ADL's), functional transfers (various surfaces w/ education/cues for safety/hand placement), standing tolerance tasks (addressing posture, balance and activity tolerance while incorporating light functional reaching impacting ADLs and functional activity) and functional ambulation tasks (including to/ from bathroom, in room and in preparation for item retrieval tasks w/ education/skilled cuing on hand placement, posture, body mechanics, energy conservation techniques (emphasized static rest breaks) and safety).   Therapist facilitated self-care retraining: UB/LB self-care tasks, simulated toileting task and standing grooming tasks while educating pt on modified techniques, posture, safety and energy conservation techniques. Skilled monitoring of HR, O2 sats and pts response to treatment. Pt initially on room air at rest  O2 sat=^95%  During ambulation task: O2 sat=^90%  Post ambulation task (pt seated EOB): O2 sat decreased to 85%. Reinforced rest and pursed lip breathing. Therapist assisted w/ donning nasal cannula (2L). O2 sat improved to 95% (increased time obtaining accurate O2 reading w/ multiple devices). At end of session: pt semi-supine in bed per pt request. O2 sat=^95% on cannula    Rehab Potential: Good for established goals     Patient / Family Goal: not stated      Patient and/or family were instructed on functional diagnosis, prognosis/goals and OT plan of care. Demonstrated good understanding. Eval Complexity: Low    Time In: 13:52  Time Out: 14:16  Total Treatment Time: 10 minutes    Min Units   OT Eval Low 97165  X  1   OT Eval Medium 41331      OT Eval High 73688      OT Re-Eval [id number]       Therapeutic Ex 78793       Therapeutic Activities 00506       ADL/Self Care 47973  10  1   Orthotic Management 67000       Manual 99591     Neuro Re-Ed 09275       Non-Billable Time          Evaluation Time additionally includes thorough review of current medical information, gathering information on past medical history/social history and prior level of function, interpretation of standardized testing/informal observation of tasks, assessment of data and development of plan of care and goals.         LINDA CoulterR/L #[id number]

## 2022-08-18 NOTE — CARE COORDINATION
Care coordination:  Following course for discharge planning. ID and pulmonology following. IV Dapto for bacteremia  Per ID , with platelets being so low  if high-grade bacteremia exist we must entertain removing the Mediport and potentially even the pacer. CT chest ordered, BLE US also ordered. Discharge plan remains to return home with wife. Baseline home 02 3L  at night (HCS) . Awaiting PT OT input. Discharge plan remains home with spouse. If HHC needed , no preference  . Continue to follow for discharge needs .

## 2022-08-18 NOTE — PROGRESS NOTES
[facility]                                Progress note    Subjective: The patient is awake and alert. Up in the bathroom washing up with his wife  States he feels much better today    Objective:    /61   Pulse 61   Temp 97.3 °F (36.3 °C) (Temporal)   Resp 18   Ht 5' 6\" (1.676 m)   Wt 187 lb 9.6 oz (85.1 kg)   SpO2 100%   BMI 30.28 kg/m²     In: 1110 [P.O.:480; Blood:630]  Out: -   In: 1110   Out: -     General appearance: NAD, conversant, looks much better today  HEENT: AT/NC, MMM  Neck: FROM, supple  Lungs: Clear to auscultation  CV: RRR, no MRGs-right-sided port in place, left-sided pacemaker in place  Vasc: Radial pulses 2+  Abdomen: Soft, non-tender; no masses or HSM  Extremities: No peripheral edema or digital cyanosis  Skin: Generalized bruising  Psych: Alert and oriented to person, place and time  Neuro: Alert and interactive     Recent Labs     08/16/22  1505 08/17/22  0417 08/18/22  0056 08/18/22  0558   WBC 3.2* 2.6*  --  3.5*   HGB 8.0* 6.9* 7.6* 8.1*   HCT 24.4* 21.4* 22.8* 24.4*   PLT 7* 4*  --  21*       Recent Labs     08/16/22  1505 08/17/22  0417 08/18/22  0558    136 138   K 3.7 3.7 3.6   CL 99 103 101   CO2 21* 22 25   BUN 16 13 8   CREATININE 0.9 0.8 0.7   CALCIUM 8.9 8.4* 8.9       Assessment:    Principal Problem:    Pneumonia  Active Problems:    Pancytopenia (HCC)    COPD (chronic obstructive pulmonary disease) (HCC)    HTN (hypertension)    Squamous carcinoma of lung (HCC)    History pulmonary embolism ([facility] [address] on Eliquis  Resolved Problems:    * No resolved hospital problems.  *      Plan:     Patient is a 53-year-old male with a hx of squamous cell lung carcinoma who is active chemo therapy who is admitted to [facility] for  Sepsis/bacteremia/immunocompromise status-acutely ill status      -Patient has both right-sided port and left-sided pacemaker in place which may need to be removed if persistent Enterococcus bacteremia  -Monitor labs  -DuoNebs as needed  -Currently room air   -Check Legionella and strep pneumoniae  -Respiratory panel negative  -Check procalcitonin 0.42  -Pulmonology following  -CT chest ordered by pulm> progressive infiltrative mass in the left perihilar region  -On daptomycin per infectious disease       Bacteremia Enterococcus  -+BC for Enterococcus faecalis  -Consult ID  -IV daptomycin, Pending sensitivities  -Repeat blood cultures  -Patient does have port for chemo and  he does have a pacemaker which was placed in 7/2021, await plan for removal of hardware    Pancytopenia  -Consult heme-onc  -h/h 6.9/21.4 > transfuse 1 unit RBC > 8.1/24.4 today  -Platelets 4 > transfuse 1 unit platelets > 21 today-patient with much more energy and strength  -Hold Eliquis-consider holding off for few weeks     Hematuria  -Check urine culture  -Hold Eliquis      DVT Prophylaxis PCD's  PT/OT  Discharge planning       LELO Dupont CNP  2:12 PM  8/18/2022     Above note edited to reflect my thoughts     I personally saw, examined and provided care for the patient. Radiographs, labs and medication list were reviewed by me independently. The case was discussed in detail and plans for care were established. Review of MAKENZIE Dupont   , documentation was conducted and revisions were made as appropriate directly by me. I agree with the above documented exam, problem list, and plan of care.      Héctor Britton MD  6:09 PM  8/18/2022

## 2022-08-18 NOTE — PROGRESS NOTES
LABGLOM >60 08/18/2022 05:58 AM    GLUCOSE 99 08/18/2022 05:58 AM    PROT 5.6 08/17/2022 04:17 AM    LABALBU 3.3 08/17/2022 04:17 AM    CALCIUM 8.9 08/18/2022 05:58 AM    BILITOT 0.6 08/17/2022 04:17 AM    ALKPHOS 82 08/17/2022 04:17 AM    AST 19 08/17/2022 04:17 AM    ALT 18 08/17/2022 04:17 AM          Current Facility-Administered Medications:     0.9 % sodium chloride infusion, , IntraVENous, PRN, LELO Cuello CNP    0.9 % sodium chloride infusion, , IntraVENous, PRN, LELO Cuello CNP    DAPTOmycin (CUBICIN) 700 mg in sodium chloride 0.9 % 50 mL IVPB, 8 mg/kg, IntraVENous, Q24H, Jimmy Lott MD    albuterol (ACCUNEB) nebulizer solution 0.63 mg, 1 ampule, Nebulization, Q6H PRN, LELO Zhang CNP    [Held by provider] apixaban (ELIQUIS) tablet 5 mg, 5 mg, Oral, BID, LELO Garnica CNP    metoprolol succinate (TOPROL XL) extended release tablet 50 mg, 50 mg, Oral, BID WC, LELO Garnica CNP, 50 mg at 08/18/22 0817    atorvastatin (LIPITOR) tablet 20 mg, 20 mg, Oral, Daily, LELO Zhang CNP, 20 mg at 08/17/22 2137    sodium chloride flush 0.9 % injection 5-40 mL, 5-40 mL, IntraVENous, 2 times per day, LELO Zhang CNP, 10 mL at 08/18/22 0818    sodium chloride flush 0.9 % injection 5-40 mL, 5-40 mL, IntraVENous, PRN, LELO Garnica CNP    0.9 % sodium chloride infusion, , IntraVENous, PRN, LELO Zhang CNP    acetaminophen (TYLENOL) tablet 650 mg, 650 mg, Oral, Q6H PRN **OR** acetaminophen (TYLENOL) suppository 650 mg, 650 mg, Rectal, Q6H PRN, LELO Zhang CNP    senna (SENOKOT) tablet 8.6 mg, 1 tablet, Oral, Daily PRN, LELO Zhang - RICA    ipratropium-albuterol (DUONEB) nebulizer solution 1 ampule, 1 ampule, Inhalation, Q4H WA, LELO Zhang CNP, 1 ampule at 08/17/22 1952    CT Head WO Contrast   Final Result   No acute intracranial abnormality.       Cortical atrophy and periventricular leukomalacia. XR CHEST PORTABLE   Final Result   Continued opacity in the left lower lung and linear scarring in the left   perihilar region. COPD. 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 PDL1 0%. Treated with concurrent chemo/XRT from 7/6/21 to 9/7/21. Now on consolidation Imfinzi from 11/2/21. Progression on PET from 6/24/22. Started Renetta Lemon and Keytruda on 6/30/22. Patient seen in office last 8/9 and received C2D8 of Carboplatin AUC 2 with Gemzar 1000 mg/m2 IV on D1,8 and Keytruda IV on D1 on a 21 day cycle. A/P:  Anemia, thrombocytopenia - transfusion to maintain Hgb > 7, plt > 10, FOBT (recent transfusion, wait checking iron studies)  History of PE on eliquis, hold with thrombocytopenia until platelets recover > 01J  Bacteremia, per primary service, ID consulted  4. Leukopenia without neutropenia, if ANC falls below 1000 can start filgastrim    Thank you for allowing us to participate in the care of Althea Marin. Alirio Taylor PA-C  [phone]    Electronically signed by MATTHEW Cevallos on 8/18/2022 at 9:19 AM    Note: This report was completed using [employer] voiced recognition software. Every effort has been made to ensure accuracy; however, inadvertent computerized transcription errors may be present.

## 2022-08-18 NOTE — PROGRESS NOTES
[address] [place] [address] [facility]  [facility]  Progress Note      Chief Complaint   Patient presents with    Fatigue     Generalized weakness, headache       SUBJECTIVE:  Patient is tolerating medications. No reported adverse drug reactions. No nausea, vomiting, diarrhea. Feels better today  Wife present   Plt up a little  Review of systems:  As stated above in the chief complaint, otherwise negative. Medications:  Scheduled Meds:   daptomycin (CUBICIN) in NS IV syringe  8 mg/kg IntraVENous Q24H    [Held by provider] apixaban  5 mg Oral BID    metoprolol succinate  50 mg Oral BID WC    atorvastatin  20 mg Oral Daily    sodium chloride flush  5-40 mL IntraVENous 2 times per day    ipratropium-albuterol  1 ampule Inhalation Q4H WA     Continuous Infusions:   sodium chloride      sodium chloride      sodium chloride       PRN Meds:sodium chloride, sodium chloride, albuterol, sodium chloride flush, sodium chloride, acetaminophen **OR** acetaminophen, senna    OBJECTIVE:  /61   Pulse 61   Temp 97.3 °F (36.3 °C) (Temporal)   Resp 18   Ht 5' 6\" (1.676 m)   Wt 187 lb 9.6 oz (85.1 kg)   SpO2 100%   BMI 30.28 kg/m²   Temp  Av.5 °F (36.9 °C)  Min: 97 °F (36.1 °C)  Max: 99.9 °F (37.7 °C)  Constitutional: The patient is awake, alert, and oriented. Skin: Warm and dry. No rashes were noted. HEENT: Round and reactive pupils. Moist mucous membranes. No ulcerations or thrush. Neck: Supple to movements. Chest: No use of accessory muscles to breathe. Symmetrical expansion. No wheezing, crackles or rhonchi. Cardiovascular: S1 and S2 are rhythmic and regular. No murmurs appreciated. Abdomen: Positive bowel sounds to auscultation. Benign to palpation. No masses felt. No hepatosplenomegaly. Genitourinary: male  Extremities: No clubbing, no cyanosis, no edema.   Lines: peripheral    Laboratory and Tests Review:  Lab Results   Component Value Date    WBC 3.5 (L) 2022    WBC 2.6 (L) 2022 WBC 3.2 (L) 08/16/2022    HGB 8.1 (L) 08/18/2022    HCT 24.4 (L) 08/18/2022    MCV 87.8 08/18/2022    PLT 21 (L) 08/18/2022     Lab Results   Component Value Date    NEUTROABS 2.29 08/17/2022    NEUTROABS 2.94 08/16/2022    NEUTROABS 5.61 05/27/2022     No results found for: CRPHS  Lab Results   Component Value Date    ALT 18 08/17/2022    AST 19 08/17/2022    ALKPHOS 82 08/17/2022    BILITOT 0.6 08/17/2022     Lab Results   Component Value Date/Time     08/18/2022 05:58 AM    K 3.6 08/18/2022 05:58 AM    K 3.7 08/17/2022 04:17 AM     08/18/2022 05:58 AM    CO2 25 08/18/2022 05:58 AM    BUN 8 08/18/2022 05:58 AM    CREATININE 0.7 08/18/2022 05:58 AM    CREATININE 0.8 08/17/2022 04:17 AM    CREATININE 0.9 08/16/2022 03:05 PM    GFRAA >60 08/18/2022 05:58 AM    LABGLOM >60 08/18/2022 05:58 AM    GLUCOSE 99 08/18/2022 05:58 AM    PROT 5.6 08/17/2022 04:17 AM    LABALBU 3.3 08/17/2022 04:17 AM    CALCIUM 8.9 08/18/2022 05:58 AM    BILITOT 0.6 08/17/2022 04:17 AM    ALKPHOS 82 08/17/2022 04:17 AM    AST 19 08/17/2022 04:17 AM    ALT 18 08/17/2022 04:17 AM     Lab Results   Component Value Date    CRP 9.6 (H) 05/24/2022     No results found for: [address]  Radiology:  Reviewed CT lung    Microbiology:   Lab Results   Component Value Date/Time    [facility]  08/16/2022 03:05 PM     Gram stain performed from blood culture bottle media  Gram positive cocci in chains      BC Identification and sensitivity to follow 08/16/2022 03:05 PM    BC 5 Days no growth 05/24/2022 05:37 PM    ORG Enterococcus faecalis 08/16/2022 03:15 PM    ORG Enterococcus faecalis 08/16/2022 03:05 PM    ORG Coagulase Negative Staphylococci 05/18/2019 10:20 AM     Lab Results   Component Value Date/Time    BLOODCULT2  08/16/2022 03:15 PM     Gram stain performed from blood culture bottle media  Gram positive cocci in chains  Previously positive blood culture called      BLOODCULT2 Sensitivity to follow 08/16/2022 03:15 PM    BLOODCULT2 5 Days no

## 2022-08-18 NOTE — PROGRESS NOTES
Pulmonary Progress Note    Admit Date: 2022                            PCP: David Bettencourt MD  Principal Problem:    Pneumonia  Active Problems:    Pancytopenia ([facility]    COPD (chronic obstructive pulmonary disease) ([facility]    HTN (hypertension)    Squamous carcinoma of lung ([facility]    History pulmonary embolism ([facility] on Eliquis  Resolved Problems:    * No resolved hospital problems. *      Subjective:  Patient seen sitting on edge of bed 2L NC  He states his breathing has significantly improved when compared to yesterday  He is able to walk to the bathroom now without dyspnea and denies cough  Patient with new RLE edema and numbness    Medications:   sodium chloride      sodium chloride      sodium chloride          daptomycin (CUBICIN) IVPB  8 mg/kg IntraVENous Q24H    [Held by provider] apixaban  5 mg Oral BID    metoprolol succinate  50 mg Oral BID WC    atorvastatin  20 mg Oral Daily    sodium chloride flush  5-40 mL IntraVENous 2 times per day    ipratropium-albuterol  1 ampule Inhalation Q4H WA       Vitals:  VITALS:  /61   Pulse 60   Temp 97.3 °F (36.3 °C) (Temporal)   Resp 18   Ht 5' 6\" (1.676 m)   Wt 187 lb 9.6 oz (85.1 kg)   SpO2 98%   BMI 30.28 kg/m²   24HR INTAKE/OUTPUT:    Intake/Output Summary (Last 24 hours) at 2022 0842  Last data filed at 2022 1701  Gross per 24 hour   Intake 630 ml   Output --   Net 630 ml     CURRENT PULSE OXIMETRY:  SpO2: 98 %  24HR PULSE OXIMETRY RANGE:  SpO2  Av.8 %  Min: 93 %  Max: 100 %  CVP:    VENT SETTINGS:      Additional Respiratory Assessments  Heart Rate: 60  Resp: 18  SpO2: 98 %      EXAM:  General: No distress. Alert. Eyes:  No sclera icterus. No conjunctival injection. ENT: No discharge. Pharynx clear. Neck: Trachea midline. Normal thyroid. Resp: No accessory muscle use. No crackles. No wheezing. No rhonchi. Diminished bilaterally  CV: Regular rate. Regular rhythm. No mumur or rub. RLE edema    ABD: Non-tender. Non-distended. No masses. No organmegaly. Normal bowel sounds. Skin: Warm and dry. No nodule on exposed extremities. No rash on exposed extremities. M/S: No cyanosis. No joint deformity. No clubbing. Neuro: Awake. Follows commands. A&Ox3    I/O: I/O last 3 completed shifts: In: 630 [Blood:630]  Out: -   No intake/output data recorded. Results:  CBC:   Recent Labs     08/16/22  1505 08/17/22  0417 08/18/22  0056 08/18/22  0558   WBC 3.2* 2.6*  --  3.5*   HGB 8.0* 6.9* 7.6* 8.1*   HCT 24.4* 21.4* 22.8* 24.4*   MCV 89.1 89.2  --  87.8   PLT 7* 4*  --  21*     BMP:   Recent Labs     08/16/22  1505 08/17/22  0417 08/18/22  0558    136 138   K 3.7 3.7 3.6   CL 99 103 101   CO2 21* 22 25   BUN 16 13 8   CREATININE 0.9 0.8 0.7     LFT:   Recent Labs     08/16/22  1505 08/17/22  0417   ALKPHOS 92 82   ALT 22 18   AST 18 19   PROT 6.2* 5.6*   BILITOT 0.8 0.6   BILIDIR  --  <0.2   LABALBU 3.7 3.3*     PT/INR: No results for input(s): PROTIME, INR in the last 72 hours. Cultures:  No results for input(s): CULTRESP in the last 72 hours. ABG:   No results for input(s): PH, PO2, PCO2, HCO3, BE, O2SAT in the last 72 hours. Films:  CT Head WO Contrast    Result Date: 8/16/2022  EXAMINATION: CT OF THE HEAD WITHOUT CONTRAST  8/16/2022 4:42 pm TECHNIQUE: CT of the head was performed without the administration of intravenous contrast. Automated exposure control, iterative reconstruction, and/or weight based adjustment of the mA/kV was utilized to reduce the radiation dose to as low as reasonably achievable. COMPARISON: July 19, 2021 HISTORY: ORDERING SYSTEM PROVIDED HISTORY: headache, hx of cancer TECHNOLOGIST PROVIDED HISTORY: Reason for exam:->headache, hx of cancer Has a \"code stroke\" or \"stroke alert\" been called? ->No Decision Support Exception - unselect if not a suspected or confirmed emergency medical condition->Emergency Medical Condition (MA) What reading provider will be dictating this exam?->CRC FINDINGS: BRAIN/VENTRICLES: There is no acute intracranial hemorrhage, mass effect or midline shift. No abnormal extra-axial fluid collection. The gray-white differentiation is maintained without evidence of an acute infarct. There is no evidence of hydrocephalus. ORBITS: The visualized portion of the orbits demonstrate no acute abnormality. SINUSES: The visualized paranasal sinuses and mastoid air cells demonstrate no acute abnormality. SOFT TISSUES/SKULL:  No acute abnormality of the visualized skull or soft tissues. No acute intracranial abnormality. Cortical atrophy and periventricular leukomalacia. XR CHEST PORTABLE    Result Date: 8/16/2022  EXAMINATION: ONE XRAY VIEW OF THE CHEST 8/16/2022 3:49 pm COMPARISON: May 24, 2022 HISTORY: ORDERING SYSTEM PROVIDED HISTORY: cough, lung cancer TECHNOLOGIST PROVIDED HISTORY: Reason for exam:->cough, lung cancer What reading provider will be dictating this exam?->CRC FINDINGS: There is opacity in the left lower lung. There are chronic markings at the right lung base. Emphysematous changes in the upper lungs. The heart is not enlarged. There is no pneumothorax. There is mild blunting of the left costophrenic angle. Pacemaker present. Continued opacity in the left lower lung and linear scarring in the left perihilar region. COPD. Assessment:  68 y/o male with past medical history noted for COPD, cardiac arrhythmia (previously wore event monitor per EP), left hilar lymphadenopathy, s/p EBUS bronchoscopy on 5/27/2021 noted left hilar station 11L Stage III, COVID 5/2022, pulmonary embolism (on eliquis), port placement, pacemaker placement (7/2021) that presented to [facility] with weakness, cough, shortness of breath, fatigue, fever.       1.) Stage IIIB T2aN3 Squamous Cell Carcinoma of Lung  - diagnosis 5/27/2021, PD-L1 0%  - treatment with concurrent chemotherapy/radiation therapy    - consolidated with Imfinzi (Durvalumab) per ID IV every 4 weeks   2.) Enterococcus faecalis Bacteremia/Septicemia  3.) H/O SARS-CoV 2 Pneumonitis 5/24/2022- suspected Omicron BA-2  - vaccinated x 2 and with booster against COVID  - sick contact wife  4.) Pancytopenia  5.) H/O Submassive Hemoptysis - streaky, known lung mass and on eliquis for h/o pulmonary embolism  6.) H/O RLL Subsegmental Pulmonary Embolism - CTA Chest 9/17/2021  7.) Radiation Pneumonitis/Fibrosis Left Lung   8.) COPD, No Acute Exacerbation   9.) Hypomagnesemia   10.) H/O Wide-complex tachycardia (HCC)--atrial tachycardia with RV pacing  11.) H/O Thrombocytopenia - secondary to chemotherapy  12.) H/O Tobacco Abuse   13.) H/O Dual-chamber pacemaker and removal of preexistent loop recorder 7/20/2021  14.) H/O Port Placement for Chemotherapy  115.) Lymphopenia     PLAN:  1.) Continue DuoNeb every 4 hours   2.) no role for bronchoscopy at this time   3.) Will order CT Chest WO contrast for left basal atelectasis  4.) Oncology following. S/p platelet and PRBC transfusion on 8/17. Continue to hold Eliquis until platelets recover > 81V. H/H 6.9/21.4 > 8.1/24.4 (8/18). PLTs 7>4 (8/17) >21 (8/18)  5.) Fibrinogen, LDH, peripheral smear, haptoglobin, ESR, CRP pending   6.) Continue IS for pulmonary hygiene   7.) IV daptomycin for bacteremia per ID. PCT 0.42. Positive BC for GPC, repeat BC pending   8.) For new RLE swelling and numbness, check US BLE to r/o DVT. Pro-BNP 1207. Electronically signed by LELO Noriega CNP on 8/18/2022 at 8:42 AM       Attending Attestation Note:    Patient seen and examined with Hospital Staff, NP. I have extensively reviewed the chart lab work and imaging. I agree with above.     In addition, the following apply:    - procalcitonin 0.42  - DuoNeb  - no role for bronchoscopy at this time   - CT Chest WO contrast for left basal atelectasis  - platelet transfusion, PRBC transfusion   - await oncology consultation   - await fibrinogen, LDH, peripheral smear, haptoglobin, ESR, CRP  - hold eliquis  - doppler US legs to r/o DVT negative  - appreciate input from consultant teams  - Daptomycin per ID  - patient has chemo port and pacemaker in place at this time  - baseline ESR  - baseline CRP    Samina MD Nii  8/18/2022  12:23 PM

## 2022-08-18 NOTE — PROGRESS NOTES
Physical Therapy    Initial Assessment     Name: Kali Maria  : 1950  MRN: [id number]      Date of Service: 2022    Evaluating PT: Dodie Mcmullen, PT, DPT [id number]      Room #:  7204/5629-T  Diagnosis:  Pneumonia [J18.9]  Thrombocytopenia ([facility] [D69.6]  Chronic anticoagulation [Z79.01]  Neutropenia, unspecified type ([facility] [D70.9]  Pneumonia due to infectious organism, unspecified laterality, unspecified part of lung [J18.9]  PMHx/PSHx:  COPD, Syncope, CHB  Precautions:  Fall Risk, O2    SUBJECTIVE:    Pt lives with wife in a 1 story house with level entry. Pt ambulated with no AD for majority of time prior to admission. Pt states he has a cane and [employer] but he rarely uses them. Pt wears 3L O2 at night. OBJECTIVE:   Initial Evaluation  Date: 22 Treatment Date: Short Term/ Long Term   Goals   AM-PAC 6 Clicks 46/17     Was pt agreeable to Eval/treatment? Yes     Does pt have pain? No current complaints of pain. Bed Mobility  Rolling: NT  Supine to sit: SBA  Sit to supine: SBA  Scooting: SBA  Rolling: Independent   Supine to sit: Independent   Sit to supine: Independent   Scooting: Independent    Transfers Sit to stand: SBA  Stand to sit: SBA  Stand pivot: SBA  Sit to stand: Independent   Stand to sit: Independent   Stand pivot: Independent    Ambulation   40 feet with SBA with WW  200 feet with Mod Independent with [employer]   Stair negotiation: ascended and descended NT  3 step(s) with 1 rail(s) with supervision   ROM BUE: See OT note  BLE: WFL     Strength BUE: See OT note  BLE: grossly 4/5     Balance Sitting EOB: SBA  Dynamic Standing: SBA with [employer]  Sitting EOB: Independent   Dynamic Standing: Mod Independent with [employer]     Pt is A & O x: 4 grossly to person, place, time and situation. Sensation: Pt reports numbness/tingling in R foot  Edema: Unremarkable. Patient education  Pt educated on PT role in acute care.     Patient response to education:   Pt verbalized understanding Pt demonstrated skill Pt requires further education in this area   Yes NA Reinforce     ASSESSMENT:    Conditions Requiring Skilled Therapeutic Intervention:    [x]Decreased strength     []Decreased ROM  [x]Decreased functional mobility  [x]Decreased balance   [x]Decreased endurance   []Decreased posture  [x]Decreased sensation  []Decreased coordination   []Decreased vision  []Decreased safety awareness   []Increased pain     Comments:    Upon entry to room, pt side lying in bed and agreeable to PT evaluation. Pt was on 2L O2 and resting O2 sat was 100%. Pt ambulated short distance in room with [employer] with fairly steady gait. Pt O2 sat upon return to the bed was 99% on 2L. Pt completed x3 STS exercise from EOB. Pt had no SOB with this activity. Pt returned to side lying in bed and was left with all needs met and call light nearby. At end of session, pt removed his O2 line and tied to bed rail while side lying. RN made aware. Treatment:  Patient practiced and was instructed in the following treatment:    Bed mobility: verbal cues for technique and safety. Transfers: (x4 EOB) verbal cues for hand placement and safety. Pt completed x3 STS exercise for strength and reinforcement on hand placement. Ambulation: verbal cues for safety, [employer] management and verbal symptom monitoring. Monitoring of vitals and symptoms throughout session. Pt's/family goals:  1. To return home. Prognosis is Good for reaching above PT goals. Patient and or family understand(s) diagnosis, prognosis, and plan of care. Yes.      PHYSICAL THERAPY PLAN OF CARE:    PT POC is established based on physician order and patient diagnosis     Referring provider/PT Order:    08/16/22 2145  PT evaluation and treat  Start:  08/16/22 2145,   End:  08/16/22 2145,   ONE TIME,   Standing Count:  1 Occurrences,   R         Liana Clarke, APRN - CNP      Diagnosis:  Pneumonia [J18.9]  Thrombocytopenia ([facility] [D69.6]  Chronic anticoagulation [Z79.01]  Neutropenia, unspecified type ([facility] [D70.9]  Pneumonia due to infectious organism, unspecified laterality, unspecified part of lung [J18.9]  Specific instructions for next treatment: To increase activity. Current Treatment Recommendations:     [x] Strengthening to improve independence with functional mobility   [] ROM to improve independence with functional mobility   [x] Balance Training to improve static/dynamic balance and to reduce fall risk  [x] Endurance Training to improve activity tolerance during functional mobility   [x] Transfer Training to improve safety and independence with all functional transfers   [x] Gait Training to improve gait mechanics, endurance and assess need for appropriate assistive device  [] Stair Training in preparation for safe discharge home and/or into the community   [] Positioning to prevent skin breakdown and contractures  [x] Safety and Education Training   [x] Patient/Caregiver Education   [] HEP  [] Other     PT long term treatment goals are located in above grid    Frequency of treatments: 2-5x/week x 2-3 days. Time in  0835  Time out  0848    Total Treatment Time  10 minutes     Evaluation Time includes thorough review of current medical information, gathering information on past medical history/social history and prior level of function, completion of standardized testing/informal observation of tasks, assessment of data and education on plan of care and goals.     CPT codes:  [x] Low Complexity PT evaluation 27377  [] Moderate Complexity PT evaluation 99973  [] High Complexity PT evaluation 27842  [] PT Re-evaluation 53810  [] Gait training 76996 0 minutes  [] Manual therapy 53832 0 minutes  [x] Therapeutic activities 03223 10 minutes  [] Therapeutic exercises 00874 0 minutes  [] Neuromuscular reeducation 93836 0 minutes     Rosario Peacock, PT, DPT  [id number]      Reynaldo Smith, SPT

## 2022-08-19 ENCOUNTER — ANESTHESIA EVENT (OUTPATIENT)
Dept: OPERATING ROOM | Age: 72
DRG: 228 | End: 2022-08-19
Payer: MEDICARE

## 2022-08-19 LAB
ANION GAP SERPL CALCULATED.3IONS-SCNC: 12 MMOL/L (ref 7–16)
BASOPHILS ABSOLUTE: 0.01 E9/L (ref 0–0.2)
BASOPHILS RELATIVE PERCENT: 0.3 % (ref 0–2)
BUN BLDV-MCNC: 10 MG/DL (ref 6–23)
CALCIUM SERPL-MCNC: 9 MG/DL (ref 8.6–10.2)
CHLORIDE BLD-SCNC: 99 MMOL/L (ref 98–107)
CO2: 25 MMOL/L (ref 22–29)
CREAT SERPL-MCNC: 0.8 MG/DL (ref 0.7–1.2)
CULTURE, BLOOD 2: ABNORMAL
CULTURE, BLOOD 2: ABNORMAL
CULTURE, BLOOD 3: ABNORMAL
CULTURE, BLOOD 3: ABNORMAL
EOSINOPHILS ABSOLUTE: 0.01 E9/L (ref 0.05–0.5)
EOSINOPHILS RELATIVE PERCENT: 0.3 % (ref 0–6)
GFR AFRICAN AMERICAN: >60
GFR NON-AFRICAN AMERICAN: >60 ML/MIN/1.73
GLUCOSE BLD-MCNC: 112 MG/DL (ref 74–99)
HCT VFR BLD CALC: 21.9 % (ref 37–54)
HCT VFR BLD CALC: 24 % (ref 37–54)
HEMOGLOBIN: 7.3 G/DL (ref 12.5–16.5)
HEMOGLOBIN: 8 G/DL (ref 12.5–16.5)
IMMATURE GRANULOCYTES #: 0.02 E9/L
IMMATURE GRANULOCYTES %: 0.6 % (ref 0–5)
LV EF: 58 %
LVEF MODALITY: NORMAL
LYMPHOCYTES ABSOLUTE: 0.37 E9/L (ref 1.5–4)
LYMPHOCYTES RELATIVE PERCENT: 11 % (ref 20–42)
MCH RBC QN AUTO: 29.4 PG (ref 26–35)
MCHC RBC AUTO-ENTMCNC: 33.3 % (ref 32–34.5)
MCV RBC AUTO: 88.2 FL (ref 80–99.9)
MONOCYTES ABSOLUTE: 0.18 E9/L (ref 0.1–0.95)
MONOCYTES RELATIVE PERCENT: 5.3 % (ref 2–12)
NEUTROPHILS ABSOLUTE: 2.78 E9/L (ref 1.8–7.3)
NEUTROPHILS RELATIVE PERCENT: 82.5 % (ref 43–80)
ORGANISM: ABNORMAL
PDW BLD-RTO: 18.2 FL (ref 11.5–15)
PLATELET # BLD: 23 E9/L (ref 130–450)
PLATELET CONFIRMATION: NORMAL
PMV BLD AUTO: ABNORMAL FL (ref 7–12)
POTASSIUM SERPL-SCNC: 3.2 MMOL/L (ref 3.5–5)
RBC # BLD: 2.72 E12/L (ref 3.8–5.8)
SODIUM BLD-SCNC: 136 MMOL/L (ref 132–146)
WBC # BLD: 3.6 E9/L (ref 4.5–11.5)

## 2022-08-19 PROCEDURE — 2580000003 HC RX 258: Performed by: SPECIALIST

## 2022-08-19 PROCEDURE — 2580000003 HC RX 258: Performed by: NURSE PRACTITIONER

## 2022-08-19 PROCEDURE — 6370000000 HC RX 637 (ALT 250 FOR IP): Performed by: NURSE PRACTITIONER

## 2022-08-19 PROCEDURE — 80048 BASIC METABOLIC PNL TOTAL CA: CPT

## 2022-08-19 PROCEDURE — 93306 TTE W/DOPPLER COMPLETE: CPT

## 2022-08-19 PROCEDURE — 85025 COMPLETE CBC W/AUTO DIFF WBC: CPT

## 2022-08-19 PROCEDURE — 94640 AIRWAY INHALATION TREATMENT: CPT

## 2022-08-19 PROCEDURE — 87040 BLOOD CULTURE FOR BACTERIA: CPT

## 2022-08-19 PROCEDURE — 85014 HEMATOCRIT: CPT

## 2022-08-19 PROCEDURE — 85018 HEMOGLOBIN: CPT

## 2022-08-19 PROCEDURE — 6360000002 HC RX W HCPCS: Performed by: SPECIALIST

## 2022-08-19 PROCEDURE — 2700000000 HC OXYGEN THERAPY PER DAY

## 2022-08-19 PROCEDURE — 36415 COLL VENOUS BLD VENIPUNCTURE: CPT

## 2022-08-19 PROCEDURE — 2060000000 HC ICU INTERMEDIATE R&B

## 2022-08-19 RX ORDER — POTASSIUM CHLORIDE 7.45 MG/ML
10 INJECTION INTRAVENOUS PRN
Status: DISCONTINUED | OUTPATIENT
Start: 2022-08-19 | End: 2022-09-03 | Stop reason: HOSPADM

## 2022-08-19 RX ORDER — ALBUTEROL SULFATE 2.5 MG/3ML
2.5 SOLUTION RESPIRATORY (INHALATION) EVERY 6 HOURS PRN
Status: DISCONTINUED | OUTPATIENT
Start: 2022-08-19 | End: 2022-08-30

## 2022-08-19 RX ORDER — LANOLIN ALCOHOL/MO/W.PET/CERES
3 CREAM (GRAM) TOPICAL NIGHTLY PRN
Status: DISCONTINUED | OUTPATIENT
Start: 2022-08-19 | End: 2022-09-03 | Stop reason: HOSPADM

## 2022-08-19 RX ORDER — POTASSIUM CHLORIDE 20 MEQ/1
40 TABLET, EXTENDED RELEASE ORAL PRN
Status: DISCONTINUED | OUTPATIENT
Start: 2022-08-19 | End: 2022-09-03 | Stop reason: HOSPADM

## 2022-08-19 RX ADMIN — IPRATROPIUM BROMIDE AND ALBUTEROL SULFATE 1 AMPULE: .5; 2.5 SOLUTION RESPIRATORY (INHALATION) at 20:21

## 2022-08-19 RX ADMIN — AMPICILLIN SODIUM 2000 MG: 2 INJECTION, POWDER, FOR SOLUTION INTRAVENOUS at 20:50

## 2022-08-19 RX ADMIN — IPRATROPIUM BROMIDE AND ALBUTEROL SULFATE 1 AMPULE: .5; 2.5 SOLUTION RESPIRATORY (INHALATION) at 12:13

## 2022-08-19 RX ADMIN — Medication 10 ML: at 20:51

## 2022-08-19 RX ADMIN — Medication 10 ML: at 09:40

## 2022-08-19 RX ADMIN — CEFTRIAXONE 2000 MG: 2 INJECTION, POWDER, FOR SOLUTION INTRAMUSCULAR; INTRAVENOUS at 16:26

## 2022-08-19 RX ADMIN — AMPICILLIN SODIUM 2000 MG: 2 INJECTION, POWDER, FOR SOLUTION INTRAVENOUS at 23:53

## 2022-08-19 RX ADMIN — IPRATROPIUM BROMIDE AND ALBUTEROL SULFATE 1 AMPULE: .5; 2.5 SOLUTION RESPIRATORY (INHALATION) at 08:41

## 2022-08-19 RX ADMIN — ATORVASTATIN CALCIUM 20 MG: 20 TABLET, FILM COATED ORAL at 20:51

## 2022-08-19 RX ADMIN — MELATONIN 3 MG ORAL TABLET 3 MG: 3 TABLET ORAL at 20:51

## 2022-08-19 RX ADMIN — IPRATROPIUM BROMIDE AND ALBUTEROL SULFATE 1 AMPULE: .5; 2.5 SOLUTION RESPIRATORY (INHALATION) at 16:00

## 2022-08-19 RX ADMIN — AMPICILLIN SODIUM 2000 MG: 2 INJECTION, POWDER, FOR SOLUTION INTRAVENOUS at 16:29

## 2022-08-19 ASSESSMENT — PAIN SCALES - GENERAL
PAINLEVEL_OUTOF10: 0
PAINLEVEL_OUTOF10: 0

## 2022-08-19 ASSESSMENT — LIFESTYLE VARIABLES: SMOKING_STATUS: 0

## 2022-08-19 ASSESSMENT — COPD QUESTIONNAIRES: CAT_SEVERITY: SEVERE

## 2022-08-19 NOTE — ANESTHESIA PRE PROCEDURE
IntraVENous Q12H Rozina Chen MD   2,000 mg at 08/19/22 1626    melatonin tablet 3 mg  3 mg Oral Nightly PRN Cherylyn Jennifer, APRN - CNP        0.9 % sodium chloride infusion   IntraVENous PRN Cherylyn Jennifer, APRN - CNP        0.9 % sodium chloride infusion   IntraVENous PRN Cherylyn Jennifer, APRN - CNP        albuterol (ACCUNEB) nebulizer solution 0.63 mg  1 ampule Nebulization Q6H PRN Lyndon Sear, APRN - CNP        [Held by provider] apixaban (ELIQUIS) tablet 5 mg  5 mg Oral BID Lyndon Sear, APRN - CNP        metoprolol succinate (TOPROL XL) extended release tablet 50 mg  50 mg Oral BID WC Lyndon Sear, APRN - CNP   50 mg at 08/18/22 1704    atorvastatin (LIPITOR) tablet 20 mg  20 mg Oral Daily Lyndon Sear, APRN - CNP   20 mg at 08/18/22 2334    sodium chloride flush 0.9 % injection 5-40 mL  5-40 mL IntraVENous 2 times per day Lyndon Sear, APRN - CNP   10 mL at 08/19/22 0940    sodium chloride flush 0.9 % injection 5-40 mL  5-40 mL IntraVENous PRN Liana Clarke, APRN - CNP        0.9 % sodium chloride infusion   IntraVENous PRN Lyndon Sear, APRN - CNP        acetaminophen (TYLENOL) tablet 650 mg  650 mg Oral Q6H PRN Lyndon Sear, APRN - CNP        Or    acetaminophen (TYLENOL) suppository 650 mg  650 mg Rectal Q6H PRN Lyndon Sear, APRN - CNP        senna (SENOKOT) tablet 8.6 mg  1 tablet Oral Daily PRN Lyndon Sear, APRN - CNP        ipratropium-albuterol (DUONEB) nebulizer solution 1 ampule  1 ampule Inhalation Q4H WA Lyndon Sear, APRN - CNP   1 ampule at 08/19/22 1600       Allergies:  No Known Allergies    Problem List:    Patient Active Problem List   Diagnosis Code    Syncope and collapse R55    Pneumonia J18.9    COPD (chronic obstructive pulmonary disease) ([address] J44.9    BPH (benign prostatic hyperplasia) N40.0    HTN (hypertension) I10    HLD (hyperlipidemia) E78.5    Intermittent complete heart block (HCC) I44.2    S/P cardiac pacemaker procedure Z95.0    Acute on chronic respiratory failure with hypoxia (HCC) J96.21    Squamous carcinoma of lung (HCC) C34.90    History pulmonary embolism (HCC), on Eliquis I26.99    Hyponatremia E87.1    Hypomagnesemia E83.42    Wide-complex tachycardia (HCC) I47.2    COVID-19 U07.1    Pancytopenia (HCC) D61.818       Past Medical History:        Diagnosis Date    [facility]B [facility] block) [facility] 07/2021    [facility]     Syncope 09/2020    dr Yannick Coleman wearing a monitor    Syncope 07/2021       Past Surgical History:        Procedure Laterality Date    BRONCHOSCOPY N/A 05/27/2021    BRONCHOSCOPY W/EBUS FNA performed by Dominic Ivy MD at [facility] 05/27/2021    BRONCHOSCOPY DIAGNOSTIC OR CELL [address] ONLY performed by Dominic Ivy MD at [address] [place] [address] N/A 05/27/2021    BRONCHOSCOPY ADD ON COMPUTER ASSISTED performed by Dominic Ivy MD at [address] [place] [address] 2011 2001    groin     PACEMAKER INSERTION  07/20/2021    DUAL PPM  ([facility])  DR. Margarita Ham       Social History:    Social History     Tobacco Use    Smoking status: Former     Packs/day: 2.00     Years: 25.00     Pack years: 50.00     Types: Cigarettes     Quit date: 12/26/2011     Years since quitting: 10.6    Smokeless tobacco: Never   Substance Use Topics    Alcohol use: Not Currently     Alcohol/week: 1.0 standard drink     Types: 1 Glasses of wine per week     Comment: 1 glass of wine per day prior                                Counseling given: Not Answered      Vital Signs (Current):   Vitals:    08/19/22 0755 08/19/22 0841 08/19/22 1214 08/19/22 1501   BP: (!) 98/40   135/71   Pulse: 60   60   Resp: 18   16   Temp: 36.3 °C (97.3 °F)   36.2 °C (97.2 °F)   TempSrc: Temporal   Temporal   SpO2: 100% 98% 94% 100%   Weight:       Height:                                                  BP Readings from Last 3 Encounters:   08/19/22 135/71   06/16/22 (!) 142/54   05/27/22 127/62       NPO Status:  advised to be NPO after midnight 8/19/2022                                                                               BMI:   Wt Readings from Last 3 Encounters:   08/19/22 183 lb 2 oz (83.1 kg)   05/26/22 187 lb (84.8 kg)   04/01/22 187 lb 9.6 oz (85.1 kg)     Body mass index is 29.56 kg/m². CBC:   Lab Results   Component Value Date/Time    WBC 3.6 08/19/2022 06:14 AM    RBC 2.72 08/19/2022 06:14 AM    HGB 8.0 08/19/2022 06:14 AM    HCT 24.0 08/19/2022 06:14 AM    MCV 88.2 08/19/2022 06:14 AM    RDW 18.2 08/19/2022 06:14 AM    PLT 23 08/19/2022 06:14 AM       CMP:   Lab Results   Component Value Date/Time     08/19/2022 06:14 AM    K 3.2 08/19/2022 06:14 AM    K 3.7 08/17/2022 04:17 AM    CL 99 08/19/2022 06:14 AM    CO2 25 08/19/2022 06:14 AM    BUN 10 08/19/2022 06:14 AM    CREATININE 0.8 08/19/2022 06:14 AM    GFRAA >60 08/19/2022 06:14 AM    LABGLOM >60 08/19/2022 06:14 AM    GLUCOSE 112 08/19/2022 06:14 AM    PROT 5.6 08/17/2022 04:17 AM    CALCIUM 9.0 08/19/2022 06:14 AM    BILITOT 0.6 08/17/2022 04:17 AM    ALKPHOS 82 08/17/2022 04:17 AM    AST 19 08/17/2022 04:17 AM    ALT 18 08/17/2022 04:17 AM       POC Tests: No results for input(s): POCGLU, POCNA, POCK, POCCL, POCBUN, POCHEMO, POCHCT in the last 72 hours.     Coags:   Lab Results   Component Value Date/Time    PROTIME 10.9 05/27/2021 11:23 AM    INR 1.0 05/27/2021 11:23 AM       HCG (If Applicable): No results found for: PREGTESTUR, PREGSERUM, HCG, HCGQUANT     ABGs: No results found for: PHART, PO2ART, TCR6VEZ, SNC4PRS, BEART, Y1NZFPXZ     Type & Screen (If Applicable):  No results found for: LABABO, LABRH    Drug/Infectious Status (If Applicable):  No results found for: HIV, HEPCAB    COVID-19 Screening (If Applicable):   Lab Results   Component Value Date/Time    COVID19 Not Detected 08/17/2022 04:17 AM           Anesthesia Evaluation  Patient summary reviewed and Nursing notes reviewed no history of anesthetic complications:   Airway: Mallampati: II  TM distance: >3 FB   Neck ROM: full  Mouth opening: > = 3 FB   Dental: normal exam         Pulmonary:   (+) pneumonia: resolved,  COPD: severe,  decreased breath sounds     (-) not a current smoker                          ROS comment: Acute on chronic respiratory failure with hypoxia (HCC  Squamous carcinoma of lung ([address]  On home O2 3L NC at night    CXR 8/16/2022:  FINDINGS:  There is opacity in the left lower lung.  There are chronic markings at the  right lung base.  Emphysematous changes in the upper lungs.  The heart is not  enlarged.  There is no pneumothorax.  There is mild blunting of the left  costophrenic angle.  Pacemaker present. CT PE 5/2022:  No central pulmonary embolism or aortic dissection.     A lobulated 3.4 x 5.4 cm left hilar and perihilar mass extending to left  lower lobe encircling the pulmonary artery and the bronchi consistent with  progressive malignancy with occlusion of left lower lobe bronchi with  postobstructive pneumonitis. Cardiovascular:  Exercise tolerance: poor (<4 METS),   (+) hypertension:, pacemaker: pacemaker, dysrhythmias (asystolic episodes): paced rhythm and PVC, orthopnea, hyperlipidemia      ECG reviewed  Rhythm: regular  Rate: normal  Echocardiogram reviewed    Cleared by cardiology     Beta Blocker:  Dose within 24 Hrs      ROS comment: RBBB  Intermittent complete heart block ([address]  S/P cardiac pacemaker procedure    Echo 8/18/2022:   Summary   Normal left ventricular chamber size. Normal left ventricular systolic function. Visually estimated LVEF is 55-60 %. No wall motion abnormalities. Normal diastolic function. Normal left atrial pressure. Normal right ventricle structure and function. Normal left atrial size. Normal right atrial size   Likely normal estimated PA pressure. Pacer/ ICD wire present in the right heart.    No gross evidence of infective endocarditis. Consider SHIRA for better   accuracy if clinically indicated. Compared to prior echo from 2021, no significant changes noted. PE comment: ICD    Neuro/Psych:                ROS comment: Syncopal episodes GI/Hepatic/Renal:            ROS comment: Hx hernia repair x2  Hx diverticulosis  BPH. Endo/Other:    (+) blood dyscrasia ( marked thrombocytopenia 2/2 chemotherapy): anticoagulation therapy and thrombocytopenia:., electrolyte abnormalities, malignancy/cancer (Squamous cell lung CA). ROS comment: Stopped eliquis   On Prednisone therapy  Bacteremia with Enterococcus 8/2022 Abdominal:       Abdomen: soft. Vascular:   + PE. Other Findings:      ECHO 8/19/22    Conclusions      Summary   Normal left ventricular chamber size. Normal left ventricular systolic function. Visually estimated LVEF is 55-60 %. No wall motion abnormalities. Normal diastolic function. Normal left atrial pressure. Normal right ventricle structure and function. Normal left atrial size. Normal right atrial size   Likely normal estimated PA pressure. Pacer/ ICD wire present in the right heart. No gross evidence of infective endocarditis. Consider SHIRA for better   accuracy if clinically indicated. Compared to prior echo from 2021, no significant changes noted. Signature    EKG 8/16/22    Narrative & Impression    Atrial-paced rhythm with premature supraventricular complexes  Right bundle branch block  Left posterior fascicular block   Bifascicular block   Nonspecific ST and T wave abnormality  Abnormal ECG  When compared with ECG of 18-SEP-2021 11:52,  No significant change was found  Confirmed by Vishal Krueger ([id number]) on 8/17/2022 6:19:47 AM     CT CHEST WO CONTRAST 8/18/22    Progressive infiltrative mass in the left perihilar region as noted   concerning for progressive malignancy with adjacent atelectasis.  Continued   surveillance is recommended. Anesthesia Plan      MAC     ASA 4       Induction: intravenous. MIPS: Postoperative opioids intended and Prophylactic antiemetics administered. Anesthetic plan and risks discussed with patient. Use of blood products discussed with patient whom consented to blood products. Plan discussed with attending and CRNA. Jose Vann   8/19/2022      ----DOS anesthesiologist addendum-------  Patient seen and evaluated. Plan for MAC anesthetic discussed with patient. All patient's questions were answered to his satisfaction. Patient consents to and agrees to [facility] anesthetic. Plan for patient to receive platelets prior to incision, per surgical note 8/20/2022:    \"For OR today for mediport removal.     Will prepare platelets on hold for OR given thrombocytopenia. \"      Nguyễn Brambila MD  8/20/22

## 2022-08-19 NOTE — PROGRESS NOTES
cyanosis. No joint deformity. No clubbing. Neuro: Awake. Follows commands. A&Ox3    I/O: I/O last 3 completed shifts: In: 660 [P.O.:660]  Out: -   No intake/output data recorded. Results:  CBC:   Recent Labs     08/17/22  0417 08/18/22  0056 08/18/22  0558 08/19/22  0155 08/19/22  0614   WBC 2.6*  --  3.5*  --  3.6*   HGB 6.9*   < > 8.1* 7.3* 8.0*   HCT 21.4*   < > 24.4* 21.9* 24.0*   MCV 89.2  --  87.8  --  88.2   PLT 4*  --  21*  --  23*    < > = values in this interval not displayed. BMP:   Recent Labs     08/17/22  0417 08/18/22  0558 08/19/22  0614    138 136   K 3.7 3.6 3.2*    101 99   CO2 22 25 25   BUN 13 8 10   CREATININE 0.8 0.7 0.8     LFT:   Recent Labs     08/16/22  1505 08/17/22  0417   ALKPHOS 92 82   ALT 22 18   AST 18 19   PROT 6.2* 5.6*   BILITOT 0.8 0.6   BILIDIR  --  <0.2   LABALBU 3.7 3.3*     PT/INR: No results for input(s): PROTIME, INR in the last 72 hours. Cultures:  No results for input(s): CULTRESP in the last 72 hours. ABG:   No results for input(s): PH, PO2, PCO2, HCO3, BE, O2SAT in the last 72 hours. Films:  CT Head WO Contrast    Result Date: 8/16/2022  EXAMINATION: CT OF THE HEAD WITHOUT CONTRAST  8/16/2022 4:42 pm TECHNIQUE: CT of the head was performed without the administration of intravenous contrast. Automated exposure control, iterative reconstruction, and/or weight based adjustment of the mA/kV was utilized to reduce the radiation dose to as low as reasonably achievable. COMPARISON: July 19, 2021 HISTORY: ORDERING SYSTEM PROVIDED HISTORY: headache, hx of cancer TECHNOLOGIST PROVIDED HISTORY: Reason for exam:->headache, hx of cancer Has a \"code stroke\" or \"stroke alert\" been called? ->No Decision Support Exception - unselect if not a suspected or confirmed emergency medical condition->Emergency Medical Condition (MA) What reading provider will be dictating this exam?->CRC FINDINGS: BRAIN/VENTRICLES: There is no acute intracranial hemorrhage, mass effect or midline shift. No abnormal extra-axial fluid collection. The gray-white differentiation is maintained without evidence of an acute infarct. There is no evidence of hydrocephalus. ORBITS: The visualized portion of the orbits demonstrate no acute abnormality. SINUSES: The visualized paranasal sinuses and mastoid air cells demonstrate no acute abnormality. SOFT TISSUES/SKULL:  No acute abnormality of the visualized skull or soft tissues. No acute intracranial abnormality. Cortical atrophy and periventricular leukomalacia. XR CHEST PORTABLE    Result Date: 8/16/2022  EXAMINATION: ONE XRAY VIEW OF THE CHEST 8/16/2022 3:49 pm COMPARISON: May 24, 2022 HISTORY: ORDERING SYSTEM PROVIDED HISTORY: cough, lung cancer TECHNOLOGIST PROVIDED HISTORY: Reason for exam:->cough, lung cancer What reading provider will be dictating this exam?->CRC FINDINGS: There is opacity in the left lower lung. There are chronic markings at the right lung base. Emphysematous changes in the upper lungs. The heart is not enlarged. There is no pneumothorax. There is mild blunting of the left costophrenic angle. Pacemaker present. Continued opacity in the left lower lung and linear scarring in the left perihilar region. COPD.     8/18/22 CT chest w/o    5/24/2022 CTA chest     5/24/2022 CTA chest:   3.4 x 5.4 cm left hilar and perihilar mass extending to left   lower lobe encircling the pulmonary artery and the bronchi consistent with   progressive malignancy with occlusion of left lower lobe bronchi with   postobstructive pneumonitis   8/18/2022 CT chest w/o: The   previously noted infiltrative left hilar mass extending to left upper lobe   and left lower lobe surrounding the broncho pulmonary vasculature is noted   currently measuring 4.6 x 4.3 cm with progression. There is some associated   atelectasis. The right lung is clear.   There is no pleural effusion     Assessment:  66 y/o male with past medical history noted for COPD, cardiac arrhythmia (previously wore event monitor per EP), left hilar lymphadenopathy, s/p EBUS bronchoscopy on 5/27/2021 noted left hilar station 11L Stage III, COVID 5/2022, pulmonary embolism (on eliquis), port placement, pacemaker placement (7/2021) that presented to [facility] with weakness, cough, shortness of breath, fatigue, fever. 8/18: 2L NC, CT chest results noted above. US BLE neg DVT   8/19: 2L NC      1.) Stage IIIB T2aN3 Squamous Cell Carcinoma of Lung  - diagnosis 5/27/2021, PD-L1 0%  - treatment with concurrent chemotherapy/radiation therapy from 7/6/21-9/7/21  - consolidated with Imfinzi (Durvalumab) per ID IV every 4 weeks from 11/2/21  - 6/30/22 PET showing progression and was started on Carbo, Gemzar and Keytruda on 6/30/22  - 8/9 received C2D8 of Carboplatin AUC 2 with Gemzar 1000 mg/m2 IV on D1,8 and Keytruda IV on D1 on a 21 day cycle  2.) Enterococcus faecalis Bacteremia/Septicemia  3.) H/O SARS-CoV 2 Pneumonitis 5/24/2022- suspected Omicron BA-2  - vaccinated x 2 and with booster against COVID  - sick contact wife  4.) Pancytopenia  5.) H/O Submassive Hemoptysis - streaky, known lung mass and on eliquis for h/o pulmonary embolism  6.) H/O RLL Subsegmental Pulmonary Embolism - CTA Chest 9/17/2021  7.) Radiation Pneumonitis/Fibrosis Left Lung   8.) COPD, No Acute Exacerbation   9.) Hypomagnesemia   10.) H/O Wide-complex tachycardia (HCC)--atrial tachycardia with RV pacing  11.) H/O Thrombocytopenia - secondary to chemotherapy  12.) H/O Tobacco Abuse   13.) H/O Dual-chamber pacemaker and removal of preexistent loop recorder 7/20/2021  14.) H/O Port Placement for Chemotherapy  115.) Lymphopenia     PLAN:  1.) Continue DuoNeb every 4 hours   3.) 8/18 CT Chest WO contrast for left basal atelectasis imaging reviewed, showing progression compared to previous CTA 5/2022. Per Oncology, 6/30/22 PET showing progression and chemo therapy adjusted   4.) Oncology following.  S/p platelet and PRBC transfusion on 8/17. Continue to hold Eliquis until platelets recover > 05O. H/H 6.9/21.4 > 8.1/24.4 (8/18) > 8.0/24.0 (8/19). PLTs 7>4 (8/17) >21 (8/18) > 23 (8/19)  5.) Fibrinogen, LDH, peripheral smear, haptoglobin, ESR, CRP still pending? Orders are active but still not collected  6.) Continue IS for pulmonary hygiene   7.) IV daptomycin for bacteremia per ID. PCT 0.42. 8/16/22 BC x 2 E faecalis. 8/17 repeat + E faecalis. Recommending Mediport removal and possible SHIRA and Echo   8.) For new RLE swelling and numbness, US BLE negative for DVT. Pro-BNP 1207.  9.) Patient on room air during the day and 2L NC at night, which is baseline. Supplemental O2 as needed to maintain pulse ox >92%. Electronically signed by LELO Batista CNP on 8/19/2022 at 7:45 AM    Seen and evaluated, and agree with above assessment and plan  Scan of the chest recently performed was compared with May 2022 even though the radiologist report refers to have a progression of the left parahilar mass I personally disagree with that description I think that overall the bulkiness of the mass is less.

## 2022-08-19 NOTE — CONSULTS
GENERAL SURGERY  CONSULT NOTE  8/19/2022    Physician Consulted: Dr. Balta Gutierrez  Reason for Consult: mediport removal for infection  Referring Physician: Dr. Sunitha LOBATO  Cher Connell is a 67 y.o. male with PMH CHB and pacemaker placement on 7/21 and squamous cell lung cancer actively receiving chemotherapy who presented to ED for evaluation of fatigue and weakness w fever. On workup pt was found to have blood cultures growing E faecalis, and pt was started on vancomycin, doxycycline, ceftriaxone. Patient has been receiving blood and platelets through his port this admission. Reports having fever and chills for last 1 to 2 weeks and admits to some hematuria a few days ago. Most recent chemotherapy (Keytruda) administration was last week and currently his white count is 3.6, hemoglobin 8.0. Eliquis last taken on 8/15 for PE July 2021. On home O2 3L NC at night. Afebrile currently (101.6 on admission)  Vitals stable    Past Medical History:   Diagnosis Date    CHB (complete heart block) ([facility] 07/2021    COPD (chronic obstructive pulmonary disease) ([facility]     Syncope 09/2020    dr Rashi Conklin wearing a monitor    Syncope 07/2021       Past Surgical History:   Procedure Laterality Date    BRONCHOSCOPY N/A 05/27/2021    BRONCHOSCOPY W/EBUS FNA performed by Trini Oliveros MD at [facility] N/A 05/27/2021    BRONCHOSCOPY DIAGNOSTIC OR CELL [address] ONLY performed by Trini Oliveros MD at [facility] N/A 05/27/2021    BRONCHOSCOPY ADD ON COMPUTER ASSISTED performed by Trini Oliveros MD at [address] Bilateral 2011 2001    groin     PACEMAKER INSERTION  07/20/2021    DUAL PPM  ([facility])  DR. Nahun Madrigal       Medications Prior to Admission    Prior to Admission medications    Medication Sig Start Date End Date Taking?  Authorizing Provider   predniSONE (DELTASONE) 5 MG tablet Take 5 mg by mouth daily    Mallory Michael MD   albuterol (ACCUNEB) 0.63 no drainage or purulence. ASSESSMENT/PLAN:  67 y.o. male with squamous cell lung cancer     OR 8/20 for mediport removal w Dr. Joon Quinones or Mario Worley  discussed with Dr. Joon Quinones.     Percy Stanley DO  Surgery Resident PGY-1  8/19/2022  4:48 PM

## 2022-08-19 NOTE — PROGRESS NOTES
[address] [place] [address] [facility]  Progress Note      Chief Complaint   Patient presents with    Fatigue     Generalized weakness, headache       SUBJECTIVE:  Patient is tolerating medications. No reported adverse drug reactions. No nausea, vomiting, diarrhea. Feels better today  afebrile    Review of systems:  As stated above in the chief complaint, otherwise negative. Medications:  Scheduled Meds:   ampicillin IV  2,000 mg IntraVENous 6 times per day    cefTRIAXone (ROCEPHIN) IV  2,000 mg IntraVENous Q12H    [Held by provider] apixaban  5 mg Oral BID    metoprolol succinate  50 mg Oral BID WC    atorvastatin  20 mg Oral Daily    sodium chloride flush  5-40 mL IntraVENous 2 times per day    ipratropium-albuterol  1 ampule Inhalation Q4H WA     Continuous Infusions:   sodium chloride      sodium chloride      sodium chloride       PRN Meds:potassium chloride **OR** potassium alternative oral replacement **OR** potassium chloride, sodium chloride, sodium chloride, albuterol, sodium chloride flush, sodium chloride, acetaminophen **OR** acetaminophen, senna    OBJECTIVE:  BP (!) 98/40   Pulse 60   Temp 97.3 °F (36.3 °C) (Temporal)   Resp 18   Ht 5' 6\" (1.676 m)   Wt 183 lb 2 oz (83.1 kg)   SpO2 94%   BMI 29.56 kg/m²   Temp  Av.8 °F (36.6 °C)  Min: 97.3 °F (36.3 °C)  Max: 98.3 °F (36.8 °C)  Constitutional: The patient is awake, alert, and oriented. Skin: Warm and dry. No rashes were noted. HEENT: Round and reactive pupils. Moist mucous membranes. No ulcerations or thrush. Neck: Supple to movements. Chest: No use of accessory muscles to breathe. Symmetrical expansion. No wheezing, crackles or rhonchi. Cardiovascular: S1 and S2 are rhythmic and regular. No murmurs appreciated. Abdomen: Positive bowel sounds to auscultation. Benign to palpation. No masses felt. No hepatosplenomegaly. Genitourinary: male  Extremities: No clubbing, no cyanosis, no edema.   Lines: peripheral    Laboratory and Tests Review:  Lab Results   Component Value Date    WBC 3.6 (L) 08/19/2022    WBC 3.5 (L) 08/18/2022    WBC 2.6 (L) 08/17/2022    HGB 8.0 (L) 08/19/2022    HCT 24.0 (L) 08/19/2022    MCV 88.2 08/19/2022    PLT 23 (L) 08/19/2022     Lab Results   Component Value Date    NEUTROABS 2.29 08/17/2022    NEUTROABS 2.94 08/16/2022    NEUTROABS 5.61 05/27/2022     No results found for: CRPHS  Lab Results   Component Value Date    ALT 18 08/17/2022    AST 19 08/17/2022    ALKPHOS 82 08/17/2022    BILITOT 0.6 08/17/2022     Lab Results   Component Value Date/Time     08/19/2022 06:14 AM    K 3.2 08/19/2022 06:14 AM    K 3.7 08/17/2022 04:17 AM    CL 99 08/19/2022 06:14 AM    CO2 25 08/19/2022 06:14 AM    BUN 10 08/19/2022 06:14 AM    CREATININE 0.8 08/19/2022 06:14 AM    CREATININE 0.7 08/18/2022 05:58 AM    CREATININE 0.8 08/17/2022 04:17 AM    GFRAA >60 08/19/2022 06:14 AM    LABGLOM >60 08/19/2022 06:14 AM    GLUCOSE 112 08/19/2022 06:14 AM    PROT 5.6 08/17/2022 04:17 AM    LABALBU 3.3 08/17/2022 04:17 AM    CALCIUM 9.0 08/19/2022 06:14 AM    BILITOT 0.6 08/17/2022 04:17 AM    ALKPHOS 82 08/17/2022 04:17 AM    AST 19 08/17/2022 04:17 AM    ALT 18 08/17/2022 04:17 AM     Lab Results   Component Value Date    CRP 9.6 (H) 05/24/2022     No results found for: [address]  Radiology:  Reviewed CT lung    Microbiology:   Lab Results   Component Value Date/Time    [facility]  08/18/2022 07:05 AM     Previously positive blood culture called  Gram stain performed from blood culture bottle media  Gram positive cocci in chains      BC 5 Days no growth 05/24/2022 05:37 PM    BC 5 Days- no growth 05/17/2019 01:00 PM    ORG Enterococcus faecalis 08/17/2022 05:40 PM    ORG Enterococcus faecalis 08/16/2022 03:15 PM    ORG Enterococcus faecalis 08/16/2022 03:05 PM     Lab Results   Component Value Date/Time    BLOODCULT2  08/18/2022 07:10 AM     Previously positive blood culture called  Gram stain performed from blood culture bottle media  Gram positive cocci in chains      BLOODCULT2 Previously positive blood culture called 08/16/2022 03:15 PM    BLOODCULT2  08/16/2022 03:15 PM     Refer to previous culture CXBLD from 8-16-22 @1505 for susceptibility  results      ORG Enterococcus faecalis 08/17/2022 05:40 PM    ORG Enterococcus faecalis 08/16/2022 03:15 PM    ORG Enterococcus faecalis 08/16/2022 03:05 PM     No results found for: WNDABS  No results found for: RESPSMEAR  No results found for: MPNEUMO, CLAMYDCU, LABLEGI, AFBCX, FUNGSM, LABFUNG  No results found for: CULTRESP  No results found for: CXCATHTIP  No results found for: BFCS  No results found for: CXSURG  Urine Culture, Routine   Date Value Ref Range Status   08/16/2022   Final    10 to 100,000 CFU/mL  Mixed heydi isolated. Further workup and sensitivity testing  is not routinely indicated and will not be performed.   Mixed heydi isolated includes:  Mixed gram positive organisms     05/18/2019 <25,000 CFU/ml  Final     No results found for: [address]:  8-16-22 blood cultures E faecalis x 2  8-17-22 blood cultures E faecalis x 1  8-18-22 blood culture + E faecalis x1      ASSESSMENT:  E faecalis bacteremia probably port infection; RO endocarditis and pacer infection  Immunocompromised host c pancytopenia; on chemo for Sq cell of lung    PLAN:  Continue with  amp and ceftriaxone  Check final cultures; blood cultures until sterile  Echo and SHIRA  Remove port and pacer( tough since pt is dependent and will need a temp)  D/W PCP and EP  Monitor labs    Ahsan Resendiz MD  2:30 PM  8/19/2022

## 2022-08-19 NOTE — CARE COORDINATION
Spoke with Pt's wife d/t Pt sleeping. Confirmed Discharge Plan is to return home when medically stable. If HHC is needed has a Hx with [facility] and will use again if needed at discharge. PT 17/24. OT 19/24. ID said Dapto IV until sensitives back then amp and Ceftriaxone. Discharge Plan is to return home when medically stable. SW/CM to follow for discharge needs.    Nury Rajan, L.S.W.  [phone]

## 2022-08-19 NOTE — PROGRESS NOTES
[facility]                                Progress note    Subjective: The patient is awake and alert. Feeling a little tired today, was unable to sleep much last night. Objective:    BP (!) 98/40   Pulse 60   Temp 97.3 °F (36.3 °C) (Temporal)   Resp 18   Ht 5' 6\" (1.676 m)   Wt 183 lb 2 oz (83.1 kg)   SpO2 94%   BMI 29.56 kg/m²     In: 780 [P.O.:780]  Out: -   In: 780   Out: -     General appearance: NAD, conversant, looks much better today  HEENT: AT/NC, MMM  Neck: FROM, supple  Lungs: Clear to auscultation  CV: RRR, no MRGs-right-sided port in place, left-sided pacemaker in place  Vasc: Radial pulses 2+  Abdomen: Soft, non-tender; no masses or HSM  Extremities: No peripheral edema or digital cyanosis  Skin: Generalized bruising  Psych: Alert and oriented to person, place and time  Neuro: Alert and interactive     Recent Labs     08/17/22 0417 08/18/22  0056 08/18/22  0558 08/19/22  0155 08/19/22  0614   WBC 2.6*  --  3.5*  --  3.6*   HGB 6.9*   < > 8.1* 7.3* 8.0*   HCT 21.4*   < > 24.4* 21.9* 24.0*   PLT 4*  --  21*  --  23*    < > = values in this interval not displayed. Recent Labs     08/17/22 0417 08/18/22  0558 08/19/22  0614    138 136   K 3.7 3.6 3.2*    101 99   CO2 22 25 25   BUN 13 8 10   CREATININE 0.8 0.7 0.8   CALCIUM 8.4* 8.9 9.0         Assessment:    Principal Problem:    Pneumonia  Active Problems:    Pancytopenia (HCC)    COPD (chronic obstructive pulmonary disease) (HCC)    HTN (hypertension)    Squamous carcinoma of lung (HCC)    History pulmonary embolism ([address] on Eliquis  Resolved Problems:    * No resolved hospital problems.  *      Plan:  Patient is a 30-year-old male with a hx of squamous cell lung carcinoma who is active chemo therapy who is admitted to [facility] for  Sepsis/bacteremia/immunocompromise status-acutely ill status  -Patient has both right-sided port and left-sided pacemaker in place which will need to be removed if persistent Enterococcus bacteremia  -Monitor labs  -DuoNebs as needed  -Currently room air   -Check Legionella and strep pneumoniae  -Respiratory panel negative  -Check procalcitonin 0.42  -Pulmonology following  -CT chest ordered by pulm> progressive infiltrative mass in the left perihilar region  -On daptomycin per infectious disease    Bacteremia --Enterococcus spp  -+BC for Enterococcus faecalis  -IV daptomycin, Pending sensitivities  -Repeat blood cultures  -GS consulted for port removal-Case discussed with Dr. Renzo Castañeda, appreciate input  -Pacemaker removal-consult electrophysiology for this  -SHIRA ordered-rule out endocarditis  Patient will need platelet transfusions prior to procedures  Case discussed with Dr. Lowell Oppenheim heme-onc  -h/h 6.9/21.4 > transfuse 1 unit RBC > 8.0/24.0 today  -Platelets 4 > transfuse 1 unit platelets > 23 today-   -Hold Eliquis-consider holding off for few weeks     Hematuria  -Check urine culture  -Hold Eliquis    DVT Prophylaxis PCD's  PT/OT  Discharge planning       LELO Caruso CNP  1:39 PM  8/19/2022     Above note edited to reflect my thoughts     I personally saw, examined and provided care for the patient. Radiographs, labs and medication list were reviewed by me independently. The case was discussed in detail and plans for care were established. Review of MAKENZIE Caruso   , documentation was conducted and revisions were made as appropriate directly by me. I agree with the above documented exam, problem list, and plan of care.      Manisha Guzman MD  5:05 PM  8/19/2022

## 2022-08-19 NOTE — PROGRESS NOTES
Subjective:  No overnight events. Wife at bedside. He is feeling better, no diaphoresis. Tolerating diet. Denies chest pain, angina, dyspnea, abdominal discomfort, nausea/vomiting and diarrhea/constipation. Objective:    BP (!) 98/40   Pulse 60   Temp 97.3 °F (36.3 °C) (Temporal)   Resp 18   Ht 5' 6\" (1.676 m)   Wt 183 lb 2 oz (83.1 kg)   SpO2 94%   BMI 29.56 kg/m²     General: NAD, awake and alert  HEENT: normocephalic/atraumatic, mucosa dry without ulcerations,thrush or mucositis, EOMI, sclera anicteric, conjuntiva pink  NECK: supple, trachea midline  Heart:  RRR, no murmurs, gallops, or rubs.   Lungs:  Diminished breath sounds but clear bilatearlly, no distress currently, no wheeze, rales or rhonchi  Abd: BS present, nontender, nondistended, no masses  Extrem:  hands ecchymotic trace edema, some scattered bruising, no clubbing, cyanosis, or petechiae  Skin: Intact, no petechia or purpura    DOPPLER RLE = negative DVT    CBC with Differential:    Lab Results   Component Value Date/Time    WBC 3.6 08/19/2022 06:14 AM    RBC 2.72 08/19/2022 06:14 AM    HGB 8.0 08/19/2022 06:14 AM    HCT 24.0 08/19/2022 06:14 AM    PLT 23 08/19/2022 06:14 AM    MCV 88.2 08/19/2022 06:14 AM    MCH 29.4 08/19/2022 06:14 AM    MCHC 33.3 08/19/2022 06:14 AM    RDW 18.2 08/19/2022 06:14 AM    NRBC 0.0 05/26/2022 09:45 AM    LYMPHOPCT 9.6 08/17/2022 04:17 AM    MONOPCT 2.6 08/17/2022 04:17 AM    MYELOPCT 0.9 09/17/2021 10:20 AM    BASOPCT 0.0 08/17/2022 04:17 AM    MONOSABS 0.08 08/17/2022 04:17 AM    LYMPHSABS 0.26 08/17/2022 04:17 AM    EOSABS 0.00 08/17/2022 04:17 AM    BASOSABS 0.00 08/17/2022 04:17 AM     CMP:    Lab Results   Component Value Date/Time     08/19/2022 06:14 AM    K 3.2 08/19/2022 06:14 AM    K 3.7 08/17/2022 04:17 AM    CL 99 08/19/2022 06:14 AM    CO2 25 08/19/2022 06:14 AM    BUN 10 08/19/2022 06:14 AM    CREATININE 0.8 08/19/2022 06:14 AM    GFRAA >60 08/19/2022 06:14 AM    LABGLOM >60 08/19/2022 06:14 AM    GLUCOSE 112 08/19/2022 06:14 AM    PROT 5.6 08/17/2022 04:17 AM    LABALBU 3.3 08/17/2022 04:17 AM    CALCIUM 9.0 08/19/2022 06:14 AM    BILITOT 0.6 08/17/2022 04:17 AM    ALKPHOS 82 08/17/2022 04:17 AM    AST 19 08/17/2022 04:17 AM    ALT 18 08/17/2022 04:17 AM          Current Facility-Administered Medications:     potassium chloride (KLOR-CON M) extended release tablet 40 mEq, 40 mEq, Oral, PRN **OR** potassium bicarb-citric acid (EFFER-K) effervescent tablet 40 mEq, 40 mEq, Oral, PRN **OR** potassium chloride 10 mEq/100 mL IVPB (Peripheral Line), 10 mEq, IntraVENous, PRN, LELO Main - CNP    DAPTOmycin (CUBICIN) 700 mg in sodium chloride (PF) 14 mL IV syringe, 8 mg/kg, IntraVENous, Q24H, Jackie Perez MD, 700 mg at 08/18/22 1600    0.9 % sodium chloride infusion, , IntraVENous, PRN, LELO Main - CNP    0.9 % sodium chloride infusion, , IntraVENous, PRN, LELO Main - CNP    albuterol (ACCUNEB) nebulizer solution 0.63 mg, 1 ampule, Nebulization, Q6H PRN, LELO Sheridan CNP    [Held by provider] apixaban (ELIQUIS) tablet 5 mg, 5 mg, Oral, BID, LELO Garnica CNP    metoprolol succinate (TOPROL XL) extended release tablet 50 mg, 50 mg, Oral, BID WC, LELO Garnica CNP, 50 mg at 08/18/22 1704    atorvastatin (LIPITOR) tablet 20 mg, 20 mg, Oral, Daily, LELO Sheridan CNP, 20 mg at 08/18/22 2334    sodium chloride flush 0.9 % injection 5-40 mL, 5-40 mL, IntraVENous, 2 times per day, LELO Sheridan CNP, 10 mL at 08/19/22 0940    sodium chloride flush 0.9 % injection 5-40 mL, 5-40 mL, IntraVENous, PRN, LELO Garnica CNP    0.9 % sodium chloride infusion, , IntraVENous, PRN, LELO Sheridan CNP    acetaminophen (TYLENOL) tablet 650 mg, 650 mg, Oral, Q6H PRN **OR** acetaminophen (TYLENOL) suppository 650 mg, 650 mg, Rectal, Q6H PRN, LELO Sheridan CNP    senna (SENOKOT) tablet 8.6 mg, 1 tablet, Oral, Daily PRN, Perlie Foots, APRN - CNP    ipratropium-albuterol (DUONEB) nebulizer solution 1 ampule, 1 ampule, Inhalation, Q4H WA, Perlie Foots, APRN - CNP, 1 ampule at 08/19/22 1213    US DUP LOWER EXTREMITIES BILATERAL VENOUS   Final Result   No evidence of DVT in either lower extremity. CT CHEST WO CONTRAST   Final Result   Progressive infiltrative mass in the left perihilar region as noted   concerning for progressive malignancy with adjacent atelectasis. Continued   surveillance is recommended. CT Head WO Contrast   Final Result   No acute intracranial abnormality. Cortical atrophy and periventricular leukomalacia. XR CHEST PORTABLE   Final Result   Continued opacity in the left lower lung and linear scarring in the left   perihilar region. COPD. 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 Treated with concurrent chemo/XRT from 7/6/21 followed by consolidation Imfinzi. Progression on PET from 6/24/22. Started Norlin Faes and Keytruda on 6/30/22. Patient seen in office last 8/9 and received C2D8 of Carboplatin with Gemzar and Keytruda. Admitted with sepsis, Enterococcus bacteremia. Respiratory panel negative. A/P:  Pancytopenia secondary to recent chemotherapy. Differential was not performed this morning. I have asked the lab to add this. Will give G-CSF support to maintain ANC greater than 1500. Transfusion to maintain Hgb > 7, plt > 10, FOBT (recent transfusion, wait checking iron studies)  History of PE on eliquis, hold with thrombocytopenia until platelets recover > 65Y  Bacteremia, per primary service, ID consulted. Concern for port involvement with infection. Echo and SHIRA pending. If port is removed, will need a temp line and then another port down the road as outpatient.      Electronically signed by Kenney Masterson MD on 8/19/2022 at 2:09 PM

## 2022-08-20 ENCOUNTER — ANESTHESIA (OUTPATIENT)
Dept: OPERATING ROOM | Age: 72
DRG: 228 | End: 2022-08-20
Payer: MEDICARE

## 2022-08-20 LAB
ALBUMIN SERPL-MCNC: 3.2 G/DL (ref 3.5–5.2)
ALP BLD-CCNC: 90 U/L (ref 40–129)
ALT SERPL-CCNC: 29 U/L (ref 0–40)
ANION GAP SERPL CALCULATED.3IONS-SCNC: 15 MMOL/L (ref 7–16)
ANISOCYTOSIS: ABNORMAL
AST SERPL-CCNC: 25 U/L (ref 0–39)
BASOPHILS ABSOLUTE: 0 E9/L (ref 0–0.2)
BASOPHILS RELATIVE PERCENT: 0 % (ref 0–2)
BILIRUB SERPL-MCNC: <0.2 MG/DL (ref 0–1.2)
BLOOD BANK DISPENSE STATUS: NORMAL
BLOOD BANK DISPENSE STATUS: NORMAL
BLOOD BANK PRODUCT CODE: NORMAL
BLOOD BANK PRODUCT CODE: NORMAL
BPU ID: NORMAL
BPU ID: NORMAL
BUN BLDV-MCNC: 7 MG/DL (ref 6–23)
CALCIUM SERPL-MCNC: 8.9 MG/DL (ref 8.6–10.2)
CHLORIDE BLD-SCNC: 98 MMOL/L (ref 98–107)
CO2: 25 MMOL/L (ref 22–29)
CREAT SERPL-MCNC: 0.7 MG/DL (ref 0.7–1.2)
DESCRIPTION BLOOD BANK: NORMAL
DESCRIPTION BLOOD BANK: NORMAL
EOSINOPHILS ABSOLUTE: 0.01 E9/L (ref 0.05–0.5)
EOSINOPHILS RELATIVE PERCENT: 0.4 % (ref 0–6)
GFR AFRICAN AMERICAN: >60
GFR NON-AFRICAN AMERICAN: >60 ML/MIN/1.73
GLUCOSE BLD-MCNC: 106 MG/DL (ref 74–99)
HCT VFR BLD CALC: 23.4 % (ref 37–54)
HEMOGLOBIN: 7.5 G/DL (ref 12.5–16.5)
IMMATURE GRANULOCYTES #: 0.02 E9/L
IMMATURE GRANULOCYTES %: 0.8 % (ref 0–5)
LYMPHOCYTES ABSOLUTE: 0.44 E9/L (ref 1.5–4)
LYMPHOCYTES RELATIVE PERCENT: 17 % (ref 20–42)
MCH RBC QN AUTO: 29 PG (ref 26–35)
MCHC RBC AUTO-ENTMCNC: 32.1 % (ref 32–34.5)
MCV RBC AUTO: 90.3 FL (ref 80–99.9)
MONOCYTES ABSOLUTE: 0.31 E9/L (ref 0.1–0.95)
MONOCYTES RELATIVE PERCENT: 12 % (ref 2–12)
NEUTROPHILS ABSOLUTE: 1.81 E9/L (ref 1.8–7.3)
NEUTROPHILS RELATIVE PERCENT: 69.8 % (ref 43–80)
OVALOCYTES: ABNORMAL
PDW BLD-RTO: 18.5 FL (ref 11.5–15)
PLATELET # BLD: 53 E9/L (ref 130–450)
PLATELET CONFIRMATION: NORMAL
PMV BLD AUTO: 12.3 FL (ref 7–12)
POIKILOCYTES: ABNORMAL
POLYCHROMASIA: ABNORMAL
POTASSIUM SERPL-SCNC: 3.1 MMOL/L (ref 3.5–5)
RBC # BLD: 2.59 E12/L (ref 3.8–5.8)
SODIUM BLD-SCNC: 138 MMOL/L (ref 132–146)
TEAR DROP CELLS: ABNORMAL
TOTAL PROTEIN: 5.9 G/DL (ref 6.4–8.3)
WBC # BLD: 2.6 E9/L (ref 4.5–11.5)

## 2022-08-20 PROCEDURE — 85025 COMPLETE CBC W/AUTO DIFF WBC: CPT

## 2022-08-20 PROCEDURE — 2580000003 HC RX 258: Performed by: SURGERY

## 2022-08-20 PROCEDURE — 0JPT0WZ REMOVAL OF TOTALLY IMPLANTABLE VASCULAR ACCESS DEVICE FROM TRUNK SUBCUTANEOUS TISSUE AND FASCIA, OPEN APPROACH: ICD-10-PCS | Performed by: SURGERY

## 2022-08-20 PROCEDURE — 99223 1ST HOSP IP/OBS HIGH 75: CPT | Performed by: INTERNAL MEDICINE

## 2022-08-20 PROCEDURE — 3600000002 HC SURGERY LEVEL 2 BASE: Performed by: SURGERY

## 2022-08-20 PROCEDURE — 87070 CULTURE OTHR SPECIMN AEROBIC: CPT

## 2022-08-20 PROCEDURE — 3700000000 HC ANESTHESIA ATTENDED CARE: Performed by: SURGERY

## 2022-08-20 PROCEDURE — 80053 COMPREHEN METABOLIC PANEL: CPT

## 2022-08-20 PROCEDURE — 6360000002 HC RX W HCPCS: Performed by: SURGERY

## 2022-08-20 PROCEDURE — 7100000001 HC PACU RECOVERY - ADDTL 15 MIN: Performed by: SURGERY

## 2022-08-20 PROCEDURE — 2580000003 HC RX 258: Performed by: NURSE PRACTITIONER

## 2022-08-20 PROCEDURE — 6370000000 HC RX 637 (ALT 250 FOR IP): Performed by: SURGERY

## 2022-08-20 PROCEDURE — 94640 AIRWAY INHALATION TREATMENT: CPT

## 2022-08-20 PROCEDURE — 6370000000 HC RX 637 (ALT 250 FOR IP): Performed by: NURSE PRACTITIONER

## 2022-08-20 PROCEDURE — 7100000000 HC PACU RECOVERY - FIRST 15 MIN: Performed by: SURGERY

## 2022-08-20 PROCEDURE — 6360000002 HC RX W HCPCS: Performed by: ANESTHESIOLOGIST ASSISTANT

## 2022-08-20 PROCEDURE — 3600000012 HC SURGERY LEVEL 2 ADDTL 15MIN: Performed by: SURGERY

## 2022-08-20 PROCEDURE — 87040 BLOOD CULTURE FOR BACTERIA: CPT

## 2022-08-20 PROCEDURE — 2580000003 HC RX 258: Performed by: SPECIALIST

## 2022-08-20 PROCEDURE — 2580000003 HC RX 258: Performed by: ANESTHESIOLOGIST ASSISTANT

## 2022-08-20 PROCEDURE — 2500000003 HC RX 250 WO HCPCS: Performed by: SURGERY

## 2022-08-20 PROCEDURE — 36415 COLL VENOUS BLD VENIPUNCTURE: CPT

## 2022-08-20 PROCEDURE — 2060000000 HC ICU INTERMEDIATE R&B

## 2022-08-20 PROCEDURE — 2709999900 HC NON-CHARGEABLE SUPPLY: Performed by: SURGERY

## 2022-08-20 PROCEDURE — 6370000000 HC RX 637 (ALT 250 FOR IP): Performed by: INTERNAL MEDICINE

## 2022-08-20 PROCEDURE — 3700000001 HC ADD 15 MINUTES (ANESTHESIA): Performed by: SURGERY

## 2022-08-20 PROCEDURE — 6360000002 HC RX W HCPCS: Performed by: SPECIALIST

## 2022-08-20 PROCEDURE — 2700000000 HC OXYGEN THERAPY PER DAY

## 2022-08-20 RX ORDER — SODIUM CHLORIDE 0.9 % (FLUSH) 0.9 %
5-40 SYRINGE (ML) INJECTION PRN
Status: DISCONTINUED | OUTPATIENT
Start: 2022-08-20 | End: 2022-08-25 | Stop reason: HOSPADM

## 2022-08-20 RX ORDER — HYDRALAZINE HYDROCHLORIDE 20 MG/ML
5 INJECTION INTRAMUSCULAR; INTRAVENOUS
Status: DISCONTINUED | OUTPATIENT
Start: 2022-08-20 | End: 2022-08-25 | Stop reason: HOSPADM

## 2022-08-20 RX ORDER — SODIUM CHLORIDE 9 MG/ML
25 INJECTION, SOLUTION INTRAVENOUS PRN
Status: DISCONTINUED | OUTPATIENT
Start: 2022-08-20 | End: 2022-08-25 | Stop reason: HOSPADM

## 2022-08-20 RX ORDER — ACETAMINOPHEN 325 MG/1
650 TABLET ORAL
Status: ACTIVE | OUTPATIENT
Start: 2022-08-20 | End: 2022-08-20

## 2022-08-20 RX ORDER — DIPHENHYDRAMINE HYDROCHLORIDE 50 MG/ML
12.5 INJECTION INTRAMUSCULAR; INTRAVENOUS
Status: ACTIVE | OUTPATIENT
Start: 2022-08-20 | End: 2022-08-20

## 2022-08-20 RX ORDER — PROPOFOL 10 MG/ML
INJECTION, EMULSION INTRAVENOUS CONTINUOUS PRN
Status: DISCONTINUED | OUTPATIENT
Start: 2022-08-20 | End: 2022-08-20 | Stop reason: SDUPTHER

## 2022-08-20 RX ORDER — TRAMADOL HYDROCHLORIDE 50 MG/1
50 TABLET ORAL
Status: ACTIVE | OUTPATIENT
Start: 2022-08-20 | End: 2022-08-20

## 2022-08-20 RX ORDER — SODIUM CHLORIDE 0.9 % (FLUSH) 0.9 %
5-40 SYRINGE (ML) INJECTION EVERY 12 HOURS SCHEDULED
Status: DISCONTINUED | OUTPATIENT
Start: 2022-08-20 | End: 2022-08-25 | Stop reason: HOSPADM

## 2022-08-20 RX ORDER — ONDANSETRON 2 MG/ML
4 INJECTION INTRAMUSCULAR; INTRAVENOUS
Status: ACTIVE | OUTPATIENT
Start: 2022-08-20 | End: 2022-08-20

## 2022-08-20 RX ORDER — FENTANYL CITRATE 50 UG/ML
INJECTION, SOLUTION INTRAMUSCULAR; INTRAVENOUS PRN
Status: DISCONTINUED | OUTPATIENT
Start: 2022-08-20 | End: 2022-08-20 | Stop reason: SDUPTHER

## 2022-08-20 RX ORDER — LABETALOL HYDROCHLORIDE 5 MG/ML
5 INJECTION, SOLUTION INTRAVENOUS
Status: DISCONTINUED | OUTPATIENT
Start: 2022-08-20 | End: 2022-08-25 | Stop reason: HOSPADM

## 2022-08-20 RX ORDER — SODIUM CHLORIDE 9 MG/ML
INJECTION, SOLUTION INTRAVENOUS CONTINUOUS PRN
Status: DISCONTINUED | OUTPATIENT
Start: 2022-08-20 | End: 2022-08-20 | Stop reason: SDUPTHER

## 2022-08-20 RX ORDER — IPRATROPIUM BROMIDE AND ALBUTEROL SULFATE 2.5; .5 MG/3ML; MG/3ML
1 SOLUTION RESPIRATORY (INHALATION)
Status: DISPENSED | OUTPATIENT
Start: 2022-08-20 | End: 2022-08-20

## 2022-08-20 RX ORDER — ALBUTEROL SULFATE 90 UG/1
2 AEROSOL, METERED RESPIRATORY (INHALATION) EVERY 6 HOURS PRN
Status: DISCONTINUED | OUTPATIENT
Start: 2022-08-20 | End: 2022-08-27

## 2022-08-20 RX ORDER — MIDAZOLAM HYDROCHLORIDE 2 MG/2ML
2 INJECTION, SOLUTION INTRAMUSCULAR; INTRAVENOUS
Status: ACTIVE | OUTPATIENT
Start: 2022-08-20 | End: 2022-08-20

## 2022-08-20 RX ORDER — DROPERIDOL 2.5 MG/ML
0.62 INJECTION, SOLUTION INTRAMUSCULAR; INTRAVENOUS
Status: ACTIVE | OUTPATIENT
Start: 2022-08-20 | End: 2022-08-20

## 2022-08-20 RX ORDER — FUROSEMIDE 20 MG/1
20 TABLET ORAL DAILY
Status: DISCONTINUED | OUTPATIENT
Start: 2022-08-20 | End: 2022-08-29

## 2022-08-20 RX ORDER — LIDOCAINE HYDROCHLORIDE AND EPINEPHRINE 10; 10 MG/ML; UG/ML
INJECTION, SOLUTION INFILTRATION; PERINEURAL PRN
Status: DISCONTINUED | OUTPATIENT
Start: 2022-08-20 | End: 2022-08-20 | Stop reason: ALTCHOICE

## 2022-08-20 RX ADMIN — IPRATROPIUM BROMIDE AND ALBUTEROL SULFATE 1 AMPULE: .5; 2.5 SOLUTION RESPIRATORY (INHALATION) at 09:39

## 2022-08-20 RX ADMIN — Medication 10 ML: at 20:18

## 2022-08-20 RX ADMIN — CEFTRIAXONE 2000 MG: 2 INJECTION, POWDER, FOR SOLUTION INTRAMUSCULAR; INTRAVENOUS at 15:11

## 2022-08-20 RX ADMIN — FENTANYL CITRATE 100 MCG: 50 INJECTION, SOLUTION INTRAMUSCULAR; INTRAVENOUS at 10:12

## 2022-08-20 RX ADMIN — FUROSEMIDE 20 MG: 20 TABLET ORAL at 16:36

## 2022-08-20 RX ADMIN — IPRATROPIUM BROMIDE AND ALBUTEROL SULFATE 1 AMPULE: .5; 2.5 SOLUTION RESPIRATORY (INHALATION) at 13:13

## 2022-08-20 RX ADMIN — IPRATROPIUM BROMIDE AND ALBUTEROL SULFATE 1 AMPULE: .5; 2.5 SOLUTION RESPIRATORY (INHALATION) at 21:09

## 2022-08-20 RX ADMIN — AMPICILLIN SODIUM 2000 MG: 2 INJECTION, POWDER, FOR SOLUTION INTRAVENOUS at 09:12

## 2022-08-20 RX ADMIN — SODIUM CHLORIDE: 9 INJECTION, SOLUTION INTRAVENOUS at 10:06

## 2022-08-20 RX ADMIN — METOPROLOL SUCCINATE 50 MG: 25 TABLET, EXTENDED RELEASE ORAL at 09:12

## 2022-08-20 RX ADMIN — CEFTRIAXONE 2000 MG: 2 INJECTION, POWDER, FOR SOLUTION INTRAMUSCULAR; INTRAVENOUS at 04:21

## 2022-08-20 RX ADMIN — Medication 10 ML: at 09:13

## 2022-08-20 RX ADMIN — PROPOFOL 100 MCG/KG/MIN: 10 INJECTION, EMULSION INTRAVENOUS at 10:12

## 2022-08-20 RX ADMIN — AMPICILLIN SODIUM 2000 MG: 2 INJECTION, POWDER, FOR SOLUTION INTRAVENOUS at 20:17

## 2022-08-20 RX ADMIN — METOPROLOL SUCCINATE 50 MG: 25 TABLET, EXTENDED RELEASE ORAL at 16:36

## 2022-08-20 RX ADMIN — AMPICILLIN SODIUM 2000 MG: 2 INJECTION, POWDER, FOR SOLUTION INTRAVENOUS at 04:31

## 2022-08-20 RX ADMIN — AMPICILLIN SODIUM 2000 MG: 2 INJECTION, POWDER, FOR SOLUTION INTRAVENOUS at 15:13

## 2022-08-20 RX ADMIN — IPRATROPIUM BROMIDE AND ALBUTEROL SULFATE 1 AMPULE: .5; 2.5 SOLUTION RESPIRATORY (INHALATION) at 15:51

## 2022-08-20 RX ADMIN — ATORVASTATIN CALCIUM 20 MG: 20 TABLET, FILM COATED ORAL at 20:19

## 2022-08-20 ASSESSMENT — PAIN SCALES - GENERAL
PAINLEVEL_OUTOF10: 0
PAINLEVEL_OUTOF10: 0

## 2022-08-20 NOTE — PROGRESS NOTES
[address] [place] [address] [facility]  Progress Note      C/C : High grade Enterococcus bacteremia, sepsis       SUBJECTIVE:  Patient is tolerating medications. No reported adverse drug reactions. No nausea, vomiting, diarrhea. Feels better today  afebrile    Review of systems:  As stated above in the chief complaint, otherwise negative. Medications:  Scheduled Meds:   sodium chloride flush  5-40 mL IntraVENous 2 times per day    ampicillin IV  2,000 mg IntraVENous 6 times per day    cefTRIAXone (ROCEPHIN) IV  2,000 mg IntraVENous Q12H    [Held by provider] apixaban  5 mg Oral BID    metoprolol succinate  50 mg Oral BID WC    atorvastatin  20 mg Oral Daily    sodium chloride flush  5-40 mL IntraVENous 2 times per day    ipratropium-albuterol  1 ampule Inhalation Q4H WA     Continuous Infusions:   sodium chloride      sodium chloride      sodium chloride      sodium chloride       PRN Meds:albuterol sulfate HFA, sodium chloride flush, sodium chloride, acetaminophen, HYDROmorphone, HYDROmorphone, traMADol, ondansetron, droperidol, midazolam, diphenhydrAMINE, labetalol **OR** hydrALAZINE, ipratropium-albuterol, potassium chloride **OR** potassium alternative oral replacement **OR** potassium chloride, melatonin, albuterol, sodium chloride, sodium chloride, albuterol, sodium chloride flush, sodium chloride, acetaminophen **OR** acetaminophen, senna    OBJECTIVE:  /66   Pulse 60   Temp 98 °F (36.7 °C)   Resp 16   Ht 5' 6\" (1.676 m)   Wt 182 lb (82.6 kg)   SpO2 97%   BMI 29.38 kg/m²   Temp  Av.6 °F (36.4 °C)  Min: 97.2 °F (36.2 °C)  Max: 98 °F (36.7 °C)    Constitutional: The patient is awake, alert, and oriented. Skin: Warm and dry. No rashes were noted. HEENT: Round and reactive pupils. Moist mucous membranes. No ulcerations or thrush. Neck: Supple to movements. Chest: Clear bilaterally   Cardiovascular: S1 and S2 are rhythmic and regular. No murmurs appreciated.    Abdomen: Positive bowel sounds to auscultation. Benign to palpation. No masses felt. No hepatosplenomegaly. Genitourinary: male  Extremities: No clubbing, no cyanosis, no edema.   Lines: peripheral    Laboratory and Tests Review:  Lab Results   Component Value Date    WBC 3.6 (L) 08/19/2022    WBC 3.5 (L) 08/18/2022    WBC 2.6 (L) 08/17/2022    HGB 8.0 (L) 08/19/2022    HCT 24.0 (L) 08/19/2022    MCV 88.2 08/19/2022    PLT 23 (L) 08/19/2022     Lab Results   Component Value Date    NEUTROABS 2.78 08/19/2022    NEUTROABS 2.29 08/17/2022    NEUTROABS 2.94 08/16/2022     No results found for: [facility]  Lab Results   Component Value Date    ALT 18 08/17/2022    AST 19 08/17/2022    ALKPHOS 82 08/17/2022    BILITOT 0.6 08/17/2022     Lab Results   Component Value Date/Time     08/19/2022 06:14 AM    K 3.2 08/19/2022 06:14 AM    K 3.7 08/17/2022 04:17 AM    CL 99 08/19/2022 06:14 AM    CO2 25 08/19/2022 06:14 AM    BUN 10 08/19/2022 06:14 AM    CREATININE 0.8 08/19/2022 06:14 AM    CREATININE 0.7 08/18/2022 05:58 AM    CREATININE 0.8 08/17/2022 04:17 AM    GFRAA >60 08/19/2022 06:14 AM    LABGLOM >60 08/19/2022 06:14 AM    GLUCOSE 112 08/19/2022 06:14 AM    PROT 5.6 08/17/2022 04:17 AM    LABALBU 3.3 08/17/2022 04:17 AM    CALCIUM 9.0 08/19/2022 06:14 AM    BILITOT 0.6 08/17/2022 04:17 AM    ALKPHOS 82 08/17/2022 04:17 AM    AST 19 08/17/2022 04:17 AM    ALT 18 08/17/2022 04:17 AM     Lab Results   Component Value Date    CRP 9.6 (H) 05/24/2022     No results found for: Madan Bergeron  Radiology:  Reviewed CT lung    Microbiology:   Lab Results   Component Value Date/Time    BC 24 Hours no growth 08/19/2022 05:05 AM    BC  08/18/2022 07:05 AM     Previously positive blood culture called  Gram stain performed from blood culture bottle media  Gram positive cocci in chains      BC Identification and sensitivity to follow 08/18/2022 07:05 AM    ORG Gram positive cocci 08/18/2022 07:10 AM    ORG Gram positive cocci 08/18/2022 07:05 AM ORG Enterococcus faecalis 08/17/2022 05:40 PM     Lab Results   Component Value Date/Time    BLOODCULT2  08/18/2022 07:10 AM     Previously positive blood culture called  Gram stain performed from blood culture bottle media  Gram positive cocci in chains      BLOODCULT2 Identification and sensitivity to follow 08/18/2022 07:10 AM    BLOODCULT2 Previously positive blood culture called 08/16/2022 03:15 PM    BLOODCULT2  08/16/2022 03:15 PM     Refer to previous culture CXBLD from 8-16-22 @1505 for susceptibility  results      ORG Gram positive cocci 08/18/2022 07:10 AM    ORG Gram positive cocci 08/18/2022 07:05 AM    ORG Enterococcus faecalis 08/17/2022 05:40 PM     No results found for: WNDABS  No results found for: RESPSMEAR  No results found for: MPNEUMO, CLAMYDCU, LABLEGI, AFBCX, FUNGSM, LABFUNG  No results found for: CULTRESP  No results found for: CXCATHTIP  No results found for: BFCS  No results found for: CXSURG  Urine Culture, Routine   Date Value Ref Range Status   08/16/2022   Final    10 to 100,000 CFU/mL  Mixed heydi isolated. Further workup and sensitivity testing  is not routinely indicated and will not be performed.   Mixed heydi isolated includes:  Mixed gram positive organisms     05/18/2019 <25,000 CFU/ml  Final     No results found for: [address]:  8-16-22 blood cultures E faecalis x 2  8-17-22 blood cultures E faecalis x 1  8-18-22 blood culture + E faecalis x1      ASSESSMENT:  High grade E faecalis bacteremia probably port infection; RO endocarditis and pacer infection s/p mediport removal ( 8/20)   Immunocompromised host c pancytopenia; on chemo for Sq cell of lung    PLAN:  Continue with  amp 2 grams IV q 4 hrs and  ceftriaxone 2 grams IV q 12 hrs   Check final cultures; blood cultures until sterile   SHIRA on Monday   Removal of  pacer( tough since pt is dependent and will need a temp)  D/W PCP and EP  Monitor labs    Samra Boland MD  12:01 PM  8/20/2022

## 2022-08-20 NOTE — PROGRESS NOTES
Subjective:  Patient is feeling slightly better today. He has no chest pain. No significant dyspnea at rest.  Rest of chest Mediport was removed as being possible source of bacteremia. Complaining of bilateral lower extremity edema    Objective:    BP (!) 145/75   Pulse 74   Temp 97 °F (36.1 °C) (Temporal)   Resp 19   Ht 5' 6\" (1.676 m)   Wt 182 lb (82.6 kg)   SpO2 96%   BMI 29.38 kg/m²     General: NAD  HEENT: No thrush or mucositis, EOMI, PERRLA  Heart:  RRR, no murmurs. Dull sounds  Lungs: Creased breath sounds bilaterally. No rales or rhonchi can be appreciated. Status post removal of right sided chest MediPort. No surrounding evidence of cellulitis or ecchymosis. Abd: nontender, nondistended, no masses  Extrem:  No clubbing, cyanosis, 1+ bilateral pedal edema  Lymphatics: No palpable adenopathy in cervical and supraclavicular regions  Skin: Intact, no petechia or purpura    CBC with Differential:    Lab Results   Component Value Date/Time    WBC 3.6 08/19/2022 06:14 AM    RBC 2.72 08/19/2022 06:14 AM    HGB 8.0 08/19/2022 06:14 AM    HCT 24.0 08/19/2022 06:14 AM    PLT 23 08/19/2022 06:14 AM    MCV 88.2 08/19/2022 06:14 AM    MCH 29.4 08/19/2022 06:14 AM    MCHC 33.3 08/19/2022 06:14 AM    RDW 18.2 08/19/2022 06:14 AM    NRBC 0.0 05/26/2022 09:45 AM    LYMPHOPCT 11.0 08/19/2022 06:14 AM    MONOPCT 5.3 08/19/2022 06:14 AM    MYELOPCT 0.9 09/17/2021 10:20 AM    BASOPCT 0.3 08/19/2022 06:14 AM    MONOSABS 0.18 08/19/2022 06:14 AM    LYMPHSABS 0.37 08/19/2022 06:14 AM    EOSABS 0.01 08/19/2022 06:14 AM    BASOSABS 0.01 08/19/2022 06:14 AM     CMP:    Lab Results   Component Value Date/Time     08/19/2022 06:14 AM    K 3.2 08/19/2022 06:14 AM    K 3.7 08/17/2022 04:17 AM    CL 99 08/19/2022 06:14 AM    CO2 25 08/19/2022 06:14 AM    BUN 10 08/19/2022 06:14 AM    CREATININE 0.8 08/19/2022 06:14 AM    GFRAA >60 08/19/2022 06:14 AM    LABGLOM >60 08/19/2022 06:14 AM    GLUCOSE 112 08/19/2022 06:14 AM    PROT 5.6 08/17/2022 04:17 AM    LABALBU 3.3 08/17/2022 04:17 AM    CALCIUM 9.0 08/19/2022 06:14 AM    BILITOT 0.6 08/17/2022 04:17 AM    ALKPHOS 82 08/17/2022 04:17 AM    AST 19 08/17/2022 04:17 AM    ALT 18 08/17/2022 04:17 AM               Current Facility-Administered Medications:     albuterol sulfate HFA (PROVENTIL;VENTOLIN;PROAIR) 108 (90 Base) MCG/ACT inhaler 2 puff, 2 puff, Inhalation, Q6H PRN, Tiffanie Gupta DO    sodium chloride flush 0.9 % injection 5-40 mL, 5-40 mL, IntraVENous, 2 times per day, Althea Cabot, MD    sodium chloride flush 0.9 % injection 5-40 mL, 5-40 mL, IntraVENous, PRN, Bhavya Murrieta MD    0.9 % sodium chloride infusion, 25 mL, IntraVENous, PRN, Bhavya Murrieta MD    acetaminophen (TYLENOL) tablet 650 mg, 650 mg, Oral, Once PRN, Bhavya Murrieta MD    HYDROmorphone (DILAUDID) injection 0.25 mg, 0.25 mg, IntraVENous, Q5 Min PRN, Bhavya Murrieta MD    HYDROmorphone (DILAUDID) injection 0.5 mg, 0.5 mg, IntraVENous, Q5 Min PRN, Bhavya Murrieta MD    traMADol (ULTRAM) tablet 50 mg, 50 mg, Oral, Once PRN, Bhavya Murrieta MD    ondansetron (ZOFRAN) injection 4 mg, 4 mg, IntraVENous, Once PRN, Bhavya Murrieta MD    droperidol (INAPSINE) injection 0.625 mg, 0.625 mg, IntraVENous, Once PRN, Bhavya Murrieta MD    midazolam PF (VERSED) injection 2 mg, 2 mg, IntraVENous, Once PRN, Bhavya Murrieta MD    diphenhydrAMINE (BENADRYL) injection 12.5 mg, 12.5 mg, IntraVENous, Once PRN, Bhavya Murrieta MD    labetalol (NORMODYNE;TRANDATE) injection 5 mg, 5 mg, IntraVENous, Q15 Min PRN **OR** hydrALAZINE (APRESOLINE) injection 5 mg, 5 mg, IntraVENous, Q15 Min PRN, Bhavya Murrieta MD    ipratropium-albuterol (DUONEB) nebulizer solution 1 ampule, 1 ampule, Inhalation, Once PRN, Bhavya Murrieta MD    potassium chloride (KLOR-CON M) extended release tablet 40 mEq, 40 mEq, Oral, PRN **OR** potassium bicarb-citric acid (EFFER-K) effervescent tablet 40 mEq, 40 mEq, Oral, PRN **OR** potassium chloride 10 mEq/100 mL IVPB (Peripheral Line), 10 mEq, IntraVENous, PRN, Tiffanie E Kaercher, DO    ampicillin 2000 mg ivpb mini bag, 2,000 mg, IntraVENous, 6 times per day, Tiffanie E Kaercher, DO, Last Rate: 200 mL/hr at 08/20/22 1513, 2,000 mg at 08/20/22 1513    cefTRIAXone (ROCEPHIN) 2,000 mg in sterile water 20 mL IV syringe, 2,000 mg, IntraVENous, Q12H, Tiffanie E Kaercher, DO, 2,000 mg at 08/20/22 1511    melatonin tablet 3 mg, 3 mg, Oral, Nightly PRN, Tiffanie E Kaercher, DO, 3 mg at 08/19/22 2051    albuterol (PROVENTIL) nebulizer solution 2.5 mg, 2.5 mg, Nebulization, Q6H PRN, Tiffanie E Kaercher, DO    0.9 % sodium chloride infusion, , IntraVENous, PRN, Tiffanie E Kaercher, DO    0.9 % sodium chloride infusion, , IntraVENous, PRN, Tiffanie E Kaercher, DO    albuterol (ACCUNEB) nebulizer solution 0.63 mg, 1 ampule, Nebulization, Q6H PRN, Tiffanie E Kaercher, DO    [Held by provider] apixaban (ELIQUIS) tablet 5 mg, 5 mg, Oral, BID, Tiffanie E Kaercher, DO    metoprolol succinate (TOPROL XL) extended release tablet 50 mg, 50 mg, Oral, BID WC, Tiffanie E Kaercher, DO, 50 mg at 08/20/22 5912    atorvastatin (LIPITOR) tablet 20 mg, 20 mg, Oral, Daily, Tiffanie E Kaercher, DO, 20 mg at 08/19/22 2051    sodium chloride flush 0.9 % injection 5-40 mL, 5-40 mL, IntraVENous, 2 times per day, Tiffanie E Kaercher, DO, 10 mL at 08/20/22 0913    sodium chloride flush 0.9 % injection 5-40 mL, 5-40 mL, IntraVENous, PRN, Tiffanie E Kaercher, DO    0.9 % sodium chloride infusion, , IntraVENous, PRN, Tiffanie E Kaercher, DO    acetaminophen (TYLENOL) tablet 650 mg, 650 mg, Oral, Q6H PRN **OR** acetaminophen (TYLENOL) suppository 650 mg, 650 mg, Rectal, Q6H PRN, Tiffanie E Kaercher, DO    senna (SENOKOT) tablet 8.6 mg, 1 tablet, Oral, Daily PRN, Tiffanie E Kaercher, DO    ipratropium-albuterol (DUONEB) nebulizer solution 1 ampule, 1 ampule, Inhalation, Q4H WA, Tiffanie Gupta, DO, 1 ampule at 08/20/22 1313    Assessment:    Principal Problem:    Pneumonia  Active Problems:    Pancytopenia ([address]    COPD (chronic obstructive pulmonary disease) (HCC)    HTN (hypertension)    Squamous carcinoma of lung (HCC)    History pulmonary embolism ([address] on Eliquis  Resolved Problems:    * No resolved hospital problems. 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 Treated with concurrent chemo/XRT from 7/6/21 followed by consolidation Imfinzi. Progression on PET from 6/24/22. Started Valentin Busing and Keytruda on 6/30/22. Patient seen in office last 8/9 and received C2D8 of Carboplatin with Gemzar and Keytruda. Admitted with sepsis, Enterococcus bacteremia. Respiratory panel negative. Mediport was removed today    Plan:  Patient is improving gradually. On IV antibiotics for Enterococcus bacteremia. Status post chest MediPort removal.  He is probably candidate for additional palliative chemotherapy and as long as performance status is adequate. Once infection is cleared he will require new IV access for treatment. Cytopenias are mostly chemotherapy related. Neutropenia resolved. Anemia is moderate. Keep hemoglobin above 7.5 g/dL. Transfuse as needed. Keep platelet count above 10 or above 20 in the presence of active bleeding. Patient requested water pill because he is bothered by bilateral lower extremity edema. He was treated previously by Dr. Bernice Levy with furosemide. He will be started on furosemide 20 mg p.o. daily. Patient is not requiring significant IV hydration. Kidney function is adequate. Follow-up with Dr. Bernice Levy in 2 weeks after discharge.           Electronically signed by Vanessa Perez MD on 8/20/2022 at 3:48 PM

## 2022-08-20 NOTE — PROGRESS NOTES
Pulmonary Progress Note    Admit Date: 2022                            PCP: Di Villegas MD  Principal Problem:    Pneumonia  Active Problems:    Pancytopenia ([facility]    COPD (chronic obstructive pulmonary disease) ([facility]    HTN (hypertension)    Squamous carcinoma of lung ([facility]    History pulmonary embolism ([facility] on Eliquis  Resolved Problems:    * No resolved hospital problems. *      Subjective:  Patient seen in bed on room air. He denies shortness of breath   Has a dry cough  Had port removal today     Medications:   sodium chloride      sodium chloride      sodium chloride      sodium chloride          sodium chloride flush  5-40 mL IntraVENous 2 times per day    ampicillin IV  2,000 mg IntraVENous 6 times per day    cefTRIAXone (ROCEPHIN) IV  2,000 mg IntraVENous Q12H    [Held by provider] apixaban  5 mg Oral BID    metoprolol succinate  50 mg Oral BID WC    atorvastatin  20 mg Oral Daily    sodium chloride flush  5-40 mL IntraVENous 2 times per day    ipratropium-albuterol  1 ampule Inhalation Q4H WA       Vitals:  VITALS:  /66   Pulse 60   Temp 98 °F (36.7 °C)   Resp 16   Ht 5' 6\" (1.676 m)   Wt 182 lb (82.6 kg)   SpO2 97%   BMI 29.38 kg/m²   24HR INTAKE/OUTPUT:    Intake/Output Summary (Last 24 hours) at 2022 1235  Last data filed at 2022 1043  Gross per 24 hour   Intake 657 ml   Output 2 ml   Net 655 ml     CURRENT PULSE OXIMETRY:  SpO2: 97 %  24HR PULSE OXIMETRY RANGE:  SpO2  Av.3 %  Min: 95 %  Max: 100 %  CVP:    VENT SETTINGS:      Additional Respiratory Assessments  Heart Rate: 60  Resp: 16  SpO2: 97 %      EXAM:  General: No distress. Alert. Eyes:  No sclera icterus. No conjunctival injection. ENT: No discharge. Pharynx clear. Neck: Trachea midline. Normal thyroid. Resp: No accessory muscle use. No crackles. No wheezing. No rhonchi. Diminished bilaterally  CV: Regular rate. Regular rhythm. No mumur or rub. RLE edema    ABD: Non-tender. Non-distended.  No masses. No organmegaly. Normal bowel sounds. Skin: Warm and dry. No nodule on exposed extremities. No rash on exposed extremities. M/S: No cyanosis. No joint deformity. No clubbing. Neuro: Awake. Follows commands. A&Ox3    I/O: I/O last 3 completed shifts: In: 300 [P.O.:300]  Out: -   I/O this shift: In: 477 [I.V.:200; Blood:277]  Out: 2 [Blood:2]     Results:  CBC:   Recent Labs     08/18/22  0558 08/19/22  0155 08/19/22  0614   WBC 3.5*  --  3.6*   HGB 8.1* 7.3* 8.0*   HCT 24.4* 21.9* 24.0*   MCV 87.8  --  88.2   PLT 21*  --  23*     BMP:   Recent Labs     08/18/22  0558 08/19/22  0614    136   K 3.6 3.2*    99   CO2 25 25   BUN 8 10   CREATININE 0.7 0.8     LFT:   No results for input(s): ALKPHOS, ALT, AST, PROT, BILITOT, BILIDIR, LABALBU in the last 72 hours. PT/INR: No results for input(s): PROTIME, INR in the last 72 hours. Cultures:  No results for input(s): CULTRESP in the last 72 hours. ABG:   No results for input(s): PH, PO2, PCO2, HCO3, BE, O2SAT in the last 72 hours. Films:  CT Head WO Contrast  Result Date: 8/16/2022  No acute intracranial abnormality. Cortical atrophy and periventricular leukomalacia. XR CHEST PORTABLE  Result Date: 8/16/2022  Continued opacity in the left lower lung and linear scarring in the left perihilar region.  COPD.     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 with weakness, cough, shortness of breath, fatigue, fever. S/p platelet and PRBC transfusion on  8/18/2022 CT chest w/o: The previously noted infiltrative left hilar mass extending to EAN and LLL surrounding the broncho pulmonary vasculature is noted currently measuring 4.6 x 4.3 cm with progression with atelectasis. 8/18: 2L NC, US BLE neg DVT   8/19: 2L NC   8/20 RA, Mediport removed wakes up somewhat short of breath    Stage IIIB T2aN3 Squamous Cell Carcinoma of Lung  diagnosis 5/27/2021, PD-L1 0%    - 7/6/21-9/7/21 treatment with concurrent chemotherapy/radiation therapy   - 11/2/21 consolidated with Imfinzi (Durvalumab) per ID IV every 4 weeks f  - 6/30/22 PET showing progression and was started on Carbo, Gemzar and Keytruda on 6/30/22  - 8/9 /22 r ceived C2D8 of Carboplatin AUC 2 with Gemzar 1000 mg/m2 IV on D1,8 and Keytruda IV on D1 on a 21 day cycle  Enterococcus faecalis bacteremia/septicemia on IV daptomycin for bacteremia cultures + on 8/16, 8/17, 8/18 s/p mediport removal    With dual-chamber pacemaker and removal of preexistent loop recorder 7/20/2021  H/O SARS-CoV 2 Pneumonitis 5/24/2022- suspected Omicron BA-2  sick contact wife  Chronic hypoxic resp failure  RA during the day and 2L NC at night, which is baseline. Probable obstructive sleep apnea  Pancytopenia secondary to chemotherapy  Thrombocytopenia   Lymphopenia  new RLE swelling and numbness,   H/o submassive Hemoptysis - streaky, known lung mass   H/O RLL subsegmental PE- CTA Chest 9/17/2021 was on eliquis now on hold   Radiation pneumonitis/fibrosis Left Lung   COPD, not in exacerbation   H/O wide-complex tachycardia (HCC)--atrial tachycardia with RV pacing     PLAN: From  Continue DuoNeb every 4 hours   Oncology following. . Continue to hold Eliquis until platelets recover > 40B. H/H 6.9/21.4 > 8.1/24.4 (8/18) > 8.0/24.0 (8/19).  PLTs 7>4 (8/17) >21 (8/18) > 23 (8/19)  Fibrinogen, LDH, peripheral smear, haptoglobin, ESR, CRP still pending? Orders are active but still not collected  Continue IS for pulmonary hygiene   May need SHIRA and Echo   For Pro-BNP 1207. Supplemental O2 as needed to maintain pulse ox >92%. Electronically signed by LELO Baptiste on 8/20/2022 at 12:35 PM  I had a face-to-face encounter with the patient at the bedside. I agree with the nurse practitioner's note and assessment and plan as detailed above. Necessary editing and changes made to the note by myself. High likelihood of obstructive sleep apnea. Patient wears oxygen at night due to a nocturnal oximetry t showing hypoxemia at night. Patient has history of snoring. Wakes up often at night. Has nocturnal urination. Used to drink 7 to 8 cups of coffee a day now drinks 4 cups of coffee. He has been napping since chemotherapy.   Patient would benefit from having sleep study

## 2022-08-20 NOTE — ANESTHESIA POSTPROCEDURE EVALUATION
Department of Anesthesiology  Postprocedure Note    Patient: Samy Alonzo  MRN: [id number]  YOB: 1950  Date of evaluation: 8/20/2022      Procedure Summary     Date: 08/20/22 Room / Location: [address] [place] [address] / [facility]    Anesthesia Start: 1006 Anesthesia Stop: 9093    Procedure: PORT REMOVAL (Right: Chest) Diagnosis:       Infected venous access port, initial encounter      (Infected venous access port, initial encounter)    Surgeons: Minerva Sommers MD Responsible Provider: Ahsan Cifuentes MD    Anesthesia Type: MAC ASA Status: 4          Anesthesia Type: No value filed.     Shin Phase I: Shin Score: 9    Shin Phase II:        Anesthesia Post Evaluation    Patient location during evaluation: PACU  Patient participation: complete - patient participated  Level of consciousness: awake and alert  Airway patency: patent  Nausea & Vomiting: no nausea and no vomiting  Complications: no  Cardiovascular status: hemodynamically stable  Respiratory status: acceptable  Hydration status: euvolemic

## 2022-08-20 NOTE — OP NOTE
Operative Note      Patient: Samy Alonzo  YOB: 1950  MRN: [id number]    Date of Procedure: 8/20/2022    Pre-Op Diagnosis: bacteremia    Post-Op Diagnosis: Same       Procedure(s):  PORT REMOVAL    Surgeon(s):  Minerva Sommers MD    Assistant:   Resident: Margo Haile DO    Anesthesia: Monitor Anesthesia Care    Estimated Blood Loss (mL): Minimal    Complications: None    Specimens:   ID Type Source Tests Collected by Time Destination   1 : catheter tip Catheter Tip Catheter Tip CULTURE, TIP Minerva Sommers MD 8/20/2022 1019        Implants:  * No implants in log *      Drains: * No LDAs found *    Findings: right internal jugular port with clean capsule and pocket, no purulence or signs of local infection    Detailed Description of Procedure:         HISTORY: Samy Alonzo is a 67 y.o. male with indwelling subcutaneous central venous access who presented with sepsis, bacteremia, pancytopenia 2/2 chemotherapy. Removal of mediport was recommended. The risks benefits and alternatives of the procedure were discussed with the patient who stated understanding and agreed to proceed. DESCRIPTION OF PROCEDURE: The patient was brought to the operating room and positioned supine on the OR table. Sequential compression devices were placed on the patient's lower extremities and functioning. He is on scheduled antibiotics which do not require redosing. Anesthesia was obtained without complication as per the anesthesia record. Immediately prior to the procedure a time-out was called and the surgical checklist was reviewed and agreed upon by all present. The patient was prepped and draped in the usual sterile fashion. Skin around mediport and insertion site was anesthetized with 1% lidocaine with epinephrine. An ellipse skin incision was made around prior scar with #15 blade. Skin and subcutaneous dissection proceeded with electrocautery and taken down until the port and catheter were identified.   The catheter was removed and tip sent for culture. The port capsule was opened and no purulence or signs of local infection were visible. The port was excised with bovie electrocautery. Hemostasis was achieved with electrocautery. The pocket was irrigated with copious amounts of sterile saline. The skin was closed in 2 layers with 3-0 vicryl interrupted sutures and 4-0 vicryl running subcuticular fashion. Dermabond was applied as a dressing. Needle, sponge, and instrument counts were reported as correct x2. Dr. Mela Baumgarten was present throughout the case. The patient tolerated the procedure well without complications. He was transferred to the recovery area in good condition. Tip culture sent  Continue antibiotics per ID  May need PICC if Oncology recommends further chemotherapy and possible outpatient mediport insertion when cleared by ID vs inpatient when ok with ID.       Electronically signed by El Alvarez DO on 8/20/2022 at 10:38 AM

## 2022-08-20 NOTE — PROGRESS NOTES
For OR today for mediport removal.    Will prepare platelets on hold for OR given thrombocytopenia. The risks, benefits, alternatives, and potential complications of the procedure, including the risks of bleeding, infection, injury to surrounding structures, need for additional procedures, and death were explained to the patient. All questions were answered. The patient understands and agrees to proceed with the procedure.      Electronically signed by Radha Avalos DO on 8/20/2022 at 7:38 AM

## 2022-08-20 NOTE — PROGRESS NOTES
[facility]                                Progress note    Subjective: The patient is awake and alert. Patient just returned from the OR after having Mediport removed. Awaiting morning labs    Objective:    BP (!) 145/75   Pulse 74   Temp 97 °F (36.1 °C) (Temporal)   Resp 19   Ht 5' 6\" (1.676 m)   Wt 182 lb (82.6 kg)   SpO2 96%   BMI 29.38 kg/m²     In: 977 [P.O.:300; I.V.:200; Blood:277]  Out: 2   In: 56   Out: 2     General appearance: NAD, conversant, looks much better today  HEENT: AT/NC, MMM  Neck: FROM, supple  Lungs: Clear to auscultation  CV: RRR, no MRGs-right-sided port removed 8/20, left-sided pacemaker in place  Vasc: Radial pulses 2+  Abdomen: Soft, non-tender; no masses or HSM  Extremities: No peripheral edema or digital cyanosis  Skin: Generalized bruising  Psych: Alert and oriented to person, place and time  Neuro: Alert and interactive     Recent Labs     08/18/22  0558 08/19/22  0155 08/19/22  0614   WBC 3.5*  --  3.6*   HGB 8.1* 7.3* 8.0*   HCT 24.4* 21.9* 24.0*   PLT 21*  --  23*         Recent Labs     08/18/22  0558 08/19/22  0614    136   K 3.6 3.2*    99   CO2 25 25   BUN 8 10   CREATININE 0.7 0.8   CALCIUM 8.9 9.0         Assessment:    Principal Problem:    Pneumonia  Active Problems:    Pancytopenia (HCC)    COPD (chronic obstructive pulmonary disease) (HCC)    HTN (hypertension)    Squamous carcinoma of lung (HCC)    History pulmonary embolism ([address] on Eliquis  Resolved Problems:    * No resolved hospital problems.  *      Plan:  Patient is a 69-year-old male with a hx of squamous cell lung carcinoma who is active chemo therapy who is admitted to [facility] for  Sepsis/bacteremia/immunocompromise status-acutely ill status  -Patient has both right-sided port and left-sided pacemaker in place which will need to be removed if persistent Enterococcus bacteremia  -Monitor labs  -DuoNebs as needed  -Currently room air   -Check Legionella and

## 2022-08-20 NOTE — CONSULTS
[address] Electrophysiology  Consultation Report  PATIENT: Kishan Gonzalez  MEDICAL RECORD NUMBER: [id number]  DATE OF SERVICE:  8/20/2022  ATTENDING ELECTROPHYSIOLOGIST: Kevyn Kirkland MD  PRIMARY ELECTROPHYSIOLOGIST: Scott Carty  REFERRING PHYSICIAN: No ref. provider found and Cheko Luna MD  CHIEF COMPLAINT: Weakness and fatigue, fever    HPI: This is a 67 y.o. male with a history of pretension, hyperlipidemia, COPD and a history of long-term smoking habit as well as recurrent syncopal episodes starting in 2012. Loop recorder insertion in 2020 leading to the discovery of recurrent episodes of complete heart block and pacemaker implant on 7/20/2021. He was also diagnosed with lung cancer around the same time and has been undergoing chemotherapy and radiation. He finished his last chemo cycle last week and presented to the emergency room with symptoms of ongoing fatigue weakness and fever. He was found to be neutropenic as well as thrombocytopenic. Subsequently blood cultures have been positive for Enterococcus faecalis. Blood cultures from 8/16, 8/17 and 8/18 have all been positive despite IV antibiotics. Patient also remains neutropenic and thrombocytopenic with a platelet count of 60,042 today. No other cardiac symptoms. No new symptoms of chest pain or dyspnea on exertion. No syncope or near syncope.       Patient Active Problem List    Diagnosis Date Noted    Pancytopenia ([address] 08/17/2022     Priority: Medium    COVID-19 05/25/2022     Priority: Medium    Squamous carcinoma of lung ([address] 09/18/2021    History pulmonary embolism ([address] on Eliquis 09/18/2021    Hyponatremia 09/18/2021    Hypomagnesemia 09/18/2021    Wide-complex tachycardia ([address] 09/18/2021    Acute on chronic respiratory failure with hypoxia ([address] 09/17/2021    S/P cardiac pacemaker procedure 07/20/2021    Intermittent complete heart block ([address] 07/19/2021    Pneumonia 01/31/2019 COPD (chronic obstructive pulmonary disease) ([facility] 01/31/2019    BPH (benign prostatic hyperplasia) 01/31/2019    HTN (hypertension) 01/31/2019    HLD (hyperlipidemia) 01/31/2019    Syncope and collapse 01/18/2019       Past Medical History:   Diagnosis Date    CHB (complete heart block) ([facility] 07/2021    COPD (chronic obstructive pulmonary disease) ([facility]     Syncope 09/2020    dr Mark Fernández wearing a monitor    Syncope 07/2021       Family History   Problem Relation Age of Onset    Cancer Father         prostate    Cancer Paternal Uncle         anal       Social History     Tobacco Use    Smoking status: Former     Packs/day: 2.00     Years: 25.00     Pack years: 50.00     Types: Cigarettes     Quit date: 12/26/2011     Years since quitting: 10.6    Smokeless tobacco: Never   Substance Use Topics    Alcohol use: Not Currently     Alcohol/week: 1.0 standard drink     Types: 1 Glasses of wine per week     Comment: 1 glass of wine per day prior       Current Facility-Administered Medications   Medication Dose Route Frequency Provider Last Rate Last Admin    albuterol sulfate HFA (PROVENTIL;VENTOLIN;PROAIR) 108 (90 Base) MCG/ACT inhaler 2 puff  2 puff Inhalation Q6H PRN Tiffanie E Kaercher, DO        sodium chloride flush 0.9 % injection 5-40 mL  5-40 mL IntraVENous 2 times per day Bhavya Murrieta MD        sodium chloride flush 0.9 % injection 5-40 mL  5-40 mL IntraVENous PRN Bhavya Murrieta MD        0.9 % sodium chloride infusion  25 mL IntraVENous PRN Bhavya Murrieta MD        acetaminophen (TYLENOL) tablet 650 mg  650 mg Oral Once PRN Bhavya Murrieta MD        HYDROmorphone (DILAUDID) injection 0.25 mg  0.25 mg IntraVENous Q5 Min PRN Bhavya Murrieta MD        HYDROmorphone (DILAUDID) injection 0.5 mg  0.5 mg IntraVENous Q5 Min PRN Bhavya Murrieta MD        traMADol (ULTRAM) tablet 50 mg  50 mg Oral Once PRN Kings Willis MD        ondansetron (ZOFRAN) injection 4 mg  4 mg IntraVENous Once PRN Bhavya Murrieta MD        droperidol (INAPSINE) injection 0.625 mg  0.625 mg IntraVENous Once PRN Mohammad-Safa Littie Hammans, MD        midazolam PF (VERSED) injection 2 mg  2 mg IntraVENous Once PRN Bhavya Murrieta MD        diphenhydrAMINE (BENADRYL) injection 12.5 mg  12.5 mg IntraVENous Once PRN Bhavya Murrieta MD        labetalol (NORMODYNE;TRANDATE) injection 5 mg  5 mg IntraVENous Q15 Min PRN Bhavya Murrieta MD        Or    hydrALAZINE (APRESOLINE) injection 5 mg  5 mg IntraVENous Q15 Min PRN Bhavya Murrieta MD        ipratropium-albuterol (DUONEB) nebulizer solution 1 ampule  1 ampule Inhalation Once PRN Ramone Hernandez MD        furosemide (LASIX) tablet 20 mg  20 mg Oral Daily Dali Young MD   20 mg at 08/20/22 1636    potassium chloride (KLOR-CON M) extended release tablet 40 mEq  40 mEq Oral PRN Tiffanie E Kaercher, DO        Or    potassium bicarb-citric acid (EFFER-K) effervescent tablet 40 mEq  40 mEq Oral PRN Tiffanie E Kaercher, DO        Or    potassium chloride 10 mEq/100 mL IVPB (Peripheral Line)  10 mEq IntraVENous PRN Tiffanie E Kaercher, DO        ampicillin 2000 mg ivpb mini bag  2,000 mg IntraVENous 6 times per day Angelinaelayne Damon, DO   Stopped at 08/20/22 1607    cefTRIAXone (ROCEPHIN) 2,000 mg in sterile water 20 mL IV syringe  2,000 mg IntraVENous Q12H Tiffanie E Kaercher, DO   2,000 mg at 08/20/22 1511    melatonin tablet 3 mg  3 mg Oral Nightly PRN Jasmine Pearl Kaercher, DO   3 mg at 08/19/22 2051    albuterol (PROVENTIL) nebulizer solution 2.5 mg  2.5 mg Nebulization Q6H PRN Tiffanie E Kaercher, DO        0.9 % sodium chloride infusion   IntraVENous PRN Tiffanie E Kaercher, DO        0.9 % sodium chloride infusion   IntraVENous PRN Tiffanie E Kaercher, DO        albuterol (ACCUNEB) nebulizer solution 0.63 mg  1 ampule Nebulization Q6H PRN Angelina Damon DO        [Held by provider] apixaban (ELIQUIS) tablet 5 mg  5 mg Oral BID Tiffanie E Kaercher, DO        metoprolol succinate (TOPROL XL) extended release tablet 50 mg  50 mg Oral BID WC Tiffanie E Kaercher, DO   50 mg at 08/20/22 1636    atorvastatin (LIPITOR) tablet 20 mg  20 mg Oral Daily Tiffanie E Kaercher, DO   20 mg at 08/19/22 2051    sodium chloride flush 0.9 % injection 5-40 mL  5-40 mL IntraVENous 2 times per day Jennifer Barton, DO   10 mL at 08/20/22 0913    sodium chloride flush 0.9 % injection 5-40 mL  5-40 mL IntraVENous PRN Tiffaine E Kaercher, DO        0.9 % sodium chloride infusion   IntraVENous PRN Tiffanie E Kaercher, DO        acetaminophen (TYLENOL) tablet 650 mg  650 mg Oral Q6H PRN Tiffanie E Kaercher, DO        Or    acetaminophen (TYLENOL) suppository 650 mg  650 mg Rectal Q6H PRN Tiffanie E Kaercher, DO        senna (SENOKOT) tablet 8.6 mg  1 tablet Oral Daily PRN Tiffanie E Kaercher, DO        ipratropium-albuterol (DUONEB) nebulizer solution 1 ampule  1 ampule Inhalation Q4H WA Tiffanie E Kaercher, DO   1 ampule at 08/20/22 1551        No Known Allergies    ROS:   Constitutional: Positive for fever, activity change and appetite change. HENT: Negative for epistaxis. Eyes: Negative for diploplia, blurred vision. Respiratory: Negative for cough, chest tightness, shortness of breath and wheezing. Cardiovascular: pertinent positives in HPI  Gastrointestinal: Negative for abdominal pain and blood in stool. All other review of systems are negative     PHYSICAL EXAM:   Vitals:    08/20/22 1110 08/20/22 1111 08/20/22 1430 08/20/22 1551   BP: 123/66  (!) 145/75    Pulse: 60  74    Resp: 16  19    Temp: 98 °F (36.7 °C)  97 °F (36.1 °C)    TempSrc:   Temporal    SpO2: 100% 97% 96% 90%   Weight:       Height:          Constitutional: Well-developed, no acute distress  Eyes: conjunctivae normal, no xanthelasma   Ears, Nose, Throat: oral mucosa moist, no cyanosis   CV: no JVD. Regular rate and rhythm.  Normal S1S2 and no S3. No murmurs, rubs, or gallops. Lungs: clear to auscultation bilaterally, normal respiratory effort without used of accessory muscles  Abdomen: soft, non-tender, non distended  Musculoskeletal: no digital clubbing, no edema   Skin: warm, no rashes     I have personally reviewed the laboratory, cardiac diagnostic and radiographic testing as outlined below:    Data:    Recent Labs     22  0558 22  0155 22  0614 22  1644   WBC 3.5*  --  3.6* 2.6*   HGB 8.1* 7.3* 8.0* 7.5*   HCT 24.4* 21.9* 24.0* 23.4*   PLT 21*  --  23* 53*     Recent Labs     22  0558 22  0614    136   K 3.6 3.2*    99   CO2 25 25   BUN 8 10   CREATININE 0.7 0.8   CALCIUM 8.9 9.0      Lab Results   Component Value Date/Time    MG 1.5 2021 05:28 AM     No results for input(s): TSH in the last 72 hours. No results for input(s): INR in the last 72 hours. CXR:   FINDINGS:   There is opacity in the left lower lung. There are chronic markings at the   right lung base. Emphysematous changes in the upper lungs. The heart is not   enlarged. There is no pneumothorax. There is mild blunting of the left   costophrenic angle. Pacemaker present. Impression   Continued opacity in the left lower lung and linear scarring in the left   perihilar region. COPD. Telemetry: Atrial pacing, PACs    EK22 Atrial pacing, PACs, right bundle branch block , left posterior hemiblock    Echocardiogram: 22   Conclusions      Summary   Normal left ventricular chamber size. Normal left ventricular systolic function. Visually estimated LVEF is 55-60 %. No wall motion abnormalities. Normal diastolic function. Normal left atrial pressure. Normal right ventricle structure and function. Normal left atrial size. Normal right atrial size   Likely normal estimated PA pressure. Pacer/ ICD wire present in the right heart. No gross evidence of infective endocarditis. Consider SHIRA for better   accuracy if clinically indicated. Compared to prior echo from 2021, no significant changes noted. Signature      ----------------------------------------------------------------   Electronically signed by Suraj Mistry MD(Interpreting   physician) on 08/19/2022 04:16 PM      I have independently reviewed all of the ECGs and rhythm strips per above     Assessment/Plan: This is a 67 y.o. male with a history of     1. Dual-chamber pacemaker in situ--for AV block with a long pause  Date of implant July 28, 2021    2. Bacteremia/sepsis--enterococcus faecalis  Persistent bacteremia despite IV antibiotics    3. Lung cancer, squamous cell--patient undergoing chemotherapy    4. Neutropenia and thrombocytopenia related to chemotherapy    Recommendations:    Patient will need lead and device extraction. Currently not being done at this institution  We may need to transfer him to [facility]. I will however speak to thoracic surgery Monday before proceeding with the transfer    I have discussed all of the above with the patient  and the family reviewing the above stated recommendations. And a total of >50% of that time involved face-to-face time providing counseling and or coordination of care with the other providers, reviewing records/tests, counseling/education of the patient, ordering medications/tests/procedures, coordinating care, and documenting clinical information in the EHR. Thank you for allowing me to participate in your patient's care. Please call me if there are any questions or concerns.       Michi Andre MD  Cardiac Electrophysiology  [facility]) Physicians  [facility] [place]: [facility] Electrophys[facility]  5:52 PM  8/20/2022

## 2022-08-21 LAB
ABO/RH: NORMAL
ANISOCYTOSIS: ABNORMAL
ANTIBODY SCREEN: NORMAL
BASOPHILS ABSOLUTE: 0.01 E9/L (ref 0–0.2)
BASOPHILS RELATIVE PERCENT: 0.4 % (ref 0–2)
BLOOD CULTURE, ROUTINE: ABNORMAL
CULTURE, BLOOD 2: ABNORMAL
CULTURE, BLOOD 2: ABNORMAL
EOSINOPHILS ABSOLUTE: 0.02 E9/L (ref 0.05–0.5)
EOSINOPHILS RELATIVE PERCENT: 0.8 % (ref 0–6)
HCT VFR BLD CALC: 22.3 % (ref 37–54)
HCT VFR BLD CALC: 25.1 % (ref 37–54)
HEMOGLOBIN: 7.2 G/DL (ref 12.5–16.5)
HEMOGLOBIN: 8.2 G/DL (ref 12.5–16.5)
IMMATURE GRANULOCYTES #: 0.02 E9/L
IMMATURE GRANULOCYTES %: 0.8 % (ref 0–5)
LYMPHOCYTES ABSOLUTE: 0.53 E9/L (ref 1.5–4)
LYMPHOCYTES RELATIVE PERCENT: 21 % (ref 20–42)
MCH RBC QN AUTO: 29 PG (ref 26–35)
MCHC RBC AUTO-ENTMCNC: 32.3 % (ref 32–34.5)
MCV RBC AUTO: 89.9 FL (ref 80–99.9)
MONOCYTES ABSOLUTE: 0.36 E9/L (ref 0.1–0.95)
MONOCYTES RELATIVE PERCENT: 14.3 % (ref 2–12)
NEUTROPHILS ABSOLUTE: 1.58 E9/L (ref 1.8–7.3)
NEUTROPHILS RELATIVE PERCENT: 62.7 % (ref 43–80)
ORGANISM: ABNORMAL
ORGANISM: ABNORMAL
OVALOCYTES: ABNORMAL
PDW BLD-RTO: 18.9 FL (ref 11.5–15)
PLATELET # BLD: 64 E9/L (ref 130–450)
PLATELET CONFIRMATION: NORMAL
PMV BLD AUTO: 12.9 FL (ref 7–12)
POIKILOCYTES: ABNORMAL
POLYCHROMASIA: ABNORMAL
RBC # BLD: 2.48 E12/L (ref 3.8–5.8)
WBC # BLD: 2.5 E9/L (ref 4.5–11.5)

## 2022-08-21 PROCEDURE — 85014 HEMATOCRIT: CPT

## 2022-08-21 PROCEDURE — 99232 SBSQ HOSP IP/OBS MODERATE 35: CPT | Performed by: INTERNAL MEDICINE

## 2022-08-21 PROCEDURE — 6370000000 HC RX 637 (ALT 250 FOR IP): Performed by: INTERNAL MEDICINE

## 2022-08-21 PROCEDURE — P9016 RBC LEUKOCYTES REDUCED: HCPCS

## 2022-08-21 PROCEDURE — 2060000000 HC ICU INTERMEDIATE R&B

## 2022-08-21 PROCEDURE — 36415 COLL VENOUS BLD VENIPUNCTURE: CPT

## 2022-08-21 PROCEDURE — 86900 BLOOD TYPING SEROLOGIC ABO: CPT

## 2022-08-21 PROCEDURE — 6370000000 HC RX 637 (ALT 250 FOR IP): Performed by: SURGERY

## 2022-08-21 PROCEDURE — 85025 COMPLETE CBC W/AUTO DIFF WBC: CPT

## 2022-08-21 PROCEDURE — 86923 COMPATIBILITY TEST ELECTRIC: CPT

## 2022-08-21 PROCEDURE — 6360000002 HC RX W HCPCS: Performed by: SURGERY

## 2022-08-21 PROCEDURE — 86901 BLOOD TYPING SEROLOGIC RH(D): CPT

## 2022-08-21 PROCEDURE — 2580000003 HC RX 258: Performed by: ANESTHESIOLOGY

## 2022-08-21 PROCEDURE — 2580000003 HC RX 258: Performed by: SURGERY

## 2022-08-21 PROCEDURE — 86850 RBC ANTIBODY SCREEN: CPT

## 2022-08-21 PROCEDURE — 2700000000 HC OXYGEN THERAPY PER DAY

## 2022-08-21 PROCEDURE — 85018 HEMOGLOBIN: CPT

## 2022-08-21 PROCEDURE — 94640 AIRWAY INHALATION TREATMENT: CPT

## 2022-08-21 PROCEDURE — 36430 TRANSFUSION BLD/BLD COMPNT: CPT

## 2022-08-21 RX ORDER — SODIUM CHLORIDE 9 MG/ML
INJECTION, SOLUTION INTRAVENOUS PRN
Status: COMPLETED | OUTPATIENT
Start: 2022-08-21 | End: 2022-08-25

## 2022-08-21 RX ADMIN — AMPICILLIN SODIUM 2000 MG: 2 INJECTION, POWDER, FOR SOLUTION INTRAVENOUS at 19:52

## 2022-08-21 RX ADMIN — IPRATROPIUM BROMIDE AND ALBUTEROL SULFATE 1 AMPULE: .5; 2.5 SOLUTION RESPIRATORY (INHALATION) at 08:59

## 2022-08-21 RX ADMIN — AMPICILLIN SODIUM 2000 MG: 2 INJECTION, POWDER, FOR SOLUTION INTRAVENOUS at 12:34

## 2022-08-21 RX ADMIN — Medication 10 ML: at 19:55

## 2022-08-21 RX ADMIN — AMPICILLIN SODIUM 2000 MG: 2 INJECTION, POWDER, FOR SOLUTION INTRAVENOUS at 08:56

## 2022-08-21 RX ADMIN — AMPICILLIN SODIUM 2000 MG: 2 INJECTION, POWDER, FOR SOLUTION INTRAVENOUS at 00:12

## 2022-08-21 RX ADMIN — SODIUM CHLORIDE, PRESERVATIVE FREE 10 ML: 5 INJECTION INTRAVENOUS at 19:51

## 2022-08-21 RX ADMIN — AMPICILLIN SODIUM 2000 MG: 2 INJECTION, POWDER, FOR SOLUTION INTRAVENOUS at 23:32

## 2022-08-21 RX ADMIN — METOPROLOL SUCCINATE 50 MG: 25 TABLET, EXTENDED RELEASE ORAL at 16:35

## 2022-08-21 RX ADMIN — IPRATROPIUM BROMIDE AND ALBUTEROL SULFATE 1 AMPULE: .5; 2.5 SOLUTION RESPIRATORY (INHALATION) at 20:29

## 2022-08-21 RX ADMIN — IPRATROPIUM BROMIDE AND ALBUTEROL SULFATE 1 AMPULE: .5; 2.5 SOLUTION RESPIRATORY (INHALATION) at 17:07

## 2022-08-21 RX ADMIN — FUROSEMIDE 20 MG: 20 TABLET ORAL at 08:54

## 2022-08-21 RX ADMIN — CEFTRIAXONE 2000 MG: 2 INJECTION, POWDER, FOR SOLUTION INTRAMUSCULAR; INTRAVENOUS at 16:35

## 2022-08-21 RX ADMIN — Medication 10 ML: at 09:00

## 2022-08-21 RX ADMIN — ATORVASTATIN CALCIUM 20 MG: 20 TABLET, FILM COATED ORAL at 19:51

## 2022-08-21 RX ADMIN — AMPICILLIN SODIUM 2000 MG: 2 INJECTION, POWDER, FOR SOLUTION INTRAVENOUS at 04:26

## 2022-08-21 RX ADMIN — AMPICILLIN SODIUM 2000 MG: 2 INJECTION, POWDER, FOR SOLUTION INTRAVENOUS at 17:09

## 2022-08-21 RX ADMIN — IPRATROPIUM BROMIDE AND ALBUTEROL SULFATE 1 AMPULE: .5; 2.5 SOLUTION RESPIRATORY (INHALATION) at 12:27

## 2022-08-21 RX ADMIN — METOPROLOL SUCCINATE 50 MG: 25 TABLET, EXTENDED RELEASE ORAL at 08:54

## 2022-08-21 RX ADMIN — CEFTRIAXONE 2000 MG: 2 INJECTION, POWDER, FOR SOLUTION INTRAMUSCULAR; INTRAVENOUS at 04:24

## 2022-08-21 ASSESSMENT — PAIN SCALES - GENERAL: PAINLEVEL_OUTOF10: 0

## 2022-08-21 NOTE — PROGRESS NOTES
GENERAL SURGERY  DAILY PROGRESS NOTE  8/21/2022    CHIEF COMPLAINT:  Chief Complaint   Patient presents with    Fatigue     Generalized weakness, headache       SUBJECTIVE:  Doing well this morning this morning mediport site clean without pain or erythema. No drainage from site. Denies any pain. Tolerating his diet without issue. OBJECTIVE:  /60   Pulse 69   Temp 97.5 °F (36.4 °C) (Temporal)   Resp 21   Ht 5' 6\" (1.676 m)   Wt 182 lb (82.6 kg)   SpO2 97%   BMI 29.38 kg/m²     GENERAL:  NAD. A&Ox3. LUNGS:  No increased work of breathing. On RA  CARDIOVASCULAR: RR. Mediport site clean no erythema or drainage. ABDOMEN:  Soft, non-distended, non-tender. No guarding, rigidity, rebound.     ASSESSMENT/PLAN:  67 y.o. male with bacteremia s/p mediport removal 8/20    Ok for regular diet   Cardiology team planning to remove pacer at some point as well   Continue abx per primary rocephin and ampicillin   Pain and nausea control per primary   Monitor wbc, fever curve   Hold eliquis   May require PICC, new port placement timing pending ID clearance  Will follow peripherally     Patrisha Meigs, MD  Surgery Resident PGY-2  8/21/2022  7:29 AM

## 2022-08-21 NOTE — PROGRESS NOTES
[facility]                                Progress note    Subjective: The patient is awake and alert. Up at the side of the bed eating lunch    Objective:    /62   Pulse 64   Temp 97.6 °F (36.4 °C) (Temporal)   Resp 18   Ht 5' 6\" (1.676 m)   Wt 182 lb (82.6 kg)   SpO2 100%   BMI 29.38 kg/m²     In: 977 [P.O.:300; I.V.:200; Blood:277]  Out: 2   In: 56   Out: 2     General appearance: NAD, conversant, looks much better today  HEENT: AT/NC, MMM  Neck: FROM, supple  Lungs: Clear to auscultation  CV: RRR, no MRGs-right-sided port removed 8/20, left-sided pacemaker in place  Vasc: Radial pulses 2+  Abdomen: Soft, non-tender; no masses or HSM  Extremities: No peripheral edema or digital cyanosis  Skin: Generalized bruising  Psych: Alert and oriented to person, place and time  Neuro: Alert and interactive     Recent Labs     08/19/22  0614 08/20/22  1644 08/21/22  0517   WBC 3.6* 2.6* 2.5*   HGB 8.0* 7.5* 7.2*   HCT 24.0* 23.4* 22.3*   PLT 23* 53* 64*         Recent Labs     08/19/22  0614 08/20/22  1644    138   K 3.2* 3.1*   CL 99 98   CO2 25 25   BUN 10 7   CREATININE 0.8 0.7   CALCIUM 9.0 8.9         Assessment:    Principal Problem:    Pneumonia  Active Problems:    Pancytopenia (HCC)    COPD (chronic obstructive pulmonary disease) (HCC)    HTN (hypertension)    Squamous carcinoma of lung (HCC)    History pulmonary embolism ([address] [facility] on Eliquis  Resolved Problems:    * No resolved hospital problems.  *      Plan:  Patient is a 34-year-old male with a hx of squamous cell lung carcinoma who is active chemo therapy who is admitted to [facility] for  Sepsis/bacteremia/immunocompromise status-acutely ill status  -Mediport removed 8/20  -EP to talk to CT surgery for possible removal of pacer, if unable then transfer to [facility]  -Respiratory panel negative  -Check procalcitonin 0.42  -Pulmonology following  -CT chest ordered by pulm> progressive infiltrative mass in the left perihilar region  -On daptomycin per infectious disease > switched to IV Rocephin and ampicillin    Bacteremia -Enterococcus spp  -+BC for Enterococcus faecalis  -IV daptomycin > switch to Rocephin and ampicillin  -Repeat blood cultures  -GS consulted for port removal-Case discussed with Dr. Renzo Castañeda, appreciate input > removed 8/20, tip sent for culture  -Pacemaker removal-consult electrophysiology for this-appreciate input by Dr. Rayo Metz will likely need to be transferred to [facility], if unable to extract pacemaker here-await final input by CT surgery  -SHIRA ordered-rule out endocarditis-scheduled for 8/22/2022  -Patient will need platelet transfusions prior to procedures  -Case discussed with Dr. Lowell Oppenheim heme-onc  -h/h 6.9/21.4 > transfuse 1 unit RBC > 8.0/24.0 > 7.2/22. 3-receiving PRBCs today 722  -Platelets 4 > transfuse 1 unit platelets > 23 > 64 today   -Hold Eliquis-consider holding off for few weeks     Hematuria  -Check urine culture  -Hold Eliquis    DVT Prophylaxis PCD's  PT/OT  Discharge planning       LELO Caruso CNP  3:15 PM  8/21/2022     Above note edited to reflect my thoughts     I personally saw, examined and provided care for the patient. Radiographs, labs and medication list were reviewed by me independently. The case was discussed in detail and plans for care were established. Review of MAKENZIE Caruso   , documentation was conducted and revisions were made as appropriate directly by me. I agree with the above documented exam, problem list, and plan of care.      Manisha Guzman MD  5:53 PM  8/21/2022

## 2022-08-21 NOTE — PROGRESS NOTES
[address] Electrophysiology  Inpatient progress note  PATIENT: Rasheeda Ramsay  MEDICAL RECORD NUMBER: [id number]  DATE OF SERVICE:  8/21/2022  ATTENDING ELECTROPHYSIOLOGIST: Elizabeth Ross MD  PRIMARY ELECTROPHYSIOLOGIST: Natalie Bauer  REFERRING PHYSICIAN: No ref. provider found and Carl Garcia MD  CHIEF COMPLAINT: Weakness and fatigue, fever    HPI: This is a 67 y.o. male with a history of pretension, hyperlipidemia, COPD and a history of long-term smoking habit as well as recurrent syncopal episodes starting in 2012. Loop recorder insertion in 2020 leading to the discovery of recurrent episodes of complete heart block and pacemaker implant on 7/20/2021. He was also diagnosed with lung cancer around the same time and has been undergoing chemotherapy and radiation. He finished his last chemo cycle last week and presented to the emergency room with symptoms of ongoing fatigue weakness and fever. He was found to be neutropenic as well as thrombocytopenic. Subsequently blood cultures have been positive for Enterococcus faecalis. Blood cultures from 8/16, 8/17 and 8/18 have all been positive despite IV antibiotics. Patient also remains neutropenic and thrombocytopenic with a platelet count of 31,343 today. No other cardiac symptoms. No new symptoms of chest pain or dyspnea on exertion. No syncope or near syncope.    8/21/22: Patient denies any new complaints today. Was transfused earlier today for hemoglobin of 7.2. No cardiac symptoms.       Patient Active Problem List    Diagnosis Date Noted    Pancytopenia ([facility] 08/17/2022     Priority: Medium    COVID-19 05/25/2022     Priority: Medium    Squamous carcinoma of lung ([address] 09/18/2021    History pulmonary embolism ([address] on Eliquis 09/18/2021    Hyponatremia 09/18/2021    Hypomagnesemia 09/18/2021    Wide-complex tachycardia ([address] 09/18/2021    Acute on chronic respiratory failure with hypoxia ([address] 09/17/2021    S/P cardiac pacemaker procedure 07/20/2021    Intermittent complete heart block (HCC) 07/19/2021    Pneumonia 01/31/2019    COPD (chronic obstructive pulmonary disease) ([email or web address] 01/31/2019    BPH (benign prostatic hyperplasia) 01/31/2019    HTN (hypertension) 01/31/2019    HLD (hyperlipidemia) 01/31/2019    Syncope and collapse 01/18/2019       Past Medical History:   Diagnosis Date    CHB (complete heart block) ([email or web address] 07/2021    COPD (chronic obstructive pulmonary disease) ([email or web address]     Syncope 09/2020    dr Jed Irby wearing a monitor    Syncope 07/2021       Family History   Problem Relation Age of Onset    Cancer Father         prostate    Cancer Paternal Uncle         anal       Social History     Tobacco Use    Smoking status: Former     Packs/day: 2.00     Years: 25.00     Pack years: 50.00     Types: Cigarettes     Quit date: 12/26/2011     Years since quitting: 10.6    Smokeless tobacco: Never   Substance Use Topics    Alcohol use: Not Currently     Alcohol/week: 1.0 standard drink     Types: 1 Glasses of wine per week     Comment: 1 glass of wine per day prior       Current Facility-Administered Medications   Medication Dose Route Frequency Provider Last Rate Last Admin    0.9 % sodium chloride infusion   IntraVENous PRN Ellis Maher MD        albuterol sulfate HFA (PROVENTIL;VENTOLIN;PROAIR) 108 (90 Base) MCG/ACT inhaler 2 puff  2 puff Inhalation Q6H PRN Tiffanie E Kaercher, DO        sodium chloride flush 0.9 % injection 5-40 mL  5-40 mL IntraVENous 2 times per day Bhavya Murrieta MD        sodium chloride flush 0.9 % injection 5-40 mL  5-40 mL IntraVENous PRN Bhavya Murrieta MD        0.9 % sodium chloride infusion  25 mL IntraVENous PRN Bhavya Murrieta MD        HYDROmorphone (DILAUDID) injection 0.25 mg  0.25 mg IntraVENous Q5 Min PRN Bhavya Murrieta MD        HYDROmorphone (DILAUDID) injection 0.5 mg  0.5 mg IntraVENous Q5 Min PRN Bhavya MASON 650 mg  650 mg Oral Q6H PRN Tiffanie E Kaercher, DO        Or    acetaminophen (TYLENOL) suppository 650 mg  650 mg Rectal Q6H PRN Tiffanie E Kaercher, DO        senna (SENOKOT) tablet 8.6 mg  1 tablet Oral Daily PRN Tiffanie E Kaercher, DO        ipratropium-albuterol (DUONEB) nebulizer solution 1 ampule  1 ampule Inhalation Q4H WA Tiffanie E Kaercher, DO   1 ampule at 08/21/22 1707        No Known Allergies    ROS:   Constitutional: Positive for fever, activity change and appetite change. HENT: Negative for epistaxis. Eyes: Negative for diploplia, blurred vision. Respiratory: Negative for cough, chest tightness, shortness of breath and wheezing. Cardiovascular: pertinent positives in HPI  Gastrointestinal: Negative for abdominal pain and blood in stool. All other review of systems are negative     PHYSICAL EXAM:   Vitals:    08/21/22 1351 08/21/22 1352 08/21/22 1414 08/21/22 1629   BP: 139/63 139/63 130/62 (!) 141/63   Pulse: 73 62 64 62   Resp: 20 20 18 20   Temp: 97.8 °F (36.6 °C) 97.6 °F (36.4 °C) 97.6 °F (36.4 °C) 97.8 °F (36.6 °C)   TempSrc: Temporal  Temporal Temporal   SpO2: 100% 100% 100% 96%   Weight:       Height:          Constitutional: Well-developed, no acute distress  Eyes: conjunctivae normal, no xanthelasma   Ears, Nose, Throat: oral mucosa moist, no cyanosis   CV: no JVD. Regular rate and rhythm. Normal S1S2 and no S3. No murmurs, rubs, or gallops.    Lungs: clear to auscultation bilaterally, normal respiratory effort without used of accessory muscles  Abdomen: soft, non-tender, non distended  Musculoskeletal: no digital clubbing, no edema   Skin: warm, no rashes     I have personally reviewed the laboratory, cardiac diagnostic and radiographic testing as outlined below:    Data:    Recent Labs     08/19/22  0614 08/20/22  1644 08/21/22  0517 08/21/22  1742   WBC 3.6* 2.6* 2.5*  --    HGB 8.0* 7.5* 7.2* 8.2*   HCT 24.0* 23.4* 22.3* 25.1*   PLT 23* 53* 64*  --      Recent Labs faecalis  Persistent bacteremia despite IV antibiotics    3. Lung cancer, squamous cell--patient undergoing chemotherapy  -  Stage IIIB T2aN3 Squamous Cell Carcinoma of Lung  diagnosis 5/27/2021, PD-L1 0%    - 7/6/21-9/7/21 treatment with concurrent chemotherapy/radiation therapy   - 11/2/21 consolidated with Imfinzi (Durvalumab) per ID IV every 4 weeks   - 6/30/22 PET showing progression and was started on Carbo, Gemzar and Keytruda on 6/30/22  - 8/9 /22 received C2D8 of Carboplatin AUC 2 with Gemzar 1000 mg/m2 IV on D1,8 and Keytruda IV on D1 on a 21 day cycle    4. Neutropenia and thrombocytopenia related to chemotherapy  Persistent anemia. Patient is now post PRBC transfusion. Recommendations: Will discuss pacemaker extraction locally or transfer to [facility] tomorrow. I have discussed all of the above with the patient  and the family reviewing the above stated recommendations. And a total of >50% of that time involved face-to-face time providing counseling and or coordination of care with the other providers, reviewing records/tests, counseling/education of the patient, ordering medications/tests/procedures, coordinating care, and documenting clinical information in the EHR. Thank you for allowing me to participate in your patient's care. Please call me if there are any questions or concerns.       Shawanda Ramirez MD  Cardiac Electrophysiology  [facility]) Physicians  [facility] [place]: [place] Electrophysiology  7:03 PM  8/21/2022

## 2022-08-21 NOTE — PROGRESS NOTES
Pulmonary Progress Note    Admit Date: 2022                            PCP: Elysa Felty, MD  Principal Problem:    Pneumonia  Active Problems:    Pancytopenia ([facility]    COPD (chronic obstructive pulmonary disease) ([facility] [facility]    HTN (hypertension)    Squamous carcinoma of lung ([facility] [address]    History pulmonary embolism ([facility] on Eliquis  Resolved Problems:    * No resolved hospital problems. *      Subjective:  Patient seen in bedside chair on room air   He denies shortness of breath   Has a dry cough  Had port removal   Worried about possible transfer to [facility] for pacer removal    Medications:   sodium chloride      sodium chloride      sodium chloride          sodium chloride flush  5-40 mL IntraVENous 2 times per day    furosemide  20 mg Oral Daily    ampicillin IV  2,000 mg IntraVENous 6 times per day    cefTRIAXone (ROCEPHIN) IV  2,000 mg IntraVENous Q12H    [Held by provider] apixaban  5 mg Oral BID    metoprolol succinate  50 mg Oral BID WC    atorvastatin  20 mg Oral Daily    sodium chloride flush  5-40 mL IntraVENous 2 times per day    ipratropium-albuterol  1 ampule Inhalation Q4H WA       Vitals:  VITALS:  BP (!) 109/58   Pulse 62   Temp 97.6 °F (36.4 °C) (Temporal)   Resp 20   Ht 5' 6\" (1.676 m)   Wt 182 lb (82.6 kg)   SpO2 93%   BMI 29.38 kg/m²   24HR INTAKE/OUTPUT:    Intake/Output Summary (Last 24 hours) at 2022 1115  Last data filed at 2022 0919  Gross per 24 hour   Intake 220 ml   Output --   Net 220 ml     CURRENT PULSE OXIMETRY:  SpO2: 93 %  24HR PULSE OXIMETRY RANGE:  SpO2  Av %  Min: 90 %  Max: 97 %  CVP:    VENT SETTINGS:      Additional Respiratory Assessments  Heart Rate: 62  Resp: 20  SpO2: 93 %      EXAM:  General: No distress. Alert. Eyes:  No sclera icterus. No conjunctival injection. ENT: No discharge. Pharynx clear. Neck: Trachea midline. Normal thyroid. Resp: No accessory muscle use. No crackles. No wheezing. No rhonchi.  Diminished bilaterally  CV: Regular rate. Regular rhythm. No mumur or rub. RLE edema    ABD: Non-tender. Non-distended. No masses. No organmegaly. Normal bowel sounds. Skin: Warm and dry. No nodule on exposed extremities. No rash on exposed extremities. M/S: No cyanosis. No joint deformity. No clubbing. Neuro: Awake. Follows commands. A&Ox3    I/O: I/O last 3 completed shifts: In: 957 [I.V.:200; Blood:277; IV Piggyback:200]  Out: 2 [Blood:2]  I/O this shift:  In: 120 [P.O.:120]  Out: -      Results:  CBC:   Recent Labs     08/19/22  0614 08/20/22  1644 08/21/22  0517   WBC 3.6* 2.6* 2.5*   HGB 8.0* 7.5* 7.2*   HCT 24.0* 23.4* 22.3*   MCV 88.2 90.3 89.9   PLT 23* 53* 64*     BMP:   Recent Labs     08/19/22  0614 08/20/22  1644    138   K 3.2* 3.1*   CL 99 98   CO2 25 25   BUN 10 7   CREATININE 0.8 0.7     LFT:   Recent Labs     08/20/22  1644   ALKPHOS 90   ALT 29   AST 25   PROT 5.9*   BILITOT <0.2   LABALBU 3.2*       PT/INR: No results for input(s): PROTIME, INR in the last 72 hours. Cultures:  No results for input(s): CULTRESP in the last 72 hours. ABG:   No results for input(s): PH, PO2, PCO2, HCO3, BE, O2SAT in the last 72 hours. Films:  CT Head WO Contrast  Result Date: 8/16/2022  No acute intracranial abnormality. Cortical atrophy and periventricular leukomalacia. XR CHEST PORTABLE  Result Date: 8/16/2022  Continued opacity in the left lower lung and linear scarring in the left perihilar region.  COPD.     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 S/p platelet and PRBC transfusion on  8/18/2022 CT chest w/o: The previously noted infiltrative left hilar mass extending to EAN and LLL surrounding the broncho pulmonary vasculature is noted currently measuring 4.6 x 4.3 cm with progression with atelectasis. 8/18: 2L NC, US BLE neg DVT   8/19: 2L NC   8/20 RA, Mediport removed wakes up somewhat short of breath  8/21 on room air     Stage IIIB T2aN3 Squamous Cell Carcinoma of Lung  diagnosis 5/27/2021, PD-L1 0%    - 7/6/21-9/7/21 treatment with concurrent chemotherapy/radiation therapy   - 11/2/21 consolidated with Imfinzi (Durvalumab) per ID IV every 4 weeks f  - 6/30/22 PET showing progression and was started on Carbo, Gemzar and Keytruda on 6/30/22  - 8/9 /22 r ceived C2D8 of Carboplatin AUC 2 with Gemzar 1000 mg/m2 IV on D1,8 and Keytruda IV on D1 on a 21 day cycle  Enterococcus faecalis bacteremia/septicemia on IV daptomycin for bacteremia cultures + on 8/16, 8/17, 8/18 s/p mediport removal    With dual-chamber pacemaker and removal of preexistent loop recorder 7/20/2021  H/O SARS-CoV 2 Pneumonitis 5/24/2022- suspected Omicron BA-2  sick contact wife  Chronic hypoxic resp failure  RA during the day and 2L NC at night, which is baseline. Probable obstructive sleep apnea  Pancytopenia secondary to chemotherapy  Thrombocytopenia   Lymphopenia  new RLE swelling and numbness,   H/o submassive Hemoptysis - streaky, known lung mass   H/O RLL subsegmental PE- CTA Chest 9/17/2021 was on eliquis now on hold   Radiation pneumonitis/fibrosis Left Lung   COPD, not in exacerbation   H/O wide-complex tachycardia (HCC)--atrial tachycardia with RV pacing     PLAN:   Continue DuoNeb every 4 hours   Oncology following.  . Continue to hold Eliquis until platelets recover > 07N. H/H 6.9/21.4 > 8.1/24.4 (8/18) > 8.0/24.0 (8/19). PLTs 7>4 (8/17) >21 (8/18) > 23 (8/19), 8/2164 received one unit platelets 2/72 in OR  Continue IS for pulmonary hygiene   May need SHIRA and Echo , cardiology explained pacer needs to come out and needs a transfer to [facility] for this. ,For Pro-BNP 1207. Supplemental O2 as needed to maintain pulse ox >92%. On room air. Please  obtain an ambulatory pox to evaluate need   Supportive care   Outpatient PSG to be obtained for high risk and probable FIONA     Electronically signed by Orvel Cogan, APRN - CNS on 8/21/2022 at 11:15 AM    I had a face-to-face encounter with the patient at the bedside. I agree with the nurse practitioner's note and assessment and plan as detailed above. Necessary editing and changes made to the note by myself.   Has CURTIS  on walking to [place]  Getting PRBC

## 2022-08-21 NOTE — PROGRESS NOTES
[address] [facility]  [place]  Progress Note      Covering for Dr Virginie Polanco       C/C : High grade Enterococcus bacteremia, sepsis       SUBJECTIVE:  Patient is tolerating medications. No reported adverse drug reactions. No nausea, vomiting, diarrhea. SOB with exertion   Denies fever or chills   Afebrile       Review of systems:  As stated above in the chief complaint, otherwise negative. Medications:  Scheduled Meds:   sodium chloride flush  5-40 mL IntraVENous 2 times per day    furosemide  20 mg Oral Daily    ampicillin IV  2,000 mg IntraVENous 6 times per day    cefTRIAXone (ROCEPHIN) IV  2,000 mg IntraVENous Q12H    [Held by provider] apixaban  5 mg Oral BID    metoprolol succinate  50 mg Oral BID WC    atorvastatin  20 mg Oral Daily    sodium chloride flush  5-40 mL IntraVENous 2 times per day    ipratropium-albuterol  1 ampule Inhalation Q4H WA     Continuous Infusions:   sodium chloride      sodium chloride      sodium chloride       PRN Meds:sodium chloride, albuterol sulfate HFA, sodium chloride flush, sodium chloride, HYDROmorphone, HYDROmorphone, labetalol **OR** hydrALAZINE, potassium chloride **OR** potassium alternative oral replacement **OR** potassium chloride, melatonin, albuterol, albuterol, sodium chloride flush, sodium chloride, acetaminophen **OR** acetaminophen, senna    OBJECTIVE:  BP (!) 109/58   Pulse 62   Temp 97.6 °F (36.4 °C) (Temporal)   Resp 20   Ht 5' 6\" (1.676 m)   Wt 182 lb (82.6 kg)   SpO2 91%   BMI 29.38 kg/m²   Temp  Av.6 °F (36.4 °C)  Min: 97 °F (36.1 °C)  Max: 98.3 °F (36.8 °C)    Constitutional: The patient is awake, alert, and oriented. Skin: Warm and dry. No rashes were noted. HEENT: Round and reactive pupils. Moist mucous membranes. No ulcerations or thrush. Neck: Supple to movements. Chest: Clear bilaterally   Cardiovascular: S1 and S2 are rhythmic and regular. No murmurs appreciated.    Abdomen: Positive bowel sounds to auscultation. Benign to palpation. No masses felt. No hepatosplenomegaly. Genitourinary: male  Extremities: No clubbing, no cyanosis, no edema.   Lines: peripheral    Laboratory and Tests Review:  Lab Results   Component Value Date    WBC 2.5 (L) 08/21/2022    WBC 2.6 (L) 08/20/2022    WBC 3.6 (L) 08/19/2022    HGB 7.2 (L) 08/21/2022    HCT 22.3 (L) 08/21/2022    MCV 89.9 08/21/2022    PLT 64 (L) 08/21/2022     Lab Results   Component Value Date    NEUTROABS 1.58 (L) 08/21/2022    NEUTROABS 1.81 08/20/2022    NEUTROABS 2.78 08/19/2022     No results found for: CRP  Lab Results   Component Value Date    ALT 29 08/20/2022    AST 25 08/20/2022    ALKPHOS 90 08/20/2022    BILITOT <0.2 08/20/2022     Lab Results   Component Value Date/Time     08/20/2022 04:44 PM    K 3.1 08/20/2022 04:44 PM    K 3.7 08/17/2022 04:17 AM    CL 98 08/20/2022 04:44 PM    CO2 25 08/20/2022 04:44 PM    BUN 7 08/20/2022 04:44 PM    CREATININE 0.7 08/20/2022 04:44 PM    CREATININE 0.8 08/19/2022 06:14 AM    CREATININE 0.7 08/18/2022 05:58 AM    GFRAA >60 08/20/2022 04:44 PM    LABGLOM >60 08/20/2022 04:44 PM    GLUCOSE 106 08/20/2022 04:44 PM    PROT 5.9 08/20/2022 04:44 PM    LABALBU 3.2 08/20/2022 04:44 PM    CALCIUM 8.9 08/20/2022 04:44 PM    BILITOT <0.2 08/20/2022 04:44 PM    ALKPHOS 90 08/20/2022 04:44 PM    AST 25 08/20/2022 04:44 PM    ALT 29 08/20/2022 04:44 PM     Lab Results   Component Value Date    CRP 9.6 (H) 05/24/2022     No results found for: Raheel Garcia  Radiology:  Reviewed CT lung    Microbiology:   Lab Results   Component Value Date/Time    BC 24 Hours no growth 08/19/2022 05:05 AM    BC Previously positive blood culture called 08/18/2022 07:05 AM    BC 5 Days no growth 05/24/2022 05:37 PM    ORG Enterococcus faecalis 08/18/2022 07:10 AM    ORG Enterococcus faecalis 08/18/2022 07:05 AM    ORG Enterococcus faecalis 08/17/2022 05:40 PM     Lab Results   Component Value Date/Time    BLOODCULT2 24 Hours no growth 08/20/2022 05:30 AM    BLOODCULT2 Previously positive blood culture called 08/18/2022 07:10 AM    BLOODCULT2  08/18/2022 07:10 AM     Refer to previous culture for susceptibility results from CXBL collected  on 8-18-22 at 0705. ORG Enterococcus faecalis 08/18/2022 07:10 AM    ORG Enterococcus faecalis 08/18/2022 07:05 AM    ORG Enterococcus faecalis 08/17/2022 05:40 PM     No results found for: WNDABS  No results found for: RESPSMEAR  No results found for: MPNEUMO, CLAMYDCU, LABLEGI, AFBCX, FUNGSM, LABFUNG  No results found for: CULTRESP  Culture Catheter Tip   Date Value Ref Range Status   08/20/2022 Growth not present, incubation continues  Preliminary     No results found for: BFCS  No results found for: CXSURG  Urine Culture, Routine   Date Value Ref Range Status   08/16/2022   Final    10 to 100,000 CFU/mL  Mixed heydi isolated. Further workup and sensitivity testing  is not routinely indicated and will not be performed.   Mixed heydi isolated includes:  Mixed gram positive organisms     05/18/2019 <25,000 CFU/ml  Final     No results found for: [address]:  8-16-22 blood cultures E faecalis x 2  8-17-22 blood cultures E faecalis x 1  8-18-22 blood culture + E faecalis x1   Port ( tip ) cx - neg tod date 8/19     ASSESSMENT:  High grade E faecalis bacteremia probably port infection; RO endocarditis and pacer infection s/p mediport removal ( 8/20) - follow up cx neg on   Immunocompromised host c pancytopenia; on chemo for Sq cell of lung    PLAN:  Continue with  amp 2 grams IV q 4 hrs and  ceftriaxone 2 grams IV q 12 hrs   SHIRA on Monday   Removal of  pacer( to [facility] )  Will need PICC line   Monitor labs    Marlon Medrano MD  1:00 PM  8/21/2022

## 2022-08-21 NOTE — PROGRESS NOTES
Subjective:    He is feeling tired but overall doing better since he was admitted to the hospital.  No significant dyspnea at rest.  No chest pains. No nausea or vomiting. Said today taking furosemide. He has bilateral lower extremity edema. Objective:    BP (!) 109/58   Pulse 62   Temp 97.6 °F (36.4 °C) (Temporal)   Resp 20   Ht 5' 6\" (1.676 m)   Wt 182 lb (82.6 kg)   SpO2 93%   BMI 29.38 kg/m²     General: NAD  HEENT: No thrush or mucositis, EOMI, PERRLA  Heart:  RRR, no murmurs. Dull sounds  Lungs: Creased breath sounds bilaterally. No rales or rhonchi can be appreciated. Status post removal of right sided chest MediPort. No surrounding evidence of cellulitis or ecchymosis.   Abd: nontender, nondistended, no masses  Extrem:  No clubbing, cyanosis, 1+ bilateral pedal edema  Lymphatics: No palpable adenopathy in cervical and supraclavicular regions  Skin: Intact, no petechia or purpura    CBC with Differential:    Lab Results   Component Value Date/Time    WBC 2.5 08/21/2022 05:17 AM    RBC 2.48 08/21/2022 05:17 AM    HGB 7.2 08/21/2022 05:17 AM    HCT 22.3 08/21/2022 05:17 AM    PLT 64 08/21/2022 05:17 AM    MCV 89.9 08/21/2022 05:17 AM    MCH 29.0 08/21/2022 05:17 AM    MCHC 32.3 08/21/2022 05:17 AM    RDW 18.9 08/21/2022 05:17 AM    NRBC 0.0 05/26/2022 09:45 AM    LYMPHOPCT 21.0 08/21/2022 05:17 AM    MONOPCT 14.3 08/21/2022 05:17 AM    MYELOPCT 0.9 09/17/2021 10:20 AM    BASOPCT 0.4 08/21/2022 05:17 AM    MONOSABS 0.36 08/21/2022 05:17 AM    LYMPHSABS 0.53 08/21/2022 05:17 AM    EOSABS 0.02 08/21/2022 05:17 AM    BASOSABS 0.01 08/21/2022 05:17 AM     CMP:    Lab Results   Component Value Date/Time     08/20/2022 04:44 PM    K 3.1 08/20/2022 04:44 PM    K 3.7 08/17/2022 04:17 AM    CL 98 08/20/2022 04:44 PM    CO2 25 08/20/2022 04:44 PM    BUN 7 08/20/2022 04:44 PM    CREATININE 0.7 08/20/2022 04:44 PM    GFRAA >60 08/20/2022 04:44 PM    LABGLOM >60 08/20/2022 04:44 PM    GLUCOSE 106 08/20/2022 04:44 PM    PROT 5.9 08/20/2022 04:44 PM    LABALBU 3.2 08/20/2022 04:44 PM    CALCIUM 8.9 08/20/2022 04:44 PM    BILITOT <0.2 08/20/2022 04:44 PM    ALKPHOS 90 08/20/2022 04:44 PM    AST 25 08/20/2022 04:44 PM    ALT 29 08/20/2022 04:44 PM               Current Facility-Administered Medications:     albuterol sulfate HFA (PROVENTIL;VENTOLIN;PROAIR) 108 (90 Base) MCG/ACT inhaler 2 puff, 2 puff, Inhalation, Q6H PRN, Tiffanie Gupta,     sodium chloride flush 0.9 % injection 5-40 mL, 5-40 mL, IntraVENous, 2 times per day, Bhavya Murrieta MD    sodium chloride flush 0.9 % injection 5-40 mL, 5-40 mL, IntraVENous, PRN, Bhavya Murrieta MD    0.9 % sodium chloride infusion, 25 mL, IntraVENous, PRN, Bhavya Murrieta MD    HYDROmorphone (DILAUDID) injection 0.25 mg, 0.25 mg, IntraVENous, Q5 Min PRN, Bhavya Murrieta MD    HYDROmorphone (DILAUDID) injection 0.5 mg, 0.5 mg, IntraVENous, Q5 Min PRN, Bhavya Murrieta MD    labetalol (NORMODYNE;TRANDATE) injection 5 mg, 5 mg, IntraVENous, Q15 Min PRN **OR** hydrALAZINE (APRESOLINE) injection 5 mg, 5 mg, IntraVENous, Q15 Min PRN, Bhavya Murrieta MD    furosemide (LASIX) tablet 20 mg, 20 mg, Oral, Daily, Dali Young MD, 20 mg at 08/21/22 0854    potassium chloride (KLOR-CON M) extended release tablet 40 mEq, 40 mEq, Oral, PRN **OR** potassium bicarb-citric acid (EFFER-K) effervescent tablet 40 mEq, 40 mEq, Oral, PRN **OR** potassium chloride 10 mEq/100 mL IVPB (Peripheral Line), 10 mEq, IntraVENous, PRN, Tiffanie E Kaercher, DO    ampicillin 2000 mg ivpb mini bag, 2,000 mg, IntraVENous, 6 times per day, Angelina Damon DO, Stopped at 08/21/22 0926    cefTRIAXone (ROCEPHIN) 2,000 mg in sterile water 20 mL IV syringe, 2,000 mg, IntraVENous, Q12H, Tiffanie E Kafannieher, DO, 2,000 mg at 08/21/22 0424    melatonin tablet 3 mg, 3 mg, Oral, Nightly PRN, Tiffanie E Kaercher, DO, 3 mg at 08/19/22 2051    albuterol (PROVENTIL) nebulizer solution 2.5 mg, 2.5 mg, Nebulization, Q6H PRN, Tiffanie E Kaercher, DO    0.9 % sodium chloride infusion, , IntraVENous, PRN, Tiffanie E Kaercher, DO    0.9 % sodium chloride infusion, , IntraVENous, PRN, Tiffanie E Kaercher, DO    albuterol (ACCUNEB) nebulizer solution 0.63 mg, 1 ampule, Nebulization, Q6H PRN, Tiffanie E Kaercher, DO    [Held by provider] apixaban (ELIQUIS) tablet 5 mg, 5 mg, Oral, BID, Tiffanie E Kaercher, DO    metoprolol succinate (TOPROL XL) extended release tablet 50 mg, 50 mg, Oral, BID WC, Tiffanie E Kaercher, DO, 50 mg at 08/21/22 0854    atorvastatin (LIPITOR) tablet 20 mg, 20 mg, Oral, Daily, Tiffanie E Kaercher, DO, 20 mg at 08/20/22 2019    sodium chloride flush 0.9 % injection 5-40 mL, 5-40 mL, IntraVENous, 2 times per day, Tiffanie E Kaercher, DO, 10 mL at 08/20/22 2018    sodium chloride flush 0.9 % injection 5-40 mL, 5-40 mL, IntraVENous, PRN, Tiffanie E Kaercher, DO    0.9 % sodium chloride infusion, , IntraVENous, PRN, Tiffanie E Kaercher, DO    acetaminophen (TYLENOL) tablet 650 mg, 650 mg, Oral, Q6H PRN **OR** acetaminophen (TYLENOL) suppository 650 mg, 650 mg, Rectal, Q6H PRN, Tiffanie E Kaercher, DO    senna (SENOKOT) tablet 8.6 mg, 1 tablet, Oral, Daily PRN, Tiffanie E Kaercher, DO    ipratropium-albuterol (DUONEB) nebulizer solution 1 ampule, 1 ampule, Inhalation, Q4H WA, Tiffanie E Kaercher, DO, 1 ampule at 08/21/22 8549    Assessment:    Principal Problem:    Pneumonia  Active Problems:    Pancytopenia (HCC)    COPD (chronic obstructive pulmonary disease) (HCC)    HTN (hypertension)    Squamous carcinoma of lung (HCC)    History pulmonary embolism ([address] on Eliquis  Resolved Problems:    * No resolved hospital problems. *    59-year-old gentleman known to me with stage IIIB T2aN3 Squamous Cell Lung Cancer of Left hilum, dx on 5/27/2021. Treated with concurrent chemo/XRT from 7/6/21 followed by consolidation Imfinzi. Progression on PET from 6/24/22.  Started Carbo,

## 2022-08-22 ENCOUNTER — ANESTHESIA EVENT (OUTPATIENT)
Dept: CARDIAC CATH/INVASIVE PROCEDURES | Age: 72
DRG: 228 | End: 2022-08-22
Payer: MEDICARE

## 2022-08-22 ENCOUNTER — ANESTHESIA (OUTPATIENT)
Dept: CARDIAC CATH/INVASIVE PROCEDURES | Age: 72
DRG: 228 | End: 2022-08-22
Payer: MEDICARE

## 2022-08-22 ENCOUNTER — APPOINTMENT (OUTPATIENT)
Dept: CARDIAC CATH/INVASIVE PROCEDURES | Age: 72
DRG: 228 | End: 2022-08-22
Payer: MEDICARE

## 2022-08-22 PROBLEM — T82.7XXA INFECTION OF PACEMAKER LEAD WIRE (HCC): Status: ACTIVE | Noted: 2022-08-22

## 2022-08-22 PROBLEM — A41.81 SEPSIS DUE TO ENTEROCOCCUS (HCC): Status: ACTIVE | Noted: 2022-01-01

## 2022-08-22 LAB
ANISOCYTOSIS: ABNORMAL
ATYPICAL LYMPHOCYTE RELATIVE PERCENT: 0.9 % (ref 0–4)
BASOPHILS ABSOLUTE: 0.03 E9/L (ref 0–0.2)
BASOPHILS RELATIVE PERCENT: 1.7 % (ref 0–2)
CULTURE CATHETER TIP: NORMAL
EOSINOPHILS ABSOLUTE: 0.02 E9/L (ref 0.05–0.5)
EOSINOPHILS RELATIVE PERCENT: 0.9 % (ref 0–6)
HCT VFR BLD CALC: 25.6 % (ref 37–54)
HEMOGLOBIN: 8.3 G/DL (ref 12.5–16.5)
LYMPHOCYTES ABSOLUTE: 0.48 E9/L (ref 1.5–4)
LYMPHOCYTES RELATIVE PERCENT: 22.6 % (ref 20–42)
MCH RBC QN AUTO: 29 PG (ref 26–35)
MCHC RBC AUTO-ENTMCNC: 32.4 % (ref 32–34.5)
MCV RBC AUTO: 89.5 FL (ref 80–99.9)
MONOCYTES ABSOLUTE: 0.28 E9/L (ref 0.1–0.95)
MONOCYTES RELATIVE PERCENT: 13.9 % (ref 2–12)
NEUTROPHILS ABSOLUTE: 1.2 E9/L (ref 1.8–7.3)
NEUTROPHILS RELATIVE PERCENT: 60 % (ref 43–80)
NUCLEATED RED BLOOD CELLS: 1.7 /100 WBC
OVALOCYTES: ABNORMAL
PDW BLD-RTO: 18.8 FL (ref 11.5–15)
PLATELET # BLD: 95 E9/L (ref 130–450)
PLATELET CONFIRMATION: NORMAL
PMV BLD AUTO: 12.6 FL (ref 7–12)
POIKILOCYTES: ABNORMAL
POLYCHROMASIA: ABNORMAL
RBC # BLD: 2.86 E12/L (ref 3.8–5.8)
WBC # BLD: 2 E9/L (ref 4.5–11.5)

## 2022-08-22 PROCEDURE — B24BZZ4 ULTRASONOGRAPHY OF HEART WITH AORTA, TRANSESOPHAGEAL: ICD-10-PCS | Performed by: INTERNAL MEDICINE

## 2022-08-22 PROCEDURE — 2580000003 HC RX 258: Performed by: NURSE ANESTHETIST, CERTIFIED REGISTERED

## 2022-08-22 PROCEDURE — 93325 DOPPLER ECHO COLOR FLOW MAPG: CPT | Performed by: INTERNAL MEDICINE

## 2022-08-22 PROCEDURE — 2580000003 HC RX 258: Performed by: ANESTHESIOLOGY

## 2022-08-22 PROCEDURE — 2700000000 HC OXYGEN THERAPY PER DAY

## 2022-08-22 PROCEDURE — 36415 COLL VENOUS BLD VENIPUNCTURE: CPT

## 2022-08-22 PROCEDURE — 3700000001 HC ADD 15 MINUTES (ANESTHESIA)

## 2022-08-22 PROCEDURE — 94640 AIRWAY INHALATION TREATMENT: CPT

## 2022-08-22 PROCEDURE — 93325 DOPPLER ECHO COLOR FLOW MAPG: CPT

## 2022-08-22 PROCEDURE — 93321 DOPPLER ECHO F-UP/LMTD STD: CPT

## 2022-08-22 PROCEDURE — 6370000000 HC RX 637 (ALT 250 FOR IP): Performed by: SURGERY

## 2022-08-22 PROCEDURE — 6360000002 HC RX W HCPCS: Performed by: SURGERY

## 2022-08-22 PROCEDURE — 85025 COMPLETE CBC W/AUTO DIFF WBC: CPT

## 2022-08-22 PROCEDURE — 3700000000 HC ANESTHESIA ATTENDED CARE

## 2022-08-22 PROCEDURE — 99232 SBSQ HOSP IP/OBS MODERATE 35: CPT | Performed by: INTERNAL MEDICINE

## 2022-08-22 PROCEDURE — 6370000000 HC RX 637 (ALT 250 FOR IP): Performed by: SPECIALIST

## 2022-08-22 PROCEDURE — 93312 ECHO TRANSESOPHAGEAL: CPT | Performed by: INTERNAL MEDICINE

## 2022-08-22 PROCEDURE — 6360000002 HC RX W HCPCS: Performed by: NURSE ANESTHETIST, CERTIFIED REGISTERED

## 2022-08-22 PROCEDURE — 2580000003 HC RX 258: Performed by: SURGERY

## 2022-08-22 PROCEDURE — 2060000000 HC ICU INTERMEDIATE R&B

## 2022-08-22 PROCEDURE — 93312 ECHO TRANSESOPHAGEAL: CPT

## 2022-08-22 PROCEDURE — 6370000000 HC RX 637 (ALT 250 FOR IP): Performed by: INTERNAL MEDICINE

## 2022-08-22 PROCEDURE — 2709999900 HC NON-CHARGEABLE SUPPLY

## 2022-08-22 RX ORDER — SODIUM CHLORIDE 9 MG/ML
INJECTION, SOLUTION INTRAVENOUS CONTINUOUS PRN
Status: DISCONTINUED | OUTPATIENT
Start: 2022-08-22 | End: 2022-08-22 | Stop reason: SDUPTHER

## 2022-08-22 RX ORDER — PROPOFOL 10 MG/ML
INJECTION, EMULSION INTRAVENOUS PRN
Status: DISCONTINUED | OUTPATIENT
Start: 2022-08-22 | End: 2022-08-22 | Stop reason: SDUPTHER

## 2022-08-22 RX ORDER — CLOTRIMAZOLE 10 MG/1
10 LOZENGE ORAL; TOPICAL 4 TIMES DAILY
Status: DISCONTINUED | OUTPATIENT
Start: 2022-08-22 | End: 2022-09-03 | Stop reason: HOSPADM

## 2022-08-22 RX ADMIN — CLOTRIMAZOLE 10 MG: 10 LOZENGE ORAL at 21:55

## 2022-08-22 RX ADMIN — Medication 10 ML: at 08:31

## 2022-08-22 RX ADMIN — AMPICILLIN SODIUM 2000 MG: 2 INJECTION, POWDER, FOR SOLUTION INTRAVENOUS at 13:47

## 2022-08-22 RX ADMIN — Medication 10 ML: at 21:56

## 2022-08-22 RX ADMIN — SODIUM CHLORIDE: 9 INJECTION, SOLUTION INTRAVENOUS at 15:40

## 2022-08-22 RX ADMIN — FUROSEMIDE 20 MG: 20 TABLET ORAL at 08:25

## 2022-08-22 RX ADMIN — METOPROLOL SUCCINATE 50 MG: 25 TABLET, EXTENDED RELEASE ORAL at 08:26

## 2022-08-22 RX ADMIN — ATORVASTATIN CALCIUM 20 MG: 20 TABLET, FILM COATED ORAL at 21:55

## 2022-08-22 RX ADMIN — CEFTRIAXONE 2000 MG: 2 INJECTION, POWDER, FOR SOLUTION INTRAMUSCULAR; INTRAVENOUS at 04:04

## 2022-08-22 RX ADMIN — IPRATROPIUM BROMIDE AND ALBUTEROL SULFATE 1 AMPULE: .5; 2.5 SOLUTION RESPIRATORY (INHALATION) at 20:28

## 2022-08-22 RX ADMIN — IPRATROPIUM BROMIDE AND ALBUTEROL SULFATE 1 AMPULE: .5; 2.5 SOLUTION RESPIRATORY (INHALATION) at 08:41

## 2022-08-22 RX ADMIN — AMPICILLIN SODIUM 2000 MG: 2 INJECTION, POWDER, FOR SOLUTION INTRAVENOUS at 04:05

## 2022-08-22 RX ADMIN — IPRATROPIUM BROMIDE AND ALBUTEROL SULFATE 1 AMPULE: .5; 2.5 SOLUTION RESPIRATORY (INHALATION) at 12:47

## 2022-08-22 RX ADMIN — SODIUM CHLORIDE, PRESERVATIVE FREE 10 ML: 5 INJECTION INTRAVENOUS at 08:30

## 2022-08-22 RX ADMIN — SODIUM CHLORIDE, PRESERVATIVE FREE 10 ML: 5 INJECTION INTRAVENOUS at 21:55

## 2022-08-22 RX ADMIN — AMPICILLIN SODIUM 2000 MG: 2 INJECTION, POWDER, FOR SOLUTION INTRAVENOUS at 08:30

## 2022-08-22 RX ADMIN — CLOTRIMAZOLE 10 MG: 10 LOZENGE ORAL at 17:28

## 2022-08-22 RX ADMIN — CEFTRIAXONE 2000 MG: 2 INJECTION, POWDER, FOR SOLUTION INTRAMUSCULAR; INTRAVENOUS at 17:28

## 2022-08-22 RX ADMIN — AMPICILLIN SODIUM 2000 MG: 2 INJECTION, POWDER, FOR SOLUTION INTRAVENOUS at 21:59

## 2022-08-22 RX ADMIN — PROPOFOL 130 MG: 10 INJECTION, EMULSION INTRAVENOUS at 15:53

## 2022-08-22 ASSESSMENT — COPD QUESTIONNAIRES: CAT_SEVERITY: MODERATE

## 2022-08-22 ASSESSMENT — PAIN SCALES - GENERAL: PAINLEVEL_OUTOF10: 6

## 2022-08-22 ASSESSMENT — PAIN DESCRIPTION - LOCATION: LOCATION: INCISION

## 2022-08-22 ASSESSMENT — PAIN DESCRIPTION - ORIENTATION: ORIENTATION: RIGHT

## 2022-08-22 ASSESSMENT — PAIN - FUNCTIONAL ASSESSMENT: PAIN_FUNCTIONAL_ASSESSMENT: ACTIVITIES ARE NOT PREVENTED

## 2022-08-22 ASSESSMENT — PAIN DESCRIPTION - DESCRIPTORS: DESCRIPTORS: DISCOMFORT

## 2022-08-22 NOTE — PROGRESS NOTES
[facility]                                Progress note    Subjective:    Family at bedside  No acute events overnight  Hemoglobin stable, platelet count improved to 95,000    Objective:    /68   Pulse 58   Temp 97.2 °F (36.2 °C) (Temporal)   Resp 16   Ht 5' 6\" (1.676 m)   Wt 188 lb 8 oz (85.5 kg)   SpO2 98%   BMI 30.42 kg/m²     In: 605.1 [P.O.:300; Blood:305.1]  Out: 75   In: 605.1   Out: 75 [Urine:75]    General appearance: NAD, conversant, looks much better today  HEENT: AT/NC, MMM  Neck: FROM, supple  Lungs: Clear to auscultation  CV: RRR, no MRGs-right-sided port removed 8/20, left-sided pacemaker in place  Vasc: Radial pulses 2+  Abdomen: Soft, non-tender; no masses or HSM  Extremities: No peripheral edema or digital cyanosis  Skin: Generalized bruising  Psych: Alert and oriented to person, place and time  Neuro: Alert and interactive     Recent Labs     08/20/22  1644 08/21/22  0517 08/21/22  1742 08/22/22  0538   WBC 2.6* 2.5*  --  2.0*   HGB 7.5* 7.2* 8.2* 8.3*   HCT 23.4* 22.3* 25.1* 25.6*   PLT 53* 64*  --  95*       Recent Labs     08/20/22  1644      K 3.1*   CL 98   CO2 25   BUN 7   CREATININE 0.7   CALCIUM 8.9       Assessment:    Principal Problem:    Pneumonia  Active Problems:    Pancytopenia (HCC)    COPD (chronic obstructive pulmonary disease) (HCC)    HTN (hypertension)    Squamous carcinoma of lung (HCC)    History pulmonary embolism ([facility] on Eliquis  Resolved Problems:    * No resolved hospital problems.  *      Plan:  Patient is a 65-year-old male with a hx of squamous cell lung carcinoma who is active chemo therapy who is admitted to [facility] for  Sepsis/bacteremia/immunocompromise status-acutely ill status  -Mediport removed 8/20  -EP to talk to CT surgery for possible removal of pacer, if unable then transfer to [facility]  -Respiratory panel negative  -Check procalcitonin 0.42  -Pulmonology following  -CT chest ordered by pulm> progressive infiltrative

## 2022-08-22 NOTE — PROGRESS NOTES
[address] and [address] Electrophysiology  Inpatient progress note  PATIENT: Thais Morales  MEDICAL RECORD NUMBER: [id number]  DATE OF SERVICE:  8/22/2022  ATTENDING ELECTROPHYSIOLOGIST: Nadiya Rasmussen MD  PRIMARY ELECTROPHYSIOLOGIST: Mundo Becker  REFERRING PHYSICIAN: No ref. provider found and Holden Hamilton MD  CHIEF COMPLAINT: Weakness and fatigue, fever    HPI: This is a 67 y.o. male with a history of pretension, hyperlipidemia, COPD and a history of long-term smoking habit as well as recurrent syncopal episodes starting in 2012. Loop recorder insertion in 2020 leading to the discovery of recurrent episodes of complete heart block and pacemaker implant on 7/20/2021. He was also diagnosed with lung cancer around the same time and has been undergoing chemotherapy and radiation. He finished his last chemo cycle last week and presented to the emergency room with symptoms of ongoing fatigue weakness and fever. He was found to be neutropenic as well as thrombocytopenic. Subsequently blood cultures have been positive for Enterococcus faecalis. Blood cultures from 8/16, 8/17 and 8/18 have all been positive despite IV antibiotics. Patient also remains neutropenic and thrombocytopenic with a platelet count of 62,579 today. No other cardiac symptoms. No new symptoms of chest pain or dyspnea on exertion. No syncope or near syncope.    8/21/22: Patient denies any new complaints today. Was transfused earlier today for hemoglobin of 7.2. No cardiac symptoms. 8/22/22: Patient remains asymptomatic. Underwent SHIRA today.   White count remains low however hemoglobin and platelet count have improved      Patient Active Problem List    Diagnosis Date Noted    Pancytopenia ([address] 08/17/2022     Priority: Medium    COVID-19 05/25/2022     Priority: Medium    Squamous carcinoma of lung ([address] 09/18/2021    History pulmonary embolism ([address] on Eliquis 09/18/2021    Hyponatremia 09/18/2021    Hypomagnesemia 09/18/2021    Wide-complex tachycardia (HCC) 09/18/2021    Acute on chronic respiratory failure with hypoxia ([facility] 09/17/2021    S/P cardiac pacemaker procedure 07/20/2021    Intermittent complete heart block ([facility] 07/19/2021    Pneumonia 01/31/2019    COPD (chronic obstructive pulmonary disease) ([facility] 01/31/2019    BPH (benign prostatic hyperplasia) 01/31/2019    HTN (hypertension) 01/31/2019    HLD (hyperlipidemia) 01/31/2019    Syncope and collapse 01/18/2019       Past Medical History:   Diagnosis Date    CHB (complete heart block) ([facility] 07/2021    COPD (chronic obstructive pulmonary disease) ([facility]     Syncope 09/2020    dr Nilda Lemus wearing a monitor    Syncope 07/2021       Family History   Problem Relation Age of Onset    Cancer Father         prostate    Cancer Paternal Uncle         anal       Social History     Tobacco Use    Smoking status: Former     Packs/day: 2.00     Years: 25.00     Pack years: 50.00     Types: Cigarettes     Quit date: 12/26/2011     Years since quitting: 10.6    Smokeless tobacco: Never   Substance Use Topics    Alcohol use: Not Currently     Alcohol/week: 1.0 standard drink     Types: 1 Glasses of wine per week     Comment: 1 glass of wine per day prior       Current Facility-Administered Medications   Medication Dose Route Frequency Provider Last Rate Last Admin    clotrimazole (MYCELEX) lindsay 10 mg  10 mg Oral 4x daily Ahsan Resendiz MD   10 mg at 08/22/22 1728    0.9 % sodium chloride infusion   IntraVENous PRN Ovidio Singleton MD        albuterol sulfate HFA (PROVENTIL;VENTOLIN;PROAIR) 108 (90 Base) MCG/ACT inhaler 2 puff  2 puff Inhalation Q6H PRN Tiffanie E Kaercher, DO        sodium chloride flush 0.9 % injection 5-40 mL  5-40 mL IntraVENous 2 times per day Deb Fitzgerald MD   10 mL at 08/22/22 0830    sodium chloride flush 0.9 % injection 5-40 mL  5-40 mL IntraVENous PRN Bhavya Murrieta MD        0.9 % sodium chloride infusion  25 mL IntraVENous PRN Bhavya Murrieta MD        HYDROmorphone (DILAUDID) injection 0.25 mg  0.25 mg IntraVENous Q5 Min PRN Bhavya Murrieta MD        HYDROmorphone (DILAUDID) injection 0.5 mg  0.5 mg IntraVENous Q5 Min PRN Bhavya Murrieta MD        labetalol (NORMODYNE;TRANDATE) injection 5 mg  5 mg IntraVENous Q15 Min PRN Bhavya Solorzano MD        Or    hydrALAZINE (APRESOLINE) injection 5 mg  5 mg IntraVENous Q15 Min PRN Bhavya Solorzano MD        furosemide (LASIX) tablet 20 mg  20 mg Oral Daily Chula Mei MD   20 mg at 08/22/22 0825    potassium chloride (KLOR-CON M) extended release tablet 40 mEq  40 mEq Oral PRN Tiffanie E Kaercher, DO        Or    potassium bicarb-citric acid (EFFER-K) effervescent tablet 40 mEq  40 mEq Oral PRN Tiffanie E Kaercher, DO        Or    potassium chloride 10 mEq/100 mL IVPB (Peripheral Line)  10 mEq IntraVENous PRN Tiffanie E Kaercher, DO        ampicillin 2000 mg ivpb mini bag  2,000 mg IntraVENous 6 times per day Leonides Daniel DO   Stopped at 08/22/22 1458    cefTRIAXone (ROCEPHIN) 2,000 mg in sterile water 20 mL IV syringe  2,000 mg IntraVENous Q12H Tiffanie E Kaercher, DO   2,000 mg at 08/22/22 1728    melatonin tablet 3 mg  3 mg Oral Nightly PRN Ken Mcgrath Kaercher, DO   3 mg at 08/19/22 2051    albuterol (PROVENTIL) nebulizer solution 2.5 mg  2.5 mg Nebulization Q6H PRN Tiffanie E Kaercher, DO        albuterol (ACCUNEB) nebulizer solution 0.63 mg  1 ampule Nebulization Q6H PRN Tiffanie E Kaercher, DO        [Held by provider] apixaban (ELIQUIS) tablet 5 mg  5 mg Oral BID Tiffanie E Kaercher, DO        metoprolol succinate (TOPROL XL) extended release tablet 50 mg  50 mg Oral BID WC Tiffanie ISABELLA Gupta, DO   50 mg at 08/22/22 0826    atorvastatin (LIPITOR) tablet 20 mg  20 mg Oral Daily Tiffanie E Kaercher, DO   20 mg at 08/21/22 1951    sodium chloride flush 0.9 % injection 5-40 mL  5-40 mL IntraVENous 2 times per day Reena Maggi, DO   10 mL at 08/22/22 0831    sodium chloride flush 0.9 % injection 5-40 mL  5-40 mL IntraVENous PRN Tiffanie E Kaercher, DO        0.9 % sodium chloride infusion   IntraVENous PRN Tiffanie E Kaercher, DO        acetaminophen (TYLENOL) tablet 650 mg  650 mg Oral Q6H PRN Tiffanie E Kaercher, DO        Or    acetaminophen (TYLENOL) suppository 650 mg  650 mg Rectal Q6H PRN Tiffanie E Kaercher, DO        senna (SENOKOT) tablet 8.6 mg  1 tablet Oral Daily PRN Tiffanie E Kaercher, DO        ipratropium-albuterol (DUONEB) nebulizer solution 1 ampule  1 ampule Inhalation Q4H WA Tiffanie E Kaercher, DO   1 ampule at 08/22/22 1247        No Known Allergies    ROS:   Constitutional: Positive for fever, activity change and appetite change. HENT: Negative for epistaxis. Eyes: Negative for diploplia, blurred vision. Respiratory: Negative for cough, chest tightness, shortness of breath and wheezing. Cardiovascular: pertinent positives in HPI  Gastrointestinal: Negative for abdominal pain and blood in stool. All other review of systems are negative     PHYSICAL EXAM:   Vitals:    08/22/22 0826 08/22/22 0841 08/22/22 1247 08/22/22 1606   BP: 137/68   100/62   Pulse: 61 58 58 71   Resp:  16 16 15   Temp:       TempSrc:       SpO2:  98% 98% 100%   Weight:       Height:          Constitutional: Well-developed, no acute distress  Eyes: conjunctivae normal, no xanthelasma   Ears, Nose, Throat: oral mucosa moist, no cyanosis   CV: no JVD. Regular rate and rhythm. Normal S1S2 and no S3. No murmurs, rubs, or gallops.    Lungs: clear to auscultation bilaterally, normal respiratory effort without used of accessory muscles  Abdomen: soft, non-tender, non distended  Musculoskeletal: no digital clubbing, no edema   Skin: warm, no rashes     I have personally reviewed the laboratory, cardiac diagnostic and radiographic testing as outlined below:    Data:    Recent Labs     08/20/22  1644 08/21/22  0517 08/21/22  1742 22  0538   WBC 2.6* 2.5*  --  2.0*   HGB 7.5* 7.2* 8.2* 8.3*   HCT 23.4* 22.3* 25.1* 25.6*   PLT 53* 64*  --  95*     Recent Labs     22  1644      K 3.1*   CL 98   CO2 25   BUN 7   CREATININE 0.7   CALCIUM 8.9      Lab Results   Component Value Date/Time    MG 1.5 2021 05:28 AM     No results for input(s): TSH in the last 72 hours. No results for input(s): INR in the last 72 hours. CXR:   FINDINGS:   There is opacity in the left lower lung. There are chronic markings at the   right lung base. Emphysematous changes in the upper lungs. The heart is not   enlarged. There is no pneumothorax. There is mild blunting of the left   costophrenic angle. Pacemaker present. Impression   Continued opacity in the left lower lung and linear scarring in the left   perihilar region. COPD. Telemetry: Atrial pacing, PACs    EK22 Atrial pacing, PACs, right bundle branch block , left posterior hemiblock    Echocardiogram: 22   Conclusions      Summary   Normal left ventricular chamber size. Normal left ventricular systolic function. Visually estimated LVEF is 55-60 %. No wall motion abnormalities. Normal diastolic function. Normal left atrial pressure. Normal right ventricle structure and function. Normal left atrial size. Normal right atrial size   Likely normal estimated PA pressure. Pacer/ ICD wire present in the right heart. No gross evidence of infective endocarditis. Consider SHIRA for better   accuracy if clinically indicated. Compared to prior echo from , no significant changes noted. Signature      ----------------------------------------------------------------   Electronically signed by Jazzy Bentley MD(Interpreting   physician) on 2022 04:16 PM      I have independently reviewed all of the ECGs and rhythm strips per above     Assessment/Plan: This is a 67 y.o. male with a history of     1.   Dual-chamber pacemaker in situ--for AV block with a long pause  Date of implant July 28, 2021  Last pacemaker check in June revealed 6.5% atrial and 3% ventricular pacing with DDI programming at lower rate of 60 bpm    2. Bacteremia/sepsis--enterococcus faecalis  Persistent bacteremia despite IV antibiotics    3. Lung cancer, squamous cell--patient undergoing chemotherapy  -  Stage IIIB T2aN3 Squamous Cell Carcinoma of Lung  diagnosis 5/27/2021, PD-L1 0%    - 7/6/21-9/7/21 treatment with concurrent chemotherapy/radiation therapy   - 11/2/21 consolidated with Imfinzi (Durvalumab) per ID IV every 4 weeks   - 6/30/22 PET showing progression and was started on Carbo, Gemzar and Keytruda on 6/30/22  - 8/9 /22 received C2D8 of Carboplatin AUC 2 with Gemzar 1000 mg/m2 IV on D1,8 and Keytruda IV on D1 on a 21 day cycle    4. Neutropenia and thrombocytopenia related to chemotherapy  Persistent anemia. Patient is now post PRBC transfusion. Recommendations:    I have spoken to Dr. Lucie Ferreira regarding proceeding with pacemaker removal locally. Awaiting a reply from him. I have discussed all of the above with the patient  and the family reviewing the above stated recommendations. And a total of >50% of that time involved face-to-face time providing counseling and or coordination of care with the other providers, reviewing records/tests, counseling/education of the patient, ordering medications/tests/procedures, coordinating care, and documenting clinical information in the EHR. Thank you for allowing me to participate in your patient's care. Please call me if there are any questions or concerns.       Bill Scanlon [facility]  Cardiac Electrophysiology  [facility]  [facility] [place]: ZACHARIAH TAN [facility] Electrophysiology  6:28 PM  8/22/2022

## 2022-08-22 NOTE — PROGRESS NOTES
Subjective:  No overnight event  Patient feeling better, just returned from SHIRA and he reports it was normal  Wife at bedside  They are requesting his pacemaker be taken out here if possible     Objective:    /68   Pulse 61   Temp 97.2 °F (36.2 °C) (Temporal)   Resp 18   Ht 5' 6\" (1.676 m)   Wt 188 lb 8 oz (85.5 kg)   SpO2 97%   BMI 30.42 kg/m²     General: NAD  HEENT: No thrush or mucositis, EOMI, PERRLA  Heart:  RRR  Lungs: respirations easy,   Status post removal of right sided chest MediPort. No surrounding evidence of cellulitis or ecchymosis.   Abd: nontender, nondistended, no masses  Extrem:   1+ bilateral pedal edema  Skin: Intact, no petechia or purpura    CBC with Differential:    Lab Results   Component Value Date/Time    WBC 2.0 08/22/2022 05:38 AM    RBC 2.86 08/22/2022 05:38 AM    HGB 8.3 08/22/2022 05:38 AM    HCT 25.6 08/22/2022 05:38 AM    PLT 95 08/22/2022 05:38 AM    MCV 89.5 08/22/2022 05:38 AM    MCH 29.0 08/22/2022 05:38 AM    MCHC 32.4 08/22/2022 05:38 AM    RDW 18.8 08/22/2022 05:38 AM    NRBC 1.7 08/22/2022 05:38 AM    LYMPHOPCT 22.6 08/22/2022 05:38 AM    MONOPCT 13.9 08/22/2022 05:38 AM    MYELOPCT 0.9 09/17/2021 10:20 AM    BASOPCT 1.7 08/22/2022 05:38 AM    MONOSABS 0.28 08/22/2022 05:38 AM    LYMPHSABS 0.48 08/22/2022 05:38 AM    EOSABS 0.02 08/22/2022 05:38 AM    BASOSABS 0.03 08/22/2022 05:38 AM     CMP:    Lab Results   Component Value Date/Time     08/20/2022 04:44 PM    K 3.1 08/20/2022 04:44 PM    K 3.7 08/17/2022 04:17 AM    CL 98 08/20/2022 04:44 PM    CO2 25 08/20/2022 04:44 PM    BUN 7 08/20/2022 04:44 PM    CREATININE 0.7 08/20/2022 04:44 PM    GFRAA >60 08/20/2022 04:44 PM    LABGLOM >60 08/20/2022 04:44 PM    GLUCOSE 106 08/20/2022 04:44 PM    PROT 5.9 08/20/2022 04:44 PM    LABALBU 3.2 08/20/2022 04:44 PM    CALCIUM 8.9 08/20/2022 04:44 PM    BILITOT <0.2 08/20/2022 04:44 PM    ALKPHOS 90 08/20/2022 04:44 PM    AST 25 08/20/2022 04:44 PM    ALT 29 08/20/2022 04:44 PM               Current Facility-Administered Medications:     0.9 % sodium chloride infusion, , IntraVENous, PRN, Trey Ritchie MD    albuterol sulfate HFA (PROVENTIL;VENTOLIN;PROAIR) 108 (90 Base) MCG/ACT inhaler 2 puff, 2 puff, Inhalation, Q6H PRN, Tiffanie E Kaercher, DO    sodium chloride flush 0.9 % injection 5-40 mL, 5-40 mL, IntraVENous, 2 times per day, Bhavya Murrieta MD, 10 mL at 08/22/22 0830    sodium chloride flush 0.9 % injection 5-40 mL, 5-40 mL, IntraVENous, PRN, Bhavya Murrieta MD    0.9 % sodium chloride infusion, 25 mL, IntraVENous, PRN, Bhavya Murrieta MD    HYDROmorphone (DILAUDID) injection 0.25 mg, 0.25 mg, IntraVENous, Q5 Min PRN, Bhavya Murrieta MD    HYDROmorphone (DILAUDID) injection 0.5 mg, 0.5 mg, IntraVENous, Q5 Min PRN, Bhavya Murrieta MD    labetalol (NORMODYNE;TRANDATE) injection 5 mg, 5 mg, IntraVENous, Q15 Min PRN **OR** hydrALAZINE (APRESOLINE) injection 5 mg, 5 mg, IntraVENous, Q15 Min PRN, Bhavya Murrieta MD    furosemide (LASIX) tablet 20 mg, 20 mg, Oral, Daily, Trey Ritchie MD, 20 mg at 08/22/22 0825    potassium chloride (KLOR-CON M) extended release tablet 40 mEq, 40 mEq, Oral, PRN **OR** potassium bicarb-citric acid (EFFER-K) effervescent tablet 40 mEq, 40 mEq, Oral, PRN **OR** potassium chloride 10 mEq/100 mL IVPB (Peripheral Line), 10 mEq, IntraVENous, PRN, Tiffanie E Kaercher, DO    ampicillin 2000 mg ivpb mini bag, 2,000 mg, IntraVENous, 6 times per day, Tiffanie E Kaercher, DO, Last Rate: 200 mL/hr at 08/22/22 0830, 2,000 mg at 08/22/22 0830    cefTRIAXone (ROCEPHIN) 2,000 mg in sterile water 20 mL IV syringe, 2,000 mg, IntraVENous, Q12H, Tiffanie E Kaercher, DO, 2,000 mg at 08/22/22 0404    melatonin tablet 3 mg, 3 mg, Oral, Nightly PRN, Tiffanie E Kaercher, DO, 3 mg at 08/19/22 2051    albuterol (PROVENTIL) nebulizer solution 2.5 mg, 2.5 mg, Nebulization, Q6H PRN, Bipin Bellamy, DO    albuterol (ACCUNEB) nebulizer solution 0.63 mg, 1 ampule, Nebulization, Q6H PRN, Tiffanie E Kaercher, DO    [Held by provider] apixaban (ELIQUIS) tablet 5 mg, 5 mg, Oral, BID, Tiffanie E Kaercher, DO    metoprolol succinate (TOPROL XL) extended release tablet 50 mg, 50 mg, Oral, BID WC, Tiffanie E Kaercher, DO, 50 mg at 08/22/22 8005    atorvastatin (LIPITOR) tablet 20 mg, 20 mg, Oral, Daily, Tiffanie E Kaercher, DO, 20 mg at 08/21/22 1951    sodium chloride flush 0.9 % injection 5-40 mL, 5-40 mL, IntraVENous, 2 times per day, Tiffanie E Kaercher, DO, 10 mL at 08/22/22 0831    sodium chloride flush 0.9 % injection 5-40 mL, 5-40 mL, IntraVENous, PRN, Tiffanie E Kaercher, DO    0.9 % sodium chloride infusion, , IntraVENous, PRN, Tiffanie E Kaercher, DO    acetaminophen (TYLENOL) tablet 650 mg, 650 mg, Oral, Q6H PRN **OR** acetaminophen (TYLENOL) suppository 650 mg, 650 mg, Rectal, Q6H PRN, Tiffanie E Kaercher, DO    senna (SENOKOT) tablet 8.6 mg, 1 tablet, Oral, Daily PRN, Tiffanie E Kaercher, DO    ipratropium-albuterol (DUONEB) nebulizer solution 1 ampule, 1 ampule, Inhalation, Q4H WA, Tiffanie E Kaercher, DO, 1 ampule at 08/22/22 1121    Assessment:    Principal Problem:    Pneumonia  Active Problems:    Pancytopenia (HCC)    COPD (chronic obstructive pulmonary disease) (HCC)    HTN (hypertension)    Squamous carcinoma of lung ([address]    History pulmonary embolism ([facility] on Eliquis  Resolved Problems:    * No resolved hospital problems. *    57-year-old gentleman known to me with stage IIIB T2aN3 Squamous Cell Lung Cancer of Left hilum, dx on 5/27/2021. Treated with concurrent chemo/XRT from 7/6/21 followed by consolidation Imfinzi. Progression on PET from 6/24/22. Started [place] and Keytruda on 6/30/22. Patient seen in office last 8/9 and received C2D8 of Carboplatin with Gemzar and Keytruda. Admitted with sepsis, Enterococcus bacteremia. Respiratory panel negative.   Mediport was removed

## 2022-08-22 NOTE — OP NOTE
Postoperative Note      Patient: Azar Hillman  YOB: 1950  MRN: [id number]    Date of Procedure: 8/20/2022    Pre-operative Diagnosis: Bacteremia, R/O Endocarditis    Post-operative Diagnosis: No SHIRA indication of endocarditis; pacer leads noted; Mild MR, TR    Procedure: Transesophageal Echocardiogram    Anesthesia: [facility]    Surgeons/Assistants: Dr. Poncho Thakkar    Complications: None    Full report to follow in [facility]      Electronically signed by Lina Flood MD on 8/22/2022 at 4:12 PM

## 2022-08-22 NOTE — PROGRESS NOTES
[address] [place] [address] [facility]  [facility]  Progress Note          C/C : High grade Enterococcus bacteremia, sepsis       SUBJECTIVE:  Patient is tolerating medications. No reported adverse drug reactions. No nausea, vomiting, diarrhea. SOB with exertion   Denies fever or chills   Afebrile   SIDE OF BED TODAY AND LOOKS BETTER  FOR SHIRA    Review of systems:  As stated above in the chief complaint, otherwise negative. Medications:  Scheduled Meds:   sodium chloride flush  5-40 mL IntraVENous 2 times per day    furosemide  20 mg Oral Daily    ampicillin IV  2,000 mg IntraVENous 6 times per day    cefTRIAXone (ROCEPHIN) IV  2,000 mg IntraVENous Q12H    [Held by provider] apixaban  5 mg Oral BID    metoprolol succinate  50 mg Oral BID WC    atorvastatin  20 mg Oral Daily    sodium chloride flush  5-40 mL IntraVENous 2 times per day    ipratropium-albuterol  1 ampule Inhalation Q4H WA     Continuous Infusions:   sodium chloride      sodium chloride      sodium chloride       PRN Meds:sodium chloride, albuterol sulfate HFA, sodium chloride flush, sodium chloride, HYDROmorphone, HYDROmorphone, labetalol **OR** hydrALAZINE, potassium chloride **OR** potassium alternative oral replacement **OR** potassium chloride, melatonin, albuterol, albuterol, sodium chloride flush, sodium chloride, acetaminophen **OR** acetaminophen, senna    OBJECTIVE:  /68   Pulse 58   Temp 97.2 °F (36.2 °C) (Temporal)   Resp 16   Ht 5' 6\" (1.676 m)   Wt 188 lb 8 oz (85.5 kg)   SpO2 98%   BMI 30.42 kg/m²   Temp  Av.4 °F (36.3 °C)  Min: 96.9 °F (36.1 °C)  Max: 97.8 °F (36.6 °C)    Constitutional: The patient is awake, alert, and oriented. Skin: Warm and dry. No rashes were noted. HEENT: Round and reactive pupils. Moist mucous membranes. No ulcerations or thrush. Neck: Supple to movements. Chest: Clear bilaterally   Cardiovascular: S1 and S2 are rhythmic and regular. No murmurs appreciated.    Abdomen: Positive no growth 08/20/2022 05:30 AM    BLOODCULT2 Previously positive blood culture called 08/18/2022 07:10 AM    BLOODCULT2  08/18/2022 07:10 AM     Refer to previous culture for susceptibility results from CXBL collected  on 8-18-22 at 0705. ORG Enterococcus faecalis 08/18/2022 07:10 AM    ORG Enterococcus faecalis 08/18/2022 07:05 AM    ORG Enterococcus faecalis 08/17/2022 05:40 PM     No results found for: WNDABS  No results found for: RESPSMEAR  No results found for: MPNEUMO, CLAMYDCU, LABLEGI, AFBCX, FUNGSM, LABFUNG  No results found for: CULTRESP  Culture Catheter Tip   Date Value Ref Range Status   08/20/2022 Growth not present  Final     No results found for: BFCS  No results found for: CXSURG  Urine Culture, Routine   Date Value Ref Range Status   08/16/2022   Final    10 to 100,000 CFU/mL  Mixed heydi isolated. Further workup and sensitivity testing  is not routinely indicated and will not be performed.   Mixed heydi isolated includes:  Mixed gram positive organisms     05/18/2019 <25,000 CFU/ml  Final     No results found for: [address]:  8-16-22 blood cultures E faecalis x 2  8-17-22 blood cultures E faecalis x 1  8-18-22 blood culture + E faecalis x1  8-19-22 blood cultures neg to date  8-20-22 Port ( tip ) cx - neg tod date   8-20-22 blood cultures neg to date          ASSESSMENT:  High grade E faecalis bacteremia probably port infection; RO endocarditis and pacer infection s/p mediport removal ( 8/20) - follow up cx neg on   Immunocompromised host c pancytopenia; on chemo for Sq cell of lung  thrush    PLAN:  Continue with  amp 2 grams IV q 4 hrs and  ceftriaxone 2 grams IV q 12 hrs   SHIRA today  Removal of  pacer- location to be decided  Will need PICC line eventually  Daphnie Bourgeois MD  2:05 PM  8/22/2022

## 2022-08-22 NOTE — PROGRESS NOTES
Pulmonary Progress Note    Admit Date: 2022                            PCP: Zeinab Ann MD  Principal Problem:    Pneumonia  Active Problems:    Pancytopenia ([facility] [address]    COPD (chronic obstructive pulmonary disease) ([facility] [address]    HTN (hypertension)    Squamous carcinoma of lung ([facility] [address]    History pulmonary embolism ([facility] [address] on Eliquis  Resolved Problems:    * No resolved hospital problems. *      Subjective:  Sitting on edge of bed, sister at bedside  On 2.5L per flow meter on wall - pox 97%  Feels better, breathing better - no sob, starting coughing last night with clear sputum  Would like to have pacer removed here, if possible, instead of traveling to [facility] [employer] [facility]    Medications:   sodium chloride      sodium chloride      sodium chloride          sodium chloride flush  5-40 mL IntraVENous 2 times per day    furosemide  20 mg Oral Daily    ampicillin IV  2,000 mg IntraVENous 6 times per day    cefTRIAXone (ROCEPHIN) IV  2,000 mg IntraVENous Q12H    [Held by provider] apixaban  5 mg Oral BID    metoprolol succinate  50 mg Oral BID WC    atorvastatin  20 mg Oral Daily    sodium chloride flush  5-40 mL IntraVENous 2 times per day    ipratropium-albuterol  1 ampule Inhalation Q4H WA       Vitals:  VITALS:  /68   Pulse 61   Temp 97.2 °F (36.2 °C) (Temporal)   Resp 18   Ht 5' 6\" (1.676 m)   Wt 188 lb 8 oz (85.5 kg)   SpO2 97%   BMI 30.42 kg/m²   24HR INTAKE/OUTPUT:    Intake/Output Summary (Last 24 hours) at 2022 0950  Last data filed at 2022 1629  Gross per 24 hour   Intake 485.08 ml   Output 75 ml   Net 410.08 ml     CURRENT PULSE OXIMETRY:  SpO2: 97 %  24HR PULSE OXIMETRY RANGE:  SpO2  Av.1 %  Min: 91 %  Max: 100 %  CVP:    VENT SETTINGS:      Additional Respiratory Assessments  Heart Rate: 61  Resp: 18  SpO2: 97 %      EXAM:  General: No distress. Alert. Eyes:  No sclera icterus. No conjunctival injection. ENT: No discharge. Pharynx clear. Neck: Trachea midline. Normal thyroid.   Resp: No accessory muscle use. No crackles. No wheezing. No rhonchi. Diminished bilaterally  CV: Regular rate. Regular rhythm. No mumur or rub. BLE edema 2-3+   ABD: Non-tender. Non-distended. No masses. No organmegaly. Normal bowel sounds. Skin: Warm and dry. No nodule on exposed extremities. No rash on exposed extremities. M/S: No cyanosis. No joint deformity. No clubbing. Neuro: Awake. Follows commands. A&Ox3    I/O: I/O last 3 completed shifts: In: 605.1 [P.O.:300; Blood:305.1]  Out: 75 [Urine:75]  No intake/output data recorded. Results:  CBC:   Recent Labs     08/20/22  1644 08/21/22  0517 08/21/22  1742 08/22/22  0538   WBC 2.6* 2.5*  --  2.0*   HGB 7.5* 7.2* 8.2* 8.3*   HCT 23.4* 22.3* 25.1* 25.6*   MCV 90.3 89.9  --  89.5   PLT 53* 64*  --  95*     BMP:   Recent Labs     08/20/22  1644      K 3.1*   CL 98   CO2 25   BUN 7   CREATININE 0.7     LFT:   Recent Labs     08/20/22  1644   ALKPHOS 90   ALT 29   AST 25   PROT 5.9*   BILITOT <0.2   LABALBU 3.2*       PT/INR: No results for input(s): PROTIME, INR in the last 72 hours. Cultures:  No results for input(s): CULTRESP in the last 72 hours. ABG:   No results for input(s): PH, PO2, PCO2, HCO3, BE, O2SAT in the last 72 hours. Films:  CT Head WO Contrast  Result Date: 8/16/2022  No acute intracranial abnormality. Cortical atrophy and periventricular leukomalacia. XR CHEST PORTABLE  Result Date: 8/16/2022  Continued opacity in the left lower lung and linear scarring in the left perihilar region. COPD.     8/18/22 CT chest w/o    5/24/2022 CTA chest       5/24/2022 CTA chest: .34 x 5.4 cm left hilar and perihilar mass extending to LLL encircling the pulmonary artery and the bronchi consistent with progressive malignancy with occlusion of left lower lobe bronchi with postobstructive pneumonitis    08/19/22 ECHO:  Normal left ventricular chamber size. Normal left ventricular systolic function. Visually estimated LVEF is 55-60 %.    No wall motion abnormalities. Normal diastolic function. Normal left atrial pressure. Normal right ventricle structure and function. Normal left atrial size. Normal right atrial size   Likely normal estimated PA pressure. Pacer/ ICD wire present in the right heart. No gross evidence of infective endocarditis. Consider SHIRA for better   accuracy if clinically indicated. Compared to prior echo from 2021, no significant changes noted. 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 S/p platelet and PRBC transfusion on  8/18/2022 CT chest w/o: The previously noted infiltrative left hilar mass extending to EAN and LLL surrounding the broncho pulmonary vasculature is noted currently measuring 4.6 x 4.3 cm with progression with atelectasis.     8/18: 2L NC, US BLE neg DVT   8/19: 2L NC   8/20 RA, Mediport removed wakes up somewhat short of breath  8/21 on room air   8/22 2.5L; SHIRA today at 330 pm    Stage IIIB T2aN3 Squamous Cell Carcinoma of Lung  diagnosis 5/27/2021, PD-L1 0%    - 7/6/21-9/7/21 treatment with concurrent chemotherapy/radiation therapy   - 11/2/21 consolidated with Imfinzi (Durvalumab) per ID IV every 4 weeks f  - 6/30/22 PET showing progression and was started on Carbo, Gemzar and Keytruda on 6/30/22  - 8/9 /22 r ceived C2D8 of Carboplatin AUC 2 with Gemzar 1000 mg/m2 IV on D1,8 and Keytruda IV on D1 on a 21 day cycle  Enterococcus faecalis bacteremia/septicemia on IV daptomycin for bacteremia cultures + on 8/16, 8/17, 8/18 s/p mediport removal    With dual-chamber pacemaker and removal of preexistent loop recorder 7/20/2021  H/O SARS-CoV 2 Pneumonitis 5/24/2022- suspected Omicron BA-2  sick contact wife  Chronic hypoxic resp failure  RA during the day and 2L NC at night, which is baseline. Probable obstructive sleep apnea  Pancytopenia secondary to chemotherapy  Thrombocytopenia   Lymphopenia  new RLE swelling and numbness,   H/o submassive Hemoptysis - streaky, known lung mass   H/O RLL subsegmental PE- CTA Chest 9/17/2021 was on eliquis now on hold   Radiation pneumonitis/fibrosis Left Lung   COPD, not in exacerbation   H/O wide-complex tachycardia (HCC)--atrial tachycardia with RV pacing     PLAN:   On on 2.5L - on/off O2 as needed. No hypoxemia charted. Wean to maintain pox >90%. Has O2 at home and wears 2L HS. Ambulatory pulse ox prior to d/c. Continue DuoNeb every 4 hours and prn   Oncology following. Continue to hold Eliquis until platelets recover > 20V. Received one unit platelets 5/60 in OR. Plts 95 8/22. Continue IS for pulmonary hygiene   SHIRA today at 1530. TTE done 08/19 - no gross evidence of endocarsitis. Cardiology explained pacer needs to come out and needs a transfer to [facility] for this. Dr. Ida Reynolds to discuss with pt regarding pacer removal or will put fro transfer today. Pro-BNP 1207. Supportive care   Outpatient PSG to be obtained outpt for high risk and probable FIONA     Electronically signed by LELO Sutton CNP on 8/22/2022 at 9:50 AM    Seen and evaluated, and agree with above assessment and plan  Currently very comfortable lying down supine in bed.   For SHIRA later today  To restart Eliquis when okay by cardiology/EP pending procedure

## 2022-08-22 NOTE — ANESTHESIA PRE PROCEDURE
Department of Anesthesiology  Preprocedure Note       Name:  Natan Remy   Age:  67 y.o.  :  1950                                          MRN:  [id number]         Date:  2022      Surgeon: Lisha    Procedure: SHIRA    Medications prior to admission:   Prior to Admission medications    Medication Sig Start Date End Date Taking?  Authorizing Provider   predniSONE (DELTASONE) 5 MG tablet Take 5 mg by mouth daily    Norberto Cardenas MD   albuterol (ACCUNEB) 0.63 MG/3ML nebulizer solution Take 1 ampule by nebulization every 6 hours as needed for Wheezing    Historical Provider, MD   apixaban (ELIQUIS) 5 MG TABS tablet Take 5 mg by mouth 2 times daily  10/1/21   Historical Provider, MD   albuterol sulfate HFA (VENTOLIN HFA) 108 (90 Base) MCG/ACT inhaler Inhale 2 puffs into the lungs every 6 hours as needed for Wheezing    Historical Provider, MD   metoprolol succinate (TOPROL XL) 50 MG extended release tablet Take 1 tablet by mouth 2 times daily (with meals) 21   Magaly Day MD   simvastatin (ZOCOR) 40 MG tablet Take 40 mg by mouth daily     Historical Provider, MD   umeclidinium-vilanterol (ANORO ELLIPTA) 62.5-25 MCG/INH AEPB inhaler Inhale 1 puff into the lungs daily    Historical Provider, MD       Current medications:    Current Facility-Administered Medications   Medication Dose Route Frequency Provider Last Rate Last Admin    clotrimazole (MYCELEX) lindsay 10 mg  10 mg Oral 4x daily Shelby Amanda MD        0.9 % sodium chloride infusion   IntraVENous PRN Baudilio Ruvalcaba MD        albuterol sulfate HFA (PROVENTIL;VENTOLIN;PROAIR) 108 (90 Base) MCG/ACT inhaler 2 puff  2 puff Inhalation Q6H PRN Tiffanie Gupta,         sodium chloride flush 0.9 % injection 5-40 mL  5-40 mL IntraVENous 2 times per day Chata Ortiz MD   10 mL at 22 0830    sodium chloride flush 0.9 % injection 5-40 mL  5-40 mL IntraVENous PRN Bhavya Murrieta MD        0.9 % sodium chloride infusion  25 mL IntraVENous PRN Bhavya Milan MD        HYDROmorphone (DILAUDID) injection 0.25 mg  0.25 mg IntraVENous Q5 Min PRN Bhavya Milan MD        HYDROmorphone (DILAUDID) injection 0.5 mg  0.5 mg IntraVENous Q5 Min PRN Bhavya Milan MD        labetalol (NORMODYNE;TRANDATE) injection 5 mg  5 mg IntraVENous Q15 Min PRN Bhavya Milan MD        Or    hydrALAZINE (APRESOLINE) injection 5 mg  5 mg IntraVENous Q15 Min PRN Bhavya Milan MD        furosemide (LASIX) tablet 20 mg  20 mg Oral Daily Cesilia Cortes MD   20 mg at 08/22/22 0825    potassium chloride (KLOR-CON M) extended release tablet 40 mEq  40 mEq Oral PRN Tiffanie E Kaercher, DO        Or    potassium bicarb-citric acid (EFFER-K) effervescent tablet 40 mEq  40 mEq Oral PRN Tiffanie E Kaercher, DO        Or    potassium chloride 10 mEq/100 mL IVPB (Peripheral Line)  10 mEq IntraVENous PRN Jane Howard, DO        ampicillin 2000 mg ivpb mini bag  2,000 mg IntraVENous 6 times per day Jane Howard DO   Stopped at 08/22/22 1458    cefTRIAXone (ROCEPHIN) 2,000 mg in sterile water 20 mL IV syringe  2,000 mg IntraVENous Q12H Tiffanie E Kaercher, DO   2,000 mg at 08/22/22 0404    melatonin tablet 3 mg  3 mg Oral Nightly PRN Ruddy Gupta, DO   3 mg at 08/19/22 2051    albuterol (PROVENTIL) nebulizer solution 2.5 mg  2.5 mg Nebulization Q6H PRN Tiffanie E Kaercher, DO        albuterol (ACCUNEB) nebulizer solution 0.63 mg  1 ampule Nebulization Q6H PRN Tiffanie E Kaercher, DO        [Held by provider] apixaban (ELIQUIS) tablet 5 mg  5 mg Oral BID Tiffanie E Kaercher, DO        metoprolol succinate (TOPROL XL) extended release tablet 50 mg  50 mg Oral BID  Tiffanie E Kaercher, DO   50 mg at 08/22/22 0826    atorvastatin (LIPITOR) tablet 20 mg  20 mg Oral Daily Tiffanie Gupta DO   20 mg at 08/21/22 1951    sodium chloride flush 0.9 % injection 5-40 mL  5-40 mL IntraVENous 2 times per day Caprice Ast, DO   10 mL at 08/22/22 0831    sodium chloride flush 0.9 % injection 5-40 mL  5-40 mL IntraVENous PRN Tiffanie E Kaercher, DO        0.9 % sodium chloride infusion   IntraVENous PRN Caprice Ast, DO        acetaminophen (TYLENOL) tablet 650 mg  650 mg Oral Q6H PRN Tiffanie E Kaercher, DO        Or    acetaminophen (TYLENOL) suppository 650 mg  650 mg Rectal Q6H PRN Tiffanie E Kaercher, DO        senna (SENOKOT) tablet 8.6 mg  1 tablet Oral Daily PRN Tiffanie E Kaercher, DO        ipratropium-albuterol (DUONEB) nebulizer solution 1 ampule  1 ampule Inhalation Q4H WA Tiffanie E Kaercher, DO   1 ampule at 08/22/22 1247       Allergies:  No Known Allergies    Problem List:    Patient Active Problem List   Diagnosis Code    Syncope and collapse R55    Pneumonia J18.9    COPD (chronic obstructive pulmonary disease) (HCC) J44.9    BPH (benign prostatic hyperplasia) N40.0    HTN (hypertension) I10    HLD (hyperlipidemia) E78.5    Intermittent complete heart block (HCC) I44.2    S/P cardiac pacemaker procedure Z95.0    Acute on chronic respiratory failure with hypoxia (HCC) J96.21    Squamous carcinoma of lung (HCC) C34.90    History pulmonary embolism (HCC), on Eliquis I26.99    Hyponatremia E87.1    Hypomagnesemia E83.42    Wide-complex tachycardia (HCC) I47.2    COVID-19 U07.1    Pancytopenia (HCC) F94.495       Past Medical History:        Diagnosis Date    CHB (complete heart block) ([facility] [address] 07/2021    COPD (chronic obstructive pulmonary disease) ([facility] [address]     Syncope 09/2020    dr Catalina Alcantar wearing a monitor    Syncope 07/2021       Past Surgical History:        Procedure Laterality Date    BRONCHOSCOPY N/A 05/27/2021    BRONCHOSCOPY W/EBUS FNA performed by Divina Guevara MD at [address] [place] [address] N/A 05/27/2021    BRONCHOSCOPY DIAGNOSTIC OR [facility] ONLY performed by Divina Guevara MD at [facility] 05/27/2021    BRONCHOSCOPY ADD ON COMPUTER ASSISTED performed by Carlene Graff MD at [address] Bilateral 2011 2001    groin     PACEMAKER INSERTION  07/20/2021    DUAL PPM  ([facility])  DR. Danilela Rashid PORT SURGERY Right 8/20/2022    PORT REMOVAL performed by Juan Levi MD at [facility] History:    Social History     Tobacco Use    Smoking status: Former     Packs/day: 2.00     Years: 25.00     Pack years: 50.00     Types: Cigarettes     Quit date: 12/26/2011     Years since quitting: 10.6    Smokeless tobacco: Never   Substance Use Topics    Alcohol use: Not Currently     Alcohol/week: 1.0 standard drink     Types: 1 Glasses of wine per week     Comment: 1 glass of wine per day prior                                Counseling given: Not Answered      Vital Signs (Current):   Vitals:    08/22/22 0630 08/22/22 0826 08/22/22 0841 08/22/22 1247   BP:  137/68     Pulse:  61 58 58   Resp:   16 16   Temp:       TempSrc:       SpO2:   98% 98%   Weight: 188 lb 8 oz (85.5 kg)      Height:                                                  BP Readings from Last 3 Encounters:   08/22/22 137/68   06/16/22 (!) 142/54   05/27/22 127/62       NPO Status: Time of last liquid consumption: 2330                        Time of last solid consumption: 2000                        Date of last liquid consumption: 08/19/22                        Date of last solid food consumption: 08/19/22    BMI:   Wt Readings from Last 3 Encounters:   08/22/22 188 lb 8 oz (85.5 kg)   05/26/22 187 lb (84.8 kg)   04/01/22 187 lb 9.6 oz (85.1 kg)     Body mass index is 30.42 kg/m².     CBC:   Lab Results   Component Value Date/Time    WBC 2.0 08/22/2022 05:38 AM    RBC 2.86 08/22/2022 05:38 AM    HGB 8.3 08/22/2022 05:38 AM    HCT 25.6 08/22/2022 05:38 AM    MCV 89.5 08/22/2022 05:38 AM    RDW 18.8 08/22/2022 05:38 AM    PLT 95 08/22/2022 05:38 AM       CMP:   Lab Results   Component Value Date/Time  08/20/2022 04:44 PM    K 3.1 08/20/2022 04:44 PM    K 3.7 08/17/2022 04:17 AM    CL 98 08/20/2022 04:44 PM    CO2 25 08/20/2022 04:44 PM    BUN 7 08/20/2022 04:44 PM    CREATININE 0.7 08/20/2022 04:44 PM    GFRAA >60 08/20/2022 04:44 PM    LABGLOM >60 08/20/2022 04:44 PM    GLUCOSE 106 08/20/2022 04:44 PM    PROT 5.9 08/20/2022 04:44 PM    CALCIUM 8.9 08/20/2022 04:44 PM    BILITOT <0.2 08/20/2022 04:44 PM    ALKPHOS 90 08/20/2022 04:44 PM    AST 25 08/20/2022 04:44 PM    ALT 29 08/20/2022 04:44 PM       POC Tests: No results for input(s): POCGLU, POCNA, POCK, POCCL, POCBUN, POCHEMO, POCHCT in the last 72 hours. Coags:   Lab Results   Component Value Date/Time    PROTIME 10.9 05/27/2021 11:23 AM    INR 1.0 05/27/2021 11:23 AM       HCG (If Applicable): No results found for: PREGTESTUR, PREGSERUM, HCG, HCGQUANT     ABGs: No results found for: PHART, PO2ART, CEX2TRA, ZHA7XRT, BEART, N3SMLSKB     Type & Screen (If Applicable):  No results found for: LABABO, LABRH    Drug/Infectious Status (If Applicable):  No results found for: HIV, HEPCAB    COVID-19 Screening (If Applicable):   Lab Results   Component Value Date/Time    COVID19 Not Detected 08/17/2022 04:17 AM           Anesthesia Evaluation  Patient summary reviewed  Airway: Mallampati: II  TM distance: >3 FB   Neck ROM: full  Mouth opening: > = 3 FB   Dental:          Pulmonary:   (+) pneumonia: resolved,  COPD: moderate,                            ROS comment: Lung cancer; O2 @ night at home; chemo/ radiation    COVID May 2022   Cardiovascular:    (+) hypertension:, pacemaker: pacemaker,                ROS comment: Bacteremia; r/o endocarditis     Neuro/Psych:   Negative Neuro/Psych ROS              GI/Hepatic/Renal:   (+) renal disease (BPH):,          ROS comment: thrush. Endo/Other:    (+) blood dyscrasia: anemia and anticoagulation therapy:., malignancy/cancer (lung ca).                   ROS comment: Pancytopenia; K 3.1 Abdominal: Vascular:   + PE. Other Findings:           Anesthesia Plan      MAC     ASA 4       Induction: intravenous. Anesthetic plan and risks discussed with patient. Plan discussed with attending.                     Enoch Liriano, APRN - CRNA   6/66/2895

## 2022-08-22 NOTE — PROGRESS NOTES
Patient seen, examined; labs and rhythm strips reviewed in CVl prior t oTEE    ID requested SHIRA to r/o Endocarditis due to underlying Bacteremia. Denies any dysphgia. Medical/ Surgical history: Reviewed    Allergies:  Reviewed    Medications: reviewed. Labs/imaging studies: Reviewed. Echo 8/19/22    Summary   Normal left ventricular chamber size. Normal left ventricular systolic function. Visually estimated LVEF is 55-60 %. No wall motion abnormalities. Normal diastolic function. Normal left atrial pressure. Normal right ventricle structure and function. Normal left atrial size. Normal right atrial size   Likely normal estimated PA pressure. Pacer/ ICD wire present in the right heart. No gross evidence of infective endocarditis. Consider SHIRA for better accuracy if clinically indicated. Compared to prior echo from 2021, no significant changes noted. Physical exam:     Vitals:    08/22/22 1247   BP:    Pulse: 58   Resp: 16   Temp:    SpO2: 98%       In general, this is a well developed, well nourished who appears stated age, awake, alert,  no apparent distress    HEENT: eyes -conjunctivae pink;  Pharynx -clear  Neck-  no stridor, no carotid bruit. Lungs: Few rhonchi Pacer +   Heart: Regular rate and rhythm, 2/6 systolic murmur, No s3 or rub. NEURO / PSYCH: oriented to person, place        Impression/Recommendations:    Bacteremia - ID requested SHIRA to r/o Endocarditis, risk benefits of SHIRA dicussed.  Marcia Mercedes MD  8/22/2022  3:43 PM  [address]  Cardiology

## 2022-08-23 PROBLEM — R78.81 BACTEREMIA: Status: ACTIVE | Noted: 2022-08-23

## 2022-08-23 LAB
ANISOCYTOSIS: ABNORMAL
BASOPHILS ABSOLUTE: 0 E9/L (ref 0–0.2)
BASOPHILS RELATIVE PERCENT: 0.6 % (ref 0–2)
BLASTS RELATIVE PERCENT: 3.5 % (ref 0–0)
EOSINOPHILS ABSOLUTE: 0.05 E9/L (ref 0.05–0.5)
EOSINOPHILS RELATIVE PERCENT: 2.6 % (ref 0–6)
HCT VFR BLD CALC: 24.6 % (ref 37–54)
HEMOGLOBIN: 8.1 G/DL (ref 12.5–16.5)
LYMPHOCYTES ABSOLUTE: 0.27 E9/L (ref 1.5–4)
LYMPHOCYTES RELATIVE PERCENT: 14.8 % (ref 20–42)
MCH RBC QN AUTO: 30 PG (ref 26–35)
MCHC RBC AUTO-ENTMCNC: 32.9 % (ref 32–34.5)
MCV RBC AUTO: 91.1 FL (ref 80–99.9)
MONOCYTES ABSOLUTE: 0.38 E9/L (ref 0.1–0.95)
MONOCYTES RELATIVE PERCENT: 20.9 % (ref 2–12)
NEUTROPHILS ABSOLUTE: 1.04 E9/L (ref 1.8–7.3)
NEUTROPHILS RELATIVE PERCENT: 58.2 % (ref 43–80)
NUCLEATED RED BLOOD CELLS: 1.7 /100 WBC
OVALOCYTES: ABNORMAL
PDW BLD-RTO: 19 FL (ref 11.5–15)
PLATELET # BLD: 125 E9/L (ref 130–450)
PMV BLD AUTO: 12.2 FL (ref 7–12)
POIKILOCYTES: ABNORMAL
POLYCHROMASIA: ABNORMAL
RBC # BLD: 2.7 E12/L (ref 3.8–5.8)
WBC # BLD: 1.8 E9/L (ref 4.5–11.5)

## 2022-08-23 PROCEDURE — 93005 ELECTROCARDIOGRAM TRACING: CPT | Performed by: NURSE PRACTITIONER

## 2022-08-23 PROCEDURE — 2700000000 HC OXYGEN THERAPY PER DAY

## 2022-08-23 PROCEDURE — 94640 AIRWAY INHALATION TREATMENT: CPT

## 2022-08-23 PROCEDURE — 2060000000 HC ICU INTERMEDIATE R&B

## 2022-08-23 PROCEDURE — 85025 COMPLETE CBC W/AUTO DIFF WBC: CPT

## 2022-08-23 PROCEDURE — 2580000003 HC RX 258: Performed by: SURGERY

## 2022-08-23 PROCEDURE — 99233 SBSQ HOSP IP/OBS HIGH 50: CPT | Performed by: INTERNAL MEDICINE

## 2022-08-23 PROCEDURE — 6370000000 HC RX 637 (ALT 250 FOR IP): Performed by: SURGERY

## 2022-08-23 PROCEDURE — 6360000002 HC RX W HCPCS: Performed by: SURGERY

## 2022-08-23 PROCEDURE — 93280 PM DEVICE PROGR EVAL DUAL: CPT | Performed by: INTERNAL MEDICINE

## 2022-08-23 PROCEDURE — 2580000003 HC RX 258: Performed by: ANESTHESIOLOGY

## 2022-08-23 PROCEDURE — 87040 BLOOD CULTURE FOR BACTERIA: CPT

## 2022-08-23 PROCEDURE — 6370000000 HC RX 637 (ALT 250 FOR IP): Performed by: SPECIALIST

## 2022-08-23 PROCEDURE — 6370000000 HC RX 637 (ALT 250 FOR IP): Performed by: INTERNAL MEDICINE

## 2022-08-23 PROCEDURE — 36415 COLL VENOUS BLD VENIPUNCTURE: CPT

## 2022-08-23 RX ADMIN — IPRATROPIUM BROMIDE AND ALBUTEROL SULFATE 1 AMPULE: .5; 2.5 SOLUTION RESPIRATORY (INHALATION) at 09:32

## 2022-08-23 RX ADMIN — METOPROLOL SUCCINATE 50 MG: 25 TABLET, EXTENDED RELEASE ORAL at 16:50

## 2022-08-23 RX ADMIN — METOPROLOL SUCCINATE 50 MG: 25 TABLET, EXTENDED RELEASE ORAL at 08:21

## 2022-08-23 RX ADMIN — CLOTRIMAZOLE 10 MG: 10 LOZENGE ORAL at 20:00

## 2022-08-23 RX ADMIN — CEFTRIAXONE 2000 MG: 2 INJECTION, POWDER, FOR SOLUTION INTRAMUSCULAR; INTRAVENOUS at 03:06

## 2022-08-23 RX ADMIN — AMPICILLIN SODIUM 2000 MG: 2 INJECTION, POWDER, FOR SOLUTION INTRAVENOUS at 03:12

## 2022-08-23 RX ADMIN — AMPICILLIN SODIUM 2000 MG: 2 INJECTION, POWDER, FOR SOLUTION INTRAVENOUS at 06:48

## 2022-08-23 RX ADMIN — AMPICILLIN SODIUM 2000 MG: 2 INJECTION, POWDER, FOR SOLUTION INTRAVENOUS at 15:05

## 2022-08-23 RX ADMIN — CLOTRIMAZOLE 10 MG: 10 LOZENGE ORAL at 08:24

## 2022-08-23 RX ADMIN — SODIUM CHLORIDE, PRESERVATIVE FREE 10 ML: 5 INJECTION INTRAVENOUS at 08:25

## 2022-08-23 RX ADMIN — CLOTRIMAZOLE 10 MG: 10 LOZENGE ORAL at 11:48

## 2022-08-23 RX ADMIN — SODIUM CHLORIDE, PRESERVATIVE FREE 10 ML: 5 INJECTION INTRAVENOUS at 20:00

## 2022-08-23 RX ADMIN — IPRATROPIUM BROMIDE AND ALBUTEROL SULFATE 1 AMPULE: .5; 2.5 SOLUTION RESPIRATORY (INHALATION) at 16:30

## 2022-08-23 RX ADMIN — ATORVASTATIN CALCIUM 20 MG: 20 TABLET, FILM COATED ORAL at 19:59

## 2022-08-23 RX ADMIN — AMPICILLIN SODIUM 2000 MG: 2 INJECTION, POWDER, FOR SOLUTION INTRAVENOUS at 11:16

## 2022-08-23 RX ADMIN — CEFTRIAXONE 2000 MG: 2 INJECTION, POWDER, FOR SOLUTION INTRAMUSCULAR; INTRAVENOUS at 16:43

## 2022-08-23 RX ADMIN — AMPICILLIN SODIUM 2000 MG: 2 INJECTION, POWDER, FOR SOLUTION INTRAVENOUS at 19:02

## 2022-08-23 RX ADMIN — Medication 10 ML: at 20:00

## 2022-08-23 RX ADMIN — IPRATROPIUM BROMIDE AND ALBUTEROL SULFATE 1 AMPULE: .5; 2.5 SOLUTION RESPIRATORY (INHALATION) at 13:48

## 2022-08-23 RX ADMIN — CLOTRIMAZOLE 10 MG: 10 LOZENGE ORAL at 16:43

## 2022-08-23 RX ADMIN — AMPICILLIN SODIUM 2000 MG: 2 INJECTION, POWDER, FOR SOLUTION INTRAVENOUS at 23:27

## 2022-08-23 RX ADMIN — FUROSEMIDE 20 MG: 20 TABLET ORAL at 08:21

## 2022-08-23 RX ADMIN — IPRATROPIUM BROMIDE AND ALBUTEROL SULFATE 1 AMPULE: .5; 2.5 SOLUTION RESPIRATORY (INHALATION) at 20:35

## 2022-08-23 NOTE — PROGRESS NOTES
Subjective:  No overnight event  Awaiting cardiothoracic surgery input  Blood cultures re-drawn today  No growth on PORT to date  Patient feeling ok, seems a little frustrated  Denies N/V, SOB    Objective:    /65   Pulse 55   Temp 99 °F (37.2 °C) (Oral)   Resp 24   Ht 5' 6\" (1.676 m)   Wt 188 lb 8 oz (85.5 kg)   SpO2 100%   BMI 30.42 kg/m²     General: NAD  HEENT: No thrush or mucositis, EOMI, PERRLA  Heart:  RRR  Lungs: respiration nonlabored  Abd: nontender, nondistended  Extrem:  No clubbing, cyanosis, 1+ bilateral pedal edema  Skin: Intact, no petechia or purpura    CBC with Differential:    Lab Results   Component Value Date/Time    WBC 1.8 08/23/2022 06:47 AM    RBC 2.70 08/23/2022 06:47 AM    HGB 8.1 08/23/2022 06:47 AM    HCT 24.6 08/23/2022 06:47 AM     08/23/2022 06:47 AM    MCV 91.1 08/23/2022 06:47 AM    MCH 30.0 08/23/2022 06:47 AM    MCHC 32.9 08/23/2022 06:47 AM    RDW 19.0 08/23/2022 06:47 AM    NRBC 1.7 08/23/2022 06:47 AM    BLASTSPCT 3.5 08/23/2022 06:47 AM    LYMPHOPCT 14.8 08/23/2022 06:47 AM    MONOPCT 20.9 08/23/2022 06:47 AM    MYELOPCT 0.9 09/17/2021 10:20 AM    BASOPCT 0.6 08/23/2022 06:47 AM    MONOSABS 0.38 08/23/2022 06:47 AM    LYMPHSABS 0.27 08/23/2022 06:47 AM    EOSABS 0.05 08/23/2022 06:47 AM    BASOSABS 0.00 08/23/2022 06:47 AM     CMP:    Lab Results   Component Value Date/Time     08/20/2022 04:44 PM    K 3.1 08/20/2022 04:44 PM    K 3.7 08/17/2022 04:17 AM    CL 98 08/20/2022 04:44 PM    CO2 25 08/20/2022 04:44 PM    BUN 7 08/20/2022 04:44 PM    CREATININE 0.7 08/20/2022 04:44 PM    GFRAA >60 08/20/2022 04:44 PM    LABGLOM >60 08/20/2022 04:44 PM    GLUCOSE 106 08/20/2022 04:44 PM    PROT 5.9 08/20/2022 04:44 PM    LABALBU 3.2 08/20/2022 04:44 PM    CALCIUM 8.9 08/20/2022 04:44 PM    BILITOT <0.2 08/20/2022 04:44 PM    ALKPHOS 90 08/20/2022 04:44 PM    AST 25 08/20/2022 04:44 PM    ALT 29 08/20/2022 04:44 PM               Current Facility-Administered Medications:     clotrimazole (MYCELEX) lindsay 10 mg, 10 mg, Oral, 4x daily, Ahsan Resendiz MD, 10 mg at 08/23/22 0824    0.9 % sodium chloride infusion, , IntraVENous, PRN, Ovidio Singleton MD    albuterol sulfate HFA (PROVENTIL;VENTOLIN;PROAIR) 108 (90 Base) MCG/ACT inhaler 2 puff, 2 puff, Inhalation, Q6H PRN, Tiffanie E Kaercher, DO    sodium chloride flush 0.9 % injection 5-40 mL, 5-40 mL, IntraVENous, 2 times per day, Deb Fitzgerald MD, 10 mL at 08/23/22 0825    sodium chloride flush 0.9 % injection 5-40 mL, 5-40 mL, IntraVENous, PRN, Bhavya Murrieta MD    0.9 % sodium chloride infusion, 25 mL, IntraVENous, PRN, Bhavya Murrieta MD    HYDROmorphone (DILAUDID) injection 0.25 mg, 0.25 mg, IntraVENous, Q5 Min PRN, Bhavya Murrieta MD    HYDROmorphone (DILAUDID) injection 0.5 mg, 0.5 mg, IntraVENous, Q5 Min PRN, Bhavya Murrieta MD    labetalol (NORMODYNE;TRANDATE) injection 5 mg, 5 mg, IntraVENous, Q15 Min PRN **OR** hydrALAZINE (APRESOLINE) injection 5 mg, 5 mg, IntraVENous, Q15 Min PRN, Bhavya Murrieta MD    furosemide (LASIX) tablet 20 mg, 20 mg, Oral, Daily, Ovidio Singleton MD, 20 mg at 08/23/22 3321    potassium chloride (KLOR-CON M) extended release tablet 40 mEq, 40 mEq, Oral, PRN **OR** potassium bicarb-citric acid (EFFER-K) effervescent tablet 40 mEq, 40 mEq, Oral, PRN **OR** potassium chloride 10 mEq/100 mL IVPB (Peripheral Line), 10 mEq, IntraVENous, PRN, Tiffanie E Kaerc, DO    ampicillin 2000 mg ivpb mini bag, 2,000 mg, IntraVENous, 6 times per day, Robbie Gutierrez DO, Stopped at 08/23/22 0718    cefTRIAXone (ROCEPHIN) 2,000 mg in sterile water 20 mL IV syringe, 2,000 mg, IntraVENous, Q12H, Tiffanie Gupta DO, 2,000 mg at 08/23/22 0306    melatonin tablet 3 mg, 3 mg, Oral, Nightly PRN, Tiffanie Gupta DO, 3 mg at 08/19/22 2051    albuterol (PROVENTIL) nebulizer solution 2.5 mg, 2.5 mg, Nebulization, Q6H PRN, Hawk Duncan Kaercher, DO    albuterol (ACCUNEB) nebulizer solution 0.63 mg, 1 ampule, Nebulization, Q6H PRN, Tiffanie E Kaercher, DO    [Held by provider] apixaban (ELIQUIS) tablet 5 mg, 5 mg, Oral, BID, Tiffanie E Kaercher, DO    metoprolol succinate (TOPROL XL) extended release tablet 50 mg, 50 mg, Oral, BID WC, Tiffanie E Kaercher, DO, 50 mg at 08/23/22 3900    atorvastatin (LIPITOR) tablet 20 mg, 20 mg, Oral, Daily, Tiffanie E Kaercher, DO, 20 mg at 08/22/22 2155    sodium chloride flush 0.9 % injection 5-40 mL, 5-40 mL, IntraVENous, 2 times per day, Tiffanie E Kaercher, DO, 10 mL at 08/22/22 2156    sodium chloride flush 0.9 % injection 5-40 mL, 5-40 mL, IntraVENous, PRN, Tiffanie E Kaercher, DO    0.9 % sodium chloride infusion, , IntraVENous, PRN, Tiffanie E Kaercher, DO    acetaminophen (TYLENOL) tablet 650 mg, 650 mg, Oral, Q6H PRN **OR** acetaminophen (TYLENOL) suppository 650 mg, 650 mg, Rectal, Q6H PRN, Tiffanie E Kaercher, DO    senna (SENOKOT) tablet 8.6 mg, 1 tablet, Oral, Daily PRN, Tiffanie E Kaercher, DO    ipratropium-albuterol (DUONEB) nebulizer solution 1 ampule, 1 ampule, Inhalation, Q4H WA, Tiffanie E Kaercher, DO, 1 ampule at 08/23/22 0932    Assessment:    Principal Problem:    Pneumonia  Active Problems:    Pancytopenia ([address]    Infection of pacemaker lead wire ([address]    Sepsis due to Enterococcus ([address]    COPD (chronic obstructive pulmonary disease) (HCC)    HTN (hypertension)    Cardiac pacemaker in situ    Squamous carcinoma of lung ([address]    History pulmonary embolism ([address] on Eliquis  Resolved Problems:    * No resolved hospital problems. 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 Treated with concurrent chemo/XRT from 7/6/21 followed by consolidation Imfinzi. Progression on PET from 6/24/22. Started Everlene [place] and Keytruda on 6/30/22.   Patient seen in office last 8/9 and received C2D8 of Carboplatin with Gemzar and Keytruda. Admitted with sepsis, Enterococcus bacteremia. Respiratory panel negative. Mediport was removed 8/20    Plan:  Pancytopenia related to recent chemotherapy and acute/chronic inflammatory process. Leukopenia is moderate but decreasing. ANC is adequate but can consider G-CSF if needed  Prefer to keep hemoglobin above 7.5 g/dL due to poor pulmonary reserves  No transfusion PRBC indicated today. Continue with daily furosemide for bilateral lower extremity edema. Enterococcus bacteremia: Status post port removal.  On broad-spectrum IV antibiotics. Followed by ID. Pacemaker to be determine removal here vs transfer to [facility]  Metastatic non-small cell lung cancer: Treatment is currently on hold. Follow-up with Dr. Sumeet Jolly in 2 weeks after discharge.       Electronically signed by MATTHEW Wan on 8/23/2022 at 9:53 AM

## 2022-08-23 NOTE — PROGRESS NOTES
Pulmonary Progress Note    Admit Date: 2022                            PCP: Tarun Alcocer MD  Principal Problem:    Pneumonia  Active Problems:    Pancytopenia ([facility]    In[facility] pacemaker lead wire ([facility]    Sepsis due to Enterococcus [facility])    COPD (chronic obstructive pulmonary disease) ([facility]    HTN (hypertension)    Cardiac pacemaker in situ    Squamous carcinoma of lung ([facility]    History pulmonary embolism ([facility] on Eliquis  Resolved Problems:    * No resolved hospital problems. *      Subjective:  Resting in bed on room air  Complains of sore throat from SHIRA yesterday  Mild dyspnea, better than yesterday and coughing with clear/pale yellow sputum    Medications:   sodium chloride      sodium chloride      sodium chloride          clotrimazole  10 mg Oral 4x daily    sodium chloride flush  5-40 mL IntraVENous 2 times per day    furosemide  20 mg Oral Daily    ampicillin IV  2,000 mg IntraVENous 6 times per day    cefTRIAXone (ROCEPHIN) IV  2,000 mg IntraVENous Q12H    [Held by provider] apixaban  5 mg Oral BID    metoprolol succinate  50 mg Oral BID WC    atorvastatin  20 mg Oral Daily    sodium chloride flush  5-40 mL IntraVENous 2 times per day    ipratropium-albuterol  1 ampule Inhalation Q4H WA       Vitals:  VITALS:  BP (!) 114/59   Pulse 60   Temp 97.8 °F (36.6 °C) (Temporal)   Resp 18   Ht 5' 6\" (1.676 m)   Wt 188 lb 8 oz (85.5 kg)   SpO2 95%   BMI 30.42 kg/m²   24HR INTAKE/OUTPUT:    Intake/Output Summary (Last 24 hours) at 2022 0829  Last data filed at 2022 1602  Gross per 24 hour   Intake 200 ml   Output --   Net 200 ml     CURRENT PULSE OXIMETRY:  SpO2: 95 %  24HR PULSE OXIMETRY RANGE:  SpO2  Av.2 %  Min: 95 %  Max: 100 %  CVP:    VENT SETTINGS:      Additional Respiratory Assessments  Heart Rate: 60  Resp: 18  SpO2: 95 %      EXAM:  General: No distress. Alert. Eyes:  No sclera icterus. No conjunctival injection. ENT: No discharge. Pharynx clear.   Neck: Trachea midline. Normal thyroid. Resp: No accessory muscle use. No crackles. No wheezing. No rhonchi. Diminished bilaterally  CV: Regular rate. Regular rhythm. No mumur or rub. BLE edema 2-3+   ABD: Non-tender. Non-distended. No masses. No organmegaly. Normal bowel sounds. Skin: Warm and dry. No nodule on exposed extremities. No rash on exposed extremities. M/S: No cyanosis. No joint deformity. No clubbing. Neuro: Awake. Follows commands. A&Ox3    I/O: I/O last 3 completed shifts: In: 200 [I.V.:200]  Out: -   No intake/output data recorded. Results:  CBC:   Recent Labs     08/21/22  0517 08/21/22  1742 08/22/22  0538 08/23/22  0647   WBC 2.5*  --  2.0* 1.8*   HGB 7.2* 8.2* 8.3* 8.1*   HCT 22.3* 25.1* 25.6* 24.6*   MCV 89.9  --  89.5 91.1   PLT 64*  --  95* 125*     BMP:   Recent Labs     08/20/22  1644      K 3.1*   CL 98   CO2 25   BUN 7   CREATININE 0.7     LFT:   Recent Labs     08/20/22  1644   ALKPHOS 90   ALT 29   AST 25   PROT 5.9*   BILITOT <0.2   LABALBU 3.2*       PT/INR: No results for input(s): PROTIME, INR in the last 72 hours. Cultures:  No results for input(s): CULTRESP in the last 72 hours. ABG:   No results for input(s): PH, PO2, PCO2, HCO3, BE, O2SAT in the last 72 hours. Films:  CT Head WO Contrast  Result Date: 8/16/2022  No acute intracranial abnormality. Cortical atrophy and periventricular leukomalacia. XR CHEST PORTABLE  Result Date: 8/16/2022  Continued opacity in the left lower lung and linear scarring in the left perihilar region. COPD.     8/18/22 CT chest w/o    5/24/2022 CTA chest       5/24/2022 CTA chest: .34 x 5.4 cm left hilar and perihilar mass extending to LLL encircling the pulmonary artery and the bronchi consistent with progressive malignancy with occlusion of left lower lobe bronchi with postobstructive pneumonitis    08/19/22 ECHO:  Normal left ventricular chamber size. Normal left ventricular systolic function. Visually estimated LVEF is 55-60 %.    No wall motion abnormalities. Normal diastolic function. Normal left atrial pressure. Normal right ventricle structure and function. Normal left atrial size. Normal right atrial size   Likely normal estimated PA pressure. Pacer/ ICD wire present in the right heart. No gross evidence of infective endocarditis. Consider SHIRA for better   accuracy if clinically indicated. Compared to prior echo from 2021, no significant changes noted. SHIRA on 8/22/22   No SHIRA indication of Endocarditis. Normal left ventricular chamber size and systolic function. Interatrial septum appears intact. Normal right ventricle size and function. Pacemaker leads noted in right atrium and right ventricle. Normal aortic root size. Moderate atherosclerosis in the descending thoracic aorta. Epicardial fat vs. small pericardial effusion. No intra cardiac mass or thrombus. Technically sub-optimal images. Compared to prior sub-optimal TTE from 8/19/22. 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 S/p platelet and PRBC transfusion on  8/18/2022 CT chest w/o: The previously noted infiltrative left hilar mass extending to EAN and LLL surrounding the broncho pulmonary vasculature is noted currently measuring 4.6 x 4.3 cm with progression with atelectasis.     8/18: 2L NC, US BLE neg DVT   8/19: 2L NC   8/20 RA, Mediport removed wakes up somewhat short of breath  8/21 RA received PRBC    8/22 2.5L; SHIRA neg for endocarditis   8/23: RA;      Stage IIIB T2aN3 Squamous Cell Carcinoma of Lung  diagnosis 5/27/2021, PD-L1 0%    - 7/6/21-9/7/21 treatment with concurrent chemotherapy/radiation therapy   - 11/2/21 consolidated with Imfinzi (Durvalumab) per ID IV every 4 weeks f  - 6/30/22 PET showing progression and was started on Carbo, Gemzar and Keytruda on 6/30/22  - 8/9 /22 r ceived C2D8 of Carboplatin AUC 2 with Gemzar 1000 mg/m2 IV on D1,8 and Keytruda IV on D1 on a 21 day cycle  Enterococcus faecalis bacteremia/septicemia on IV daptomycin for bacteremia cultures + on 8/16, 8/17, 8/18 s/p mediport removal  cath tip neg so far. With dual-chamber pacemaker with h/o wide-complex tachycardia (HCC)--atrial tachycardia with RV pacing  H/O SARS-CoV 2 Pneumonitis 5/24/2022- suspected Omicron BA-2  sick contact wife  Chronic hypoxic resp failure  RA during the day and 2L NC at night, which is baseline. Probable obstructive sleep apnea  Persistent pancytopenia secondary to chemotherapy  Thrombocytopenia   Lymphopenia   RLE swelling and numbness, w/u with neg u/s for DVT   H/o submassive hemoptysis - streaky, known lung mass resolved   H/O RLL subsegmental PE- CTA Chest 9/17/2021 was on eliquis now on hold   Radiation pneumonitis/fibrosis left lung   COPD, not in exacerbation      PLAN:   On room air;  2.5L - on/off O2 as needed. No hypoxemia charted. Wean to maintain pox >90%. Has O2 at home and wears 2L HS. Ambulatory pulse ox prior to d/c. Continue DuoNeb every 4 hours and prn   Oncology following. Continue to hold Eliquis until platelets recover > 48C. Received one unit platelets 2/46 in OR. Plts 95 8/22. Continue IS for pulmonary hygiene   SHIRA yesterday - not reported yet. TTE done 08/19 - no gross evidence of endocarsitis. Cardiology explained pacer needs to come out and needs a transfer to [facility] for this. Dr. Kaye Side to discuss with Dr Alberto Ibarra to have PPM removed locally vs transfer to [facility]. Pro-BNP 1207. To resume Eliquis when ok with cards/EP.   Outpatient PSG to be obtained outpt for high risk and probable FIONA Electronically signed by LELO Leonardo CNP on 8/23/2022 at 8:29 AM  I had a face-to-face encounter with the patient at the bedside. I agree with the nurse practitioner's note and assessment and plan as detailed above. Necessary editing and changes made to the note by myself.     repeat BC

## 2022-08-23 NOTE — PROGRESS NOTES
[address] [place] [address] [facility]  [facility]  Progress Note          C/C : High grade Enterococcus bacteremia, sepsis       SUBJECTIVE:  Patient is tolerating medications. No reported adverse drug reactions. No nausea, vomiting, diarrhea. SOB with exertion   Denies fever or chills   Afebrile   Improved each day; wife at bedside    Review of systems:  As stated above in the chief complaint, otherwise negative. Medications:  Scheduled Meds:   clotrimazole  10 mg Oral 4x daily    sodium chloride flush  5-40 mL IntraVENous 2 times per day    furosemide  20 mg Oral Daily    ampicillin IV  2,000 mg IntraVENous 6 times per day    cefTRIAXone (ROCEPHIN) IV  2,000 mg IntraVENous Q12H    [Held by provider] apixaban  5 mg Oral BID    metoprolol succinate  50 mg Oral BID WC    atorvastatin  20 mg Oral Daily    sodium chloride flush  5-40 mL IntraVENous 2 times per day    ipratropium-albuterol  1 ampule Inhalation Q4H WA     Continuous Infusions:   sodium chloride      sodium chloride      sodium chloride       PRN Meds:sodium chloride, albuterol sulfate HFA, sodium chloride flush, sodium chloride, HYDROmorphone, HYDROmorphone, labetalol **OR** hydrALAZINE, potassium chloride **OR** potassium alternative oral replacement **OR** potassium chloride, melatonin, albuterol, albuterol, sodium chloride flush, sodium chloride, acetaminophen **OR** acetaminophen, senna    OBJECTIVE:  BP (!) 126/51   Pulse 79   Temp 99.2 °F (37.3 °C) (Temporal)   Resp 20   Ht 5' 6\" (1.676 m)   Wt 188 lb 8 oz (85.5 kg)   SpO2 93%   BMI 30.42 kg/m²   Temp  Av.7 °F (37.1 °C)  Min: 97.8 °F (36.6 °C)  Max: 99.2 °F (37.3 °C)    Constitutional: The patient is awake, alert, and oriented. Skin: Warm and dry. No rashes were noted. HEENT: Round and reactive pupils. Moist mucous membranes. No ulcerations or thrush. Neck: Supple to movements. Chest: Clear bilaterally   Cardiovascular: S1 and S2 are rhythmic and regular.  No murmurs appreciated. Abdomen: Positive bowel sounds to auscultation. Benign to palpation. No masses felt. No hepatosplenomegaly. Genitourinary: male  Extremities: No clubbing, no cyanosis, no edema.   Lines: peripheral    Laboratory and Tests Review:  Lab Results   Component Value Date    WBC 1.8 (L) 08/23/2022    WBC 2.0 (L) 08/22/2022    WBC 2.5 (L) 08/21/2022    HGB 8.1 (L) 08/23/2022    HCT 24.6 (L) 08/23/2022    MCV 91.1 08/23/2022     (L) 08/23/2022     Lab Results   Component Value Date    NEUTROABS 1.04 (L) 08/23/2022    NEUTROABS 1.20 (L) 08/22/2022    NEUTROABS 1.58 (L) 08/21/2022     No results found for: [facility]  Lab Results   Component Value Date    ALT 29 08/20/2022    AST 25 08/20/2022    ALKPHOS 90 08/20/2022    BILITOT <0.2 08/20/2022     Lab Results   Component Value Date/Time     08/20/2022 04:44 PM    K 3.1 08/20/2022 04:44 PM    K 3.7 08/17/2022 04:17 AM    CL 98 08/20/2022 04:44 PM    CO2 25 08/20/2022 04:44 PM    BUN 7 08/20/2022 04:44 PM    CREATININE 0.7 08/20/2022 04:44 PM    CREATININE 0.8 08/19/2022 06:14 AM    CREATININE 0.7 08/18/2022 05:58 AM    GFRAA >60 08/20/2022 04:44 PM    LABGLOM >60 08/20/2022 04:44 PM    GLUCOSE 106 08/20/2022 04:44 PM    PROT 5.9 08/20/2022 04:44 PM    LABALBU 3.2 08/20/2022 04:44 PM    CALCIUM 8.9 08/20/2022 04:44 PM    BILITOT <0.2 08/20/2022 04:44 PM    ALKPHOS 90 08/20/2022 04:44 PM    AST 25 08/20/2022 04:44 PM    ALT 29 08/20/2022 04:44 PM     Lab Results   Component Value Date    CRP 9.6 (H) 05/24/2022     No results found for: [address]  Radiology:  Reviewed CT lung    Microbiology:   Lab Results   Component Value Date/Time    BC 24 Hours no growth 08/19/2022 05:05 AM    BC Previously positive blood culture called 08/18/2022 07:05 AM    BC 5 Days no growth 05/24/2022 05:37 PM    ORG Enterococcus faecalis 08/18/2022 07:10 AM    ORG Enterococcus faecalis 08/18/2022 07:05 AM    ORG Enterococcus faecalis 08/17/2022 05:40 PM     Lab Results   Component

## 2022-08-23 NOTE — PROGRESS NOTES
Comprehensive Nutrition Assessment    Type and Reason for Visit:  Initial (LOS)    Nutrition Recommendations/Plan:   Recommend and start Ensure high protein supplement daily and Boost Pudding supplement daily to help meet increased nutritional needs. Malnutrition Assessment:  Malnutrition Status: At risk for malnutrition (Comment) (08/23/22 3601)    Context:  Acute Illness     Findings of the 6 clinical characteristics of malnutrition:  Energy Intake:  75% or less of estimated energy requirements for 7 or more days  Weight Loss:  No significant weight loss     Body Fat Loss:  Unable to assess     Muscle Mass Loss:  Unable to assess    Fluid Accumulation:  No significant fluid accumulation     Strength:  Not Performed    Nutrition Assessment:    Patient adm w/ fatigue ; noted sepsis and bacteremia  ; adm w/ PNA ; noted neutropenic and thrombocytopenic ; hx of lung CA w/ chemotherapy/radiation ; hx of COPD and CHB ; s/p SHIRA on 8/22 ; will start ONS    Nutrition Related Findings:    +I&Os (+2.9 L), 2+ edema, active BS, A&O x 4 ; Wound Type: Surgical Incision (Incision x 1)       Current Nutrition Intake & Therapies:    Average Meal Intake: 51-75%     ADULT DIET; Regular    Anthropometric Measures:  Height: 5' 6\" (167.6 cm)  Ideal Body Weight (IBW): 142 lbs (65 kg)    Admission Body Weight: 187 lb (84.8 kg) (8/17, actual)  Current Body Weight: 188 lb (85.3 kg) (8/22, standing scale), 132.4 % IBW. Weight Source: Standing Scale  Current BMI (kg/m2): 30.4  Usual Body Weight: 188 lb (85.3 kg) (8/18/21, actual)  % Weight Change (Calculated): 0                    BMI Categories: Obese Class 1 (BMI 30.0-34. 9)    Estimated Daily Nutrient Needs:  Energy Requirements Based On: Formula  Weight Used for Energy Requirements: Current  Energy (kcal/day): 4936-2599 (REE 1542 x 1.2 SF)  Weight Used for Protein Requirements: Ideal  Protein (g/day):  (1.3-1.5g/kg IBW)  Method Used for Fluid Requirements: 1 ml/kcal  Fluid (ml/day): 4395-4254    Nutrition Diagnosis:   Inadequate oral intake related to inadequate protein-energy intake (2/2 fatigue) as evidenced by poor intake prior to admission, intake 51-75%    Nutrition Interventions:   Food and/or Nutrient Delivery: Continue Current Diet, Start Oral Nutrition Supplement  Nutrition Education/Counseling: Education not indicated  Coordination of Nutrition Care: Continue to monitor while inpatient       Goals:  Previous Goal Met: Progressing toward Goal(s)  Goals: PO intake 75% or greater, by next RD assessment       Nutrition Monitoring and Evaluation:   Behavioral-Environmental Outcomes: None Identified  Food/Nutrient Intake Outcomes: Food and Nutrient Intake, Supplement Intake  Physical Signs/Symptoms Outcomes: Biochemical Data, Chewing or Swallowing, GI Status, Hemodynamic Status, Meal Time Behavior, Fluid Status or Edema, Nutrition Focused Physical Findings, Skin, Weight    Discharge Planning:     Too soon to determine     Darci Edouard RD, LD  Contact: [phone]

## 2022-08-23 NOTE — ANESTHESIA POSTPROCEDURE EVALUATION
Department of Anesthesiology  Postprocedure Note    Patient: Mariopsa Gardner  MRN: [id number]  YOB: 1950  Date of evaluation: 8/22/2022      Procedure Summary     Date: 08/22/22 [facility] / [facility]; [facility] E[facility]    Anesthesia Start: 5544 Anesthesia Stop: 8280    Procedure: SEY SHIRA Diagnosis:     Scheduled Providers: LELO Barajas CRNA; Viviane Sosa DO Responsible Provider: Michele Linn MD    Anesthesia Type: MAC ASA Status: 4          Anesthesia Type: MAC    Shin Phase I: Shin Score: 9    Shin Phase II:        Anesthesia Post Evaluation    Patient location during evaluation: PACU  Patient participation: complete - patient participated  Level of consciousness: awake and alert  Airway patency: patent  Nausea & Vomiting: no nausea and no vomiting  Complications: no  Cardiovascular status: hemodynamically stable  Respiratory status: acceptable  Hydration status: euvolemic  There was medical reason for not using a multimodal analgesia pain management approach.

## 2022-08-23 NOTE — CARE COORDINATION
Waiting on decision form ID and EP to see if pacemaker needs to be removed. Discharge Plan is to return home when medically stable. SW/CM to follow for discharge needs.    KEY [email or web address]  [phone]

## 2022-08-23 NOTE — PROGRESS NOTES
[address] and [address] Electrophysiology  Inpatient progress note  PATIENT: Belkis Gandhi  MEDICAL RECORD NUMBER: [id number]  DATE OF SERVICE:  8/23/2022  ATTENDING ELECTROPHYSIOLOGIST: Dr Andrea Florian   PRIMARY ELECTROPHYSIOLOGIST: May Riedel Raheja,MD  REFERRING PHYSICIAN: No ref. provider found and Paula Davila MD  CHIEF COMPLAINT: Weakness and fatigue, fever    HPI: This is a 67 y.o. male with a history of pretension, hyperlipidemia, COPD and a history of long-term smoking habit as well as recurrent syncopal episodes starting in 2012. ILR 2020 showed CHB and subsequent pacemaker implant on 7/20/2021. He was also diagnosed with lung cancer around the same time and has been undergoing chemotherapy and radiation. He finished his last chemo cycle last week and presented to the emergency room with symptoms of ongoing fatigue weakness and fever. He was found to be neutropenic as well as thrombocytopenic. Subsequently blood cultures have been positive for Enterococcus faecalis. Blood cultures from 8/16, 8/17 and 8/18 have all been positive despite IV antibiotics. Patient also remains neutropenic and thrombocytopenic with a platelet count of 43,673 today. No other cardiac symptoms. No new symptoms of chest pain or dyspnea on exertion. No syncope or near syncope.    8/21/22: Patient denies any new complaints today. Was transfused earlier today for hemoglobin of 7.2. No cardiac symptoms. 8/22/22: Patient remains asymptomatic. Underwent SHIRA today. White count remains low however hemoglobin and platelet count have improved    8/23/22:  No complaints. Awaiting plan for extraction to confirm CT surgery back up. SHIRA negative yesterday.  Afebrile       Patient Active Problem List    Diagnosis Date Noted    Infection of pacemaker lead wire ([address] 08/22/2022     Priority: Medium    Sepsis due to Enterococcus ([address] 08/22/2022     Priority: Medium    Pancytopenia ([address] 08/17/2022 Priority: Medium    COVID-19 05/25/2022     Priority: Medium    Squamous carcinoma of lung ([facility] 09/18/2021    History pulmonary embolism ([address] on Eliquis 09/18/2021    Hyponatremia 09/18/2021    Hypomagnesemia 09/18/2021    Wide-complex tachycardia ([address] 09/18/2021    Acute on chronic respiratory failure with hypoxia ([facility] 09/17/2021    Cardiac pacemaker in situ 07/20/2021    Intermittent complete heart block (HCC) 07/19/2021    Pneumonia 01/31/2019    COPD (chronic obstructive pulmonary disease) ([facility] 01/31/2019    BPH (benign prostatic hyperplasia) 01/31/2019    HTN (hypertension) 01/31/2019    HLD (hyperlipidemia) 01/31/2019    Syncope and collapse 01/18/2019       Past Medical History:   Diagnosis Date    CHB (complete heart block) ([facility] 07/2021    COPD [facility]chronic obstructive pulmonary disease) [facility]     Syncope 09/2020    dr Kvng Velasquez wearing a monitor    Syncope 07/2021       Family History   Problem Relation Age of Onset    Cancer Father         prostate    Cancer Paternal Uncle         anal       Social History     Tobacco Use    Smoking status: Former     Packs/day: 2.00     Years: 25.00     Pack years: 50.00     Types: Cigarettes     Quit date: 12/26/2011     Years since quitting: 10.6    Smokeless tobacco: Never   Substance Use Topics    Alcohol use: Not Currently     Alcohol/week: 1.0 standard drink     Types: 1 Glasses of wine per week     Comment: 1 glass of wine per day prior       Current Facility-Administered Medications   Medication Dose Route Frequency Provider Last Rate Last Admin    clotrimazole (MYCELEX) lindsay 10 mg  10 mg Oral 4x daily Chaitanya Mcmullen MD   10 mg at 08/23/22 0824    0.9 % sodium chloride infusion   IntraVENous PRN Violeta Lema MD        albuterol sulfate HFA (PROVENTIL;VENTOLIN;PROAIR) 108 (90 Base) MCG/ACT inhaler 2 puff  2 puff Inhalation Q6H PRN Tiffanie E Kaercher, DO        sodium chloride flush 0.9 % injection 5-40 mL  5-40 mL IntraVENous 2 times per day Catarino Eddy MD   10 mL at 08/23/22 0825    sodium chloride flush 0.9 % injection 5-40 mL  5-40 mL IntraVENous PRN Bhavya Murrieta MD        0.9 % sodium chloride infusion  25 mL IntraVENous PRN Bhavya Murrieta MD        HYDROmorphone (DILAUDID) injection 0.25 mg  0.25 mg IntraVENous Q5 Min PRN Bhavya Murrieta MD        HYDROmorphone (DILAUDID) injection 0.5 mg  0.5 mg IntraVENous Q5 Min PRN Bhavya Murrieta MD        labetalol (NORMODYNE;TRANDATE) injection 5 mg  5 mg IntraVENous Q15 Min PRN Bhavya Mendez MD        Or    hydrALAZINE (APRESOLINE) injection 5 mg  5 mg IntraVENous Q15 Min PRN Bhavya Mendez MD        furosemide (LASIX) tablet 20 mg  20 mg Oral Daily Tabitha Saenz MD   20 mg at 08/23/22 5833    potassium chloride (KLOR-CON M) extended release tablet 40 mEq  40 mEq Oral PRN Tiffanie E Kaercher, DO        Or    potassium bicarb-citric acid (EFFER-K) effervescent tablet 40 mEq  40 mEq Oral PRN Tiffanie E Kaercher, DO        Or    potassium chloride 10 mEq/100 mL IVPB (Peripheral Line)  10 mEq IntraVENous PRN Tiffanie E Kaercher, DO        ampicillin 2000 mg ivpb mini bag  2,000 mg IntraVENous 6 times per day Slick Singleton  mL/hr at 08/23/22 1116 2,000 mg at 08/23/22 1116    cefTRIAXone (ROCEPHIN) 2,000 mg in sterile water 20 mL IV syringe  2,000 mg IntraVENous Q12H Tiffanie E Kaercher, DO   2,000 mg at 08/23/22 0306    melatonin tablet 3 mg  3 mg Oral Nightly PRN Rudene Manolo Gupta, DO   3 mg at 08/19/22 2051    albuterol (PROVENTIL) nebulizer solution 2.5 mg  2.5 mg Nebulization Q6H PRN Tiffanie ISABELLA Kaercher, DO        albuterol (ACCUNEB) nebulizer solution 0.63 mg  1 ampule Nebulization Q6H PRN Tiffanie E Kaercher, DO        [Held by provider] apixaban (ELIQUIS) tablet 5 mg  5 mg Oral BID Tiffanie E Kafannieher, DO        metoprolol succinate (TOPROL XL) extended release tablet 50 mg  50 mg Oral BID  Tiffanie Gupta, DO 50 mg at 08/23/22 8167    atorvastatin (LIPITOR) tablet 20 mg  20 mg Oral Daily Tiffanie E Kaercher, DO   20 mg at 08/22/22 2155    sodium chloride flush 0.9 % injection 5-40 mL  5-40 mL IntraVENous 2 times per day Lovetta Oglesby, DO   10 mL at 08/22/22 2156    sodium chloride flush 0.9 % injection 5-40 mL  5-40 mL IntraVENous PRN Tiffanie E Kaercher, DO        0.9 % sodium chloride infusion   IntraVENous PRN Tiffanie E Kaercher, DO        acetaminophen (TYLENOL) tablet 650 mg  650 mg Oral Q6H PRN Tiffanie E Kaercher, DO        Or    acetaminophen (TYLENOL) suppository 650 mg  650 mg Rectal Q6H PRN Tiffanie E Kaercher, DO        senna (SENOKOT) tablet 8.6 mg  1 tablet Oral Daily PRN Tiffanie E Kaercher, DO        ipratropium-albuterol (DUONEB) nebulizer solution 1 ampule  1 ampule Inhalation Q4H WA Tiffanie E Kaercher, DO   1 ampule at 08/23/22 0932        No Known Allergies    PHYSICAL EXAM:   Vitals:    08/22/22 2145 08/23/22 0833 08/23/22 0932 08/23/22 0943   BP: (!) 114/59 116/65     Pulse: 60 60 55    Resp: 18 17 24    Temp: 97.8 °F (36.6 °C) 99 °F (37.2 °C)     TempSrc: Temporal Oral     SpO2: 95% 93% 100%    Weight:       Height:    5' 6\" (1.676 m)      Constitutional: Well-developed, no acute distress  Eyes: conjunctivae normal, no xanthelasma   Ears, Nose, Throat: oral mucosa moist, no cyanosis   CV: no JVD. Regular rate and rhythm. Normal S1S2 and no S3. No murmurs, rubs, or gallops.    Lungs: clear to auscultation bilaterally, normal respiratory effort without used of accessory muscles  Abdomen: soft, non-tender, non distended  Musculoskeletal: no digital clubbing, no edema   Skin: warm, no rashes     I have personally reviewed the laboratory, cardiac diagnostic and radiographic testing as outlined below:    Data:    Recent Labs     08/21/22  0517 08/21/22  1742 08/22/22  0538 08/23/22  0647   WBC 2.5*  --  2.0* 1.8*   HGB 7.2* 8.2* 8.3* 8.1*   HCT 22.3* 25.1* 25.6* 24.6*   PLT 64*  --  95* 125*     Recent Labs     22  1644      K 3.1*   CL 98   CO2 25   BUN 7   CREATININE 0.7   CALCIUM 8.9      Lab Results   Component Value Date/Time    MG 1.5 2021 05:28 AM     No results for input(s): TSH in the last 72 hours. No results for input(s): INR in the last 72 hours. CXR:   FINDINGS:   There is opacity in the left lower lung. There are chronic markings at the   right lung base. Emphysematous changes in the upper lungs. The heart is not   enlarged. There is no pneumothorax. There is mild blunting of the left   costophrenic angle. Pacemaker present. Impression   Continued opacity in the left lower lung and linear scarring in the left   perihilar region. COPD. Telemetry: Atrial pacing, V, pacing, PACs    EK22 Atrial pacing, PACs, right bundle branch block , left posterior hemiblock    SHIRA 22   Summary   No SHIRA indication of Endocarditis. Normal left ventricular chamber size and systolic function. Interatrial septum appears intact. Normal right ventricle size and function. Pacemaker leads noted in right atrium and right ventricle. Normal aortic root size. Moderate atherosclerosis in the descending thoracic aorta. Epicardial fat vs. small pericardial effusion. No intra cardiac mass or thrombus. Technically sub-optimal images. Compared to prior sub-optimal TTE from 22. Signature    ----------------------------------------------------------------   Electronically signed by Noah Herbert MD(Interpreting   physician) on 2022 06:50 PM      Echocardiogram: 22   Conclusions      Summary   Normal left ventricular chamber size. Normal left ventricular systolic function. Visually estimated LVEF is 55-60 %. No wall motion abnormalities. Normal diastolic function. Normal left atrial pressure. Normal right ventricle structure and function. Normal left atrial size.    Normal right atrial size   Likely normal estimated PA pressure. Pacer/ ICD wire present in the right heart. No gross evidence of infective endocarditis. Consider SHIRA for better   accuracy if clinically indicated. Compared to prior echo from 2021, no significant changes noted. Signature    ----------------------------------------------------------------   Electronically signed by Abhishek Kasper MD(Interpreting   physician) on 08/19/2022 04:16 PM      I have independently reviewed all of the ECGs and rhythm strips per above     Assessment/Plan:      1. Dual-chamber pacemaker in situ-  -for AV block with a long pause/ ? CHB in past on ILR. Date of implant July 28, 2021  Last pacemaker check in June revealed 6.5% atrial and 3% ventricular pacing with DDI programming at lower rate of 60 bpm    2. Bacteremia/sepsis--enterococcus faecalis  -  probably port infection per ID   Persistent bacteremia despite IV antibiotics  8-16-22 blood cultures E faecalis x 2  8-17-22 blood cultures E faecalis x 1  8-18-22 blood culture + E faecalis x1  8-19-22 blood cultures neg to date  8-20-22 Port ( tip ) cx - neg tod date   8-20-22 blood cultures neg to date  - redraw 8/22/22      3. Lung cancer, squamous cell--patient undergoing chemotherapy  -  Stage IIIB T2aN3 Squamous Cell Carcinoma of Lung  diagnosis 5/27/2021, PD-L1 0%    - 7/6/21-9/7/21 treatment with concurrent chemotherapy/radiation therapy   - 11/2/21 consolidated with Imfinzi (Durvalumab) per ID IV every 4 weeks   - 6/30/22 PET showing progression and was started on Carbo, Gemzar and Keytruda on 6/30/22  - 8/9 /22 received C2D8 of Carboplatin AUC 2 with Gemzar 1000 mg/m2 IV on D1,8 and Keytruda IV on D1 on a 21 day cycle    4. Neutropenia and thrombocytopenia related to chemotherapy  - WBC ~ 2; plt trending up. Now above 100; had transfusion of PLT on 8/17      5. Persistent anemia. -  post PRBC transfusion 8/21/22    6.  History of PE in 2021   - on Eliquis in the past     Recommendations:    Awaiting confirmation of back of from CT surgery team to schedule extraction if possible. Continue to trend CBC and plt count. Holding eliquis- was on in past for PE     Thank you for allowing me to participate in your patient's care. Please call me if there are any questions or concerns. LELO Blankenship CNP,[facility]  Cardiac Electrophysiology  [facility]) Physicians  Electrophysiology  11:17 AM  8/23/2022    Addendum:   Pacemaker check done ~ 2 pm today   Device Interrogation: [place]   Mode: DDI 60; changed to VVI 40 bpm   Battery Voltage/Longevity:  3.01V, 5.9-8.6 years     Pacing: A: 27%   RV: 25%   P wave: >5 mV  Impedance: 380 ohms   Threshold: 1.0 V @ 0.5 ms  RV R wave: 10.6 mV  Impedance: 410 ohms   Threshold: 1.5 V @ 0.5 ms  Episodes: AT/AF detections ranging from 12 seconds to 8:44 minutes. June 18-most recent episode on Aug 20   Reprogramming included: changed to VVI 40   Overall device function is normal    Will monitor closely this week to measure pacing percentage in RV. Pending EKG to assess underlying also. LELO Blankenship CNP    Attending [de-identified] Statement    Patient seen with the ANP. Agree with the findings, assessment and plan. Management plan was discussed. I have personally interviewed the patient, independently performed a focused cardiac examination, reviewed the pertinent laboratory and diagnostic testing with the patient and directly participated in the medical decision-making as noted above with the following additions:     Feeling weak. Currently in AV paced. SHIRA showed no evidence of endocarditis. Pacemaker interrogation showed 27% A paced and 25% V paced     Physical exam showed no JVD, RRR, normal S1S2, no murmur, clear lungs bilaterally, trace edema. Pacemaker site showed no sign of infection. Will program pacemaker to VVI 40 to access pacing percentage. Will consult CTS for pacemaker lead and system removal. Hold Eliquis. Monitor platelet count.     I have spent a total of 35 minutes with the patient and the family reviewing the above stated recommendations. And a total of >50% of that time involved face-to-face time providing counseling and/or coordination of care with the other providers, reviewing records/tests, counseling/education of the patient, ordering medications/tests/procedures, coordinating care, and documenting clinical information in the EHR.     Emmie Wen MD  Cardiac Electrophysiology  [facility]  [facility]: [facility]  3:54 PM  8/23/2022

## 2022-08-23 NOTE — PROGRESS NOTES
[facility]                                Progress note    Subjective:    Family at bedside  No acute events overnight  Hemoglobin stable, platelet count improved to 1 25,000    Objective:    /65   Pulse 55   Temp 99 °F (37.2 °C) (Oral)   Resp 24   Ht 5' 6\" (1.676 m)   Wt 188 lb 8 oz (85.5 kg)   SpO2 100%   BMI 30.42 kg/m²     In: 200 [I.V.:200]  Out: -   In: 200   Out: -     General appearance: NAD, conversant, looks much better today  HEENT: AT/NC, MMM  Neck: FROM, supple  Lungs: Clear to auscultation  CV: RRR, no MRGs-right-sided port removed 8/20, left-sided pacemaker in place  Vasc: Radial pulses 2+  Abdomen: Soft, non-tender; no masses or HSM  Extremities: No peripheral edema or digital cyanosis  Skin: Generalized bruising  Psych: Alert and oriented to person, place and time  Neuro: Alert and interactive     Recent Labs     08/21/22  0517 08/21/22  1742 08/22/22  0538 08/23/22  0647   WBC 2.5*  --  2.0* 1.8*   HGB 7.2* 8.2* 8.3* 8.1*   HCT 22.3* 25.1* 25.6* 24.6*   PLT 64*  --  95* 125*       Recent Labs     08/20/22  1644      K 3.1*   CL 98   CO2 25   BUN 7   CREATININE 0.7   CALCIUM 8.9       Assessment:    Principal Problem:    Pneumonia  Active Problems:    Pancytopenia (HCC)    Infection of pacemaker lead wire (HCC)    Sepsis due to Enterococcus ([address]    COPD (chronic obstructive pulmonary disease) (HCC)    HTN (hypertension)    Cardiac pacemaker in situ    Squamous carcinoma of lung ([address]    History pulmonary embolism ([address] on Eliquis  Resolved Problems:    * No resolved hospital problems.  *      Plan:  Patient is a 39-year-old male with a hx of squamous cell lung carcinoma who is active chemo therapy who is admitted to [facility] for  Sepsis/bacteremia/immunocompromise status-acutely ill status  -Mediport removed 8/20  -EP to talk to CT surgery for possible removal of pacer, if unable then transfer to [facility]-if pacemaker removal still needed  -Respiratory panel negative  -Check procalcitonin 0.42  -CT chest ordered by pulm> progressive infiltrative mass in the left perihilar region  -On daptomycin per infectious disease > switched to IV Rocephin and ampicillin    Bacteremia -Enterococcus spp  -IV daptomycin > switch to Rocephin and ampicillin  -Repeat blood cultures  -GS consulted for port removal-Case discussed with Dr. Teodoro Brewer, appreciate input > removed 8/20, tip sent for culture  -Pacemaker removal-EP following  -Blood cultures from yesterday have returned negative, patient may not need pacemaker removal-we will defer to ID and EP  -SHIRA 8/22/2022-unremarkable, no evidence of endocarditis, ejection fraction  -Patient will need platelet transfusions prior to procedures  -Case discussed with Dr. Alycia Oneill last week    Pancytopenia  -Consult heme-onc-following  -h/h 6.9/21.4 > stable now in mid eights post transfusion  -Platelets 4 > transfuse 1 unit platelets > 23 > 245,516  -Hold Eliquis-consider holding off for few weeks     Hematuria  -Check urine culture  -Hold Eliquis    DVT Prophylaxis PCD's  PT/OT  Discharge [id number] Adry Ramon MD  10:26 AM  8/23/2022

## 2022-08-24 ENCOUNTER — APPOINTMENT (OUTPATIENT)
Dept: GENERAL RADIOLOGY | Age: 72
DRG: 228 | End: 2022-08-24
Payer: MEDICARE

## 2022-08-24 LAB
ABO/RH: NORMAL
ANION GAP SERPL CALCULATED.3IONS-SCNC: 13 MMOL/L (ref 7–16)
ANISOCYTOSIS: ABNORMAL
ANTIBODY SCREEN: NORMAL
APTT: 29 SEC (ref 24.5–35.1)
BASOPHILS ABSOLUTE: 0 E9/L (ref 0–0.2)
BASOPHILS RELATIVE PERCENT: 0.4 % (ref 0–2)
BLASTS RELATIVE PERCENT: 0.9 % (ref 0–0)
BLOOD BANK DISPENSE STATUS: NORMAL
BLOOD BANK DISPENSE STATUS: NORMAL
BLOOD BANK PRODUCT CODE: NORMAL
BLOOD BANK PRODUCT CODE: NORMAL
BLOOD CULTURE, ROUTINE: NORMAL
BPU ID: NORMAL
BPU ID: NORMAL
BUN BLDV-MCNC: 5 MG/DL (ref 6–23)
CALCIUM SERPL-MCNC: 9.2 MG/DL (ref 8.6–10.2)
CHLORIDE BLD-SCNC: 100 MMOL/L (ref 98–107)
CO2: 27 MMOL/L (ref 22–29)
CREAT SERPL-MCNC: 1 MG/DL (ref 0.7–1.2)
DESCRIPTION BLOOD BANK: NORMAL
DESCRIPTION BLOOD BANK: NORMAL
EOSINOPHILS ABSOLUTE: 0 E9/L (ref 0.05–0.5)
EOSINOPHILS RELATIVE PERCENT: 1.3 % (ref 0–6)
GFR AFRICAN AMERICAN: >60
GFR NON-AFRICAN AMERICAN: >60 ML/MIN/1.73
GLUCOSE BLD-MCNC: 105 MG/DL (ref 74–99)
HCT VFR BLD CALC: 27.2 % (ref 37–54)
HEMOGLOBIN: 8.8 G/DL (ref 12.5–16.5)
HYPOCHROMIA: ABNORMAL
INR BLD: 1.2
LYMPHOCYTES ABSOLUTE: 0.43 E9/L (ref 1.5–4)
LYMPHOCYTES RELATIVE PERCENT: 17.5 % (ref 20–42)
MCH RBC QN AUTO: 29.8 PG (ref 26–35)
MCHC RBC AUTO-ENTMCNC: 32.4 % (ref 32–34.5)
MCV RBC AUTO: 92.2 FL (ref 80–99.9)
METAMYELOCYTES RELATIVE PERCENT: 0.9 % (ref 0–1)
MONOCYTES ABSOLUTE: 0.7 E9/L (ref 0.1–0.95)
MONOCYTES RELATIVE PERCENT: 28.9 % (ref 2–12)
NEUTROPHILS ABSOLUTE: 1.25 E9/L (ref 1.8–7.3)
NEUTROPHILS RELATIVE PERCENT: 50.9 % (ref 43–80)
NUCLEATED RED BLOOD CELLS: 1.8 /100 WBC
OVALOCYTES: ABNORMAL
PDW BLD-RTO: 19.3 FL (ref 11.5–15)
PLATELET # BLD: 162 E9/L (ref 130–450)
PMV BLD AUTO: 11.5 FL (ref 7–12)
POIKILOCYTES: ABNORMAL
POLYCHROMASIA: ABNORMAL
POTASSIUM SERPL-SCNC: 3.5 MMOL/L (ref 3.5–5)
PROMYELOCYTES PERCENT: 0.9 % (ref 0–0)
PROTHROMBIN TIME: 12.6 SEC (ref 9.3–12.4)
RBC # BLD: 2.95 E12/L (ref 3.8–5.8)
SODIUM BLD-SCNC: 140 MMOL/L (ref 132–146)
TEAR DROP CELLS: ABNORMAL
WBC # BLD: 2.4 E9/L (ref 4.5–11.5)

## 2022-08-24 PROCEDURE — 36415 COLL VENOUS BLD VENIPUNCTURE: CPT

## 2022-08-24 PROCEDURE — 6370000000 HC RX 637 (ALT 250 FOR IP): Performed by: SURGERY

## 2022-08-24 PROCEDURE — 2580000003 HC RX 258: Performed by: SURGERY

## 2022-08-24 PROCEDURE — 85610 PROTHROMBIN TIME: CPT

## 2022-08-24 PROCEDURE — 6360000002 HC RX W HCPCS: Performed by: PHYSICIAN ASSISTANT

## 2022-08-24 PROCEDURE — 6370000000 HC RX 637 (ALT 250 FOR IP): Performed by: INTERNAL MEDICINE

## 2022-08-24 PROCEDURE — 86923 COMPATIBILITY TEST ELECTRIC: CPT

## 2022-08-24 PROCEDURE — 86901 BLOOD TYPING SEROLOGIC RH(D): CPT

## 2022-08-24 PROCEDURE — 71045 X-RAY EXAM CHEST 1 VIEW: CPT

## 2022-08-24 PROCEDURE — 85025 COMPLETE CBC W/AUTO DIFF WBC: CPT

## 2022-08-24 PROCEDURE — 85730 THROMBOPLASTIN TIME PARTIAL: CPT

## 2022-08-24 PROCEDURE — 80048 BASIC METABOLIC PNL TOTAL CA: CPT

## 2022-08-24 PROCEDURE — 99222 1ST HOSP IP/OBS MODERATE 55: CPT | Performed by: THORACIC SURGERY (CARDIOTHORACIC VASCULAR SURGERY)

## 2022-08-24 PROCEDURE — 2700000000 HC OXYGEN THERAPY PER DAY

## 2022-08-24 PROCEDURE — 86900 BLOOD TYPING SEROLOGIC ABO: CPT

## 2022-08-24 PROCEDURE — 86850 RBC ANTIBODY SCREEN: CPT

## 2022-08-24 PROCEDURE — 94640 AIRWAY INHALATION TREATMENT: CPT

## 2022-08-24 PROCEDURE — 6360000002 HC RX W HCPCS: Performed by: SURGERY

## 2022-08-24 PROCEDURE — 2060000000 HC ICU INTERMEDIATE R&B

## 2022-08-24 PROCEDURE — 93005 ELECTROCARDIOGRAM TRACING: CPT | Performed by: NURSE PRACTITIONER

## 2022-08-24 PROCEDURE — 6370000000 HC RX 637 (ALT 250 FOR IP): Performed by: SPECIALIST

## 2022-08-24 PROCEDURE — 99233 SBSQ HOSP IP/OBS HIGH 50: CPT | Performed by: INTERNAL MEDICINE

## 2022-08-24 RX ADMIN — AMPICILLIN SODIUM 2000 MG: 2 INJECTION, POWDER, FOR SOLUTION INTRAVENOUS at 15:38

## 2022-08-24 RX ADMIN — FILGRASTIM-AAFI 300 MCG: 480 INJECTION, SOLUTION SUBCUTANEOUS at 18:35

## 2022-08-24 RX ADMIN — CLOTRIMAZOLE 10 MG: 10 LOZENGE ORAL at 08:57

## 2022-08-24 RX ADMIN — IPRATROPIUM BROMIDE AND ALBUTEROL SULFATE 1 AMPULE: .5; 2.5 SOLUTION RESPIRATORY (INHALATION) at 20:12

## 2022-08-24 RX ADMIN — ATORVASTATIN CALCIUM 20 MG: 20 TABLET, FILM COATED ORAL at 19:51

## 2022-08-24 RX ADMIN — CLOTRIMAZOLE 10 MG: 10 LOZENGE ORAL at 16:26

## 2022-08-24 RX ADMIN — AMPICILLIN SODIUM 2000 MG: 2 INJECTION, POWDER, FOR SOLUTION INTRAVENOUS at 19:58

## 2022-08-24 RX ADMIN — IPRATROPIUM BROMIDE AND ALBUTEROL SULFATE 1 AMPULE: .5; 2.5 SOLUTION RESPIRATORY (INHALATION) at 08:44

## 2022-08-24 RX ADMIN — Medication 10 ML: at 19:51

## 2022-08-24 RX ADMIN — AMPICILLIN SODIUM 2000 MG: 2 INJECTION, POWDER, FOR SOLUTION INTRAVENOUS at 11:59

## 2022-08-24 RX ADMIN — AMPICILLIN SODIUM 2000 MG: 2 INJECTION, POWDER, FOR SOLUTION INTRAVENOUS at 23:09

## 2022-08-24 RX ADMIN — CEFTRIAXONE 2000 MG: 2 INJECTION, POWDER, FOR SOLUTION INTRAMUSCULAR; INTRAVENOUS at 03:50

## 2022-08-24 RX ADMIN — CLOTRIMAZOLE 10 MG: 10 LOZENGE ORAL at 19:51

## 2022-08-24 RX ADMIN — IPRATROPIUM BROMIDE AND ALBUTEROL SULFATE 1 AMPULE: .5; 2.5 SOLUTION RESPIRATORY (INHALATION) at 13:20

## 2022-08-24 RX ADMIN — AMPICILLIN SODIUM 2000 MG: 2 INJECTION, POWDER, FOR SOLUTION INTRAVENOUS at 09:03

## 2022-08-24 RX ADMIN — Medication 10 ML: at 09:03

## 2022-08-24 RX ADMIN — FUROSEMIDE 20 MG: 20 TABLET ORAL at 08:57

## 2022-08-24 RX ADMIN — IPRATROPIUM BROMIDE AND ALBUTEROL SULFATE 1 AMPULE: .5; 2.5 SOLUTION RESPIRATORY (INHALATION) at 16:06

## 2022-08-24 RX ADMIN — METOPROLOL SUCCINATE 50 MG: 25 TABLET, EXTENDED RELEASE ORAL at 08:57

## 2022-08-24 RX ADMIN — CEFTRIAXONE 2000 MG: 2 INJECTION, POWDER, FOR SOLUTION INTRAMUSCULAR; INTRAVENOUS at 16:26

## 2022-08-24 RX ADMIN — MELATONIN 3 MG ORAL TABLET 3 MG: 3 TABLET ORAL at 20:05

## 2022-08-24 RX ADMIN — AMPICILLIN SODIUM 2000 MG: 2 INJECTION, POWDER, FOR SOLUTION INTRAVENOUS at 03:57

## 2022-08-24 ASSESSMENT — PAIN SCALES - GENERAL
PAINLEVEL_OUTOF10: 0
PAINLEVEL_OUTOF10: 0

## 2022-08-24 NOTE — CONSULTS
CTS Consult    Patient name: Mary Grace Johnston    Reason for consult: Infected pacer    Referring Physician: Dr. Odell Arzola    Primary Care Physician: Elysa Felty, MD    Date of service: 8/24/2022    Chief Complaint: Weakness    HPI: 67year old S/P pacer placed in July of 2021 who is not undergoing chemo/rads for lung CA presents with weakness. He was found to have positive blood cultures with enterococcus that is persistent despite IV ATBx. He is referred for extraction. Currently he denies CP, SOB, CURTIS, LE edema, N/V, F/C, orthopnea, PND and syncope. Jazmine Green His incision is well healed.     Allergies: No Known Allergies    Home medications:    Current Facility-Administered Medications   Medication Dose Route Frequency Provider Last Rate Last Admin    [START ON 8/25/2022] ceFAZolin (ANCEF) 2,000 mg in sterile water 20 mL IV syringe  2,000 mg IntraVENous See Admin Instructions LELO Cade - RICA        clotrimazole [facility], [facility]) lindsay 10 mg  10 mg Oral 4x daily Cesilia Wise MD   10 mg at 08/24/22 0857    0.9 % sodium chloride infusion   IntraVENous PRN Naya Narayanan MD        albuterol sulfate HFA (PROVENTIL;VENTOLIN;PROAIR) 108 (90 Base) MCG/ACT inhaler 2 puff  2 puff Inhalation Q6H PRN Tiffanie Gupta DO        sodium chloride flush 0.9 % injection 5-40 mL  5-40 mL IntraVENous 2 times per day Mildred Layne MD   10 mL at 08/23/22 2000    sodium chloride flush 0.9 % injection 5-40 mL  5-40 mL IntraVENous PRN Bhavya Murrieta MD        0.9 % sodium chloride infusion  25 mL IntraVENous PRN Bhavya Murrieta MD        HYDROmorphone (DILAUDID) injection 0.25 mg  0.25 mg IntraVENous Q5 Min PRN Bhavya Murrieta MD        HYDROmorphone (DILAUDID) injection 0.5 mg  0.5 mg IntraVENous Q5 Min PRN Bhavya Murrieta MD        labetalol (NORMODYNE;TRANDATE) injection 5 mg  5 mg IntraVENous Q15 Min PRN Bhavya Santizo MD        Or    hydrALAZINE (APRESOLINE) injection 5 mg  5 mg IntraVENous Q15 Min PRN Bhavya Mazariegos MD        furosemide (LASIX) tablet 20 mg  20 mg Oral Daily Cy Tate MD   20 mg at 08/24/22 0857    potassium chloride (KLOR-CON M) extended release tablet 40 mEq  40 mEq Oral PRN Tiffanie E Kaercher, DO        Or    potassium bicarb-citric acid (EFFER-K) effervescent tablet 40 mEq  40 mEq Oral PRN Tiffanie E Kaercher, DO        Or    potassium chloride 10 mEq/100 mL IVPB (Peripheral Line)  10 mEq IntraVENous PRN Tiffanie E Kaercher, DO        ampicillin 2000 mg ivpb mini bag  2,000 mg IntraVENous 6 times per day Jinger Eis, DO   Stopped at 08/24/22 0930    cefTRIAXone (ROCEPHIN) 2,000 mg in sterile water 20 mL IV syringe  2,000 mg IntraVENous Q12H Tiffanie E Kaercher, DO   2,000 mg at 08/24/22 0350    melatonin tablet 3 mg  3 mg Oral Nightly PRN Jinger Eis, DO   3 mg at 08/19/22 2051    albuterol (PROVENTIL) nebulizer solution 2.5 mg  2.5 mg Nebulization Q6H PRN Tiffanie E Kaercher, DO        albuterol (ACCUNEB) nebulizer solution 0.63 mg  1 ampule Nebulization Q6H PRN Tiffanie E Kaercher, DO        [Held by provider] apixaban (ELIQUIS) tablet 5 mg  5 mg Oral BID Tiffanie E Kaercher, DO        [Held by provider] metoprolol succinate (TOPROL XL) extended release tablet 50 mg  50 mg Oral BID  Tiffanie E Kaercher, DO   50 mg at 08/24/22 0857    atorvastatin (LIPITOR) tablet 20 mg  20 mg Oral Daily Tiffanie E Kaercher, DO   20 mg at 08/23/22 1959    sodium chloride flush 0.9 % injection 5-40 mL  5-40 mL IntraVENous 2 times per day Jinger Eis, DO   10 mL at 08/24/22 6028    sodium chloride flush 0.9 % injection 5-40 mL  5-40 mL IntraVENous PRN Tiffanie E Kaercher, DO        0.9 % sodium chloride infusion   IntraVENous PRN Tiffanie E Kaercher, DO        acetaminophen (TYLENOL) tablet 650 mg  650 mg Oral Q6H PRN Tiffanie E Kaercher, DO        Or    acetaminophen (TYLENOL) suppository 650 mg  650 mg Rectal Q6H PRN Tiffanie E Karashard, DO        senna (SENOKOT) tablet 8.6 mg  1 tablet Oral Daily PRN Tiffanie E Kaercher, DO        ipratropium-albuterol (DUONEB) nebulizer solution 1 ampule  1 ampule Inhalation Q4H WA Tiffanie ISABELLA Gupta DO   1 ampule at 08/24/22 0844       Past Medical History:  Past Medical History:   Diagnosis Date    CHB (complete heart block) ([facility] [address] 07/2021    COPD (chronic obstructive pulmonary disease) ([facility]     Syncope 09/2020    dr Satish Fountain wearing a monitor    Syncope 07/2021       Past Surgical History:  Past Surgical History:   Procedure Laterality Date    BRONCHOSCOPY N/A 05/27/2021    BRONCHOSCOPY W/EBUS FNA performed by Clarisa Fry MD at [address] [facility] N/A 05/27/2021    BRONCHOSCOPY DIAGNOSTIC OR CELL [address] ONLY performed by Clarisa Fyr MD at [address] [facility] N/A 05/27/2021    BRONCHOSCOPY ADD ON COMPUTER ASSISTED performed by Clarisa Fry MD at [address] [facility] [address] Bilateral 2011 2001    groin     PACEMAKER INSERTION  07/20/2021    DUAL PPM  ([facility]BOTT)  DR. Alexander Gene    PORT SURGERY Right 08/20/2022    PORT REMOVAL performed by Sherif Bourgeois MD at [address] [place] [address]    TRANSESOPHAGEAL ECHOCARDIOGRAM  08/22/2022    Dr Shima Marshall       Social History:  Social History     Socioeconomic History    Marital status:      Spouse name: Not on file    Number of children: Not on file    Years of education: Not on file    Highest education level: Not on file   Occupational History    Not on file   Tobacco Use    Smoking status: Former     Packs/day: 2.00     Years: 25.00     Pack years: 50.00     Types: Cigarettes     Quit date: 12/26/2011     Years since quitting: 10.6    Smokeless tobacco: Never   Vaping Use    Vaping Use: Never used   Substance and Sexual Activity    Alcohol use: Not Currently     Alcohol/week: 1.0 standard drink     Types: 1 Glasses of wine per week     Comment: 1 glass of wine per day prior    Drug use: No    Sexual activity: Not on file Other Topics Concern    Not on file   Social History Narrative    Not on file     Social Determinants of Health     Financial Resource Strain: Not on file   Food Insecurity: Not on file   Transportation Needs: Not on file   Physical Activity: Not on file   Stress: Not on file   Social Connections: Not on file   Intimate Partner Violence: Not on file   Housing Stability: Not on file       Family History:  Family History   Problem Relation Age of Onset    Cancer Father         prostate    Cancer Paternal Uncle         anal       Review of Systems:  Constitutional: Denies fevers, chills, or weight loss. HEENT: Denies visual changes or hearing loss. Heart: As per HPI. Lungs: Denies shortness of breath, cough, or wheezing. Gastrointestinal: Denies nausea, vomiting, constipation, or diarrhea. Genitourinary: Denies dysuria or hematuria. Psychiatric: Patient denies anxiety or depression. Neurologic: Patient denies weakness of the extremities, dizziness, or headaches. All other ROS checked and found to be negative. Labs:  Recent Labs     08/22/22  0538 08/23/22  0647 08/24/22  0522   WBC 2.0* 1.8* 2.4*   HGB 8.3* 8.1* 8.8*   HCT 25.6* 24.6* 27.2*   PLT 95* 125* 162      Recent Labs     08/24/22  1008   BUN 5*   CREATININE 1.0       Objective:  Vitals BP (!) 107/54   Pulse 52   Temp 98.1 °F (36.7 °C)   Resp 28   Ht 5' 6\" (1.676 m)   Wt 188 lb 8 oz (85.5 kg)   SpO2 99%   BMI 30.42 kg/m²   General Appearance: Pleasant 67y.o. year old male who appears stated age. Communicates well, no acute distress. HEENT: Head is normocephalic, atraumatic. EOMs intact, PERRL. Trachea midline. Lungs: Normal respiratory rate and normal effort. He is not in respiratory distress. Breath sounds clear to auscultation. No wheezes. Heart: Normal rate. Regular rhythm. S1 normal and S2 normal. Negative for murmur. Chest: Symmetric chest wall expansion. Extremities: Normal range of motion.      Neurological: Patient is alert and oriented to person, place and time. Patient has normal reflexes. Skin: Warm and dry. Abdomen: Abdomen is soft and non-distended. Bowel sounds are normal. There is no abdominal tenderness tenderness. There is no guarding. There is no mass. Pulses: Distal pulses are intact. Skin: Warm and dry without lesions. Assessment: Persistent bacteremia despite ATBx        Plan: Agree complete device removal is best.  Plan for Tomorrow. All risks, benefits, alternatives and potential complications explained thoroughly including, but not limited to, bleeding, infection, lung injury, kidney injury, stroke, heart attack, prolonged ventilation, wound complication, need for re-operation, and death, and the patient agrees to proceed.       Electronically signed by Marta Payne MD on 8/24/2022 at 11:51 AM

## 2022-08-24 NOTE — PROGRESS NOTES
[facility]                                Progress note    Subjective:    Family at bedside  Resting in a chair at bedside  Plans for removal of pacer 8/25    Objective:    BP (!) 107/54   Pulse 52   Temp 98.1 °F (36.7 °C)   Resp 28   Ht 5' 6\" (1.676 m)   Wt 188 lb 8 oz (85.5 kg)   SpO2 99%   BMI 30.42 kg/m²     In: 600 [P.O.:600]  Out: -   In: 600   Out: -     General appearance: NAD, conversant, looks much better today  HEENT: AT/NC, MMM  Neck: FROM, supple  Lungs: Clear to auscultation  CV: RRR, no MRGs-right-sided port removed 8/20, left-sided pacemaker in place  Vasc: Radial pulses 2+  Abdomen: Soft, non-tender; no masses or HSM  Extremities: No peripheral edema or digital cyanosis  Skin: Generalized bruising  Psych: Alert and oriented to person, place and time  Neuro: Alert and interactive     Recent Labs     08/22/22  0538 08/23/22  0647 08/24/22  0522   WBC 2.0* 1.8* 2.4*   HGB 8.3* 8.1* 8.8*   HCT 25.6* 24.6* 27.2*   PLT 95* 125* 162         Recent Labs     08/24/22  1008      K 3.5      CO2 27   BUN 5*   CREATININE 1.0   CALCIUM 9.2         Assessment:    Principal Problem:    Pneumonia  Active Problems:    Pancytopenia (HCC)    Infection of pacemaker lead wire (HCC)    Sepsis due to Enterococcus ([address]    Bacteremia    COPD (chronic obstructive pulmonary disease) (HCC)    HTN (hypertension)    Cardiac pacemaker in situ    Squamous carcinoma of lung ([address]    History pulmonary embolism ([address] on Eliquis  Resolved Problems:    * No resolved hospital problems.  *      Plan:  Patient is a 39-year-old male with a hx of squamous cell lung carcinoma who is active chemo therapy who is admitted to [facility] for  Sepsis/bacteremia/immunocompromise status-acutely ill status  -Mediport removed 8/20  -CTS consulted by EP for removal of pacer, plans for 8/25  -Respiratory panel negative  -Check procalcitonin 0.42  -CT chest ordered by pulm> progressive infiltrative mass in the left perihilar region  -On daptomycin per infectious disease > switched to IV Rocephin and ampicillin  -General surgery > signed off   -SHIRA 8/22/2022-unremarkable, no evidence of endocarditis, ejection fraction    Check chest x-ray today secondary to shortness of breath especially in the mornings  Resume Trelegy as he takes at home-okay to use from home if okay with pulmonology    Pancytopenia  -Consult heme-onc-following  -h/h 6.9/21.4 > stable now in mid eights post transfusion  -Platelets 4 > transfuse 1 unit platelets > 23 > 916 today   -Hold Eliquis-consider holding off for few weeks     Hematuria  -Check urine culture  -Hold Eliquis    DVT Prophylaxis PCD's  PT/OT  Discharge planning       LELO Main CNP  12:48 PM  8/24/2022     Above note edited to reflect my thoughts     I personally saw, examined and provided care for the patient. Radiographs, labs and medication list were reviewed by me independently. The case was discussed in detail and plans for care were established. Review of MAKENZIE Main   , documentation was conducted and revisions were made as appropriate directly by me. I agree with the above documented exam, problem list, and plan of care.      Jerzy Peralta MD  3:37 PM  8/24/2022

## 2022-08-24 NOTE — PROGRESS NOTES
Message left with [facility] as per request of Dr. Julia Peacock. To have Dr. Mary Bowen  call Dr. Julia Peacock.

## 2022-08-24 NOTE — PROGRESS NOTES
Subjective:  No overnight event  cardiothoracic surgery to remove pacemaker tomorrow  No growth on PORT to date  Overall patient feeling much better  Denies N/V, SOB    Objective:    BP (!) 107/54   Pulse 52   Temp 98.1 °F (36.7 °C)   Resp 28   Ht 5' 6\" (1.676 m)   Wt 188 lb 8 oz (85.5 kg)   SpO2 99%   BMI 30.42 kg/m²     General: NAD  HEENT: No thrush or mucositis, EOMI, PERRLA  Heart:  RRR  Lungs: respiration nonlabored  Abd: nontender, nondistended  Extrem:  No clubbing, cyanosis, 1+ bilateral pedal edema  Skin: Intact, no petechia or purpura    CBC with Differential:    Lab Results   Component Value Date/Time    WBC 2.4 08/24/2022 05:22 AM    RBC 2.95 08/24/2022 05:22 AM    HGB 8.8 08/24/2022 05:22 AM    HCT 27.2 08/24/2022 05:22 AM     08/24/2022 05:22 AM    MCV 92.2 08/24/2022 05:22 AM    MCH 29.8 08/24/2022 05:22 AM    MCHC 32.4 08/24/2022 05:22 AM    RDW 19.3 08/24/2022 05:22 AM    NRBC 1.8 08/24/2022 05:22 AM    BLASTSPCT 0.9 08/24/2022 05:22 AM    METASPCT 0.9 08/24/2022 05:22 AM    LYMPHOPCT 17.5 08/24/2022 05:22 AM    PROMYELOPCT 0.9 08/24/2022 05:22 AM    MONOPCT 28.9 08/24/2022 05:22 AM    MYELOPCT 0.9 09/17/2021 10:20 AM    BASOPCT 0.4 08/24/2022 05:22 AM    MONOSABS 0.70 08/24/2022 05:22 AM    LYMPHSABS 0.43 08/24/2022 05:22 AM    EOSABS 0.00 08/24/2022 05:22 AM    BASOSABS 0.00 08/24/2022 05:22 AM     CMP:    Lab Results   Component Value Date/Time     08/20/2022 04:44 PM    K 3.1 08/20/2022 04:44 PM    K 3.7 08/17/2022 04:17 AM    CL 98 08/20/2022 04:44 PM    CO2 25 08/20/2022 04:44 PM    BUN 7 08/20/2022 04:44 PM    CREATININE 0.7 08/20/2022 04:44 PM    GFRAA >60 08/20/2022 04:44 PM    LABGLOM >60 08/20/2022 04:44 PM    GLUCOSE 106 08/20/2022 04:44 PM    PROT 5.9 08/20/2022 04:44 PM    LABALBU 3.2 08/20/2022 04:44 PM    CALCIUM 8.9 08/20/2022 04:44 PM    BILITOT <0.2 08/20/2022 04:44 PM    ALKPHOS 90 08/20/2022 04:44 PM    AST 25 08/20/2022 04:44 PM    ALT 29 08/20/2022 04:44 PM               Current Facility-Administered Medications:     [START ON 8/25/2022] ceFAZolin (ANCEF) 2,000 mg in sterile water 20 mL IV syringe, 2,000 mg, IntraVENous, See Admin Instructions, LELO Trujillo CNP    clotrimazole [facility]) lindsay 10 mg, 10 mg, Oral, 4x daily, Ling Simons MD, 10 mg at 08/24/22 0857    0.9 % sodium chloride infusion, , IntraVENous, PRN, Fang Mei MD    albuterol sulfate HFA (PROVENTIL;VENTOLIN;PROAIR) 108 (90 Base) MCG/ACT inhaler 2 puff, 2 puff, Inhalation, Q6H PRN, Tiffanie Gupta,     sodium chloride flush 0.9 % injection 5-40 mL, 5-40 mL, IntraVENous, 2 times per day, Kimmy Taylor MD, 10 mL at 08/23/22 2000    sodium chloride flush 0.9 % injection 5-40 mL, 5-40 mL, IntraVENous, PRN, Bhavya Murrieta MD    0.9 % sodium chloride infusion, 25 mL, IntraVENous, PRN, Bhavya Murrieta MD    HYDROmorphone (DILAUDID) injection 0.25 mg, 0.25 mg, IntraVENous, Q5 Min PRN, Bhavya Murrieta MD    HYDROmorphone (DILAUDID) injection 0.5 mg, 0.5 mg, IntraVENous, Q5 Min PRN, Bhavya Murrieta MD    labetalol (NORMODYNE;TRANDATE) injection 5 mg, 5 mg, IntraVENous, Q15 Min PRN **OR** hydrALAZINE (APRESOLINE) injection 5 mg, 5 mg, IntraVENous, Q15 Min PRN, Bhavya Murrieta MD    furosemide (LASIX) tablet 20 mg, 20 mg, Oral, Daily, Fang Mei MD, 20 mg at 08/24/22 0857    potassium chloride (KLOR-CON M) extended release tablet 40 mEq, 40 mEq, Oral, PRN **OR** potassium bicarb-citric acid (EFFER-K) effervescent tablet 40 mEq, 40 mEq, Oral, PRN **OR** potassium chloride 10 mEq/100 mL IVPB (Peripheral Line), 10 mEq, IntraVENous, PRN, Tiffanie ISABELLA Gupta, DO    ampicillin 2000 mg ivpb mini bag, 2,000 mg, IntraVENous, 6 times per day, Tiffanie E Alonso, , Last Rate: 200 mL/hr at 08/24/22 0903, 2,000 mg at 08/24/22 0903    cefTRIAXone (ROCEPHIN) 2,000 mg in sterile water 20 mL IV syringe, 2,000 mg, IntraVENous, Q12H, Micky Kimball, DO, 2,000 mg at 08/24/22 0350    melatonin tablet 3 mg, 3 mg, Oral, Nightly PRN, Tiffanie E Kaercher, DO, 3 mg at 08/19/22 2051    albuterol (PROVENTIL) nebulizer solution 2.5 mg, 2.5 mg, Nebulization, Q6H PRN, Tiffanie E Kaercher, DO    albuterol (ACCUNEB) nebulizer solution 0.63 mg, 1 ampule, Nebulization, Q6H PRN, Tiffanie E Kaercher, DO    [Held by provider] apixaban (ELIQUIS) tablet 5 mg, 5 mg, Oral, BID, Tiffanie E Kaercher, DO    metoprolol succinate (TOPROL XL) extended release tablet 50 mg, 50 mg, Oral, BID WC, Tiffanie E Kaercher, DO, 50 mg at 08/24/22 0857    atorvastatin (LIPITOR) tablet 20 mg, 20 mg, Oral, Daily, Tiffanie E Kaercher, DO, 20 mg at 08/23/22 1959    sodium chloride flush 0.9 % injection 5-40 mL, 5-40 mL, IntraVENous, 2 times per day, Tiffanie E Kaercher, DO, 10 mL at 08/24/22 3288    sodium chloride flush 0.9 % injection 5-40 mL, 5-40 mL, IntraVENous, PRN, Tiffanie E Kaercher, DO    0.9 % sodium chloride infusion, , IntraVENous, PRN, Tiffanie E Kaercher, DO    acetaminophen (TYLENOL) tablet 650 mg, 650 mg, Oral, Q6H PRN **OR** acetaminophen (TYLENOL) suppository 650 mg, 650 mg, Rectal, Q6H PRN, Tiffanie E Kaercher, DO    senna (SENOKOT) tablet 8.6 mg, 1 tablet, Oral, Daily PRN, Tiffanie E Kaercher, DO    ipratropium-albuterol (DUONEB) nebulizer solution 1 ampule, 1 ampule, Inhalation, Q4H WA, Tiffanie E Kaercher, DO, 1 ampule at 08/24/22 1883    Assessment:    Principal Problem:    Pneumonia  Active Problems:    Pancytopenia ([facility] [address]    Infection of pacemaker lead wire ([address]    Sepsis due to Enterococcus ([address]    Bacteremia    COPD (chronic obstructive pulmonary disease) ([address]    HTN (hypertension)    Cardiac pacemaker in situ    Squamous carcinoma of lung ([address]    History pulmonary embolism ([address] on Eliquis  Resolved Problems:    * No resolved hospital problems. *    79-year-old gentleman known to me with stage IIIB T2aN3 Squamous Cell Lung Cancer of Left hilum, dx on 5/27/2021. Treated with concurrent chemo/XRT from 7/6/21 followed by consolidation Imfinzi. Progression on PET from 6/24/22. Started Oletha Pilon and Keytruda on 6/30/22. Patient seen in office last 8/9 and received C2D8 of Carboplatin with Gemzar and Keytruda. Admitted with sepsis, Enterococcus bacteremia. Respiratory panel negative. Mediport was removed 8/20    Plan:  Pancytopenia related to recent chemotherapy and acute/chronic inflammatory process. Neutropenia- start filgrastim daily until [address] > 1500  Prefer to keep hemoglobin above 7.5 g/dL due to poor pulmonary reserves  No transfusion PRBC indicated today. Continue with daily furosemide for bilateral lower extremity edema. Enterococcus bacteremia: Status post port removal.  On broad-spectrum IV antibiotics. Followed by ID. Pacemaker to be removed 8/25  Metastatic non-small cell lung cancer: Treatment is currently on hold. Follow-up with Dr. Shruthi Levi in 2 weeks after discharge.       Electronically signed by MATTHEW Moran on 8/24/2022 at 10:58 AM

## 2022-08-24 NOTE — PROGRESS NOTES
Pulmonary Progress Note    Admit Date: 2022                            PCP: Jerman Amezcua MD  Principal Problem:    Pneumonia  Active Problems:    Pancytopenia ([facility]    Infection of pacemaker lead wire ([facility]    Sepsis due to Enterococcus ([facility]    Bacteremia    COPD (chronic obstructive pulmonary disease) ([facility]    HTN (hypertension)    Cardiac pacemaker in situ    Squamous carcinoma of lung ([facility]    History pulmonary embolism ([facility] on Eliquis  Resolved Problems:    * No resolved hospital problems. *      Subjective:  Sitting in chair on 3L NC   States he is having increased dyspnea with exertion and increased leg swelling   States he is having his pacemaker removed tomorrow     Medications:   sodium chloride      sodium chloride      sodium chloride          [START ON 2022] ceFAZolin  2,000 mg IntraVENous See Admin Instructions    clotrimazole  10 mg Oral 4x daily    sodium chloride flush  5-40 mL IntraVENous 2 times per day    furosemide  20 mg Oral Daily    ampicillin IV  2,000 mg IntraVENous 6 times per day    cefTRIAXone (ROCEPHIN) IV  2,000 mg IntraVENous Q12H    [Held by provider] apixaban  5 mg Oral BID    [Held by provider] metoprolol succinate  50 mg Oral BID WC    atorvastatin  20 mg Oral Daily    sodium chloride flush  5-40 mL IntraVENous 2 times per day    ipratropium-albuterol  1 ampule Inhalation Q4H WA       Vitals:  VITALS:  BP (!) 107/54   Pulse 52   Temp 98.1 °F (36.7 °C)   Resp 28   Ht 5' 6\" (1.676 m)   Wt 188 lb 8 oz (85.5 kg)   SpO2 99%   BMI 30.42 kg/m²   24HR INTAKE/OUTPUT:    Intake/Output Summary (Last 24 hours) at 2022 1153  Last data filed at 2022 0855  Gross per 24 hour   Intake 360 ml   Output --   Net 360 ml     CURRENT PULSE OXIMETRY:  SpO2: 99 %  24HR PULSE OXIMETRY RANGE:  SpO2  Av.2 %  Min: 93 %  Max: 100 %  CVP:    VENT SETTINGS:      Additional Respiratory Assessments  Heart Rate: 52  Resp: 28  SpO2: 99 %      EXAM:  General: No distress. chamber size. Normal left ventricular systolic function. Visually estimated LVEF is 55-60 %. No wall motion abnormalities. Normal diastolic function. Normal left atrial pressure. Normal right ventricle structure and function. Normal left atrial size. Normal right atrial size   Likely normal estimated PA pressure. Pacer/ ICD wire present in the right heart. No gross evidence of infective endocarditis. Consider SHIRA for better   accuracy if clinically indicated. Compared to prior echo from 2021, no significant changes noted. SHIRA on 8/22/22   No SHIRA indication of Endocarditis. Normal left ventricular chamber size and systolic function. Interatrial septum appears intact. Normal right ventricle size and function. Pacemaker leads noted in right atrium and right ventricle. Normal aortic root size. Moderate atherosclerosis in the descending thoracic aorta. Epicardial fat vs. small pericardial effusion. No intra cardiac mass or thrombus. Technically sub-optimal images. Compared to prior sub-optimal TTE from 8/19/22. 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 S/p platelet and PRBC transfusion on  8/18/2022 CT chest w/o: The previously noted infiltrative left hilar mass extending to EAN and LLL surrounding the broncho pulmonary vasculature is noted currently measuring 4.6 x 4.3 cm with progression with atelectasis.     8/18: 2L NC, US BLE neg DVT   8/19: 2L NC   8/20 RA, Mediport removed wakes up somewhat short of breath  8/21 RA received PRBC    8/22 2.5L; SHIRA neg for endocarditis   8/23: RA;    8/24: 3L NC 99%    Stage IIIB T2aN3 Squamous Cell Carcinoma of Lung  diagnosis 5/27/2021, PD-L1 0%    - 7/6/21-9/7/21 treatment with concurrent chemotherapy/radiation therapy   - 11/2/21 consolidated with Imfinzi (Durvalumab) per ID IV every 4 weeks f  - 6/30/22 PET showing progression and was started on Carbo, Gemzar and Keytruda on 6/30/22  - 8/9 /22 r ceived C2D8 of Carboplatin AUC 2 with Gemzar 1000 mg/m2 IV on D1,8 and Keytruda IV on D1 on a 21 day cycle  Enterococcus faecalis bacteremia/septicemia on IV daptomycin for bacteremia cultures + on 8/16, 8/17, 8/18 s/p mediport removal  cath tip neg so far. With dual-chamber pacemaker with h/o wide-complex tachycardia (HCC)--atrial tachycardia with RV pacing  H/O SARS-CoV 2 Pneumonitis 5/24/2022- suspected Omicron BA-2  sick contact wife  Chronic hypoxic resp failure  RA during the day and 2L NC at night, which is baseline. Probable obstructive sleep apnea  Persistent pancytopenia secondary to chemotherapy  Thrombocytopenia   Lymphopenia   RLE swelling and numbness, w/u with neg u/s for DVT   H/o submassive hemoptysis - streaky, known lung mass resolved   H/O RLL subsegmental PE- CTA Chest 9/17/2021 was on eliquis now on hold   Radiation pneumonitis/fibrosis left lung   COPD, not in exacerbation      PLAN:   On 3L NC;  2.5L - on/off O2 as needed. No hypoxemia charted. Wean to maintain pox >90%. Has O2 at home and wears 2L HS. Ambulatory pulse ox prior to d/c. Continue DuoNeb every 4 hours and prn   Oncology following. Continue to hold Eliquis until platelets recover > 87H. Received one unit platelets 0/17 in OR. Plts 95 8/22. IV ATB per ID   Continue IS for pulmonary hygiene   T8/22 SHIRA negative for endocarditis. TTE done 08/19 - no gross evidence of endocarsitis. EP and CTS planning for pacer removal 8/25 . To resume Eliquis when ok with cards/EP.   Outpatient PSG to be obtained outpt for high risk and probable FIONA   Pt restarted PO lasix on 8/20 for leg swelling, continue strict I&O. No output documented    Electronically signed by LELO Eden CNP on 8/24/2022 at 11:53 AM    Seen and evaluated, and agree with above assessment and plan. For removal of cardiac device tomorrow.   Will need PICC line placement prior to discharge  Discussed with family at bedside

## 2022-08-24 NOTE — PROGRESS NOTES
Clear bilaterally   Cardiovascular: S1 and S2 are rhythmic and regular. No murmurs appreciated. Abdomen: Positive bowel sounds to auscultation. Benign to palpation. No masses felt. No hepatosplenomegaly. Genitourinary: male  Extremities: No clubbing, no cyanosis, no edema.   Lines: peripheral    Laboratory and Tests Review:  Lab Results   Component Value Date    WBC 2.4 (L) 08/24/2022    WBC 1.8 (L) 08/23/2022    WBC 2.0 (L) 08/22/2022    HGB 8.8 (L) 08/24/2022    HCT 27.2 (L) 08/24/2022    MCV 92.2 08/24/2022     08/24/2022     Lab Results   Component Value Date    NEUTROABS 1.25 (L) 08/24/2022    NEUTROABS 1.04 (L) 08/23/2022    NEUTROABS 1.20 (L) 08/22/2022     No results found for: [facility]  Lab Results   Component Value Date    ALT 29 08/20/2022    AST 25 08/20/2022    ALKPHOS 90 08/20/2022    BILITOT <0.2 08/20/2022     Lab Results   Component Value Date/Time     08/24/2022 10:08 AM    K 3.5 08/24/2022 10:08 AM    K 3.7 08/17/2022 04:17 AM     08/24/2022 10:08 AM    CO2 27 08/24/2022 10:08 AM    BUN 5 08/24/2022 10:08 AM    CREATININE 1.0 08/24/2022 10:08 AM    CREATININE 0.7 08/20/2022 04:44 PM    CREATININE 0.8 08/19/2022 06:14 AM    GFRAA >60 08/24/2022 10:08 AM    LABGLOM >60 08/24/2022 10:08 AM    GLUCOSE 105 08/24/2022 10:08 AM    PROT 5.9 08/20/2022 04:44 PM    LABALBU 3.2 08/20/2022 04:44 PM    CALCIUM 9.2 08/24/2022 10:08 AM    BILITOT <0.2 08/20/2022 04:44 PM    ALKPHOS 90 08/20/2022 04:44 PM    AST 25 08/20/2022 04:44 PM    ALT 29 08/20/2022 04:44 PM     Lab Results   Component Value Date    CRP 9.6 (H) 05/24/2022     No results found for: [address]  Radiology:  Reviewed CT lung    Microbiology:   Lab Results   Component Value Date/Time    BC 24 Hours no growth 08/23/2022 10:23 AM    BC 5 Days no growth 08/19/2022 05:05 AM    BC Previously positive blood culture called 08/18/2022 07:05 AM    ORG Enterococcus faecalis 08/18/2022 07:10 AM    ORG Enterococcus faecalis 08/18/2022 07:05 AM    ORG Enterococcus faecalis 08/17/2022 05:40 PM     Lab Results   Component Value Date/Time    BLOODCULT2 24 Hours no growth 08/23/2022 10:27 AM    BLOODCULT2 24 Hours no growth 08/20/2022 05:30 AM    BLOODCULT2 Previously positive blood culture called 08/18/2022 07:10 AM    BLOODCULT2  08/18/2022 07:10 AM     Refer to previous culture for susceptibility results from CXBL collected  on 8-18-22 at 0705. ORG Enterococcus faecalis 08/18/2022 07:10 AM    ORG Enterococcus faecalis 08/18/2022 07:05 AM    ORG Enterococcus faecalis 08/17/2022 05:40 PM     No results found for: WNDABS  No results found for: RESPSMEAR  No results found for: MPNEUMO, CLAMYDCU, LABLEGI, AFBCX, FUNGSM, LABFUNG  No results found for: CULTRESP  Culture Catheter Tip   Date Value Ref Range Status   08/20/2022 Growth not present  Final     No results found for: BFCS  No results found for: CXSURG  Urine Culture, Routine   Date Value Ref Range Status   08/16/2022   Final    10 to 100,000 CFU/mL  Mixed heydi isolated. Further workup and sensitivity testing  is not routinely indicated and will not be performed. Mixed heydi isolated includes:  Mixed gram positive organisms     05/18/2019 <25,000 CFU/ml  Final     No results found for: [address]:  8-16-22 blood cultures E faecalis x 2  8-17-22 blood cultures E faecalis x 1  8-18-22 blood culture + E faecalis x1  8-19-22 blood cultures neg to date  8-20-22 Port ( tip ) cx - neg tod date   8-20-22 blood cultures neg to date          ASSESSMENT:  High grade E faecalis bacteremia probably port infection; RO endocarditis and pacer infection s/p mediport removal ( 8/20) - follow up cx neg on antimicrobials  Immunocompromised host c pancytopenia; on chemo for Sq cell of lung; platelets are up and white count is stable  thrush-improved  For removal of pacer in the a.m.     PLAN:  Continue with  amp 2 grams IV q 4 hrs and  ceftriaxone 2 grams IV q 12 hrs   SHIRA nondiagnostic  Discussed with wife patient is up in a chair and another family member present  Removal of  pacer-at [facility] in the a.m.   Will need PICC line eventually or reimplantation of Mediport-need to discuss with heme-onc which I have placed a call for them to return  Raymond Harley MD  12:36 PM  8/24/2022

## 2022-08-24 NOTE — PROGRESS NOTES
Device Interrogation:   Mode: VVI 40   Battery Voltage/Longevity:  8.9-11.5 years     Pacing:  RV: <1%     RV R wave: 9.5 mV  Impedance: 410 ohms   Threshold: 1.5 V @ 0.5 ms  Episodes: none   Reprogramming included: none   Overall device function is normal    LELO Weber CNP

## 2022-08-24 NOTE — PROGRESS NOTES
[facility] and [facility] [place]- Electrophysiology    Inpatient progress note  PATIENT: Kali Maria  MEDICAL RECORD NUMBER: [id number]  DATE OF SERVICE:  8/24/2022  ATTENDING ELECTROPHYSIOLOGIST: Dr Carlos Brito   PRIMARY ELECTROPHYSIOLOGIST: Paulo Fermin MD  REFERRING PHYSICIAN: No ref. provider found and Herminio Neslon MD  CHIEF COMPLAINT: Weakness and fatigue, fever    HPI: This is a 67 y.o. male with a history of pretension, hyperlipidemia, COPD and a history of long-term smoking habit as well as recurrent syncopal episodes starting in 2012. ILR 2020 showed CHB and subsequent pacemaker implant on 7/20/2021. He was also diagnosed with lung cancer around the same time and has been undergoing chemotherapy and radiation. He finished his last chemo cycle last week and presented to the emergency room with symptoms of ongoing fatigue weakness and fever. He was found to be neutropenic as well as thrombocytopenic. Subsequently blood cultures have been positive for Enterococcus faecalis. Blood cultures from 8/16, 8/17 and 8/18 have all been positive despite IV antibiotics. Patient also remains neutropenic and thrombocytopenic with a platelet count of 56,073 today. No other cardiac symptoms. No new symptoms of chest pain or dyspnea on exertion. No syncope or near syncope.    8/21/22: Patient denies any new complaints today. Was transfused earlier today for hemoglobin of 7.2. No cardiac symptoms. 8/22/22: Patient remains asymptomatic. Underwent SHIRA today. White count remains low however hemoglobin and platelet count have improved    8/23/22:  No complaints. Awaiting plan for extraction to confirm CT surgery back up. SHIRA negative yesterday. Afebrile     8/23/22: Pacemaker set at VVI 40 yesterday afternoon, has been stable without change in symptoms.  On monitor, it appears to be wenckebach vs 2:1 block with rates 60-70's     Patient Active Problem List    Diagnosis Date Noted mg Oral 4x daily Zara Lazaro MD   10 mg at 08/24/22 0857    0.9 % sodium chloride infusion   IntraVENous PRN Trey Ritchie MD        albuterol sulfate HFA (PROVENTIL;VENTOLIN;PROAIR) 108 (90 Base) MCG/ACT inhaler 2 puff  2 puff Inhalation Q6H PRN Tiffanie E Kaercher, DO        sodium chloride flush 0.9 % injection 5-40 mL  5-40 mL IntraVENous 2 times per day Ryan Dominique MD   10 mL at 08/23/22 2000    sodium chloride flush 0.9 % injection 5-40 mL  5-40 mL IntraVENous PRN Bhavya Murrieta MD        0.9 % sodium chloride infusion  25 mL IntraVENous PRN Bhavya Murrieta MD        HYDROmorphone (DILAUDID) injection 0.25 mg  0.25 mg IntraVENous Q5 Min PRN Bhavay Murrieta MD        HYDROmorphone (DILAUDID) injection 0.5 mg  0.5 mg IntraVENous Q5 Min PRN Bhavya Murrieta MD        labetalol (NORMODYNE;TRANDATE) injection 5 mg  5 mg IntraVENous Q15 Min PRN Bhavya Murrieta MD        Or    hydrALAZINE (APRESOLINE) injection 5 mg  5 mg IntraVENous Q15 Min PRN Bhavya Erickson MD        furosemide (LASIX) tablet 20 mg  20 mg Oral Daily Trey Ritchie MD   20 mg at 08/24/22 0857    potassium chloride (KLOR-CON M) extended release tablet 40 mEq  40 mEq Oral PRN Tiffanie E Kaercher, DO        Or    potassium bicarb-citric acid (EFFER-K) effervescent tablet 40 mEq  40 mEq Oral PRN Tiffanie E Kaercher, DO        Or    potassium chloride 10 mEq/100 mL IVPB (Peripheral Line)  10 mEq IntraVENous PRN Tiffanie E Kaercher, DO        ampicillin 2000 mg ivpb mini bag  2,000 mg IntraVENous 6 times per day Bipin Bellamy,  mL/hr at 08/24/22 0903 2,000 mg at 08/24/22 0903    cefTRIAXone (ROCEPHIN) 2,000 mg in sterile water 20 mL IV syringe  2,000 mg IntraVENous Q12H Tiffanie ISABELLA Kafannieher, DO   2,000 mg at 08/24/22 0350    melatonin tablet 3 mg  3 mg Oral Nightly PRN [facility] Kafannieher, DO   3 mg at 08/19/22 2051    albuterol (PROVENTIL) nebulizer solution 2.5 mg  2.5 mg Nebulization Q6H PRN Tiffanie E Kaercher, DO        albuterol (ACCUNEB) nebulizer solution 0.63 mg  1 ampule Nebulization Q6H PRN Tiffanie E Kaercher, DO        [Held by provider] apixaban (ELIQUIS) tablet 5 mg  5 mg Oral BID Tiffanie E Kaercher, DO        metoprolol succinate (TOPROL XL) extended release tablet 50 mg  50 mg Oral BID  Tiffanie E Kaercher, DO   50 mg at 08/24/22 0857    atorvastatin (LIPITOR) tablet 20 mg  20 mg Oral Daily Tiffanie E Kaercher, DO   20 mg at 08/23/22 1959    sodium chloride flush 0.9 % injection 5-40 mL  5-40 mL IntraVENous 2 times per day Alejandra Hudson, DO   10 mL at 08/24/22 4982    sodium chloride flush 0.9 % injection 5-40 mL  5-40 mL IntraVENous PRN Tiffanie E Kaercher, DO        0.9 % sodium chloride infusion   IntraVENous PRN Teri Elliot Kaercher, DO        acetaminophen (TYLENOL) tablet 650 mg  650 mg Oral Q6H PRN Tiffanie E Kaercher, DO        Or    acetaminophen (TYLENOL) suppository 650 mg  650 mg Rectal Q6H PRN Tiffanie E Kaercher, DO        senna (SENOKOT) tablet 8.6 mg  1 tablet Oral Daily PRN Tiffanie E Kaercher, DO        ipratropium-albuterol (DUONEB) nebulizer solution 1 ampule  1 ampule Inhalation Q4H WA Tiffanie E Kaercher, DO   1 ampule at 08/24/22 0844        No Known Allergies    PHYSICAL EXAM:   Vitals:    08/23/22 1945 08/23/22 2315 08/24/22 0759 08/24/22 0844   BP: 125/61 119/79 (!) 107/54    Pulse: 57 84 52 52   Resp: 18 18 18 28   Temp: 97.5 °F (36.4 °C) 97.2 °F (36.2 °C) 98.1 °F (36.7 °C)    TempSrc: Temporal      SpO2: 93% 99% 99% 99%   Weight:       Height:          Constitutional: Well-developed, no acute distress  Eyes: conjunctivae normal, no xanthelasma   Ears, Nose, Throat: oral mucosa moist, no cyanosis   CV: no JVD. irregular rhythm. Normal S1S2 and no S3. No murmurs, rubs, or gallops.    Lungs: decreased bases bilaterally, normal respiratory effort without used of accessory muscles  Abdomen: soft, non-tender, non distended  Musculoskeletal: no digital clubbing, +2 pitting BLE edema   Skin: warm, no rashes     I have personally reviewed the laboratory, cardiac diagnostic and radiographic testing as outlined below:    Data:    Recent Labs     08/22/22  0538 08/23/22  0647 08/24/22  0522   WBC 2.0* 1.8* 2.4*   HGB 8.3* 8.1* 8.8*   HCT 25.6* 24.6* 27.2*   PLT 95* 125* 162     No results for input(s): NA, K, CL, CO2, BUN, CREATININE, GLU, CALCIUM in the last 72 hours. Invalid input(s): MAGNESIUM     Lab Results   Component Value Date/Time    MG 1.5 09/22/2021 05:28 AM     No results for input(s): TSH in the last 72 hours. No results for input(s): INR in the last 72 hours. Telemetry: 8/24/22: 2:1 block vs wenckebach with RBBB and LPFB, minimal A and V pacing      8/23/22 Atrial pacing, V, pacing, PACs    EKG:        SHIRA 8/22/22   Summary   No SHIRA indication of Endocarditis. Normal left ventricular chamber size and systolic function. Interatrial septum appears intact. Normal right ventricle size and function. Pacemaker leads noted in right atrium and right ventricle. Normal aortic root size. Moderate atherosclerosis in the descending thoracic aorta. Epicardial fat vs. small pericardial effusion. No intra cardiac mass or thrombus. Technically sub-optimal images. Compared to prior sub-optimal TTE from 8/19/22. Signature    ----------------------------------------------------------------   Electronically signed by Talia Limon MD(Interpreting   physician) on 08/22/2022 06:50 PM    Echocardiogram: 8/19/22   Conclusions      Summary   Normal left ventricular chamber size. Normal left ventricular systolic function. Visually estimated LVEF is 55-60 %. No wall motion abnormalities. Normal diastolic function. Normal left atrial pressure. Normal right ventricle structure and function. Normal left atrial size. Normal right atrial size   Likely normal estimated PA pressure. Pacer/ ICD wire present in the right heart. No gross evidence of infective endocarditis. Consider SHIRA for better   accuracy if clinically indicated. Compared to prior echo from 2021, no significant changes noted. Signature    ----------------------------------------------------------------   Electronically signed by Alayna Meza MD(Interpreting   physician) on 08/19/2022 04:16 PM    8/23/22  ~ 2 pm today   Device Interrogation: [place]   Mode: DDI 60; changed to VVI 40 bpm   Battery Voltage/Longevity:  3.01V, 5.9-8.6 years     Pacing: A: 27%   RV: 25%   P wave: >5 mV  Impedance: 380 ohms   Threshold: 1.0 V @ 0.5 ms  RV R wave: 10.6 mV  Impedance: 410 ohms   Threshold: 1.5 V @ 0.5 ms  Episodes: AT/AF detections ranging from 12 seconds to 8:44 minutes. June 18-most recent episode on Aug 20   Reprogramming included: changed to VVI 40   Overall device function is normal    I have independently reviewed all of the ECGs and rhythm strips per above     Assessment/Plan:      1. Dual-chamber pacemaker in situ  -for AV block with a long pause/ ? CHB in past on ILR. Date of implant July 28, 2021  - bifascicular block with 2:1 block vs wenckebach on tele when pacer set at VVI   - Last pacemaker check in June revealed 6.5% atrial and 3% ventricular pacing with DDI programming at lower rate of 60 bpm; above showed 27% AV and 25% RV paced yesterday. - on toprol 50 mg BID     2. Bacteremia/sepsis--enterococcus faecalis  -  probably port infection per ID; mediport removed 8/20/22   Persistent bacteremia despite IV antibiotics  8-16-22 blood cultures E faecalis x 2  8-17-22 blood cultures E faecalis x 1  8-18-22 blood culture + E faecalis x1  8-19-22 blood cultures neg to date  8-20-22 Port ( tip ) cx - neg tod date   8-20-22 blood cultures neg to date  - redraw 8/22/22      3.   Lung cancer, squamous cell--patient undergoing chemotherapy  -  Stage IIIB T2aN3 Squamous Cell Carcinoma of Lung  diagnosis 5/27/2021, PD-L1 0%    - 7/6/21-9/7/21 treatment with concurrent chemotherapy/radiation therapy   - 11/2/21 consolidated with Imfinzi (Durvalumab) per ID IV every 4 weeks   - 6/30/22 PET showing progression and was started on Carbo, Gemzar and Keytruda on 6/30/22  - 8/9 /22 received C2D8 of Carboplatin AUC 2 with Gemzar 1000 mg/m2 IV on D1,8 and Keytruda IV on D1 on a 21 day cycle    4. Neutropenia and thrombocytopenia related to chemotherapy  - WBC ~ 2; plt trending up. Now above 100; had transfusion of PLT on 8/17      5. Persistent anemia. -  post PRBC transfusion 8/21/22    6. History of PE in 2021   - on Eliquis in the past ; held now     Recommendations:    Awaiting confirmation/ evaluation of CT surgery team to schedule extraction if possible. If not, will plan to transfer  Continue to trend CBC and plt count. Holding eliquis- was on in past for PE   Will reassess pacing percentage with VVI 40 settings prior to extraction     Thank you for allowing me to participate in your patient's care. Please call me if there are any questions or concerns. Ilir Salgado [facility]  Cardiac Electrophysiology  [facility]) Physicians  Electrophysiology  9:49 AM  8/24/2022    Attending Physician's Statement    Patient seen with the ANP. Agree with the findings, assessment and plan. Management plan was discussed. I have personally interviewed the patient, independently performed a focused cardiac examination, reviewed the pertinent laboratory and diagnostic testing with the patient and directly participated in the medical decision-making as noted above with the following additions:     Reports slight dizziness but has not change since pacemaker was adjusted. Remains in 2 to 1 AV block  currently. Physical exam showed no JVD, bradycardic, no murmur, clear lungs bilaterally, trace edema of LE    Await for CTS's input. Plan for pacemaker removal and lead extraction in near future. Hold Eliquis and Toprol XL. He may benefit from RAJESH CANDELARIA in the future.     I have spent a total of 35 minutes with the patient and the family reviewing the above stated recommendations. And a total of >50% of that time involved face-to-face time providing counseling and/or coordination of care with the other providers, reviewing records/tests, counseling/education of the patient, ordering medications/tests/procedures, coordinating care, and documenting clinical information in the EHR.     Chelly Dawson MD  Cardiac Electrophysiology  [facility]) Physicians  [facility] [place]: [place] [facility]  11:00 AM  8/24/2022

## 2022-08-24 NOTE — CARE COORDINATION
Requested Saurabh - Gabriel following Pt for possible admission.    Oneal Hurt, L.S.W.  [phone]

## 2022-08-24 NOTE — PROGRESS NOTES
Spoke with blood bank, and lab to make sure type and screen is drawn even though the current blood band is still valid.  Per order

## 2022-08-24 NOTE — CARE COORDINATION
Spoke with pt and Wife about Transition Plan of Care. Pt prefers to return home if able but will consider BOY if needed. [address] Choice is [facility] Choice. Pt agrees to got to Infusion daily if appropriate. [facility] BOY list and discussed may be LTAC. ID  Rocephin q 12 hours. Ampicillin q 4 hrs. Referral made to [facility]. Vilma Pompa will review and call back. Spoke with Pt and Family about looking into [facility]. Informed that Infusion will not work d/t antibiotics more than once a day. Pt will consider [facility] if that is what [facility]  recommends. Discharge plan is to return home with [facility]. SW/CM to follow for discharge needs.    Kenia Payan, L.S.W.  [phone]

## 2022-08-25 ENCOUNTER — ANESTHESIA EVENT (OUTPATIENT)
Dept: OPERATING ROOM | Age: 72
DRG: 228 | End: 2022-08-25
Payer: MEDICARE

## 2022-08-25 ENCOUNTER — ANESTHESIA (OUTPATIENT)
Dept: OPERATING ROOM | Age: 72
DRG: 228 | End: 2022-08-25
Payer: MEDICARE

## 2022-08-25 ENCOUNTER — APPOINTMENT (OUTPATIENT)
Dept: GENERAL RADIOLOGY | Age: 72
DRG: 228 | End: 2022-08-25
Payer: MEDICARE

## 2022-08-25 LAB
ANION GAP SERPL CALCULATED.3IONS-SCNC: 11 MMOL/L (ref 7–16)
ANION GAP SERPL CALCULATED.3IONS-SCNC: 13 MMOL/L (ref 7–16)
ANISOCYTOSIS: ABNORMAL
BASOPHILS ABSOLUTE: 0 E9/L (ref 0–0.2)
BASOPHILS RELATIVE PERCENT: 0.2 % (ref 0–2)
BUN BLDV-MCNC: 5 MG/DL (ref 6–23)
BUN BLDV-MCNC: 5 MG/DL (ref 6–23)
CALCIUM SERPL-MCNC: 8.3 MG/DL (ref 8.6–10.2)
CALCIUM SERPL-MCNC: 8.6 MG/DL (ref 8.6–10.2)
CHLORIDE BLD-SCNC: 102 MMOL/L (ref 98–107)
CHLORIDE BLD-SCNC: 106 MMOL/L (ref 98–107)
CO2: 23 MMOL/L (ref 22–29)
CO2: 27 MMOL/L (ref 22–29)
CREAT SERPL-MCNC: 0.8 MG/DL (ref 0.7–1.2)
CREAT SERPL-MCNC: 0.9 MG/DL (ref 0.7–1.2)
CULTURE, BLOOD 2: NORMAL
EKG ATRIAL RATE: 45 BPM
EKG ATRIAL RATE: 55 BPM
EKG P AXIS: 76 DEGREES
EKG P-R INTERVAL: 234 MS
EKG Q-T INTERVAL: 426 MS
EKG Q-T INTERVAL: 490 MS
EKG QRS DURATION: 148 MS
EKG QRS DURATION: 150 MS
EKG QTC CALCULATION (BAZETT): 536 MS
EKG QTC CALCULATION (BAZETT): 609 MS
EKG R AXIS: 119 DEGREES
EKG R AXIS: 124 DEGREES
EKG T AXIS: -38 DEGREES
EKG T AXIS: -40 DEGREES
EKG VENTRICULAR RATE: 123 BPM
EKG VENTRICULAR RATE: 72 BPM
EOSINOPHILS ABSOLUTE: 0.09 E9/L (ref 0.05–0.5)
EOSINOPHILS RELATIVE PERCENT: 1.7 % (ref 0–6)
GFR AFRICAN AMERICAN: >60
GFR AFRICAN AMERICAN: >60
GFR NON-AFRICAN AMERICAN: >60 ML/MIN/1.73
GFR NON-AFRICAN AMERICAN: >60 ML/MIN/1.73
GLUCOSE BLD-MCNC: 150 MG/DL (ref 74–99)
GLUCOSE BLD-MCNC: 95 MG/DL (ref 74–99)
HCT VFR BLD CALC: 24 % (ref 37–54)
HCT VFR BLD CALC: 25.1 % (ref 37–54)
HEMOGLOBIN: 7.7 G/DL (ref 12.5–16.5)
HEMOGLOBIN: 7.8 G/DL (ref 12.5–16.5)
HYPOCHROMIA: ABNORMAL
LYMPHOCYTES ABSOLUTE: 0.64 E9/L (ref 1.5–4)
LYMPHOCYTES RELATIVE PERCENT: 12.3 % (ref 20–42)
MAGNESIUM: 1.3 MG/DL (ref 1.6–2.6)
MCH RBC QN AUTO: 28.5 PG (ref 26–35)
MCH RBC QN AUTO: 29.8 PG (ref 26–35)
MCHC RBC AUTO-ENTMCNC: 31.1 % (ref 32–34.5)
MCHC RBC AUTO-ENTMCNC: 32.1 % (ref 32–34.5)
MCV RBC AUTO: 91.6 FL (ref 80–99.9)
MCV RBC AUTO: 93 FL (ref 80–99.9)
MONOCYTES ABSOLUTE: 0.58 E9/L (ref 0.1–0.95)
MONOCYTES RELATIVE PERCENT: 11.4 % (ref 2–12)
NEUTROPHILS ABSOLUTE: 3.98 E9/L (ref 1.8–7.3)
NEUTROPHILS RELATIVE PERCENT: 74.6 % (ref 43–80)
NUCLEATED RED BLOOD CELLS: 1.8 /100 WBC
PDW BLD-RTO: 19.8 FL (ref 11.5–15)
PDW BLD-RTO: 19.8 FL (ref 11.5–15)
PLATELET # BLD: 161 E9/L (ref 130–450)
PLATELET # BLD: 174 E9/L (ref 130–450)
PMV BLD AUTO: 11.6 FL (ref 7–12)
PMV BLD AUTO: 11.9 FL (ref 7–12)
POLYCHROMASIA: ABNORMAL
POTASSIUM SERPL-SCNC: 3.3 MMOL/L (ref 3.5–5)
POTASSIUM SERPL-SCNC: 3.5 MMOL/L (ref 3.5–5)
RBC # BLD: 2.58 E12/L (ref 3.8–5.8)
RBC # BLD: 2.74 E12/L (ref 3.8–5.8)
SODIUM BLD-SCNC: 140 MMOL/L (ref 132–146)
SODIUM BLD-SCNC: 142 MMOL/L (ref 132–146)
WBC # BLD: 5.3 E9/L (ref 4.5–11.5)
WBC # BLD: 7.6 E9/L (ref 4.5–11.5)

## 2022-08-25 PROCEDURE — 85027 COMPLETE CBC AUTOMATED: CPT

## 2022-08-25 PROCEDURE — 33233 REMOVAL OF PM GENERATOR: CPT | Performed by: THORACIC SURGERY (CARDIOTHORACIC VASCULAR SURGERY)

## 2022-08-25 PROCEDURE — 99233 SBSQ HOSP IP/OBS HIGH 50: CPT | Performed by: INTERNAL MEDICINE

## 2022-08-25 PROCEDURE — 85025 COMPLETE CBC W/AUTO DIFF WBC: CPT

## 2022-08-25 PROCEDURE — 0JPT0PZ REMOVAL OF CARDIAC RHYTHM RELATED DEVICE FROM TRUNK SUBCUTANEOUS TISSUE AND FASCIA, OPEN APPROACH: ICD-10-PCS | Performed by: THORACIC SURGERY (CARDIOTHORACIC VASCULAR SURGERY)

## 2022-08-25 PROCEDURE — 6360000002 HC RX W HCPCS: Performed by: NURSE ANESTHETIST, CERTIFIED REGISTERED

## 2022-08-25 PROCEDURE — 3700000001 HC ADD 15 MINUTES (ANESTHESIA): Performed by: THORACIC SURGERY (CARDIOTHORACIC VASCULAR SURGERY)

## 2022-08-25 PROCEDURE — 7100000001 HC PACU RECOVERY - ADDTL 15 MIN: Performed by: THORACIC SURGERY (CARDIOTHORACIC VASCULAR SURGERY)

## 2022-08-25 PROCEDURE — 33235 REMOVAL PACEMAKER ELECTRODE: CPT | Performed by: THORACIC SURGERY (CARDIOTHORACIC VASCULAR SURGERY)

## 2022-08-25 PROCEDURE — 7100000000 HC PACU RECOVERY - FIRST 15 MIN: Performed by: THORACIC SURGERY (CARDIOTHORACIC VASCULAR SURGERY)

## 2022-08-25 PROCEDURE — 2500000003 HC RX 250 WO HCPCS: Performed by: NURSE ANESTHETIST, CERTIFIED REGISTERED

## 2022-08-25 PROCEDURE — 97535 SELF CARE MNGMENT TRAINING: CPT

## 2022-08-25 PROCEDURE — 87040 BLOOD CULTURE FOR BACTERIA: CPT

## 2022-08-25 PROCEDURE — C1769 GUIDE WIRE: HCPCS | Performed by: THORACIC SURGERY (CARDIOTHORACIC VASCULAR SURGERY)

## 2022-08-25 PROCEDURE — 6370000000 HC RX 637 (ALT 250 FOR IP): Performed by: NURSE PRACTITIONER

## 2022-08-25 PROCEDURE — 6370000000 HC RX 637 (ALT 250 FOR IP): Performed by: SPECIALIST

## 2022-08-25 PROCEDURE — 83735 ASSAY OF MAGNESIUM: CPT

## 2022-08-25 PROCEDURE — 6360000002 HC RX W HCPCS: Performed by: SURGERY

## 2022-08-25 PROCEDURE — 6360000002 HC RX W HCPCS: Performed by: NURSE PRACTITIONER

## 2022-08-25 PROCEDURE — 94640 AIRWAY INHALATION TREATMENT: CPT

## 2022-08-25 PROCEDURE — 2580000003 HC RX 258: Performed by: NURSE PRACTITIONER

## 2022-08-25 PROCEDURE — 2720000010 HC SURG SUPPLY STERILE: Performed by: THORACIC SURGERY (CARDIOTHORACIC VASCULAR SURGERY)

## 2022-08-25 PROCEDURE — C1894 INTRO/SHEATH, NON-LASER: HCPCS | Performed by: THORACIC SURGERY (CARDIOTHORACIC VASCULAR SURGERY)

## 2022-08-25 PROCEDURE — 02PA3MZ REMOVAL OF CARDIAC LEAD FROM HEART, PERCUTANEOUS APPROACH: ICD-10-PCS | Performed by: THORACIC SURGERY (CARDIOTHORACIC VASCULAR SURGERY)

## 2022-08-25 PROCEDURE — 2700000000 HC OXYGEN THERAPY PER DAY

## 2022-08-25 PROCEDURE — 71045 X-RAY EXAM CHEST 1 VIEW: CPT

## 2022-08-25 PROCEDURE — 87075 CULTR BACTERIA EXCEPT BLOOD: CPT

## 2022-08-25 PROCEDURE — 87205 SMEAR GRAM STAIN: CPT

## 2022-08-25 PROCEDURE — 2580000003 HC RX 258: Performed by: ANESTHESIOLOGY

## 2022-08-25 PROCEDURE — 2140000000 HC CCU INTERMEDIATE R&B

## 2022-08-25 PROCEDURE — 6360000002 HC RX W HCPCS: Performed by: PHYSICIAN ASSISTANT

## 2022-08-25 PROCEDURE — 2580000003 HC RX 258: Performed by: SURGERY

## 2022-08-25 PROCEDURE — 6370000000 HC RX 637 (ALT 250 FOR IP): Performed by: SURGERY

## 2022-08-25 PROCEDURE — 87070 CULTURE OTHR SPECIMN AEROBIC: CPT

## 2022-08-25 PROCEDURE — 36415 COLL VENOUS BLD VENIPUNCTURE: CPT

## 2022-08-25 PROCEDURE — 3600000017 HC SURGERY HYBRID ADDL 15MIN: Performed by: THORACIC SURGERY (CARDIOTHORACIC VASCULAR SURGERY)

## 2022-08-25 PROCEDURE — 3600000007 HC SURGERY HYBRID BASE: Performed by: THORACIC SURGERY (CARDIOTHORACIC VASCULAR SURGERY)

## 2022-08-25 PROCEDURE — 80048 BASIC METABOLIC PNL TOTAL CA: CPT

## 2022-08-25 PROCEDURE — 2709999900 HC NON-CHARGEABLE SUPPLY: Performed by: THORACIC SURGERY (CARDIOTHORACIC VASCULAR SURGERY)

## 2022-08-25 PROCEDURE — B24BZZ4 ULTRASONOGRAPHY OF HEART WITH AORTA, TRANSESOPHAGEAL: ICD-10-PCS | Performed by: ANESTHESIOLOGY

## 2022-08-25 PROCEDURE — 3700000000 HC ANESTHESIA ATTENDED CARE: Performed by: THORACIC SURGERY (CARDIOTHORACIC VASCULAR SURGERY)

## 2022-08-25 PROCEDURE — 87102 FUNGUS ISOLATION CULTURE: CPT

## 2022-08-25 PROCEDURE — 2580000003 HC RX 258: Performed by: INTERNAL MEDICINE

## 2022-08-25 PROCEDURE — 6360000002 HC RX W HCPCS: Performed by: ANESTHESIOLOGY

## 2022-08-25 RX ORDER — LIDOCAINE HYDROCHLORIDE 20 MG/ML
INJECTION, SOLUTION EPIDURAL; INFILTRATION; INTRACAUDAL; PERINEURAL PRN
Status: DISCONTINUED | OUTPATIENT
Start: 2022-08-25 | End: 2022-08-25 | Stop reason: SDUPTHER

## 2022-08-25 RX ORDER — MEPERIDINE HYDROCHLORIDE 25 MG/ML
12.5 INJECTION INTRAMUSCULAR; INTRAVENOUS; SUBCUTANEOUS
Status: DISCONTINUED | OUTPATIENT
Start: 2022-08-25 | End: 2022-08-25 | Stop reason: HOSPADM

## 2022-08-25 RX ORDER — SODIUM CHLORIDE 0.9 % (FLUSH) 0.9 %
5-40 SYRINGE (ML) INJECTION EVERY 12 HOURS SCHEDULED
Status: DISCONTINUED | OUTPATIENT
Start: 2022-08-25 | End: 2022-08-25 | Stop reason: HOSPADM

## 2022-08-25 RX ORDER — PROPOFOL 10 MG/ML
INJECTION, EMULSION INTRAVENOUS PRN
Status: DISCONTINUED | OUTPATIENT
Start: 2022-08-25 | End: 2022-08-25 | Stop reason: SDUPTHER

## 2022-08-25 RX ORDER — LIDOCAINE HYDROCHLORIDE 10 MG/ML
INJECTION, SOLUTION EPIDURAL; INFILTRATION; INTRACAUDAL; PERINEURAL
Status: DISCONTINUED
Start: 2022-08-25 | End: 2022-08-25

## 2022-08-25 RX ORDER — IPRATROPIUM BROMIDE AND ALBUTEROL SULFATE 2.5; .5 MG/3ML; MG/3ML
1 SOLUTION RESPIRATORY (INHALATION) ONCE AS NEEDED
Status: DISCONTINUED | OUTPATIENT
Start: 2022-08-25 | End: 2022-08-25 | Stop reason: HOSPADM

## 2022-08-25 RX ORDER — SODIUM CHLORIDE 0.9 % (FLUSH) 0.9 %
5-40 SYRINGE (ML) INJECTION PRN
Status: DISCONTINUED | OUTPATIENT
Start: 2022-08-25 | End: 2022-08-25 | Stop reason: HOSPADM

## 2022-08-25 RX ORDER — HYDRALAZINE HYDROCHLORIDE 20 MG/ML
10 INJECTION INTRAMUSCULAR; INTRAVENOUS EVERY 6 HOURS PRN
Status: DISCONTINUED | OUTPATIENT
Start: 2022-08-25 | End: 2022-08-29

## 2022-08-25 RX ORDER — CEFAZOLIN SODIUM 1 G/3ML
INJECTION, POWDER, FOR SOLUTION INTRAMUSCULAR; INTRAVENOUS PRN
Status: DISCONTINUED | OUTPATIENT
Start: 2022-08-25 | End: 2022-08-25 | Stop reason: SDUPTHER

## 2022-08-25 RX ORDER — ONDANSETRON 2 MG/ML
4 INJECTION INTRAMUSCULAR; INTRAVENOUS
Status: DISCONTINUED | OUTPATIENT
Start: 2022-08-25 | End: 2022-08-25 | Stop reason: HOSPADM

## 2022-08-25 RX ORDER — HYDRALAZINE HYDROCHLORIDE 20 MG/ML
INJECTION INTRAMUSCULAR; INTRAVENOUS PRN
Status: DISCONTINUED | OUTPATIENT
Start: 2022-08-25 | End: 2022-08-25 | Stop reason: SDUPTHER

## 2022-08-25 RX ORDER — SODIUM CHLORIDE 9 MG/ML
INJECTION, SOLUTION INTRAVENOUS PRN
Status: DISCONTINUED | OUTPATIENT
Start: 2022-08-25 | End: 2022-08-25 | Stop reason: HOSPADM

## 2022-08-25 RX ORDER — ESMOLOL HYDROCHLORIDE 10 MG/ML
INJECTION INTRAVENOUS PRN
Status: DISCONTINUED | OUTPATIENT
Start: 2022-08-25 | End: 2022-08-25 | Stop reason: SDUPTHER

## 2022-08-25 RX ORDER — OXYCODONE HYDROCHLORIDE AND ACETAMINOPHEN 5; 325 MG/1; MG/1
2 TABLET ORAL EVERY 4 HOURS PRN
Status: DISCONTINUED | OUTPATIENT
Start: 2022-08-25 | End: 2022-09-03 | Stop reason: HOSPADM

## 2022-08-25 RX ORDER — OXYCODONE HYDROCHLORIDE AND ACETAMINOPHEN 5; 325 MG/1; MG/1
1 TABLET ORAL EVERY 4 HOURS PRN
Status: DISCONTINUED | OUTPATIENT
Start: 2022-08-25 | End: 2022-09-03 | Stop reason: HOSPADM

## 2022-08-25 RX ORDER — IPRATROPIUM BROMIDE AND ALBUTEROL SULFATE 2.5; .5 MG/3ML; MG/3ML
1 SOLUTION RESPIRATORY (INHALATION) ONCE
Status: DISCONTINUED | OUTPATIENT
Start: 2022-08-25 | End: 2022-08-27

## 2022-08-25 RX ORDER — DEXAMETHASONE SODIUM PHOSPHATE 10 MG/ML
INJECTION INTRAMUSCULAR; INTRAVENOUS PRN
Status: DISCONTINUED | OUTPATIENT
Start: 2022-08-25 | End: 2022-08-25 | Stop reason: SDUPTHER

## 2022-08-25 RX ORDER — NITROGLYCERIN 5 MG/ML
INJECTION, SOLUTION INTRAVENOUS PRN
Status: DISCONTINUED | OUTPATIENT
Start: 2022-08-25 | End: 2022-08-25 | Stop reason: SDUPTHER

## 2022-08-25 RX ORDER — FENTANYL CITRATE 50 UG/ML
INJECTION, SOLUTION INTRAMUSCULAR; INTRAVENOUS PRN
Status: DISCONTINUED | OUTPATIENT
Start: 2022-08-25 | End: 2022-08-25 | Stop reason: SDUPTHER

## 2022-08-25 RX ORDER — MAGNESIUM SULFATE IN WATER 40 MG/ML
2000 INJECTION, SOLUTION INTRAVENOUS ONCE
Status: COMPLETED | OUTPATIENT
Start: 2022-08-25 | End: 2022-08-25

## 2022-08-25 RX ORDER — MORPHINE SULFATE 2 MG/ML
2 INJECTION, SOLUTION INTRAMUSCULAR; INTRAVENOUS EVERY 4 HOURS PRN
Status: DISCONTINUED | OUTPATIENT
Start: 2022-08-25 | End: 2022-09-03 | Stop reason: HOSPADM

## 2022-08-25 RX ORDER — ONDANSETRON 2 MG/ML
INJECTION INTRAMUSCULAR; INTRAVENOUS PRN
Status: DISCONTINUED | OUTPATIENT
Start: 2022-08-25 | End: 2022-08-25 | Stop reason: SDUPTHER

## 2022-08-25 RX ORDER — ROCURONIUM BROMIDE 10 MG/ML
INJECTION, SOLUTION INTRAVENOUS PRN
Status: DISCONTINUED | OUTPATIENT
Start: 2022-08-25 | End: 2022-08-25 | Stop reason: SDUPTHER

## 2022-08-25 RX ADMIN — MAGNESIUM SULFATE HEPTAHYDRATE 2000 MG: 40 INJECTION, SOLUTION INTRAVENOUS at 17:28

## 2022-08-25 RX ADMIN — PROPOFOL 100 MG: 10 INJECTION, EMULSION INTRAVENOUS at 13:39

## 2022-08-25 RX ADMIN — DEXAMETHASONE SODIUM PHOSPHATE 10 MG: 10 INJECTION INTRAMUSCULAR; INTRAVENOUS at 13:39

## 2022-08-25 RX ADMIN — ACETAMINOPHEN 650 MG: 325 TABLET, FILM COATED ORAL at 17:24

## 2022-08-25 RX ADMIN — FENTANYL CITRATE 50 MCG: 50 INJECTION, SOLUTION INTRAMUSCULAR; INTRAVENOUS at 13:39

## 2022-08-25 RX ADMIN — ROCURONIUM BROMIDE 30 MG: 10 INJECTION, SOLUTION INTRAVENOUS at 13:39

## 2022-08-25 RX ADMIN — IPRATROPIUM BROMIDE AND ALBUTEROL SULFATE 1 AMPULE: .5; 2.5 SOLUTION RESPIRATORY (INHALATION) at 20:54

## 2022-08-25 RX ADMIN — CEFTRIAXONE 2000 MG: 2 INJECTION, POWDER, FOR SOLUTION INTRAMUSCULAR; INTRAVENOUS at 03:26

## 2022-08-25 RX ADMIN — Medication 10 ML: at 09:05

## 2022-08-25 RX ADMIN — CLOTRIMAZOLE 10 MG: 10 LOZENGE ORAL at 08:33

## 2022-08-25 RX ADMIN — Medication 100 MG: at 13:39

## 2022-08-25 RX ADMIN — HYDRALAZINE HYDROCHLORIDE 10 MG: 20 INJECTION INTRAMUSCULAR; INTRAVENOUS at 14:23

## 2022-08-25 RX ADMIN — CLOTRIMAZOLE 10 MG: 10 LOZENGE ORAL at 11:28

## 2022-08-25 RX ADMIN — ATORVASTATIN CALCIUM 20 MG: 20 TABLET, FILM COATED ORAL at 20:25

## 2022-08-25 RX ADMIN — CLOTRIMAZOLE 10 MG: 10 LOZENGE ORAL at 18:04

## 2022-08-25 RX ADMIN — IPRATROPIUM BROMIDE AND ALBUTEROL SULFATE 1 AMPULE: .5; 2.5 SOLUTION RESPIRATORY (INHALATION) at 10:11

## 2022-08-25 RX ADMIN — IPRATROPIUM BROMIDE AND ALBUTEROL SULFATE 1 AMPULE: .5; 2.5 SOLUTION RESPIRATORY (INHALATION) at 15:34

## 2022-08-25 RX ADMIN — AMPICILLIN SODIUM 2000 MG: 2 INJECTION, POWDER, FOR SOLUTION INTRAVENOUS at 18:06

## 2022-08-25 RX ADMIN — ONDANSETRON 4 MG: 2 INJECTION INTRAMUSCULAR; INTRAVENOUS at 13:39

## 2022-08-25 RX ADMIN — FENTANYL CITRATE 50 MCG: 50 INJECTION, SOLUTION INTRAMUSCULAR; INTRAVENOUS at 14:18

## 2022-08-25 RX ADMIN — AMPICILLIN SODIUM 2000 MG: 2 INJECTION, POWDER, FOR SOLUTION INTRAVENOUS at 11:28

## 2022-08-25 RX ADMIN — AMPICILLIN SODIUM 2000 MG: 2 INJECTION, POWDER, FOR SOLUTION INTRAVENOUS at 21:40

## 2022-08-25 RX ADMIN — CEFTRIAXONE 2000 MG: 2 INJECTION, POWDER, FOR SOLUTION INTRAMUSCULAR; INTRAVENOUS at 18:04

## 2022-08-25 RX ADMIN — SODIUM CHLORIDE: 9 INJECTION, SOLUTION INTRAVENOUS at 13:31

## 2022-08-25 RX ADMIN — CLOTRIMAZOLE 10 MG: 10 LOZENGE ORAL at 20:25

## 2022-08-25 RX ADMIN — ESMOLOL HYDROCHLORIDE 20 MG: 10 INJECTION, SOLUTION INTRAVENOUS at 14:27

## 2022-08-25 RX ADMIN — HYDROMORPHONE HYDROCHLORIDE 0.5 MG: 1 INJECTION, SOLUTION INTRAMUSCULAR; INTRAVENOUS; SUBCUTANEOUS at 15:35

## 2022-08-25 RX ADMIN — NITROGLYCERIN 100 MCG: 5 INJECTION, SOLUTION INTRAVENOUS at 14:21

## 2022-08-25 RX ADMIN — Medication 10 ML: at 20:25

## 2022-08-25 RX ADMIN — IPRATROPIUM BROMIDE AND ALBUTEROL SULFATE 1 AMPULE: .5; 2.5 SOLUTION RESPIRATORY (INHALATION) at 13:03

## 2022-08-25 RX ADMIN — CEFAZOLIN SODIUM 2000 MG: 1 INJECTION, POWDER, FOR SOLUTION INTRAMUSCULAR; INTRAVENOUS at 13:39

## 2022-08-25 RX ADMIN — NITROGLYCERIN 50 MCG: 5 INJECTION, SOLUTION INTRAVENOUS at 14:12

## 2022-08-25 RX ADMIN — NITROGLYCERIN 50 MCG: 5 INJECTION, SOLUTION INTRAVENOUS at 14:16

## 2022-08-25 RX ADMIN — AMPICILLIN SODIUM 2000 MG: 2 INJECTION, POWDER, FOR SOLUTION INTRAVENOUS at 08:29

## 2022-08-25 RX ADMIN — AMPICILLIN SODIUM 2000 MG: 2 INJECTION, POWDER, FOR SOLUTION INTRAVENOUS at 03:26

## 2022-08-25 RX ADMIN — SODIUM CHLORIDE, PRESERVATIVE FREE 10 ML: 5 INJECTION INTRAVENOUS at 08:28

## 2022-08-25 RX ADMIN — FILGRASTIM-AAFI 300 MCG: 480 INJECTION, SOLUTION SUBCUTANEOUS at 09:05

## 2022-08-25 ASSESSMENT — COPD QUESTIONNAIRES: CAT_SEVERITY: MODERATE

## 2022-08-25 ASSESSMENT — PAIN DESCRIPTION - LOCATION
LOCATION: SHOULDER
LOCATION: HEAD
LOCATION: SHOULDER

## 2022-08-25 ASSESSMENT — PAIN DESCRIPTION - ONSET
ONSET: ON-GOING
ONSET: ON-GOING

## 2022-08-25 ASSESSMENT — PAIN DESCRIPTION - ORIENTATION
ORIENTATION: LEFT
ORIENTATION: LEFT

## 2022-08-25 ASSESSMENT — PAIN DESCRIPTION - FREQUENCY
FREQUENCY: CONTINUOUS
FREQUENCY: CONTINUOUS

## 2022-08-25 ASSESSMENT — PAIN DESCRIPTION - DESCRIPTORS
DESCRIPTORS: ACHING;DISCOMFORT
DESCRIPTORS: ACHING;DISCOMFORT
DESCRIPTORS: ACHING;SHOOTING;SORE;SHARP
DESCRIPTORS: ACHING;DISCOMFORT
DESCRIPTORS: ACHING;SHOOTING;SORE

## 2022-08-25 ASSESSMENT — PAIN SCALES - GENERAL
PAINLEVEL_OUTOF10: 7
PAINLEVEL_OUTOF10: 7
PAINLEVEL_OUTOF10: 0
PAINLEVEL_OUTOF10: 0
PAINLEVEL_OUTOF10: 5
PAINLEVEL_OUTOF10: 6
PAINLEVEL_OUTOF10: 7

## 2022-08-25 ASSESSMENT — PAIN DESCRIPTION - PAIN TYPE: TYPE: ACUTE PAIN

## 2022-08-25 NOTE — PROGRESS NOTES
Subjective:  Chart reviewed, no overnight event  Patient off floor in OR    Objective:    /62   Pulse 57   Temp (!) 96.7 °F (35.9 °C) (Oral)   Resp 16   Ht 5' 6\" (1.676 m)   Wt 190 lb 4.8 oz (86.3 kg)   SpO2 98%   BMI 30.72 kg/m²     CBC with Differential:    Lab Results   Component Value Date/Time    WBC 5.3 08/25/2022 05:44 AM    RBC 2.74 08/25/2022 05:44 AM    HGB 7.8 08/25/2022 05:44 AM    HCT 25.1 08/25/2022 05:44 AM     08/25/2022 05:44 AM    MCV 91.6 08/25/2022 05:44 AM    MCH 28.5 08/25/2022 05:44 AM    MCHC 31.1 08/25/2022 05:44 AM    RDW 19.8 08/25/2022 05:44 AM    NRBC 1.8 08/24/2022 05:22 AM    BLASTSPCT 0.9 08/24/2022 05:22 AM    METASPCT 0.9 08/24/2022 05:22 AM    LYMPHOPCT 17.5 08/24/2022 05:22 AM    PROMYELOPCT 0.9 08/24/2022 05:22 AM    MONOPCT 28.9 08/24/2022 05:22 AM    MYELOPCT 0.9 09/17/2021 10:20 AM    BASOPCT 0.4 08/24/2022 05:22 AM    MONOSABS 0.70 08/24/2022 05:22 AM    LYMPHSABS 0.43 08/24/2022 05:22 AM    EOSABS 0.00 08/24/2022 05:22 AM    BASOSABS 0.00 08/24/2022 05:22 AM     CMP:    Lab Results   Component Value Date/Time     08/25/2022 05:44 AM    K 3.3 08/25/2022 05:44 AM    K 3.7 08/17/2022 04:17 AM     08/25/2022 05:44 AM    CO2 27 08/25/2022 05:44 AM    BUN 5 08/25/2022 05:44 AM    CREATININE 0.9 08/25/2022 05:44 AM    GFRAA >60 08/25/2022 05:44 AM    LABGLOM >60 08/25/2022 05:44 AM    GLUCOSE 95 08/25/2022 05:44 AM    PROT 5.9 08/20/2022 04:44 PM    LABALBU 3.2 08/20/2022 04:44 PM    CALCIUM 8.6 08/25/2022 05:44 AM    BILITOT <0.2 08/20/2022 04:44 PM    ALKPHOS 90 08/20/2022 04:44 PM    AST 25 08/20/2022 04:44 PM    ALT 29 08/20/2022 04:44 PM               Current Facility-Administered Medications:     ceFAZolin (ANCEF) 2,000 mg in sterile water 20 mL IV syringe, 2,000 mg, IntraVENous, See Admin Instructions, LELO Goodrich - RICA    filgrastim-aafi (NIVESTYM) injection 300 mcg, 300 mcg, SubCUTAneous, Daily, MATTHEW Ruth, 300 mcg at 08/24/22 1835    clotrimazole (MYCELEX) lindsay 10 mg, 10 mg, Oral, 4x daily, Amita Mak MD, 10 mg at 08/24/22 1951    0.9 % sodium chloride infusion, , IntraVENous, PRN, Praveena Calloway MD    albuterol sulfate HFA (PROVENTIL;VENTOLIN;PROAIR) 108 (90 Base) MCG/ACT inhaler 2 puff, 2 puff, Inhalation, Q6H PRN, Tiffanie Gupta,     sodium chloride flush 0.9 % injection 5-40 mL, 5-40 mL, IntraVENous, 2 times per day, Maricruz Estrada MD, 10 mL at 08/25/22 0828    sodium chloride flush 0.9 % injection 5-40 mL, 5-40 mL, IntraVENous, PRN, Bhavya Murrieta MD    0.9 % sodium chloride infusion, 25 mL, IntraVENous, PRN, Bhavya Murrieta MD    HYDROmorphone (DILAUDID) injection 0.25 mg, 0.25 mg, IntraVENous, Q5 Min PRN, Bhavya Murrieta MD    HYDROmorphone (DILAUDID) injection 0.5 mg, 0.5 mg, IntraVENous, Q5 Min PRN, Bhavya Murrieta MD    labetalol (NORMODYNE;TRANDATE) injection 5 mg, 5 mg, IntraVENous, Q15 Min PRN **OR** hydrALAZINE (APRESOLINE) injection 5 mg, 5 mg, IntraVENous, Q15 Min PRN, Bhavya Murrieta MD    furosemide (LASIX) tablet 20 mg, 20 mg, Oral, Daily, Praveena Calloway MD, 20 mg at 08/24/22 0857    potassium chloride (KLOR-CON M) extended release tablet 40 mEq, 40 mEq, Oral, PRN **OR** potassium bicarb-citric acid (EFFER-K) effervescent tablet 40 mEq, 40 mEq, Oral, PRN **OR** potassium chloride 10 mEq/100 mL IVPB (Peripheral Line), 10 mEq, IntraVENous, PRN, Tiffanie Gupta,     ampicillin 2000 mg ivpb mini bag, 2,000 mg, IntraVENous, 6 times per day, Tiffanie E Kaercher, DO, Last Rate: 200 mL/hr at 08/25/22 0829, 2,000 mg at 08/25/22 0829    cefTRIAXone (ROCEPHIN) 2,000 mg in sterile water 20 mL IV syringe, 2,000 mg, IntraVENous, Q12H, Tiffanie E Kaercher, DO, 2,000 mg at 08/25/22 0326    melatonin tablet 3 mg, 3 mg, Oral, Nightly PRN, Tiffanie E Kaercher, DO, 3 mg at 08/24/22 2005    albuterol (PROVENTIL) nebulizer solution 2.5 mg, 2.5 mg, Nebulization, Q6H PRN, Tiffanie E Kaercher, DO    albuterol (ACCUNEB) nebulizer solution 0.63 mg, 1 ampule, Nebulization, Q6H PRN, Tiffanie E Kaercher, DO    [Held by provider] apixaban (ELIQUIS) tablet 5 mg, 5 mg, Oral, BID, Tiffanie E Kaercher, DO    [Held by provider] metoprolol succinate (TOPROL XL) extended release tablet 50 mg, 50 mg, Oral, BID WC, Tiffanie E Kaercher, DO, 50 mg at 08/24/22 0857    atorvastatin (LIPITOR) tablet 20 mg, 20 mg, Oral, Daily, Tiffanie E Kaercher, DO, 20 mg at 08/24/22 1951    sodium chloride flush 0.9 % injection 5-40 mL, 5-40 mL, IntraVENous, 2 times per day, Tiffanie E Kaercher, DO, 10 mL at 08/24/22 1951    sodium chloride flush 0.9 % injection 5-40 mL, 5-40 mL, IntraVENous, PRN, Tiffanie E Kaercher, DO    0.9 % sodium chloride infusion, , IntraVENous, PRN, Tiffanie E Kaercher, DO    acetaminophen (TYLENOL) tablet 650 mg, 650 mg, Oral, Q6H PRN **OR** acetaminophen (TYLENOL) suppository 650 mg, 650 mg, Rectal, Q6H PRN, Tiffanie E Kaercher, DO    senna (SENOKOT) tablet 8.6 mg, 1 tablet, Oral, Daily PRN, Tiffanie E Kaercher, DO    ipratropium-albuterol (DUONEB) nebulizer solution 1 ampule, 1 ampule, Inhalation, Q4H WA, Tiffanie E Kaercher, DO, 1 ampule at 08/24/22 2012    Assessment:    Principal Problem:    Pneumonia  Active Problems:    Pancytopenia ([facility]    Infection of pacemaker lead wire ([facility]    Sepsis due to Enterococcus ([facility]    Bacteremia    COPD (chronic obstructive pulmonary disease) (HCC)    HTN (hypertension)    Cardiac pacemaker in situ    Squamous carcinoma of lung ([facility]    History pulmonary embolism ([facility] [facility] on Eliquis  Resolved Problems:    * No resolved hospital problems. 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 Treated with concurrent chemo/XRT from 7/6/21 followed by consolidation Imfinzi. Progression on PET from 6/24/22. Started Rj Licona and Estela on 6/30/22.   Patient seen in office last 8/9 and received C2D8 of Carboplatin with Gemzar and Keytruda. Admitted with sepsis, Enterococcus bacteremia. Respiratory panel negative. Mediport was removed 8/20    Plan:  Neutropenia-resolved, discontinue filgrastim   Thrombocytopenia resolved  Anemia, prefer to keep hemoglobin above 7.5 g/dL due to poor pulmonary reserves  No transfusion PRBC indicated today but would advise a postop CBC. Enterococcus bacteremia: Status post port removal.  On broad-spectrum IV antibiotics. Followed by ID. Pacemaker to be removed 8/25  Metastatic non-small cell lung cancer: Treatment is currently on hold. Follow-up with Dr. Shruthi Levi in 2 weeks after discharge.       Electronically signed by MATTHEW Moran on 8/25/2022 at 8:31 AM

## 2022-08-25 NOTE — PROGRESS NOTES
[facility] and Vascular [place]- Electrophysiology    Inpatient progress note  PATIENT: Belkis Gandhi  MEDICAL RECORD NUMBER: [id number]  DATE OF SERVICE:  8/25/2022  ATTENDING ELECTROPHYSIOLOGIST: Dr Andrea Florian   PRIMARY ELECTROPHYSIOLOGIST: May Riedel Raheja,MD  REFERRING PHYSICIAN: No ref. provider found and Paula Davila MD  CHIEF COMPLAINT: Weakness and fatigue, fever    HPI: This is a 67 y.o. male with a history of pretension, hyperlipidemia, COPD and a history of long-term smoking habit as well as recurrent syncopal episodes starting in 2012. ILR 2020 showed CHB and subsequent pacemaker implant on 7/20/2021. He was also diagnosed with lung cancer around the same time and has been undergoing chemotherapy and radiation. He finished his last chemo cycle last week and presented to the emergency room with symptoms of ongoing fatigue weakness and fever. He was found to be neutropenic as well as thrombocytopenic. Subsequently blood cultures have been positive for Enterococcus faecalis. Blood cultures from 8/16, 8/17 and 8/18 have all been positive despite IV antibiotics. Patient also remains neutropenic and thrombocytopenic with a platelet count of 33,204 today. No other cardiac symptoms. No new symptoms of chest pain or dyspnea on exertion. No syncope or near syncope.    8/21/22: Patient denies any new complaints today. Was transfused earlier today for hemoglobin of 7.2. No cardiac symptoms. 8/22/22: Patient remains asymptomatic. Underwent SHIRA today. White count remains low however hemoglobin and platelet count have improved    8/23/22:  No complaints. Awaiting plan for extraction to confirm CT surgery back up. SHIRA negative yesterday. Afebrile     8/23/22: Pacemaker set at VVI 40 yesterday afternoon, has been stable without change in symptoms. On monitor, it appears to be wenckebach vs 2:1 block with rates 60-70's     8/25/22: NPO for extraction today. No complaints overnight. Continues with stable heart rates what appears wenckebach vs 2:1 AV block     Patient Active Problem List    Diagnosis Date Noted    Bacteremia 08/23/2022     Priority: Medium    Infection of pacemaker lead wire ([address] 08/22/2022     Priority: Medium    Sepsis due to Enterococcus ([address] 08/22/2022     Priority: Medium    Pancytopenia ([address] 08/17/2022     Priority: Medium    COVID-19 05/25/2022     Priority: Medium    Squamous carcinoma of lung ([address] 09/18/2021    History pulmonary embolism ([address] on Eliquis 09/18/2021    Hyponatremia 09/18/2021    Hypomagnesemia 09/18/2021    Wide-complex tachycardia ([address] 09/18/2021    Acute on chronic respiratory failure with hypoxia ([address] 09/17/2021    Cardiac pacemaker in situ 07/20/2021    Intermittent complete heart block ([address] 07/19/2021    Pneumonia 01/31/2019    COPD (chronic obstructive pulmonary disease) ([address] 01/31/2019    BPH (benign prostatic hyperplasia) 01/31/2019    HTN (hypertension) 01/31/2019    HLD (hyperlipidemia) 01/31/2019    Syncope and collapse 01/18/2019       Past Medical History:   Diagnosis Date    CHB (complete heart block) ([address] 07/2021    COPD (chronic obstructive pulmonary disease) ([address]     Syncope 09/2020    dr Reba Paul wearing a monitor    Syncope 07/2021       Family History   Problem Relation Age of Onset    Cancer Father         prostate    Cancer Paternal Uncle         anal       Social History     Tobacco Use    Smoking status: Former     Packs/day: 2.00     Years: 25.00     Pack years: 50.00     Types: Cigarettes     Quit date: 12/26/2011     Years since quitting: 10.6    Smokeless tobacco: Never   Substance Use Topics    Alcohol use: Not Currently     Alcohol/week: 1.0 standard drink     Types: 1 Glasses of wine per week     Comment: 1 glass of wine per day prior       Current Facility-Administered Medications   Medication Dose Route Frequency Provider Last Rate Last Admin    ceFAZolin (ANCEF) 2,000 mg in sterile water 20 mL IV syringe  2,000 mg IntraVENous See Admin Instructions LELO Lezama - RICA        filgrastim-aafi (NIVESTYM) injection 300 mcg  300 mcg SubCUTAneous Daily [place], [address]   300 mcg at 08/25/22 0905    clotrimazole [facility]) lindsay 10 mg  10 mg Oral 4x daily Shelby Amanda MD   10 mg at 08/25/22 0833    0.9 % sodium chloride infusion   IntraVENous PRN Baudilio Ruvalcaba MD        albuterol sulfate HFA (PROVENTIL;VENTOLIN;PROAIR) 108 (90 Base) MCG/ACT inhaler 2 puff  2 puff Inhalation Q6H PRN Tiffanie ISABELLA Kaerc, DO        sodium chloride flush 0.9 % injection 5-40 mL  5-40 mL IntraVENous 2 times per day Chata Ortiz MD   10 mL at 08/25/22 0828    sodium chloride flush 0.9 % injection 5-40 mL  5-40 mL IntraVENous PRN Bhavya Murrieta MD        0.9 % sodium chloride infusion  25 mL IntraVENous PRN Bhavya Murrieta MD        HYDROmorphone (DILAUDID) injection 0.25 mg  0.25 mg IntraVENous Q5 Min PRN Bhavya Murrieta MD        HYDROmorphone (DILAUDID) injection 0.5 mg  0.5 mg IntraVENous Q5 Min PRN Bhavya Murrieta MD        labetalol (NORMODYNE;TRANDATE) injection 5 mg  5 mg IntraVENous Q15 Min PRN Bhavya Arrieta MD        Or    hydrALAZINE (APRESOLINE) injection 5 mg  5 mg IntraVENous Q15 Min PRN Bhavya Arrieta MD        furosemide (LASIX) tablet 20 mg  20 mg Oral Daily Baudilio Ruvalcaba MD   20 mg at 08/24/22 0857    potassium chloride (KLOR-CON M) extended release tablet 40 mEq  40 mEq Oral PRN Tiffanie E Kaercher, DO        Or    potassium bicarb-citric acid (EFFER-K) effervescent tablet 40 mEq  40 mEq Oral PRN Tiffanie E Kaercher, DO        Or    potassium chloride 10 mEq/100 mL IVPB (Peripheral Line)  10 mEq IntraVENous PRN Tiffanie E Kaercher, DO        ampicillin 2000 mg ivpb mini bag  2,000 mg IntraVENous 6 times per day Jorge A Hernandez DO   Stopped at 08/25/22 0905    cefTRIAXone (ROCEPHIN) 2,000 mg in sterile water 20 mL IV syringe  2,000 mg IntraVENous Q12H Gerald Sake, DO   2,000 mg at 08/25/22 0326    melatonin tablet 3 mg  3 mg Oral Nightly PRN Gerald Sake, DO   3 mg at 08/24/22 2005    albuterol (PROVENTIL) nebulizer solution 2.5 mg  2.5 mg Nebulization Q6H PRN Tiffanie E Kaercher, DO        albuterol (ACCUNEB) nebulizer solution 0.63 mg  1 ampule Nebulization Q6H PRN Tiffanie E Kaercher, DO        [Held by provider] apixaban (ELIQUIS) tablet 5 mg  5 mg Oral BID Tiffanie E Kaercher, DO        [Held by provider] metoprolol succinate (TOPROL XL) extended release tablet 50 mg  50 mg Oral BID WC Tiffanie E Kaercher, DO   50 mg at 08/24/22 0857    atorvastatin (LIPITOR) tablet 20 mg  20 mg Oral Daily Tiffanie E Kaercher, DO   20 mg at 08/24/22 1951    sodium chloride flush 0.9 % injection 5-40 mL  5-40 mL IntraVENous 2 times per day Gerald Reyes, DO   10 mL at 08/25/22 3385    sodium chloride flush 0.9 % injection 5-40 mL  5-40 mL IntraVENous PRN Tiffanie E Kaercher, DO        0.9 % sodium chloride infusion   IntraVENous PRN Tiffanie E Kaercher, DO        acetaminophen (TYLENOL) tablet 650 mg  650 mg Oral Q6H PRN Tiffanie E Kaercher, DO        Or    acetaminophen (TYLENOL) suppository 650 mg  650 mg Rectal Q6H PRN Tiffanie E Kaercher, DO        senna (SENOKOT) tablet 8.6 mg  1 tablet Oral Daily PRN Tiffanie E Kaercher, DO        ipratropium-albuterol (DUONEB) nebulizer solution 1 ampule  1 ampule Inhalation Q4H WA Tiffanie E Kaercher, DO   1 ampule at 08/24/22 2012        No Known Allergies    PHYSICAL EXAM:   Vitals:    08/24/22 2000 08/24/22 2304 08/25/22 0645 08/25/22 0825   BP: 138/63 (!) 125/59  118/62   Pulse: 60 58  57   Resp: 20 18  16   Temp: 98.5 °F (36.9 °C) 97.4 °F (36.3 °C)  (!) 96.7 °F (35.9 °C)   TempSrc: Temporal Temporal  Oral   SpO2: 100% 98%  98%   Weight:   190 lb 4.8 oz (86.3 kg)    Height:          Constitutional: Well-developed, no acute distress  Eyes: conjunctivae normal, no xanthelasma   Ears, Nose, Throat: oral mucosa moist, no cyanosis   CV: no JVD. irregular rhythm. Normal S1S2 and no S3. No murmurs, rubs, or gallops. Lungs: decreased bases bilaterally, normal respiratory effort without used of accessory muscles  Abdomen: soft, non-tender, non distended  Musculoskeletal: no digital clubbing, +2 pitting BLE edema   Skin: warm, no rashes     I have personally reviewed the laboratory, cardiac diagnostic and radiographic testing as outlined below:    Data:    Recent Labs     08/23/22  0647 08/24/22  0522 08/25/22  0544   WBC 1.8* 2.4* 5.3   HGB 8.1* 8.8* 7.8*   HCT 24.6* 27.2* 25.1*   * 162 161     Recent Labs     08/24/22  1008 08/25/22  0544    140   K 3.5 3.3*    102   CO2 27 27   BUN 5* 5*   CREATININE 1.0 0.9   CALCIUM 9.2 8.6        Lab Results   Component Value Date/Time    MG 1.5 09/22/2021 05:28 AM     No results for input(s): TSH in the last 72 hours. Recent Labs     08/24/22  1008   INR 1.2     Telemetry: 8/25/22: 2: 1 AV blcok vs wenckebach rates 50-60's      8/23/22 Atrial pacing, V, pacing, PACs    8/24/22: 2:1 block vs wenckebach with RBBB and LPFB    EKG:        SHIRA 8/22/22   Summary   No SHIRA indication of Endocarditis. Normal left ventricular chamber size and systolic function. Interatrial septum appears intact. Normal right ventricle size and function. Pacemaker leads noted in right atrium and right ventricle. Normal aortic root size. Moderate atherosclerosis in the descending thoracic aorta. Epicardial fat vs. small pericardial effusion. No intra cardiac mass or thrombus. Technically sub-optimal images. Compared to prior sub-optimal TTE from 8/19/22. Signature    ----------------------------------------------------------------   Electronically signed by Deanna Santos MD(Interpreting   physician) on 08/22/2022 06:50 PM    Echocardiogram: 8/19/22   Conclusions      Summary   Normal left ventricular chamber size. Normal left ventricular systolic function.    Visually estimated LVEF is 55-60 %. No wall motion abnormalities. Normal diastolic function. Normal left atrial pressure. Normal right ventricle structure and function. Normal left atrial size. Normal right atrial size   Likely normal estimated PA pressure. Pacer/ ICD wire present in the right heart. No gross evidence of infective endocarditis. Consider SHIRA for better   accuracy if clinically indicated. Compared to prior echo from 2021, no significant changes noted. Signature    ----------------------------------------------------------------   Electronically signed by Zoraida Panchal MD(Interpreting   physician) on 08/19/2022 04:16 PM    8/23/22  ~ 2 pm  Device Interrogation: [facility] [place]   Mode: DDI 60; changed to VVI 40 bpm   Battery Voltage/Longevity:  3.01V, 5.9-8.6 years     Pacing: A: 27%   RV: 25%   P wave: >5 mV  Impedance: 380 ohms   Threshold: 1.0 V @ 0.5 ms  RV R wave: 10.6 mV  Impedance: 410 ohms   Threshold: 1.5 V @ 0.5 ms  Episodes: AT/AF detections ranging from 12 seconds to 8:44 minutes. June 18-most recent episode on Aug 20   Reprogramming included: changed to VVI 40   Overall device function is normal    8/24/22 pacemaker check   Mode: VVI 40   Pacing:  RV: <1%       Assessment/Plan:      1. Dual-chamber pacemaker in situ  -for AV block with a long pause/ ? CHB in past on ILR. Date of implant July 28, 2021  - bifascicular block with 2:1 block vs wenckebach on tele when pacer set at VVI   - pacemaker check in June revealed 6.5% atrial and 3% ventricular pacing with DDI programming at lower rate of 60 bpm; above showed 27% AV and 25% RV paced 8/23/22. VVI at 36 and continued to have conduction without dependence of pacing; will plan for reimplant of likely Micra AV after extraction    - on toprol 50 mg BID- held since 8/24/22     2.   Bacteremia/sepsis--enterococcus faecalis  -  probably port infection per ID; mediport removed 8/20/22   Persistent bacteremia despite IV antibiotics  8-16-22 blood cultures E faecalis x 2  8-17-22 blood cultures E faecalis x 1  8-18-22 blood culture + E faecalis x1  8-19-22 blood cultures neg to date  8-20-22 Port ( tip ) cx - neg tod date   8-20-22 blood cultures neg to date  - redraw X2  8/23/22- no growth to date      3. Lung cancer, squamous cell--patient undergoing chemotherapy  -  Stage IIIB T2aN3 Squamous Cell Carcinoma of Lung  diagnosis 5/27/2021, PD-L1 0%    - 7/6/21-9/7/21 treatment with concurrent chemotherapy/radiation therapy   - 11/2/21 consolidated with Imfinzi (Durvalumab) per ID IV every 4 weeks   - 6/30/22 PET showing progression and was started on Carbo, Gemzar and Keytruda on 6/30/22  - 8/9 /22 received C2D8 of Carboplatin AUC 2 with Gemzar 1000 mg/m2 IV on D1,8 and Keytruda IV on D1 on a 21 day cycle    4. Neutropenia and thrombocytopenia related to chemotherapy  - WBC ~ 2; 5.3 on 8/25/22   - plt trending up. Now consistently above 100; had transfusion of PLT on 8/17     5. Persistent anemia. -  post PRBC transfusion 8/21/22  - Hgb 7.8 8/25/22     6. History of PE in 2021   - on Eliquis in the past ; held now     Recommendations:    Plan for extraction of pacemaker today with CT surgery team    Continue to trend CBC and plt count. Holding eliquis- was on in past for PE   Will need reimplant of pacemaker, possibly leadless Micra AV. Thank you for allowing me to participate in your patient's care. Please call me if there are any questions or concerns. LELO Gonzalez - [facility]  Cardiac Electrophysiology  [facility] ([facility] Physicians  Electrophysiology  9:44 AM  8/25/2022    Attending Physician's Statement    Patient seen with the ANP. Agree with the findings, assessment and plan. Management plan was discussed.  I have personally interviewed the patient, independently performed a focused cardiac examination, reviewed the pertinent laboratory and diagnostic testing with the patient and directly participated in the medical decision-making as noted above with the following additions:     Feeling well. No dizziness. Remains in second degree AV block Mobitz type I and 2 to 1 AV block at 60 bpm. Underwent successful pacemaker system removal today. Physical exam showed no JVD, RRR, normal S1S2, no murmur, clear lungs bilaterally, 1+ edema    Plan for MICRA AV leadless pacemaker implantation on Monday if OK with ID. Will obtain blood cultures. I have spent a total of 35 minutes with the patient and the family reviewing the above stated recommendations. And a total of >50% of that time involved face-to-face time providing counseling and/or coordination of care with the other providers, reviewing records/tests, counseling/education of the patient, ordering medications/tests/procedures, coordinating care, and documenting clinical information in the EHR.      Giovanni Leventhal, MD  Cardiac Electrophysiology  [address]  [facility] [place] [place] [facility]  3:46 PM  8/25/2022

## 2022-08-25 NOTE — PROGRESS NOTES
[facility]                                Progress note    Subjective: On my evaluation, comfortable comfortable pacemaker extraction today  No acute events or issues overnight    Objective:    /75   Pulse 77   Temp 97 °F (36.1 °C) (Temporal)   Resp 12   Ht 5' 6\" (1.676 m)   Wt 190 lb 4.8 oz (86.3 kg)   SpO2 93%   BMI 30.72 kg/m²     In: 480 [P.O.:480]  Out: -   In: 480   Out: -     General appearance: NAD, conversant, continuing to improve daily  HEENT: AT/NC, MMM  Neck: FROM, supple  Lungs: Clear to auscultation  CV: RRR, no MRGs-right-sided port removed 8/20, left-sided pacemaker in place-to be removed today  Vasc: Radial pulses 2+  Abdomen: Soft, non-tender; no masses or HSM  Extremities: No peripheral edema or digital cyanosis  Skin: Generalized bruising  Psych: Alert and oriented to person, place and time  Neuro: Alert and interactive     Recent Labs     08/24/22  0522 08/25/22  0544 08/25/22  1512   WBC 2.4* 5.3 7.6   HGB 8.8* 7.8* 7.7*   HCT 27.2* 25.1* 24.0*    161 174       Recent Labs     08/24/22  1008 08/25/22  0544 08/25/22  1512    140 142   K 3.5 3.3* 3.5    102 106   CO2 27 27 23   BUN 5* 5* 5*   CREATININE 1.0 0.9 0.8   CALCIUM 9.2 8.6 8.3*       Assessment:    Principal Problem:    Pneumonia  Active Problems:    Pancytopenia (HCC)    Pacemaker infection (HCC)    Sepsis due to Enterococcus (HCC)    Bacteremia    COPD (chronic obstructive pulmonary disease) (HCC)    HTN (hypertension)    Cardiac pacemaker in situ    Squamous carcinoma of lung ([address]    History pulmonary embolism ([address] on Eliquis  Resolved Problems:    * No resolved hospital problems.  *      Plan:  Patient is a 65-year-old male with a hx of squamous cell lung carcinoma who is active chemo therapy who is admitted to [facility] for  Sepsis/bacteremia/immunocompromise status-acutely ill status  -Mediport removed 8/20  -CTS consulted by EP for removal of pacer, removal of infected pacemaker today 8/25/2022  -Monitor H&H and labs in a.m.  -Await final decision on temporary pacemaker/further management since patient was pacer dependent  -Respiratory panel negative  -Check procalcitonin 0.42  -CT chest ordered by pulm> progressive infiltrative mass in the left perihilar region  -On daptomycin per infectious disease > switched to IV Rocephin and ampicillin  -General surgery > signed off   -SHIRA 8/22/2022-unremarkable, no evidence of endocarditis, ejection fraction    Chest x-ray 24 2022 unremarkable  Trilogy resumed as at home    Pancytopenia  -Consult heme-onc-following  -h/h 6.9/21.4 > stable now in mid eights post transfusion  -Platelets 4 > transfuse 1 unit platelets > 23 > 396 today   -Hold Eliquis-consider holding off for few weeks     Hematuria  -Check urine culture  -Hold Eliquis      Multiple consultants on board  DVT Prophylaxis PCD's  PT/OT  Discharge Xavi Chaparro MD  4:08 PM  8/25/2022

## 2022-08-25 NOTE — PROGRESS NOTES
Alert.   Eyes:  No sclera icterus. No conjunctival injection. ENT: No discharge. Pharynx clear. Neck: Trachea midline. Normal thyroid. Resp: No accessory muscle use. No crackles. Mild expiratory wheeze posteriorly. No rhonchi. Diminished bilaterally  CV: Regular rate. Regular rhythm. No mumur or rub. +2 pitting BLE edema - improving  ABD: Non-tender. Non-distended. Normal bowel sounds. Skin: Warm and dry. No nodule on exposed extremities. No rash on exposed extremities. M/S: No cyanosis. No joint deformity. No clubbing. Neuro: Awake. Follows commands. A&Ox3    I/O: I/O last 3 completed shifts: In: 480 [P.O.:480]  Out: -   No intake/output data recorded. Results:  CBC:   Recent Labs     08/23/22  0647 08/24/22  0522 08/25/22  0544   WBC 1.8* 2.4* 5.3   HGB 8.1* 8.8* 7.8*   HCT 24.6* 27.2* 25.1*   MCV 91.1 92.2 91.6   * 162 161     BMP:   Recent Labs     08/24/22  1008 08/25/22  0544    140   K 3.5 3.3*    102   CO2 27 27   BUN 5* 5*   CREATININE 1.0 0.9     LFT:   No results for input(s): ALKPHOS, ALT, AST, PROT, BILITOT, BILIDIR, LABALBU in the last 72 hours. PT/INR:   Recent Labs     08/24/22  1008   PROTIME 12.6*   INR 1.2     Cultures:  No results for input(s): CULTRESP in the last 72 hours. ABG:   No results for input(s): PH, PO2, PCO2, HCO3, BE, O2SAT in the last 72 hours. Films:  CT Head WO Contrast  Result Date: 8/16/2022  No acute intracranial abnormality. Cortical atrophy and periventricular leukomalacia. XR CHEST PORTABLE  Result Date: 8/16/2022  Continued opacity in the left lower lung and linear scarring in the left perihilar region.  COPD.     8/18/22 CT chest w/o    5/24/2022 CTA chest       5/24/2022 CTA chest: .34 x 5.4 cm left hilar and perihilar mass extending to LLL encircling the pulmonary artery and the bronchi consistent with progressive malignancy with occlusion of left lower lobe bronchi with postobstructive pneumonitis    08/19/22 ECHO:  Normal left ventricular chamber size. Normal left ventricular systolic function. Visually estimated LVEF is 55-60 %. No wall motion abnormalities. Normal diastolic function. Normal left atrial pressure. Normal right ventricle structure and function. Normal left atrial size. Normal right atrial size   Likely normal estimated PA pressure. Pacer/ ICD wire present in the right heart. No gross evidence of infective endocarditis. Consider SHIRA for better   accuracy if clinically indicated. Compared to prior echo from 2021, no significant changes noted. SHIRA on 8/22/22   No SHIRA indication of Endocarditis. Normal left ventricular chamber size and systolic function. Interatrial septum appears intact. Normal right ventricle size and function. Pacemaker leads noted in right atrium and right ventricle. Normal aortic root size. Moderate atherosclerosis in the descending thoracic aorta. Epicardial fat vs. small pericardial effusion. No intra cardiac mass or thrombus. Technically sub-optimal images. Compared to prior sub-optimal TTE from 8/19/22. 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 S/p platelet and PRBC transfusion on  8/18/2022 CT chest w/o: The previously noted infiltrative left hilar mass extending to EAN and LLL surrounding the broncho pulmonary vasculature is noted currently measuring 4.6 x 4.3 cm with progression with atelectasis.     8/18: 2L NC, US BLE neg DVT   8/19: 2L NC   8/20 RA, Mediport removed wakes up somewhat short of breath  8/21 RA received PRBC 8/22 2.5L; SHIRA neg for endocarditis   8/23: RA;    8/24: 3L NC 99%  8/25: 3L NC 98%    Stage IIIB T2aN3 Squamous Cell Carcinoma of Lung  diagnosis 5/27/2021, PD-L1 0%    - 7/6/21-9/7/21 treatment with concurrent chemotherapy/radiation therapy   - 11/2/21 consolidated with Imfinzi (Durvalumab) per ID IV every 4 weeks f  - 6/30/22 PET showing progression and was started on Carbo, Gemzar and Keytruda on 6/30/22  - 8/9 /22 r ceived C2D8 of Carboplatin AUC 2 with Gemzar 1000 mg/m2 IV on D1,8 and Keytruda IV on D1 on a 21 day cycle  Enterococcus faecalis bacteremia/septicemia on IV daptomycin for bacteremia cultures + on 8/16, 8/17, 8/18 s/p mediport removal  cath tip neg so far. With dual-chamber pacemaker with h/o wide-complex tachycardia (HCC)--atrial tachycardia with RV pacing  H/O SARS-CoV 2 Pneumonitis 5/24/2022- suspected Omicron BA-2  sick contact wife  Chronic hypoxic resp failure  RA during the day and 2L NC at night, which is baseline. Probable obstructive sleep apnea  Persistent pancytopenia secondary to chemotherapy  Thrombocytopenia   Lymphopenia   RLE swelling and numbness, w/u with neg u/s for DVT   H/o submassive hemoptysis - streaky, known lung mass resolved   H/O RLL subsegmental PE- CTA Chest 9/17/2021 was on eliquis now on hold   Radiation pneumonitis/fibrosis left lung   COPD, not in exacerbation      PLAN:   On 3L NC;  2.5L - on/off O2 as needed. No hypoxemia charted. Wean to maintain pox >90%. Has O2 at home and wears 2L HS. Ambulatory pulse ox prior to d/c. Continue DuoNeb every 4 hours and prn   Oncology following. Received one unit platelets 7/28 in OR. PLTS 64>95>125>162>161 (8/25)  IV ATB per ID. Will need PICC line prior to discharge  Continue IS for pulmonary hygiene   T8/22 SHIRA negative for endocarditis. TTE done 08/19 - no gross evidence of endocarditis. EP and CTS planning for pacer removal 8/25 . To resume Eliquis when ok with CTS/EP.   Outpatient PSG to be obtained outpt for high risk and probable FIONA   Pt restarted PO lasix on 8/20 for leg swelling, continue strict I&O.  No output documented    Electronically signed by LELO Thomas - CNP on 8/25/2022 at 9:04 AM    Seen and evaluated, and agree with above assessment and plan

## 2022-08-25 NOTE — CARE COORDINATION
Dione Rincon reported that  cost for at home antibiotics is at least $250  a week. Pt asked SW talk with his wife. Spoke with Wife is concerned about Pt being able to go outside to get fresh air during [facility]. [facility]. Informed of cost of supplies and Anitbiotics if Pt went home. Spoke with Surjit Page . Pt would be allowed to go outside to get fresh air. Inquired with BRENT and Sara about bed availability. [facility]charge plan is to [facility]. [facility] Made referral.  NGUYEN/MERCEDEZ to follow for discharge needs.    Rodriguez Hidalgo, L.S.W.  [phone]

## 2022-08-25 NOTE — PROGRESS NOTES
Patient to PACU. Appropriate monitors placed on patient. Cart locked and in lowest position with side rails up.

## 2022-08-25 NOTE — ANESTHESIA PROCEDURE NOTES
Arterial Line:    An arterial line was placed using surface landmarks, in the pre-op for the following indication(s): continuous blood pressure monitoring and blood sampling needed. A 20 gauge (size), 1 and 3/4 inch (length), Arrow (type) catheter was placed, Seldinger technique used, into the right radial artery, secured by tape and Tegaderm. Anesthesia type: Local  Local infiltration: Injection (1 ml of 1% lidocaine)    Events:  EBL < 5mL. 8/25/2022 12:45 PM8/25/2022 12:48 PM  Resident/CRNA: LELO Singh - CRNA  Performed: Resident/CRNA   Preanesthetic Checklist  Completed: patient identified, IV checked, site marked, risks and benefits discussed, surgical/procedural consents, equipment checked, pre-op evaluation, timeout performed, anesthesia consent given, oxygen available, monitors applied/VS acknowledged, fire risk safety assessment completed and verbalized and blood product R/B/A discussed and consented

## 2022-08-25 NOTE — ANESTHESIA PRE PROCEDURE
Department of Anesthesiology  Preprocedure Note       Name:  Quinn Hughes   Age:  67 y.o.  :  1950                                          MRN:  [id number]         Date:  2022      Surgeon: Lisha    Procedure: SHIRA    Medications prior to admission:   Prior to Admission medications    Medication Sig Start Date End Date Taking?  Authorizing Provider   predniSONE (DELTASONE) 5 MG tablet Take 5 mg by mouth daily    Juli Robles MD   albuterol (ACCUNEB) 0.63 MG/3ML nebulizer solution Take 1 ampule by nebulization every 6 hours as needed for Wheezing    Historical Provider, MD   apixaban (ELIQUIS) 5 MG TABS tablet Take 5 mg by mouth 2 times daily  10/1/21   Historical Provider, MD   albuterol sulfate HFA (VENTOLIN HFA) 108 (90 Base) MCG/ACT inhaler Inhale 2 puffs into the lungs every 6 hours as needed for Wheezing    Historical Provider, MD   metoprolol succinate (TOPROL XL) 50 MG extended release tablet Take 1 tablet by mouth 2 times daily (with meals) 21   Kelvin Dunn MD   simvastatin (ZOCOR) 40 MG tablet Take 40 mg by mouth daily     Historical Provider, MD   umeclidinium-vilanterol (ANORO ELLIPTA) 62.5-25 MCG/INH AEPB inhaler Inhale 1 puff into the lungs daily    Historical Provider, MD       Current medications:    Current Facility-Administered Medications   Medication Dose Route Frequency Provider Last Rate Last Admin    lidocaine PF 1 % injection             ipratropium-albuterol (DUONEB) nebulizer solution 1 ampule  1 ampule Inhalation Once Sung Estrada DO        ceFAZolin (ANCEF) 2,000 mg in sterile water 20 mL IV syringe  2,000 mg IntraVENous See Admin Instructions LELO Childress - CNP        filgrastim-aafi (NIVESTYM) injection 300 mcg  300 mcg SubCUTAneous Daily MATTHEW Sow   300 mcg at 22 0905    clotrimazole (MYCELEX) lindsay 10 mg  10 mg Oral 4x daily Landry Andrade MD   10 mg at 22 1128    0.9 % sodium chloride infusion   IntraVENous PRN Ovidio Singleton MD        albuterol sulfate HFA (PROVENTIL;VENTOLIN;PROAIR) 108 (90 Base) MCG/ACT inhaler 2 puff  2 puff Inhalation Q6H PRN Tiffanie E Kaercher, DO        sodium chloride flush 0.9 % injection 5-40 mL  5-40 mL IntraVENous 2 times per day Deb Fitzgerald MD   10 mL at 08/25/22 0828    sodium chloride flush 0.9 % injection 5-40 mL  5-40 mL IntraVENous PRN Bhavya Murrieta MD        0.9 % sodium chloride infusion  25 mL IntraVENous PRN Bhavya Perez MD        HYDROmorphone (DILAUDID) injection 0.25 mg  0.25 mg IntraVENous Q5 Min PRN Bhavya Perez MD        HYDROmorphone (DILAUDID) injection 0.5 mg  0.5 mg IntraVENous Q5 Min PRN Bhavya Perez MD        labetalol (NORMODYNE;TRANDATE) injection 5 mg  5 mg IntraVENous Q15 Min PRN Bhavya Perez MD        Or    hydrALAZINE (APRESOLINE) injection 5 mg  5 mg IntraVENous Q15 Min PRN Bhavya Perez MD        furosemide (LASIX) tablet 20 mg  20 mg Oral Daily Ovidio Singleton MD   20 mg at 08/24/22 0857    potassium chloride (KLOR-CON M) extended release tablet 40 mEq  40 mEq Oral PRN Tiffanie E Kaercher, DO        Or    potassium bicarb-citric acid (EFFER-K) effervescent tablet 40 mEq  40 mEq Oral PRN Tiffanie E Kaercher, DO        Or    potassium chloride 10 mEq/100 mL IVPB (Peripheral Line)  10 mEq IntraVENous PRN Robbie Nasuti, DO        ampicillin 2000 mg ivpb mini bag  2,000 mg IntraVENous 6 times per day Robbie Nasuti, DO   Stopped at 08/25/22 1209    cefTRIAXone (ROCEPHIN) 2,000 mg in sterile water 20 mL IV syringe  2,000 mg IntraVENous Q12H Tiffanie E Kaercher, DO   2,000 mg at 08/25/22 0326    melatonin tablet 3 mg  3 mg Oral Nightly PRN Tiffanie E Kaercher, DO   3 mg at 08/24/22 2005    albuterol (PROVENTIL) nebulizer solution 2.5 mg  2.5 mg Nebulization Q6H PRN Tiffanie Gupta DO        albuterol (ACCUNEB) nebulizer solution 0.63 mg  1 ampule Nebulization Q6H PRN Caprice Reveles, DO        [Held by provider] apixaban (ELIQUIS) tablet 5 mg  5 mg Oral BID Tiffaine E Kaercher, DO        [Held by provider] metoprolol succinate (TOPROL XL) extended release tablet 50 mg  50 mg Oral BID WC Tiffanie E Kaercher, DO   50 mg at 08/24/22 0857    atorvastatin (LIPITOR) tablet 20 mg  20 mg Oral Daily Tiffanie E Kaercher, DO   20 mg at 08/24/22 1951    sodium chloride flush 0.9 % injection 5-40 mL  5-40 mL IntraVENous 2 times per day Caprice Reveles, DO   10 mL at 08/25/22 5299    sodium chloride flush 0.9 % injection 5-40 mL  5-40 mL IntraVENous PRN Tiffanie E Kaercher, DO        0.9 % sodium chloride infusion   IntraVENous PRN Caprice Reveles, DO        acetaminophen (TYLENOL) tablet 650 mg  650 mg Oral Q6H PRN Tiffanie E Kaercher, DO        Or    acetaminophen (TYLENOL) suppository 650 mg  650 mg Rectal Q6H PRN Tiffanie E Kaercher, DO        senna (SENOKOT) tablet 8.6 mg  1 tablet Oral Daily PRN Tiffanie E Kaercher, DO        ipratropium-albuterol (DUONEB) nebulizer solution 1 ampule  1 ampule Inhalation Q4H WA Tiffanie E Kaercher, DO   1 ampule at 08/25/22 1303       Allergies:  No Known Allergies    Problem List:    Patient Active Problem List   Diagnosis Code    Syncope and collapse R55    Pneumonia J18.9    COPD (chronic obstructive pulmonary disease) (HCC) J44.9    BPH (benign prostatic hyperplasia) N40.0    HTN (hypertension) I10    HLD (hyperlipidemia) E78.5    Intermittent complete heart block (HCC) I44.2    Cardiac pacemaker in situ Z95.0    Acute on chronic respiratory failure with hypoxia (HCC) J96.21    Squamous carcinoma of lung (HCC) C34.90    History pulmonary embolism ([facility] on Eliquis I26.99    Hyponatremia E87.1    Hypomagnesemia E83.42    Wide-complex tachycardia ([facility]) I47.2    COVID-19 U07.1    Pancytopenia ([facility] D61.818    Infection of pacemaker lead wire ([facility] T82. 7XXA    Sepsis due to Enterococcus ([address] A41.81    Bacteremia R78.81       Past Medical History:        Diagnosis Date    CHB (complete heart block) ([facility] 07/2021    COPD (chronic obstructive pulmonary disease) ([facility]     Syncope 09/2020    dr Dana Gonzales wearing a monitor    Syncope 07/2021       Past Surgical History:        Procedure Laterality Date    BRONCHOSCOPY N/A 05/27/2021    BRONCHOSCOPY W/EBUS FNA performed by Lu Lockett MD at [facility] N/A 05/27/2021    BRONCHOSCOPY DIAGNOSTIC OR [facility]LL [facility] ONLY performed by Lu Lockett MD at [facility] N/A 05/27/2021    BRONCHOSCOPY ADD ON COMPUTER ASSISTED performed by Lu Lockett MD at [address] [facility] 2011 2001    groin     PACEMAKER INSERTION  07/20/2021    DUAL PPM  (ABDULLAHI)  DR. Javid Calloway PORT SURGERY Right 08/20/2022    PORT REMOVAL performed by Aneta Ambrocio MD at [facility] TRANSESOPHAGEAL ECHOCARDIOGRAM  08/22/2022    Dr Suzanne Murphy       Social History:    Social History     Tobacco Use    Smoking status: Former     Packs/day: 2.00     Years: 25.00     Pack years: 50.00     Types: Cigarettes     Quit date: 12/26/2011     Years since quitting: 10.6    Smokeless tobacco: Never   Substance Use Topics    Alcohol use: Not Currently     Alcohol/week: 1.0 standard drink     Types: 1 Glasses of wine per week     Comment: 1 glass of wine per day prior                                Counseling given: Not Answered      Vital Signs (Current):   Vitals:    08/24/22 2000 08/24/22 2304 08/25/22 0645 08/25/22 0825   BP: 138/63 (!) 125/59  118/62   Pulse: 60 58  57   Resp: 20 18  16   Temp: 98.5 °F (36.9 °C) 97.4 °F (36.3 °C)  (!) 96.7 °F (35.9 °C)   TempSrc: Temporal Temporal  Oral   SpO2: 100% 98%  98%   Weight:   190 lb 4.8 oz (86.3 kg)    Height:                                                  BP Readings from Last 3 Encounters:   08/25/22 118/62   06/16/22 (!) 142/54   05/27/22 127/62       NPO Status: Time of last liquid consumption: 2330                        Time of last solid consumption: 2000                        Date of last liquid consumption: 08/24/22                        Date of last solid food consumption: 08/24/22    BMI:   Wt Readings from Last 3 Encounters:   08/25/22 190 lb 4.8 oz (86.3 kg)   05/26/22 187 lb (84.8 kg)   04/01/22 187 lb 9.6 oz (85.1 kg)     Body mass index is 30.72 kg/m². CBC:   Lab Results   Component Value Date/Time    WBC 5.3 08/25/2022 05:44 AM    RBC 2.74 08/25/2022 05:44 AM    HGB 7.8 08/25/2022 05:44 AM    HCT 25.1 08/25/2022 05:44 AM    MCV 91.6 08/25/2022 05:44 AM    RDW 19.8 08/25/2022 05:44 AM     08/25/2022 05:44 AM       CMP:   Lab Results   Component Value Date/Time     08/25/2022 05:44 AM    K 3.3 08/25/2022 05:44 AM    K 3.7 08/17/2022 04:17 AM     08/25/2022 05:44 AM    CO2 27 08/25/2022 05:44 AM    BUN 5 08/25/2022 05:44 AM    CREATININE 0.9 08/25/2022 05:44 AM    GFRAA >60 08/25/2022 05:44 AM    LABGLOM >60 08/25/2022 05:44 AM    GLUCOSE 95 08/25/2022 05:44 AM    PROT 5.9 08/20/2022 04:44 PM    CALCIUM 8.6 08/25/2022 05:44 AM    BILITOT <0.2 08/20/2022 04:44 PM    ALKPHOS 90 08/20/2022 04:44 PM    AST 25 08/20/2022 04:44 PM    ALT 29 08/20/2022 04:44 PM       POC Tests: No results for input(s): POCGLU, POCNA, POCK, POCCL, POCBUN, POCHEMO, POCHCT in the last 72 hours.     Coags:   Lab Results   Component Value Date/Time    PROTIME 12.6 08/24/2022 10:08 AM    INR 1.2 08/24/2022 10:08 AM    APTT 29.0 08/24/2022 10:08 AM       HCG (If Applicable): No results found for: PREGTESTUR, PREGSERUM, HCG, HCGQUANT     ABGs: No results found for: PHART, PO2ART, BPO9RNZ, WEY1SAB, BEART, C7GMFCNE     Type & Screen (If Applicable):  No results found for: LABABO, LABRH    Drug/Infectious Status (If Applicable):  No results found for: HIV, HEPCAB    COVID-19 Screening (If Applicable):   Lab Results   Component Value Date/Time    COVID19 Not Detected 08/17/2022 04:17

## 2022-08-25 NOTE — PROGRESS NOTES
Spoke with Hernandez regarding hemoglobin 7.7. hemoglobin was 7.8 this am. Just continue to monitor hemoglobin

## 2022-08-25 NOTE — BRIEF OP NOTE
Brief Postoperative Note    Tia Palma  YOB: 1950  [id number]    Pre-operative Diagnosis: Infected pacemaker    Post-operative Diagnosis: Same    Operation: Ultrasound guidance for vascular access/Insertion of right femoral 4F arterial line/Insertion of right femoral 9F MAC catheter/Removal of pacemaker generator/Laser lead extraction of RA lead/Laser lead extraction of RV lead    Anesthesia: General    Surgeon: Joselyn Galeas    Assistants: Raysa Mendenhall    Estimated Blood Loss: less than 50     Complications: None    Specimens:   ID Type Source Tests Collected by Time Destination   1 : RA cardiac lead Tissue Tissue CULTURE, ANAEROBIC, CULTURE, FUNGUS, GRAM STAIN, CULTURE, SURGICAL Adalgisa Sierra MD 8/25/2022 1401    2 : RV cardiac pacer lead Tissue Tissue CULTURE, ANAEROBIC, CULTURE, FUNGUS, GRAM STAIN, CULTURE, SURGICAL Adalgisa Sierra MD 8/25/2022 1403        Implants:  * No implants in log *      Drains: * No LDAs found *    Findings: system removed entirely    Electronically signed by Adalgisa Sierra MD on 8/25/2022 at 2:22 PM    #[id number]

## 2022-08-25 NOTE — PROGRESS NOTES
[facility]                                Progress note    Subjective:    Patient up in bed on assessment, awaiting surgery for pacer removal on my assessment. Objective:    /75   Pulse 77   Temp 97 °F (36.1 °C) (Temporal)   Resp 12   Ht 5' 6\" (1.676 m)   Wt 190 lb 4.8 oz (86.3 kg)   SpO2 93%   BMI 30.72 kg/m²     In: 480 [P.O.:480]  Out: -   In: 480   Out: -     General appearance: NAD, conversant, looks much better today  HEENT: AT/NC, MMM  Neck: FROM, supple  Lungs: Clear to auscultation  CV: RRR, no MRGs-right-sided port removed 8/20, left-sided pacemaker in place  Vasc: Radial pulses 2+  Abdomen: Soft, non-tender; no masses or HSM  Extremities: No peripheral edema or digital cyanosis  Skin: Generalized bruising  Psych: Alert and oriented to person, place and time  Neuro: Alert and interactive     Recent Labs     08/24/22  0522 08/25/22  0544 08/25/22  1512   WBC 2.4* 5.3 7.6   HGB 8.8* 7.8* 7.7*   HCT 27.2* 25.1* 24.0*    161 174         Recent Labs     08/24/22  1008 08/25/22  0544 08/25/22  1512    140 142   K 3.5 3.3* 3.5    102 106   CO2 27 27 23   BUN 5* 5* 5*   CREATININE 1.0 0.9 0.8   CALCIUM 9.2 8.6 8.3*         Assessment:    Principal Problem:    Pneumonia  Active Problems:    Pancytopenia (HCC)    Pacemaker infection (HCC)    Sepsis due to Enterococcus (HCC)    Bacteremia    COPD (chronic obstructive pulmonary disease) (HCC)    HTN (hypertension)    Cardiac pacemaker in situ    Squamous carcinoma of lung ([address]    History pulmonary embolism ([address] on Eliquis  Resolved Problems:    * No resolved hospital problems.  *      Plan:  Patient is a 65-year-old male with a hx of squamous cell lung carcinoma who is active chemo therapy who is admitted to [facility] for  Sepsis/bacteremia/immunocompromise status-acutely ill status  -Mediport removed 8/20  -CTS consulted by EP for removal of pacer, plans for 8/25  -Respiratory panel negative  -Check procalcitonin 0.42  -CT chest ordered by pulm> progressive infiltrative mass in the left perihilar region  -On daptomycin per infectious disease > switched to IV Rocephin and ampicillin  -General surgery > signed off   -SHIRA 8/22/2022-unremarkable, no evidence of endocarditis, ejection fraction  -Resume Trelegy as he takes at home-okay to use from home if okay with pulmonology    Hypomagnesemia  -Mg+ 1.3 > replaced with 2 g, recheck in the a.m. Pancytopenia  -Consult heme-onc-following  -h/h 6.9/21.4 > stable now in mid eights post transfusion  -Platelets 4 > transfuse 1 unit platelets > 23 > 631 today   -Hold Eliquis-consider holding off for few weeks     Hematuria  -Check urine culture  -Hold Eliquis    DVT Prophylaxis PCD's  PT/OT  Discharge planning       LELO Rossi CNP  4:11 PM  8/25/2022     Above note edited to reflect my thoughts     I personally saw, examined and provided care for the patient. Radiographs, labs and medication list were reviewed by me independently. The case was discussed in detail and plans for care were established. Review of MAKENZIE Rossi   , documentation was conducted and revisions were made as appropriate directly by me. I agree with the above documented exam, problem list, and plan of care.      Xena Strong MD  5:19 PM  8/25/2022

## 2022-08-25 NOTE — ANESTHESIA POSTPROCEDURE EVALUATION
Department of Anesthesiology  Postprocedure Note    Patient: Belkis Gandhi  MRN: [id number]  YOB: 1950  Date of evaluation: 8/25/2022      Procedure Summary     Date: 08/25/22 Room / Location: [place] [address] / [facility]    Anesthesia Start: 6133 Anesthesia Stop: 1445    Procedures:       CARDIAC LASER LEAD EXTRACTION, LASER LEAD EXTRACTION LEFT SIDED DEVICE, PACEMAKER DEVICE EXTRACTION      CARDIAC LASER LEAD EXCHANGE Diagnosis:       Pacemaker infection, initial encounter ([facility]      (/)    Surgeons: Tess Bojorquez MD Responsible Provider: Alok Gamboa DO    Anesthesia Type: general ASA Status: 4          Anesthesia Type: No value filed.     Shin Phase I: Shin Score: 9    Shin Phase II:        Anesthesia Post Evaluation    Patient location during evaluation: PACU  Patient participation: complete - patient participated  Level of consciousness: awake and alert  Airway patency: patent  Nausea & Vomiting: no nausea and no vomiting  Complications: no  Cardiovascular status: blood pressure returned to baseline  Respiratory status: acceptable  Hydration status: euvolemic  Multimodal analgesia pain management approach

## 2022-08-25 NOTE — ANESTHESIA PROCEDURE NOTES
Procedure Performed: SHIRA       Start Time:        End Time:      Preanesthesia Checklist:  Patient identified, IV assessed, risks and benefits discussed, monitors and equipment assessed, procedure being performed at surgeon's request and anesthesia consent obtained. General Procedure Information  Diagnostic Indications for Echo:  assessment of surgical repair, hemodynamic monitoring and assessment of valve function  Location performed:  OR  Intubated  Bite block placed  Heart visualized  Probe Insertion:  Easy  Probe Type:  3D and mulitplane  Modalities:  3D, color flow mapping, pulse wave Doppler and continuous wave Doppler    Echocardiographic and Doppler Measurements    Ventricles    Right Ventricle:  Cavity size normal.  Hypertrophy not present. Thrombus not present. Global function normal.    Left Ventricle:  Cavity size normal.  Hypertrophy not present. Thrombus not present. Global Function normal.  Ejection Fraction 55%. Ventricular Regional Function:  1- Basal Anteroseptal:  normal  2- Basal Anterior:  normal  3- Basal Anterolateral:  normal  4- Basal Inferolateral:  normal  5- Basal Inferior:  normal  6- Basal Inferoseptal:  normal  7- Mid Anteroseptal:  normal  8- Mid Anterior:  normal  9- Mid Anterolateral:  normal  10- Mid Inferolateral:  normal  11- Mid Inferior:  normal  12- Mid Inferoseptal:  normal  13- Apical Anterior:  normal  14- Apical Lateral:  normal  15- Apical Inferior:  normal  16- Apical Septal:  normal  17- Max:  normal      Valves    Aortic Valve: Annulus normal.  Stenosis not present. Regurgitation none. Leaflets normal.  Leaflet motions normal.      Mitral Valve: Annulus normal.  Stenosis not present. Regurgitation mild. Leaflets normal.  Leaflet motions normal.      Tricuspid Valve: Annulus normal.  Stenosis not present. Regurgitation none. Leaflets normal.  Leaflet motions normal.    Pulmonic Valve: Annulus normal.  Stenosis not present. Regurgitation none. Aorta    Ascending Aorta:  Size normal.  Dissection not present. Aortic Arch:  Size normal.  Dissection not present. Descending Aorta:  Size normal.  Dissection not present. Other Aortic Findings:       Susen Cobble grade 1 atheroma throughout the aorta    Atria    Right Atrium:  Size normal.  Spontaneous echo contrast not present. Thrombus not present. Tumor not present. Device not present. Left Atrium:  Size normal.  Spontaneous echo contrast not present. Thrombus not present. Tumor not present. Device not present. Left atrial appendage normal.      Septa    Atrial Septum:  Intra-atrial septal morphology normal.      Ventricular Septum:  Intra-ventricular septum morphology normal.          Other Findings  Pericardium:  pericardial effusion  Pleural Effusion:  none  Pulmonary Arteries:  normal  Pulmonary Venous Flow:  normal    Anesthesia Information  Performed Personally  Anesthesiologist:  Tia Lay DO      Echocardiogram Comments: All findings d/w surgeonPost Intervention Follow-up Study  Comments: S/p laser lead extraction.   No change in the small pericardial effusion that was there prior to extraction

## 2022-08-25 NOTE — PROGRESS NOTES
CTS PACU Progress Note:      Brief HPI: Awake, alert. No complaints. Denies CP, palpitations, SOB at rest, dizziness/lightheadedness. Vitals:    08/25/22 1444 08/25/22 1500 08/25/22 1515 08/25/22 1534   BP:       Pulse: 66 (!) 112 (!) 122 87   Resp: 17 20 18 18   Temp: 97 °F (36.1 °C)      TempSrc: Temporal      SpO2: 96% 98% 97% 99%   Weight:       Height:               Intake/Output Summary (Last 24 hours) at 8/25/2022 1549  Last data filed at 8/25/2022 0859  Gross per 24 hour   Intake 120 ml   Output --   Net 120 ml         Recent Labs     08/25/22  0544 08/25/22  1512   WBC 5.3 7.6   HGB 7.8* 7.7*   HCT 25.1* 24.0*    174     Recent Labs     08/25/22  0544 08/25/22  1512   BUN 5* 5*   CREATININE 0.9 0.8           PE  Cardiac: RRR  Lungs: decreased bases  Chest incision with DSD C/D/I.  Right groin access site C/D/I, soft to palpation, no hematoma  Abd: Soft, nontender, +BS  Ext: COELHO        A/P: Day of Surgery     1) Infected pacemaker  Stable s/p Ultrasound guidance for vascular access/Insertion of right femoral 4F arterial line/Insertion of right femoral 9F MAC catheter/Removal of pacemaker generator/Laser lead extraction of RA lead/Laser lead extraction of RV lead  Immediate post-operative hgb stable at 7.8 (7.8 pre-op)  Remaining labs will be reviewed once resulted  Maintaining stable HR  VSS  CXR reviewed  Right groin soft, no hematoma  Left arm immobilizer         2) Expected acute post operative anemia secondary to surgery; multifactorial  --stable-hgb 7.8 pre-op and 7.7 post op      3) Hypertension  --defer to EP/cardiology  --add prn IV hydralazine to aid in BP control    Dispo: continue transfer to PCCU      This patient's case and care plan was discussed with the attending surgeon

## 2022-08-25 NOTE — PROGRESS NOTES
Called Dr. Kayla Murrieta for request for duoneb PRN order x1.   Per physician, pt was able to have another breathing tx despite having one at 1300

## 2022-08-25 NOTE — PROGRESS NOTES
[address] [place] [address] [facility]  Progress Note          C/C : High grade Enterococcus bacteremia, sepsis       SUBJECTIVE:  Patient is tolerating medications. No reported adverse drug reactions. No nausea, vomiting, diarrhea. SOB with exertion   Denies fever or chills   Afebrile   For pacer removal today  Doing okay    Review of systems:  As stated above in the chief complaint, otherwise negative. Medications:  Scheduled Meds:   ceFAZolin  2,000 mg IntraVENous See Admin Instructions    filgrastim-aafi  300 mcg SubCUTAneous Daily    clotrimazole  10 mg Oral 4x daily    sodium chloride flush  5-40 mL IntraVENous 2 times per day    furosemide  20 mg Oral Daily    ampicillin IV  2,000 mg IntraVENous 6 times per day    cefTRIAXone (ROCEPHIN) IV  2,000 mg IntraVENous Q12H    [Held by provider] apixaban  5 mg Oral BID    [Held by provider] metoprolol succinate  50 mg Oral BID WC    atorvastatin  20 mg Oral Daily    sodium chloride flush  5-40 mL IntraVENous 2 times per day    ipratropium-albuterol  1 ampule Inhalation Q4H WA     Continuous Infusions:   sodium chloride      sodium chloride      sodium chloride       PRN Meds:sodium chloride, albuterol sulfate HFA, sodium chloride flush, sodium chloride, HYDROmorphone, HYDROmorphone, labetalol **OR** hydrALAZINE, potassium chloride **OR** potassium alternative oral replacement **OR** potassium chloride, melatonin, albuterol, albuterol, sodium chloride flush, sodium chloride, acetaminophen **OR** acetaminophen, senna    OBJECTIVE:  /62   Pulse 57   Temp (!) 96.7 °F (35.9 °C) (Oral)   Resp 16   Ht 5' 6\" (1.676 m)   Wt 190 lb 4.8 oz (86.3 kg)   SpO2 98%   BMI 30.72 kg/m²   Temp  Av.4 °F (36.3 °C)  Min: 96.7 °F (35.9 °C)  Max: 98.5 °F (36.9 °C)    Constitutional: The patient is awake, alert, and oriented. Skin: Warm and dry. No rashes were noted. HEENT: Round and reactive pupils. Moist mucous membranes.   No ulcerations or thrush. Neck: Supple to movements. Chest: wheeze  Cardiovascular: S1 and S2 are rhythmic and regular. No murmurs appreciated. Abdomen: Positive bowel sounds to auscultation. Benign to palpation. No masses felt. No hepatosplenomegaly. Genitourinary: male  Extremities: No clubbing, no cyanosis, no edema.   Lines: peripheral    Laboratory and Tests Review:  Lab Results   Component Value Date    WBC 5.3 08/25/2022    WBC 2.4 (L) 08/24/2022    WBC 1.8 (L) 08/23/2022    HGB 7.8 (L) 08/25/2022    HCT 25.1 (L) 08/25/2022    MCV 91.6 08/25/2022     08/25/2022     Lab Results   Component Value Date    NEUTROABS 3.98 08/25/2022    NEUTROABS 1.25 (L) 08/24/2022    NEUTROABS 1.04 (L) 08/23/2022     No results found for: [facility]  Lab Results   Component Value Date    ALT 29 08/20/2022    AST 25 08/20/2022    ALKPHOS 90 08/20/2022    BILITOT <0.2 08/20/2022     Lab Results   Component Value Date/Time     08/25/2022 05:44 AM    K 3.3 08/25/2022 05:44 AM    K 3.7 08/17/2022 04:17 AM     08/25/2022 05:44 AM    CO2 27 08/25/2022 05:44 AM    BUN 5 08/25/2022 05:44 AM    CREATININE 0.9 08/25/2022 05:44 AM    CREATININE 1.0 08/24/2022 10:08 AM    CREATININE 0.7 08/20/2022 04:44 PM    GFRAA >60 08/25/2022 05:44 AM    LABGLOM >60 08/25/2022 05:44 AM    GLUCOSE 95 08/25/2022 05:44 AM    PROT 5.9 08/20/2022 04:44 PM    LABALBU 3.2 08/20/2022 04:44 PM    CALCIUM 8.6 08/25/2022 05:44 AM    BILITOT <0.2 08/20/2022 04:44 PM    ALKPHOS 90 08/20/2022 04:44 PM    AST 25 08/20/2022 04:44 PM    ALT 29 08/20/2022 04:44 PM     Lab Results   Component Value Date    CRP 9.6 (H) 05/24/2022     No results found for: [address]  Radiology:  Reviewed CT lung    Microbiology:   Lab Results   Component Value Date/Time    BC 24 Hours no growth 08/23/2022 10:23 AM    BC 5 Days no growth 08/19/2022 05:05 AM    BC Previously positive blood culture called 08/18/2022 07:05 AM    ORG Enterococcus faecalis 08/18/2022 07:10 AM    ORG Enterococcus faecalis 08/18/2022 07:05 AM    ORG Enterococcus faecalis 08/17/2022 05:40 PM     Lab Results   Component Value Date/Time    BLOODCULT2 24 Hours no growth 08/23/2022 10:27 AM    BLOODCULT2 5 Days no growth 08/20/2022 05:30 AM    BLOODCULT2 Previously positive blood culture called 08/18/2022 07:10 AM    BLOODCULT2  08/18/2022 07:10 AM     Refer to previous culture for susceptibility results from CXBL collected  on 8-18-22 at 0705. ORG Enterococcus faecalis 08/18/2022 07:10 AM    ORG Enterococcus faecalis 08/18/2022 07:05 AM    ORG Enterococcus faecalis 08/17/2022 05:40 PM     No results found for: WNDABS  No results found for: RESPSMEAR  No results found for: MPNEUMO, CLAMYDCU, LABLEGI, AFBCX, FUNGSM, LABFUNG  No results found for: CULTRESP  Culture Catheter Tip   Date Value Ref Range Status   08/20/2022 Growth not present  Final     No results found for: BFCS  No results found for: CXSURG  Urine Culture, Routine   Date Value Ref Range Status   08/16/2022   Final    10 to 100,000 CFU/mL  Mixed heydi isolated. Further workup and sensitivity testing  is not routinely indicated and will not be performed.   Mixed heydi isolated includes:  Mixed gram positive organisms     05/18/2019 <25,000 CFU/ml  Final     No results found for: [address]:  8-16-22 blood cultures E faecalis x 2  8-17-22 blood cultures E faecalis x 1  8-18-22 blood culture + E faecalis x1  8-19-22 blood cultures neg to date  8-20-22 Port ( tip ) cx - neg tod date   8-20-22 blood cultures neg to date          ASSESSMENT:  High grade E faecalis bacteremia probably port infection; RO endocarditis and pacer infection s/p mediport removal ( 8/20) - follow up cx neg on antimicrobials  Immunocompromised host c pancytopenia; on chemo for Sq cell of lung; platelets are up and white count is stable  thrush-improved      PLAN:  Continue with  amp 2 grams IV q 4 hrs and  ceftriaxone 2 grams IV q 12 hrs   SHIRA nondiagnostic  Removal of  pacer-at [place] main [facility] today  Will need PICC line eventually or reimplantation of Mediport-need to discuss with heme-onc which I have placed a call for them to return  Monitor labs    LELO Lopez - CNP  10:21 AM  8/25/2022  Pt seen and examined. Above discussed agree with advanced practice nurse. Labs, cultures, and radiographs reviewed. Face to Face encounter occurred. Changes made as necessary.      Kimberly Kerns MD

## 2022-08-25 NOTE — PROGRESS NOTES
Occupational Therapy  OT BEDSIDE TREATMENT NOTE   [facility] [address]  [address] [place] [address], [address] [place] [address]  Patient Name: Mariposa Gardner  MRN: [id number]  : 1950  Room: OR POOL /NONE     Evaluating [address] [place] [address] [id number]     Referring Provider: LELO Castillo Res, CNP  Specific Provider Orders/Date: OT eval and treat  22     Diagnosis: Pneumonia [J18.9]  Thrombocytopenia ([facility] [address] [D69.6]  Chronic anticoagulation [Z79.01]  Neutropenia, unspecified type ([address] [D70.9]  Pneumonia due to infectious organism, unspecified laterality, unspecified part of lung [J18.9]   Pt admitted to hospital on 22 for fatigue, generalized weakness, fever        Pertinent Medical History:  has a past medical history of CHB (complete heart block) ([facility] COPD (chronic obstructive pulmonary disease) ([facility] [facility] Syncope, and Syncope.          Precautions:  Fall Risk, O2, current chemo (squamous cell lung CA)     Assessment of current deficits    [x] Functional mobility         [x]ADLs           [x] Strength                  []Cognition    [x] Functional transfers       [x] IADLs         [x] Safety Awareness   [x]Endurance    [] Fine Coordination                      [x] Balance      [] Vision/perception   []Sensation      []Gross Motor Coordination          [] ROM           [] Delirium                   [] Motor Control      OT PLAN OF CARE   OT POC based on physician orders, patient diagnosis and results of clinical assessment     Frequency/Duration 1-3 days/wk for 2 weeks PRN   Specific OT Treatment Interventions to include:   * Instruction/training on adapted ADL techniques and AE recommendations to increase functional independence within precautions       * Training on energy conservation strategies, correct breathing pattern and techniques to improve independence/tolerance for self-care routine  * Functional transfer/mobility training/DME recommendations for increased independence, safety, and fall prevention  * Patient/Family education to increase follow through with safety techniques and functional independence  * Recommendation of environmental modifications for increased safety with functional transfers/mobility and ADLs  * Therapeutic exercise to improve motor endurance, ROM, and functional strength for ADLs/functional transfers  * Therapeutic activities to facilitate/challenge dynamic balance, stand tolerance for increased safety and independence with ADLs  * Therapeutic activities to facilitate gross/fine motor skills for increased independence with ADLs     Recommended Adaptive Equipment: shower chair     Home Living: Pt lives with spouse in 1 floor home (+basement).  0 JERRY  Pt prefers to bathe in basement shower - full flight, 2 handrails    Bathroom setup: tub/shower with grab bars    Equipment owned: SPC, w/w, home O2 (at night), transport chair     Prior Level of Function: independent/mod I with ADLs , shares IADLs; ambulated w/o AD   Driving: yes     Pain Level: Pt c/o no pain his session      Cognition: A&O: 4/4; Follows 1-2 step directions           Memory:  good            Sequencing:  good            Problem solving:  fair +           Judgement/safety:  fair +             Functional Assessment:  AM-PAC Daily Activity Raw Score: 19/24    Initial Eval Status  Date: 8/18/22 Treatment Status  Date: 8/25/22 STGs = LTGs  Time frame: 10-14 days   Feeding Independent  Ind  -   Grooming Stand by Assist  Sup  To wash hands standing at sink  Modified [place]    UB Dressing Supervision  Per last tx  Modified [place]    LB Dressing Contact Guard Assist   For safety  B socks, simulated pants SBA  To don/doff socks seated   Modified [place]    Bathing Contact Guard Assist  SBA  Simulated seated and standing for posterior Modified [place]    Toileting Stand by Assist  SBA- clothing management  Modified [place]    Bed Mobility  Supine to sit:NT  Sit to supine: Supervision  Per last tx  Supine to sit: Modified [place]   Sit to supine: Modified [place]    Functional Transfers Stand by Assist   SBA- sit<->stand  Cuing for hand placement  Modified [place]    Functional Mobility Stand by Assist   In room and bathroom   2 trials w/ rest break between  SBA  Home distance no AD Modified [place]    Balance Sitting:     Static:  Independent    Dynamic:Supervision  Standing: SBA, no AD Sitting:     Static:  Independent    Dynamic: Ind  Standing: SBA     Activity Tolerance Fair   Noted fatigue/+SOB post ambulation task. Reinforced rest and pursed lip breathing. Fair  SOB with Min activity Good   Visual/  Perceptual Glasses: yes                        Comments: Upon arrival pt standing in room. Pt educated on techniques to increase independence and safety during ADL's, and functional transfers. Discussed home set up with pt, giving suggestions to increase safety at discharge. At end of session pt left seated in bedside chair, call light within reach. Pt has made fair progress towards set goals.      Continue with current plan of care    Treatment Time In: 9:58            Treatment Time Out: 10:08             Treatment Charges: Mins Units   Ther Ex  [id number]     Manual Therapy 47087     Thera Activities 66048     ADL/Home Mgt 21726 10 1   Neuro Re-ed [id number]     Group Therapy      Orthotic manage/training  [id number]     Non-Billable Time     Total Timed Treatment 10 [address]

## 2022-08-26 LAB
ANION GAP SERPL CALCULATED.3IONS-SCNC: 13 MMOL/L (ref 7–16)
ANISOCYTOSIS: ABNORMAL
BASOPHILS ABSOLUTE: 0 E9/L (ref 0–0.2)
BASOPHILS RELATIVE PERCENT: 0.9 % (ref 0–2)
BUN BLDV-MCNC: 10 MG/DL (ref 6–23)
CALCIUM SERPL-MCNC: 9.2 MG/DL (ref 8.6–10.2)
CHLORIDE BLD-SCNC: 96 MMOL/L (ref 98–107)
CO2: 27 MMOL/L (ref 22–29)
CREAT SERPL-MCNC: 0.9 MG/DL (ref 0.7–1.2)
EKG ATRIAL RATE: 100 BPM
EKG P-R INTERVAL: 232 MS
EKG Q-T INTERVAL: 402 MS
EKG QRS DURATION: 144 MS
EKG QTC CALCULATION (BAZETT): 536 MS
EKG R AXIS: 124 DEGREES
EKG T AXIS: -22 DEGREES
EKG VENTRICULAR RATE: 107 BPM
EOSINOPHILS ABSOLUTE: 0 E9/L (ref 0.05–0.5)
EOSINOPHILS RELATIVE PERCENT: 0 % (ref 0–6)
GFR AFRICAN AMERICAN: >60
GFR NON-AFRICAN AMERICAN: >60 ML/MIN/1.73
GLUCOSE BLD-MCNC: 153 MG/DL (ref 74–99)
HCT VFR BLD CALC: 27.1 % (ref 37–54)
HEMOGLOBIN: 8.6 G/DL (ref 12.5–16.5)
HYPOCHROMIA: ABNORMAL
LYMPHOCYTES ABSOLUTE: 0.45 E9/L (ref 1.5–4)
LYMPHOCYTES RELATIVE PERCENT: 1.7 % (ref 20–42)
MAGNESIUM: 2.1 MG/DL (ref 1.6–2.6)
MCH RBC QN AUTO: 30.1 PG (ref 26–35)
MCHC RBC AUTO-ENTMCNC: 31.7 % (ref 32–34.5)
MCV RBC AUTO: 94.8 FL (ref 80–99.9)
METAMYELOCYTES RELATIVE PERCENT: 3.5 % (ref 0–1)
MONOCYTES ABSOLUTE: 1.56 E9/L (ref 0.1–0.95)
MONOCYTES RELATIVE PERCENT: 7 % (ref 2–12)
NEUTROPHILS ABSOLUTE: 20.29 E9/L (ref 1.8–7.3)
NEUTROPHILS RELATIVE PERCENT: 87.8 % (ref 43–80)
PDW BLD-RTO: 20.4 FL (ref 11.5–15)
PLATELET # BLD: 242 E9/L (ref 130–450)
PMV BLD AUTO: 11.9 FL (ref 7–12)
POIKILOCYTES: ABNORMAL
POLYCHROMASIA: ABNORMAL
POTASSIUM SERPL-SCNC: 4.2 MMOL/L (ref 3.5–5)
RBC # BLD: 2.86 E12/L (ref 3.8–5.8)
SODIUM BLD-SCNC: 136 MMOL/L (ref 132–146)
TEAR DROP CELLS: ABNORMAL
WBC # BLD: 22.3 E9/L (ref 4.5–11.5)

## 2022-08-26 PROCEDURE — 2580000003 HC RX 258: Performed by: NURSE PRACTITIONER

## 2022-08-26 PROCEDURE — 6370000000 HC RX 637 (ALT 250 FOR IP): Performed by: NURSE PRACTITIONER

## 2022-08-26 PROCEDURE — 2700000000 HC OXYGEN THERAPY PER DAY

## 2022-08-26 PROCEDURE — 99233 SBSQ HOSP IP/OBS HIGH 50: CPT | Performed by: INTERNAL MEDICINE

## 2022-08-26 PROCEDURE — 83735 ASSAY OF MAGNESIUM: CPT

## 2022-08-26 PROCEDURE — 85025 COMPLETE CBC W/AUTO DIFF WBC: CPT

## 2022-08-26 PROCEDURE — 80048 BASIC METABOLIC PNL TOTAL CA: CPT

## 2022-08-26 PROCEDURE — 36415 COLL VENOUS BLD VENIPUNCTURE: CPT

## 2022-08-26 PROCEDURE — 93005 ELECTROCARDIOGRAM TRACING: CPT | Performed by: NURSE PRACTITIONER

## 2022-08-26 PROCEDURE — 6360000002 HC RX W HCPCS: Performed by: NURSE PRACTITIONER

## 2022-08-26 PROCEDURE — 2140000000 HC CCU INTERMEDIATE R&B

## 2022-08-26 PROCEDURE — 6360000002 HC RX W HCPCS

## 2022-08-26 PROCEDURE — 94640 AIRWAY INHALATION TREATMENT: CPT

## 2022-08-26 RX ORDER — SODIUM CHLORIDE 0.9 % (FLUSH) 0.9 %
5-40 SYRINGE (ML) INJECTION PRN
Status: DISCONTINUED | OUTPATIENT
Start: 2022-08-26 | End: 2022-08-29 | Stop reason: SDUPTHER

## 2022-08-26 RX ORDER — ARFORMOTEROL TARTRATE 15 UG/2ML
15 SOLUTION RESPIRATORY (INHALATION) 2 TIMES DAILY
Status: DISCONTINUED | OUTPATIENT
Start: 2022-08-26 | End: 2022-09-03 | Stop reason: HOSPADM

## 2022-08-26 RX ORDER — HEPARIN SODIUM (PORCINE) LOCK FLUSH IV SOLN 100 UNIT/ML 100 UNIT/ML
3 SOLUTION INTRAVENOUS PRN
Status: DISCONTINUED | OUTPATIENT
Start: 2022-08-26 | End: 2022-09-03 | Stop reason: HOSPADM

## 2022-08-26 RX ORDER — SODIUM CHLORIDE 0.9 % (FLUSH) 0.9 %
5-40 SYRINGE (ML) INJECTION EVERY 12 HOURS SCHEDULED
Status: DISCONTINUED | OUTPATIENT
Start: 2022-08-26 | End: 2022-08-29 | Stop reason: SDUPTHER

## 2022-08-26 RX ORDER — BUDESONIDE 0.5 MG/2ML
0.5 INHALANT ORAL 2 TIMES DAILY
Status: DISCONTINUED | OUTPATIENT
Start: 2022-08-26 | End: 2022-09-03 | Stop reason: HOSPADM

## 2022-08-26 RX ORDER — SODIUM CHLORIDE 9 MG/ML
INJECTION, SOLUTION INTRAVENOUS PRN
Status: DISCONTINUED | OUTPATIENT
Start: 2022-08-26 | End: 2022-08-29 | Stop reason: SDUPTHER

## 2022-08-26 RX ORDER — HEPARIN SODIUM (PORCINE) LOCK FLUSH IV SOLN 100 UNIT/ML 100 UNIT/ML
3 SOLUTION INTRAVENOUS EVERY 12 HOURS SCHEDULED
Status: DISCONTINUED | OUTPATIENT
Start: 2022-08-26 | End: 2022-09-03 | Stop reason: HOSPADM

## 2022-08-26 RX ORDER — LIDOCAINE HYDROCHLORIDE 10 MG/ML
5 INJECTION, SOLUTION EPIDURAL; INFILTRATION; INTRACAUDAL; PERINEURAL ONCE
Status: COMPLETED | OUTPATIENT
Start: 2022-08-26 | End: 2022-08-27

## 2022-08-26 RX ADMIN — IPRATROPIUM BROMIDE AND ALBUTEROL SULFATE 1 AMPULE: .5; 2.5 SOLUTION RESPIRATORY (INHALATION) at 09:55

## 2022-08-26 RX ADMIN — AMPICILLIN SODIUM 2000 MG: 2 INJECTION, POWDER, FOR SOLUTION INTRAVENOUS at 10:01

## 2022-08-26 RX ADMIN — Medication 10 ML: at 09:57

## 2022-08-26 RX ADMIN — AMPICILLIN SODIUM 2000 MG: 2 INJECTION, POWDER, FOR SOLUTION INTRAVENOUS at 01:36

## 2022-08-26 RX ADMIN — SODIUM CHLORIDE, PRESERVATIVE FREE 10 ML: 5 INJECTION INTRAVENOUS at 21:50

## 2022-08-26 RX ADMIN — CEFTRIAXONE 2000 MG: 2 INJECTION, POWDER, FOR SOLUTION INTRAMUSCULAR; INTRAVENOUS at 02:43

## 2022-08-26 RX ADMIN — ATORVASTATIN CALCIUM 20 MG: 20 TABLET, FILM COATED ORAL at 21:47

## 2022-08-26 RX ADMIN — ARFORMOTEROL TARTRATE 15 MCG: 15 SOLUTION RESPIRATORY (INHALATION) at 09:55

## 2022-08-26 RX ADMIN — CLOTRIMAZOLE 10 MG: 10 LOZENGE ORAL at 09:58

## 2022-08-26 RX ADMIN — AMPICILLIN SODIUM 2000 MG: 2 INJECTION, POWDER, FOR SOLUTION INTRAVENOUS at 21:55

## 2022-08-26 RX ADMIN — MELATONIN 3 MG ORAL TABLET 3 MG: 3 TABLET ORAL at 21:47

## 2022-08-26 RX ADMIN — CLOTRIMAZOLE 10 MG: 10 LOZENGE ORAL at 16:40

## 2022-08-26 RX ADMIN — IPRATROPIUM BROMIDE AND ALBUTEROL SULFATE 1 AMPULE: .5; 2.5 SOLUTION RESPIRATORY (INHALATION) at 13:52

## 2022-08-26 RX ADMIN — ARFORMOTEROL TARTRATE 15 MCG: 15 SOLUTION RESPIRATORY (INHALATION) at 19:58

## 2022-08-26 RX ADMIN — ACETAMINOPHEN 650 MG: 325 TABLET, FILM COATED ORAL at 03:22

## 2022-08-26 RX ADMIN — CLOTRIMAZOLE 10 MG: 10 LOZENGE ORAL at 12:26

## 2022-08-26 RX ADMIN — IPRATROPIUM BROMIDE AND ALBUTEROL SULFATE 1 AMPULE: .5; 2.5 SOLUTION RESPIRATORY (INHALATION) at 19:58

## 2022-08-26 RX ADMIN — CLOTRIMAZOLE 10 MG: 10 LOZENGE ORAL at 21:47

## 2022-08-26 RX ADMIN — BUDESONIDE 500 MCG: 0.5 SUSPENSION RESPIRATORY (INHALATION) at 09:55

## 2022-08-26 RX ADMIN — FUROSEMIDE 20 MG: 20 TABLET ORAL at 09:58

## 2022-08-26 RX ADMIN — CEFTRIAXONE 2000 MG: 2 INJECTION, POWDER, FOR SOLUTION INTRAMUSCULAR; INTRAVENOUS at 16:41

## 2022-08-26 RX ADMIN — AMPICILLIN SODIUM 2000 MG: 2 INJECTION, POWDER, FOR SOLUTION INTRAVENOUS at 05:35

## 2022-08-26 RX ADMIN — BUDESONIDE 500 MCG: 0.5 SUSPENSION RESPIRATORY (INHALATION) at 19:58

## 2022-08-26 RX ADMIN — AMPICILLIN SODIUM 2000 MG: 2 INJECTION, POWDER, FOR SOLUTION INTRAVENOUS at 18:00

## 2022-08-26 RX ADMIN — AMPICILLIN SODIUM 2000 MG: 2 INJECTION, POWDER, FOR SOLUTION INTRAVENOUS at 16:00

## 2022-08-26 ASSESSMENT — PAIN DESCRIPTION - DESCRIPTORS: DESCRIPTORS: ACHING;DISCOMFORT

## 2022-08-26 ASSESSMENT — PAIN SCALES - GENERAL
PAINLEVEL_OUTOF10: 1
PAINLEVEL_OUTOF10: 3
PAINLEVEL_OUTOF10: 5
PAINLEVEL_OUTOF10: 0

## 2022-08-26 ASSESSMENT — PAIN DESCRIPTION - ORIENTATION: ORIENTATION: OTHER (COMMENT)

## 2022-08-26 ASSESSMENT — PAIN DESCRIPTION - LOCATION: LOCATION: CHEST

## 2022-08-26 ASSESSMENT — PAIN - FUNCTIONAL ASSESSMENT: PAIN_FUNCTIONAL_ASSESSMENT: ACTIVITIES ARE NOT PREVENTED

## 2022-08-26 NOTE — CARE COORDINATION
Chart reviewed. IV rocephin 2000mg q 12 hrs. IV ampicillin 2000mg 6 x's a day. Met with pt and pt's wife in room. Discharge plan remains to [facility] or [facility] [address]([facility]). DC disposition will depend on needs. They were agreeable to both. Adry at [address] was updated on pt. Sw/cm will follow.

## 2022-08-26 NOTE — PROGRESS NOTES
Dr. Shawn Childs notified of runs of PAT, CHB, bigeminy and trigeminy on monitor. Dr. Shawn Childs states to observe at this time. TM.

## 2022-08-26 NOTE — PROGRESS NOTES
[address] [place] [address] [facility]  Progress Note          C/C : High grade Enterococcus bacteremia, sepsis       SUBJECTIVE:  Patient is tolerating medications. No reported adverse drug reactions. No nausea, vomiting, diarrhea. SOB with exertion   Denies fever or chills   Afebrile   S/p pacer removal   Doing okay  Sitting at edge of bed  Wife is present  Appetite good  Review of systems:  As stated above in the chief complaint, otherwise negative. Medications:  Scheduled Meds:   Arformoterol Tartrate  15 mcg Nebulization BID    budesonide  0.5 mg Nebulization BID    ipratropium-albuterol  1 ampule Inhalation Once    clotrimazole  10 mg Oral 4x daily    furosemide  20 mg Oral Daily    ampicillin IV  2,000 mg IntraVENous 6 times per day    cefTRIAXone (ROCEPHIN) IV  2,000 mg IntraVENous Q12H    [Held by provider] apixaban  5 mg Oral BID    [Held by provider] metoprolol succinate  50 mg Oral BID WC    atorvastatin  20 mg Oral Daily    sodium chloride flush  5-40 mL IntraVENous 2 times per day    ipratropium-albuterol  1 ampule Inhalation Q4H WA     Continuous Infusions:   sodium chloride       PRN Meds:morphine, sodium chloride, trimethobenzamide, oxyCODONE-acetaminophen **OR** oxyCODONE-acetaminophen, hydrALAZINE, albuterol sulfate HFA, potassium chloride **OR** potassium alternative oral replacement **OR** potassium chloride, melatonin, albuterol, albuterol, sodium chloride flush, sodium chloride, acetaminophen **OR** acetaminophen, senna    OBJECTIVE:  BP (!) 144/71   Pulse (!) 103   Temp 97 °F (36.1 °C) (Temporal)   Resp 20   Ht 5' 6\" (1.676 m)   Wt 191 lb 6.4 oz (86.8 kg)   SpO2 92%   BMI 30.89 kg/m²   Temp  Av.3 °F (36.3 °C)  Min: 97 °F (36.1 °C)  Max: 97.7 °F (36.5 °C)    Constitutional: The patient is awake, alert, and oriented. Skin: Warm and dry. No rashes were noted. Old pacer site no redness  HEENT: Round and reactive pupils. Moist mucous membranes.   No ulcerations or thrush. Neck: Supple to movements. Chest:cta  Cardiovascular: S1 and S2 are rhythmic and regular. No murmurs appreciated. Abdomen: Positive bowel sounds to auscultation. Benign to palpation. No masses felt. No hepatosplenomegaly. Genitourinary: male  Extremities: No clubbing, no cyanosis, + ble edema.   Lines: peripheral    Laboratory and Tests Review:  Lab Results   Component Value Date    WBC 22.3 (H) 08/26/2022    WBC 7.6 08/25/2022    WBC 5.3 08/25/2022    HGB 8.6 (L) 08/26/2022    HCT 27.1 (L) 08/26/2022    MCV 94.8 08/26/2022     08/26/2022     Lab Results   Component Value Date    NEUTROABS 20.29 (H) 08/26/2022    NEUTROABS 3.98 08/25/2022    NEUTROABS 1.25 (L) 08/24/2022     No results found for: [facility]  Lab Results   Component Value Date    ALT 29 08/20/2022    AST 25 08/20/2022    ALKPHOS 90 08/20/2022    BILITOT <0.2 08/20/2022     Lab Results   Component Value Date/Time     08/26/2022 05:28 AM    K 4.2 08/26/2022 05:28 AM    K 3.7 08/17/2022 04:17 AM    CL 96 08/26/2022 05:28 AM    CO2 27 08/26/2022 05:28 AM    BUN 10 08/26/2022 05:28 AM    CREATININE 0.9 08/26/2022 05:28 AM    CREATININE 0.8 08/25/2022 03:12 PM    CREATININE 0.9 08/25/2022 05:44 AM    GFRAA >60 08/26/2022 05:28 AM    LABGLOM >60 08/26/2022 05:28 AM    GLUCOSE 153 08/26/2022 05:28 AM    PROT 5.9 08/20/2022 04:44 PM    LABALBU 3.2 08/20/2022 04:44 PM    CALCIUM 9.2 08/26/2022 05:28 AM    BILITOT <0.2 08/20/2022 04:44 PM    ALKPHOS 90 08/20/2022 04:44 PM    AST 25 08/20/2022 04:44 PM    ALT 29 08/20/2022 04:44 PM     Lab Results   Component Value Date    CRP 9.6 (H) 05/24/2022     No results found for: [address]  Radiology:  Reviewed CT lung    Microbiology:   Lab Results   Component Value Date/Time    BC 24 Hours no growth 08/23/2022 10:23 AM    BC 5 Days no growth 08/19/2022 05:05 AM    BC Previously positive blood culture called 08/18/2022 07:05 AM    ORG Enterococcus faecalis 08/18/2022 07:10 AM    ORG Enterococcus faecalis 08/18/2022 07:05 AM    ORG Enterococcus faecalis 08/17/2022 05:40 PM     Lab Results   Component Value Date/Time    BLOODCULT2 24 Hours no growth 08/23/2022 10:27 AM    BLOODCULT2 5 Days no growth 08/20/2022 05:30 AM    BLOODCULT2 Previously positive blood culture called 08/18/2022 07:10 AM    BLOODCULT2  08/18/2022 07:10 AM     Refer to previous culture for susceptibility results from CXBL collected  on 8-18-22 at 0705. ORG Enterococcus faecalis 08/18/2022 07:10 AM    ORG Enterococcus faecalis 08/18/2022 07:05 AM    ORG Enterococcus faecalis 08/17/2022 05:40 PM     No results found for: WNDABS  No results found for: RESPSMEAR  No results found for: MPNEUMO, CLAMYDCU, LABLEGI, AFBCX, FUNGSM, LABFUNG  No results found for: CULTRESP  Culture Catheter Tip   Date Value Ref Range Status   08/20/2022 Growth not present  Final     No results found for: BFCS  No results found for: CXSURG  Urine Culture, Routine   Date Value Ref Range Status   08/16/2022   Final    10 to 100,000 CFU/mL  Mixed heydi isolated. Further workup and sensitivity testing  is not routinely indicated and will not be performed.   Mixed heydi isolated includes:  Mixed gram positive organisms     05/18/2019 <25,000 CFU/ml  Final     No results found for: [address]:  8-16-22 blood cultures E faecalis x 2  8-17-22 blood cultures E faecalis x 1  8-18-22 blood culture + E faecalis x1  8-19-22 blood cultures neg to date  8-20-22 Port ( tip ) cx - neg tod date   8-20-22 blood cultures neg to date  8/25/22 pacer cx pending  8/25/2 blood cx pending      ASSESSMENT:  High grade E faecalis bacteremia probably port infection; RO endocarditis and pacer infection s/p mediport removal ( 8/20) - follow up cx neg on antimicrobials  Immunocompromised host c pancytopenia; on chemo for Sq cell of lung; platelets are up and white count is stable  thrush-improved      PLAN:  Continue with  amp 2 grams IV q 4 hrs and  ceftriaxone 2 grams IV q 12 hrs SHIRA nondiagnostic  S/p Removal of  pacer-  PICC line ordered eventually or reimplantation of Mediport-need to discuss with heme-onc   Monitor labs  Plan for new pace Monday if  cx continue to be negative    LELO Anderson - CNP  1:04 PM  8/26/2022  Pt seen and examined. Above discussed agree with advanced practice nurse. Labs, cultures, and radiographs reviewed. Face to Face encounter occurred. Changes made as necessary.      Campbell Hanson MD

## 2022-08-26 NOTE — PROGRESS NOTES
[facility] and [facility] [place]- Electrophysiology    Inpatient progress note  PATIENT: Michelle Ocampo  MEDICAL RECORD NUMBER: [id number]  DATE OF SERVICE:  8/26/2022  ATTENDING ELECTROPHYSIOLOGIST: Dr Victorino Arrieta   PRIMARY ELECTROPHYSIOLOGIST: Yvan Fermin MD  REFERRING PHYSICIAN: No ref. provider found and Clau Mock MD  CHIEF COMPLAINT: Weakness and fatigue, fever    HPI: This is a 67 y.o. male with a history of pretension, hyperlipidemia, COPD and a history of long-term smoking habit as well as recurrent syncopal episodes starting in 2012. ILR 2020 showed CHB and subsequent pacemaker implant on 7/20/2021. He was also diagnosed with lung cancer around the same time and has been undergoing chemotherapy and radiation. He finished his last chemo cycle last week and presented to the emergency room with symptoms of ongoing fatigue weakness and fever. He was found to be neutropenic as well as thrombocytopenic. Subsequently blood cultures have been positive for Enterococcus faecalis. Blood cultures from 8/16, 8/17 and 8/18 have all been positive despite IV antibiotics. Patient also remains neutropenic and thrombocytopenic with a platelet count of 18,527 today. No other cardiac symptoms. No new symptoms of chest pain or dyspnea on exertion. No syncope or near syncope.    8/21/22: Patient denies any new complaints today. Was transfused earlier today for hemoglobin of 7.2. No cardiac symptoms. 8/22/22: Patient remains asymptomatic. Underwent SHIRA today. White count remains low however hemoglobin and platelet count have improved    8/23/22:  No complaints. Awaiting plan for extraction to confirm CT surgery back up. SHIRA negative yesterday. Afebrile     8/23/22: Pacemaker set at VVI 40 yesterday afternoon, has been stable without change in symptoms. On monitor, it appears to be wenckebach vs 2:1 block with rates 60-70's     8/25/22: NPO for extraction today. No complaints overnight. Continues with stable heart rates what appears wenckebach vs 2:1 AV block     8/26/22: stable, family in room. He is up at side of bed, pain is tolerable, controlled. Left chest incision from extraction site stable. He continues to have 2:1 block vs wenckebach on monitor, stable rates 50-70 bpm. Denies dizziness or lightheadedness.      Patient Active Problem List    Diagnosis Date Noted    Bacteremia 08/23/2022     Priority: Medium    Pacemaker infection ([address] 08/22/2022     Priority: Medium    Sepsis due to Enterococcus ([address] 08/22/2022     Priority: Medium    Pancytopenia ([address] 08/17/2022     Priority: Medium    COVID-19 05/25/2022     Priority: Medium    Squamous carcinoma of lung ([address] 09/18/2021    History pulmonary embolism ([address] on Eliquis 09/18/2021    Hyponatremia 09/18/2021    Hypomagnesemia 09/18/2021    Wide-complex tachycardia ([address] 09/18/2021    Acute on chronic respiratory failure with hypoxia ([address] 09/17/2021    Cardiac pacemaker in situ 07/20/2021    Intermittent complete heart block ([address] 07/19/2021    Pneumonia 01/31/2019    COPD (chronic obstructive pulmonary disease) ([address] 01/31/2019    BPH (benign prostatic hyperplasia) 01/31/2019    HTN (hypertension) 01/31/2019    HLD (hyperlipidemia) 01/31/2019    Syncope and collapse 01/18/2019       Past Medical History:   Diagnosis Date    CHB (complete heart block) ([address] 07/2021    COPD (chronic obstructive pulmonary disease) ([address]     Syncope 09/2020    dr Radhika Woodruff wearing a monitor    Syncope 07/2021       Family History   Problem Relation Age of Onset    Cancer Father         prostate    Cancer Paternal Uncle         anal       Social History     Tobacco Use    Smoking status: Former     Packs/day: 2.00     Years: 25.00     Pack years: 50.00     Types: Cigarettes     Quit date: 12/26/2011     Years since quitting: 10.6    Smokeless tobacco: Never   Substance Use Topics    Alcohol use: Not Currently     Alcohol/week: 1.0 standard drink     Types: 1 Glasses of wine per week     Comment: 1 glass of wine per day prior       Current Facility-Administered Medications   Medication Dose Route Frequency Provider Last Rate Last Admin    Arformoterol Tartrate (BROVANA) nebulizer solution 15 mcg  15 mcg Nebulization BID LELO Lugo CNP        budesonide (PULMICORT) nebulizer suspension 500 mcg  0.5 mg Nebulization BID LELO Rebollar - CNP        ipratropium-albuterol (DUONEB) nebulizer solution 1 ampule  1 ampule Inhalation Once Hilda Busby DO        morphine (PF) injection 2 mg  2 mg IntraVENous Q4H PRN Mandy POOJA SahuN - CNP        sodium chloride (OCEAN, BABY AYR) 0.65 % nasal spray 1 spray  1 spray Each Nostril PRN Mandy Adelina, APRN - CNP        trimethobenzamide (TIGAN) injection 200 mg  200 mg IntraMUSCular Q6H PRN Mandy Adelina, APRN - CNP        oxyCODONE-acetaminophen (PERCOCET) 5-325 MG per tablet 1 tablet  1 tablet Oral Q4H PRN Mandy Adelina APRN - CNP        Or    oxyCODONE-acetaminophen (PERCOCET) 5-325 MG per tablet 2 tablet  2 tablet Oral Q4H PRN Mandy Adelina, APRN - CNP        hydrALAZINE (APRESOLINE) injection 10 mg  10 mg IntraVENous Q6H PRN Mandy Adelina, APRN - CNP        clotrimazole [facility]) lindsay 10 mg  10 mg Oral 4x daily Mandy Adelina, APRN - CNP   10 mg at 08/25/22 2025    albuterol sulfate HFA (PROVENTIL;VENTOLIN;PROAIR) 108 (90 Base) MCG/ACT inhaler 2 puff  2 puff Inhalation Q6H PRN Mandy Adelina, APRN - CNP        furosemide (LASIX) tablet 20 mg  20 mg Oral Daily Mandy Adelina, APRN - CNP   20 mg at 08/24/22 0857    potassium chloride (KLOR-CON M) extended release tablet 40 mEq  40 mEq Oral PRN Mandy Adelina, APRN - CNP        Or    potassium bicarb-citric acid (EFFER-K) effervescent tablet 40 mEq  40 mEq Oral PRN Mandy Adelina, APRN - CNP        Or    potassium chloride 10 mEq/100 mL IVPB (Peripheral Line)  10 mEq IntraVENous PRN LELO Paredes - RICA        ampicillin 2000 mg ivpb mini bag 2,000 mg IntraVENous 6 times per day Courtney Pickup, APRN - CNP   Stopped at 08/26/22 0735    cefTRIAXone (ROCEPHIN) 2,000 mg in sterile water 20 mL IV syringe  2,000 mg IntraVENous Q12H Courtney Pickup, APRN - CNP   2,000 mg at 08/26/22 0243    melatonin tablet 3 mg  3 mg Oral Nightly PRN Courtney Pickup, APRN - CNP   3 mg at 08/24/22 2005    albuterol (PROVENTIL) nebulizer solution 2.5 mg  2.5 mg Nebulization Q6H PRN Courtney Pickup, APRN - CNP        albuterol (ACCUNEB) nebulizer solution 0.63 mg  1 ampule Nebulization Q6H PRN Courtney Pickup, APRN - CNP        [Held by provider] apixaban (ELIQUIS) tablet 5 mg  5 mg Oral BID Tiffanie Red, DO        [Held by provider] metoprolol succinate (TOPROL XL) extended release tablet 50 mg  50 mg Oral BID  Tiffanie Gupta, DO   50 mg at 08/24/22 0857    atorvastatin (LIPITOR) tablet 20 mg  20 mg Oral Daily Courtney Pickup, APRN - CNP   20 mg at 08/25/22 2025    sodium chloride flush 0.9 % injection 5-40 mL  5-40 mL IntraVENous 2 times per day Courtney Pickup, APRN - CNP   10 mL at 08/25/22 2025    sodium chloride flush 0.9 % injection 5-40 mL  5-40 mL IntraVENous PRN Courtney Pickup, APRN - CNP        0.9 % sodium chloride infusion   IntraVENous PRN Courtney Pickup, APRN - CNP        acetaminophen (TYLENOL) tablet 650 mg  650 mg Oral Q6H PRN Courtney Pickup, APRN - CNP   650 mg at 08/26/22 8829    Or    acetaminophen (TYLENOL) suppository 650 mg  650 mg Rectal Q6H PRN Courtney Pickup, APRN - CNP        senna (SENOKOT) tablet 8.6 mg  1 tablet Oral Daily PRN Courtney Pickup, APRN - CNP        ipratropium-albuterol (DUONEB) nebulizer solution 1 ampule  1 ampule Inhalation Q4H WA Courtney Pickup, APRN - CNP   1 ampule at 08/25/22 2054        No Known Allergies    PHYSICAL EXAM:   Vitals:    08/25/22 2259 08/26/22 0317 08/26/22 0541 08/26/22 0747   BP: (!) 144/77 (!) 142/63  (!) 119/59   Pulse: 64 54  54   Resp: 18 18  16   Temp: 97.7 °F (36.5 °C) 97 °F (36.1 °C)  97.6 °F (36.4 ----------------------------------------------------------------   Electronically signed by Davi Rodriguez MD(Interpreting   physician) on 08/22/2022 06:50 PM    Echocardiogram: 8/19/22   Summary   Normal left ventricular chamber size. Normal left ventricular systolic function. Visually estimated LVEF is 55-60 %. No wall motion abnormalities. Normal diastolic function. Normal left atrial pressure. Normal right ventricle structure and function. Normal left atrial size. Normal right atrial size   Likely normal estimated PA pressure. Pacer/ ICD wire present in the right heart. No gross evidence of infective endocarditis. Consider SHIRA for better   accuracy if clinically indicated. Compared to prior echo from 2021, no significant changes noted. Signature    ----------------------------------------------------------------   Electronically signed by Miguel Paz MD(Interpreting   physician) on 08/19/2022 04:16 PM    8/23/22  ~ 2 pm  Device Interrogation: [place]   Mode: DDI 60; changed to VVI 40 bpm   Battery Voltage/Longevity:  3.01V, 5.9-8.6 years     Pacing: A: 27%   RV: 25%   P wave: >5 mV  Impedance: 380 ohms   Threshold: 1.0 V @ 0.5 ms  RV R wave: 10.6 mV  Impedance: 410 ohms   Threshold: 1.5 V @ 0.5 ms  Episodes: AT/AF detections ranging from 12 seconds to 8:44 minutes. June 18-most recent episode on Aug 20   Reprogramming included: changed to VVI 40   Overall device function is normal    8/24/22 pacemaker check   Mode: VVI 40   Pacing:  RV: <1%       Assessment/Plan:      1. Extraction of Dual-chamber pacemaker in situ  -for AV block with a long pause/ ? CHB in past on ILR. Date of implant July 28, 2021  - bifascicular block with 2:1 block vs wenckebach on tele when pacer set at VVI   - pacemaker check in June revealed 6.5% atrial and 3% ventricular pacing with DDI programming at lower rate of 60 bpm; above showed 27% AV and 25% RV paced 8/23/22.  VVI at 36 and continued to have conduction without dependence of pacing; will plan for reimplant of likely Micra AV after extraction    - on toprol 50 mg BID- held since 8/24/22   - full extraction on 8/25/22     2. Bacteremia/sepsis--enterococcus faecalis  -  probably port infection per ID; mediport removed 8/20/22   Persistent bacteremia despite IV antibiotics  8-16-22 blood cultures E faecalis x 2  8-17-22 blood cultures E faecalis x 1  8-18-22 blood culture + E faecalis x1  8-19-22 blood cultures neg to date  8-20-22 Port ( tip ) cx - neg tod date   8-20-22 blood cultures neg to date  - redraw X2  8/23/22- no growth to date      3. Lung cancer, squamous cell--patient undergoing chemotherapy  -  Stage IIIB T2aN3 Squamous Cell Carcinoma of Lung  diagnosis 5/27/2021, PD-L1 0%    - 7/6/21-9/7/21 treatment with concurrent chemotherapy/radiation therapy   - 11/2/21 consolidated with Imfinzi (Durvalumab) per ID IV every 4 weeks   - 6/30/22 PET showing progression and was started on Carbo, Gemzar and Keytruda on 6/30/22  - 8/9 /22 received C2D8 of Carboplatin AUC 2 with Gemzar 1000 mg/m2 IV on D1,8 and Keytruda IV on D1 on a 21 day cycle    4. Neutropenia and thrombocytopenia related to chemotherapy  - WBC ~ 2; 5.3 on 8/25/22; trending up   - plt trending up. Now consistently above 100; had transfusion of PLT on 8/17     5. Persistent anemia. -  post PRBC transfusion 8/21/22  - Hgb 7.8 8/25/22     6. History of PE in 2021   - on Eliquis in the past ; held now     Recommendations:    Plan for Micra AV on Monday if ok with ID   Continue to trend CBC and plt count. Holding eliquis- was on in past for PE; use SCDs and ambulate     Thank you for allowing me to participate in your patient's care. Please call me if there are any questions or concerns. LELO Guo - [facility]  Cardiac Electrophysiology  [facility]) Physicians  Electrophysiology  9:49 AM  8/26/2022    Attending Physician's Statement    Patient seen with the ANP.  Agree with the findings, assessment and plan. Management plan was discussed. I have personally interviewed the patient, independently performed a focused cardiac examination, reviewed the pertinent laboratory and diagnostic testing with the patient and directly participated in the medical decision-making as noted above with the following additions:     Feeling better. No dizziness or syncope. Remains in sinus tachycardia with RBBB and LAFB on EKG    Physical exam showed no JVD, RRR, normal S1S2, no murmur, clear lungs bilaterally, 1-2+ edema of LE. Old pacemaker site: no bleeding or hematoma. Will continue to hold Eliquis and Toprol XL. Plan for MICRA AV leadless pacemaker on Monday if he is cleared by ID. I have spent a total of 35 minutes with the patient and the family reviewing the above stated recommendations. And a total of >50% of that time involved face-to-face time providing counseling and/or coordination of care with the other providers, reviewing records/tests, counseling/education of the patient, ordering medications/tests/procedures, coordinating care, and documenting clinical information in the EHR.      Martha Hicks MD  Cardiac Electrophysiology  [facility]) Physicians  [facility] [place]: [facility]  4:44 PM  8/26/2022

## 2022-08-26 NOTE — PROGRESS NOTES
POD#1 Awake, alert. No complaints. Sitting up on side of bed. Family present. Denies CP, palpitations, SOB at rest, dizziness/lightheadedness. Vitals:    08/25/22 2259 08/26/22 0317 08/26/22 0541 08/26/22 0747   BP: (!) 144/77 (!) 142/63  (!) 119/59   Pulse: 64 54  54   Resp: 18 18  16   Temp: 97.7 °F (36.5 °C) 97 °F (36.1 °C)  97.6 °F (36.4 °C)   TempSrc: Oral Temporal  Temporal   SpO2:    97%   Weight:   191 lb 6.4 oz (86.8 kg)    Height:         O2: 3L/NC      Intake/Output Summary (Last 24 hours) at 8/26/2022 0826  Last data filed at 8/26/2022 0542  Gross per 24 hour   Intake 340 ml   Output 100 ml   Net 240 ml         Recent Labs     08/25/22  0544 08/25/22  1512 08/26/22  0528   WBC 5.3 7.6 22.3*   HGB 7.8* 7.7* 8.6*   HCT 25.1* 24.0* 27.1*    174 242      Recent Labs     08/25/22  0544 08/25/22  1512 08/26/22  0528   BUN 5* 5* 10   CREATININE 0.9 0.8 0.9         PE  Cardiac: bradycardia  Lungs: decreased bases  Chest incision C/D/I, approximated, no erythema, mild swelling immediately around incision.  Right groin prior IV access site, soft, no hematoma, minimal ecchymosis present   Abd: Soft, nontender, +BS  Ext: COELHO          POD#1    A/P:     1) Infected pacemaker  Stable s/p Ultrasound guidance for vascular access/Insertion of right femoral 4F arterial line/Insertion of right femoral 9F MAC catheter/Removal of pacemaker generator/Laser lead extraction of RA lead/Laser lead extraction of RV lead  Maintaining stable HR (50's)  VSS  CXR reviewed  Right groin soft, no hematoma  Left arm immobilizer   Abx per ID           2) Expected acute post operative anemia secondary to surgery; multifactorial  --stable-hgb 7.8 pre-op and 7.7 post op; today 8.6        3) Hypertension  --stable  --defer to EP/cardiology  --prn IV hydralazine to aid in BP control--has not needed      3) Constipation--expected delayed return of bowel function  --secondary to anesthesia, narcotics, decreased oral intake, and decreased physical activity   --continue ordered bowel regimen        --CTS to sign off. Please call with any questions or concerns.  Will make office apt in 2 weeks for incision check        This patient's case and care plan was discussed with the attending surgeon

## 2022-08-26 NOTE — PROGRESS NOTES
Pulmonary Progress Note    Admit Date: 2022                            PCP: Ab Fan MD  Principal Problem:    Pneumonia  Active Problems:    Pancytopenia ([facility]    [facility]    Sepsis due to Enterococcus ([facility]    Bacteremia    COPD (chronic obstructive pulmonary disease) ([facility]    HTN (hypertension)    Cardiac pacemaker in situ    Squamous carcinoma of lung ([facility]    History pulmonary embolism ([facility] on Eliquis  Resolved Problems:    * No resolved hospital problems. *      Subjective:  Resting in bed on room air, pulse ox 97%. Wife and sister present at bedside. He still has mild dyspnea on exertion, denies cough or wheezing   He is concerned about BLE edema, discussed with primary NP     Medications:   sodium chloride          ipratropium-albuterol  1 ampule Inhalation Once    clotrimazole  10 mg Oral 4x daily    furosemide  20 mg Oral Daily    ampicillin IV  2,000 mg IntraVENous 6 times per day    cefTRIAXone (ROCEPHIN) IV  2,000 mg IntraVENous Q12H    [Held by provider] apixaban  5 mg Oral BID    [Held by provider] metoprolol succinate  50 mg Oral BID WC    atorvastatin  20 mg Oral Daily    sodium chloride flush  5-40 mL IntraVENous 2 times per day    ipratropium-albuterol  1 ampule Inhalation Q4H WA       Vitals:  VITALS:  BP (!) 119/59   Pulse 54   Temp 97.6 °F (36.4 °C) (Temporal)   Resp 16   Ht 5' 6\" (1.676 m)   Wt 191 lb 6.4 oz (86.8 kg)   SpO2 97%   BMI 30.89 kg/m²   24HR INTAKE/OUTPUT:    Intake/Output Summary (Last 24 hours) at 2022 0921  Last data filed at 2022 0542  Gross per 24 hour   Intake 340 ml   Output 100 ml   Net 240 ml     CURRENT PULSE OXIMETRY:  SpO2: 97 %  24HR PULSE OXIMETRY RANGE:  SpO2  Av.9 %  Min: 93 %  Max: 99 %  CVP:    VENT SETTINGS:      Additional Respiratory Assessments  Heart Rate: 54  Resp: 16  SpO2: 97 %      EXAM:  General: No distress. Alert. Eyes:  No sclera icterus. No conjunctival injection. ENT: No discharge.  Pharynx clear.  Neck: Trachea midline. Normal thyroid. Resp: No accessory muscle use. No crackles. Clear bilaterally   CV: Regular rate. Regular rhythm. No mumur or rub. +2/3 pitting BLE edema  ABD: Non-tender. Non-distended. Normal bowel sounds. Skin: Warm and dry. No nodule on exposed extremities. No rash on exposed extremities. M/S: No cyanosis. No joint deformity. No clubbing. Neuro: Awake. Follows commands. A&Ox3    I/O: I/O last 3 completed shifts: In: 340 [P.O.:240; I.V.:100]  Out: 100 [Urine:100]  No intake/output data recorded. Results:  CBC:   Recent Labs     08/25/22  0544 08/25/22  1512 08/26/22  0528   WBC 5.3 7.6 22.3*   HGB 7.8* 7.7* 8.6*   HCT 25.1* 24.0* 27.1*   MCV 91.6 93.0 94.8    174 242     BMP:   Recent Labs     08/25/22  0544 08/25/22  1512 08/26/22  0528    142 136   K 3.3* 3.5 4.2    106 96*   CO2 27 23 27   BUN 5* 5* 10   CREATININE 0.9 0.8 0.9     LFT:   No results for input(s): ALKPHOS, ALT, AST, PROT, BILITOT, BILIDIR, LABALBU in the last 72 hours. PT/INR:   Recent Labs     08/24/22  1008   PROTIME 12.6*   INR 1.2     Cultures:  No results for input(s): CULTRESP in the last 72 hours. ABG:   No results for input(s): PH, PO2, PCO2, HCO3, BE, O2SAT in the last 72 hours. Films:  CT Head WO Contrast  Result Date: 8/16/2022  No acute intracranial abnormality. Cortical atrophy and periventricular leukomalacia. XR CHEST PORTABLE  Result Date: 8/16/2022  Continued opacity in the left lower lung and linear scarring in the left perihilar region. COPD.     8/18/22 CT chest w/o    5/24/2022 CTA chest       5/24/2022 CTA chest: .34 x 5.4 cm left hilar and perihilar mass extending to LLL encircling the pulmonary artery and the bronchi consistent with progressive malignancy with occlusion of left lower lobe bronchi with postobstructive pneumonitis    08/19/22 ECHO:  Normal left ventricular chamber size. Normal left ventricular systolic function.    Visually estimated LVEF is 55-60 %. No wall motion abnormalities. Normal diastolic function. Normal left atrial pressure. Normal right ventricle structure and function. Normal left atrial size. Normal right atrial size   Likely normal estimated PA pressure. Pacer/ ICD wire present in the right heart. No gross evidence of infective endocarditis. Consider SHIRA for better   accuracy if clinically indicated. Compared to prior echo from 2021, no significant changes noted. SHIRA on 8/22/22   No SHIRA indication of Endocarditis. Normal left ventricular chamber size and systolic function. Interatrial septum appears intact. Normal right ventricle size and function. Pacemaker leads noted in right atrium and right ventricle. Normal aortic root size. Moderate atherosclerosis in the descending thoracic aorta. Epicardial fat vs. small pericardial effusion. No intra cardiac mass or thrombus. Technically sub-optimal images. Compared to prior sub-optimal TTE from 8/19/22. 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 S/p platelet and PRBC transfusion on  8/18/2022 CT chest w/o: The previously noted infiltrative left hilar mass extending to EAN and LLL surrounding the broncho pulmonary vasculature is noted currently measuring 4.6 x 4.3 cm with progression with atelectasis. 8/18: 2L NC, US BLE neg DVT   8/19: 2L NC   8/20 RA, Mediport removed wakes up somewhat short of breath  8/21 RA received PRBC    8/22 2.5L; SHIRA neg for endocarditis   8/23: RA;    8/24: 3L NC 99%  8/25: 3L NC 98%. Pacer removed by CTS/EP  8/26: room air 97%    Stage IIIB T2aN3 Squamous Cell Carcinoma of Lung  diagnosis 5/27/2021, PD-L1 0%    - 7/6/21-9/7/21 treatment with concurrent chemotherapy/radiation therapy   - 11/2/21 consolidated with Imfinzi (Durvalumab) per ID IV every 4 weeks f  - 6/30/22 PET showing progression and was started on Carbo, Gemzar and Keytruda on 6/30/22  - 8/9 /22 r ceived C2D8 of Carboplatin AUC 2 with Gemzar 1000 mg/m2 IV on D1,8 and Keytruda IV on D1 on a 21 day cycle  Enterococcus faecalis bacteremia/septicemia on IV daptomycin for bacteremia cultures + on 8/16, 8/17, 8/18 s/p mediport removal  cath tip neg so far. With dual-chamber pacemaker with h/o wide-complex tachycardia (HCC)--atrial tachycardia with RV pacing  H/O SARS-CoV 2 Pneumonitis 5/24/2022- suspected Omicron BA-2  sick contact wife  Chronic hypoxic resp failure  RA during the day and 2L NC at night, which is baseline. Probable obstructive sleep apnea  Persistent pancytopenia secondary to chemotherapy  Thrombocytopenia   Lymphopenia   RLE swelling and numbness, w/u with neg u/s for DVT   H/o submassive hemoptysis - streaky, known lung mass resolved   H/O RLL subsegmental PE- CTA Chest 9/17/2021 was on eliquis now on hold   Radiation pneumonitis/fibrosis left lung   COPD, not in exacerbation      PLAN:   On room air, pulse ox 97%. Wears 2L HS at home. He is on/off supplemental O2 2-3L here. No hypoxemia charted. Wean to maintain pox >90%. Ambulatory pulse ox prior to d/c.   Brovana, Pulmicort and DuoNebs will resume Trelegy on discharge   Oncology following. Received one unit platelets 6/73 in OR. PLTS 64>95>125>162>161 (8/25)  IV ATB per ID. Will need PICC or reimplantation of Mediport prior to discharge  Continue IS for pulmonary hygiene   T8/22 SHIRA negative for endocarditis. TTE done 08/19 - no gross evidence of endocarditis. S/p pacer removal on 8/25 with EP/CTS. To resume Eliquis when ok with CTS/EP.   Outpatient PSG to be obtained outpt for high risk and probable FIONA   Pt restarted PO lasix on 8/20 for leg swelling, continue strict I&O. No output documented    Electronically signed by LELO Tariq CNP on 8/26/2022 at 9:21 AM    Attending Attestation Note:    Patient seen and examined with Hospital Staff, NP. I have extensively reviewed the chart lab work and imaging. I agree with above. In addition, the following apply:    - outpatient PFT, PSG  - pacer out 8/25/2022  - IV abx per ID (ampicillin 2 grams IV q 4 hrs and ceftriaxone 2 grams IV q 12 hrs)  - Micra AV on Monday 8/29/2022 per EP if OK with ID team    - Bárbara Denny and Dionicio will resume Trelegy on discharge   - Oncology following. Received one unit platelets 7/36 in OR.  PLTS 64>95>125>162>161 (8/25)  - PT/OT, nutritional support, strength training   - Eliquis to resume when OK with CTS/EP teams     Joan Brooks MD  8/26/2022  3:45 PM

## 2022-08-26 NOTE — DISCHARGE INSTRUCTIONS
Discharge Instructions for Thoracic Surgery    What you will need at home:                          *  Digital Thermometer               *  Antibacterial Soap                *  Clean Wash Cloths             *  Someone to be with you for one week                NEVER SMOKE AGAIN! Absolutely no tobacco products! Do not allow others to smoke around you. Second hand smoke can be just as bad. The [facility] has a free program available, call [phone]. Activity:  Plan to walk indoors on days the temperature is below 40? or over 80? or during smog alerts. At first limit your stair climbing to once or twice a day. You may use the handrail for balance only. It is not unusual to feel tired for the first few weeks, but walking builds up your strength. Driving:  Do not drive while on pain medication. Incentive Spirometer:  Continue to use your lung exerciser for the next 4 weeks. Support your chest and cough each time you complete the 10 exercises. Medication:    Pain pills are ordered to keep you comfortable and able to increase activity         level. Your need for pain pills should decrease over the next 2 weeks. For mild pain you take Tylenol, but do not exceed 4000mg of Tylenol a day. Vicodin contains Tylenol,  so watch how much Tylenol (Acetaminophen) you take  Stool Softners: You may have been prescribed Colace (docusate), to help prevent straining with bowel movements. If your bowels have not moved by the second day home, take a laxative of your choice. If no bowel movement by the third day, call your surgeon. If you find you have the opposite problem and your stools are too soft, stop taking the stool softener. Avoid herbal, dietary products and vitamins unless OKd by your surgeon. If on Coumadin monitor Vitamin K intake and keep it consistent.             Call during business hours Monday through Friday for medication refills. [phone]      Incision Care:  8 Merissa Berman YOUR INCISIONS DAILY WITH A CLEAN WASHCLOTH AND ANTIBACTERIAL SOAP. Do not wash your incisions after you have cleansed other parts of your body. You may shower but keep your back to the spray. Avoid hot water, comfortably warm is OK. ? If you went home with a dressing on any incision: Remove the dressing daily     and wash the incision with antibacterial soap and water and pat dry. Only     cover the incision with a dressing if it is draining. Otherwise leave it     uncovered (unless your doctor told you otherwise)  ? No tub baths until your incisions are healed. ? DO NOT APPLY ANYTHING OTHER THAN ANTIBACTERIAL SOAP AND     WATER TO YOUR INCISIONS. Do not apply lotions, creams, ointments,     hydrogen peroxide or powder. ? Keep your incisions CLEAN. Think CLEAN. No one should touch your     incisions without washing their hands first.  Do not let pets touch your incisions     (have incisions covered with clothing when around pets). ? Wear loose clothing. For support women should wear a bra. KOJO Hose (white stockings): Should be worn during the day and off at night. Someone other than the patient will need to put on and take off the hose. It is too much pulling and tugging for the patient. Expect them to be difficult to put on and they should feel snug. These are usually worn for 2-4 weeks. Wash them by hand to keep their elasticity. Diet:  Eat a low fat, low salt diet. Eat a high fiber diet to avoid constipation and straining during bowel movements (whole grains, raw veggies, fruits)    Diabetics:  Check blood sugars twice a day. Contact your primary care physician if your blood sugar is consistently above 130. Good tissue healing occurs when blood sugars are less than 130. Sexual Activity: When you can climb 2 flights of stairs without chest pain, shortness of breath or fatigue, it is generally OK to resume sexual activity. Depression:   It is not unusual to have feelings of anxiety, fear or depression after surgery. If you need help with these feelings, call your primary care physician. There are medications to help and healing usually occurs sooner is youre not depressed. When to call the doctor:  [phone]    If you have sudden, severe shortness of breath or shortness of breath while resting. Pain, tenderness, warmth or redness in your calf. If your heart rate is below 50 or over 110 beats per minute, or symptoms of fluttering in your chest or irregular pulse. Temperature (taken daily) greater than 101? Lawernce Roughen If your bowels have not moved by the third day home. Persistent diarrhea, nausea or vomiting. If you are unable to eat, or unable to drink 5 glasses of fluid a day for more than one day. For redness, warmth, tenderness, drainage or swelling of any incision. For ANY chest incision drainage. If you have pain not relieved by pain medication. If you experience the same pain or other symptoms that brought you to the hospital or doctor before surgery. Diabetics:  Call Primary Care Physician for blood sugars consistently above 130. Depression:  Call Primary Care Physician if feelings of depression persist.      If you think something is seriously wrong go to the nearest emergency room! Call 911 for any EMERGENCY and go to the nearest hospital!  Future Appointments   Date Time Provider Zana Muñiz   8/29/2022 12:30 PM [facility] CVL EP LAB 1 [facility] [address] [facility]   9/6/2022 10:15 AM Pineda Willoughby MD CARDIO SURG [facility]   9/26/2022  8:05 AM SCHEDULE, RAMÍREZ [facility]   10/5/2022 10:00 AM LELO Brooks CNP SEYZ Rad Onc [id number]  27   12/8/2022 10:45 AM SCHEDULE, [address] [place] Pacemaker Discharge Instructions      Medication Instructions: Resume home medications, except for apixaban; unless otherwise specified by your physician. Apixaban is being held due to groin hematoma and swelling around the heart. An echocardiogram is ordered for 9/15/2022. Depending on the results of the echocardiogram, Dr. Kenji Saravia may resume apixaban. Follow-Up:  - Device clinic: For scheduled 9/7/2022 at 11:30 AM at the [facility]. Please call [phone] or [phone] if you need to reschedule this appointment. - Echocardiogram: You will be contacted to schedule an echocardiogram on 9/15/2022. What to Expect at Home:    Bruising of the trunk, groin and leg around the puncture site is normal and should resolve in a few days. Puncture Site Care: You will have a bandage over the puncture site on your groin, you can remove it tomorrow evening. After you remove the bandage you may shower. You can gently wash the site with soap and water but do not scrub the puncture site. Check the groin puncture site daily. If you find any redness, swelling, drainage, warmth, or have a fever greater than 100 degrees, notify the office immediately at [phone]. Do not take a tub bath, Jacuzzi, or swim for 7 days. Activity: No lifting greater than 10 lbs. for 5 days, and no strenuous exercise for 2 weeks. You may drive if you feel up to it. Call if You Experience:  Significant redness, heat, swelling, drainage or severe pain at your puncture site. If any bleeding occurs, hold direct pressure at the site with gauze or a band-aid. If the bleeding continues past 10 minutes, call your physician, and seek immediate medical attention. Fever of 100 degrees or higher. A high temperature can be early signs of infection      [place] [facility]:   [address]  Ora Massey [phone] or 66 [facility]  ([facility] [place] [address])  [address] [facility] [address]  [place], [facility] [place] [employer]  Phone [phone]   Fax [phone]        Mathieu Brady MD, MD Bryn Blackman MD Josefa Sola, MD Jamaica Lai, RN, CNP      Karin Brady, MARKO, CNS      MD Dwight Hong MD     STANDING ORDERS (ID Protocol)     Visiting nurses are to write the Primary Care Physician and their own call back number on all laboratory requisition forms. Abnormal lab values are called to the physician by the nurse and NOT by the laboratory. Fax all labs to the office in a timely manner, during office hours. All faxes should include nurses name and call back number. Vascular Access Devices or VADs (TLC, PICC, Midline, etc) will be replaced as necessary. Draw all blood work from VADs, except for drug levels. If unable to access a VAD, insert a peripheral catheter temporarily. Contact the Primary Care Physician or [facility] office for surgical referral.  Use tPA (Joanell Beverage) as per agency protocol to restore patency of VAD. Remove VAD upon completion of IV antibiotics, unless otherwise specified by the ordering physician. If VAD cannot be removed, schedule appointment at office for removal.  Notify ordering physician or office if patient requires admission to the hospital with reason for admission. Discontinue all blood work upon completion of IV antibiotics, unless otherwise specified by ordering physician. Notify ordering physician if the patient does not receive the scheduled antibiotic for 24 hours or more. The Pharmacy and [address] [place] [address] may adjust the timing of the infusion and blood work to accommodate the patients home care conditions. When PICC or VAD is to be removed, documentation of length of inserted PICC.  PICC or VAD length is to correlate with inserted length and sent to physician at the time of removal.  Give the patient a list of antibiotics being administered with:  Drug name  Route  Frequency  Start/Stop date      ROUTINE LABS TO BE DRAWN/ORDERED:  Twice weekly (preferably every Monday & Thursday):  BUN Creatinine and liver panel  Complete Blood Count with differential (CBC with dif)  Once weekly:  C-Reactive Protein (not high sensitivity CRP)  Erythrocyte/Westergren Sedimentation Rate (WSR or ESR)  Total CPK for patients on Daptomycin (Cubicin®). Obtain CPK more often if the patient is experiencing muscle weakness or myalgias. Clinical Pharmacist is to adjust Vancomycin and Aminoglycosides unless otherwise indicated. Draw Vancomycin trough 30 minutes before the third dose  After starting drug, or   After the dosing or interval is changed. If the trough level is between 5 and 20 continue dose as ordered. Draw troughs twice weekly thereafter until troughs are stable (i.e. until trough is between 5 and 20 mcg/ml for two consecutive laboratory values). Once stable check troughs once weekly or every third dose. Please do not call physician unless the trough is < 5 or >20. If the trough is <20 continue dosing as ordered. If the trough is >20 call the office for further orders. Do not hold the dose while waiting for the trough result. Amingoglycosides (e.g. Gentamicin, Tobramycin and Amikacin) peaks and troughs should be drawn twice weekly (preferably on Mondays and Thursdays) or every third dose. Aminoglycoside peaks are not to be drawn if patient on Once-Daily Dosing (ODD). Call physician or office if the trough is:  >1 for gentamicin,   >2 for tobramycin, or   >5 for amikacin  When clinically indicated obtain:  Urine culture. If the patient has a fever with purulent drainage from Sun or suprapubic catheter, or foul smelling urine. Do not irrigate a clogged Sun catheter. Replace it. Blood cultures and Wound Gram stain with culture & sensitivity. If the patient has a fever or increasing drainage or foul odor from a wound. Notify the treating physician in a timely manner  Stool specimen. If diarrhea occurs while on antibiotics, send stools for C. difficile and WBCs.   When a drug is discontinued due to a low white blood cell count (WBCs) draw two consecutive CBC with differential and CMP. ALLERGIC OR ADVERSE REACTIONS TO MEDICATIONS  Mild reaction: (itching, with or without rash):  Administer Benadryl 50mg po x 1, then 25mg po q6h prn. Notify office or physician in a timely matter. Moderate reaction (itching with or without rash and/or wheezing, dyspnea, itchy throat):  Administer Benadryl 50 mg IV push x 1. Notify office or physician in a timely manner. Severe reaction i.e. Anaphylaxis (wheezing or stidor, sudden rash, lightheadedness, hypotension):  Administer epinephrine subcutaneous 0.3mg (1:1000) x 1 dose. May repeat twice every 5 minutes if needed. Call EMS and notify office or physician immediately. For all above reactions: administer Solu-Cortef 100mg slow IV push x 1. VASCULAR ACCESS DRESSING CHANGE PROTOCOL  Cleanse insertion site with Shur-Clens® or equivalent three times. Secure catheter with Steri-Strips®, Bone®, or equivalent securing device. Apply Opsite® 3000 or equivalent transparent dressing. Change dressing twice weekly, maintaining sterile technique. If there is a BioPatch®, SilverSite® or equivalent, change once weekly only or as needed. FOLLOW-UP VISITS  Nursing staff should call the office during business hours to schedule a follow-up appointment once the patient is admitted to the service or facility. Every effort should be made to have patient follow-up within 2 weeks of discharge. Continue IV antibiotic therapy until patient is seen in the office or unless specific stop date is noted on the original order or unless otherwise ordered by physician. Call office to ensure stop date is correct before stopping antibiotics. Theressa Bamberger. MD Doug Overton MD Jayme Latus, MD Josh New, MD Munir P. Levert Hibbs, MD

## 2022-08-26 NOTE — PROGRESS NOTES
Subjective:  Chart reviewed, no overnight event  Being monitoring by EP with pacemaker removal  Patient without complaints sitting at bedside and feeling well  Denies CP, SOB, palpitations, N/V/D/C    Objective:    BP (!) 144/71   Pulse (!) 103   Temp 97 °F (36.1 °C) (Temporal)   Resp 20   Ht 5' 6\" (1.676 m)   Wt 191 lb 6.4 oz (86.8 kg)   SpO2 92%   BMI 30.89 kg/m²     General: NAD, awake and alert  HEENT: No thrush or mucositis, EOMI, PERRLA  Heart:  tachycardic, chest with well-healed PORT and pacemaker removal  Lungs: respiration nonlabored, CTA bilaterally  Abd: nontender, nondistended, soft  Extrem:  No clubbing, cyanosis, 1+ bilateral pedal edema  Skin: Intact, no petechia or purpura    CBC with Differential:    Lab Results   Component Value Date/Time    WBC 22.3 08/26/2022 05:28 AM    RBC 2.86 08/26/2022 05:28 AM    HGB 8.6 08/26/2022 05:28 AM    HCT 27.1 08/26/2022 05:28 AM     08/26/2022 05:28 AM    MCV 94.8 08/26/2022 05:28 AM    MCH 30.1 08/26/2022 05:28 AM    MCHC 31.7 08/26/2022 05:28 AM    RDW 20.4 08/26/2022 05:28 AM    NRBC 1.8 08/25/2022 05:44 AM    BLASTSPCT 0.9 08/24/2022 05:22 AM    METASPCT 3.5 08/26/2022 05:28 AM    LYMPHOPCT 1.7 08/26/2022 05:28 AM    PROMYELOPCT 0.9 08/24/2022 05:22 AM    MONOPCT 7.0 08/26/2022 05:28 AM    MYELOPCT 0.9 09/17/2021 10:20 AM    BASOPCT 0.9 08/26/2022 05:28 AM    MONOSABS 1.56 08/26/2022 05:28 AM    LYMPHSABS 0.45 08/26/2022 05:28 AM    EOSABS 0.00 08/26/2022 05:28 AM    BASOSABS 0.00 08/26/2022 05:28 AM     CMP:    Lab Results   Component Value Date/Time     08/26/2022 05:28 AM    K 4.2 08/26/2022 05:28 AM    K 3.7 08/17/2022 04:17 AM    CL 96 08/26/2022 05:28 AM    CO2 27 08/26/2022 05:28 AM    BUN 10 08/26/2022 05:28 AM    CREATININE 0.9 08/26/2022 05:28 AM    GFRAA >60 08/26/2022 05:28 AM    LABGLOM >60 08/26/2022 05:28 AM    GLUCOSE 153 08/26/2022 05:28 AM    PROT 5.9 08/20/2022 04:44 PM    LABALBU 3.2 08/20/2022 04:44 PM    CALCIUM 9.2 08/26/2022 05:28 AM    BILITOT <0.2 08/20/2022 04:44 PM    ALKPHOS 90 08/20/2022 04:44 PM    AST 25 08/20/2022 04:44 PM    ALT 29 08/20/2022 04:44 PM               Current Facility-Administered Medications:     Arformoterol Tartrate (BROVANA) nebulizer solution 15 mcg, 15 mcg, Nebulization, BID, Carmelina Edison, APRN - CNP, 15 mcg at 08/26/22 0955    budesonide (PULMICORT) nebulizer suspension 500 mcg, 0.5 mg, Nebulization, BID, Hardik Prom, APRN - CNP, 500 mcg at 08/26/22 0955    ipratropium-albuterol (DUONEB) nebulizer solution 1 ampule, 1 ampule, Inhalation, Once, Mila Saliva Finamore, DO    morphine (PF) injection 2 mg, 2 mg, IntraVENous, Q4H PRN, Sandy Perone, APRN - CNP    sodium chloride (OCEAN, BABY AYR) 0.65 % nasal spray 1 spray, 1 spray, Each Nostril, PRN, Sandy Perone, APRN - CNP    trimethobenzamide (TIGAN) injection 200 mg, 200 mg, IntraMUSCular, Q6H PRN, Snady Perone, APRN - CNP    oxyCODONE-acetaminophen (PERCOCET) 5-325 MG per tablet 1 tablet, 1 tablet, Oral, Q4H PRN **OR** oxyCODONE-acetaminophen (PERCOCET) 5-325 MG per tablet 2 tablet, 2 tablet, Oral, Q4H PRN, Sandy Perone, APRN - CNP    hydrALAZINE (APRESOLINE) injection 10 mg, 10 mg, IntraVENous, Q6H PRN, Sandy Perone, APRN - CNP    clotrimazole [facility]) lindsay 10 mg, 10 mg, Oral, 4x daily, Sandy Perone, APRN - CNP, 10 mg at 08/26/22 1226    albuterol sulfate HFA (PROVENTIL;VENTOLIN;PROAIR) 108 (90 Base) MCG/ACT inhaler 2 puff, 2 puff, Inhalation, Q6H PRN, Sandy Perone, APRN - CNP    furosemide (LASIX) tablet 20 mg, 20 mg, Oral, Daily, Sandy Perone, APRN - CNP, 20 mg at 08/26/22 0958    potassium chloride (KLOR-CON M) extended release tablet 40 mEq, 40 mEq, Oral, PRN **OR** potassium bicarb-citric acid (EFFER-K) effervescent tablet 40 mEq, 40 mEq, Oral, PRN **OR** potassium chloride 10 mEq/100 mL IVPB (Peripheral Line), 10 mEq, IntraVENous, PRN, Du Puente APRN - CNP    ampicillin 2000 mg ivpb mini bag, 2,000 mg, IntraVENous, 6 times per day, Irene Pointer, APRN - CNP, Stopped at 08/26/22 1044    cefTRIAXone (ROCEPHIN) 2,000 mg in sterile water 20 mL IV syringe, 2,000 mg, IntraVENous, Q12H, Irene Pointer, APRN - CNP, 2,000 mg at 08/26/22 0243    melatonin tablet 3 mg, 3 mg, Oral, Nightly PRN, Irene Pointer, APRN - CNP, 3 mg at 08/24/22 2005    albuterol (PROVENTIL) nebulizer solution 2.5 mg, 2.5 mg, Nebulization, Q6H PRN, Irene Pointer, APRN - CNP    albuterol (ACCUNEB) nebulizer solution 0.63 mg, 1 ampule, Nebulization, Q6H PRN, Irene Pointer, APRN - CNP    [Held by provider] apixaban (ELIQUIS) tablet 5 mg, 5 mg, Oral, BID, Tiffanie E Kaercher, DO    [Held by provider] metoprolol succinate (TOPROL XL) extended release tablet 50 mg, 50 mg, Oral, BID WC, Tiffanie E Kaercher, DO, 50 mg at 08/24/22 0857    atorvastatin (LIPITOR) tablet 20 mg, 20 mg, Oral, Daily, Irene Pointer, APRN - CNP, 20 mg at 08/25/22 2025    sodium chloride flush 0.9 % injection 5-40 mL, 5-40 mL, IntraVENous, 2 times per day, Irene Pointer, APRN - CNP, 10 mL at 08/26/22 0957    sodium chloride flush 0.9 % injection 5-40 mL, 5-40 mL, IntraVENous, PRN, Irene Pointer, APRN - CNP    0.9 % sodium chloride infusion, , IntraVENous, PRN, Irene Pointer, APRN - CNP    acetaminophen (TYLENOL) tablet 650 mg, 650 mg, Oral, Q6H PRN, 650 mg at 08/26/22 0322 **OR** acetaminophen (TYLENOL) suppository 650 mg, 650 mg, Rectal, Q6H PRN, Irene Pointer, APRN - CNP    senna (SENOKOT) tablet 8.6 mg, 1 tablet, Oral, Daily PRN, Irene Pointer, APRN - CNP    ipratropium-albuterol (DUONEB) nebulizer solution 1 ampule, 1 ampule, Inhalation, Q4H Irene PEREZ APRN - CNP, 1 ampule at 08/26/22 1352    Assessment:    Principal Problem:    Pneumonia  Active Problems:    Pancytopenia ([facility] [address]    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 Treated with concurrent chemo/XRT from 7/6/21 followed by consolidation Imfinzi. Progression on PET from 6/24/22. Started August Gola and Keytruda on 6/30/22. Patient seen in office last 8/9 and received C2D8 of Carboplatin with Gemzar and Keytruda. Admitted with sepsis, Enterococcus bacteremia. Respiratory panel negative. Mediport was removed 8/20    Plan:  Leukocytosis, s/p filgrastim likely causing  Thrombocytopenia resolved  Anemia, prefer to keep hemoglobin above 7.5 g/dL due to poor pulmonary reserves  No transfusion PRBC indicated today but would advise a postop CBC. Enterococcus bacteremia: Status post port removal.  On broad-spectrum IV antibiotics. Followed by ID. Pacemaker to be removed 8/25, cultures pending  Metastatic non-small cell lung cancer: Treatment is currently on hold. Follow-up with Dr. Kera Bal in 2 weeks after discharge. Electronically signed by MATTHEW Sow on 8/26/2022 at 2:34 PM     Attending Addendum:  Patient seen and examined personally. Agree with PA note as above which has been updated for changes. Port has been removed, will need another eventually. Will resume chemotherapy once completes antibiotics.       Debi Parrish MD

## 2022-08-26 NOTE — PATIENT CARE CONFERENCE
P Quality Flow/Interdisciplinary Rounds Progress Note        Quality Flow Rounds held on August 26, 2022    Disciplines Attending:  Bedside Nurse, , , and Nursing Unit Leadership    Belkis Gandhi was admitted on 8/16/2022  2:38 PM    Anticipated Discharge Date:       Disposition:    Kojo Score:  Kojo Scale Score: 22    Readmission Risk              Risk of Unplanned Readmission:  22           Discussed patient goal for the day, patient clinical progression, and barriers to discharge.   The following Goal(s) of the Day/Commitment(s) have been identified:  Labs - Report Results      Bridget Almendarez RN  August 26, 2022

## 2022-08-26 NOTE — PROGRESS NOTES
[facility]                                Progress note    Subjective:    Patient visiting with wife   S/p pacer removal 8/25   feels great no acute complaints    Objective:    BP (!) 136/92   Pulse (!) 39   Temp 98.1 °F (36.7 °C) (Oral)   Resp 20   Ht 5' 6\" (1.676 m)   Wt 191 lb 6.4 oz (86.8 kg)   SpO2 92%   BMI 30.89 kg/m²     In: 1000 [P.O.:900; I.V.:100]  Out: 615   In: 1000   Out: 615 [Urine:615]    General appearance: NAD, conversant, looks much better today  HEENT: AT/NC, MMM  Neck: FROM, supple  Lungs: Clear to auscultation  CV: RRR, no MRGs-right-sided port removed 8/20, left-sided pacemaker removed   Vasc: Radial pulses 2+  Abdomen: Soft, non-tender; no masses or HSM  Extremities: No  or digital cyanosis, 2+ BLE edema   Skin: Generalized bruising  Psych: Alert and oriented to person, place and time  Neuro: Alert and interactive     Recent Labs     08/25/22  0544 08/25/22  1512 08/26/22  0528   WBC 5.3 7.6 22.3*   HGB 7.8* 7.7* 8.6*   HCT 25.1* 24.0* 27.1*    174 242         Recent Labs     08/25/22  0544 08/25/22  1512 08/26/22  0528    142 136   K 3.3* 3.5 4.2    106 96*   CO2 27 23 27   BUN 5* 5* 10   CREATININE 0.9 0.8 0.9   CALCIUM 8.6 8.3* 9.2         Assessment:    Principal Problem:    Pneumonia  Active Problems:    Pancytopenia (HCC)    Pacemaker infection (HCC)    Sepsis due to Enterococcus (HCC)    Bacteremia    COPD (chronic obstructive pulmonary disease) (HCC)    HTN (hypertension)    Cardiac pacemaker in situ    Squamous carcinoma of lung ([address]    History pulmonary embolism ([address] on Eliquis  Resolved Problems:    * No resolved hospital problems.  *      Plan:  Patient is a 76-year-old male with a hx of squamous cell lung carcinoma who is active chemo therapy who is admitted to [facility] for  Sepsis/bacteremia/immunocompromise status-acutely ill status  -Mediport removed 8/20  -CTS consulted by EP for removal of pacer, completed 8/25  -Respiratory panel negative  -Check procalcitonin 0.42  -CT chest ordered by pulm> progressive infiltrative mass in the left perihilar region  -On daptomycin per infectious disease > switched to IV Rocephin and ampicillin  -General surgery > signed off   -SHIRA 8/22/2022-unremarkable, no evidence of endocarditis, ejection fraction  -Resume Trelegy as he takes at home-okay to use from home if okay with pulmonology  -Plans for Darrius Staple on Monday if ok with ID per EP   -Will need PICC or Mediport replaced for abx on discharge     Leukocytosis, likely reactive from surgery   -Monitor CBC daily  -WBC 22.3    Hypomagnesemia  -Mg+ 1.3 > replaced with 2 g, recheck in the a.m. Pancytopenia  -Consult heme-onc-following  -h/h 6.9/21.4 > stable now in mid eights post transfusion  -Platelets 4 > transfuse 1 unit platelets > 23 > 193 today   -Hold Eliquis-consider holding off for few weeks     Hematuria  -Check urine culture  -Hold Eliquis    DVT Prophylaxis PCD's  PT/OT  Discharge planning-will be able to be discharged once pacemaker is placed when okay with ID      LELO Mckinney CNP  4:13 PM  8/26/2022     Above note edited to reflect my thoughts     I personally saw, examined and provided care for the patient. Radiographs, labs and medication list were reviewed by me independently. The case was discussed in detail and plans for care were established. Review of MAKENZIE Mckinney   , documentation was conducted and revisions were made as appropriate directly by me. I agree with the above documented exam, problem list, and plan of care.      Butch London MD  5:20 PM  8/26/2022

## 2022-08-26 NOTE — OP NOTE
[address]                  [address] [facility]  [facility]                                OPERATIVE REPORT    PATIENT NAME: Eileen Bunn                      :        1950  MED REC NO:   [id number]                            [place]       [facility] [place] [facility] [place]  ACCOUNT NO:   [de-identified]                           ADMIT DATE: 2022  PROVIDER:     Dave Rodriguez MD    DATE OF PROCEDURE:  2022    PREOPERATIVE DIAGNOSIS:  Infected pacemaker. POSTOPERATIVE DIAGNOSIS:  Infected pacemaker. INDICATION:  Infected pacemaker. SURGEON:  Dave Rodriguez MD    ASSISTANT:   Shirley Medina NP (no other resident was adequately trained  to be able to assist). Shirley Medina was present assisting throughout  the entire operation from the first incision to the last suture and  every part in between. COMPLICATIONS:  None, tolerated well. ESTIMATED BLOOD LOSS:  Less than 50 mL. ANESTHESIA:  General endotracheal.    ANESTHESIA ATTENDING:  Wellington Andres DO    SPECIMENS OBTAINED:  Include right atrial lead and right ventricular  lead for culture. FINDINGS:  The system was removed entirely. OPERATIONS:  1. Ultrasound guidance for vascular access. 2.  Insertion of right femoral 4-[place]awk arterial line. 3.  Insertion of right femoral 9-[place]awk MAC venous catheter. 4.  Removal of pacemaker generator. 5.  Laser lead extraction of right atrial lead. 6.  Laser lead extraction of right ventricular lead. HISTORY:  This is a 59-year-old man with history of pacemaker about a  year ago. He presented back with likely infected ports and then had  positive blood cultures. He was referred for extraction.   The patient  was described the procedure in full including the risks and  complications including but not limited to bleeding, infection, need for  reoperation, hemothorax, pneumothorax, stroke, myocardial infarction,  and death and the patient agreed to proceed. DESCRIPTION OF OPERATION:  After adequate informed consent was obtained  and adequate preoperative antibiotics were given, the patient was  brought to the operating room in stable condition. He was laid in  supine position and induced endotracheal anesthesia by Anesthesia staff. Adequate monitoring lines were placed and transesophageal echo probe was  placed. Ultrasound guidance was used to gain vascular access to the  right femoral vein and artery. 4-Jamaican arterial line was placed and  the 9-Jamaican venous line was placed. These were secured appropriately. The previous pacemaker pocket was then opened and the generator was  delivered and removed. The leads were freed up using locking the  stylets and the 16-Jamaican laser. We extracted the right atrial lead  followed by the right ventricular lead. They came out intact. Specimen  was sent for culture. Hemostasis was achieved with pressure and  sutures. We closed the incisions with multiple layers of absorbable  stitch. Dressings were applied over top. The patient tolerated the  procedure well. The patient was extubated on the table and transferred  to recovery room in stable condition. All sponges, instruments, and  needle counts were correct at the end of the case.         Rhina Franco MD    D: 08/25/2022 14:29:32       T: 08/25/2022 22:08:59     /[email or web address]  Job#: [id number]     Doc#: [id number]    CC:  MD Ming Ahumada MD Vila Hila, MD Ilah File, MD Rolly Dredge, MD

## 2022-08-27 ENCOUNTER — APPOINTMENT (OUTPATIENT)
Dept: GENERAL RADIOLOGY | Age: 72
DRG: 228 | End: 2022-08-27
Payer: MEDICARE

## 2022-08-27 PROBLEM — I44.39 HIGH-GRADE ATRIOVENTRICULAR BLOCK: Status: ACTIVE | Noted: 2022-08-27

## 2022-08-27 PROBLEM — I44.1 SECOND DEGREE AV BLOCK, MOBITZ TYPE I: Status: ACTIVE | Noted: 2022-08-27

## 2022-08-27 LAB
ANION GAP SERPL CALCULATED.3IONS-SCNC: 11 MMOL/L (ref 7–16)
BUN BLDV-MCNC: 10 MG/DL (ref 6–23)
CALCIUM SERPL-MCNC: 8.6 MG/DL (ref 8.6–10.2)
CHLORIDE BLD-SCNC: 104 MMOL/L (ref 98–107)
CO2: 29 MMOL/L (ref 22–29)
CREAT SERPL-MCNC: 1 MG/DL (ref 0.7–1.2)
GFR AFRICAN AMERICAN: >60
GFR NON-AFRICAN AMERICAN: >60 ML/MIN/1.73
GLUCOSE BLD-MCNC: 121 MG/DL (ref 74–99)
GRAM STAIN ORDERABLE: NORMAL
GRAM STAIN ORDERABLE: NORMAL
MAGNESIUM: 1.8 MG/DL (ref 1.6–2.6)
POTASSIUM SERPL-SCNC: 3.6 MMOL/L (ref 3.5–5)
SODIUM BLD-SCNC: 144 MMOL/L (ref 132–146)

## 2022-08-27 PROCEDURE — 6370000000 HC RX 637 (ALT 250 FOR IP): Performed by: NURSE PRACTITIONER

## 2022-08-27 PROCEDURE — 2580000003 HC RX 258: Performed by: NURSE PRACTITIONER

## 2022-08-27 PROCEDURE — 6360000002 HC RX W HCPCS: Performed by: NURSE PRACTITIONER

## 2022-08-27 PROCEDURE — 36415 COLL VENOUS BLD VENIPUNCTURE: CPT

## 2022-08-27 PROCEDURE — 6360000002 HC RX W HCPCS

## 2022-08-27 PROCEDURE — 2500000003 HC RX 250 WO HCPCS: Performed by: NURSE PRACTITIONER

## 2022-08-27 PROCEDURE — 6360000002 HC RX W HCPCS: Performed by: INTERNAL MEDICINE

## 2022-08-27 PROCEDURE — 2140000000 HC CCU INTERMEDIATE R&B

## 2022-08-27 PROCEDURE — 36569 INSJ PICC 5 YR+ W/O IMAGING: CPT

## 2022-08-27 PROCEDURE — C1751 CATH, INF, PER/CENT/MIDLINE: HCPCS

## 2022-08-27 PROCEDURE — 80048 BASIC METABOLIC PNL TOTAL CA: CPT

## 2022-08-27 PROCEDURE — 2700000000 HC OXYGEN THERAPY PER DAY

## 2022-08-27 PROCEDURE — 99233 SBSQ HOSP IP/OBS HIGH 50: CPT | Performed by: INTERNAL MEDICINE

## 2022-08-27 PROCEDURE — 02HV33Z INSERTION OF INFUSION DEVICE INTO SUPERIOR VENA CAVA, PERCUTANEOUS APPROACH: ICD-10-PCS | Performed by: INTERNAL MEDICINE

## 2022-08-27 PROCEDURE — 94640 AIRWAY INHALATION TREATMENT: CPT

## 2022-08-27 PROCEDURE — 71045 X-RAY EXAM CHEST 1 VIEW: CPT

## 2022-08-27 PROCEDURE — 83735 ASSAY OF MAGNESIUM: CPT

## 2022-08-27 PROCEDURE — 76937 US GUIDE VASCULAR ACCESS: CPT

## 2022-08-27 RX ORDER — GUAIFENESIN 400 MG/1
400 TABLET ORAL 3 TIMES DAILY
Status: DISCONTINUED | OUTPATIENT
Start: 2022-08-27 | End: 2022-09-03 | Stop reason: HOSPADM

## 2022-08-27 RX ORDER — ALBUTEROL SULFATE 2.5 MG/3ML
2.5 SOLUTION RESPIRATORY (INHALATION) EVERY 6 HOURS
Status: DISCONTINUED | OUTPATIENT
Start: 2022-08-27 | End: 2022-08-30

## 2022-08-27 RX ORDER — FUROSEMIDE 10 MG/ML
20 INJECTION INTRAMUSCULAR; INTRAVENOUS ONCE
Status: COMPLETED | OUTPATIENT
Start: 2022-08-27 | End: 2022-08-27

## 2022-08-27 RX ADMIN — GUAIFENESIN 400 MG: 400 TABLET ORAL at 21:36

## 2022-08-27 RX ADMIN — CEFTRIAXONE 2000 MG: 2 INJECTION, POWDER, FOR SOLUTION INTRAMUSCULAR; INTRAVENOUS at 15:14

## 2022-08-27 RX ADMIN — FUROSEMIDE 20 MG: 10 INJECTION, SOLUTION INTRAMUSCULAR; INTRAVENOUS at 15:14

## 2022-08-27 RX ADMIN — CEFTRIAXONE 2000 MG: 2 INJECTION, POWDER, FOR SOLUTION INTRAMUSCULAR; INTRAVENOUS at 03:13

## 2022-08-27 RX ADMIN — GUAIFENESIN 400 MG: 400 TABLET ORAL at 15:14

## 2022-08-27 RX ADMIN — ARFORMOTEROL TARTRATE 15 MCG: 15 SOLUTION RESPIRATORY (INHALATION) at 20:50

## 2022-08-27 RX ADMIN — BUDESONIDE 500 MCG: 0.5 SUSPENSION RESPIRATORY (INHALATION) at 20:50

## 2022-08-27 RX ADMIN — AMPICILLIN SODIUM 2000 MG: 2 INJECTION, POWDER, FOR SOLUTION INTRAVENOUS at 06:48

## 2022-08-27 RX ADMIN — CLOTRIMAZOLE 10 MG: 10 LOZENGE ORAL at 17:00

## 2022-08-27 RX ADMIN — CLOTRIMAZOLE 10 MG: 10 LOZENGE ORAL at 12:12

## 2022-08-27 RX ADMIN — LIDOCAINE HYDROCHLORIDE 5 ML: 10 INJECTION, SOLUTION EPIDURAL; INFILTRATION; INTRACAUDAL; PERINEURAL at 16:18

## 2022-08-27 RX ADMIN — ALBUTEROL SULFATE 2.5 MG: 2.5 SOLUTION RESPIRATORY (INHALATION) at 13:25

## 2022-08-27 RX ADMIN — ACETAMINOPHEN 650 MG: 325 TABLET, FILM COATED ORAL at 19:54

## 2022-08-27 RX ADMIN — CLOTRIMAZOLE 10 MG: 10 LOZENGE ORAL at 08:16

## 2022-08-27 RX ADMIN — AMPICILLIN SODIUM 2000 MG: 2 INJECTION, POWDER, FOR SOLUTION INTRAVENOUS at 15:15

## 2022-08-27 RX ADMIN — ATORVASTATIN CALCIUM 20 MG: 20 TABLET, FILM COATED ORAL at 21:36

## 2022-08-27 RX ADMIN — GUAIFENESIN 400 MG: 400 TABLET ORAL at 10:47

## 2022-08-27 RX ADMIN — ALBUTEROL SULFATE 2.5 MG: 2.5 SOLUTION RESPIRATORY (INHALATION) at 20:50

## 2022-08-27 RX ADMIN — AMPICILLIN SODIUM 2000 MG: 2 INJECTION, POWDER, FOR SOLUTION INTRAVENOUS at 02:37

## 2022-08-27 RX ADMIN — SODIUM CHLORIDE, PRESERVATIVE FREE 10 ML: 5 INJECTION INTRAVENOUS at 21:36

## 2022-08-27 RX ADMIN — SODIUM CHLORIDE, PRESERVATIVE FREE 10 ML: 5 INJECTION INTRAVENOUS at 08:16

## 2022-08-27 RX ADMIN — ARFORMOTEROL TARTRATE 15 MCG: 15 SOLUTION RESPIRATORY (INHALATION) at 09:05

## 2022-08-27 RX ADMIN — MELATONIN 3 MG ORAL TABLET 3 MG: 3 TABLET ORAL at 21:36

## 2022-08-27 RX ADMIN — CLOTRIMAZOLE 10 MG: 10 LOZENGE ORAL at 21:36

## 2022-08-27 RX ADMIN — FUROSEMIDE 20 MG: 20 TABLET ORAL at 08:16

## 2022-08-27 RX ADMIN — AMPICILLIN SODIUM 2000 MG: 2 INJECTION, POWDER, FOR SOLUTION INTRAVENOUS at 10:16

## 2022-08-27 RX ADMIN — AMPICILLIN SODIUM 2000 MG: 2 INJECTION, POWDER, FOR SOLUTION INTRAVENOUS at 18:36

## 2022-08-27 RX ADMIN — AMPICILLIN SODIUM 2000 MG: 2 INJECTION, POWDER, FOR SOLUTION INTRAVENOUS at 21:40

## 2022-08-27 RX ADMIN — BUDESONIDE 500 MCG: 0.5 SUSPENSION RESPIRATORY (INHALATION) at 09:05

## 2022-08-27 RX ADMIN — IPRATROPIUM BROMIDE AND ALBUTEROL SULFATE 1 AMPULE: .5; 2.5 SOLUTION RESPIRATORY (INHALATION) at 09:05

## 2022-08-27 ASSESSMENT — PAIN SCALES - GENERAL
PAINLEVEL_OUTOF10: 3
PAINLEVEL_OUTOF10: 5
PAINLEVEL_OUTOF10: 3
PAINLEVEL_OUTOF10: 0
PAINLEVEL_OUTOF10: 6
PAINLEVEL_OUTOF10: 0

## 2022-08-27 ASSESSMENT — PAIN DESCRIPTION - ORIENTATION: ORIENTATION: RIGHT

## 2022-08-27 ASSESSMENT — PAIN - FUNCTIONAL ASSESSMENT: PAIN_FUNCTIONAL_ASSESSMENT: ACTIVITIES ARE NOT PREVENTED

## 2022-08-27 ASSESSMENT — PAIN DESCRIPTION - DESCRIPTORS: DESCRIPTORS: ACHING

## 2022-08-27 ASSESSMENT — PAIN DESCRIPTION - LOCATION: LOCATION: ARM

## 2022-08-27 NOTE — PROGRESS NOTES
[facility]                                Progress note    Subjective:    Patient visiting with wife   S/p pacer removal 8/25   feels great no acute complaints    Objective:    /71   Pulse 62   Temp 98.1 °F (36.7 °C) (Oral)   Resp 20   Ht 5' 6\" (1.676 m)   Wt 195 lb (88.5 kg)   SpO2 99%   BMI 31.47 kg/m²     In: 1380 [P.O.:1380]  Out: 2365   In: 1380   Out: 2365 [Urine:2365]    General appearance: NAD, conversant, looks much better today  HEENT: AT/NC, MMM  Neck: FROM, supple  Lungs: Clear to auscultation  CV: RRR, no MRGs-right-sided port removed 8/20, left-sided pacemaker removed   Vasc: Radial pulses 2+  Abdomen: Soft, non-tender; no masses or HSM  Extremities: No  or digital cyanosis, 2+ BLE edema   Skin: Generalized bruising  Psych: Alert and oriented to person, place and time  Neuro: Alert and interactive     Recent Labs     08/25/22  0544 08/25/22  1512 08/26/22  0528   WBC 5.3 7.6 22.3*   HGB 7.8* 7.7* 8.6*   HCT 25.1* 24.0* 27.1*    174 242       Recent Labs     08/25/22  1512 08/26/22  0528 08/27/22  0725    136 144   K 3.5 4.2 3.6    96* 104   CO2 23 27 29   BUN 5* 10 10   CREATININE 0.8 0.9 1.0   CALCIUM 8.3* 9.2 8.6       Assessment:    Principal Problem:    Pneumonia  Active Problems:    Pancytopenia ([address]    Pacemaker infection ([address]    Sepsis due to Enterococcus ([address]    Bacteremia    High-grade atrioventricular block    Second degree AV block, Mobitz type I    COPD (chronic obstructive pulmonary disease) (HCC)    HTN (hypertension)    Cardiac pacemaker in situ    Squamous carcinoma of lung ([address]    History pulmonary embolism ([address] on Eliquis  Resolved Problems:    * No resolved hospital problems.  *      Plan:  Patient is a 78-year-old male with a hx of squamous cell lung carcinoma who is active chemo therapy who is admitted to [facility] for  Sepsis/bacteremia/immunocompromise status-acutely ill status  -Mediport removed 8/20  -CTS consulted by EP for removal of pacer, completed 8/25  -Respiratory panel negative  -Check procalcitonin 0.42  -CT chest ordered by pulm> progressive infiltrative mass in the left perihilar region  -On daptomycin per infectious disease > switched to IV Rocephin and ampicillin  -General surgery > signed off   -SHIRA 8/22/2022-unremarkable, no evidence of endocarditis, ejection fraction  -Resume Trelegy as he takes at home-okay to use from home if okay with pulmonology  -Plans for pacemaker on Monday ep to place  -PICC placement today    Leukocytosis, likely reactive from surgery   -Monitor CBC daily  -WBC - Awaiting labs from today    Hypomagnesemia  -Mg+ 1.3 > replaced with 2 g  Labs pending    Pancytopenia  -Consult heme-onc-following  -h/h 6.9/21.4 > stable now in mid eights post transfusion  -Platelets 4 > transfuse 1 unit platelets > 23 > 955 today   -Hold Eliquis-consider holding off for few weeks  Thrombocytopenia resolved     Hematuria  -Check urine culture  -Hold Eliquis    DVT Prophylaxis PCD's  PT/OT  Discharge planning-will be able to be discharged once pacemaker is placed when okay with ID      LELO Peralta CNP  11:55 AM  8/27/2022     Above note edited to reflect my thoughts     I personally saw, examined and provided care for the patient. Radiographs, labs and medication list were reviewed by me independently. The case was discussed in detail and plans for care were established. Review of Kemi FELICIANO CNP, documentation was conducted and revisions were made as appropriate directly by me. I agree with the above documented exam, problem list, and plan of care.      Luisana Covington MD  7:00 PM  8/27/2022

## 2022-08-27 NOTE — PROGRESS NOTES
Subjective:  Chart reviewed, no overnight event  EP, placement of Pacemaker Monday    Objective:    BP (!) 161/92   Pulse 62   Temp 97.5 °F (36.4 °C)   Resp 24   Ht 5' 6\" (1.676 m)   Wt 195 lb (88.5 kg)   SpO2 90% Comment: found sitting at side of bed washing up with oxygen off  BMI 31.47 kg/m²     Blood cultures no growth from 8/25, 8/23 or pacemaker to date    CBC with Differential:    Lab Results   Component Value Date/Time    WBC 22.3 08/26/2022 05:28 AM    RBC 2.86 08/26/2022 05:28 AM    HGB 8.6 08/26/2022 05:28 AM    HCT 27.1 08/26/2022 05:28 AM     08/26/2022 05:28 AM    MCV 94.8 08/26/2022 05:28 AM    MCH 30.1 08/26/2022 05:28 AM    MCHC 31.7 08/26/2022 05:28 AM    RDW 20.4 08/26/2022 05:28 AM    NRBC 1.8 08/25/2022 05:44 AM    BLASTSPCT 0.9 08/24/2022 05:22 AM    METASPCT 3.5 08/26/2022 05:28 AM    LYMPHOPCT 1.7 08/26/2022 05:28 AM    PROMYELOPCT 0.9 08/24/2022 05:22 AM    MONOPCT 7.0 08/26/2022 05:28 AM    MYELOPCT 0.9 09/17/2021 10:20 AM    BASOPCT 0.9 08/26/2022 05:28 AM    MONOSABS 1.56 08/26/2022 05:28 AM    LYMPHSABS 0.45 08/26/2022 05:28 AM    EOSABS 0.00 08/26/2022 05:28 AM    BASOSABS 0.00 08/26/2022 05:28 AM     CMP:    Lab Results   Component Value Date/Time     08/27/2022 07:25 AM    K 3.6 08/27/2022 07:25 AM    K 3.7 08/17/2022 04:17 AM     08/27/2022 07:25 AM    CO2 29 08/27/2022 07:25 AM    BUN 10 08/27/2022 07:25 AM    CREATININE 1.0 08/27/2022 07:25 AM    GFRAA >60 08/27/2022 07:25 AM    LABGLOM >60 08/27/2022 07:25 AM    GLUCOSE 121 08/27/2022 07:25 AM    PROT 5.9 08/20/2022 04:44 PM    LABALBU 3.2 08/20/2022 04:44 PM    CALCIUM 8.6 08/27/2022 07:25 AM    BILITOT <0.2 08/20/2022 04:44 PM    ALKPHOS 90 08/20/2022 04:44 PM    AST 25 08/20/2022 04:44 PM    ALT 29 08/20/2022 04:44 PM               Current Facility-Administered Medications:     albuterol (PROVENTIL) nebulizer solution 2.5 mg, 2.5 mg, Nebulization, Q6H, Angeline Nestle, APRN - CNP    guaiFENesin tablet 400 mg, 400 mg, Oral, TID, Charles Meyer, APRN - CNP, 400 mg at 08/27/22 1047    Arformoterol Tartrate (BROVANA) nebulizer solution 15 mcg, 15 mcg, Nebulization, BID, Carmelina Koch, APRN - CNP, 15 mcg at 08/27/22 0905    budesonide (PULMICORT) nebulizer suspension 500 mcg, 0.5 mg, Nebulization, BID, Rose Parrish, APRN - CNP, 500 mcg at 08/27/22 0905    lidocaine PF 1 % injection 5 mL, 5 mL, IntraDERmal, Once, Jimi Marcos, APRN - CNP    sodium chloride flush 0.9 % injection 5-40 mL, 5-40 mL, IntraVENous, 2 times per day, Jimi Marcos, APRN - CNP, 10 mL at 08/27/22 0816    sodium chloride flush 0.9 % injection 5-40 mL, 5-40 mL, IntraVENous, PRN, Jimi Crowleyel APRN - CNP    0.9 % sodium chloride infusion, , IntraVENous, PRN, Jimi Marcos, APRN - CNP    heparin flush 100 UNIT/ML injection 300 Units, 3 mL, IntraVENous, 2 times per day, Jimi Marcos, APRN - CNP    heparin flush 100 UNIT/ML injection 300 Units, 3 mL, IntraCATHeter, PRN, Jimi Marcos, APRN - CNP    morphine (PF) injection 2 mg, 2 mg, IntraVENous, Q4H PRN, Dois Loan, APRN - CNP    sodium chloride (OCEAN, BABY AYR) 0.65 % nasal spray 1 spray, 1 spray, Each Nostril, PRN, Dois Loan, APRN - CNP    trimethobenzamide (TIGAN) injection 200 mg, 200 mg, IntraMUSCular, Q6H PRN, Dois Loan, APRN - CNP    oxyCODONE-acetaminophen (PERCOCET) 5-325 MG per tablet 1 tablet, 1 tablet, Oral, Q4H PRN **OR** oxyCODONE-acetaminophen (PERCOCET) 5-325 MG per tablet 2 tablet, 2 tablet, Oral, Q4H PRN, Dois Loan, APRN - CNP    hydrALAZINE (APRESOLINE) injection 10 mg, 10 mg, IntraVENous, Q6H PRN, LELO Cox - CNP    clotrimazole [facility]) lindsay 10 mg, 10 mg, Oral, 4x daily, LELO Cox CNP, 10 mg at 08/27/22 0816    albuterol sulfate HFA (PROVENTIL;VENTOLIN;PROAIR) 108 (90 Base) MCG/ACT inhaler 2 puff, 2 puff, Inhalation, Q6H PRN, LELO Cox CNP    furosemide (LASIX) tablet 20 mg, 20 mg, Oral, Daily, Jose Cruz Hernandez, LELO - CNP, 20 mg at 08/27/22 0816    potassium chloride (KLOR-CON M) extended release tablet 40 mEq, 40 mEq, Oral, PRN **OR** potassium bicarb-citric acid (EFFER-K) effervescent tablet 40 mEq, 40 mEq, Oral, PRN **OR** potassium chloride 10 mEq/100 mL IVPB (Peripheral Line), 10 mEq, IntraVENous, PRN, Freda Pacini, APRN - CNP    ampicillin 2000 mg ivpb mini bag, 2,000 mg, IntraVENous, 6 times per day, Freda Pacini, APRN - CNP, Stopped at 08/27/22 1047    cefTRIAXone (ROCEPHIN) 2,000 mg in sterile water 20 mL IV syringe, 2,000 mg, IntraVENous, Q12H, Freda Pacini, APRN - CNP, 2,000 mg at 08/27/22 3580    melatonin tablet 3 mg, 3 mg, Oral, Nightly PRN, Freda Pacini, APRN - CNP, 3 mg at 08/26/22 2147    albuterol (PROVENTIL) nebulizer solution 2.5 mg, 2.5 mg, Nebulization, Q6H PRN, Freda Pacini, APRN - CNP    albuterol (ACCUNEB) nebulizer solution 0.63 mg, 1 ampule, Nebulization, Q6H PRN, Freda Pacini, APRN - CNP    [Held by provider] apixaban (ELIQUIS) tablet 5 mg, 5 mg, Oral, BID, Tiffanie E Kaercher, DO    [Held by provider] metoprolol succinate (TOPROL XL) extended release tablet 50 mg, 50 mg, Oral, BID WC, Tiffanie E Kaercher, DO, 50 mg at 08/24/22 0857    atorvastatin (LIPITOR) tablet 20 mg, 20 mg, Oral, Daily, Freda Pacini, APRN - CNP, 20 mg at 08/26/22 2147    sodium chloride flush 0.9 % injection 5-40 mL, 5-40 mL, IntraVENous, 2 times per day, Freda Pacini, APRN - CNP, 10 mL at 08/26/22 0957    sodium chloride flush 0.9 % injection 5-40 mL, 5-40 mL, IntraVENous, PRN, Freda Pacini, APRN - CNP    0.9 % sodium chloride infusion, , IntraVENous, PRN, LELO Drew CNP    acetaminophen (TYLENOL) tablet 650 mg, 650 mg, Oral, Q6H PRN, 650 mg at 08/26/22 0322 **OR** acetaminophen (TYLENOL) suppository 650 mg, 650 mg, Rectal, Q6H PRN, LELO Drew CNP    senna (SENOKOT) tablet 8.6 mg, 1 tablet, Oral, Daily PRN, LELO Drew CNP    Assessment:    Principal Problem: Pneumonia  Active Problems:    Pancytopenia ([address]    Pacemaker infection ([address]    Sepsis due to Enterococcus ([address]    Bacteremia    High-grade atrioventricular block    Second degree AV block, Mobitz type I    COPD (chronic obstructive pulmonary disease) (HCC)    HTN (hypertension)    Cardiac pacemaker in situ    Squamous carcinoma of lung ([address]    History pulmonary embolism ([address] on Eliquis  Resolved Problems:    * No resolved hospital problems. 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 Treated with concurrent chemo/XRT from 7/6/21 followed by consolidation Imfinzi. Progression on PET from 6/24/22. Started August Gola and Keytruda on 6/30/22. Patient seen in office last 8/9 and received C2D8 of Carboplatin with Gemzar and Keytruda. Admitted with sepsis, Enterococcus bacteremia. Respiratory panel negative. Mediport was removed 8/20    Plan:  CBC diff plt - pending  Leukocytosis, s/p filgrastim likely causing  Thrombocytopenia resolved  Anemia, prefer to keep hemoglobin above 7.5 g/dL due to poor pulmonary reserves  No transfusion PRBC indicated today but would advise a postop CBC. Enterococcus bacteremia: Status post port removal.  On broad-spectrum IV antibiotics. Followed by ID. Pacemaker to be removed 8/25, cultures pending  Metastatic non-small cell lung cancer: Treatment is currently on hold. Patient will need PORT replaced as well    Follow-up with Dr. Kera Bal in 2 weeks after discharge.     Electronically signed by MATTHEW Sow on 8/27/2022 at 10:54 AM

## 2022-08-27 NOTE — PROGRESS NOTES
overnight. Continues with stable heart rates what appears wenckebach vs 2:1 AV block     8/26/22: stable, family in room. He is up at side of bed, pain is tolerable, controlled. Left chest incision from extraction site stable. He continues to have 2:1 block vs wenckebach on monitor, stable rates 50-70 bpm. Denies dizziness or lightheadedness. 8/27/22: Feeling well. Reports some dizziness. Monitor showed sinus tachycardia with second degree AV block type I, 2 to 1 AV block and intermittent high grade AV block without any significant pauses.     Patient Active Problem List    Diagnosis Date Noted    Bacteremia 08/23/2022     Priority: Medium    Pacemaker infection ([address] 08/22/2022     Priority: Medium    Sepsis due to Enterococcus ([address] 08/22/2022     Priority: Medium    Pancytopenia ([address] 08/17/2022     Priority: Medium    COVID-19 05/25/2022     Priority: Medium    Squamous carcinoma of lung ([address] 09/18/2021    History pulmonary embolism ([address] on Eliquis 09/18/2021    Hyponatremia 09/18/2021    Hypomagnesemia 09/18/2021    Wide-complex tachycardia ([address] 09/18/2021    Acute on chronic respiratory failure with hypoxia ([address] 09/17/2021    Cardiac pacemaker in situ 07/20/2021    Intermittent complete heart block ([address] 07/19/2021    Pneumonia 01/31/2019    COPD (chronic obstructive pulmonary disease) ([address] 01/31/2019    BPH (benign prostatic hyperplasia) 01/31/2019    HTN (hypertension) 01/31/2019    HLD (hyperlipidemia) 01/31/2019    Syncope and collapse 01/18/2019       Past Medical History:   Diagnosis Date    CHB (complete heart block) ([address] 07/2021    COPD (chronic obstructive pulmonary disease) ([address]     Syncope 09/2020    dr Radhika Woodruff wearing a monitor    Syncope 07/2021       Family History   Problem Relation Age of Onset    Cancer Father         prostate    Cancer Paternal Uncle         anal       Social History     Tobacco Use    Smoking status: Former     Packs/day: 2.00     Years: 25.00     Pack years: 50.00 Types: Cigarettes     Quit date: 12/26/2011     Years since quitting: 10.6    Smokeless tobacco: Never   Substance Use Topics    Alcohol use: Not Currently     Alcohol/week: 1.0 standard drink     Types: 1 Glasses of wine per week     Comment: 1 glass of wine per day prior       Current Facility-Administered Medications   Medication Dose Route Frequency Provider Last Rate Last Admin    Arformoterol Tartrate (BROVANA) nebulizer solution 15 mcg  15 mcg Nebulization BID Venecia Roger, APRN - CNP   15 mcg at 08/26/22 1958    budesonide (PULMICORT) nebulizer suspension 500 mcg  0.5 mg Nebulization BID Venecia Roger, APRN - CNP   500 mcg at 08/26/22 1958    lidocaine PF 1 % injection 5 mL  5 mL IntraDERmal Once Yvette Amalia, APRN - CNP        sodium chloride flush 0.9 % injection 5-40 mL  5-40 mL IntraVENous 2 times per day [place] Am[place], APRN - CNP   10 mL at 08/26/22 2150    sodium chloride flush 0.9 % injection 5-40 mL  5-40 mL IntraVENous PRN Yvette Amalia, APRN - CNP        0.9 % sodium chloride infusion   IntraVENous PRN Yvette Amalia, APRN - CNP        heparin flush 100 UNIT/ML injection 300 Units  3 mL IntraVENous 2 times per day [place] Amalia, APRN - CNP        heparin flush 100 UNIT/ML injection 300 Units  3 mL IntraCATHeter PRN [place] Amalia, APRN - CNP        ipratropium-albuterol (DUONEB) nebulizer solution 1 ampule  1 ampule Inhalation Once [employer], DO        morphine (PF) injection 2 mg  2 mg IntraVENous Q4H PRN Casie Collins, APRN - CNP        sodium chloride (OCEAN, BABY AYR) 0.65 % nasal spray 1 spray  1 spray Each Nostril PRN Casie Collins, APRN - CNP        trimethobenzamide Kamlesh Picking) injection 200 mg  200 mg IntraMUSCular Q6H PRN Casie Collins, APRN - CNP        oxyCODONE-acetaminophen (PERCOCET) 5-325 MG per tablet 1 tablet  1 tablet Oral Q4H PRN Casie Collins, APRN - CNP        Or    oxyCODONE-acetaminophen (PERCOCET) 5-325 MG per tablet 2 tablet  2 tablet Oral Q4H PRN Nate Nichole Atif Jarvis, APRN - CNP        hydrALAZINE (APRESOLINE) injection 10 mg  10 mg IntraVENous Q6H PRN Philemon Gunnar, APRN - CNP        clotrimazole [facility]) lindsay 10 mg  10 mg Oral 4x daily Philemon [place], APRN - CNP   10 mg at 08/26/22 2147    albuterol sulfate HFA (PROVENTIL;VENTOLIN;PROAIR) 108 (90 Base) MCG/ACT inhaler 2 puff  2 puff Inhalation Q6H PRN Philemon Gunnar, APRN - CNP        furosemide (LASIX) tablet 20 mg  20 mg Oral Daily Philemon [place], APRN - CNP   20 mg at 08/26/22 0958    potassium chloride (KLOR-CON M) extended release tablet 40 mEq  40 mEq Oral PRN Philemon Gunnar, APRN - CNP        Or    potassium bicarb-citric acid (EFFER-K) effervescent tablet 40 mEq  40 mEq Oral PRN Philemon Gunnar, APRN - CNP        Or    potassium chloride 10 mEq/100 mL IVPB (Peripheral Line)  10 mEq IntraVENous PRN Philemon [place], APRN - CNP        ampicillin 2000 mg ivpb mini bag  2,000 mg IntraVENous 6 times per day Philemon Gunnar, APRN - CNP   Stopped at 08/27/22 5430    cefTRIAXone (ROCEPHIN) 2,000 mg in sterile water 20 mL IV syringe  2,000 mg IntraVENous Q12H Philemon Gunnar, APRN - CNP   2,000 mg at 08/27/22 0791    melatonin tablet 3 mg  3 mg Oral Nightly PRN Philemon [place], APRN - CNP   3 mg at 08/26/22 2147    albuterol (PROVENTIL) nebulizer solution 2.5 mg  2.5 mg Nebulization Q6H PRN Philemon Gunnar, APRN - CNP        albuterol (ACCUNEB) nebulizer solution 0.63 mg  1 ampule Nebulization Q6H PRN Philemon Gunnar, APRN - CNP        [Held by provider] apixaban (ELIQUIS) tablet 5 mg  5 mg Oral BID Tiffanie E Kaercher, DO        [Held by provider] metoprolol succinate (TOPROL XL) extended release tablet 50 mg  50 mg Oral BID WC Tiffanie E Kaercher, DO   50 mg at 08/24/22 0857    atorvastatin (LIPITOR) tablet 20 mg  20 mg Oral Daily LELO Cade CNP   20 mg at 08/26/22 2147    sodium chloride flush 0.9 % injection 5-40 mL  5-40 mL IntraVENous 2 times per day LELO Cade CNP   10 mL at 08/26/22 59 sodium chloride flush 0.9 % injection 5-40 mL  5-40 mL IntraVENous PRN Courtney Pickup, APRN - CNP        0.9 % sodium chloride infusion   IntraVENous PRN Courtney Pickup, APRN - CNP        acetaminophen (TYLENOL) tablet 650 mg  650 mg Oral Q6H PRN Courtney Pickup, APRN - CNP   650 mg at 08/26/22 2603    Or    acetaminophen (TYLENOL) suppository 650 mg  650 mg Rectal Q6H PRN Courtney Pickup, APRN - CNP        senna (SENOKOT) tablet 8.6 mg  1 tablet Oral Daily PRN Courtney Pickup, APRN - CNP        ipratropium-albuterol (DUONEB) nebulizer solution 1 ampule  1 ampule Inhalation Q4H [place] Courtney Pickup, APRN - CNP   1 ampule at 08/26/22 1958        No Known Allergies    ROS:   Constitutional: Negative for fever. Positive for activity change and negative for appetite change. HENT: Negative for epistaxis. Eyes: Negative for diploplia, blurred vision. Respiratory: Negative for cough, chest tightness, shortness of breath and wheezing. Cardiovascular: pertinent positives in HPI  Gastrointestinal: Negative for abdominal pain and blood in stool. All other review of systems are negative       PHYSICAL EXAM:   Vitals:    08/26/22 2013 08/26/22 2352 08/27/22 0501 08/27/22 0735   BP: (!) 159/62 (!) 129/59  132/84   Pulse: 51 54  66   Resp: 15 18  20   Temp: 97.9 °F (36.6 °C) 97.4 °F (36.3 °C)  97.5 °F (36.4 °C)   TempSrc: Oral Temporal     SpO2: 93% 98%  99%   Weight:   195 lb (88.5 kg)    Height:          Constitutional: Well-developed, no acute distress  Eyes: conjunctivae normal, no xanthelasma   Ears, Nose, Throat: oral mucosa moist, no cyanosis   CV: no JVD. Regular rate and irregular rhythm. Normal S1S2 and no S3. No murmurs, rubs, or gallops. Lungs: decreased bases bilaterally, normal respiratory effort without used of accessory muscles  Abdomen: soft, non-tender, non distended  Musculoskeletal: no digital clubbing, +2 pitting BLE edema   Skin: warm, no rashes.  Left chest wall extraction site glued, Small right side chest wall incision site well approximated also, no swelling or hemtatoma     Data:    Recent Labs     08/25/22  0544 08/25/22  1512 08/26/22  0528   WBC 5.3 7.6 22.3*   HGB 7.8* 7.7* 8.6*   HCT 25.1* 24.0* 27.1*    174 242     Recent Labs     08/25/22  0544 08/25/22  1512 08/26/22  0528    142 136   K 3.3* 3.5 4.2    106 96*   CO2 27 23 27   BUN 5* 5* 10   CREATININE 0.9 0.8 0.9   CALCIUM 8.6 8.3* 9.2        Lab Results   Component Value Date/Time    MG 2.1 08/26/2022 05:28 AM     No results for input(s): TSH in the last 72 hours. Recent Labs     08/24/22  1008   INR 1.2     Telemetry 08/27/22  Sinus rhythm and sinu tachycardia with second degree AV block Mobitz type I, 2: 1 AV blcok and high grade AV block  bpm    EKG:        SHIRA 8/22/22   Summary   No SHIRA indication of Endocarditis. Normal left ventricular chamber size and systolic function. Interatrial septum appears intact. Normal right ventricle size and function. Pacemaker leads noted in right atrium and right ventricle. Normal aortic root size. Moderate atherosclerosis in the descending thoracic aorta. Epicardial fat vs. small pericardial effusion. No intra cardiac mass or thrombus. Technically sub-optimal images. Compared to prior sub-optimal TTE from 8/19/22. Signature    ----------------------------------------------------------------   Electronically signed by Geetha Medina MD(Interpreting   physician) on 08/22/2022 06:50 PM    Echocardiogram: 8/19/22   Summary   Normal left ventricular chamber size. Normal left ventricular systolic function. Visually estimated LVEF is 55-60 %. No wall motion abnormalities. Normal diastolic function. Normal left atrial pressure. Normal right ventricle structure and function. Normal left atrial size. Normal right atrial size   Likely normal estimated PA pressure. Pacer/ ICD wire present in the right heart. No gross evidence of infective endocarditis. Consider SHIRA for better   accuracy if clinically indicated. Compared to prior echo from 2021, no significant changes noted. Signature    ----------------------------------------------------------------   Electronically signed by Jose L Chino MD(Interpreting   physician) on 08/19/2022 04:16 PM    Device Interrogation 8/23/22  ~ 2 pm:   [place]   Mode: DDI 60; changed to VVI 40 bpm   Battery Voltage/Longevity:  3.01V, 5.9-8.6 years     Pacing: A: 27%   RV: 25%   P wave: >5 mV  Impedance: 380 ohms   Threshold: 1.0 V @ 0.5 ms  RV R wave: 10.6 mV  Impedance: 410 ohms   Threshold: 1.5 V @ 0.5 ms  Episodes: AT/AF detections ranging from 12 seconds to 8:44 minutes. June 18-most recent episode on Aug 20   Reprogramming included: changed to VVI 40   Overall device function is normal    8/24/22 pacemaker check   Mode: VVI 40   Pacing:  RV: <1%       Assessment/Plan:      1. Dual-chamber pacemaker in situ  - Initial indication for AV block with a long pause/ ? CHB in past on ILR.   - Date of implant July 28, 2021  - Pacemaker check in June revealed 6.5% atrial and 3% ventricular pacing with DDI programming at lower rate of 60 bpm.  - Initial interrogation during thi admission on 8/23/22 showed 27% AV and 25% RV paced.  - Noted bifascicular block with 2:1 block and second degree AV block type I on tele when pacer set at VVI 40 with RV paced <1%. - On Toprol 50 mg bid which held since 8/24/22   - Underwent full extraction on 8/25/22   - Currently in intermittent second degree AV block type I, 2 to 1 AV block and high grade AV block with HR  bpm and no significant pause seen. - Plan for MICRA AV leadless pacemaker implantation on Monday.      2.  Bacteremia/sepsis--enterococcus faecalis  - Probably port infection per ID; mediport removed 8/20/22   - Persistent bacteremia despite IV antibiotics  8-16-22 blood cultures E faecalis x 2  8-17-22 blood cultures E faecalis x 1  8-18-22 blood culture + E faecalis x1  8-19-22 blood cultures neg to date  8-20-22 Port ( tip ) cx - neg tod date   8-20-22 blood cultures neg to date  8-23-22 blood cultures neg to date  8-25-22 blood cultures neg to date   - On IV Ampicillin and Rocephin  -ID is OK for MICRA AV leadless pacemaker implantation on Monday. 3.  Lung cancer, squamous cell--patient undergoing chemotherapy  -  Stage IIIB T2aN3 Squamous Cell Carcinoma of Lung  diagnosis 5/27/2021, PD-L1 0%    - 7/6/21-9/7/21 treatment with concurrent chemotherapy/radiation therapy   - 11/2/21 consolidated with Imfinzi (Durvalumab) per ID IV every 4 weeks   - 6/30/22 PET showing progression and was started on Carbo, Gemzar and Keytruda on 6/30/22  - 8/9 /22 received C2D8 of Carboplatin AUC 2 with Gemzar 1000 mg/m2 IV on D1,8 and Keytruda IV on D1 on a 21 day cycle    4. Neutropenia and thrombocytopenia related to chemotherapy  - Improved. 5. Persistent anemia.    - Post PRBC transfusion 8/21/22  - Improved    6. History of PE   - Diagnosed in 2021   - On Eliquis in the past and currently on hold. Recommendations:    Plan for Micra AV leadless pacemaker insertion on Monday if ok with ID   Continue to hold Toprol XL and Eliquis. Monitor on telemetry. Thank you for allowing me to participate in your patient's care. Please call me if there are any questions or concerns. I have spent a total of 35 minutes with the patient and the family reviewing the above stated recommendations. And a total of >50% of that time involved face-to-face time providing counseling and/or coordination of care with the other providers, reviewing records/tests, counseling/education of the patient, ordering medications/tests/procedures, coordinating care, and documenting clinical information in the EHR.      Andrew Costa MD  Cardiac Electrophysiology  [facility] [place] [place]) Physicians  Electrophysiology  7:48 AM  8/27/2022

## 2022-08-27 NOTE — PATIENT CARE CONFERENCE
P Quality Flow/Interdisciplinary Rounds Progress Note        Quality Flow Rounds held on August 27, 2022    Disciplines Attending:  Bedside Nurse and Nursing Unit Leadership    Natan Remy was admitted on 8/16/2022  2:38 PM    Anticipated Discharge Date:       Disposition:    Kojo Score:  Kojo Scale Score: 21    Readmission Risk              Risk of Unplanned Readmission:  24           Discussed patient goal for the day, patient clinical progression, and barriers to discharge.   The following Goal(s) of the Day/Commitment(s) have been identified:  Labs - Report Results      Chuck Ralph RN  August 27, 2022

## 2022-08-27 NOTE — PROGRESS NOTES
[facility] Pulmonary Progress Note  ERICA  Admit Date: 8/16/2022                            PCP: Ghada Montoya MD  Principal Problem:    Pneumonia  Active Problems:    Pancytopenia ([facility]    Pacemaker infection ([facility]    Sepsis due to Enterococcus ([facility]    Bacteremia    High-grade atrioventricular block    Second degree AV block, Mobitz type I    COPD (chronic obstructive pulmonary disease) ([facility]    HTN (hypertension)    Cardiac pacemaker in situ    Squamous carcinoma of lung ([facility]    History pulmonary embolism ([facility] on Eliquis  Resolved Problems:    * No resolved hospital problems. *      Subjective:  Resting on 3lNC- sats 93%. C/o dyspnea with exertion like going to [place], cough with brown.  No wz       Medications:   sodium chloride      sodium chloride          Arformoterol Tartrate  15 mcg Nebulization BID    budesonide  0.5 mg Nebulization BID    lidocaine PF  5 mL IntraDERmal Once    sodium chloride flush  5-40 mL IntraVENous 2 times per day    heparin flush  3 mL IntraVENous 2 times per day    ipratropium-albuterol  1 ampule Inhalation Once    clotrimazole  10 mg Oral 4x daily    furosemide  20 mg Oral Daily    ampicillin IV  2,000 mg IntraVENous 6 times per day    cefTRIAXone (ROCEPHIN) IV  2,000 mg IntraVENous Q12H    [Held by provider] apixaban  5 mg Oral BID    [Held by provider] metoprolol succinate  50 mg Oral BID WC    atorvastatin  20 mg Oral Daily    sodium chloride flush  5-40 mL IntraVENous 2 times per day    ipratropium-albuterol  1 ampule Inhalation Q4H WA       Vitals:  VITALS:  BP (!) 161/92   Pulse 62   Temp 97.5 °F (36.4 °C)   Resp 24   Ht 5' 6\" (1.676 m)   Wt 195 lb (88.5 kg)   SpO2 90% Comment: found sitting at side of bed washing up with oxygen off  BMI 31.47 kg/m²   24HR INTAKE/OUTPUT:    Intake/Output Summary (Last 24 hours) at 8/27/2022 1019  Last data filed at 8/27/2022 1003  Gross per 24 hour   Intake 1020 ml   Output 2365 ml   Net -1345 ml       CURRENT PULSE OXIMETRY:  SpO2: 90 % (found sitting at side of bed washing up with oxygen off)  24HR PULSE OXIMETRY RANGE:  SpO2  Av %  Min: 90 %  Max: 99 %  CVP:    VENT SETTINGS:      Additional Respiratory Assessments  Heart Rate: 62  Resp: 24  SpO2: 90 % (found sitting at side of bed washing up with oxygen off)      EXAM:  General: No distress. Alert. On 3lnc   ENT: No discharge. Pharynx clear. Neck: Trachea midline. Resp: No accessory muscle use. No crackles. Clear bilaterally. Diminished   CV: Regular rate. Regular rhythm. No mumur or rub. +2/3 pitting BLE edema  ABD: Non-tender. Non-distended. Normal bowel sounds. Obese   Skin: Warm and dry. M/S: hawk  Neuro: Aox4    I/O: I/O last 3 completed shifts: In: 1140 [P.O.:1140]  Out: 2365 [Urine:2365]  I/O this shift: In: 480 [P.O.:480]  Out: 100 [Urine:100]     Results:  CBC:   Recent Labs     22  0544 22  1512 22  0528   WBC 5.3 7.6 22.3*   HGB 7.8* 7.7* 8.6*   HCT 25.1* 24.0* 27.1*   MCV 91.6 93.0 94.8    174 242       BMP:   Recent Labs     22  1512 22  0528 22  0725    136 144   K 3.5 4.2 3.6    96* 104   CO2 23 27 29   BUN 5* 10 10   CREATININE 0.8 0.9 1.0       LFT:   No results for input(s): ALKPHOS, ALT, AST, PROT, BILITOT, BILIDIR, LABALBU in the last 72 hours. PT/INR:   No results for input(s): PROTIME, INR in the last 72 hours. Cultures:  No results for input(s): CULTRESP in the last 72 hours. ABG:   No results for input(s): PH, PO2, PCO2, HCO3, BE, O2SAT in the last 72 hours. Films:  CT Head WO Contrast  Result Date: 2022  No acute intracranial abnormality. Cortical atrophy and periventricular leukomalacia. XR CHEST PORTABLE  Result Date: 2022  Continued opacity in the left lower lung and linear scarring in the left perihilar region.  COPD.     22 CT chest w/o    2022 CTA chest       2022 CTA chest: .34 x 5.4 cm left hilar and perihilar mass extending to LLL encircling the pulmonary artery and the bronchi consistent with progressive malignancy with occlusion of left lower lobe bronchi with postobstructive pneumonitis    08/19/22 ECHO:  Normal left ventricular chamber size. Normal left ventricular systolic function. Visually estimated LVEF is 55-60 %. No wall motion abnormalities. Normal diastolic function. Normal left atrial pressure. Normal right ventricle structure and function. Normal left atrial size. Normal right atrial size   Likely normal estimated PA pressure. Pacer/ ICD wire present in the right heart. No gross evidence of infective endocarditis. Consider SHIRA for better   accuracy if clinically indicated. Compared to prior echo from 2021, no significant changes noted. SHIRA on 8/22/22   No SHIRA indication of Endocarditis. Normal left ventricular chamber size and systolic function. Interatrial septum appears intact. Normal right ventricle size and function. Pacemaker leads noted in right atrium and right ventricle. Normal aortic root size. Moderate atherosclerosis in the descending thoracic aorta. Epicardial fat vs. small pericardial effusion. No intra cardiac mass or thrombus. Technically sub-optimal images. Compared to prior sub-optimal TTE from 8/19/22. 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 8/18/2022 CT chest w/o:  The previously noted infiltrative left hilar mass extending to EAN and LLL surrounding the broncho pulmonary vasculature is noted currently measuring 4.6 x 4.3 cm with progression with atelectasis. 8/18: 2L NC, US BLE neg DVT   8/19: 2L NC   8/20 RA, Mediport removed   8/21 RA received PRBC    8/22 2.5L; SHIRA neg for endocarditis   8/23: RA;    8/24: 3L NC 99%  8/25: 3L NC 98%. Pacer removed by CTS/EP  8/26: room air 97%  8/27 on 3lnc    Stage IIIB T2aN3 Squamous Cell Carcinoma of Lung  diagnosis 5/27/2021, PD-L1 0% ( 7/6/21-9/7/21 treatment with concurrent chemotherapy/radiation therapy; 11/2/21 consolidated with Imfinzi (Durvalumab) per ID IV every 4 weeks; 6/30/22 PET showing progression and was started on Carbo, Gemzar and Keytruda on 6/30/22; 8/9 /22 r ceived C2D8 of Carboplatin AUC 2 with Gemzar 1000 mg/m2 IV on D1,8 and Keytruda IV on D1 on a 21 day cycle)  Enterococcus faecalis bacteremia/septicemia on IV daptomycin for bacteremia cultures + on 8/16, 8/17, 8/18 s/p mediport removal    With dual-chamber pacemaker with h/o wide-complex tachycardia (HCC)--atrial tachycardia with RV pacing  H/O SARS-CoV 2 Pneumonitis 5/24/2022  Chronic hypoxic resp failure, RA during the day and 2L NC at night  Probable obstructive sleep apnea  Persistent pancytopenia secondary to chemotherapy   BLE swelling and numbness  H/O RLL subsegmental PE- CTA Chest 9/17/2021 was on eliquis now on hold   Radiation pneumonitis/fibrosis left lung   COPD, not in exacerbation, uses trelegy at home      PLAN:   On 3LNC, wean O2   Continue Brovana, Pulmicort   Will change DuoNebs to albuterol and add mucinex to thin secretions  Oncology following. IV ATB per ID  Continue IS for pulmonary hygiene   S/p pacer removal on 8/25   To resume Eliquis when ok with CTS/EP  On PO lasix     Electronically signed by LELO Allen - CNP    Seen and examined, agree with above except with marked ble edema and dyspnea, will give iv furosemide 20 mg once. Continue nebs as is. I do not want to add steroids at this point.

## 2022-08-27 NOTE — PROGRESS NOTES
[address] [place] [address] [facility]  Progress Note          C/C : High grade Enterococcus bacteremia, sepsis       SUBJECTIVE:  Patient is tolerating medications. No reported adverse drug reactions. No nausea, vomiting, diarrhea. SOB with exertion   Denies fever or chills   Afebrile   S/p pacer removal   Doing okay  Sitting at edge of bed, some cough modestly productive  Wife is present  Appetite good  Review of systems:  As stated above in the chief complaint, otherwise negative.     Medications:  Scheduled Meds:   albuterol  2.5 mg Nebulization Q6H    guaiFENesin  400 mg Oral TID    furosemide  20 mg IntraVENous Once    Arformoterol Tartrate  15 mcg Nebulization BID    budesonide  0.5 mg Nebulization BID    lidocaine PF  5 mL IntraDERmal Once    sodium chloride flush  5-40 mL IntraVENous 2 times per day    heparin flush  3 mL IntraVENous 2 times per day    clotrimazole  10 mg Oral 4x daily    furosemide  20 mg Oral Daily    ampicillin IV  2,000 mg IntraVENous 6 times per day    cefTRIAXone (ROCEPHIN) IV  2,000 mg IntraVENous Q12H    [Held by provider] apixaban  5 mg Oral BID    [Held by provider] metoprolol succinate  50 mg Oral BID WC    atorvastatin  20 mg Oral Daily    sodium chloride flush  5-40 mL IntraVENous 2 times per day     Continuous Infusions:   sodium chloride      sodium chloride       PRN Meds:sodium chloride flush, sodium chloride, heparin flush, morphine, sodium chloride, trimethobenzamide, oxyCODONE-acetaminophen **OR** oxyCODONE-acetaminophen, hydrALAZINE, albuterol sulfate HFA, potassium chloride **OR** potassium alternative oral replacement **OR** potassium chloride, melatonin, albuterol, albuterol, sodium chloride flush, sodium chloride, acetaminophen **OR** acetaminophen, senna    OBJECTIVE:  /71   Pulse 62   Temp 98.1 °F (36.7 °C) (Oral)   Resp 20   Ht 5' 6\" (1.676 m)   Wt 195 lb (88.5 kg)   SpO2 99%   BMI 31.47 kg/m²   Temp  Av.8 °F (36.6 °C)  Min: 97.4 °F (36.3 °C)  Max: 98.1 °F (36.7 °C)    Constitutional: The patient is awake, alert, and oriented. Skin: Warm and dry. No rashes were noted. Old pacer site no redness  HEENT: Round and reactive pupils. Moist mucous membranes. No ulcerations or thrush. Neck: Supple to movements. Chest: Crackles right base but otherwise good air exchange  Cardiovascular: S1 and S2 are rhythmic and regular. No murmurs appreciated. Abdomen: Positive bowel sounds to auscultation. Benign to palpation. No masses felt. No hepatosplenomegaly. Genitourinary: male  Extremities: No clubbing, no cyanosis, + ble edema.   Lines: peripheral    Laboratory and Tests Review:  Lab Results   Component Value Date    WBC 22.3 (H) 08/26/2022    WBC 7.6 08/25/2022    WBC 5.3 08/25/2022    HGB 8.6 (L) 08/26/2022    HCT 27.1 (L) 08/26/2022    MCV 94.8 08/26/2022     08/26/2022     Lab Results   Component Value Date    NEUTROABS 20.29 (H) 08/26/2022    NEUTROABS 3.98 08/25/2022    NEUTROABS 1.25 (L) 08/24/2022     No results found for: [facility]  Lab Results   Component Value Date    ALT 29 08/20/2022    AST 25 08/20/2022    ALKPHOS 90 08/20/2022    BILITOT <0.2 08/20/2022     Lab Results   Component Value Date/Time     08/27/2022 07:25 AM    K 3.6 08/27/2022 07:25 AM    K 3.7 08/17/2022 04:17 AM     08/27/2022 07:25 AM    CO2 29 08/27/2022 07:25 AM    BUN 10 08/27/2022 07:25 AM    CREATININE 1.0 08/27/2022 07:25 AM    CREATININE 0.9 08/26/2022 05:28 AM    CREATININE 0.8 08/25/2022 03:12 PM    GFRAA >60 08/27/2022 07:25 AM    LABGLOM >60 08/27/2022 07:25 AM    GLUCOSE 121 08/27/2022 07:25 AM    PROT 5.9 08/20/2022 04:44 PM    LABALBU 3.2 08/20/2022 04:44 PM    CALCIUM 8.6 08/27/2022 07:25 AM    BILITOT <0.2 08/20/2022 04:44 PM    ALKPHOS 90 08/20/2022 04:44 PM    AST 25 08/20/2022 04:44 PM    ALT 29 08/20/2022 04:44 PM     Lab Results   Component Value Date    CRP 9.6 (H) 05/24/2022     No results found for: [address]  Radiology:  Reviewed CT lung    Microbiology:   Lab Results   Component Value Date/Time    BC 24 Hours no growth 08/25/2022 05:04 PM    BC 24 Hours no growth 08/23/2022 10:23 AM    BC 5 Days no growth 08/19/2022 05:05 AM    ORG Enterococcus faecalis 08/18/2022 07:10 AM    ORG Enterococcus faecalis 08/18/2022 07:05 AM    ORG Enterococcus faecalis 08/17/2022 05:40 PM     Lab Results   Component Value Date/Time    BLOODCULT2 24 Hours no growth 08/25/2022 05:05 PM    BLOODCULT2 24 Hours no growth 08/23/2022 10:27 AM    BLOODCULT2 5 Days no growth 08/20/2022 05:30 AM    ORG Enterococcus faecalis 08/18/2022 07:10 AM    ORG Enterococcus faecalis 08/18/2022 07:05 AM    ORG Enterococcus faecalis 08/17/2022 05:40 PM     No results found for: WNDABS  No results found for: RESPSMEAR  No results found for: MPNEUMO, CLAMYDCU, LABLEGI, AFBCX, FUNGSM, LABFUNG  No results found for: CULTRESP  Culture Catheter Tip   Date Value Ref Range Status   08/20/2022 Growth not present  Final     No results found for: BFCS  Culture Surgical   Date Value Ref Range Status   08/25/2022 Growth not present, incubation continues  Preliminary   08/25/2022 Growth not present, incubation continues  Preliminary     Urine Culture, Routine   Date Value Ref Range Status   08/16/2022   Final    10 to 100,000 CFU/mL  Mixed heydi isolated. Further workup and sensitivity testing  is not routinely indicated and will not be performed.   Mixed heydi isolated includes:  Mixed gram positive organisms     05/18/2019 <25,000 CFU/ml  Final     No results found for: Joya Minot    MICROBIOLOGY:  8-16-22 blood cultures E faecalis x 2  8-17-22 blood cultures E faecalis x 1  8-18-22 blood culture + E faecalis x1  8-19-22 blood cultures neg to date  8-20-22 Port ( tip ) cx - neg tod date   8-20-22 blood cultures neg to date  8/25/22 pacer cx pending  8/25/2 blood cx pending      ASSESSMENT:  High grade E faecalis bacteremia probably port infection; RO endocarditis and pacer infection s/p mediport removal ( 8/20) - follow up cx neg on antimicrobials  Immunocompromised host c pancytopenia; on chemo for Sq cell of lung; platelets are up and white count is stable  thrush-improved      PLAN:  Continue with  amp 2 grams IV q 4 hrs and  ceftriaxone 2 grams IV q 12 hrs   SHIRA nondiagnostic  S/p Removal of  pacer-  PICC line ordered-awaiting insertion  Monitor labs  Plan for new pace Monday if  cx continue to be negative    Connie Mckenna MD  2:45 PM  8/27/2022

## 2022-08-27 NOTE — PROGRESS NOTES
Power picc line  Placement 8/27/2022    Product number: [id number]   Lot Number: [id number]      Ultrasound: yes   Right Brachial vein:                Upper Arm Circumference: 34cm    Size: 5.5frdl    Exposed Length: 1cm    Internal Length: 38cm   Cut: 16cm   Vein Measurement: 0.59cm    Irineo Dan RN  8/27/2022  2:46 PM      Power picc line placed with vps unable to obtain bullseye, xray ordered I will START or STAY ON the medications listed below when I get home from the hospital:    alfuzosin 10 mg oral tablet, extended release  -- 1 tab(s) by mouth once a day  -- Indication: For BPH (benign prostatic hyperplasia)    Pacerone 200 mg oral tablet  -- 1 tab(s) by mouth once a day  -- Indication: For A-fib    Coumadin 2 mg oral tablet  -- 1 tab(s) by mouth once a day. Follow up with your PCP within 2-3 days for measurement of PT/INR and readjustment of dosing.  -- Indication: For A-fib    gabapentin 100 mg oral capsule  -- 1 cap(s) by mouth 2 times a day (8AM and 2PM)  -- Indication: For Postherpetic neuralgia    gabapentin 100 mg oral capsule  -- 2 cap(s) by mouth once a day (at bedtime)  -- Indication: For Postherpetic neuralgia    Tessalon Perles 100 mg oral capsule  -- 1 cap(s) by mouth 3 times a day  -- Indication: For Cough    ALPRAZolam 0.25 mg oral tablet  -- 1 tab(s) by mouth once a day  -- Indication: For Anxiety    ALPRAZolam 0.5 mg oral tablet  -- orally once a day (at bedtime)  -- Indication: For Anxiety    Metoprolol Tartrate 25 mg oral tablet  -- 1 tab(s) by mouth once a day  -- Indication: For A-fib    albuterol 2.5 mg/3 mL (0.083%) inhalation solution  -- 3 milliliter(s) inhaled every 6 hours  -- Indication: For CHF (congestive heart failure)    furosemide 40 mg oral tablet  -- 1 tab(s) by mouth once a day  -- Indication: For CHF (congestive heart failure)    potassium chloride 20 mEq oral tablet, extended release  -- 1 tab(s) by mouth once a day  -- Indication: For Need for prophylactic measure    Lotemax 0.5% ophthalmic ointment  -- 1 application in each eye 2 times a day  -- Indication: For Eyes    Refresh ophthalmic solution  -- 1 drop(s) in each eye 2 times a day  -- Indication: For Eyes    lactobacillus acidophilus oral capsule  -- 1 cap(s) by mouth once a day  -- Indication: For Need for prophylactic measure    levothyroxine 100 mcg (0.1 mg) oral tablet  -- 1 tab(s) by mouth once a day  -- Indication: For Hypothyroid

## 2022-08-28 LAB
ANION GAP SERPL CALCULATED.3IONS-SCNC: 10 MMOL/L (ref 7–16)
ANISOCYTOSIS: ABNORMAL
BASOPHILIC STIPPLING: ABNORMAL
BASOPHILS ABSOLUTE: 0.14 E9/L (ref 0–0.2)
BASOPHILS RELATIVE PERCENT: 0.9 % (ref 0–2)
BLOOD BANK DISPENSE STATUS: NORMAL
BLOOD BANK PRODUCT CODE: NORMAL
BLOOD CULTURE, ROUTINE: NORMAL
BPU ID: NORMAL
BUN BLDV-MCNC: 6 MG/DL (ref 6–23)
CALCIUM SERPL-MCNC: 9 MG/DL (ref 8.6–10.2)
CHLORIDE BLD-SCNC: 100 MMOL/L (ref 98–107)
CO2: 32 MMOL/L (ref 22–29)
CREAT SERPL-MCNC: 0.9 MG/DL (ref 0.7–1.2)
CULTURE SURGICAL: NORMAL
CULTURE SURGICAL: NORMAL
CULTURE, BLOOD 2: NORMAL
DESCRIPTION BLOOD BANK: NORMAL
EOSINOPHILS ABSOLUTE: 0 E9/L (ref 0.05–0.5)
EOSINOPHILS RELATIVE PERCENT: 0.3 % (ref 0–6)
GFR AFRICAN AMERICAN: >60
GFR NON-AFRICAN AMERICAN: >60 ML/MIN/1.73
GLUCOSE BLD-MCNC: 94 MG/DL (ref 74–99)
HCT VFR BLD CALC: 25.8 % (ref 37–54)
HEMOGLOBIN: 8.1 G/DL (ref 12.5–16.5)
LYMPHOCYTES ABSOLUTE: 0.79 E9/L (ref 1.5–4)
LYMPHOCYTES RELATIVE PERCENT: 5.2 % (ref 20–42)
MCH RBC QN AUTO: 29.8 PG (ref 26–35)
MCHC RBC AUTO-ENTMCNC: 31.4 % (ref 32–34.5)
MCV RBC AUTO: 94.9 FL (ref 80–99.9)
METAMYELOCYTES RELATIVE PERCENT: 1.7 % (ref 0–1)
MONOCYTES ABSOLUTE: 0.95 E9/L (ref 0.1–0.95)
MONOCYTES RELATIVE PERCENT: 6.1 % (ref 2–12)
MYELOCYTE PERCENT: 2.6 % (ref 0–0)
NEUTROPHILS ABSOLUTE: 13.9 E9/L (ref 1.8–7.3)
NEUTROPHILS RELATIVE PERCENT: 83.5 % (ref 43–80)
OVALOCYTES: ABNORMAL
PDW BLD-RTO: 20.7 FL (ref 11.5–15)
PLATELET # BLD: 256 E9/L (ref 130–450)
PMV BLD AUTO: 11.4 FL (ref 7–12)
POIKILOCYTES: ABNORMAL
POLYCHROMASIA: ABNORMAL
POTASSIUM SERPL-SCNC: 3.3 MMOL/L (ref 3.5–5)
RBC # BLD: 2.72 E12/L (ref 3.8–5.8)
SODIUM BLD-SCNC: 142 MMOL/L (ref 132–146)
WBC # BLD: 15.8 E9/L (ref 4.5–11.5)

## 2022-08-28 PROCEDURE — 2140000000 HC CCU INTERMEDIATE R&B

## 2022-08-28 PROCEDURE — 6370000000 HC RX 637 (ALT 250 FOR IP): Performed by: NURSE PRACTITIONER

## 2022-08-28 PROCEDURE — 2580000003 HC RX 258: Performed by: NURSE PRACTITIONER

## 2022-08-28 PROCEDURE — 94640 AIRWAY INHALATION TREATMENT: CPT

## 2022-08-28 PROCEDURE — 87040 BLOOD CULTURE FOR BACTERIA: CPT

## 2022-08-28 PROCEDURE — 80048 BASIC METABOLIC PNL TOTAL CA: CPT

## 2022-08-28 PROCEDURE — 6360000002 HC RX W HCPCS: Performed by: NURSE PRACTITIONER

## 2022-08-28 PROCEDURE — 6360000002 HC RX W HCPCS

## 2022-08-28 PROCEDURE — 85025 COMPLETE CBC W/AUTO DIFF WBC: CPT

## 2022-08-28 PROCEDURE — 2700000000 HC OXYGEN THERAPY PER DAY

## 2022-08-28 PROCEDURE — 36415 COLL VENOUS BLD VENIPUNCTURE: CPT

## 2022-08-28 PROCEDURE — 99233 SBSQ HOSP IP/OBS HIGH 50: CPT | Performed by: INTERNAL MEDICINE

## 2022-08-28 RX ADMIN — GUAIFENESIN 400 MG: 400 TABLET ORAL at 08:05

## 2022-08-28 RX ADMIN — POTASSIUM CHLORIDE 40 MEQ: 1500 TABLET, EXTENDED RELEASE ORAL at 08:05

## 2022-08-28 RX ADMIN — GUAIFENESIN 400 MG: 400 TABLET ORAL at 21:59

## 2022-08-28 RX ADMIN — ARFORMOTEROL TARTRATE 15 MCG: 15 SOLUTION RESPIRATORY (INHALATION) at 20:52

## 2022-08-28 RX ADMIN — Medication 300 UNITS: at 08:05

## 2022-08-28 RX ADMIN — CEFTRIAXONE 2000 MG: 2 INJECTION, POWDER, FOR SOLUTION INTRAMUSCULAR; INTRAVENOUS at 03:24

## 2022-08-28 RX ADMIN — ARFORMOTEROL TARTRATE 15 MCG: 15 SOLUTION RESPIRATORY (INHALATION) at 09:18

## 2022-08-28 RX ADMIN — ALBUTEROL SULFATE 2.5 MG: 2.5 SOLUTION RESPIRATORY (INHALATION) at 01:50

## 2022-08-28 RX ADMIN — ALBUTEROL SULFATE 2.5 MG: 2.5 SOLUTION RESPIRATORY (INHALATION) at 09:21

## 2022-08-28 RX ADMIN — ATORVASTATIN CALCIUM 20 MG: 20 TABLET, FILM COATED ORAL at 21:59

## 2022-08-28 RX ADMIN — CLOTRIMAZOLE 10 MG: 10 LOZENGE ORAL at 08:05

## 2022-08-28 RX ADMIN — AMPICILLIN SODIUM 2000 MG: 2 INJECTION, POWDER, FOR SOLUTION INTRAVENOUS at 15:11

## 2022-08-28 RX ADMIN — GUAIFENESIN 400 MG: 400 TABLET ORAL at 15:08

## 2022-08-28 RX ADMIN — Medication 300 UNITS: at 21:59

## 2022-08-28 RX ADMIN — AMPICILLIN SODIUM 2000 MG: 2 INJECTION, POWDER, FOR SOLUTION INTRAVENOUS at 10:29

## 2022-08-28 RX ADMIN — Medication 10 ML: at 08:05

## 2022-08-28 RX ADMIN — BUDESONIDE 500 MCG: 0.5 SUSPENSION RESPIRATORY (INHALATION) at 20:52

## 2022-08-28 RX ADMIN — AMPICILLIN SODIUM 2000 MG: 2 INJECTION, POWDER, FOR SOLUTION INTRAVENOUS at 06:56

## 2022-08-28 RX ADMIN — AMPICILLIN SODIUM 2000 MG: 2 INJECTION, POWDER, FOR SOLUTION INTRAVENOUS at 17:52

## 2022-08-28 RX ADMIN — AMPICILLIN SODIUM 2000 MG: 2 INJECTION, POWDER, FOR SOLUTION INTRAVENOUS at 22:05

## 2022-08-28 RX ADMIN — SODIUM CHLORIDE, PRESERVATIVE FREE 10 ML: 5 INJECTION INTRAVENOUS at 22:00

## 2022-08-28 RX ADMIN — ALBUTEROL SULFATE 2.5 MG: 2.5 SOLUTION RESPIRATORY (INHALATION) at 20:52

## 2022-08-28 RX ADMIN — AMPICILLIN SODIUM 2000 MG: 2 INJECTION, POWDER, FOR SOLUTION INTRAVENOUS at 02:50

## 2022-08-28 RX ADMIN — Medication 10 ML: at 22:00

## 2022-08-28 RX ADMIN — CEFTRIAXONE 2000 MG: 2 INJECTION, POWDER, FOR SOLUTION INTRAMUSCULAR; INTRAVENOUS at 15:08

## 2022-08-28 RX ADMIN — OXYCODONE AND ACETAMINOPHEN 1 TABLET: 5; 325 TABLET ORAL at 07:03

## 2022-08-28 RX ADMIN — SODIUM CHLORIDE, PRESERVATIVE FREE 10 ML: 5 INJECTION INTRAVENOUS at 07:24

## 2022-08-28 RX ADMIN — CLOTRIMAZOLE 10 MG: 10 LOZENGE ORAL at 11:59

## 2022-08-28 RX ADMIN — BUDESONIDE 500 MCG: 0.5 SUSPENSION RESPIRATORY (INHALATION) at 09:19

## 2022-08-28 RX ADMIN — ALBUTEROL SULFATE 2.5 MG: 2.5 SOLUTION RESPIRATORY (INHALATION) at 13:29

## 2022-08-28 RX ADMIN — FUROSEMIDE 20 MG: 20 TABLET ORAL at 08:05

## 2022-08-28 RX ADMIN — CLOTRIMAZOLE 10 MG: 10 LOZENGE ORAL at 15:08

## 2022-08-28 RX ADMIN — CLOTRIMAZOLE 10 MG: 10 LOZENGE ORAL at 21:59

## 2022-08-28 ASSESSMENT — PAIN SCALES - GENERAL
PAINLEVEL_OUTOF10: 0
PAINLEVEL_OUTOF10: 0
PAINLEVEL_OUTOF10: 6

## 2022-08-28 ASSESSMENT — PAIN DESCRIPTION - LOCATION: LOCATION: ARM

## 2022-08-28 ASSESSMENT — PAIN DESCRIPTION - ORIENTATION: ORIENTATION: RIGHT

## 2022-08-28 ASSESSMENT — PAIN - FUNCTIONAL ASSESSMENT: PAIN_FUNCTIONAL_ASSESSMENT: ACTIVITIES ARE NOT PREVENTED

## 2022-08-28 ASSESSMENT — PAIN DESCRIPTION - DESCRIPTORS: DESCRIPTORS: ACHING

## 2022-08-28 NOTE — DISCHARGE SUMMARY
Patient ID:  Zahida Gutiérrez  [id number]  67 y.o.  1950    Admit date: 5/24/2022    Discharge date and time: 5/27/2022  3:27 PM     Admission Diagnoses: Pneumonia [J18.9]    Discharge Diagnoses:   Patient Active Problem List   Diagnosis    Syncope and collapse    Pneumonia    COPD (chronic obstructive pulmonary disease) (HCC)    BPH (benign prostatic hyperplasia)    HTN (hypertension)    HLD (hyperlipidemia)    Intermittent complete heart block (HCC)    Cardiac pacemaker in situ    Acute on chronic respiratory failure with hypoxia (HCC)    Squamous carcinoma of lung ([address]    History pulmonary embolism ([address] on Eliquis    Hyponatremia    Hypomagnesemia    Wide-complex tachycardia ([address]    COVID-19    Pancytopenia ([address]    Pacemaker infection ([address]    Sepsis due to Enterococcus ([address]    Bacteremia    High-grade atrioventricular block    Second degree AV block, Mobitz type I       Consults: ID    Procedures:   none    Hospital Course:  Pt was admitted with pneumonia following recent Covid infection. ID was consulted to help ultimately did well no new issues. Went home.     Discharge Exam:  See progress note from today    Disposition: home    Condition at Discharge:  stable    Patient Instructions:   Discharge Medication List as of 5/27/2022  2:53 PM        START taking these medications    Details   benzonatate (TESSALON) 200 MG capsule Take 1 capsule by mouth 3 times daily as needed for Cough, Disp-60 capsule, R-1Normal      levoFLOXacin (LEVAQUIN) 750 MG tablet Take 1 tablet by mouth daily for 7 days, Disp-7 tablet, R-0Normal      amoxicillin-clavulanate (AUGMENTIN-ES) 600-42.9 MG/5ML suspension Take 15 mLs by mouth every 12 hours for 7 days, Disp-210 mL, R-0Normal      dexamethasone (DECADRON) 6 MG tablet Take 1 tablet by mouth daily for 10 days, Disp-10 tablet, R-0Normal           CONTINUE these medications which have NOT CHANGED    Details   apixaban (ELIQUIS) 5 MG TABS tablet Take by mouthHistorical Med albuterol sulfate HFA (VENTOLIN HFA) 108 (90 Base) MCG/ACT inhaler Inhale 2 puffs into the lungs every 6 hours as needed for WheezingHistorical Med      metoprolol succinate (TOPROL XL) 50 MG extended release tablet Take 1 tablet by mouth 2 times daily (with meals), Disp-180 tablet, R-3Normal      simvastatin (ZOCOR) 40 MG tablet Take 40 mg by mouth daily Historical Med      umeclidinium-vilanterol (ANORO ELLIPTA) 62.5-25 MCG/INH AEPB inhaler Inhale 1 puff into the lungs dailyHistorical Med           STOP taking these medications       predniSONE (DELTASONE) 10 MG tablet Comments:   Reason for Stopping:         albuterol (PROVENTIL) (2.5 MG/3ML) 0.083% nebulizer solution Comments:   Reason for Stopping:             Activity: activity as tolerated  Diet: regular diet    Follow-up with Dr Angie Martinez  in 2 weeks.     Note that over 30 minutes was spent in preparing discharge papers, discussing discharge with patient, medication review, etc.    Signed:  Mary Ellen Bhandari MD  8/28/2022  7:16 AM

## 2022-08-28 NOTE — PROGRESS NOTES
[address] [place] [address] [facility]  Progress Note          C/C : High grade Enterococcus bacteremia, sepsis       SUBJECTIVE:  Patient is tolerating medications. No reported adverse drug reactions. No nausea, vomiting, diarrhea. SOB with exertion   Denies fever or chills   Afebrile   S/p pacer removal y  Sitting at edge of bed, some cough modestly productive  Wife is present  Appetite good  Review of systems:  As stated above in the chief complaint, otherwise negative. Medications:  Scheduled Meds:   albuterol  2.5 mg Nebulization Q6H    guaiFENesin  400 mg Oral TID    Arformoterol Tartrate  15 mcg Nebulization BID    budesonide  0.5 mg Nebulization BID    sodium chloride flush  5-40 mL IntraVENous 2 times per day    heparin flush  3 mL IntraVENous 2 times per day    clotrimazole  10 mg Oral 4x daily    furosemide  20 mg Oral Daily    ampicillin IV  2,000 mg IntraVENous 6 times per day    cefTRIAXone (ROCEPHIN) IV  2,000 mg IntraVENous Q12H    [Held by provider] apixaban  5 mg Oral BID    [Held by provider] metoprolol succinate  50 mg Oral BID WC    atorvastatin  20 mg Oral Daily    sodium chloride flush  5-40 mL IntraVENous 2 times per day     Continuous Infusions:   sodium chloride      sodium chloride       PRN Meds:sodium chloride flush, sodium chloride, heparin flush, morphine, sodium chloride, trimethobenzamide, oxyCODONE-acetaminophen **OR** oxyCODONE-acetaminophen, hydrALAZINE, potassium chloride **OR** potassium alternative oral replacement **OR** potassium chloride, melatonin, albuterol, sodium chloride flush, sodium chloride, acetaminophen **OR** acetaminophen, senna    OBJECTIVE:  /76   Pulse 69   Temp 97.5 °F (36.4 °C)   Resp 16   Ht 5' 6\" (1.676 m)   Wt 193 lb (87.5 kg)   SpO2 100%   BMI 31.15 kg/m²   Temp  Av.5 °F (36.4 °C)  Min: 97 °F (36.1 °C)  Max: 98 °F (36.7 °C)    Constitutional: The patient is awake, alert, and oriented. Skin: Warm and dry.  No rashes no growth 08/23/2022 10:23 AM    BC 5 Days no growth 08/19/2022 05:05 AM    ORG Enterococcus faecalis 08/18/2022 07:10 AM    ORG Enterococcus faecalis 08/18/2022 07:05 AM    ORG Enterococcus faecalis 08/17/2022 05:40 PM     Lab Results   Component Value Date/Time    BLOODCULT2 24 Hours no growth 08/25/2022 05:05 PM    BLOODCULT2 5 Days no growth 08/23/2022 10:27 AM    BLOODCULT2 5 Days no growth 08/20/2022 05:30 AM    ORG Enterococcus faecalis 08/18/2022 07:10 AM    ORG Enterococcus faecalis 08/18/2022 07:05 AM    ORG Enterococcus faecalis 08/17/2022 05:40 PM     No results found for: WNDABS  No results found for: RESPSMEAR  No results found for: MPNEUMO, CLAMYDCU, LABLEGI, AFBCX, FUNGSM, LABFUNG  No results found for: CULTRESP  Culture Catheter Tip   Date Value Ref Range Status   08/20/2022 Growth not present  Final     No results found for: BFCS  Culture Surgical   Date Value Ref Range Status   08/25/2022 Growth not present  Final   08/25/2022 Growth not present  Final     Urine Culture, Routine   Date Value Ref Range Status   08/16/2022   Final    10 to 100,000 CFU/mL  Mixed heydi isolated. Further workup and sensitivity testing  is not routinely indicated and will not be performed.   Mixed heydi isolated includes:  Mixed gram positive organisms     05/18/2019 <25,000 CFU/ml  Final     No results found for: [address]:  8-16-22 blood cultures E faecalis x 2  8-17-22 blood cultures E faecalis x 1  8-18-22 blood culture + E faecalis x1  8-19-22 blood cultures neg to date  8-20-22 Port ( tip ) cx - neg tod date   8-20-22 blood cultures neg to date  8/25/22 pacer cx pending  8/25/2 blood cx pending      ASSESSMENT:  High grade E faecalis bacteremia probably port infection; RO endocarditis and pacer infection s/p mediport removal ( 8/20) - follow up cx neg on antimicrobials  Immunocompromised host c pancytopenia; on chemo for Sq cell of lung; platelets are up and white count is stable  thrush-improved      PLAN:  Continue with  amp 2 grams IV q 4 hrs and  ceftriaxone 2 grams IV q 12 hrs   SHIRA nondiagnostic  S/p Removal of  pacer-  PICC line ordered-inserted  Monitor labs  Plan for new pace Monday if  cx continue to be negative    Amita Mak MD  3:00 PM  8/28/2022

## 2022-08-28 NOTE — PROGRESS NOTES
[facility]                                Progress note    Subjective:    Patient is alert and oriented eating breakfast at bedside  No acute events overnight  Awaiting pacemaker placement in the a.m. S/p pacer removal 8/25   feels great no acute complaints    Objective:    BP (!) 145/67   Pulse 94   Temp 97.6 °F (36.4 °C)   Resp 16   Ht 5' 6\" (1.676 m)   Wt 193 lb (87.5 kg)   SpO2 97%   BMI 31.15 kg/m²     In: 1310 [P.O.:1310]  Out: 2880   In: 1310   Out: 2880 [Urine:2880]    General appearance: NAD, conversant, looks much better today  HEENT: AT/NC, MMM  Neck: FROM, supple  Lungs: Clear to auscultation  CV: RRR, no MRGs-right-sided port removed 8/20, left-sided pacemaker removed   Vasc: Radial pulses 2+  Abdomen: Soft, non-tender; no masses or HSM  Extremities: No  or digital cyanosis, 2+ BLE edema   Skin: Generalized bruising  Psych: Alert and oriented to person, place and time  Neuro: Alert and interactive     Recent Labs     08/25/22  1512 08/26/22  0528 08/28/22  0656   WBC 7.6 22.3* 15.8*   HGB 7.7* 8.6* 8.1*   HCT 24.0* 27.1* 25.8*    242 256       Recent Labs     08/26/22  0528 08/27/22  0725 08/28/22  0656    144 142   K 4.2 3.6 3.3*   CL 96* 104 100   CO2 27 29 32*   BUN 10 10 6   CREATININE 0.9 1.0 0.9   CALCIUM 9.2 8.6 9.0       Assessment:    Principal Problem:    Pneumonia  Active Problems:    Pancytopenia (HCC)    Pacemaker infection (HCC)    Sepsis due to Enterococcus (HCC)    Bacteremia    High-grade atrioventricular block    Second degree AV block, Mobitz type I    COPD (chronic obstructive pulmonary disease) (HCC)    HTN (hypertension)    Cardiac pacemaker in situ    Squamous carcinoma of lung ([facility] [address]    History pulmonary embolism ([facility] [address] on Eliquis  Resolved Problems:    * No resolved hospital problems.  *      Plan:  Patient is a 70-year-old male with a hx of squamous cell lung carcinoma who is active chemo therapy who is admitted to intermediate for  Sepsis/bacteremia/immunocompromise status-acutely ill status  -Mediport removed 8/20  -CTS consulted by EP for removal of pacer, completed 8/25  -Respiratory panel negative  -Check procalcitonin 0.42  -CT chest ordered by pulm> progressive infiltrative mass in the left perihilar region  -On daptomycin per infectious disease > switched to IV Rocephin and ampicillin  -General surgery > signed off   -SHIRA 8/22/2022-unremarkable, no evidence of endocarditis, ejection fraction  -Resume Trelegy as he takes at home-okay to use from home if okay with pulmonology  -Plans for pacemaker on Monday ep to place  -PICC placed     Leukocytosis, likely reactive from surgery   -Monitor CBC daily  -WBC - Awaiting labs from today    Hypomagnesemia  -Mg+ 1.3 > replaced with 2 g  Labs pending    Pancytopenia  -Consult heme-onc-following  -h/h 6.9/21.4 > stable now in mid eights post transfusion  -Platelets 4 > transfuse 1 unit platelets > 23 > 480 today   -Hold Eliquis-consider holding off for few weeks  Thrombocytopenia resolved     Hematuria  -Check urine culture  -Hold Eliquis    DVT Prophylaxis PCD's  PT/OT  Discharge planning-will be able to be discharged once pacemaker is placed when okay with ID      LELO Saldana CNP  12:35 PM  8/28/2022     Above note edited to reflect my thoughts   Possible discharge after pacemaker placed, will be going to subacute rehab    I personally saw, examined and provided care for the patient. Radiographs, labs and medication list were reviewed by me independently. The case was discussed in detail and plans for care were established. Review of Kemi FELICIANO CNP, documentation was conducted and revisions were made as appropriate directly by me. I agree with the above documented exam, problem list, and plan of care.      Tomasa Mckeon MD  4:36 PM  8/28/2022

## 2022-08-28 NOTE — PROGRESS NOTES
[facility],2Nd Floor and Vascular [place]- Electrophysiology    Inpatient progress note  PATIENT: Samy Alonzo  MEDICAL RECORD NUMBER: [id number]  DATE OF SERVICE:  8/28/2022  ATTENDING ELECTROPHYSIOLOGIST: Erna Ramos MD   PRIMARY ELECTROPHYSIOLOGIST: Keyur Morris  REFERRING PHYSICIAN: No ref. provider found and Di Villegas MD  CHIEF COMPLAINT: Weakness and fatigue, fever    HPI: This is a 67 y.o. male with a history of pretension, hyperlipidemia, COPD and a history of long-term smoking habit as well as recurrent syncopal episodes starting in 2012. ILR 2020 showed CHB and subsequent pacemaker implant on 7/20/2021. He was also diagnosed with lung cancer around the same time and has been undergoing chemotherapy and radiation. He finished his last chemo cycle last week and presented to the emergency room with symptoms of ongoing fatigue weakness and fever. He was found to be neutropenic as well as thrombocytopenic. Subsequently blood cultures have been positive for Enterococcus faecalis. Blood cultures from 8/16, 8/17 and 8/18 have all been positive despite IV antibiotics. Patient also remains neutropenic and thrombocytopenic with a platelet count of 18,714 today. No other cardiac symptoms. No new symptoms of chest pain or dyspnea on exertion. No syncope or near syncope.    8/21/22: Patient denies any new complaints today. Was transfused earlier today for hemoglobin of 7.2. No cardiac symptoms. 8/22/22: Patient remains asymptomatic. Underwent SHIRA today. White count remains low however hemoglobin and platelet count have improved    8/23/22:  No complaints. Awaiting plan for extraction to confirm CT surgery back up. SHIRA negative yesterday. Afebrile     8/23/22: Pacemaker set at VVI 40 yesterday afternoon, has been stable without change in symptoms. On monitor, it appears to be wenckebach vs 2:1 block with rates 60-70's     8/25/22: NPO for extraction today.  No complaints overnight. Continues with stable heart rates what appears wenckebach vs 2:1 AV block     8/26/22: stable, family in room. He is up at side of bed, pain is tolerable, controlled. Left chest incision from extraction site stable. He continues to have 2:1 block vs wenckebach on monitor, stable rates 50-70 bpm. Denies dizziness or lightheadedness. 8/27/22: Feeling well. Reports some dizziness. Monitor showed sinus tachycardia with second degree AV block type I, 2 to 1 AV block and intermittent high grade AV block without any significant pauses. 8/28/22: The patient is seen in the hospital for follow up. He reports no dizziness or syncope. He remains in NSR with second degree AV block type I and 2 to 1 AV block.     Patient Active Problem List    Diagnosis Date Noted    High-grade atrioventricular block 08/27/2022     Priority: Medium    Second degree AV block, Mobitz type I 08/27/2022     Priority: Medium    Bacteremia 08/23/2022     Priority: Medium    Pacemaker infection ([address] 08/22/2022     Priority: Medium    Sepsis due to Enterococcus ([address] 08/22/2022     Priority: Medium    Pancytopenia ([address] 08/17/2022     Priority: Medium    COVID-19 05/25/2022     Priority: Medium    Squamous carcinoma of lung ([address] 09/18/2021    History pulmonary embolism ([address] on Eliquis 09/18/2021    Hyponatremia 09/18/2021    Hypomagnesemia 09/18/2021    Wide-complex tachycardia ([address] 09/18/2021    Acute on chronic respiratory failure with hypoxia ([address] 09/17/2021    Cardiac pacemaker in situ 07/20/2021    Intermittent complete heart block ([address] 07/19/2021    Pneumonia 01/31/2019    COPD (chronic obstructive pulmonary disease) ([address] 01/31/2019    BPH (benign prostatic hyperplasia) 01/31/2019    HTN (hypertension) 01/31/2019    HLD (hyperlipidemia) 01/31/2019    Syncope and collapse 01/18/2019       Past Medical History:   Diagnosis Date    CHB (complete heart block) ([address] 07/2021    COPD (chronic obstructive pulmonary disease) ([facility] Syncope 09/2020    dr Conor Peacock wearing a monitor    Syncope 07/2021       Family History   Problem Relation Age of Onset    Cancer Father         prostate    Cancer Paternal Uncle         anal       Social History     Tobacco Use    Smoking status: Former     Packs/day: 2.00     Years: 25.00     Pack years: 50.00     Types: Cigarettes     Quit date: 12/26/2011     Years since quitting: 10.6    Smokeless tobacco: Never   Substance Use Topics    Alcohol use: Not Currently     Alcohol/week: 1.0 standard drink     Types: 1 Glasses of wine per week     Comment: 1 glass of wine per day prior       Current Facility-Administered Medications   Medication Dose Route Frequency Provider Last Rate Last Admin    albuterol (PROVENTIL) nebulizer solution 2.5 mg  2.5 mg Nebulization Q6H LELO Sanders - CNP   2.5 mg at 08/28/22 0150    guaiFENesin tablet 400 mg  400 mg Oral TID Brock Berry APRN - CNP   400 mg at 08/28/22 0805    Arformoterol Tartrate (BROVANA) nebulizer solution 15 mcg  15 mcg Nebulization BID Nahomi Fonseca APRN - CNP   15 mcg at 08/27/22 2050    budesonide (PULMICORT) nebulizer suspension 500 mcg  0.5 mg Nebulization BID Nahomi POOJA FonsecaN - CNP   500 mcg at 08/27/22 2050    sodium chloride flush 0.9 % injection 5-40 mL  5-40 mL IntraVENous 2 times per day Esperanza Triana APRN - CNP   10 mL at 08/28/22 0724    sodium chloride flush 0.9 % injection 5-40 mL  5-40 mL IntraVENous PRN Esperanza Triana APRN - CNP        0.9 % sodium chloride infusion   IntraVENous PRN Esperanza Triana APRN - CNP        heparin flush 100 UNIT/ML injection 300 Units  3 mL IntraVENous 2 times per day Esperanza Triana APRN - CNP   300 Units at 08/28/22 0805    heparin flush 100 UNIT/ML injection 300 Units  3 mL IntraCATHeter PRN Esperanza Triana APRN - CNP        morphine (PF) injection 2 mg  2 mg IntraVENous Q4H PRN LELO Hudson - CNP        sodium chloride (OCEAN, BABY AYR) 0.65 % nasal spray 1 spray  1 spray Each Nostril PRN LELO Parker CNP        trimethobenzamide [employer]) injection 200 mg  200 mg IntraMUSCular Q6H PRN LELO Parker CNP        oxyCODONE-acetaminophen (PERCOCET) 5-325 MG per tablet 1 tablet  1 tablet Oral Q4H PRN LELO Parker CNP   1 tablet at 08/28/22 0703    Or    oxyCODONE-acetaminophen (PERCOCET) 5-325 MG per tablet 2 tablet  2 tablet Oral Q4H PRN LELO Parker CNP        hydrALAZINE (APRESOLINE) injection 10 mg  10 mg IntraVENous Q6H PRN LELO Parker CNP        clotrimazole [facility]) lindsay 10 mg  10 mg Oral 4x daily LELO Parker CNP   10 mg at 08/28/22 0805    furosemide (LASIX) tablet 20 mg  20 mg Oral Daily LELO Parker CNP   20 mg at 08/28/22 0805    potassium chloride (KLOR-CON M) extended release tablet 40 mEq  40 mEq Oral PRN LELO Parker CNP   40 mEq at 08/28/22 9826    Or    potassium bicarb-citric acid (EFFER-K) effervescent tablet 40 mEq  40 mEq Oral PRN LELO Parker CNP        Or    potassium chloride 10 mEq/100 mL IVPB (Peripheral Line)  10 mEq IntraVENous PRN LELO Parker CNP        ampicillin 2000 mg ivpb mini bag  2,000 mg IntraVENous 6 times per day LELO Parker CNP   Stopped at 08/28/22 9774    cefTRIAXone (ROCEPHIN) 2,000 mg in sterile water 20 mL IV syringe  2,000 mg IntraVENous Q12H LELO Parker CNP   2,000 mg at 08/28/22 0324    melatonin tablet 3 mg  3 mg Oral Nightly PRN LELO Parker CNP   3 mg at 08/27/22 2136    albuterol (PROVENTIL) nebulizer solution 2.5 mg  2.5 mg Nebulization Q6H PRN LELO Parker CNP        [Held by provider] apixaban (ELIQUIS) tablet 5 mg  5 mg Oral BID Tiffanie ISABELLA Gupta,         [Held by provider] metoprolol succinate (TOPROL XL) extended release tablet 50 mg  50 mg Oral BID  Tiffanie Gupta DO   50 mg at 08/24/22 0857    atorvastatin (LIPITOR) tablet 20 mg  20 mg Oral Daily LELO Parker CNP   20 mg at 08/27/22 2136    sodium chloride flush 0.9 % injection 5-40 mL  5-40 mL IntraVENous 2 times per day Nehemias Webber APRN - CNP   10 mL at 08/28/22 0805    sodium chloride flush 0.9 % injection 5-40 mL  5-40 mL IntraVENous PRN Nehemias Oconnorsundeep, APRN - CNP        0.9 % sodium chloride infusion   IntraVENous PRN Nehemias Oconnorsundeep, APRN - CNP        acetaminophen (TYLENOL) tablet 650 mg  650 mg Oral Q6H PRN Nehemias Monsundeep, APRN - CNP   650 mg at 08/27/22 1954    Or    acetaminophen (TYLENOL) suppository 650 mg  650 mg Rectal Q6H PRN Neehmias Oconnorsundeep, APRN - CNP        senna (SENOKOT) tablet 8.6 mg  1 tablet Oral Daily PRN Nehemiasmateus Oconnorsundeep, APRN - CNP            No Known Allergies    ROS:   Constitutional: Negative for fever. Positive for activity change and negative for appetite change. HENT: Negative for epistaxis. Eyes: Negative for diploplia, blurred vision. Respiratory: Negative for cough, chest tightness, shortness of breath and wheezing. Cardiovascular: pertinent positives in HPI  Gastrointestinal: Negative for abdominal pain and blood in stool. All other review of systems are negative       PHYSICAL EXAM:   Vitals:    08/28/22 0600 08/28/22 0703 08/28/22 0733 08/28/22 0737   BP:    (!) 145/67   Pulse:    94   Resp:  18 16 16   Temp:    97.6 °F (36.4 °C)   TempSrc:       SpO2:    100%   Weight: 193 lb (87.5 kg)      Height:          Constitutional: Well-developed, no acute distress  Eyes: conjunctivae normal, no xanthelasma   Ears, Nose, Throat: oral mucosa moist, no cyanosis   CV: no JVD. Regular rate and irregular rhythm. Normal S1S2 and no S3. No murmurs, rubs, or gallops. Lungs: decreased bases bilaterally, normal respiratory effort without used of accessory muscles  Abdomen: soft, non-tender, non distended  Musculoskeletal: no digital clubbing, +1-2 pitting BLE edema   Skin: warm, no rashes.  Left chest wall extraction site glued, Small right side chest wall incision site well approximated also, no swelling or hemtatoma     Data:    Recent Labs     08/25/22  1512 08/26/22  0528 08/28/22  0656   WBC 7.6 22.3* 15.8*   HGB 7.7* 8.6* 8.1*   HCT 24.0* 27.1* 25.8*    242 256     Recent Labs     08/26/22  0528 08/27/22  0725 08/28/22  0656    144 142   K 4.2 3.6 3.3*   CL 96* 104 100   CO2 27 29 32*   BUN 10 10 6   CREATININE 0.9 1.0 0.9   CALCIUM 9.2 8.6 9.0        Lab Results   Component Value Date/Time    MG 1.8 08/27/2022 07:25 AM     No results for input(s): TSH in the last 72 hours. No results for input(s): INR in the last 72 hours. Telemetry 08/28/22  Sinus rhythm and sinus tachycardia with second degree AV block Mobitz type I and 2: 1 AV block at 70 bpm    EKG:        SHIRA 8/22/22   Summary   No SHIRA indication of Endocarditis. Normal left ventricular chamber size and systolic function. Interatrial septum appears intact. Normal right ventricle size and function. Pacemaker leads noted in right atrium and right ventricle. Normal aortic root size. Moderate atherosclerosis in the descending thoracic aorta. Epicardial fat vs. small pericardial effusion. No intra cardiac mass or thrombus. Technically sub-optimal images. Compared to prior sub-optimal TTE from 8/19/22. Signature    ----------------------------------------------------------------   Electronically signed by Moria Manning MD(Interpreting   physician) on 08/22/2022 06:50 PM    Echocardiogram: 8/19/22   Summary   Normal left ventricular chamber size. Normal left ventricular systolic function. Visually estimated LVEF is 55-60 %. No wall motion abnormalities. Normal diastolic function. Normal left atrial pressure. Normal right ventricle structure and function. Normal left atrial size. Normal right atrial size   Likely normal estimated PA pressure. Pacer/ ICD wire present in the right heart. No gross evidence of infective endocarditis. Consider SHIRA for better   accuracy if clinically indicated.    Compared to prior echo from 2021, no significant changes noted. Signature    ----------------------------------------------------------------   Electronically signed by Dacia Dominique MD(Interpreting   physician) on 08/19/2022 04:16 PM    Device Interrogation 8/23/22  ~ 2 pm:   [facility]   Mode: DDI 60; changed to VVI 40 bpm   Battery Voltage/Longevity:  3.01V, 5.9-8.6 years     Pacing: A: 27%   RV: 25%   P wave: >5 mV  Impedance: 380 ohms   Threshold: 1.0 V @ 0.5 ms  RV R wave: 10.6 mV  Impedance: 410 ohms   Threshold: 1.5 V @ 0.5 ms  Episodes: AT/AF detections ranging from 12 seconds to 8:44 minutes. June 18-most recent episode on Aug 20   Reprogramming included: changed to VVI 40   Overall device function is normal    8/24/22 pacemaker check   Mode: VVI 40   Pacing:  RV: <1%       Assessment/Plan:      1. Dual-chamber pacemaker in situ  - Initial indication for AV block with a long pause/ ? CHB in past on ILR.   - Date of implant July 28, 2021  - Pacemaker check in June revealed 6.5% atrial and 3% ventricular pacing with DDI programming at lower rate of 60 bpm.  - Initial interrogation during thi admission on 8/23/22 showed 27% AV and 25% RV paced.  - Noted bifascicular block with 2:1 block and second degree AV block type I on tele when pacer set at VVI 40 with RV paced <1%. - On Toprol 50 mg bid which held since 8/24/22   - Underwent full extraction on 8/25/22   - Currently in intermittent second degree AV block type I, 2 to 1 AV block and high grade AV block with HR  bpm and no significant pause seen. - Plan for MICRA AV leadless pacemaker implantation tomorrow.     2.  Bacteremia/sepsis--enterococcus faecalis  - Probably port infection per ID; mediport removed 8/20/22   - Persistent bacteremia despite IV antibiotics  8-16-22 blood cultures E faecalis x 2  8-17-22 blood cultures E faecalis x 1  8-18-22 blood culture + E faecalis x1  8-19-22 blood cultures neg to date  8-20-22 Port ( tip ) cx - neg tod date 8-20-22 blood cultures neg to date  8-23-22 blood cultures neg to date  8-25-22 blood cultures neg to date   - On IV Ampicillin and Rocephin  -ID is OK for MICRA AV leadless pacemaker implantation tomorrow if blood cultures remain negative. 3.  Lung cancer, squamous cell--patient undergoing chemotherapy  -  Stage IIIB T2aN3 Squamous Cell Carcinoma of Lung  diagnosis 5/27/2021, PD-L1 0%    - 7/6/21-9/7/21 treatment with concurrent chemotherapy/radiation therapy   - 11/2/21 consolidated with Imfinzi (Durvalumab) per ID IV every 4 weeks   - 6/30/22 PET showing progression and was started on Carbo, Gemzar and Keytruda on 6/30/22  - 8/9 /22 received C2D8 of Carboplatin AUC 2 with Gemzar 1000 mg/m2 IV on D1,8 and Keytruda IV on D1 on a 21 day cycle    4. Neutropenia and thrombocytopenia related to chemotherapy  - Improved. 5. Persistent anemia.    - Post PRBC transfusion 8/21/22  - Improved    6. History of PE   - Diagnosed in 2021   - On Eliquis in the past and currently on hold. Recommendations:    Plan for MICRA AV leadless pacemaker insertion tomorrow if ok with ID   Continue to hold Toprol XL and Eliquis. Continue to monitor on telemetry. NPO after midnight. Thank you for allowing me to participate in your patient's care. Please call me if there are any questions or concerns. I have spent a total of 35 minutes with the patient and the family reviewing the above stated recommendations. And a total of >50% of that time involved face-to-face time providing counseling and/or coordination of care with the other providers, reviewing records/tests, counseling/education of the patient, ordering medications/tests/procedures, coordinating care, and documenting clinical information in the EHR.      Adry Mendez MD  Cardiac Electrophysiology  [facility]) Physicians  Electrophysiology  8:51 AM  8/28/2022

## 2022-08-29 ENCOUNTER — ANESTHESIA EVENT (OUTPATIENT)
Dept: CARDIAC CATH/INVASIVE PROCEDURES | Age: 72
DRG: 228 | End: 2022-08-29
Payer: MEDICARE

## 2022-08-29 ENCOUNTER — APPOINTMENT (OUTPATIENT)
Dept: CARDIAC CATH/INVASIVE PROCEDURES | Age: 72
DRG: 228 | End: 2022-08-29
Payer: MEDICARE

## 2022-08-29 ENCOUNTER — ANESTHESIA (OUTPATIENT)
Dept: CARDIAC CATH/INVASIVE PROCEDURES | Age: 72
DRG: 228 | End: 2022-08-29
Payer: MEDICARE

## 2022-08-29 LAB
ANION GAP SERPL CALCULATED.3IONS-SCNC: 12 MMOL/L (ref 7–16)
ANISOCYTOSIS: ABNORMAL
BASOPHILS ABSOLUTE: 0 E9/L (ref 0–0.2)
BASOPHILS RELATIVE PERCENT: 0.3 % (ref 0–2)
BUN BLDV-MCNC: 4 MG/DL (ref 6–23)
CALCIUM SERPL-MCNC: 8.8 MG/DL (ref 8.6–10.2)
CHLORIDE BLD-SCNC: 101 MMOL/L (ref 98–107)
CO2: 30 MMOL/L (ref 22–29)
CREAT SERPL-MCNC: 0.8 MG/DL (ref 0.7–1.2)
EOSINOPHILS ABSOLUTE: 0 E9/L (ref 0.05–0.5)
EOSINOPHILS RELATIVE PERCENT: 0.4 % (ref 0–6)
GFR AFRICAN AMERICAN: >60
GFR NON-AFRICAN AMERICAN: >60 ML/MIN/1.73
GLUCOSE BLD-MCNC: 97 MG/DL (ref 74–99)
HCT VFR BLD CALC: 26.7 % (ref 37–54)
HEMOGLOBIN: 8 G/DL (ref 12.5–16.5)
HYPOCHROMIA: ABNORMAL
LYMPHOCYTES ABSOLUTE: 0.77 E9/L (ref 1.5–4)
LYMPHOCYTES RELATIVE PERCENT: 7.8 % (ref 20–42)
MCH RBC QN AUTO: 28.6 PG (ref 26–35)
MCHC RBC AUTO-ENTMCNC: 30 % (ref 32–34.5)
MCV RBC AUTO: 95.4 FL (ref 80–99.9)
METAMYELOCYTES RELATIVE PERCENT: 0.9 % (ref 0–1)
MONOCYTES ABSOLUTE: 0.96 E9/L (ref 0.1–0.95)
MONOCYTES RELATIVE PERCENT: 10.4 % (ref 2–12)
MYELOCYTE PERCENT: 2.6 % (ref 0–0)
NEUTROPHILS ABSOLUTE: 7.68 E9/L (ref 1.8–7.3)
NEUTROPHILS RELATIVE PERCENT: 76.5 % (ref 43–80)
PDW BLD-RTO: 20.9 FL (ref 11.5–15)
PLATELET # BLD: 244 E9/L (ref 130–450)
PMV BLD AUTO: 10.9 FL (ref 7–12)
POLYCHROMASIA: ABNORMAL
POTASSIUM SERPL-SCNC: 3.6 MMOL/L (ref 3.5–5)
PROMYELOCYTES PERCENT: 1.8 % (ref 0–0)
RBC # BLD: 2.8 E12/L (ref 3.8–5.8)
SODIUM BLD-SCNC: 143 MMOL/L (ref 132–146)
STOMATOCYTES: ABNORMAL
TEAR DROP CELLS: ABNORMAL
WBC # BLD: 9.6 E9/L (ref 4.5–11.5)

## 2022-08-29 PROCEDURE — 2580000003 HC RX 258: Performed by: INTERNAL MEDICINE

## 2022-08-29 PROCEDURE — 2709999900 HC NON-CHARGEABLE SUPPLY

## 2022-08-29 PROCEDURE — 80048 BASIC METABOLIC PNL TOTAL CA: CPT

## 2022-08-29 PROCEDURE — 6360000002 HC RX W HCPCS: Performed by: NURSE ANESTHETIST, CERTIFIED REGISTERED

## 2022-08-29 PROCEDURE — 2500000003 HC RX 250 WO HCPCS

## 2022-08-29 PROCEDURE — 6370000000 HC RX 637 (ALT 250 FOR IP): Performed by: NURSE PRACTITIONER

## 2022-08-29 PROCEDURE — 2580000003 HC RX 258: Performed by: NURSE PRACTITIONER

## 2022-08-29 PROCEDURE — 2580000003 HC RX 258

## 2022-08-29 PROCEDURE — 02HK3NZ INSERTION OF INTRACARDIAC PACEMAKER INTO RIGHT VENTRICLE, PERCUTANEOUS APPROACH: ICD-10-PCS | Performed by: INTERNAL MEDICINE

## 2022-08-29 PROCEDURE — 6360000002 HC RX W HCPCS: Performed by: ANESTHESIOLOGIST ASSISTANT

## 2022-08-29 PROCEDURE — 6360000002 HC RX W HCPCS

## 2022-08-29 PROCEDURE — 2140000000 HC CCU INTERMEDIATE R&B

## 2022-08-29 PROCEDURE — 2700000000 HC OXYGEN THERAPY PER DAY

## 2022-08-29 PROCEDURE — C1769 GUIDE WIRE: HCPCS

## 2022-08-29 PROCEDURE — 6370000000 HC RX 637 (ALT 250 FOR IP): Performed by: INTERNAL MEDICINE

## 2022-08-29 PROCEDURE — A4216 STERILE WATER/SALINE, 10 ML: HCPCS

## 2022-08-29 PROCEDURE — 6360000002 HC RX W HCPCS: Performed by: INTERNAL MEDICINE

## 2022-08-29 PROCEDURE — C1894 INTRO/SHEATH, NON-LASER: HCPCS

## 2022-08-29 PROCEDURE — 2580000003 HC RX 258: Performed by: NURSE ANESTHETIST, CERTIFIED REGISTERED

## 2022-08-29 PROCEDURE — 6360000002 HC RX W HCPCS: Performed by: NURSE PRACTITIONER

## 2022-08-29 PROCEDURE — 3700000001 HC ADD 15 MINUTES (ANESTHESIA)

## 2022-08-29 PROCEDURE — 33274 TCAT INSJ/RPL PERM LDLS PM: CPT | Performed by: INTERNAL MEDICINE

## 2022-08-29 PROCEDURE — 3700000000 HC ANESTHESIA ATTENDED CARE

## 2022-08-29 PROCEDURE — A4216 STERILE WATER/SALINE, 10 ML: HCPCS | Performed by: NURSE ANESTHETIST, CERTIFIED REGISTERED

## 2022-08-29 PROCEDURE — 36415 COLL VENOUS BLD VENIPUNCTURE: CPT

## 2022-08-29 PROCEDURE — C1786 PMKR, SINGLE, RATE-RESP: HCPCS

## 2022-08-29 PROCEDURE — 33274 TCAT INSJ/RPL PERM LDLS PM: CPT

## 2022-08-29 PROCEDURE — 94640 AIRWAY INHALATION TREATMENT: CPT

## 2022-08-29 PROCEDURE — 85025 COMPLETE CBC W/AUTO DIFF WBC: CPT

## 2022-08-29 RX ORDER — MIDAZOLAM HYDROCHLORIDE 1 MG/ML
INJECTION INTRAMUSCULAR; INTRAVENOUS PRN
Status: DISCONTINUED | OUTPATIENT
Start: 2022-08-29 | End: 2022-08-29 | Stop reason: SDUPTHER

## 2022-08-29 RX ORDER — SODIUM CHLORIDE 9 MG/ML
INJECTION INTRAVENOUS PRN
Status: DISCONTINUED | OUTPATIENT
Start: 2022-08-29 | End: 2022-08-29 | Stop reason: SDUPTHER

## 2022-08-29 RX ORDER — PROPOFOL 10 MG/ML
INJECTION, EMULSION INTRAVENOUS CONTINUOUS PRN
Status: DISCONTINUED | OUTPATIENT
Start: 2022-08-29 | End: 2022-08-29 | Stop reason: SDUPTHER

## 2022-08-29 RX ORDER — METOPROLOL SUCCINATE 25 MG/1
25 TABLET, EXTENDED RELEASE ORAL 2 TIMES DAILY WITH MEALS
Status: DISCONTINUED | OUTPATIENT
Start: 2022-08-29 | End: 2022-09-01 | Stop reason: SDUPTHER

## 2022-08-29 RX ORDER — SODIUM CHLORIDE 0.9 % (FLUSH) 0.9 %
5-40 SYRINGE (ML) INJECTION PRN
Status: DISCONTINUED | OUTPATIENT
Start: 2022-08-29 | End: 2022-09-03 | Stop reason: HOSPADM

## 2022-08-29 RX ORDER — SODIUM CHLORIDE 0.9 % (FLUSH) 0.9 %
5-40 SYRINGE (ML) INJECTION EVERY 12 HOURS SCHEDULED
Status: DISCONTINUED | OUTPATIENT
Start: 2022-08-29 | End: 2022-09-03 | Stop reason: HOSPADM

## 2022-08-29 RX ORDER — FENTANYL CITRATE 50 UG/ML
INJECTION, SOLUTION INTRAMUSCULAR; INTRAVENOUS PRN
Status: DISCONTINUED | OUTPATIENT
Start: 2022-08-29 | End: 2022-08-29 | Stop reason: SDUPTHER

## 2022-08-29 RX ORDER — HEPARIN SODIUM 10000 [USP'U]/ML
INJECTION, SOLUTION INTRAVENOUS; SUBCUTANEOUS PRN
Status: DISCONTINUED | OUTPATIENT
Start: 2022-08-29 | End: 2022-08-29 | Stop reason: SDUPTHER

## 2022-08-29 RX ORDER — FUROSEMIDE 40 MG/1
40 TABLET ORAL DAILY
Status: DISCONTINUED | OUTPATIENT
Start: 2022-08-30 | End: 2022-08-30

## 2022-08-29 RX ORDER — SODIUM CHLORIDE 9 MG/ML
INJECTION, SOLUTION INTRAVENOUS PRN
Status: DISCONTINUED | OUTPATIENT
Start: 2022-08-29 | End: 2022-09-03 | Stop reason: HOSPADM

## 2022-08-29 RX ORDER — SODIUM CHLORIDE 9 MG/ML
INJECTION, SOLUTION INTRAVENOUS CONTINUOUS PRN
Status: DISCONTINUED | OUTPATIENT
Start: 2022-08-29 | End: 2022-08-29 | Stop reason: SDUPTHER

## 2022-08-29 RX ADMIN — HEPARIN SODIUM 3000 UNITS: 10000 INJECTION INTRAVENOUS; SUBCUTANEOUS at 15:06

## 2022-08-29 RX ADMIN — CEFTRIAXONE 2000 MG: 2 INJECTION, POWDER, FOR SOLUTION INTRAMUSCULAR; INTRAVENOUS at 14:50

## 2022-08-29 RX ADMIN — AMPICILLIN SODIUM 2000 MG: 2 INJECTION, POWDER, FOR SOLUTION INTRAVENOUS at 06:50

## 2022-08-29 RX ADMIN — AMPICILLIN SODIUM 2000 MG: 2 INJECTION, POWDER, FOR SOLUTION INTRAVENOUS at 22:43

## 2022-08-29 RX ADMIN — ARFORMOTEROL TARTRATE 15 MCG: 15 SOLUTION RESPIRATORY (INHALATION) at 07:55

## 2022-08-29 RX ADMIN — ALBUTEROL SULFATE 2.5 MG: 2.5 SOLUTION RESPIRATORY (INHALATION) at 21:26

## 2022-08-29 RX ADMIN — AMPICILLIN SODIUM 2000 MG: 2 INJECTION, POWDER, FOR SOLUTION INTRAVENOUS at 17:41

## 2022-08-29 RX ADMIN — SODIUM CHLORIDE 20 ML: 9 INJECTION INTRAMUSCULAR; INTRAVENOUS; SUBCUTANEOUS at 14:50

## 2022-08-29 RX ADMIN — Medication 300 UNITS: at 09:34

## 2022-08-29 RX ADMIN — CEFTRIAXONE 2000 MG: 2 INJECTION, POWDER, FOR SOLUTION INTRAMUSCULAR; INTRAVENOUS at 03:24

## 2022-08-29 RX ADMIN — BUDESONIDE 500 MCG: 0.5 SUSPENSION RESPIRATORY (INHALATION) at 07:55

## 2022-08-29 RX ADMIN — FENTANYL CITRATE 25 MCG: 50 INJECTION, SOLUTION INTRAMUSCULAR; INTRAVENOUS at 14:40

## 2022-08-29 RX ADMIN — FUROSEMIDE 20 MG: 20 TABLET ORAL at 09:33

## 2022-08-29 RX ADMIN — PROPOFOL 25 MCG/KG/MIN: 10 INJECTION, EMULSION INTRAVENOUS at 14:49

## 2022-08-29 RX ADMIN — FENTANYL CITRATE 25 MCG: 50 INJECTION, SOLUTION INTRAMUSCULAR; INTRAVENOUS at 14:59

## 2022-08-29 RX ADMIN — ALBUTEROL SULFATE 2.5 MG: 2.5 SOLUTION RESPIRATORY (INHALATION) at 02:48

## 2022-08-29 RX ADMIN — CLOTRIMAZOLE 10 MG: 10 LOZENGE ORAL at 22:56

## 2022-08-29 RX ADMIN — SODIUM CHLORIDE, PRESERVATIVE FREE 10 ML: 5 INJECTION INTRAVENOUS at 09:34

## 2022-08-29 RX ADMIN — ARFORMOTEROL TARTRATE 15 MCG: 15 SOLUTION RESPIRATORY (INHALATION) at 21:26

## 2022-08-29 RX ADMIN — SODIUM CHLORIDE: 9 INJECTION, SOLUTION INTRAVENOUS at 14:23

## 2022-08-29 RX ADMIN — CLOTRIMAZOLE 10 MG: 10 LOZENGE ORAL at 17:16

## 2022-08-29 RX ADMIN — ACETAMINOPHEN 650 MG: 325 TABLET, FILM COATED ORAL at 18:47

## 2022-08-29 RX ADMIN — MIDAZOLAM 1.5 MG: 1 INJECTION INTRAMUSCULAR; INTRAVENOUS at 14:40

## 2022-08-29 RX ADMIN — AMPICILLIN SODIUM 2000 MG: 2 INJECTION, POWDER, FOR SOLUTION INTRAVENOUS at 12:48

## 2022-08-29 RX ADMIN — GUAIFENESIN 400 MG: 400 TABLET ORAL at 22:17

## 2022-08-29 RX ADMIN — GUAIFENESIN 400 MG: 400 TABLET ORAL at 09:33

## 2022-08-29 RX ADMIN — AMPICILLIN SODIUM 2000 MG: 2 INJECTION, POWDER, FOR SOLUTION INTRAVENOUS at 14:50

## 2022-08-29 RX ADMIN — CLOTRIMAZOLE 10 MG: 10 LOZENGE ORAL at 09:34

## 2022-08-29 RX ADMIN — BUDESONIDE 500 MCG: 0.5 SUSPENSION RESPIRATORY (INHALATION) at 21:26

## 2022-08-29 RX ADMIN — AMPICILLIN SODIUM 2000 MG: 2 INJECTION, POWDER, FOR SOLUTION INTRAVENOUS at 02:44

## 2022-08-29 RX ADMIN — ALBUTEROL SULFATE 2.5 MG: 2.5 SOLUTION RESPIRATORY (INHALATION) at 07:55

## 2022-08-29 RX ADMIN — ATORVASTATIN CALCIUM 20 MG: 20 TABLET, FILM COATED ORAL at 22:17

## 2022-08-29 RX ADMIN — SODIUM CHLORIDE, PRESERVATIVE FREE 10 ML: 5 INJECTION INTRAVENOUS at 22:17

## 2022-08-29 ASSESSMENT — PAIN DESCRIPTION - ORIENTATION
ORIENTATION: RIGHT
ORIENTATION: RIGHT

## 2022-08-29 ASSESSMENT — PAIN DESCRIPTION - DESCRIPTORS
DESCRIPTORS: ACHING;CRAMPING;NAGGING
DESCRIPTORS: ACHING;SORE;NAGGING

## 2022-08-29 ASSESSMENT — PAIN SCALES - GENERAL
PAINLEVEL_OUTOF10: 0
PAINLEVEL_OUTOF10: 0
PAINLEVEL_OUTOF10: 10

## 2022-08-29 ASSESSMENT — PAIN DESCRIPTION - LOCATION
LOCATION: FOOT
LOCATION: GROIN

## 2022-08-29 ASSESSMENT — PAIN - FUNCTIONAL ASSESSMENT: PAIN_FUNCTIONAL_ASSESSMENT: ACTIVITIES ARE NOT PREVENTED

## 2022-08-29 ASSESSMENT — LIFESTYLE VARIABLES: SMOKING_STATUS: 0

## 2022-08-29 NOTE — PROGRESS NOTES
Subjective:  Chart reviewed, no overnight event  Patient in cath lab for replacement of pacemaker    Objective:    /69   Pulse 80   Temp 97.4 °F (36.3 °C) (Temporal)   Resp 20   Ht 5' 6\" (1.676 m)   Wt 191 lb 9.6 oz (86.9 kg)   SpO2 94%   BMI 30.93 kg/m²     CBC with Differential:    Lab Results   Component Value Date/Time    WBC 9.6 08/29/2022 06:52 AM    RBC 2.80 08/29/2022 06:52 AM    HGB 8.0 08/29/2022 06:52 AM    HCT 26.7 08/29/2022 06:52 AM     08/29/2022 06:52 AM    MCV 95.4 08/29/2022 06:52 AM    MCH 28.6 08/29/2022 06:52 AM    MCHC 30.0 08/29/2022 06:52 AM    RDW 20.9 08/29/2022 06:52 AM    NRBC 1.8 08/25/2022 05:44 AM    BLASTSPCT 0.9 08/24/2022 05:22 AM    METASPCT 0.9 08/29/2022 06:52 AM    LYMPHOPCT 7.8 08/29/2022 06:52 AM    PROMYELOPCT 1.8 08/29/2022 06:52 AM    MONOPCT 10.4 08/29/2022 06:52 AM    MYELOPCT 2.6 08/29/2022 06:52 AM    BASOPCT 0.3 08/29/2022 06:52 AM    MONOSABS 0.96 08/29/2022 06:52 AM    LYMPHSABS 0.77 08/29/2022 06:52 AM    EOSABS 0.00 08/29/2022 06:52 AM    BASOSABS 0.00 08/29/2022 06:52 AM     CMP:    Lab Results   Component Value Date/Time     08/29/2022 06:52 AM    K 3.6 08/29/2022 06:52 AM    K 3.7 08/17/2022 04:17 AM     08/29/2022 06:52 AM    CO2 30 08/29/2022 06:52 AM    BUN 4 08/29/2022 06:52 AM    CREATININE 0.8 08/29/2022 06:52 AM    GFRAA >60 08/29/2022 06:52 AM    LABGLOM >60 08/29/2022 06:52 AM    GLUCOSE 97 08/29/2022 06:52 AM    PROT 5.9 08/20/2022 04:44 PM    LABALBU 3.2 08/20/2022 04:44 PM    CALCIUM 8.8 08/29/2022 06:52 AM    BILITOT <0.2 08/20/2022 04:44 PM    ALKPHOS 90 08/20/2022 04:44 PM    AST 25 08/20/2022 04:44 PM    ALT 29 08/20/2022 04:44 PM               Current Facility-Administered Medications:     albuterol (PROVENTIL) nebulizer solution 2.5 mg, 2.5 mg, Nebulization, Q6H, LELO Hernandez CNP, 2.5 mg at 08/29/22 0755    guaiFENesin tablet 400 mg, 400 mg, Oral, TID, LELO Hernandez CNP, 400 mg at 08/29/22 2302    Arformoterol Tartrate (BROVANA) nebulizer solution 15 mcg, 15 mcg, Nebulization, BID, Carmelina Koch, APRN - CNP, 15 mcg at 08/29/22 0755    budesonide (PULMICORT) nebulizer suspension 500 mcg, 0.5 mg, Nebulization, BID, Roel Patel, APRN - CNP, 500 mcg at 08/29/22 0755    sodium chloride flush 0.9 % injection 5-40 mL, 5-40 mL, IntraVENous, 2 times per day, Uniop[place] Wolfe, APRN - CNP, 10 mL at 08/29/22 0934    sodium chloride flush 0.9 % injection 5-40 mL, 5-40 mL, IntraVENous, PRN, Uniopolis Wolfe, APRN - CNP    0.9 % sodium chloride infusion, , IntraVENous, PRN, Uniopolis Wolfe, APRN - CNP    heparin flush 100 UNIT/ML injection 300 Units, 3 mL, IntraVENous, 2 times per day, Uniopolis Wolfe, APRN - CNP, 300 Units at 08/29/22 0934    heparin flush 100 UNIT/ML injection 300 Units, 3 mL, IntraCATHeter, PRN, Uniopolis Wolfe, APRN - CNP    morphine (PF) injection 2 mg, 2 mg, IntraVENous, Q4H PRN, LELO Doyle - CNP    sodium chloride (OCEAN, BABY AYR) 0.65 % nasal spray 1 spray, 1 spray, Each Nostril, PRN, LELO Doyle - CNP    trimethobenzamide (TIGAN) injection 200 mg, 200 mg, IntraMUSCular, Q6H PRN, LELO Doyle - RICA    oxyCODONE-acetaminophen (PERCOCET) 5-325 MG per tablet 1 tablet, 1 tablet, Oral, Q4H PRN, 1 tablet at 08/28/22 0703 **OR** oxyCODONE-acetaminophen (PERCOCET) 5-325 MG per tablet 2 tablet, 2 tablet, Oral, Q4H PRN, LELO Doyle - CNP    hydrALAZINE (APRESOLINE) injection 10 mg, 10 mg, IntraVENous, Q6H PRN, Eugene Lema APRN - CNP    clotrimazole [facility]) lindsay 10 mg, 10 mg, Oral, 4x daily, LELO Doyle - CNP, 10 mg at 08/29/22 0934    furosemide (LASIX) tablet 20 mg, 20 mg, Oral, Daily, LELO Doyle - CNP, 20 mg at 08/29/22 0933    potassium chloride (KLOR-CON M) extended release tablet 40 mEq, 40 mEq, Oral, PRN, 40 mEq at 08/28/22 0805 **OR** potassium bicarb-citric acid (EFFER-K) effervescent tablet 40 mEq, 40 mEq, Oral, PRN **OR** potassium situ    Squamous carcinoma of lung (HCC)    History pulmonary embolism ([facility] [address] on Eliquis  Resolved Problems:    * No resolved hospital problems. *    79-year-old gentleman known to Dr Yue Howell with stage IIIB T2aN3 Squamous Cell Lung Cancer of Left hilum, dx on 5/27/2021. Treated with concurrent chemo/XRT from 7/6/21 followed by consolidation Imfinzi. Progression on PET from 6/24/22. Started Brendalyn Beers and Keytruda on 6/30/22. Patient seen in office last 8/9 and received C2D8 of Carboplatin with Gemzar and Keytruda. Admitted with sepsis, Enterococcus bacteremia. Respiratory panel negative. Mediport was removed 8/20    Plan:  Anemia, prefer to keep hemoglobin above 7.5 g/dL due to poor pulmonary reserves  No transfusion PRBC indicated today but would advise a postop CBC. Enterococcus bacteremia: Status post port removal.  On broad-spectrum IV antibiotics. Followed by ID. Pacemaker to be removed 8/25, cultures remain negative  Metastatic non-small cell lung cancer: Treatment is currently on hold. Follow-up with Dr. Yue Howell in 2 weeks after discharge.     Electronically signed by MATTHEW Richey on 8/29/2022 at 10:31 AM

## 2022-08-29 NOTE — ANESTHESIA PRE PROCEDURE
Department of Anesthesiology  Preprocedure Note       Name:  Teena Montano   Age:  67 y.o.  :  1950                                          MRN:  [id number]         Date:  2022      Surgeon: Woody Noguera    Procedure: implant micrapacer    Medications prior to admission:   Prior to Admission medications    Medication Sig Start Date End Date Taking?  Authorizing Provider   predniSONE (DELTASONE) 5 MG tablet Take 5 mg by mouth daily    Basilio Bianchi MD   albuterol (ACCUNEB) 0.63 MG/3ML nebulizer solution Take 1 ampule by nebulization every 6 hours as needed for Wheezing    Historical Provider, MD   apixaban (ELIQUIS) 5 MG TABS tablet Take 5 mg by mouth 2 times daily  10/1/21   Historical Provider, MD   albuterol sulfate HFA (VENTOLIN HFA) 108 (90 Base) MCG/ACT inhaler Inhale 2 puffs into the lungs every 6 hours as needed for Wheezing    Historical Provider, MD   metoprolol succinate (TOPROL XL) 50 MG extended release tablet Take 1 tablet by mouth 2 times daily (with meals) 21   Jennifer Blackwell MD   simvastatin (ZOCOR) 40 MG tablet Take 40 mg by mouth daily     Historical Provider, MD   umeclidinium-vilanterol (ANORO ELLIPTA) 62.5-25 MCG/INH AEPB inhaler Inhale 1 puff into the lungs daily    Historical Provider, MD       Current medications:    Current Facility-Administered Medications   Medication Dose Route Frequency Provider Last Rate Last Admin    albuterol (PROVENTIL) nebulizer solution 2.5 mg  2.5 mg Nebulization Q6H Nora Carolina, APRN - CNP   2.5 mg at 22 0755    guaiFENesin tablet 400 mg  400 mg Oral TID Nora Carolina, APRN - CNP   400 mg at 22 0933    Arformoterol Tartrate (BROVANA) nebulizer solution 15 mcg  15 mcg Nebulization BID Colton Tejeda, APRN - CNP   15 mcg at 22 0755    budesonide (PULMICORT) nebulizer suspension 500 mcg  0.5 mg Nebulization BID Colton Tejeda, APRN - CNP   500 mcg at 22 0755    sodium chloride flush 0.9 % injection 5-40 mL  5-40 mL IntraVENous 2 times per day Mace Sirisha, APRN - CNP   10 mL at 08/29/22 0934    sodium chloride flush 0.9 % injection 5-40 mL  5-40 mL IntraVENous PRN Mace Sirisha, APRN - CNP        0.9 % sodium chloride infusion   IntraVENous PRN Mace Sirisha, APRN - CNP        heparin flush 100 UNIT/ML injection 300 Units  3 mL IntraVENous 2 times per day Mace Sirisha, APRN - CNP   300 Units at 08/29/22 0934    heparin flush 100 UNIT/ML injection 300 Units  3 mL IntraCATHeter PRN Mace Sirisha, APRN - CNP        morphine (PF) injection 2 mg  2 mg IntraVENous Q4H PRN Izabel Merlin, APRN - CNP        sodium chloride (OCEAN, BABY AYR) 0.65 % nasal spray 1 spray  1 spray Each Nostril PRN Izabel Merlin, APRN - CNP        trimethobenzamide Asmita Sharp) injection 200 mg  200 mg IntraMUSCular Q6H PRN Izabel Merlin, APRN - CNP        oxyCODONE-acetaminophen (PERCOCET) 5-325 MG per tablet 1 tablet  1 tablet Oral Q4H PRN Izabel Merlin, APRN - CNP   1 tablet at 08/28/22 0703    Or    oxyCODONE-acetaminophen (PERCOCET) 5-325 MG per tablet 2 tablet  2 tablet Oral Q4H PRN Izabel Merlin, APRN - CNP        hydrALAZINE (APRESOLINE) injection 10 mg  10 mg IntraVENous Q6H PRN Izabel Merlin, APRN - CNP        clotrimazole [facility]) lindsay 10 mg  10 mg Oral 4x daily Izabel Merlin, APRN - CNP   10 mg at 08/29/22 4817    furosemide (LASIX) tablet 20 mg  20 mg Oral Daily Izabel Merlin, APRN - CNP   20 mg at 08/29/22 0933    potassium chloride (KLOR-CON M) extended release tablet 40 mEq  40 mEq Oral PRN Izabel Merlin, APRN - CNP   40 mEq at 08/28/22 0805    Or    potassium bicarb-citric acid (EFFER-K) effervescent tablet 40 mEq  40 mEq Oral PRN Izabel Merlin, APRN - CNP        Or    potassium chloride 10 mEq/100 mL IVPB (Peripheral Line)  10 mEq IntraVENous PRN Izabel Merlin, APRN - CNP        ampicillin 2000 mg ivpb mini bag  2,000 mg IntraVENous 6 times per day Marius Merlin, APRN - CNP   Stopped at 08/29/22 1045    E87.1    Hypomagnesemia E83.42    Wide-complex tachycardia (HCC) I47.2    COVID-19 U07.1    Pancytopenia (HCC) D61.818    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 7XXA    Sepsis due to Enterococcus ([facility] A41.81    Bacteremia R78.81    High-grade atrioventricular block I44.39    Second degree AV block, Mobitz type I I44.1       Past Medical History:        Diagnosis Date    CHB (complete heart block) ([facility] 07/2021    COPD (chronic obstructive pulmonary disease) ([facility]     Syncope 09/2020    dr Yannick Coleman wearing a monitor    Syncope 07/2021       Past Surgical History:        Procedure Laterality Date    BRONCHOSCOPY N/A 05/27/2021    BRONCHOSCOPY W/EBUS FNA performed by Dominic Ivy MD at [facility] 05/27/2021    BRONCHOSCOPY DIAGNOSTIC OR CELL [address] ONLY performed by Dominic Ivy MD at [address] [place] [address] N/A 05/27/2021    BRONCHOSCOPY ADD ON COMPUTER ASSISTED performed by Dominic Ivy MD at [address] [place] [address] N/A 8/25/2022    CARDIAC LASER LEAD EXCHANGE performed by Shamika Patricio MD at [address] [facility] 2011 2001    groin     PACEMAKER CHANGE N/A 8/25/2022    CARDIAC LASER LEAD EXTRACTION, LASER LEAD EXTRACTION LEFT SIDED DEVICE, PACEMAKER DEVICE EXTRACTION performed by Shamika Patricio MD at [id number] [place] [address]  07/20/2021    DUAL PPM  (ABDULLAHI)  DR. Fatuma Staples PORT SURGERY Right 08/20/2022    PORT REMOVAL performed by Ochoa Herring MD at [place] [address] TRANSESOPHAGEAL ECHOCARDIOGRAM  08/22/2022    Dr Khoi Christensen       Social History:    Social History     Tobacco Use    Smoking status: Former     Packs/day: 2.00     Years: 25.00     Pack years: 50.00     Types: Cigarettes     Quit date: 12/26/2011     Years since quitting: 10.6    Smokeless tobacco: Never   Substance Use Topics    Alcohol use: Not Currently     Alcohol/week: 1.0 standard drink     Types: 1 Glasses of wine per week     Comment: 1 glass of wine per day prior                                Counseling given: Not Answered      Vital Signs (Current):   Vitals:    08/29/22 0507 08/29/22 0734 08/29/22 0757 08/29/22 1123   BP:  137/69  (!) 156/70   Pulse:  80  63   Resp:  20  19   Temp:  36.3 °C (97.4 °F)  36.1 °C (97 °F)   TempSrc:  Temporal  Temporal   SpO2:  100% 94% 98%   Weight: 191 lb 9.6 oz (86.9 kg)      Height:                                                  BP Readings from Last 3 Encounters:   08/29/22 (!) 156/70   06/16/22 (!) 142/54   05/27/22 127/62       NPO Status: Time of last liquid consumption: 2330                        Time of last solid consumption: 2000                        Date of last liquid consumption: 08/24/22                        Date of last solid food consumption: 08/24/22    BMI:   Wt Readings from Last 3 Encounters:   08/29/22 191 lb 9.6 oz (86.9 kg)   05/26/22 187 lb (84.8 kg)   04/01/22 187 lb 9.6 oz (85.1 kg)     Body mass index is 30.93 kg/m². CBC:   Lab Results   Component Value Date/Time    WBC 9.6 08/29/2022 06:52 AM    RBC 2.80 08/29/2022 06:52 AM    HGB 8.0 08/29/2022 06:52 AM    HCT 26.7 08/29/2022 06:52 AM    MCV 95.4 08/29/2022 06:52 AM    RDW 20.9 08/29/2022 06:52 AM     08/29/2022 06:52 AM       CMP:   Lab Results   Component Value Date/Time     08/29/2022 06:52 AM    K 3.6 08/29/2022 06:52 AM    K 3.7 08/17/2022 04:17 AM     08/29/2022 06:52 AM    CO2 30 08/29/2022 06:52 AM    BUN 4 08/29/2022 06:52 AM    CREATININE 0.8 08/29/2022 06:52 AM    GFRAA >60 08/29/2022 06:52 AM    LABGLOM >60 08/29/2022 06:52 AM    GLUCOSE 97 08/29/2022 06:52 AM    PROT 5.9 08/20/2022 04:44 PM    CALCIUM 8.8 08/29/2022 06:52 AM    BILITOT <0.2 08/20/2022 04:44 PM    ALKPHOS 90 08/20/2022 04:44 PM    AST 25 08/20/2022 04:44 PM    ALT 29 08/20/2022 04:44 PM       POC Tests: No results for input(s): POCGLU, POCNA, POCK, POCCL, POCBUN, POCHEMO, POCHCT in the last 72 hours.     Coags:   Lab Results Component Value Date/Time    PROTIME 12.6 08/24/2022 10:08 AM    INR 1.2 08/24/2022 10:08 AM    APTT 29.0 08/24/2022 10:08 AM       HCG (If Applicable): No results found for: PREGTESTUR, PREGSERUM, HCG, HCGQUANT     ABGs: No results found for: PHART, PO2ART, VPM8MHJ, TQI6UZX, BEART, Y0IMJFLX     Type & Screen (If Applicable):  No results found for: LABABO, LABRH    Drug/Infectious Status (If Applicable):  No results found for: HIV, HEPCAB    COVID-19 Screening (If Applicable):   Lab Results   Component Value Date/Time    COVID19 Not Detected 08/17/2022 04:17 AM           Anesthesia Evaluation  Patient summary reviewed and Nursing notes reviewed no history of anesthetic complications:   Airway: Mallampati: III  TM distance: >3 FB   Neck ROM: full  Mouth opening: > = 3 FB   Dental:          Pulmonary:   (+) pneumonia:  COPD:  decreased breath sounds     (-) not a current smoker                          ROS comment: Chronic resp failure w hypoxia  Home O2 nc   Cardiovascular:  Exercise tolerance: poor (<4 METS),   (+) hypertension:, dysrhythmias (intermittent CHB, wide complex tachycardia):, hyperlipidemia      ECG reviewed  Rhythm: irregular  Rate: normal  Echocardiogram reviewed    Cleared by cardiology           ROS comment: H/o pacemaker infection     Neuro/Psych:                ROS comment: syncope GI/Hepatic/Renal:            ROS comment: BPH. Endo/Other:    (+) blood dyscrasia: anticoagulation therapy:., malignancy/cancer (lung). Pt had no PAT visit        ROS comment: High grade enterococcus bacteremia Abdominal:       Abdomen: soft. Vascular:   + PE. Other Findings:           ORDERING SYSTEM PROVIDED HISTORY: left basal atelectasis   TECHNOLOGIST PROVIDED HISTORY:   Reason for exam:->left basal atelectasis   What reading provider will be dictating this exam?->CRC       FINDINGS:   Heart size is normal.  A tiny pericardial effusion is noted. There is   coronary artery calcification. Left subclavian pacemaker is noted. The   previously noted infiltrative left hilar mass extending to left upper lobe   and left lower lobe surrounding the broncho pulmonary vasculature is noted   currently measuring 4.6 x 4.3 cm with progression. There is some associated   atelectasis. The right lung is clear. There is no pleural effusion. Liver   is of grossly normal architecture. Degenerative changes are identified in   the thoracic spine. Impression   Progressive infiltrative mass in the left perihilar region as noted   concerning for progressive malignancy with adjacent atelectasis. Continued   surveillance is recommended. ONE XRAY VIEW OF THE CHEST       8/27/2022 1:30 pm       COMPARISON:   Chest x-ray 08/25/2022       HISTORY:   ORDERING SYSTEM PROVIDED HISTORY: picc   TECHNOLOGIST PROVIDED HISTORY:   Reason for exam:->picc   What reading provider will be dictating this exam?->CRC       FINDINGS:   Right arm PICC terminates in the upper SVC. No evidence of pneumothorax. Linear bands of atelectasis versus scarring in both lungs. Stable small left pleural effusion. Impression   Right arm PICC terminates in the upper SVC. Type of Study      SHIRA procedure:Transesophageal Echo (SHIRA), Doppler Echocardiography, Color   Flow Velocity Mapping. Procedure Date  Date: 08/22/2022 Start: 03:38 PM     Study Location: Portable  Technical Quality: Adequate visualization     Indications:R/O Vegetation. Patient Status: Routine     Height: 66 inches Weight: 187 pounds BSA: 1.94 m^2 BMI: 30.18 kg/m^2     SHIRA Performed By: the attending and the sonographer      Type of Anesthesia: Moderate sedation     Allergies    - No known allergies. Findings      Left Ventricle   Normal left ventricular chamber size and systolic function. Right Ventricle   Normal right ventricle size and function. Pacemaker lead noted.       Left Atrium   Left atrium is of normal size. Interatrial septum appears intact. Right Atrium   Normal right atrium. Right atrial pacemaker lead noted. Mitral Valve   Normal mitral valve structure. There is trace mitral regurgitation. No mitral valve prolapse. No vegetations. Tricuspid Valve   Normal tricuspid valve structure. There is trace tricuspid regurgitation. No vegetations. Aortic Valve   The aortic valve appears mildly sclerotic. No vegetations. Pulmonic Valve   The pulmonic valve was not well visualized. Pericardial Effusion   Epicardial fat vs. small pericardial effusion. Pericardium appears normal.      Pleural Effusion   No evidence of pleural effusion. Aorta   Normal aortic root size. Moderate atherosclerosis seen in the descending thoracic aorta. Conclusions      Summary   No SHIRA indication of Endocarditis. Normal left ventricular chamber size and systolic function. Interatrial septum appears intact. Normal right ventricle size and function. Pacemaker leads noted in right atrium and right ventricle. Normal aortic root size. Moderate atherosclerosis in the descending thoracic aorta. Epicardial fat vs. small pericardial effusion. No intra cardiac mass or thrombus. Technically sub-optimal images. Compared to prior sub-optimal TTE from 8/19/22. Signature      ----------------------------------------------------------------   Electronically signed by Omero Lopez MD(Interpreting   physician) on 08/22/2022 06:50 PM   ----------------------------------------------------------------       Anesthesia Plan      MAC     ASA 4       Induction: intravenous. Anesthetic plan and risks discussed with patient. Use of blood products discussed with patient whom. Plan discussed with attending. Suraj Tillman, LELO - CRNA   8/29/2022      Patient seen and examined, chart reviewed, agree with above findings.   Anesthetic plan, risks, benefits, alternatives, and personnel involved discussed with patient. Patient verbalized an understanding and agreed to proceed. NPO status confirmed. Anesthetic plan discussed with care team members and agreed upon.     Trice Lo DO   8/29/2022  2:41 PM

## 2022-08-29 NOTE — PROGRESS NOTES
OT SESSION ATTEMPT     Date:2022  Patient Name: Thais Morales  MRN: [id number]  : 1950  Room: [address] [place] [address]     OCCUPATIONAL THERAPY TREATMENT NOTE    [address]      Attempted OT session this date:    [] unavailable due to other medical staff currently with pt   [] on hold per nursing staff   [] on hold per nursing staff secondary to lab / radiology results    [] declined treatment  this date due to ____. Benefits of participation in therapy reviewed with pt. [x] off unit   [] Other:     Continue with current OT P. O.C at a later date/time.       Kirk Art [id number]

## 2022-08-29 NOTE — PROGRESS NOTES
Came to see patient, second visit. Still JESSICA. 4;49 pm    Came in to see patient, currently JESSICA ( cardiac cath). Will re-evaluate later.  3:51 pm

## 2022-08-29 NOTE — PROGRESS NOTES
for  Sepsis/bacteremia/immunocompromise status-acutely ill status  -Mediport removed 8/20  -CTS consulted by EP for removal of pacer, completed 8/25  -Respiratory panel negative  -CT chest ordered by pulm> progressive infiltrative mass in the left perihilar region  -On daptomycin per infectious disease > switched to IV Rocephin and ampicillin  -General surgery > signed off   -SHIRA 8/22/2022-unremarkable, no evidence of endocarditis, ejection fraction  -Resume Trelegy as he takes at home-okay to use from home if okay with pulmonology  -Pacemaker placed 8/29/2022-tolerated well  -PICC placed     Leukocytosis, likely reactive from surgery   -Monitor CBC daily  -WBC - 15.8>9.6    Hypomagnesemia  -Mg+ 1.3 > replaced with 2 g  Labs pending    Pancytopenia  -Consult heme-onc-following  -h/h 6.9/21.4 > stable now in mid eights post transfusion  -Platelets 4 > transfuse 1 unit platelets > 23 > 214 today   -Hold Eliquis-consider holding off for few weeks  Thrombocytopenia resolved     Hematuria  -Check urine culture  -Hold Eliquis    DVT Prophylaxis PCD's  PT/OT  Discharge planning-monitor overnight, okay for discharge tomorrow to select for ongoing IV antibiotic therapy      LELO Lincoln CNP  2:29 PM  8/29/2022     Above note edited to reflect my thoughts     I personally saw, examined and provided care for the patient. Radiographs, labs and medication list were reviewed by me independently. The case was discussed in detail and plans for care were established. Review of Kemi FELICIANO CNP, documentation was conducted and revisions were made as appropriate directly by me. I agree with the above documented exam, problem list, and plan of care.      Giovanny Maya MD  7:13 PM  8/29/2022

## 2022-08-29 NOTE — PATIENT CARE CONFERENCE
[facility] Quality Flow/Interdisciplinary Rounds Progress Note        Quality Flow Rounds held on August 29, 2022    Disciplines Attending:  Bedside Nurse, , , and Nursing Unit Leadership    Sabrina Broussard was admitted on 8/16/2022  2:38 PM    Anticipated Discharge Date:       Disposition:    Kojo Score:  Kojo Scale Score: 21    Readmission Risk              Risk of Unplanned Readmission:  24           Discussed patient goal for the day, patient clinical progression, and barriers to discharge.   The following Goal(s) of the Day/Commitment(s) have been identified:   have patient ready for cvl      Jimi Covington RN  August 29, 2022

## 2022-08-29 NOTE — OP NOTE
femoral vein. The 8-Fr sheath was removed over an exchange length Amplatzer super stiff guidewire for a seriess of dilators up to 24Fr to help the insertion of the final sheath. A 3000 unit bolus of heparin was given at this time. The Micra delivery sheath was prepped in the usual fashion. The Micra delivery sheath was advanced over the Amzplatzer guidewire up into the high right atrium. The Micra delivery catheter was also prepped in the usual fashion and inserted into the delivery sheath. Heparinized saline flush was attached to the side-arm of the delivery sheath. The Micra deliver catheter was advanced into the high right atria and curved across the tricuspid valve. The device was then advanced to the mid septum which was confirmed with numerous contrast injections using orthogonal flouroscopic QUINTANILLA/NISREEN views. The tether was unlocked and the device was delivered using adequate forward tension to ensure adequate fixation into the ventricular myocardium. Multiple Cine images were taken in orthogonal views again to confirm that at least 2 active fixation splines were well attached. A tug test was performed and this was confirmed. Device testing was performed and adequate sensing, impedance and capture threshold were determined. Please see above for details. Once we were satisfied with the device interrogation/reprogramming we decided to release the Micra device from the delivery catheter. The tether pin was cut and the tether mechanism was slowly removed. Final flouroscopy and testing confirmed no significant changes in position or electrical measurements. The delivery catheter was removed and the delivery sheath was also removed. The right femoral vein access site was closed using an 0-Ethibond sutures using a \"figure of eight\" technique. Additional manual compression was held for hemostasis.  At the end of the procedure the patient was hemodynamically stable and under the care of the anesthesiology staff and will be brought back to the recovery area for post procedural monitoring. Conclusions:  Successful implantation of leadless pacemaker (Medtronic Micra AV) into the right ventricle via trans-catheter technique. Recommendations  1. Six hours of bed-rest is recommended and suture removal will be performed after 3 hours. 2. PA and lateral chest x-ray to check device placement  in the morning. 3. Post-procedural monitoring on the telemetry. 4. The patient will receive 24-hours of hong-operative intravenous antibiotics. 5. The patient's device will be interrogated in the morning by a representative of the device . 6 The patient is to follow-up in device clinic within 1-2 weeks upon discharge for a wound and device checks.   7. The patient is advised follow all post-operative device and wound care instructions as per the information provided in the pre-operative clinic    Lorrayne Dakins, MD  Cardiac Electrophysiology  [address]  [facility]: [facility] Electrophysiology  4:44 PM  8/29/2022

## 2022-08-29 NOTE — PROGRESS NOTES
[address] [place] [address] [facility]  Progress Note          C/C : High grade Enterococcus bacteremia, sepsis       SUBJECTIVE:  Patient is tolerating medications. No reported adverse drug reactions. No nausea, vomiting, diarrhea. SOB with exertion   Denies fever or chills   Afebrile   S/p pacer removal   Sitting at edge of bed  Wife is present  N.p.o. for pacer insertion  Review of systems:  As stated above in the chief complaint, otherwise negative. Medications:  Scheduled Meds:   albuterol  2.5 mg Nebulization Q6H    guaiFENesin  400 mg Oral TID    Arformoterol Tartrate  15 mcg Nebulization BID    budesonide  0.5 mg Nebulization BID    sodium chloride flush  5-40 mL IntraVENous 2 times per day    heparin flush  3 mL IntraVENous 2 times per day    clotrimazole  10 mg Oral 4x daily    furosemide  20 mg Oral Daily    ampicillin IV  2,000 mg IntraVENous 6 times per day    cefTRIAXone (ROCEPHIN) IV  2,000 mg IntraVENous Q12H    [Held by provider] apixaban  5 mg Oral BID    [Held by provider] metoprolol succinate  50 mg Oral BID WC    atorvastatin  20 mg Oral Daily    sodium chloride flush  5-40 mL IntraVENous 2 times per day     Continuous Infusions:   sodium chloride      sodium chloride       PRN Meds:sodium chloride flush, sodium chloride, heparin flush, morphine, sodium chloride, trimethobenzamide, oxyCODONE-acetaminophen **OR** oxyCODONE-acetaminophen, hydrALAZINE, potassium chloride **OR** potassium alternative oral replacement **OR** potassium chloride, melatonin, albuterol, sodium chloride flush, sodium chloride, acetaminophen **OR** acetaminophen, senna    OBJECTIVE:  BP (!) 156/70   Pulse 63   Temp 97 °F (36.1 °C) (Temporal)   Resp 19   Ht 5' 6\" (1.676 m)   Wt 191 lb 9.6 oz (86.9 kg)   SpO2 98%   BMI 30.93 kg/m²   Temp  Av.7 °F (36.5 °C)  Min: 97 °F (36.1 °C)  Max: 98.6 °F (37 °C)    Constitutional: The patient is awake, alert, and oriented. Skin: Warm and dry.  No rashes were noted. Old pacer site no redness  HEENT: Round and reactive pupils. Moist mucous membranes. No ulcerations or thrush. Neck: Supple to movements. Chest: Crackles right base but otherwise good air exchange  Cardiovascular: S1 and S2 are rhythmic and regular. No murmurs appreciated. Abdomen: Positive bowel sounds to auscultation. Benign to palpation. No masses felt. No hepatosplenomegaly. Genitourinary: male  Extremities: No clubbing, no cyanosis, + ble edema.   Lines: peripheral    Laboratory and Tests Review:  Lab Results   Component Value Date    WBC 9.6 08/29/2022    WBC 15.8 (H) 08/28/2022    WBC 22.3 (H) 08/26/2022    HGB 8.0 (L) 08/29/2022    HCT 26.7 (L) 08/29/2022    MCV 95.4 08/29/2022     08/29/2022     Lab Results   Component Value Date    NEUTROABS 7.68 (H) 08/29/2022    NEUTROABS 13.90 (H) 08/28/2022    NEUTROABS 20.29 (H) 08/26/2022     No results found for: [facility]  Lab Results   Component Value Date    ALT 29 08/20/2022    AST 25 08/20/2022    ALKPHOS 90 08/20/2022    BILITOT <0.2 08/20/2022     Lab Results   Component Value Date/Time     08/29/2022 06:52 AM    K 3.6 08/29/2022 06:52 AM    K 3.7 08/17/2022 04:17 AM     08/29/2022 06:52 AM    CO2 30 08/29/2022 06:52 AM    BUN 4 08/29/2022 06:52 AM    CREATININE 0.8 08/29/2022 06:52 AM    CREATININE 0.9 08/28/2022 06:56 AM    CREATININE 1.0 08/27/2022 07:25 AM    GFRAA >60 08/29/2022 06:52 AM    LABGLOM >60 08/29/2022 06:52 AM    GLUCOSE 97 08/29/2022 06:52 AM    PROT 5.9 08/20/2022 04:44 PM    LABALBU 3.2 08/20/2022 04:44 PM    CALCIUM 8.8 08/29/2022 06:52 AM    BILITOT <0.2 08/20/2022 04:44 PM    ALKPHOS 90 08/20/2022 04:44 PM    AST 25 08/20/2022 04:44 PM    ALT 29 08/20/2022 04:44 PM     Lab Results   Component Value Date    CRP 9.6 (H) 05/24/2022     No results found for: [address]  Radiology:  Reviewed CT lung    Microbiology:   Lab Results   Component Value Date/Time    BC 24 Hours no growth 08/25/2022 05:04 PM    BC 5 Days no growth 08/23/2022 10:23 AM    BC 5 Days no growth 08/19/2022 05:05 AM    ORG Enterococcus faecalis 08/18/2022 07:10 AM    ORG Enterococcus faecalis 08/18/2022 07:05 AM    ORG Enterococcus faecalis 08/17/2022 05:40 PM     Lab Results   Component Value Date/Time    BLOODCULT2 24 Hours no growth 08/25/2022 05:05 PM    BLOODCULT2 5 Days no growth 08/23/2022 10:27 AM    BLOODCULT2 5 Days no growth 08/20/2022 05:30 AM    ORG Enterococcus faecalis 08/18/2022 07:10 AM    ORG Enterococcus faecalis 08/18/2022 07:05 AM    ORG Enterococcus faecalis 08/17/2022 05:40 PM     No results found for: WNDABS  No results found for: RESPSMEAR  No results found for: MPNEUMO, CLAMYDCU, LABLEGI, AFBCX, FUNGSM, LABFUNG  No results found for: CULTRESP  Culture Catheter Tip   Date Value Ref Range Status   08/20/2022 Growth not present  Final     No results found for: BFCS  Culture Surgical   Date Value Ref Range Status   08/25/2022 Growth not present  Final   08/25/2022 Growth not present  Final     Urine Culture, Routine   Date Value Ref Range Status   08/16/2022   Final    10 to 100,000 CFU/mL  Mixed heydi isolated. Further workup and sensitivity testing  is not routinely indicated and will not be performed.   Mixed heydi isolated includes:  Mixed gram positive organisms     05/18/2019 <25,000 CFU/ml  Final     No results found for: [address]:  8-16-22 blood cultures E faecalis x 2  8-17-22 blood cultures E faecalis x 1  8-18-22 blood culture + E faecalis x1  8-19-22 blood cultures neg to date  8-20-22 Port ( tip ) cx - neg tod date   8-20-22 blood cultures neg to date  8/25/22 pacer cx pending  8/25/2 blood cx pending      ASSESSMENT:  High grade E faecalis bacteremia probably port infection; RO endocarditis and pacer infection s/p mediport removal ( 8/20) - follow up cx neg on antimicrobials  Immunocompromised host c pancytopenia; on chemo for Sq cell of lung; platelets are up and white count is stable  thrush-improved      PLAN:  Continue with  amp 2 grams IV q 4 hrs and  ceftriaxone 2 grams IV q 12 hrs   SHIRA nondiagnostic  S/p Removal of  pacer-  PICC line ordered-inserted  Monitor labs  Plan for new pacer today  Lester Medrano MD  2:07 PM  8/29/2022

## 2022-08-30 ENCOUNTER — APPOINTMENT (OUTPATIENT)
Dept: GENERAL RADIOLOGY | Age: 72
DRG: 228 | End: 2022-08-30
Payer: MEDICARE

## 2022-08-30 PROBLEM — T14.8XXA HEMATOMA: Status: ACTIVE | Noted: 2022-08-30

## 2022-08-30 PROBLEM — Z95.0 PRESENCE OF LEADLESS CARDIAC PACEMAKER: Status: ACTIVE | Noted: 2022-01-01

## 2022-08-30 LAB
ABO/RH: NORMAL
ANION GAP SERPL CALCULATED.3IONS-SCNC: 17 MMOL/L (ref 7–16)
ANISOCYTOSIS: ABNORMAL
ANTIBODY SCREEN: NORMAL
BASOPHILS ABSOLUTE: 0 E9/L (ref 0–0.2)
BASOPHILS RELATIVE PERCENT: 0.5 % (ref 0–2)
BLOOD BANK DISPENSE STATUS: NORMAL
BLOOD BANK PRODUCT CODE: NORMAL
BLOOD CULTURE, ROUTINE: NORMAL
BPU ID: NORMAL
BUN BLDV-MCNC: 10 MG/DL (ref 6–23)
CALCIUM SERPL-MCNC: 8.8 MG/DL (ref 8.6–10.2)
CHLORIDE BLD-SCNC: 98 MMOL/L (ref 98–107)
CO2: 26 MMOL/L (ref 22–29)
CREAT SERPL-MCNC: 1.1 MG/DL (ref 0.7–1.2)
CULTURE, BLOOD 2: NORMAL
DESCRIPTION BLOOD BANK: NORMAL
EOSINOPHILS ABSOLUTE: 0.1 E9/L (ref 0.05–0.5)
EOSINOPHILS RELATIVE PERCENT: 0.8 % (ref 0–6)
GFR AFRICAN AMERICAN: >60
GFR NON-AFRICAN AMERICAN: >60 ML/MIN/1.73
GLUCOSE BLD-MCNC: 107 MG/DL (ref 74–99)
HCT VFR BLD CALC: 25.1 % (ref 37–54)
HCT VFR BLD CALC: 28.4 % (ref 37–54)
HEMOGLOBIN: 7.8 G/DL (ref 12.5–16.5)
HEMOGLOBIN: 8.9 G/DL (ref 12.5–16.5)
HYPOCHROMIA: ABNORMAL
LYMPHOCYTES ABSOLUTE: 0.71 E9/L (ref 1.5–4)
LYMPHOCYTES RELATIVE PERCENT: 6.1 % (ref 20–42)
MCH RBC QN AUTO: 30 PG (ref 26–35)
MCHC RBC AUTO-ENTMCNC: 31.1 % (ref 32–34.5)
MCV RBC AUTO: 96.5 FL (ref 80–99.9)
METAMYELOCYTES RELATIVE PERCENT: 3.5 % (ref 0–1)
MONOCYTES ABSOLUTE: 1.67 E9/L (ref 0.1–0.95)
MONOCYTES RELATIVE PERCENT: 13.9 % (ref 2–12)
MYELOCYTE PERCENT: 3.5 % (ref 0–0)
NEUTROPHILS ABSOLUTE: 9.4 E9/L (ref 1.8–7.3)
NEUTROPHILS RELATIVE PERCENT: 72.2 % (ref 43–80)
OVALOCYTES: ABNORMAL
PDW BLD-RTO: 21.6 FL (ref 11.5–15)
PLATELET # BLD: 242 E9/L (ref 130–450)
PMV BLD AUTO: 11.5 FL (ref 7–12)
POIKILOCYTES: ABNORMAL
POLYCHROMASIA: ABNORMAL
POTASSIUM SERPL-SCNC: 4.2 MMOL/L (ref 3.5–5)
RBC # BLD: 2.6 E12/L (ref 3.8–5.8)
SODIUM BLD-SCNC: 141 MMOL/L (ref 132–146)
WBC # BLD: 11.9 E9/L (ref 4.5–11.5)

## 2022-08-30 PROCEDURE — 6360000002 HC RX W HCPCS: Performed by: INTERNAL MEDICINE

## 2022-08-30 PROCEDURE — 2580000003 HC RX 258: Performed by: INTERNAL MEDICINE

## 2022-08-30 PROCEDURE — 93286 PERI-PX EVAL PM/LDLS PM IP: CPT | Performed by: STUDENT IN AN ORGANIZED HEALTH CARE EDUCATION/TRAINING PROGRAM

## 2022-08-30 PROCEDURE — 86923 COMPATIBILITY TEST ELECTRIC: CPT

## 2022-08-30 PROCEDURE — 86900 BLOOD TYPING SEROLOGIC ABO: CPT

## 2022-08-30 PROCEDURE — 71046 X-RAY EXAM CHEST 2 VIEWS: CPT

## 2022-08-30 PROCEDURE — 2700000000 HC OXYGEN THERAPY PER DAY

## 2022-08-30 PROCEDURE — 85025 COMPLETE CBC W/AUTO DIFF WBC: CPT

## 2022-08-30 PROCEDURE — P9016 RBC LEUKOCYTES REDUCED: HCPCS

## 2022-08-30 PROCEDURE — 6370000000 HC RX 637 (ALT 250 FOR IP): Performed by: INTERNAL MEDICINE

## 2022-08-30 PROCEDURE — 85014 HEMATOCRIT: CPT

## 2022-08-30 PROCEDURE — 36415 COLL VENOUS BLD VENIPUNCTURE: CPT

## 2022-08-30 PROCEDURE — 2140000000 HC CCU INTERMEDIATE R&B

## 2022-08-30 PROCEDURE — 99233 SBSQ HOSP IP/OBS HIGH 50: CPT | Performed by: STUDENT IN AN ORGANIZED HEALTH CARE EDUCATION/TRAINING PROGRAM

## 2022-08-30 PROCEDURE — 36430 TRANSFUSION BLD/BLD COMPNT: CPT

## 2022-08-30 PROCEDURE — 86850 RBC ANTIBODY SCREEN: CPT

## 2022-08-30 PROCEDURE — 85018 HEMOGLOBIN: CPT

## 2022-08-30 PROCEDURE — 94640 AIRWAY INHALATION TREATMENT: CPT

## 2022-08-30 PROCEDURE — 86901 BLOOD TYPING SEROLOGIC RH(D): CPT

## 2022-08-30 PROCEDURE — 80048 BASIC METABOLIC PNL TOTAL CA: CPT

## 2022-08-30 RX ORDER — SODIUM CHLORIDE 9 MG/ML
INJECTION, SOLUTION INTRAVENOUS PRN
Status: DISCONTINUED | OUTPATIENT
Start: 2022-08-30 | End: 2022-09-03 | Stop reason: HOSPADM

## 2022-08-30 RX ORDER — LEVALBUTEROL INHALATION SOLUTION 0.63 MG/3ML
0.63 SOLUTION RESPIRATORY (INHALATION) EVERY 8 HOURS PRN
Status: DISCONTINUED | OUTPATIENT
Start: 2022-08-30 | End: 2022-09-03 | Stop reason: HOSPADM

## 2022-08-30 RX ORDER — FUROSEMIDE 10 MG/ML
20 INJECTION INTRAMUSCULAR; INTRAVENOUS ONCE
Status: COMPLETED | OUTPATIENT
Start: 2022-08-30 | End: 2022-08-30

## 2022-08-30 RX ADMIN — AMPICILLIN SODIUM 2000 MG: 2 INJECTION, POWDER, FOR SOLUTION INTRAVENOUS at 02:41

## 2022-08-30 RX ADMIN — CEFTRIAXONE 2000 MG: 2 INJECTION, POWDER, FOR SOLUTION INTRAMUSCULAR; INTRAVENOUS at 03:21

## 2022-08-30 RX ADMIN — SODIUM CHLORIDE, PRESERVATIVE FREE 10 ML: 5 INJECTION INTRAVENOUS at 15:41

## 2022-08-30 RX ADMIN — ARFORMOTEROL TARTRATE 15 MCG: 15 SOLUTION RESPIRATORY (INHALATION) at 21:36

## 2022-08-30 RX ADMIN — ALBUTEROL SULFATE 2.5 MG: 2.5 SOLUTION RESPIRATORY (INHALATION) at 09:04

## 2022-08-30 RX ADMIN — BUDESONIDE 500 MCG: 0.5 SUSPENSION RESPIRATORY (INHALATION) at 09:05

## 2022-08-30 RX ADMIN — GUAIFENESIN 400 MG: 400 TABLET ORAL at 20:15

## 2022-08-30 RX ADMIN — BUDESONIDE 500 MCG: 0.5 SUSPENSION RESPIRATORY (INHALATION) at 21:36

## 2022-08-30 RX ADMIN — ATORVASTATIN CALCIUM 20 MG: 20 TABLET, FILM COATED ORAL at 20:16

## 2022-08-30 RX ADMIN — ARFORMOTEROL TARTRATE 15 MCG: 15 SOLUTION RESPIRATORY (INHALATION) at 09:05

## 2022-08-30 RX ADMIN — SODIUM CHLORIDE, PRESERVATIVE FREE 10 ML: 5 INJECTION INTRAVENOUS at 10:20

## 2022-08-30 RX ADMIN — CLOTRIMAZOLE 10 MG: 10 LOZENGE ORAL at 15:42

## 2022-08-30 RX ADMIN — AMPICILLIN SODIUM 2000 MG: 2 INJECTION, POWDER, FOR SOLUTION INTRAVENOUS at 18:09

## 2022-08-30 RX ADMIN — AMPICILLIN SODIUM 2000 MG: 2 INJECTION, POWDER, FOR SOLUTION INTRAVENOUS at 10:28

## 2022-08-30 RX ADMIN — CLOTRIMAZOLE 10 MG: 10 LOZENGE ORAL at 20:15

## 2022-08-30 RX ADMIN — Medication 300 UNITS: at 10:08

## 2022-08-30 RX ADMIN — SODIUM CHLORIDE, PRESERVATIVE FREE 10 ML: 5 INJECTION INTRAVENOUS at 20:16

## 2022-08-30 RX ADMIN — FUROSEMIDE 20 MG: 10 INJECTION, SOLUTION INTRAMUSCULAR; INTRAVENOUS at 15:41

## 2022-08-30 RX ADMIN — CEFTRIAXONE 2000 MG: 2 INJECTION, POWDER, FOR SOLUTION INTRAMUSCULAR; INTRAVENOUS at 17:54

## 2022-08-30 RX ADMIN — CLOTRIMAZOLE 10 MG: 10 LOZENGE ORAL at 10:20

## 2022-08-30 RX ADMIN — GUAIFENESIN 400 MG: 400 TABLET ORAL at 15:42

## 2022-08-30 RX ADMIN — AMPICILLIN SODIUM 2000 MG: 2 INJECTION, POWDER, FOR SOLUTION INTRAVENOUS at 21:35

## 2022-08-30 RX ADMIN — ALBUTEROL SULFATE 2.5 MG: 2.5 SOLUTION RESPIRATORY (INHALATION) at 03:11

## 2022-08-30 RX ADMIN — Medication 300 UNITS: at 00:48

## 2022-08-30 RX ADMIN — ALBUTEROL SULFATE 2.5 MG: 2.5 SOLUTION RESPIRATORY (INHALATION) at 06:20

## 2022-08-30 RX ADMIN — Medication 300 UNITS: at 20:15

## 2022-08-30 RX ADMIN — GUAIFENESIN 400 MG: 400 TABLET ORAL at 10:20

## 2022-08-30 RX ADMIN — AMPICILLIN SODIUM 2000 MG: 2 INJECTION, POWDER, FOR SOLUTION INTRAVENOUS at 07:04

## 2022-08-30 NOTE — PROGRESS NOTES
Subjective:  Chart reviewed  SOB overnight with sweating  This has happened in the distant past, no associated chest pain, pressure and doesn't feel due to anxiety  Tired today because he didn't sleep well  Leadless pacemaker placed 8/29  Some oozing from femoral access    Objective:    BP (!) 136/46   Pulse 73   Temp 97 °F (36.1 °C)   Resp 24   Ht 5' 6\" (1.676 m)   Wt 191 lb 9.6 oz (86.9 kg)   SpO2 96%   BMI 30.93 kg/m²     General: NAD, awake and alert  HEENT: No thrush or mucositis, EOMI, PERRLA  Heart:  tachycardic, chest with well-healed PORT and pacemaker removal  Lungs: respiration nonlabored, CTA bilaterally  Abd: nontender, nondistended, soft  Extrem:  No clubbing, cyanosis, 2+ bilateral pedal edema  Skin: Intact, no petechia or purpura    CBC with Differential:    Lab Results   Component Value Date/Time    WBC 11.9 08/30/2022 07:03 AM    RBC 2.60 08/30/2022 07:03 AM    HGB 7.8 08/30/2022 07:03 AM    HCT 25.1 08/30/2022 07:03 AM     08/30/2022 07:03 AM    MCV 96.5 08/30/2022 07:03 AM    MCH 30.0 08/30/2022 07:03 AM    MCHC 31.1 08/30/2022 07:03 AM    RDW 21.6 08/30/2022 07:03 AM    NRBC 1.8 08/25/2022 05:44 AM    BLASTSPCT 0.9 08/24/2022 05:22 AM    METASPCT 3.5 08/30/2022 07:03 AM    LYMPHOPCT 6.1 08/30/2022 07:03 AM    PROMYELOPCT 1.8 08/29/2022 06:52 AM    MONOPCT 13.9 08/30/2022 07:03 AM    MYELOPCT 3.5 08/30/2022 07:03 AM    BASOPCT 0.5 08/30/2022 07:03 AM    MONOSABS 1.67 08/30/2022 07:03 AM    LYMPHSABS 0.71 08/30/2022 07:03 AM    EOSABS 0.10 08/30/2022 07:03 AM    BASOSABS 0.00 08/30/2022 07:03 AM     CMP:    Lab Results   Component Value Date/Time     08/30/2022 07:03 AM    K 4.2 08/30/2022 07:03 AM    K 3.7 08/17/2022 04:17 AM    CL 98 08/30/2022 07:03 AM    CO2 26 08/30/2022 07:03 AM    BUN 10 08/30/2022 07:03 AM    CREATININE 1.1 08/30/2022 07:03 AM    GFRAA >60 08/30/2022 07:03 AM    LABGLOM >60 08/30/2022 07:03 AM    GLUCOSE 107 08/30/2022 07:03 AM    PROT 5.9 08/20/2022 04:44 PM    LABALBU 3.2 08/20/2022 04:44 PM    CALCIUM 8.8 08/30/2022 07:03 AM    BILITOT <0.2 08/20/2022 04:44 PM    ALKPHOS 90 08/20/2022 04:44 PM    AST 25 08/20/2022 04:44 PM    ALT 29 08/20/2022 04:44 PM               Current Facility-Administered Medications:     0.9 % sodium chloride infusion, , IntraVENous, PRN, Flip Mckinley MD    furosemide (LASIX) injection 20 mg, 20 mg, IntraVENous, Once, Flip Mckinley MD    levalbuterol (Maribeth Marking) nebulization 0.63 mg, 0.63 mg, Nebulization, Q8H PRN, Zulma Bush MD    [Held by provider] metoprolol succinate (TOPROL XL) extended release tablet 25 mg, 25 mg, Oral, BID WC, Flip Mckinley MD    sodium chloride flush 0.9 % injection 5-40 mL, 5-40 mL, IntraVENous, 2 times per day, Flip Mckinley MD, 10 mL at 08/30/22 1020    sodium chloride flush 0.9 % injection 5-40 mL, 5-40 mL, IntraVENous, PRN, Flip Mckinley MD    0.9 % sodium chloride infusion, , IntraVENous, PRN, Flip Mckinley MD    guaiFENesin tablet 400 mg, 400 mg, Oral, TID, Flip Mckinley MD, 400 mg at 08/30/22 1020    Arformoterol Tartrate (BROVANA) nebulizer solution 15 mcg, 15 mcg, Nebulization, BID, Flip Mckinley MD, 15 mcg at 08/30/22 0905    budesonide (PULMICORT) nebulizer suspension 500 mcg, 0.5 mg, Nebulization, BID, Flip Mckinley MD, 500 mcg at 08/30/22 0905    heparin flush 100 UNIT/ML injection 300 Units, 3 mL, IntraVENous, 2 times per day, Flip Mkcinley MD, 300 Units at 08/30/22 1008    heparin flush 100 UNIT/ML injection 300 Units, 3 mL, IntraCATHeter, PRN, Flip Mckinley MD    morphine (PF) injection 2 mg, 2 mg, IntraVENous, Q4H PRN, Flip Mckinley MD    sodium chloride (OCEAN, BABY AYR) 0.65 % nasal spray 1 spray, 1 spray, Each Nostril, PRN, Flip Mckinley MD    trimethobenzamide (TIGAN) injection 200 mg, 200 mg, IntraMUSCular, Q6H PRN, Flip Mckinley MD    oxyCODONE-acetaminophen (PERCOCET) 5-325 MG per tablet 1 tablet, 1 tablet, Oral, Q4H PRN, 1 tablet at 08/28/22 0703 **OR** oxyCODONE-acetaminophen (PERCOCET) 5-325 MG per tablet 2 tablet, 2 tablet, Oral, Q4H PRN, Elizabeth Ross MD    [facility]) lindsay 10 mg, 10 mg, Oral, 4x daily, Elizabeth Ross MD, 10 mg at 08/30/22 1020    potassium chloride (KLOR-CON M) extended release tablet 40 mEq, 40 mEq, Oral, PRN, 40 mEq at 08/28/22 0805 **OR** potassium bicarb-citric acid (EFFER-K) effervescent tablet 40 mEq, 40 mEq, Oral, PRN **OR** potassium chloride 10 mEq/100 mL IVPB (Peripheral Line), 10 mEq, IntraVENous, PRN, Elizabeth Ross MD    ampicillin 2000 mg ivpb mini bag, 2,000 mg, IntraVENous, 6 times per day, Elizabeth Ross MD, Stopped at 08/30/22 1100    cefTRIAXone (ROCEPHIN) 2,000 mg in sterile water 20 mL IV syringe, 2,000 mg, IntraVENous, Q12H, Elizabeth Ross MD, 2,000 mg at 08/30/22 0321    melatonin tablet 3 mg, 3 mg, Oral, Nightly PRN, Elizabeth Ross MD, 3 mg at 08/27/22 2136    atorvastatin (LIPITOR) tablet 20 mg, 20 mg, Oral, Daily, Elizabeth Ross MD, 20 mg at 08/29/22 2217    acetaminophen (TYLENOL) tablet 650 mg, 650 mg, Oral, Q6H PRN, 650 mg at 08/29/22 1847 **OR** acetaminophen (TYLENOL) suppository 650 mg, 650 mg, Rectal, Q6H PRN, Elizabeth Ross MD    senna (SENOKOT) tablet 8.6 mg, 1 tablet, Oral, Daily PRN, Elizabeth Ross MD    Assessment:    Principal Problem:    Pneumonia  Active Problems:    Pancytopenia ([address]    Pacemaker infection ([address]    Sepsis due to Enterococcus ([address]    Bacteremia    High-grade atrioventricular block    Second degree AV block, Mobitz type I    COPD (chronic obstructive pulmonary disease) (HCC)    HTN (hypertension)    Cardiac pacemaker in situ    Squamous carcinoma of lung ([address]    History pulmonary embolism ([address] on Eliquis  Resolved Problems:    * No resolved hospital problems. 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 Treated with concurrent chemo/XRT from 7/6/21 followed by consolidation Imfinzi. Progression on PET from 6/24/22.  Started Carbo, Gemzar and Lisa Chand on 6/30/22. Patient seen in office last 8/9 and received C2D8 of Carboplatin with Gemzar and Keytruda. Admitted with sepsis, Enterococcus bacteremia. Respiratory panel negative. Mediport was removed 8/20    Plan:  Anemia, prefer to keep hemoglobin above 7.5 g/dL due to poor pulmonary reserves  No transfusion PRBC indicated today but would advise a postop CBC. Enterococcus bacteremia: Status post port removal.  On broad-spectrum IV antibiotics. Followed by ID. Pacemaker to be removed 8/25, cultures remain negative  Metastatic non-small cell lung cancer: Treatment is currently on hold. Follow-up with Dr. Shruthi Levi in 2 weeks after discharge.     Electronically signed by MATTHEW Moran on 8/30/2022 at 12:41 PM

## 2022-08-30 NOTE — PROGRESS NOTES
[facility]  ERICA  Admit Date: 8/16/2022                            PCP: Holden Hamilton MD  Principal Problem:    Pneumonia  Active Problems:    Pancytopenia ([facility]    Pacemaker infection ([facility]    Sepsis due to Enterococcus ([facility]    Bacteremia    High-grade atrioventricular block    Second degree AV block, Mobitz type I    COPD (chronic obstructive pulmonary disease) ([facility]    HTN (hypertension)    Cardiac pacemaker in situ    Squamous carcinoma of lung ([facility]    History pulmonary embolism ([facility] on Eliquis  Resolved Problems:    * No resolved hospital problems. *      Subjective:  Seen in room on cart - just returned from CXR  Had PPM yesterday with some mild oozing from R groin - sandbag remains along with bedrest  On 3L nc  Had issues with breathing overnight - received breathing treatments but did not feel they helped.  Feels better now        Medications:   sodium chloride      sodium chloride          furosemide  20 mg IntraVENous Once    [Held by provider] metoprolol succinate  25 mg Oral BID WC    sodium chloride flush  5-40 mL IntraVENous 2 times per day    albuterol  2.5 mg Nebulization Q6H    guaiFENesin  400 mg Oral TID    Arformoterol Tartrate  15 mcg Nebulization BID    budesonide  0.5 mg Nebulization BID    heparin flush  3 mL IntraVENous 2 times per day    clotrimazole  10 mg Oral 4x daily    ampicillin IV  2,000 mg IntraVENous 6 times per day    cefTRIAXone (ROCEPHIN) IV  2,000 mg IntraVENous Q12H    atorvastatin  20 mg Oral Daily       Vitals:  VITALS:  BP (!) 102/42 Comment: Dr Madelin Rincon aware  Pulse 73   Temp 97.9 °F (36.6 °C)   Resp 24   Ht 5' 6\" (1.676 m)   Wt 191 lb 9.6 oz (86.9 kg)   SpO2 100%   BMI 30.93 kg/m²   24HR INTAKE/OUTPUT:    Intake/Output Summary (Last 24 hours) at 8/30/2022 0908  Last data filed at 8/30/2022 0101  Gross per 24 hour   Intake 350 ml   Output 1150 ml   Net -800 ml     CURRENT PULSE OXIMETRY:  SpO2: 100 %  24HR PULSE OXIMETRY RANGE:  SpO2 Av.8 %  Min: 94 %  Max: 100 %  CVP:    VENT SETTINGS:      Additional Respiratory Assessments  Heart Rate: 73  Resp: 24  SpO2: 100 %      EXAM:  General: No distress. Alert. ENT: No discharge. Pharynx clear. Neck: Trachea midline. Resp: No accessory muscle use. No crackles. Clear bilaterally, anterior. Diminished   CV: Regular rate. Regular rhythm. No mumur or rub. +2/3 pitting BLE edema. Bilateral upper chest areas with 2 incisions well approximated. ABD: Non-tender. Non-distended. Normal bowel sounds. Obese   Skin: Warm and dry. M/S: hawk  Neuro: Aox4. Follows commands appropriately     I/O: I/O last 3 completed shifts: In: 0 [P.O.:300; I.V.:250; IV Piggyback:100]  Out:  [Urine:]  No intake/output data recorded. Results:  CBC:   Recent Labs     22  0656 22  0652 22  0703   WBC 15.8* 9.6 11.9*   HGB 8.1* 8.0* 7.8*   HCT 25.8* 26.7* 25.1*   MCV 94.9 95.4 96.5    244 242     BMP:   Recent Labs     22  0656 22  0652 22  0703    143 141   K 3.3* 3.6 4.2    101 98   CO2 32* 30* 26   BUN 6 4* 10   CREATININE 0.9 0.8 1.1     LFT:   No results for input(s): ALKPHOS, ALT, AST, PROT, BILITOT, BILIDIR, LABALBU in the last 72 hours. PT/INR:   No results for input(s): PROTIME, INR in the last 72 hours. Cultures:  No results for input(s): CULTRESP in the last 72 hours. ABG:   No results for input(s): PH, PO2, PCO2, HCO3, BE, O2SAT in the last 72 hours. Films:  CT Head WO Contrast  Result Date: 2022  No acute intracranial abnormality. Cortical atrophy and periventricular leukomalacia. XR CHEST PORTABLE  2022 CXR:   Continued opacity in the left lower lung and linear scarring in the left perihilar region.  COPD.     22 CT chest w/o    2022 CTA chest       2022 CTA chest: .34 x 5.4 cm left hilar and perihilar mass extending to LLL encircling the pulmonary artery and the bronchi consistent with progressive malignancy with occlusion of left lower lobe bronchi with postobstructive pneumonitis    08/30 CXR: Impression Mildly waning atelectasis and or scarring on the left. Obstructive airways disease. 08/19/22 ECHO:  Normal left ventricular chamber size. Normal left ventricular systolic function. Visually estimated LVEF is 55-60 %. No wall motion abnormalities. Normal diastolic function. Normal left atrial pressure. Normal right ventricle structure and function. Normal left atrial size. Normal right atrial size   Likely normal estimated PA pressure. Pacer/ ICD wire present in the right heart. No gross evidence of infective endocarditis. Consider SHIRA for better   accuracy if clinically indicated. Compared to prior echo from 2021, no significant changes noted. SHIRA on 8/22/22   No SHIRA indication of Endocarditis. Normal left ventricular chamber size and systolic function. Interatrial septum appears intact. Normal right ventricle size and function. Pacemaker leads noted in right atrium and right ventricle. Normal aortic root size. Moderate atherosclerosis in the descending thoracic aorta. Epicardial fat vs. small pericardial effusion. No intra cardiac mass or thrombus. Technically sub-optimal images. Compared to prior sub-optimal TTE from 8/19/22. 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 8/18/2022 CT chest w/o:  The previously noted infiltrative left hilar mass extending to EAN and LLL surrounding the broncho pulmonary vasculature is noted currently measuring 4.6 x 4.3 cm with progression with atelectasis. 8/18: 2L NC, US BLE neg DVT   8/19: 2L NC   8/20 RA, Mediport removed   8/21 RA received PRBC    8/22 2.5L; SHIRA neg for endocarditis   8/23: RA;    8/24: 3L NC 99%  8/25: 3L NC 98%. Pacer removed by CTS/EP  8/26: room air 97%  8/27 on 3lnc  8/30 3L nc had leadless PPM placed 8/29/2022     Stage IIIB T2aN3 Squamous Cell Carcinoma of Lung  diagnosis 5/27/2021, PD-L1 0% ( 7/6/21-9/7/21 treatment with concurrent chemotherapy/radiation therapy; 11/2/21 consolidated with Imfinzi (Durvalumab) per ID IV every 4 weeks; 6/30/22 PET showing progression and was started on Carbo, Gemzar and Keytruda on 6/30/22; 8/9 /22 r ceived C2D8 of Carboplatin AUC 2 with Gemzar 1000 mg/m2 IV on D1,8 and Keytruda IV on D1 on a 21 day cycle)  Enterococcus faecalis bacteremia/septicemia, bacteremia cultures + on 8/16, 8/17, 8/18 s/p mediport removal    With dual-chamber pacemaker with h/o wide-complex tachycardia (HCC)--atrial tachycardia with RV pacing  - pacer out 8/25/2022  - leadless pacer implantation 8/29/2022  H/O SARS-CoV 2 Pneumonitis 5/24/2022  Chronic hypoxic resp failure, RA during the day and 2L NC at night  Probable obstructive sleep apnea  Persistent pancytopenia secondary to chemotherapy  B/l Lower Extremity Swelling and numbness  H/O RLL subsegmental PE- CTA Chest 9/17/2021 was on eliquis now on hold   Radiation pneumonitis/fibrosis left lung   COPD, not in exacerbation, uses trelegy at home      PLAN:   On 3LNC, wean O2   Continue Brovana, Pulmicort   Abuterol and mucinex to thin secretions  Oncology following. IV ATB per ID - on Ampicillin and Ceftriaxone   Continue IS for pulmonary hygiene   New Leadless PPM placed 08/29 with Dr. Young Arreola. S/p old pacer removal on 8/25. Repeat CXR good 08/30.   To resume Eliquis when ok with CTS/EP    Electronically signed by LELO Mendez - RICA    Attending Attestation Note:    Patient seen and examined with [facility] EVANS Staff,

## 2022-08-30 NOTE — ANESTHESIA POSTPROCEDURE EVALUATION
Department of Anesthesiology  Postprocedure Note    Patient: Quinn Hughes  MRN: [id number]  YOB: 1950  Date of evaluation: 8/30/2022      Procedure Summary     Date: 08/29/22 Room / Location: [facility] [place] CATH LAB    Anesthesia Start: 3891 Anesthesia Stop: 1661    Procedure: PACEMAKER IMPLANT W/ANES Diagnosis:     Scheduled Providers: Olivier Armendariz, LELO - CRNA; Alvaro Shock, DO Responsible Provider: Alvaro Turner, DO    Anesthesia Type: MAC ASA Status: 4          Anesthesia Type: No value filed.     Shin Phase I: Shin Score: 9    Shin Phase II:        Anesthesia Post Evaluation    Patient location during evaluation: PACU  Patient participation: complete - patient participated  Level of consciousness: awake and alert  Pain score: 1  Airway patency: patent  Nausea & Vomiting: no nausea and no vomiting  Complications: no  Cardiovascular status: hemodynamically stable  Respiratory status: acceptable  Hydration status: euvolemic

## 2022-08-30 NOTE — PROGRESS NOTES
[facility]                                Progress note    Subjective:    Patient is alert and oriented sitting in chair at bedside. Had pacemaker placement completed yesterday  Complains of shortness of breath today  Appears volume overloaded    Objective:    BP (!) 131/52   Pulse 62   Temp 98.4 °F (36.9 °C) (Oral)   Resp 22   Ht 5' 6\" (1.676 m)   Wt 191 lb 9.6 oz (86.9 kg)   SpO2 98%   BMI 30.93 kg/m²     In: 710 [P.O.:360; I.V.:250]  Out: 1150   In: 710   Out: 1150 [Urine:1150]    General appearance: NAD, conversant, somewhat dyspneic  HEENT: AT/NC, MMM  Neck: FROM, supple  Lungs: Clear to auscultation  CV: RRR, no MRGs-right-sided port removed 8/20, left-sided pacemaker removed   Vasc: Radial pulses 2+  Abdomen: Soft, non-tender; no masses or HSM  Extremities: No  or digital cyanosis, 2+ BLE edema   Skin: Generalized bruising  Psych: Alert and oriented to person, place and time  Neuro: Alert and interactive     Recent Labs     08/28/22  0656 08/29/22  0652 08/30/22  0703   WBC 15.8* 9.6 11.9*   HGB 8.1* 8.0* 7.8*   HCT 25.8* 26.7* 25.1*    244 242       Recent Labs     08/28/22  0656 08/29/22  0652 08/30/22  0703    143 141   K 3.3* 3.6 4.2    101 98   CO2 32* 30* 26   BUN 6 4* 10   CREATININE 0.9 0.8 1.1   CALCIUM 9.0 8.8 8.8       Assessment:    Principal Problem:    Pneumonia  Active Problems:    Pancytopenia (HCC)    Pacemaker infection (HCC)    Sepsis due to Enterococcus (HCC)    Bacteremia    High-grade atrioventricular block    Second degree AV block, Mobitz type I    COPD (chronic obstructive pulmonary disease) (HCC)    HTN (hypertension)    Cardiac pacemaker in situ    Squamous carcinoma of lung ([address]    History pulmonary embolism ([facility] [address] on Eliquis  Resolved Problems:    * No resolved hospital problems.  *      Plan:  Patient is a 79-year-old male with a hx of squamous cell lung carcinoma who is active chemo therapy who is admitted to intermediate for  Sepsis/bacteremia/immunocompromise status-acutely ill status  -Mediport removed 8/20  -EP for removal of pacer, completed 8/25  -EP for new pacemaker placement, completed 8/29/2022  -Diuresis for volume overload 831, questionable hematoma in groin-for 1 unit PRBC transfusion  -Respiratory panel negative  -CT chest ordered by pulm> progressive infiltrative mass in the left perihilar region  -On daptomycin per infectious disease > switched to IV Rocephin and ampicillin  -General surgery > signed off   -SHIRA 8/22/2022-unremarkable, no evidence of endocarditis, ejection fraction  -Resume Trelegy as he takes at home-okay to use   -PICC placed for ongoing antibiotic [facility]    Leukocytosis, likely reactive from surgery   -Monitor CBC daily  -WBC - 15.8>9.6    Hypomagnesemia  -Mg+ 1.3 > replaced with 2 g  Labs pending    Pancytopenia-improved  -Consult heme-onc-following  -h/h 6.9/21.4 > stable now, did drop slightly and will be receiving 1 unit today of PRBC  -Platelets 4 > transfuse 1 unit platelets > 23 > 204 today  -Hold Eliquis-consider holding off for few weeks  Thrombocytopenia resolved-platelet count 346     Hematuria  -Check urine culture  -Hold Eliquis    DVT Prophylaxis PCD's  PT/OT  Discharge planning-monitor overnight, okay for discharge once okay with EP service to [facility]      Isma Verdin MD  4:53 PM  8/30/2022

## 2022-08-30 NOTE — PROGRESS NOTES
Comprehensive Nutrition Assessment    Type and Reason for Visit:  Reassess    Nutrition Recommendations/Plan:   Recommend and start Ensure high protein supplement daily and Magic Cup supplement daily to help meet nutritional needs. Cuauhtemoc wound healing supplement out of stock at this time. Malnutrition Assessment:  Malnutrition Status: At risk for malnutrition (Comment) (08/30/22 1022)    Context:  Acute Illness     Findings of the 6 clinical characteristics of malnutrition:  Energy Intake:  75% or less of estimated energy requirements for 7 or more days  Weight Loss:  No significant weight loss     Body Fat Loss:  Unable to assess     Muscle Mass Loss:  Unable to assess    Fluid Accumulation:  No significant fluid accumulation     Strength:  Not Performed    Nutrition Assessment:    Patients po intake has been sporadic, averaging ~50% of meals served ; adm w/ fatigue ; noted sepsis and bacteremia  ; adm w/ PNA ; noted neutropenic and thrombocytopenic ; hx of lung CA w/ chemotherapy/radiation ; hx of COPD and CHB ; s/p SHIRA on 8/22 ; s/p cardiac cath 8/25 ; s/p pacemaker insertion 8/29 ; will start ONS    Nutrition Related Findings:    I&Os WNL, 3+ pitting edema, active BS, rounded abd, A&O x 4 ; Wound Type: Multiple, Surgical Incision, Skin Tears (Incisions x 2 ; skin tears x 2)       Current Nutrition Intake & Therapies:    Average Meal Intake: 26-50%, 51-75% (~50%)     ADULT DIET; Regular    Anthropometric Measures:  Height: 5' 6\" (167.6 cm)  Ideal Body Weight (IBW): 142 lbs (65 kg)    Admission Body Weight: 187 lb (84.8 kg) (8/17, actual)  Current Body Weight: 191 lb (86.6 kg) (8/29, standing scale), 134.5 % IBW. Weight Source: Standing Scale  Current BMI (kg/m2): 30.8  Usual Body Weight: 188 lb (85.3 kg) (8/18/21, actual)  % Weight Change (Calculated): 0                    BMI Categories: Obese Class 1 (BMI 30.0-34. 9)    Estimated Daily Nutrient Needs:  Energy Requirements Based On: Formula  Weight Used for Energy Requirements: Current  Energy (kcal/day): 0331-6469 (REE 1542 x 1.2 SF)  Weight Used for Protein Requirements: Ideal  Protein (g/day):  (1.3-1.5g/kg IBW)  Method Used for Fluid Requirements: 1 ml/kcal  Fluid (ml/day): 5756-6155    Nutrition Diagnosis:   Inadequate oral intake related to inadequate protein-energy intake (2/2 fatigue) as evidenced by poor intake prior to admission, intake 26-50%, intake 51-75%    Nutrition Interventions:   Food and/or Nutrient Delivery: Continue Current Diet, Start Oral Nutrition Supplement  Nutrition Education/Counseling: Education not indicated  Coordination of Nutrition Care: Continue to monitor while inpatient       Goals:  Previous Goal Met: Progressing toward Goal(s)  Goals: PO intake 75% or greater, by next RD assessment       Nutrition Monitoring and Evaluation:   Behavioral-Environmental Outcomes: None Identified  Food/Nutrient Intake Outcomes: Supplement Intake, Food and Nutrient Intake  Physical Signs/Symptoms Outcomes: Biochemical Data, Chewing or Swallowing, GI Status, Fluid Status or Edema, Hemodynamic Status, Meal Time Behavior, Nutrition Focused Physical Findings, Skin, Weight    Discharge Planning:     Too soon to determine     Teri Cristóbal RD, LD  Contact: [phone]

## 2022-08-30 NOTE — PROGRESS NOTES
Monitors called. Request for them to send strips with low HR. Waiting for strips.     Electronically signed by Zeina Patterson RN on 8/30/2022 at 4:34 PM

## 2022-08-30 NOTE — CARE COORDINATION
Update was given to Angle Griffin at [facility]. No pre cert or covidtest needed. Spoke with Nicole John at [phone] to notify her of dc plan to [facility]. Sw/cm will follow.

## 2022-08-30 NOTE — PROGRESS NOTES
[facility],2[address] Floor and [facility] [place]- Electrophysiology  Inpatient progress note  PATIENT: Natan Remy  MEDICAL RECORD NUMBER: [id number]  DATE OF SERVICE:  8/30/2022  ATTENDING ELECTROPHYSIOLOGIST: Rayo Chu DO    PRIMARY ELECTROPHYSIOLOGIST: Radha Laurent  REFERRING PHYSICIAN: No ref. provider found and Ab Fan MD  CHIEF COMPLAINT: Weakness and fatigue, fever    HPI: This is a 67 y.o. male with a history of pretension, hyperlipidemia, COPD and a history of long-term smoking habit as well as recurrent syncopal episodes starting in 2012. ILR 2020 showed CHB and subsequent pacemaker implant on 7/20/2021. He was also diagnosed with lung cancer around the same time and has been undergoing chemotherapy and radiation. He finished his last chemo cycle last week and presented to the emergency room with symptoms of ongoing fatigue weakness and fever. He was found to be neutropenic as well as thrombocytopenic. Subsequently blood cultures have been positive for Enterococcus faecalis. Blood cultures from 8/16, 8/17 and 8/18 have all been positive despite IV antibiotics. Patient also remains neutropenic and thrombocytopenic with a platelet count of 82,019 today. No other cardiac symptoms. No new symptoms of chest pain or dyspnea on exertion. No syncope or near syncope.    8/21/22: Patient denies any new complaints today. Was transfused earlier today for hemoglobin of 7.2. No cardiac symptoms. 8/22/22: Patient remains asymptomatic. Underwent SHIRA today. White count remains low however hemoglobin and platelet count have improved    8/23/22:  No complaints. Awaiting plan for extraction to confirm CT surgery back up. SHIRA negative yesterday. Afebrile     8/23/22: Pacemaker set at VVI 40 yesterday afternoon, has been stable without change in symptoms. On monitor, it appears to be wenckebach vs 2:1 block with rates 60-70's     8/25/22: NPO for extraction today. No complaints overnight. Continues with stable heart rates what appears wenckebach vs 2:1 AV block     8/26/22: stable, family in room. He is up at side of bed, pain is tolerable, controlled. Left chest incision from extraction site stable. He continues to have 2:1 block vs wenckebach on monitor, stable rates 50-70 bpm. Denies dizziness or lightheadedness. 8/27/22: Feeling well. Reports some dizziness. Monitor showed sinus tachycardia with second degree AV block type I, 2 to 1 AV block and intermittent high grade AV block without any significant pauses. 8/28/22: The patient is seen in the hospital for follow up. He reports no dizziness or syncope. He remains in NSR with second degree AV block type I and 2 to 1 AV block. 08/30/22: The patient is seen in the hospital, she underwent placement of a MICRA yesterday today the groin is bruised today and her H/H was 7.8 today and he has been transfused with 1 unit. He is significantly SOB today and wheezy.   The Device is functioning normal.     Patient Active Problem List    Diagnosis Date Noted    High-grade atrioventricular block 08/27/2022     Priority: Medium    Second degree AV block, Mobitz type I 08/27/2022     Priority: Medium    Bacteremia 08/23/2022     Priority: Medium    Pacemaker infection ([address] 08/22/2022     Priority: Medium    Sepsis due to Enterococcus ([address] 08/22/2022     Priority: Medium    Pancytopenia ([address] 08/17/2022     Priority: Medium    COVID-19 05/25/2022     Priority: Medium    Squamous carcinoma of lung ([address] 09/18/2021    History pulmonary embolism ([address] on Eliquis 09/18/2021    Hyponatremia 09/18/2021    Hypomagnesemia 09/18/2021    Wide-complex tachycardia ([address] 09/18/2021    Acute on chronic respiratory failure with hypoxia ([address] 09/17/2021    Cardiac pacemaker in situ 07/20/2021    Intermittent complete heart block ([address] 07/19/2021    Pneumonia 01/31/2019    COPD (chronic obstructive pulmonary disease) ([address] 01/31/2019    BPH (benign prostatic hyperplasia) 01/31/2019    HTN (hypertension) 01/31/2019    HLD (hyperlipidemia) 01/31/2019    Syncope and collapse 01/18/2019       Past Medical History:   Diagnosis Date    CHB (complete heart block) ([facility] 07/2021    COPD (chronic obstructive pulmonary disease) ([facility]     Syncope 09/2020    dr Conor Peacock wearing a monitor    Syncope 07/2021       Family History   Problem Relation Age of Onset    Cancer Father         prostate    Cancer Paternal Uncle         anal       Social History     Tobacco Use    Smoking status: Former     Packs/day: 2.00     Years: 25.00     Pack years: 50.00     Types: Cigarettes     Quit date: 12/26/2011     Years since quitting: 10.6    Smokeless tobacco: Never   Substance Use Topics    Alcohol use: Not Currently     Alcohol/week: 1.0 standard drink     Types: 1 Glasses of wine per week     Comment: 1 glass of wine per day prior       Current Facility-Administered Medications   Medication Dose Route Frequency Provider Last Rate Last Admin    0.9 % sodium chloride infusion   IntraVENous PRN Duane Pereira MD        furosemide (LASIX) injection 20 mg  20 mg IntraVENous Once Duane Pereira MD        levalbuterol (XOPENEX) nebulization 0.63 mg  0.63 mg Nebulization Q8H PRN Baruch Leyden, MD        [facility] metoprolol succinate (TOPROL XL) extended release tablet 25 mg  25 mg Oral BID WC Duane Pereira MD        sodium chloride flush 0.9 % injection 5-40 mL  5-40 mL IntraVENous 2 times per day Duane Pereira MD   10 mL at 08/30/22 1020    sodium chloride flush 0.9 % injection 5-40 mL  5-40 mL IntraVENous PRN Duane Pereira MD        0.9 % sodium chloride infusion   IntraVENous PRN Duane Pereira MD        guaiFENesin tablet 400 mg  400 mg Oral TID Duane Pereira MD   400 mg at 08/30/22 1020    Arformoterol Tartrate (BROVANA) nebulizer solution 15 mcg  15 mcg Nebulization BID Duane Pereira MD   15 mcg at 08/30/22 0905    budesonide (PULMICORT) nebulizer suspension 500 mcg  0.5 mg Nebulization BID Elizabeth Ross MD   500 mcg at 08/30/22 0905    heparin flush 100 UNIT/ML injection 300 Units  3 mL IntraVENous 2 times per day Elizabeth Ross MD   300 Units at 08/30/22 1008    heparin flush 100 UNIT/ML injection 300 Units  3 mL IntraCATHeter PRN Elizabeth Ross MD        morphine (PF) injection 2 mg  2 mg IntraVENous Q4H PRN Elizabeth Ross MD        sodium chloride (OCEAN, BABY AYR) 0.65 % nasal spray 1 spray  1 spray Each Nostril PRN Elizabeth Ross MD        trimethobenzamide (TIGAN) injection 200 mg  200 mg IntraMUSCular Q6H PRN Elizabeth Ross MD        oxyCODONE-acetaminophen (PERCOCET) 5-325 MG per tablet 1 tablet  1 tablet Oral Q4H PRN Elizabeth Ross MD   1 tablet at 08/28/22 0703    Or    oxyCODONE-acetaminophen (PERCOCET) 5-325 MG per tablet 2 tablet  2 tablet Oral Q4H PRN Elizabeth Ross MD        clotrimazole (MYCELEX) lindsay 10 mg  10 mg Oral 4x daily Elizabeth Ross MD   10 mg at 08/30/22 1020    potassium chloride (KLOR-CON M) extended release tablet 40 mEq  40 mEq Oral PRN Elizabeth Ross MD   40 mEq at 08/28/22 0805    Or    potassium bicarb-citric acid (EFFER-K) effervescent tablet 40 mEq  40 mEq Oral PRN Elizabeth Ross MD        Or    potassium chloride 10 mEq/100 mL IVPB (Peripheral Line)  10 mEq IntraVENous PRN Elizabeth Ross MD        ampicillin 2000 mg ivpb mini bag  2,000 mg IntraVENous 6 times per day Elizabeth Ross MD   Stopped at 08/30/22 1100    cefTRIAXone (ROCEPHIN) 2,000 mg in sterile water 20 mL IV syringe  2,000 mg IntraVENous Q12H Elizabeth Ross MD   2,000 mg at 08/30/22 0321    melatonin tablet 3 mg  3 mg Oral Nightly PRN Elizabeth Ross MD   3 mg at 08/27/22 2136    atorvastatin (LIPITOR) tablet 20 mg  20 mg Oral Daily Elizabeth Ross MD   20 mg at 08/29/22 2217    acetaminophen (TYLENOL) tablet 650 mg  650 mg Oral Q6H PRN Elizabeth Ross MD   650 mg at 08/29/22 1847    Or    acetaminophen (TYLENOL) suppository 650 mg  650 mg Rectal Q6H PRN Elizabeth Ross MD        senna (SENOKOT) tablet 8.6 mg  1 tablet Oral Daily PRN Mone Hightower Jazmin Hernandez MD            No Known Allergies    ROS:   Constitutional: Negative for fever. Positive for activity change and negative for appetite change. HENT: Negative for epistaxis. Eyes: Negative for diploplia, blurred vision. Respiratory: Negative for chest tightness, + for cough shortness of breath and wheezing. Cardiovascular: pertinent positives in HPI  Gastrointestinal: Negative for abdominal pain and blood in stool. All other review of systems are negative       PHYSICAL EXAM:   Vitals:    08/30/22 1019 08/30/22 1114 08/30/22 1313 08/30/22 1345   BP:  (!) 136/46 (!) 118/58 (!) 117/52   Pulse:  73 79 52   Resp:  24 24 20   Temp:  97 °F (36.1 °C) 98.1 °F (36.7 °C) 98 °F (36.7 °C)   TempSrc:    Oral   SpO2:  96% 100% 100%   Weight:       Height: 5' 6\" (1.676 m)         Constitutional: Well-developed, Short of breath. Eyes: conjunctivae normal, no xanthelasma   Ears, Nose, Throat: oral mucosa moist, no cyanosis   CV: no JVD. Irregular rhythm. Normal S1S2 and no S3. No murmurs, rubs, or gallops. Lungs: decreased bases bilaterally, normal respiratory effort without used of accessory muscles  Abdomen: soft, non-tender, non distended  Musculoskeletal: no digital clubbing, +1-2 pitting BLE edema   Skin: warm, no rashes. Left chest wall extraction site glued, Small right side chest wall incision site well approximated also, no swelling or hemtatoma   Right groin: is bruised with no edema. Data:    Recent Labs     08/28/22  0656 08/29/22  0652 08/30/22  0703   WBC 15.8* 9.6 11.9*   HGB 8.1* 8.0* 7.8*   HCT 25.8* 26.7* 25.1*    244 242     Recent Labs     08/28/22  0656 08/29/22  0652 08/30/22  0703    143 141   K 3.3* 3.6 4.2    101 98   CO2 32* 30* 26   BUN 6 4* 10   CREATININE 0.9 0.8 1.1   CALCIUM 9.0 8.8 8.8        Lab Results   Component Value Date/Time    MG 1.8 08/27/2022 07:25 AM     No results for input(s): TSH in the last 72 hours.   No results for input(s): INR in the last 72 hours. Telemetry 08/30/22  Sinus rhythm with episodes of Non conducted P waves and intermittent RV pacing. No significant pauses noted. EKG:        SHIRA 8/22/22   Summary   No SHIRA indication of Endocarditis. Normal left ventricular chamber size and systolic function. Interatrial septum appears intact. Normal right ventricle size and function. Pacemaker leads noted in right atrium and right ventricle. Normal aortic root size. Moderate atherosclerosis in the descending thoracic aorta. Epicardial fat vs. small pericardial effusion. No intra cardiac mass or thrombus. Technically sub-optimal images. Compared to prior sub-optimal TTE from 8/19/22. Signature    ----------------------------------------------------------------   Electronically signed by Yue Rea MD(Interpreting   physician) on 08/22/2022 06:50 PM    Echocardiogram: 8/19/22   Summary   Normal left ventricular chamber size. Normal left ventricular systolic function. Visually estimated LVEF is 55-60 %. No wall motion abnormalities. Normal diastolic function. Normal left atrial pressure. Normal right ventricle structure and function. Normal left atrial size. Normal right atrial size   Likely normal estimated PA pressure. Pacer/ ICD wire present in the right heart. No gross evidence of infective endocarditis. Consider SHIRA for better   accuracy if clinically indicated. Compared to prior echo from 2021, no significant changes noted.       Signature    ----------------------------------------------------------------   Electronically signed by Brie Perez MD(Interpreting   physician) on 08/19/2022 04:16 PM      Device Interrogation/Reprogramming 08/30/2022  Make/Model: [employer] MICRA AV   DOI: 08/29/22  Battery: 3.19 V  Initializing   Evalina Kiersten therapy: VVI 50 ppm  Pacing %: RV = 4.8%,   Lead function:  RV lead: sensing = >20 mV, impedance = 560 , threshold = 0.38 V @ 0.24 msec  Lead programming:  RV lead: sensitivity = 2.0 mV, output = 2.38 V @ 0.24 msec  Arrhythmias: none   Reprogramming included: none, Can not turn on AV synchrony until the 2 week device check. Overall device function is normal  All device programmable settings were evaluated per above and in the scanned document, along with iterative adjustments (capture thresholds) to assess and select the most appropriate final programming to provide for consistent delivery of the appropriate therapy and to verify function of the device. Assessment/Plan:      1. Dual-chamber pacemaker explanted 08/25/22  - Initial indication for AV block with a long pause/ ? CHB in past on ILR.   - Date of implant July 28, 2021  - Pacemaker check in June revealed 6.5% atrial and 3% ventricular pacing with DDI programming at lower rate of 60 bpm.  - Initial interrogation during [facility] admission on 8/23/22 showed 27% AV and 25% RV paced.  - Noted bifascicular block with 2:1 block and second degree AV block type I on tele when pacer set at VVI 40 with RV paced <1%. - On Toprol 50 mg bid which held since 8/24/22   - Underwent full extraction on 8/25/22   - Currently in intermittent second degree AV block type I, 2 to 1 AV block and high grade AV block with HR  bpm and no significant pause seen. 2. MICRA AV in situ   - DOI 08/29/22  - Normal device function   - Slight ecchymosis of groin     3. Bacteremia/sepsis--enterococcus faecalis  - Probably port infection per ID; mediport removed 8/20/22   - Persistent bacteremia despite IV antibiotics  8-16-22 blood cultures E faecalis x 2  8-17-22 blood cultures E faecalis x 1  8-18-22 blood culture + E faecalis x1  8-19-22 blood cultures neg to date  8-20-22 Port ( tip ) cx - neg tod date   8-20-22 blood cultures neg to date  8-23-22 blood cultures neg to date  8-25-22 blood cultures neg to date   - On IV Ampicillin and Rocephin  - Dr. Breanna Shultz following.       3.  Lung cancer, squamous cell--patient undergoing chemotherapy  -  Stage IIIB T2aN3 Squamous Cell Carcinoma of Lung  diagnosis 5/27/2021, PD-L1 0%    - 7/6/21-9/7/21 treatment with concurrent chemotherapy/radiation therapy   - 11/2/21 consolidated with Imfinzi (Durvalumab) per ID IV every 4 weeks   - 6/30/22 PET showing progression and was started on Carbo, Gemzar and Keytruda on 6/30/22  - 8/9 /22 received C2D8 of Carboplatin AUC 2 with Gemzar 1000 mg/m2 IV on D1,8 and Keytruda IV on D1 on a 21 day cycle    4. Neutropenia and thrombocytopenia related to chemotherapy  - Improved. 5. Persistent anemia.    - Post PRBC transfusion 8/21/22  - count 7.8 today for transfusion of 1 PRBC. 6. History of PE   - Diagnosed in 2021   - On Eliquis in the past and currently on hold. Recommendations:    US right groin. Continue Toprol XL 25mg BID. Continue to hold  Eliquis until at least 09/02/22. Continue to monitor on telemetry. For  1 unit PRBC and IV Lasix times 1. Removal of right groin suture   Make device VVIR  bpm.     Thank you for allowing me to participate in your patient's care. Please call me if there are any questions or concerns. I have spent a total of 10 minutes with the patient and the family reviewing the above stated recommendations. And a total of >50% of that time involved face-to-face time providing counseling and/or coordination of care with the other providers, reviewing records/tests, counseling/education of the patient, ordering medications/tests/procedures, coordinating care, and documenting clinical information in the EHR. Discussed with Dr. Shashi Nunez. Alvaro Gonzalez, APRN - CNP  Cardiac Electrophysiology  [facility]) Physicians  Electrophysiology  2:07 PM  8/30/2022    67-year-old male with a history of high-grade AV block status post pacemaker implant (7/20/2021), PE on [address] [place] [address], and lung cancer on chemotherapy/radiation. In August 2022, patient was diagnosed with Enterococcus faecalis bacteremia.   On 8/25/2022, he underwent extraction of the device. On 8/29/2022, he underwent Micra implant. His beta-blocker was restarted following the procedure. This procedure was complicated by right groin hematoma. He was administered 1 unit of packed red blood cells. His home apixaban remains held. Figure-of-eight stitch from right groin removed today. Ultrasound ordered to further evaluate right groin hematoma is currently pending. Device interrogation today with stable device function. Device reprogrammed to VVIR  bpm.  Per [employer] representative, they recommend delaying AV synchrony assessment until 2-week device post check. Sulema Stephen DO  [id number] [facility] Cardiac Electrophysiology  [address] Physicians     Attending Supervising Physicians Attestation Statement  I performed at least 51% of the work.   I was present with the nurse practitioner during the history and exam. I discussed the findings and plans with the nurse practitioner and agree as documented in his note     Electronically signed by Sulema Stephen DO on 8/30/22 at 5:59 PM EDT

## 2022-08-30 NOTE — PROGRESS NOTES
This afternoon heart monitor displayed a rate of 26 momentarily then began to immediately rise. Dr Bronwyn Moore notified. Pt displayed no distress and denies complaints.

## 2022-08-30 NOTE — PROGRESS NOTES
[facility] Quality Flow/Interdisciplinary Rounds Progress Note        Quality Flow Rounds held on August 30, 2022    Disciplines Attending:  Bedside Nurse, , , and Nursing Unit Leadership    Noe Tejeda was admitted on 8/16/2022  2:38 PM    Anticipated Discharge Date:  Expected Discharge Date: 09/01/22    Disposition:    Kojo Score:  Kojo Scale Score: 21    Readmission Risk              Risk of Unplanned Readmission:  23           Discussed patient goal for the day, patient clinical progression, and barriers to discharge.   The following Goal(s) of the Day/Commitment(s) have been identified:  report labs/diagnostics       Michelle Melendez RN  August 30, 2022

## 2022-08-30 NOTE — PROGRESS NOTES
Nurse found pt sitting at edge of bed before bedrest orders complete. Pt was found to be bleeding from R groin incision site all the way through the dressing. This nurse helped pt back to laying position in bed and pressure was held on site for 20mins. Bleeding stopped. Pedal and posterior pulses remain palpable and capillary refill <3 seconds. BP was 121/55 HR 66. Dr. Evette Sr called and notified who gave verbal orders to change dressing, place sandbag on site, and have pt remain bedrest for rest of night. Upon changing dressing it was noted that there were two small hard spots on groin that may be small hematomas. Dr. Evette Sr called again and notified. Pt remains in bed, sandbag in place, bedrest orders til morning, bed alarm on. Will continue to monitor.

## 2022-08-31 ENCOUNTER — APPOINTMENT (OUTPATIENT)
Dept: ULTRASOUND IMAGING | Age: 72
DRG: 228 | End: 2022-08-31
Payer: MEDICARE

## 2022-08-31 LAB
ANAEROBIC CULTURE: NORMAL
ANAEROBIC CULTURE: NORMAL
ANISOCYTOSIS: ABNORMAL
BASOPHILS ABSOLUTE: 0 E9/L (ref 0–0.2)
BASOPHILS RELATIVE PERCENT: 0.5 % (ref 0–2)
EOSINOPHILS ABSOLUTE: 0 E9/L (ref 0.05–0.5)
EOSINOPHILS RELATIVE PERCENT: 0.2 % (ref 0–6)
HCT VFR BLD CALC: 27.6 % (ref 37–54)
HEMOGLOBIN: 8.5 G/DL (ref 12.5–16.5)
LYMPHOCYTES ABSOLUTE: 0.93 E9/L (ref 1.5–4)
LYMPHOCYTES RELATIVE PERCENT: 10.4 % (ref 20–42)
MCH RBC QN AUTO: 27.6 PG (ref 26–35)
MCHC RBC AUTO-ENTMCNC: 30.8 % (ref 32–34.5)
MCV RBC AUTO: 89.6 FL (ref 80–99.9)
MONOCYTES ABSOLUTE: 1.86 E9/L (ref 0.1–0.95)
MONOCYTES RELATIVE PERCENT: 20 % (ref 2–12)
MYELOCYTE PERCENT: 5.2 % (ref 0–0)
NEUTROPHILS ABSOLUTE: 6.51 E9/L (ref 1.8–7.3)
NEUTROPHILS RELATIVE PERCENT: 64.4 % (ref 43–80)
OVALOCYTES: ABNORMAL
PDW BLD-RTO: 25.3 FL (ref 11.5–15)
PLATELET # BLD: 184 E9/L (ref 130–450)
PMV BLD AUTO: 11.8 FL (ref 7–12)
POIKILOCYTES: ABNORMAL
POLYCHROMASIA: ABNORMAL
RBC # BLD: 3.08 E12/L (ref 3.8–5.8)
SCHISTOCYTES: ABNORMAL
WBC # BLD: 9.3 E9/L (ref 4.5–11.5)

## 2022-08-31 PROCEDURE — 85025 COMPLETE CBC W/AUTO DIFF WBC: CPT

## 2022-08-31 PROCEDURE — 99024 POSTOP FOLLOW-UP VISIT: CPT | Performed by: STUDENT IN AN ORGANIZED HEALTH CARE EDUCATION/TRAINING PROGRAM

## 2022-08-31 PROCEDURE — 94640 AIRWAY INHALATION TREATMENT: CPT

## 2022-08-31 PROCEDURE — 6360000002 HC RX W HCPCS: Performed by: INTERNAL MEDICINE

## 2022-08-31 PROCEDURE — 93308 TTE F-UP OR LMTD: CPT

## 2022-08-31 PROCEDURE — 6370000000 HC RX 637 (ALT 250 FOR IP): Performed by: INTERNAL MEDICINE

## 2022-08-31 PROCEDURE — 2140000000 HC CCU INTERMEDIATE R&B

## 2022-08-31 PROCEDURE — 2580000003 HC RX 258: Performed by: INTERNAL MEDICINE

## 2022-08-31 PROCEDURE — 2700000000 HC OXYGEN THERAPY PER DAY

## 2022-08-31 PROCEDURE — 76999 ECHO EXAMINATION PROCEDURE: CPT

## 2022-08-31 PROCEDURE — 97535 SELF CARE MNGMENT TRAINING: CPT

## 2022-08-31 PROCEDURE — 87088 URINE BACTERIA CULTURE: CPT

## 2022-08-31 PROCEDURE — 36415 COLL VENOUS BLD VENIPUNCTURE: CPT

## 2022-08-31 RX ADMIN — Medication 300 UNITS: at 09:04

## 2022-08-31 RX ADMIN — BUDESONIDE 500 MCG: 0.5 SUSPENSION RESPIRATORY (INHALATION) at 12:29

## 2022-08-31 RX ADMIN — SODIUM CHLORIDE, PRESERVATIVE FREE 10 ML: 5 INJECTION INTRAVENOUS at 09:03

## 2022-08-31 RX ADMIN — CLOTRIMAZOLE 10 MG: 10 LOZENGE ORAL at 09:03

## 2022-08-31 RX ADMIN — CLOTRIMAZOLE 10 MG: 10 LOZENGE ORAL at 12:14

## 2022-08-31 RX ADMIN — AMPICILLIN SODIUM 2000 MG: 2 INJECTION, POWDER, FOR SOLUTION INTRAVENOUS at 05:58

## 2022-08-31 RX ADMIN — GUAIFENESIN 400 MG: 400 TABLET ORAL at 20:42

## 2022-08-31 RX ADMIN — Medication 300 UNITS: at 20:43

## 2022-08-31 RX ADMIN — CLOTRIMAZOLE 10 MG: 10 LOZENGE ORAL at 16:49

## 2022-08-31 RX ADMIN — AMPICILLIN SODIUM 2000 MG: 2 INJECTION, POWDER, FOR SOLUTION INTRAVENOUS at 18:04

## 2022-08-31 RX ADMIN — BUDESONIDE 500 MCG: 0.5 SUSPENSION RESPIRATORY (INHALATION) at 20:29

## 2022-08-31 RX ADMIN — CEFTRIAXONE 2000 MG: 2 INJECTION, POWDER, FOR SOLUTION INTRAMUSCULAR; INTRAVENOUS at 03:01

## 2022-08-31 RX ADMIN — CLOTRIMAZOLE 10 MG: 10 LOZENGE ORAL at 20:42

## 2022-08-31 RX ADMIN — ARFORMOTEROL TARTRATE 15 MCG: 15 SOLUTION RESPIRATORY (INHALATION) at 12:28

## 2022-08-31 RX ADMIN — AMPICILLIN SODIUM 2000 MG: 2 INJECTION, POWDER, FOR SOLUTION INTRAVENOUS at 09:13

## 2022-08-31 RX ADMIN — AMPICILLIN SODIUM 2000 MG: 2 INJECTION, POWDER, FOR SOLUTION INTRAVENOUS at 14:33

## 2022-08-31 RX ADMIN — AMPICILLIN SODIUM 2000 MG: 2 INJECTION, POWDER, FOR SOLUTION INTRAVENOUS at 01:41

## 2022-08-31 RX ADMIN — ARFORMOTEROL TARTRATE 15 MCG: 15 SOLUTION RESPIRATORY (INHALATION) at 20:29

## 2022-08-31 RX ADMIN — SODIUM CHLORIDE, PRESERVATIVE FREE 10 ML: 5 INJECTION INTRAVENOUS at 20:43

## 2022-08-31 RX ADMIN — AMPICILLIN SODIUM 2000 MG: 2 INJECTION, POWDER, FOR SOLUTION INTRAVENOUS at 21:30

## 2022-08-31 RX ADMIN — GUAIFENESIN 400 MG: 400 TABLET ORAL at 16:49

## 2022-08-31 RX ADMIN — GUAIFENESIN 400 MG: 400 TABLET ORAL at 09:03

## 2022-08-31 RX ADMIN — CEFTRIAXONE 2000 MG: 2 INJECTION, POWDER, FOR SOLUTION INTRAMUSCULAR; INTRAVENOUS at 16:50

## 2022-08-31 RX ADMIN — ATORVASTATIN CALCIUM 20 MG: 20 TABLET, FILM COATED ORAL at 20:42

## 2022-08-31 NOTE — PROGRESS NOTES
P Quality Flow/Interdisciplinary Rounds Progress Note        Quality Flow Rounds held on August 31, 2022    Disciplines Attending:  Bedside Nurse, , , and Nursing Unit Leadership    Noe Tejeda was admitted on 8/16/2022  2:38 PM    Anticipated Discharge Date:  Expected Discharge Date: 09/01/22    Disposition:    Kojo Score:  Kojo Scale Score: 20    Readmission Risk              Risk of Unplanned Readmission:  23           Discussed patient goal for the day, patient clinical progression, and barriers to discharge.   The following Goal(s) of the Day/Commitment(s) have been identified:  report labs/diagnostics       Michelle Melendez RN  August 31, 2022

## 2022-08-31 NOTE — PROGRESS NOTES
Patient off floor for imaging. I did not personally see patient.   Patient discussed with NP this Tavo Gibson MD  8/31/2022

## 2022-08-31 NOTE — PROGRESS NOTES
[facility] and [facility] [place]- Electrophysiology  Inpatient progress note  PATIENT: Althea Marin  MEDICAL RECORD NUMBER: [id number]  DATE OF SERVICE:  8/31/2022  ATTENDING ELECTROPHYSIOLOGIST: Enzo De La Cruz DO    PRIMARY ELECTROPHYSIOLOGIST: Tatum Canseco  REFERRING PHYSICIAN: No ref. provider found and Neisha Neal MD  CHIEF COMPLAINT: Weakness and fatigue, fever    HPI: This is a 67 y.o. male with a history of pretension, hyperlipidemia, COPD and a history of long-term smoking habit as well as recurrent syncopal episodes starting in 2012. ILR 2020 showed CHB and subsequent pacemaker implant on 7/20/2021. He was also diagnosed with lung cancer around the same time and has been undergoing chemotherapy and radiation. He finished his last chemo cycle last week and presented to the emergency room with symptoms of ongoing fatigue weakness and fever. He was found to be neutropenic as well as thrombocytopenic. Subsequently blood cultures have been positive for Enterococcus faecalis. Blood cultures from 8/16, 8/17 and 8/18 have all been positive despite IV antibiotics. Patient also remains neutropenic and thrombocytopenic with a platelet count of 20,744 today. No other cardiac symptoms. No new symptoms of chest pain or dyspnea on exertion. No syncope or near syncope.    8/21/22: Patient denies any new complaints today. Was transfused earlier today for hemoglobin of 7.2. No cardiac symptoms. 8/22/22: Patient remains asymptomatic. Underwent SHIRA today. White count remains low however hemoglobin and platelet count have improved    8/23/22:  No complaints. Awaiting plan for extraction to confirm CT surgery back up. SHIRA negative yesterday. Afebrile     8/23/22: Pacemaker set at VVI 40 yesterday afternoon, has been stable without change in symptoms. On monitor, it appears to be wenckebach vs 2:1 block with rates 60-70's     8/25/22: NPO for extraction today. No complaints overnight. Continues with stable heart rates what appears wenckebach vs 2:1 AV block     8/26/22: stable, family in room. He is up at side of bed, pain is tolerable, controlled. Left chest incision from extraction site stable. He continues to have 2:1 block vs wenckebach on monitor, stable rates 50-70 bpm. Denies dizziness or lightheadedness. 8/27/22: Feeling well. Reports some dizziness. Monitor showed sinus tachycardia with second degree AV block type I, 2 to 1 AV block and intermittent high grade AV block without any significant pauses. 8/28/22: The patient is seen in the hospital for follow up. He reports no dizziness or syncope. He remains in NSR with second degree AV block type I and 2 to 1 AV block. 08/30/22: The patient is seen in the hospital, she underwent placement of a MICRA yesterday today the groin is bruised today and her H/H was 7.8 today and he has been transfused with 1 unit. He is significantly SOB today and wheezy. The Device is functioning normal.     08/31/2022: The patient is seen in hospital follow-up, ultrasound revealed small right groin hematoma, due to ongoing dyspnea and an abundance of caution and limited echo was performed and showed a small circumferential pericardial effusion without tamponade.     Patient Active Problem List    Diagnosis Date Noted    Hematoma 08/30/2022     Priority: Medium    Presence of leadless cardiac pacemaker 08/30/2022     Priority: Medium    High-grade atrioventricular block 08/27/2022     Priority: Medium    Second degree AV block, Mobitz type I 08/27/2022     Priority: Medium    Bacteremia 08/23/2022     Priority: Medium    Pacemaker infection ([address] 08/22/2022     Priority: Medium    Sepsis due to Enterococcus ([address] 08/22/2022     Priority: Medium    Pancytopenia ([address] 08/17/2022     Priority: Medium    COVID-19 05/25/2022     Priority: Medium    Squamous carcinoma of lung ([address] 09/18/2021    History pulmonary embolism ([address] on Eliquis 09/18/2021 Hyponatremia 09/18/2021    Hypomagnesemia 09/18/2021    Wide-complex tachycardia ([facility] 09/18/2021    Acute on chronic respiratory failure with hypoxia ([facility] 09/17/2021    Cardiac pacemaker in situ 07/20/2021    Intermittent complete heart block (HCC) 07/19/2021    Pneumonia 01/31/2019    COPD (chronic obstructive pulmonary disease) ([facility] 01/31/2019    BPH (benign prostatic hyperplasia) 01/31/2019    HTN (hypertension) 01/31/2019    HLD (hyperlipidemia) 01/31/2019    Syncope and collapse 01/18/2019       Past Medical History:   Diagnosis Date    CHB (complete heart block) ([facility] 07/2021    COPD (chronic obstructive pulmonary disease) ([facility]     Syncope 09/2020    dr David Manjarrez wearing a monitor    Syncope 07/2021       Family History   Problem Relation Age of Onset    Cancer Father         prostate    Cancer Paternal Uncle         anal       Social History     Tobacco Use    Smoking status: Former     Packs/day: 2.00     Years: 25.00     Pack years: 50.00     Types: Cigarettes     Quit date: 12/26/2011     Years since quitting: 10.6    Smokeless tobacco: Never   Substance Use Topics    Alcohol use: Not Currently     Alcohol/week: 1.0 standard drink     Types: 1 Glasses of wine per week     Comment: 1 glass of wine per day prior       Current Facility-Administered Medications   Medication Dose Route Frequency Provider Last Rate Last Admin    perflutren lipid microspheres (DEFINITY) injection 1.65 mg  1.5 mL IntraVENous ONCE PRN LELO Garcia - CNP        0.9 % sodium chloride infusion   IntraVENous PRN Will Camacho MD        levalbuterol (XOPENEX) nebulization 0.63 mg  0.63 mg Nebulization Q8H PRN Marco Pinedo MD        [facility] metoprolol succinate (TOPROL XL) extended release tablet 25 mg  25 mg Oral BID  Will Camacho MD        sodium chloride flush 0.9 % injection 5-40 mL  5-40 mL IntraVENous 2 times per day Will Camacho MD   10 mL at 08/31/22 0903    sodium chloride flush 0.9 % injection 5-40 mL  5-40 mL IntraVENous PRN Robert Lombardo MD   10 mL at 08/30/22 1541    0.9 % sodium chloride infusion   IntraVENous PRN Robert Lombardo MD        guaiFENesin tablet 400 mg  400 mg Oral TID Robert Lombardo MD   400 mg at 08/31/22 1649    Arformoterol Tartrate (BROVANA) nebulizer solution 15 mcg  15 mcg Nebulization BID Robert Lombardo MD   15 mcg at 08/31/22 1228    budesonide (PULMICORT) nebulizer suspension 500 mcg  0.5 mg Nebulization BID Robert Lombardo MD   500 mcg at 08/31/22 1229    heparin flush 100 UNIT/ML injection 300 Units  3 mL IntraVENous 2 times per day Robert Lombardo MD   300 Units at 08/31/22 0904    heparin flush 100 UNIT/ML injection 300 Units  3 mL IntraCATHeter PRN Robert Lombardo MD        morphine (PF) injection 2 mg  2 mg IntraVENous Q4H PRN Robert Lombardo MD        sodium chloride (OCEAN, BABY AYR) 0.65 % nasal spray 1 spray  1 spray Each Nostril PRN Robert Lombardo MD        trimethobenzamide (TIGAN) injection 200 mg  200 mg IntraMUSCular Q6H PRN Robert Lombardo MD        oxyCODONE-acetaminophen (PERCOCET) 5-325 MG per tablet 1 tablet  1 tablet Oral Q4H PRN Robert Lombardo MD   1 tablet at 08/28/22 0703    Or    oxyCODONE-acetaminophen (PERCOCET) 5-325 MG per tablet 2 tablet  2 tablet Oral Q4H PRN Robert Lombardo MD        clotrimazole (MYCELEX) lindsay 10 mg  10 mg Oral 4x daily Robert Lombardo MD   10 mg at 08/31/22 1649    potassium chloride (KLOR-CON M) extended release tablet 40 mEq  40 mEq Oral PRN Robert Lombardo MD   40 mEq at 08/28/22 0805    Or    potassium bicarb-citric acid (EFFER-K) effervescent tablet 40 mEq  40 mEq Oral PRN Robert Lombardo MD        Or    potassium chloride 10 mEq/100 mL IVPB (Peripheral Line)  10 mEq IntraVENous PRN Robert Lombardo MD        ampicillin 2000 mg ivpb mini bag  2,000 mg IntraVENous 6 times per day Robert Lombardo MD   Stopped at 08/31/22 1623    cefTRIAXone (ROCEPHIN) 2,000 mg in sterile water 20 mL IV syringe  2,000 mg IntraVENous Q12H Robert Lombardo MD   2,000 mg at 08/31/22 66 91 21 melatonin tablet 3 mg  3 mg Oral Nightly PRN Shawanda Ramirez MD   3 mg at 08/27/22 2136    atorvastatin (LIPITOR) tablet 20 mg  20 mg Oral Daily Shawanda Ramirez MD   20 mg at 08/30/22 2016    acetaminophen (TYLENOL) tablet 650 mg  650 mg Oral Q6H PRN Shawanda Ramirez MD   650 mg at 08/29/22 1847    Or    acetaminophen (TYLENOL) suppository 650 mg  650 mg Rectal Q6H PRN Shawanda Ramirez MD        senna (SENOKOT) tablet 8.6 mg  1 tablet Oral Daily PRN Shawanda Ramirez MD            No Known Allergies    ROS:   Constitutional: Negative for fever. Positive for activity change and negative for appetite change. HENT: Negative for epistaxis. Eyes: Negative for diploplia, blurred vision. Respiratory: Negative for chest tightness, + for cough shortness of breath and wheezing. Cardiovascular: pertinent positives in HPI  Gastrointestinal: Negative for abdominal pain and blood in stool. All other review of systems are negative       PHYSICAL EXAM:   Vitals:    08/31/22 0541 08/31/22 0734 08/31/22 1240 08/31/22 1444   BP:  (!) 126/51  (!) 143/63   Pulse:  60 82 72   Resp:  24 20 24   Temp:  96.8 °F (36 °C)  97 °F (36.1 °C)   TempSrc:       SpO2:  100% 100% 100%   Weight: 194 lb 6.4 oz (88.2 kg)      Height:          Constitutional: Well-developed, Short of breath. Eyes: conjunctivae normal, no xanthelasma   Ears, Nose, Throat: oral mucosa moist, no cyanosis   CV: no JVD. Irregular rhythm. Normal S1S2 and no S3. No murmurs, rubs, or gallops. Lungs: decreased bases bilaterally, normal respiratory effort without used of accessory muscles  Abdomen: soft, non-tender, non distended  Musculoskeletal: no digital clubbing, +1-2 pitting BLE edema   Skin: warm, no rashes. Left chest wall extraction site glued, Small right side chest wall incision site well approximated also, no swelling or hemtatoma   Right groin:is bruised with no edema.      Data:    Recent Labs     08/29/22  0652 08/30/22  0703 08/30/22 2005 08/31/22  0515   WBC 9.6 11.9* --  9.3   HGB 8.0* 7.8* 8.9* 8.5*   HCT 26.7* 25.1* 28.4* 27.6*    242  --  184     Recent Labs     08/29/22  0652 08/30/22  0703    141   K 3.6 4.2    98   CO2 30* 26   BUN 4* 10   CREATININE 0.8 1.1   CALCIUM 8.8 8.8        Lab Results   Component Value Date/Time    MG 1.8 08/27/2022 07:25 AM     No results for input(s): TSH in the last 72 hours. No results for input(s): INR in the last 72 hours. Telemetry 08/31/22  Sinus rhythm with episodes of Non conducted P waves and intermittent RV pacing rates 60-90 bpm. No significant pauses noted. EKG:        SHIRA 8/22/22   Summary   No SHIRA indication of Endocarditis. Normal left ventricular chamber size and systolic function. Interatrial septum appears intact. Normal right ventricle size and function. Pacemaker leads noted in right atrium and right ventricle. Normal aortic root size. Moderate atherosclerosis in the descending thoracic aorta. Epicardial fat vs. small pericardial effusion. No intra cardiac mass or thrombus. Technically sub-optimal images. Compared to prior sub-optimal TTE from 8/19/22. Signature    ----------------------------------------------------------------   Electronically signed by Duane Saravia MD(Interpreting   physician) on 08/22/2022 06:50 PM    Echocardiogram: 8/19/22   Summary   Normal left ventricular chamber size. Normal left ventricular systolic function. Visually estimated LVEF is 55-60 %. No wall motion abnormalities. Normal diastolic function. Normal left atrial pressure. Normal right ventricle structure and function. Normal left atrial size. Normal right atrial size   Likely normal estimated PA pressure. Pacer/ ICD wire present in the right heart. No gross evidence of infective endocarditis. Consider SHIRA for better   accuracy if clinically indicated. Compared to prior echo from 2021, no significant changes noted.       Signature ----------------------------------------------------------------   Electronically signed by Brett Balbuena MD(Interpreting   physician) on 08/19/2022 04:16 PM      Device Interrogation/Reprogramming 08/30/2022  Make/Model: [employer] MICRA AV   DOI: 08/29/22  Battery: 3.19 V  Initializing   Carlos Becerra therapy: VVI 50 ppm  Pacing %: RV = 4.8%,   Lead function:  RV lead: sensing = >20 mV, impedance = 560 , threshold = 0.38 V @ 0.24 msec  Lead programming:  RV lead: sensitivity = 2.0 mV, output = 2.38 V @ 0.24 msec  Arrhythmias: none   Reprogramming included: none, Can not turn on AV synchrony until the 2 week device check. Overall device function is normal  All device programmable settings were evaluated per above and in the scanned document, along with iterative adjustments (capture thresholds) to assess and select the most appropriate final programming to provide for consistent delivery of the appropriate therapy and to verify function of the device. Assessment/Plan:      1. Dual-chamber pacemaker explanted 08/25/22  - Initial indication for AV block with a long pause/ ? CHB in past on ILR.   - Date of implant July 28, 2021  - Pacemaker check in June revealed 6.5% atrial and 3% ventricular pacing with DDI programming at lower rate of 60 bpm.  - Initial interrogation during [facility] admission on 8/23/22 showed 27% AV and 25% RV paced.  - Noted bifascicular block with 2:1 block and second degree AV block type I on tele when pacer set at VVI 40 with RV paced <1%. - On Toprol 50 mg bid which held since 8/24/22   - Underwent full extraction on 8/25/22   - Currently in intermittent second degree AV block type I, 2 to 1 AV block and high grade AV block with HR  bpm and no significant pause seen. 2. MICRA AV in situ   - DOI 08/29/22  - Normal device function   - Slight ecchymosis of groin     3.   Bacteremia/sepsis--enterococcus faecalis  - Probably port infection per ID; mediport removed 8/20/22   - Persistent bacteremia despite IV antibiotics  8-16-22 blood cultures E faecalis x 2  8-17-22 blood cultures E faecalis x 1  8-18-22 blood culture + E faecalis x1  8-19-22 blood cultures neg to date  8-20-22 Port ( tip ) cx - neg tod date   8-20-22 blood cultures neg to date  8-23-22 blood cultures neg to date  8-25-22 blood cultures neg to date   - On IV Ampicillin and Rocephin  - Dr. Ricardo Bales following. 3.  Lung cancer, squamous cell--patient undergoing chemotherapy  -  Stage IIIB T2aN3 Squamous Cell Carcinoma of Lung  diagnosis 5/27/2021, PD-L1 0%    - 7/6/21-9/7/21 treatment with concurrent chemotherapy/radiation therapy   - 11/2/21 consolidated with Imfinzi (Durvalumab) per ID IV every 4 weeks   - 6/30/22 PET showing progression and was started on Carbo, Gemzar and Keytruda on 6/30/22  - 8/9 /22 received C2D8 of Carboplatin AUC 2 with Gemzar 1000 mg/m2 IV on D1,8 and Keytruda IV on D1 on a 21 day cycle    4. Neutropenia and thrombocytopenia related to chemotherapy  - Improved. 5. Persistent anemia.    - Post PRBC transfusion 8/21/22  - count 8.5 post PRBC transfusion 08/30/2022.    6. History of PE   - Diagnosed in 2021   - On Eliquis in the past and currently on hold. 7.  Small circumferential pericardial effusion with largest component anterior to the RV. -Noted on echo 08/31/2022    Recommendations:    Limited echo to rule out effusion giving ongoing dyspnea. Continue Toprol XL 25mg BID. Continue to hold  Eliquis until at least 09/02/22. Continue to monitor on telemetry. Make device VVIR  bpm.       Thank you for allowing me to participate in your patient's care. Please call me if there are any questions or concerns. I have spent a total of 10 minutes with the patient and the family reviewing the above stated recommendations.   And a total of >50% of that time involved face-to-face time providing counseling and/or coordination of care with the other providers, reviewing records/tests, counseling/education of the patient, ordering medications/tests/procedures, coordinating care, and documenting clinical information in the EHR. Discussed with Dr. Glenard Boeck. LELO Ca - CNP  Cardiac Electrophysiology  [facility]  Electrophysiology  5:51 PM  8/31/2022    67year-old male with a history of high-grade AV block status post pacemaker implant (7/20/2021), PE on [address] [place] [address], and lung cancer on chemotherapy/radiation. In August 2022, patient was diagnosed with Enterococcus faecalis bacteremia. On 8/19/22, a TTE reported LVEF of 55-60%, no IE, and no periciardial effusion. On 8/22/22, a SHIRA was technically suboptimal, but reported no evidence of IE, LVEF = \"normal\", and epicardial fat v small pericardial effusion. On 8/25/2022, he underwent extraction of the device. He is on IV abx until 10/13/22 via right brachial PICC. On 8/29/2022, he underwent Micra implant. His beta-blocker was restarted following the procedure. This procedure was complicated by right groin hematoma. US reported small (3.1 cm) hematoma. He was administered 1 unit of packed red blood cells. Hematoma progressively improving. Device interrogation post-implant with stable device function. Device programmed to VVIR  bpm as [employer] representative, they recommend delaying AV synchrony assessment until 2-week device post check. Patient complains of increasing dyspnea. TTE today reports new small circumferential pericardial effusion with largest component superficial to RV without evidence of tamponade. Recommend repeat limited TTE 9/1/22. Continue to hold [address] [place] [address] [employer]. Device appears to function adequately on review  of telemetry. However, given new pericardial effusion, I will ask [employer] to re-interrogate device on 9/2/22.      Machelle Mcrae DO  [id number] [facility] Cardiac Electrophysiology  [address] [facility]      Attending Supervising Physicians Attestation Statement  I performed at least 51% of the work.   I was present with the nurse practitioner during the history and exam. I discussed the findings and plans with the nurse practitioner and agree as documented in his note     Electronically signed by Christa Eason DO on 8/31/22 at 10:47 PM EDT

## 2022-08-31 NOTE — PROGRESS NOTES
[address] [place] [address] [facility]  Progress Note          C/C : High grade Enterococcus bacteremia, sepsis       SUBJECTIVE:  Patient is tolerating medications. No reported adverse drug reactions. No nausea or emesis  Edge of bed; some SOB  The insertion site of the pacer had some bleeding, hb checked was 7.8, given 1 unit of blood to keep above 8. Per EP, device doing well. Afebrile and awake  Review of systems:  As stated above in the chief complaint, otherwise negative. Medications:  Scheduled Meds:   [Held by provider] metoprolol succinate  25 mg Oral BID WC    sodium chloride flush  5-40 mL IntraVENous 2 times per day    guaiFENesin  400 mg Oral TID    Arformoterol Tartrate  15 mcg Nebulization BID    budesonide  0.5 mg Nebulization BID    heparin flush  3 mL IntraVENous 2 times per day    clotrimazole  10 mg Oral 4x daily    ampicillin IV  2,000 mg IntraVENous 6 times per day    cefTRIAXone (ROCEPHIN) IV  2,000 mg IntraVENous Q12H    atorvastatin  20 mg Oral Daily     Continuous Infusions:   sodium chloride      sodium chloride       PRN Meds:sodium chloride, levalbuterol, sodium chloride flush, sodium chloride, heparin flush, morphine, sodium chloride, trimethobenzamide, oxyCODONE-acetaminophen **OR** oxyCODONE-acetaminophen, potassium chloride **OR** potassium alternative oral replacement **OR** potassium chloride, melatonin, acetaminophen **OR** acetaminophen, senna    OBJECTIVE:  BP (!) 126/51   Pulse 82   Temp 96.8 °F (36 °C)   Resp 20   Ht 5' 6\" (1.676 m)   Wt 194 lb 6.4 oz (88.2 kg)   SpO2 100%   BMI 31.38 kg/m²   Temp  Av.8 °F (36.6 °C)  Min: 96.8 °F (36 °C)  Max: 98.4 °F (36.9 °C)    Constitutional: The patient is awake, alert, and oriented. Skin: Warm and dry. No rashes were noted. Old pacer site no redness  HEENT: Round and reactive pupils. Moist mucous membranes. No ulcerations or thrush. Neck: Supple to movements. Chest: Bilateral equal breath sounds.  No crackles/rhonci  Cardiovascular: S1 and S2 are rhythmic and regular. No murmurs appreciated. Abdomen: Positive bowel sounds to auscultation. Benign to palpation. No masses felt. No hepatosplenomegaly. Genitourinary: male  Extremities: No clubbing, no cyanosis, + ble edema. RT GROIN ECCHYMOSIS   Lines: peripheral    Laboratory and Tests Review:  Lab Results   Component Value Date    WBC 9.3 08/31/2022    WBC 11.9 (H) 08/30/2022    WBC 9.6 08/29/2022    HGB 8.5 (L) 08/31/2022    HCT 27.6 (L) 08/31/2022    MCV 89.6 08/31/2022     08/31/2022     Lab Results   Component Value Date    NEUTROABS 6.51 08/31/2022    NEUTROABS 9.40 (H) 08/30/2022    NEUTROABS 7.68 (H) 08/29/2022     No results found for: [facility]  Lab Results   Component Value Date    ALT 29 08/20/2022    AST 25 08/20/2022    ALKPHOS 90 08/20/2022    BILITOT <0.2 08/20/2022     Lab Results   Component Value Date/Time     08/30/2022 07:03 AM    K 4.2 08/30/2022 07:03 AM    K 3.7 08/17/2022 04:17 AM    CL 98 08/30/2022 07:03 AM    CO2 26 08/30/2022 07:03 AM    BUN 10 08/30/2022 07:03 AM    CREATININE 1.1 08/30/2022 07:03 AM    CREATININE 0.8 08/29/2022 06:52 AM    CREATININE 0.9 08/28/2022 06:56 AM    GFRAA >60 08/30/2022 07:03 AM    LABGLOM >60 08/30/2022 07:03 AM    GLUCOSE 107 08/30/2022 07:03 AM    PROT 5.9 08/20/2022 04:44 PM    LABALBU 3.2 08/20/2022 04:44 PM    CALCIUM 8.8 08/30/2022 07:03 AM    BILITOT <0.2 08/20/2022 04:44 PM    ALKPHOS 90 08/20/2022 04:44 PM    AST 25 08/20/2022 04:44 PM    ALT 29 08/20/2022 04:44 PM     Lab Results   Component Value Date    CRP 9.6 (H) 05/24/2022     No results found for: [address]  Radiology:  Reviewed CT lung    Microbiology:   Lab Results   Component Value Date/Time    BC 5 Days no growth 08/25/2022 05:04 PM    BC 5 Days no growth 08/23/2022 10:23 AM    BC 5 Days no growth 08/19/2022 05:05 AM    ORG Enterococcus faecalis 08/18/2022 07:10 AM    ORG Enterococcus faecalis 08/18/2022 07:05 AM    ORG Enterococcus faecalis 08/17/2022 05:40 PM     Lab Results   Component Value Date/Time    BLOODCULT2 5 Days no growth 08/25/2022 05:05 PM    BLOODCULT2 5 Days no growth 08/23/2022 10:27 AM    BLOODCULT2 5 Days no growth 08/20/2022 05:30 AM    ORG Enterococcus faecalis 08/18/2022 07:10 AM    ORG Enterococcus faecalis 08/18/2022 07:05 AM    ORG Enterococcus faecalis 08/17/2022 05:40 PM     No results found for: WNDABS  No results found for: RESPSMEAR      Component Value Date/Time    FUNGSM No Fungal elements seen 08/25/2022 1403     No results found for: CULTRESP  Culture Catheter Tip   Date Value Ref Range Status   08/20/2022 Growth not present  Final     No results found for: BFCS  Culture Surgical   Date Value Ref Range Status   08/25/2022 Growth not present  Final   08/25/2022 Growth not present  Final     Urine Culture, Routine   Date Value Ref Range Status   08/16/2022   Final    10 to 100,000 CFU/mL  Mixed heydi isolated. Further workup and sensitivity testing  is not routinely indicated and will not be performed.   Mixed heydi isolated includes:  Mixed gram positive organisms     05/18/2019 <25,000 CFU/ml  Final     No results found for: [address]:  8-16-22 blood cultures E faecalis x 2  8-17-22 blood cultures E faecalis x 1  8-18-22 blood culture + E faecalis x1  8-19-22 blood cultures neg to date  8-20-22 Port ( tip ) cx - neg tod date   8-20-22 blood cultures neg to date  8/25/22 pacer cx pending  8/25/2 blood cx pending      ASSESSMENT:  High grade E faecalis bacteremia probably port infection; RO endocarditis and pacer infection s/p mediport removal ( 8/20) - follow up cx neg on antimicrobials  Immunocompromised host c pancytopenia; on chemo for Sq cell of lung; platelets are up and white count is stable  thrush-improved      PLAN:  Continue with  amp 2 grams IV q 4 hrs and ceftriaxone 2 grams IV q 12 hrs until 10-13-22  SHIRA nondiagnostic  S/p Removal of  pacer, and placement of a new pacemaker (MICRA)  PICC line ordered-inserted in right brachial   Monitor labs    Landry Andrade MD  1:19 PM  8/31/2022

## 2022-08-31 NOTE — PROGRESS NOTES
[address] [place] [address] [facility]  Progress Note          C/C : High grade Enterococcus bacteremia, sepsis       SUBJECTIVE:  Patient is tolerating medications. No reported adverse drug reactions. Patient is doing well, seen this morning while he was getting his breathing treatments. Feels short of breath, however is saturating well. Breath sounds are good bilaterally. The insertion site of the pacer had some bleeding, hb checked was 7.8, given 1 unit of blood to keep above 8. Per EP, device doing well. Review of systems:  As stated above in the chief complaint, otherwise negative. Medications:  Scheduled Meds:   [Held by provider] metoprolol succinate  25 mg Oral BID WC    sodium chloride flush  5-40 mL IntraVENous 2 times per day    guaiFENesin  400 mg Oral TID    Arformoterol Tartrate  15 mcg Nebulization BID    budesonide  0.5 mg Nebulization BID    heparin flush  3 mL IntraVENous 2 times per day    clotrimazole  10 mg Oral 4x daily    ampicillin IV  2,000 mg IntraVENous 6 times per day    cefTRIAXone (ROCEPHIN) IV  2,000 mg IntraVENous Q12H    atorvastatin  20 mg Oral Daily     Continuous Infusions:   sodium chloride      sodium chloride       PRN Meds:sodium chloride, levalbuterol, sodium chloride flush, sodium chloride, heparin flush, morphine, sodium chloride, trimethobenzamide, oxyCODONE-acetaminophen **OR** oxyCODONE-acetaminophen, potassium chloride **OR** potassium alternative oral replacement **OR** potassium chloride, melatonin, acetaminophen **OR** acetaminophen, senna    OBJECTIVE:  BP (!) 124/54   Pulse 59   Temp 98.2 °F (36.8 °C) (Oral)   Resp 21   Ht 5' 6\" (1.676 m)   Wt 191 lb 9.6 oz (86.9 kg)   SpO2 100%   BMI 30.93 kg/m²   Temp  Av.9 °F (36.6 °C)  Min: 97 °F (36.1 °C)  Max: 98.4 °F (36.9 °C)    Constitutional: The patient is awake, alert, and oriented. Skin: Warm and dry. No rashes were noted.  Old pacer site no redness  HEENT: Round and reactive pupils. Moist mucous membranes. No ulcerations or thrush. Neck: Supple to movements. Chest: Bilateral equal breath sounds. No crackles/rhonci  Cardiovascular: S1 and S2 are rhythmic and regular. No murmurs appreciated. Abdomen: Positive bowel sounds to auscultation. Benign to palpation. No masses felt. No hepatosplenomegaly. Genitourinary: male  Extremities: No clubbing, no cyanosis, + ble edema.   Lines: peripheral    Laboratory and Tests Review:  Lab Results   Component Value Date    WBC 11.9 (H) 08/30/2022    WBC 9.6 08/29/2022    WBC 15.8 (H) 08/28/2022    HGB 8.9 (L) 08/30/2022    HCT 28.4 (L) 08/30/2022    MCV 96.5 08/30/2022     08/30/2022     Lab Results   Component Value Date    NEUTROABS 9.40 (H) 08/30/2022    NEUTROABS 7.68 (H) 08/29/2022    NEUTROABS 13.90 (H) 08/28/2022     No results found for: [facility]  Lab Results   Component Value Date    ALT 29 08/20/2022    AST 25 08/20/2022    ALKPHOS 90 08/20/2022    BILITOT <0.2 08/20/2022     Lab Results   Component Value Date/Time     08/30/2022 07:03 AM    K 4.2 08/30/2022 07:03 AM    K 3.7 08/17/2022 04:17 AM    CL 98 08/30/2022 07:03 AM    CO2 26 08/30/2022 07:03 AM    BUN 10 08/30/2022 07:03 AM    CREATININE 1.1 08/30/2022 07:03 AM    CREATININE 0.8 08/29/2022 06:52 AM    CREATININE 0.9 08/28/2022 06:56 AM    GFRAA >60 08/30/2022 07:03 AM    LABGLOM >60 08/30/2022 07:03 AM    GLUCOSE 107 08/30/2022 07:03 AM    PROT 5.9 08/20/2022 04:44 PM    LABALBU 3.2 08/20/2022 04:44 PM    CALCIUM 8.8 08/30/2022 07:03 AM    BILITOT <0.2 08/20/2022 04:44 PM    ALKPHOS 90 08/20/2022 04:44 PM    AST 25 08/20/2022 04:44 PM    ALT 29 08/20/2022 04:44 PM     Lab Results   Component Value Date    CRP 9.6 (H) 05/24/2022     No results found for: [address]  Radiology:  Reviewed CT lung    Microbiology:   Lab Results   Component Value Date/Time    BC 5 Days no growth 08/25/2022 05:04 PM    BC 5 Days no growth 08/23/2022 10:23 AM    BC 5 Days no growth 08/19/2022 05:05 AM    ORG Enterococcus faecalis 08/18/2022 07:10 AM    ORG Enterococcus faecalis 08/18/2022 07:05 AM    ORG Enterococcus faecalis 08/17/2022 05:40 PM     Lab Results   Component Value Date/Time    BLOODCULT2 5 Days no growth 08/25/2022 05:05 PM    BLOODCULT2 5 Days no growth 08/23/2022 10:27 AM    BLOODCULT2 5 Days no growth 08/20/2022 05:30 AM    ORG Enterococcus faecalis 08/18/2022 07:10 AM    ORG Enterococcus faecalis 08/18/2022 07:05 AM    ORG Enterococcus faecalis 08/17/2022 05:40 PM     No results found for: WNDABS  No results found for: RESPSMEAR      Component Value Date/Time    FUNGSM No Fungal elements seen 08/25/2022 1403     No results found for: CULTRESP  Culture Catheter Tip   Date Value Ref Range Status   08/20/2022 Growth not present  Final     No results found for: BFCS  Culture Surgical   Date Value Ref Range Status   08/25/2022 Growth not present  Final   08/25/2022 Growth not present  Final     Urine Culture, Routine   Date Value Ref Range Status   08/16/2022   Final    10 to 100,000 CFU/mL  Mixed heydi isolated. Further workup and sensitivity testing  is not routinely indicated and will not be performed.   Mixed heydi isolated includes:  Mixed gram positive organisms     05/18/2019 <25,000 CFU/ml  Final     No results found for: [address]:  8-16-22 blood cultures E faecalis x 2  8-17-22 blood cultures E faecalis x 1  8-18-22 blood culture + E faecalis x1  8-19-22 blood cultures neg to date  8-20-22 Port ( tip ) cx - neg tod date   8-20-22 blood cultures neg to date  8/25/22 pacer cx pending  8/25/2 blood cx pending      ASSESSMENT:  High grade E faecalis bacteremia probably port infection; RO endocarditis and pacer infection s/p mediport removal ( 8/20) - follow up cx neg on antimicrobials  Immunocompromised host c pancytopenia; on chemo for Sq cell of lung; platelets are up and white count is stable  thrush-improved      PLAN:  Continue with  amp 2 grams IV q 4 hrs and ceftriaxone 2 grams IV q 12 hrs   SHIRA nondiagnostic  S/p Removal of  pacer, and placement of a new pacemaker (MICRA)  PICC line ordered-inserted in right brachial   Monitor labs    Iwona Torrez MD  11:01 PM  8/30/2022    Pt seen and examined. Above discussed agree with PGY3 . Labs, cultures, and radiographs reviewed. Face to Face encounter occurred. Changes made as necessary.      Katia Cabrera MD

## 2022-08-31 NOTE — PROGRESS NOTES
Subjective:  Chart reviewed  Continue dyspnea on exertion, 4 L O2 via nasal cannula  PICC line placed, [facility]  Eliquis being held until at least 9/2, will restart when okay with EP  Port and pacer cultures remain negative, cultures remain negative    Objective:    BP (!) 126/51   Pulse 82   Temp 96.8 °F (36 °C)   Resp 20   Ht 5' 6\" (1.676 m)   Wt 194 lb 6.4 oz (88.2 kg)   SpO2 100%   BMI 31.38 kg/m²     General: NAD, awake and alert  HEENT: No thrush or mucositis, EOMI, PERRLA  Heart: Heart regular rate and rhythm, chest with well-healed PORT and pacemaker removal  Lungs: respiration nonlabored, CTA bilaterally, 4 L O2 via nasal cannula  Abd: nontender, nondistended, soft  Extrem:  No clubbing, cyanosis, 2+ bilateral pedal edema  Skin: Intact, no petechia or purpura    CBC with Differential:    Lab Results   Component Value Date/Time    WBC 9.3 08/31/2022 05:15 AM    RBC 3.08 08/31/2022 05:15 AM    HGB 8.5 08/31/2022 05:15 AM    HCT 27.6 08/31/2022 05:15 AM     08/31/2022 05:15 AM    MCV 89.6 08/31/2022 05:15 AM    MCH 27.6 08/31/2022 05:15 AM    MCHC 30.8 08/31/2022 05:15 AM    RDW 25.3 08/31/2022 05:15 AM    NRBC 1.8 08/25/2022 05:44 AM    BLASTSPCT 0.9 08/24/2022 05:22 AM    METASPCT 3.5 08/30/2022 07:03 AM    LYMPHOPCT 10.4 08/31/2022 05:15 AM    PROMYELOPCT 1.8 08/29/2022 06:52 AM    MONOPCT 20.0 08/31/2022 05:15 AM    MYELOPCT 5.2 08/31/2022 05:15 AM    BASOPCT 0.5 08/31/2022 05:15 AM    MONOSABS 1.86 08/31/2022 05:15 AM    LYMPHSABS 0.93 08/31/2022 05:15 AM    EOSABS 0.00 08/31/2022 05:15 AM    BASOSABS 0.00 08/31/2022 05:15 AM     CMP:    Lab Results   Component Value Date/Time     08/30/2022 07:03 AM    K 4.2 08/30/2022 07:03 AM    K 3.7 08/17/2022 04:17 AM    CL 98 08/30/2022 07:03 AM    CO2 26 08/30/2022 07:03 AM    BUN 10 08/30/2022 07:03 AM    CREATININE 1.1 08/30/2022 07:03 AM    GFRAA >60 08/30/2022 07:03 AM oxyCODONE-acetaminophen (PERCOCET) 5-325 MG per tablet 2 tablet, 2 tablet, Oral, Q4H PRN, Juan Alberto Dhaliwal MD    clotrimazole (MYCELEX) lindsay 10 mg, 10 mg, Oral, 4x daily, Juan Alberto Dhaliwal MD, 10 mg at 08/31/22 1214    potassium chloride (KLOR-CON M) extended release tablet 40 mEq, 40 mEq, Oral, PRN, 40 mEq at 08/28/22 0805 **OR** potassium bicarb-citric acid (EFFER-K) effervescent tablet 40 mEq, 40 mEq, Oral, PRN **OR** potassium chloride 10 mEq/100 mL IVPB (Peripheral Line), 10 mEq, IntraVENous, PRN, Juan Alberto Dhaliwal MD    ampicillin 2000 mg ivpb mini bag, 2,000 mg, IntraVENous, 6 times per day, Juan Alberto Dhaliwal MD, Stopped at 08/31/22 1019    cefTRIAXone (ROCEPHIN) 2,000 mg in sterile water 20 mL IV syringe, 2,000 mg, IntraVENous, Q12H, Juan Alberto Dhaliwal MD, 2,000 mg at 08/31/22 0301    melatonin tablet 3 mg, 3 mg, Oral, Nightly PRN, Juan Alberto Dhaliwal MD, 3 mg at 08/27/22 2136    atorvastatin (LIPITOR) tablet 20 mg, 20 mg, Oral, Daily, Juan Alberto Dhaliwal MD, 20 mg at 08/30/22 2016    acetaminophen (TYLENOL) tablet 650 mg, 650 mg, Oral, Q6H PRN, 650 mg at 08/29/22 1847 **OR** acetaminophen (TYLENOL) suppository 650 mg, 650 mg, Rectal, Q6H PRN, Juan Alberto Dhaliwal MD    senna (SENOKOT) tablet 8.6 mg, 1 tablet, Oral, Daily PRN, Juan Alberto Dhaliwal MD    Assessment:    Principal Problem:    Pneumonia  Active Problems:    Pancytopenia ([address]    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  Treated with concurrent chemo/XRT from 7/6/21 followed by consolidation Imfinzi. Progression on PET from 6/24/22. Started [place] and Keytruda on 6/30/22. Patient seen in office last 8/9 and received C2D8 of Carboplatin with Gemzar and Keytruda. Admitted with sepsis, Enterococcus bacteremia. Respiratory panel negative. Mediport was removed 8/20    Plan:  Anemia, prefer to keep hemoglobin above 7.5 g/dL due to poor pulmonary reserves  Enterococcus bacteremia: Status post port removal.  On broad-spectrum IV antibiotics. Followed by ID. Pacemaker to be removed 8/25, cultures remain negative  Metastatic non-small cell lung cancer: Treatment is currently on hold.   Follow-up with  [facility] in 1-2 weeks after discharge from [facility] and completion of antibiotics  Restart Eliquis when okay by EP    Electronically signed by MATTHEW Ellison on 8/31/2022 at 12:51 PM

## 2022-08-31 NOTE — PROGRESS NOTES
OCCUPATIONAL THERAPY BEDSIDE TREATMENT NOTE   [address] [facility] [address]  [address] [place] [address]  Patient Name: Sonya Benito  MRN: [id number]  : 1950  Room: [address] [place] [address] #[id number]     Referring Provider: LELO Soria CNP  Specific Provider Orders/Date: OT eval and treat  22     Diagnosis: Pneumonia [J18.9]  Thrombocytopenia ([address] [D69.6]  Chronic anticoagulation [Z79.01]  Neutropenia, unspecified type ([address] [D70.9]  Pneumonia due to infectious organism, unspecified laterality, unspecified part of lung [J18.9]   Pt admitted to hospital on 22 for fatigue, generalized weakness, fever    Surgeries this Admission:  22:  Cardiac Laser Lead Extraction Left Sided Device, Pacemaker Device Extraction, Cardiac Laser Lead Exchange   22:  Leadless pacemaker implantation         Pertinent Medical History:  has a past medical history of CHB (complete heart block) ([facility] COPD (chronic obstructive pulmonary disease) ([address] Syncope, and Syncope.          Precautions:  Fall Risk, O2, current chemo (squamous cell lung CA)     Assessment of current deficits    [x] Functional mobility         [x]ADLs           [x] Strength                  []Cognition    [x] Functional transfers       [x] IADLs         [x] Safety Awareness   [x]Endurance    [] Fine Coordination                      [x] Balance      [] Vision/perception   []Sensation      []Gross Motor Coordination          [] ROM           [] Delirium                   [] Motor Control      OT PLAN OF CARE   OT POC based on physician orders, patient diagnosis and results of clinical assessment     Frequency/Duration 1-3 days/wk for 2 weeks PRN   Specific OT Treatment Interventions to include:   * Instruction/training on adapted ADL techniques and AE recommendations to increase functional independence within precautions       * Training on energy conservation strategies, correct breathing pattern and techniques to improve independence/tolerance for self-care routine  * Functional transfer/mobility training/DME recommendations for increased independence, safety, and fall prevention  * Patient/Family education to increase follow through with safety techniques and functional independence  * Recommendation of environmental modifications for increased safety with functional transfers/mobility and ADLs  * Therapeutic exercise to improve motor endurance, ROM, and functional strength for ADLs/functional transfers  * Therapeutic activities to facilitate/challenge dynamic balance, stand tolerance for increased safety and independence with ADLs  * Therapeutic activities to facilitate gross/fine motor skills for increased independence with ADLs     Recommended Adaptive Equipment: shower chair     Home Living: Pt lives with spouse in 1 floor home (+basement). [address]  Pt prefers to bathe in basement shower - full flight, 2 handrails    Bathroom setup: tub/shower with grab bars    Equipment owned: SPC, w/w, home O2 (at night), transport chair     Prior Level of Function: independent/mod I with ADLs , shares IADLs; ambulated w/o AD   Driving: yes     Pain Level: Pt c/o \"soreness\" Right Groin d/t recent procedure.       Vitals:  5L O2  O2 sats in semi-supine prior to initiation of ax = 91%  O2 sats seated EOB = 96%  O2 sats w/ static standing = 98%  O2 sats w/ upright ax/mobility = 96% despite Moderate SOB  O2 sats in semi-supine at end of session 94%      Cognition: A&O: 4/4; Follows 1-2 step directions           Memory:  good            Sequencing:  good            Problem solving:  fair +           Judgement/safety:  fair +             Functional Assessment:  AM-PAC Daily Activity Raw Score: 15/24    Initial Eval Status  Date: 8/18/22 Treatment Status  Date: 8/31/22 STGs = LTGs  Time frame: 10-14 days   Feeding Independent  Ind  -   Grooming Stand by Assist  Sup/Set up    Able to tolerate standing at sink for light ax, however, required seated rest break after ambulating to sink and after ax completion prior to ambulating back to bed   Modified [place]    UB Dressing Supervision  NT Modified [place]    LB Dressing Contact Guard Assist   For safety  B socks, simulated pants Mod A    Min A to don socks, thread LEs into pants, Pull pants over hips, Close SUP for safety w/ standing balance during ax  VCs for safety, PLB/EC Techs, use of adaptive equipment   Modified [place]    Bathing Contact Guard Assist  NT Modified [place]    Toileting Stand by Assist  NT    Pt declined Modified [place]    Bed Mobility  Supine to sit:NT  Sit to supine: Supervision  Supine to sit:  SUP  Sit to supine:  Min A - to lift LE into bed  VCs for safety Supine to sit: Modified [place]   Sit to supine: Modified [place]    Functional Transfers Stand by Assist   Min A- sit<->stand     EOB 2x, Chair 2x  Mod VCs for safety/hand placement    Modified [place]    Functional Mobility Stand by Assist   In room and bathroom   2 trials w/ rest break between  Close SUP w/ WW    Short distances in room, bathroom, short household distances Bed<>Bathroom   Modified [place]    Balance Sitting:     Static:  Independent    Dynamic:Supervision  Standing: SBA, no AD Sitting:     Static: SUP EOB    Dynamic: SUP EOB    Standing: Close SUP w/ [address] w/ Functional ax/mobility       Activity Tolerance Fair   Noted fatigue/+SOB post ambulation task. Reinforced rest and pursed lip breathing. Fair    Moderate SOB with Min activity  Required Several extended seated rest breaks during session Good   Visual/  Perceptual Glasses: yes                        Comments: Upon arrival, pt found in semi-supine. He was agreeable to participate in OT session. Received Permission from RN To engage pt in tx ax. No family at b/s.     Pt educated on techniques to increase independence and safety during ADL's, and functional transfers/mobility, use of PLB and EC Techs, use of DME/AD/AE. Pt verbalized a good understanding - will review. At end of session pt was positioned on Left side in supine d/t arrival of tech for Echo. Call light within reach. Pt has made fair progress towards set goals.      Continue with current plan of care    Treatment Time In: 1505             Treatment Time Out: [facility]             Treatment Charges: Mins Units   Ther Ex  [id number]     Manual Therapy [phone]     Thera Activities [id number]     ADL/Home Mgt [id number] [phone]   Neuro Re-ed [id number]     Group Therapy      Orthotic manage/training  02263     Non-Billable Time     Total Timed Treatment 39 209 [facility], [address] [place] [address], OTR/L  # [id number]

## 2022-08-31 NOTE — PROGRESS NOTES
[facility]                                Progress note    Subjective:    Patient is alert and oriented sitting in chair at bedside. Had pacemaker placement   He also has decreased urine output, has urgency and hesitancy  Continues to feel somewhat short of breath     Objective:    BP (!) 126/51   Pulse 60   Temp 96.8 °F (36 °C)   Resp 24   Ht 5' 6\" (1.676 m)   Wt 194 lb 6.4 oz (88.2 kg)   SpO2 100%   BMI 31.38 kg/m²     In: 580 [P.O.:580]  Out: 500   In: 580   Out: 500 [Urine:500]    General appearance: NAD, conversant, somewhat dyspneic  HEENT: AT/NC, MMM  Neck: FROM, supple  Lungs: Clear to auscultation  CV: RRR, no MRGs-right-sided port removed 8/20, left-sided pacemaker removed   Vasc: Radial pulses 2+  Abdomen: Soft, non-tender; no masses or HSM  Extremities: No  or digital cyanosis, 2+ BLE edema   Skin: Generalized bruising  Psych: Alert and oriented to person, place and time  Neuro: Alert and interactive     Recent Labs     08/29/22  0652 08/30/22  0703 08/30/22 2005 08/31/22  0515   WBC 9.6 11.9*  --  9.3   HGB 8.0* 7.8* 8.9* 8.5*   HCT 26.7* 25.1* 28.4* 27.6*    242  --  184       Recent Labs     08/29/22  0652 08/30/22  0703    141   K 3.6 4.2    98   CO2 30* 26   BUN 4* 10   CREATININE 0.8 1.1   CALCIUM 8.8 8.8       Assessment:    Principal Problem:    Pneumonia  Active Problems:    Pancytopenia (HCC)    Pacemaker infection (HCC)    Sepsis due to Enterococcus (HCC)    Bacteremia    High-grade atrioventricular block    Second degree AV block, Mobitz type I    Hematoma    Presence of leadless cardiac pacemaker    COPD (chronic obstructive pulmonary disease) (HCC)    HTN (hypertension)    Cardiac pacemaker in situ    Squamous carcinoma of lung (HCC)    History pulmonary embolism ([facility] [address] on Eliquis  Resolved Problems:    * No resolved hospital problems.  *      Plan:  Patient is a 76-year-old male with a hx of squamous cell lung carcinoma who is active chemo therapy who is admitted to [facility] for  Sepsis/bacteremia/immunocompromise status-acutely ill status  -Mediport removed 8/20  -EP for removal of pacer, completed 8/25  -EP for new pacemaker placement, completed 8/29/2022  -Diuresis for volume overload 830, questionable hematoma in groin-for 1 unit PRBC transfusion  -Awaiting chest x-ray today  -Respiratory panel negative  -CT chest ordered by pulm> progressive infiltrative mass in the left perihilar region  -On daptomycin per infectious disease > switched to IV Rocephin and ampicillin  -SHIRA 8/22/2022-unremarkable, no evidence of endocarditis, ejection fraction  -Resume Trelegy as he takes at home-okay to use   -[facility] for [facility]    Leukocytosis, likely reactive from surgery   -Monitor CBC daily  -WBC - 15.8>9.6  -UA/urine culture, monitor for infection  -Complaining of breaking out Fair amount of phlegm    Hypomagnesemia  -Mg+ 1.3 > replaced with 2 g  Labs pending    Pancytopenia-improved  -Consult heme-onc-following  -h/h 6.9/21.4 > stable now, did drop slightly and will be receiving 1 unit today of PRBC  -Platelets 4 > transfuse 1 unit platelets > 23 > 696 today  -Hold Eliquis-consider holding off for few weeks  Thrombocytopenia resolved-platelet count 649     Hematuria  -Check urine culture  -Hold Eliquis    DVT Prophylaxis PCD's  PT/OT  Discharge planning-monitor overnight, okay for discharge once okay with EP service to [facility]      Jerzy Peralta MD  12:19 PM  8/31/2022

## 2022-08-31 NOTE — PROGRESS NOTES
%      EXAM:  General: No distress. Alert. Eyes: No sclera icterus. No conjunctival injection. ENT: No discharge. Pharynx clear. Neck: Trachea midline. Normal thyroid. Resp: No accessory muscle use. No crackles. No wheezing. No rhonchi. Diminished bilaterally   CV: Regular rate. Regular rhythm. No mumur or rub. +3 pitting BLE  ABD: Non-tender. Non-distended. No masses. No organmegaly. Normal bowel sounds. Skin: Warm and dry. No nodule on exposed extremities. No rash on exposed extremities. R groin bruising   M/S: No cyanosis. No joint deformity. No clubbing. Neuro: Awake. Follows commands. A&Ox 3    I/O: I/O last 3 completed shifts: In: 480 [P.O.:480]  Out: 350 [Urine:350]  I/O this shift:  In: 100 [P.O.:100]  Out: 300 [Urine:300]     Results:  CBC:   Recent Labs     08/29/22 0652 08/30/22  0703 08/30/22 2005 08/31/22  0515   WBC 9.6 11.9*  --  9.3   HGB 8.0* 7.8* 8.9* 8.5*   HCT 26.7* 25.1* 28.4* 27.6*   MCV 95.4 96.5  --  89.6    242  --  184     BMP:   Recent Labs     08/29/22 0652 08/30/22  0703    141   K 3.6 4.2    98   CO2 30* 26   BUN 4* 10   CREATININE 0.8 1.1     LFT: No results for input(s): ALKPHOS, ALT, AST, PROT, BILITOT, BILIDIR, LABALBU in the last 72 hours. PT/INR: No results for input(s): PROTIME, INR in the last 72 hours. Cultures:  No results for input(s): CULTRESP in the last 72 hours. ABG:   No results for input(s): PH, PO2, PCO2, HCO3, BE, O2SAT in the last 72 hours. Films:  CT Head WO Contrast 8/16/2022  EXAMINATION: CT OF THE HEAD WITHOUT CONTRAST  8/16/2022 4:42 pm TECHNIQUE: CT of the head was performed without the administration of intravenous contrast. Automated exposure control, iterative reconstruction, and/or weight based adjustment of the mA/kV was utilized to reduce the radiation dose to as low as reasonably achievable.  COMPARISON: July 19, 2021 HISTORY: ORDERING SYSTEM PROVIDED HISTORY: headache, hx of cancer TECHNOLOGIST PROVIDED HISTORY: Reason for exam:->headache, hx of cancer Has a \"code stroke\" or \"stroke alert\" been called? ->No Decision Support Exception - unselect if not a suspected or confirmed emergency medical condition->Emergency Medical Condition (MA) What reading provider will be dictating this exam?->CRC FINDINGS: BRAIN/VENTRICLES: There is no acute intracranial hemorrhage, mass effect or midline shift. No abnormal extra-axial fluid collection. The gray-white differentiation is maintained without evidence of an acute infarct. There is no evidence of hydrocephalus. ORBITS: The visualized portion of the orbits demonstrate no acute abnormality. SINUSES: The visualized paranasal sinuses and mastoid air cells demonstrate no acute abnormality. SOFT TISSUES/SKULL:  No acute abnormality of the visualized skull or soft tissues. No acute intracranial abnormality. Cortical atrophy and periventricular leukomalacia. XR CHEST PORTABLE 8/16/2022  EXAMINATION: ONE XRAY VIEW OF THE CHEST 8/16/2022 3:49 pm COMPARISON: May 24, 2022 HISTORY: ORDERING SYSTEM PROVIDED HISTORY: cough, lung cancer TECHNOLOGIST PROVIDED HISTORY: Reason for exam:->cough, lung cancer What reading provider will be dictating this exam?->CRC FINDINGS: There is opacity in the left lower lung. There are chronic markings at the right lung base. Emphysematous changes in the upper lungs. The heart is not enlarged. There is no pneumothorax. There is mild blunting of the left costophrenic angle. Pacemaker present. Continued opacity in the left lower lung and linear scarring in the left perihilar region. COPD.     8/27 8/30    CXR 8/27: left pleural effusion  2 VW CXR: 8/30: left pleural effusion, unchanged.  Atelectasis improved    8/18/22 CT chest w/o                                      5/24/2022 CTA chest   5/24/2022 CTA chest: 3.4 x 5.4 cm left hilar and perihilar mass extending to LLL encircling the pulmonary artery and the bronchi consistent with progressive malignancy with occlusion of left lower lobe bronchi with postobstructive pneumonitis  8/18 CT chest:   A tiny pericardial effusion is noted. There is   coronary artery calcification. Left subclavian pacemaker is noted. The   previously noted infiltrative left hilar mass extending to left upper lobe   and left lower lobe surrounding the broncho pulmonary vasculature is noted   currently measuring 4.6 x 4.3 cm with progression. There is some associated   atelectasis. The right lung is clear. There is no pleural effusion       08/19/22 ECHO:  Normal left ventricular chamber size. Normal left ventricular systolic function. Visually estimated LVEF is 55-60 %. No wall motion abnormalities. Normal diastolic function. Normal left atrial pressure. Normal right ventricle structure and function. Normal left atrial size. Normal right atrial size   Likely normal estimated PA pressure. Pacer/ ICD wire present in the right heart. No gross evidence of infective endocarditis. Consider SHIRA for better   accuracy if clinically indicated. Compared to prior echo from 2021, no significant changes noted. SHIRA on 8/22/22   No SHIRA indication of Endocarditis. Normal left ventricular chamber size and systolic function. Interatrial septum appears intact. Normal right ventricle size and function. Pacemaker leads noted in right atrium and right ventricle. Normal aortic root size. Moderate atherosclerosis in the descending thoracic aorta. Epicardial fat vs. small pericardial effusion. No intra cardiac mass or thrombus. Technically sub-optimal images. Compared to prior sub-optimal TTE from 8/19/22.   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 8/18/2022 CT chest w/o: The previously noted infiltrative left hilar mass extending to EAN and LLL surrounding the broncho pulmonary vasculature is noted currently measuring 4.6 x 4.3 cm with progression with atelectasis. 8/18: 2L NC, US BLE neg DVT   8/19: 2L NC   8/20 RA, Mediport removed   8/21 RA received PRBC    8/22 2.5L; SHIRA neg for endocarditis   8/23: RA;    8/24: 3L NC 99%  8/25: 3L NC 98%. Pacer removed by CTS/EP  8/26: room air 97%  8/27 on 3lnc  8/30 3L nc had leadless PPM placed 8/29/2022. S/p 1 uPRBC and IV lasix x 1  8/31: 4L NC, 100%. Stage IIIB T2aN3 Squamous Cell Carcinoma of Lung  diagnosis 5/27/2021, PD-L1 0% ( 7/6/21-9/7/21 treatment with concurrent chemotherapy/radiation therapy; 11/2/21 consolidated with Imfinzi (Durvalumab) per ID IV every 4 weeks; 6/30/22 PET showing progression and was started on Carbo, Gemzar and Keytruda on 6/30/22; 8/9 /22 r ceived C2D8 of Carboplatin AUC 2 with Gemzar 1000 mg/m2 IV on D1,8 and Keytruda IV on D1 on a 21 day cycle)  Enterococcus faecalis bacteremia/septicemia, bacteremia cultures + on 8/16, 8/17, 8/20 s/p mediport removal    With dual-chamber pacemaker with h/o wide-complex tachycardia (HCC)--atrial tachycardia with RV pacing  - pacer out 8/25/2022  - leadless pacer implantation 8/29/2022  H/O SARS-CoV 2 Pneumonitis 5/24/2022  Chronic hypoxic resp failure, RA during the day and 2L NC at night  Probable obstructive sleep apnea  Persistent pancytopenia secondary to chemotherapy  B/l Lower Extremity Swelling and numbness  H/O RLL subsegmental PE- CTA Chest 9/17/2021 was on eliquis now on hold   Radiation pneumonitis/fibrosis left lung   COPD, not in exacerbation, uses trelegy at home       Plan:  Seen on 4LNC, patient states his baseline is 4L NC at HS.  Will need ambulatory pulse

## 2022-09-01 LAB
ANISOCYTOSIS: ABNORMAL
BASOPHILS ABSOLUTE: 0 E9/L (ref 0–0.2)
BASOPHILS RELATIVE PERCENT: 0.4 % (ref 0–2)
BLOOD BANK DISPENSE STATUS: NORMAL
BLOOD BANK PRODUCT CODE: NORMAL
BPU ID: NORMAL
BURR CELLS: ABNORMAL
DESCRIPTION BLOOD BANK: NORMAL
EOSINOPHILS ABSOLUTE: 0 E9/L (ref 0.05–0.5)
EOSINOPHILS RELATIVE PERCENT: 0.2 % (ref 0–6)
HCT VFR BLD CALC: 23.3 % (ref 37–54)
HCT VFR BLD CALC: 30.9 % (ref 37–54)
HEMOGLOBIN: 7.3 G/DL (ref 12.5–16.5)
HEMOGLOBIN: 9.9 G/DL (ref 12.5–16.5)
HYPOCHROMIA: ABNORMAL
LYMPHOCYTES ABSOLUTE: 1.2 E9/L (ref 1.5–4)
LYMPHOCYTES RELATIVE PERCENT: 13.8 % (ref 20–42)
MCH RBC QN AUTO: 28 PG (ref 26–35)
MCHC RBC AUTO-ENTMCNC: 31.3 % (ref 32–34.5)
MCV RBC AUTO: 89.3 FL (ref 80–99.9)
MONOCYTES ABSOLUTE: 1.12 E9/L (ref 0.1–0.95)
MONOCYTES RELATIVE PERCENT: 13 % (ref 2–12)
MYELOCYTE PERCENT: 1.7 % (ref 0–0)
NEUTROPHILS ABSOLUTE: 6.19 E9/L (ref 1.8–7.3)
NEUTROPHILS RELATIVE PERCENT: 69.8 % (ref 43–80)
NUCLEATED RED BLOOD CELLS: 0.9 /100 WBC
OVALOCYTES: ABNORMAL
PDW BLD-RTO: 25.2 FL (ref 11.5–15)
PLATELET # BLD: 145 E9/L (ref 130–450)
PMV BLD AUTO: 11 FL (ref 7–12)
POIKILOCYTES: ABNORMAL
POLYCHROMASIA: ABNORMAL
PROMYELOCYTES PERCENT: 1.7 % (ref 0–0)
RBC # BLD: 2.61 E12/L (ref 3.8–5.8)
WBC # BLD: 8.6 E9/L (ref 4.5–11.5)

## 2022-09-01 PROCEDURE — 85014 HEMATOCRIT: CPT

## 2022-09-01 PROCEDURE — 85018 HEMOGLOBIN: CPT

## 2022-09-01 PROCEDURE — 6370000000 HC RX 637 (ALT 250 FOR IP): Performed by: INTERNAL MEDICINE

## 2022-09-01 PROCEDURE — 93279 PRGRMG DEV EVAL PM/LDLS PM: CPT | Performed by: STUDENT IN AN ORGANIZED HEALTH CARE EDUCATION/TRAINING PROGRAM

## 2022-09-01 PROCEDURE — 6360000002 HC RX W HCPCS: Performed by: INTERNAL MEDICINE

## 2022-09-01 PROCEDURE — 99233 SBSQ HOSP IP/OBS HIGH 50: CPT | Performed by: STUDENT IN AN ORGANIZED HEALTH CARE EDUCATION/TRAINING PROGRAM

## 2022-09-01 PROCEDURE — 2700000000 HC OXYGEN THERAPY PER DAY

## 2022-09-01 PROCEDURE — 6370000000 HC RX 637 (ALT 250 FOR IP): Performed by: NURSE PRACTITIONER

## 2022-09-01 PROCEDURE — 36430 TRANSFUSION BLD/BLD COMPNT: CPT

## 2022-09-01 PROCEDURE — 85025 COMPLETE CBC W/AUTO DIFF WBC: CPT

## 2022-09-01 PROCEDURE — 36415 COLL VENOUS BLD VENIPUNCTURE: CPT

## 2022-09-01 PROCEDURE — 94640 AIRWAY INHALATION TREATMENT: CPT

## 2022-09-01 PROCEDURE — 2580000003 HC RX 258: Performed by: INTERNAL MEDICINE

## 2022-09-01 PROCEDURE — 2140000000 HC CCU INTERMEDIATE R&B

## 2022-09-01 RX ORDER — METOPROLOL SUCCINATE 25 MG/1
25 TABLET, EXTENDED RELEASE ORAL 2 TIMES DAILY WITH MEALS
Status: DISCONTINUED | OUTPATIENT
Start: 2022-09-01 | End: 2022-09-03 | Stop reason: HOSPADM

## 2022-09-01 RX ORDER — FUROSEMIDE 10 MG/ML
40 INJECTION INTRAMUSCULAR; INTRAVENOUS ONCE
Status: COMPLETED | OUTPATIENT
Start: 2022-09-01 | End: 2022-09-01

## 2022-09-01 RX ORDER — SODIUM CHLORIDE 9 MG/ML
INJECTION, SOLUTION INTRAVENOUS PRN
Status: DISCONTINUED | OUTPATIENT
Start: 2022-09-01 | End: 2022-09-03 | Stop reason: HOSPADM

## 2022-09-01 RX ADMIN — ARFORMOTEROL TARTRATE 15 MCG: 15 SOLUTION RESPIRATORY (INHALATION) at 21:08

## 2022-09-01 RX ADMIN — CEFTRIAXONE 2000 MG: 2 INJECTION, POWDER, FOR SOLUTION INTRAMUSCULAR; INTRAVENOUS at 15:41

## 2022-09-01 RX ADMIN — SODIUM CHLORIDE, PRESERVATIVE FREE 10 ML: 5 INJECTION INTRAVENOUS at 21:12

## 2022-09-01 RX ADMIN — CLOTRIMAZOLE 10 MG: 10 LOZENGE ORAL at 15:41

## 2022-09-01 RX ADMIN — AMPICILLIN SODIUM 2000 MG: 2 INJECTION, POWDER, FOR SOLUTION INTRAVENOUS at 09:10

## 2022-09-01 RX ADMIN — AMPICILLIN SODIUM 2000 MG: 2 INJECTION, POWDER, FOR SOLUTION INTRAVENOUS at 06:06

## 2022-09-01 RX ADMIN — METOPROLOL SUCCINATE 25 MG: 25 TABLET, EXTENDED RELEASE ORAL at 17:57

## 2022-09-01 RX ADMIN — AMPICILLIN SODIUM 2000 MG: 2 INJECTION, POWDER, FOR SOLUTION INTRAVENOUS at 21:12

## 2022-09-01 RX ADMIN — ATORVASTATIN CALCIUM 20 MG: 20 TABLET, FILM COATED ORAL at 21:09

## 2022-09-01 RX ADMIN — GUAIFENESIN 400 MG: 400 TABLET ORAL at 09:01

## 2022-09-01 RX ADMIN — CEFTRIAXONE 2000 MG: 2 INJECTION, POWDER, FOR SOLUTION INTRAMUSCULAR; INTRAVENOUS at 03:24

## 2022-09-01 RX ADMIN — BUDESONIDE 500 MCG: 0.5 SUSPENSION RESPIRATORY (INHALATION) at 09:52

## 2022-09-01 RX ADMIN — CLOTRIMAZOLE 10 MG: 10 LOZENGE ORAL at 13:22

## 2022-09-01 RX ADMIN — Medication 300 UNITS: at 21:09

## 2022-09-01 RX ADMIN — CLOTRIMAZOLE 10 MG: 10 LOZENGE ORAL at 09:02

## 2022-09-01 RX ADMIN — CLOTRIMAZOLE 10 MG: 10 LOZENGE ORAL at 21:09

## 2022-09-01 RX ADMIN — ARFORMOTEROL TARTRATE 15 MCG: 15 SOLUTION RESPIRATORY (INHALATION) at 09:52

## 2022-09-01 RX ADMIN — AMPICILLIN SODIUM 2000 MG: 2 INJECTION, POWDER, FOR SOLUTION INTRAVENOUS at 18:00

## 2022-09-01 RX ADMIN — GUAIFENESIN 400 MG: 400 TABLET ORAL at 21:09

## 2022-09-01 RX ADMIN — BUDESONIDE 500 MCG: 0.5 SUSPENSION RESPIRATORY (INHALATION) at 21:08

## 2022-09-01 RX ADMIN — SODIUM CHLORIDE, PRESERVATIVE FREE 10 ML: 5 INJECTION INTRAVENOUS at 09:02

## 2022-09-01 RX ADMIN — AMPICILLIN SODIUM 2000 MG: 2 INJECTION, POWDER, FOR SOLUTION INTRAVENOUS at 02:06

## 2022-09-01 RX ADMIN — ACETAMINOPHEN 650 MG: 325 TABLET, FILM COATED ORAL at 03:23

## 2022-09-01 RX ADMIN — FUROSEMIDE 40 MG: 10 INJECTION, SOLUTION INTRAMUSCULAR; INTRAVENOUS at 09:26

## 2022-09-01 RX ADMIN — AMPICILLIN SODIUM 2000 MG: 2 INJECTION, POWDER, FOR SOLUTION INTRAVENOUS at 13:21

## 2022-09-01 RX ADMIN — Medication 300 UNITS: at 09:02

## 2022-09-01 RX ADMIN — GUAIFENESIN 400 MG: 400 TABLET ORAL at 14:09

## 2022-09-01 ASSESSMENT — PAIN DESCRIPTION - ONSET: ONSET: GRADUAL

## 2022-09-01 ASSESSMENT — PAIN SCALES - GENERAL
PAINLEVEL_OUTOF10: 3
PAINLEVEL_OUTOF10: 0

## 2022-09-01 ASSESSMENT — PAIN DESCRIPTION - ORIENTATION: ORIENTATION: RIGHT

## 2022-09-01 ASSESSMENT — PAIN - FUNCTIONAL ASSESSMENT: PAIN_FUNCTIONAL_ASSESSMENT: ACTIVITIES ARE NOT PREVENTED

## 2022-09-01 ASSESSMENT — PAIN DESCRIPTION - FREQUENCY: FREQUENCY: INTERMITTENT

## 2022-09-01 ASSESSMENT — PAIN DESCRIPTION - DESCRIPTORS: DESCRIPTORS: ACHING;DISCOMFORT;TIGHTNESS

## 2022-09-01 ASSESSMENT — PAIN DESCRIPTION - LOCATION: LOCATION: LEG

## 2022-09-01 ASSESSMENT — PAIN DESCRIPTION - PAIN TYPE: TYPE: ACUTE PAIN

## 2022-09-01 NOTE — PATIENT CARE CONFERENCE
P Quality Flow/Interdisciplinary Rounds Progress Note        Quality Flow Rounds held on September 1, 2022    Disciplines Attending:  Bedside Nurse, , , and Nursing Unit Leadership    Janene Hathaway was admitted on 8/16/2022  2:38 PM    Anticipated Discharge Date:  Expected Discharge Date: 09/01/22    Disposition:    Kojo Score:  Kojo Scale Score: 20    Readmission Risk              Risk of Unplanned Readmission:  24           Discussed patient goal for the day, patient clinical progression, and barriers to discharge.   The following Goal(s) of the Day/Commitment(s) have been identified:  Discharge - Obtain Order      Teo Young RN  September 1, 2022

## 2022-09-01 NOTE — PROGRESS NOTES
Pulmonary Progress Note    Admit Date: 2022                            PCP: Herminio Nelson MD  Principal Problem:    Pneumonia  Active Problems:    Pancytopenia ([facility] [address]    Pacemaker infection ([facility] [address]    Sepsis due to Enterococcus ([facility] [address]    Bacteremia    High-grade atrioventricular block    Second degree AV block, Mobitz type I    Hematoma    Presence of leadless cardiac pacemaker    COPD (chronic obstructive pulmonary disease) (HCC)    HTN (hypertension)    Cardiac pacemaker in situ    Squamous carcinoma of lung ([facility] [address]    History pulmonary embolism ([facility] [address] on Eliquis  Resolved Problems:    * No resolved hospital problems.  *      Subjective:  Patient seen lying in  bed on 4L NC   With continued complaints of dyspnea on exertion  He denies shortness of breath at rest, cough or wheezing   Feel swollen and tired     Medications:   sodium chloride      sodium chloride      sodium chloride          [Held by provider] metoprolol succinate  25 mg Oral BID WC    sodium chloride flush  5-40 mL IntraVENous 2 times per day    guaiFENesin  400 mg Oral TID    Arformoterol Tartrate  15 mcg Nebulization BID    budesonide  0.5 mg Nebulization BID    heparin flush  3 mL IntraVENous 2 times per day    clotrimazole  10 mg Oral 4x daily    ampicillin IV  2,000 mg IntraVENous 6 times per day    cefTRIAXone (ROCEPHIN) IV  2,000 mg IntraVENous Q12H    atorvastatin  20 mg Oral Daily       Vitals:  VITALS:  BP (!) 146/65   Pulse 67   Temp 97.7 °F (36.5 °C) (Oral)   Resp 18   Ht 5' 6\" (1.676 m)   Wt 197 lb 3.2 oz (89.4 kg)   SpO2 100%   BMI 31.83 kg/m²   24HR INTAKE/OUTPUT:    Intake/Output Summary (Last 24 hours) at 2022 1323  Last data filed at 2022 1322  Gross per 24 hour   Intake 1167.78 ml   Output 1900 ml   Net -732.22 ml     CURRENT PULSE OXIMETRY:  SpO2: 100 %  24HR PULSE OXIMETRY RANGE:  SpO2  Av.2 %  Min: 97 %  Max: 100 %  CVP:    VENT SETTINGS:      Additional Respiratory Assessments  Heart Rate: 67  Resp: caner Stage III, COVID 5/2022, pulmonary embolism (on eliquis), port placement, pacemaker placement (7/2021)      8/16 presented to [facility] with weakness, cough, shortness of breath, fatigue, fever. 8/18/2022 CT chest w/o: The previously noted infiltrative left hilar mass extending to EAN and LLL surrounding the broncho pulmonary vasculature is noted currently measuring 4.6 x 4.3 cm with progression with atelectasis. 8/18: 2L NC, US BLE neg DVT   8/19: 2L NC   8/20 RA, Mediport removed   8/21 RA received PRBC    8/22 2.5L; SHIRA neg for endocarditis   8/23: RA;    8/24: 3L NC 99%  8/25: 3L NC 98%. Pacer removed by CTS/EP  8/26: room air 97%  8/27 on 3lnc  8/30 3L nc had leadless PPM placed 8/29/2022. S/p 1 uPRBC and IV lasix x 1  8/31: 4L NC, 100%.    9/1: 4L, 1 unit PRBC today     Stage IIIB T2aN3 Squamous Cell Carcinoma of Lung  diagnosis 5/27/2021, PD-L1 0% ( 7/6/21-9/7/21 treatment with concurrent chemotherapy/radiation therapy; 11/2/21 consolidated with Imfinzi (Durvalumab) per ID IV every 4 weeks; 6/30/22 PET showing progression and was started on Carbo, Gemzar and Keytruda on 6/30/22; 8/9 /22 r ceived C2D8 of Carboplatin AUC 2 with Gemzar 1000 mg/m2 IV on D1,8 and Keytruda IV on D1 on a 21 day cycle)  Enterococcus faecalis bacteremia/septicemia, bacteremia cultures + on 8/16, 8/17, 8/20 s/p mediport removal    With dual-chamber pacemaker with h/o wide-complex tachycardia (HCC)--atrial tachycardia with RV pacing  - pacer out 8/25/2022  - leadless pacer implantation 8/29/2022  H/O SARS-CoV 2 Pneumonitis 5/24/2022  Chronic hypoxic resp failure, RA during the day and 2L NC at night  Probable obstructive sleep apnea  Persistent pancytopenia secondary to chemotherapy  B/l Lower Extremity Swelling and numbness  H/O RLL subsegmental PE- CTA Chest 9/17/2021 was on eliquis now on hold   Radiation pneumonitis/fibrosis left lung   COPD, not in exacerbation, uses trelegy at home       Plan:  Seen on Joanna Dillon, patient states his baseline is 4L NC at HS. Will need ambulatory pulse ox prior to discharge. Continue Brovana, Pulmicort   Abuterol and mucinex to thin secretions. Encouraged IS use, showed proper technique. Oncology following. Transfuse if Hgb < 7.5. H/H 8.5/27.6 (8/31) received one unit today 9/1 hgb 7.3  IV ATB per ID - ampicillin 2 grams IV q 4 hrs and ceftriaxone 2 grams IV q 12 hrs. S/p R PICC 8/27  Continue IS for pulmonary hygiene   New Leadless PPM placed 08/29 with Dr. Ruby Espinal. S/p old pacer removal on 8/25, cultures pending. Repeat CXR 8/30 stable. Eliquis to resume when ok with CTS/EP, holding until at least 9/2/22  US right groin pending, per EP -small 3.1 cm hematoma   Supportive care   Generalized edema and ecchymosis noted , received lasix 40 x 1 today 9/1         Electronically signed by LELO Harmon - CNS on 9/1/2022 at 1:23 PM       Attending Attestation Note:    Patient seen and examined with Hospital Staff, NP. I have extensively reviewed the chart lab work and imaging. I agree with above. Modifications and amendment of note made as necessary.     In addition, the following apply:       - outpatient PFT, PSG  - pacer out 8/25/2022  - leadless pacer implantation 8/29/2022  - IV abx per ID (ampicillin 2 grams IV q 4 hrs and ceftriaxone 2 grams IV q 12 hrs), right PICC placement 8/27/2022  - Brovana, Pulmicort  - D/C Albuterol scheduled and PRN and change to Xopenex q8 hours PRN  - resume Trelegy upon D/C  - Oncology following.   - PT/OT, nutritional support, strength training   - Eliquis to resume when OK with CTS/EP teams      - Blood Products: 3 units PRBC, 2 units SDP       Betty Camara MD  9/1/2022  2:09 PM

## 2022-09-01 NOTE — PROGRESS NOTES
[facility],2[address] Floor and Vascular [place]- Electrophysiology  Inpatient progress note  PATIENT: Belkis Gandhi  MEDICAL RECORD NUMBER: [id number]  DATE OF SERVICE:  9/1/2022  ATTENDING ELECTROPHYSIOLOGIST: Zenaida Giordano DO    PRIMARY ELECTROPHYSIOLOGIST: Jenniffer Waggoner  REFERRING PHYSICIAN: No ref. provider found and Paula Davila MD  CHIEF COMPLAINT: Weakness and fatigue, fever    HPI: This is a 67 y.o. male with a history of pretension, hyperlipidemia, COPD and a history of long-term smoking habit as well as recurrent syncopal episodes starting in 2012. ILR 2020 showed CHB and subsequent pacemaker implant on 7/20/2021. He was also diagnosed with lung cancer around the same time and has been undergoing chemotherapy and radiation. He finished his last chemo cycle last week and presented to the emergency room with symptoms of ongoing fatigue weakness and fever. He was found to be neutropenic as well as thrombocytopenic. Subsequently blood cultures have been positive for Enterococcus faecalis. Blood cultures from 8/16, 8/17 and 8/18 have all been positive despite IV antibiotics. Patient also remains neutropenic and thrombocytopenic with a platelet count of 24,571 today. No other cardiac symptoms. No new symptoms of chest pain or dyspnea on exertion. No syncope or near syncope.    8/21/22: Patient denies any new complaints today. Was transfused earlier today for hemoglobin of 7.2. No cardiac symptoms. 8/22/22: Patient remains asymptomatic. Underwent SHIRA today. White count remains low however hemoglobin and platelet count have improved    8/23/22:  No complaints. Awaiting plan for extraction to confirm CT surgery back up. SHIRA negative yesterday. Afebrile     8/23/22: Pacemaker set at VVI 40 yesterday afternoon, has been stable without change in symptoms. On monitor, it appears to be wenckebach vs 2:1 block with rates 60-70's     8/25/22: NPO for extraction today. No complaints overnight. Continues with stable heart rates what appears wenckebach vs 2:1 AV block     8/26/22: stable, family in room. He is up at side of bed, pain is tolerable, controlled. Left chest incision from extraction site stable. He continues to have 2:1 block vs wenckebach on monitor, stable rates 50-70 bpm. Denies dizziness or lightheadedness. 8/27/22: Feeling well. Reports some dizziness. Monitor showed sinus tachycardia with second degree AV block type I, 2 to 1 AV block and intermittent high grade AV block without any significant pauses. 8/28/22: The patient is seen in the hospital for follow up. He reports no dizziness or syncope. He remains in NSR with second degree AV block type I and 2 to 1 AV block. 08/30/22: The patient is seen in the hospital, she underwent placement of a MICRA yesterday today the groin is bruised today and her H/H was 7.8 today and he has been transfused with 1 unit. He is significantly SOB today and wheezy. The Device is functioning normal.     08/31/2022: The patient is seen in hospital follow-up, ultrasound revealed small right groin hematoma, due to ongoing dyspnea and an abundance of caution and limited echo was performed and showed a small circumferential pericardial effusion without tamponade. 09/01/2022: The patient is seen in hospital follow-up the Limited echo showed a small effusion he remains stable without significant hypotension his right groin site is stable at this time.      Patient Active Problem List    Diagnosis Date Noted    Hematoma 08/30/2022     Priority: Medium    Presence of leadless cardiac pacemaker 08/30/2022     Priority: Medium    High-grade atrioventricular block 08/27/2022     Priority: Medium    Second degree AV block, Mobitz type I 08/27/2022     Priority: Medium    Bacteremia 08/23/2022     Priority: Medium    Pacemaker infection ([facility] 08/22/2022     Priority: Medium    Sepsis due to Enterococcus ([facility] 08/22/2022     Priority: Medium    Pancytopenia ([facility] 08/17/2022     Priority: Medium    COVID-19 05/25/2022     Priority: Medium    Squamous carcinoma of lung ([facility] 09/18/2021    History pulmonary embolism ([facility] on Eliquis 09/18/2021    Hyponatremia 09/18/2021    Hypomagnesemia 09/18/2021    Wide-complex tachycardia ([address] 09/18/2021    Acute on chronic respiratory failure with hypoxia ([facility] 09/17/2021    Cardiac pacemaker in situ 07/20/2021    Intermittent complete heart block (HCC) 07/19/2021    Pneumonia 01/31/2019    COPD (chronic obstructive pulmonary disease) ([facility] 01/31/2019    BPH (benign prostatic hyperplasia) 01/31/2019    HTN (hypertension) 01/31/2019    HLD (hyperlipidemia) 01/31/2019    Syncope and collapse 01/18/2019       Past Medical History:   Diagnosis Date    CHB (complete heart block) ([facility] 07/2021    COPD (chronic obstructive pulmonary disease) ([facility]     Syncope 09/2020    dr Catina Florence wearing a monitor    Syncope 07/2021       Family History   Problem Relation Age of Onset    Cancer Father         prostate    Cancer Paternal Uncle         anal       Social History     Tobacco Use    Smoking status: Former     Packs/day: 2.00     Years: 25.00     Pack years: 50.00     Types: Cigarettes     Quit date: 12/26/2011     Years since quitting: 10.6    Smokeless tobacco: Never   Substance Use Topics    Alcohol use: Not Currently     Alcohol/week: 1.0 standard drink     Types: 1 Glasses of wine per week     Comment: 1 glass of wine per day prior       Current Facility-Administered Medications   Medication Dose Route Frequency Provider Last Rate Last Admin    0.9 % sodium chloride infusion   IntraVENous PRN MATTHEW [place]        perflutren lipid microspheres (DEFINITY) injection 1.65 mg  1.5 mL IntraVENous ONCE PRN LELO Kennedy CNP        perflutren lipid microspheres (DEFINITY) injection 1.65 mg  1.5 mL IntraVENous ONCE PRN Cristóbal Rascon, DO        0.9 % sodium chloride infusion   IntraVENous PRN Rochelle Will MD        levalbuterol Vivica Ped) nebulization 0.63 mg  0.63 mg Nebulization Q8H PRN Shawanda Barnett MD        [facility] [place] by provider] metoprolol succinate (TOPROL XL) extended release tablet 25 mg  25 mg Oral BID WC Rosemary Baird MD        sodium chloride flush 0.9 % injection 5-40 mL  5-40 mL IntraVENous 2 times per day Rosemary Baird MD   10 mL at 09/01/22 0902    sodium chloride flush 0.9 % injection 5-40 mL  5-40 mL IntraVENous PRN Rosemary Baird MD   10 mL at 08/30/22 1541    0.9 % sodium chloride infusion   IntraVENous PRN Rosemary Baird MD        guaiFENesin tablet 400 mg  400 mg Oral TID Rosemary Baird MD   400 mg at 09/01/22 1409    Arformoterol Tartrate (BROVANA) nebulizer solution 15 mcg  15 mcg Nebulization BID Rosemary Baird MD   15 mcg at 09/01/22 0952    budesonide (PULMICORT) nebulizer suspension 500 mcg  0.5 mg Nebulization BID Rosemary Baird MD   500 mcg at 09/01/22 0952    heparin flush 100 UNIT/ML injection 300 Units  3 mL IntraVENous 2 times per day Rosemary Baird MD   300 Units at 09/01/22 0902    heparin flush 100 UNIT/ML injection 300 Units  3 mL IntraCATHeter PRN Rosemary Baird MD        morphine (PF) injection 2 mg  2 mg IntraVENous Q4H PRN Rosemary Baird MD        sodium chloride (OCEAN, BABY AYR) 0.65 % nasal spray 1 spray  1 spray Each Nostril PRN Rosemary Baird MD        trimethobenzamide (TIGAN) injection 200 mg  200 mg IntraMUSCular Q6H PRN Rosemary Baird MD        oxyCODONE-acetaminophen (PERCOCET) 5-325 MG per tablet 1 tablet  1 tablet Oral Q4H PRN Rosemary Baird MD   1 tablet at 08/28/22 0703    Or    oxyCODONE-acetaminophen (PERCOCET) 5-325 MG per tablet 2 tablet  2 tablet Oral Q4H PRN Rosemary Baird MD        clotrimazole (MYCELEX) lindsay 10 mg  10 mg Oral 4x daily Rosemary Baird MD   10 mg at 09/01/22 1541    potassium chloride (KLOR-CON M) extended release tablet 40 mEq  40 mEq Oral PRN Rosemary Baird MD   40 mEq at 08/28/22 0805    Or    potassium bicarb-citric acid (EFFER-K) effervescent tablet 40 mEq  40 mEq Oral PRN Rosemaryjanes Baird, MD        Or    potassium chloride 10 mEq/100 mL IVPB (Peripheral Line)  10 mEq IntraVENous PRN Jeremy Hernadez MD        ampicillin 2000 mg ivpb mini bag  2,000 mg IntraVENous 6 times per day Jeremy Hernadez MD   Stopped at 09/01/22 1409    cefTRIAXone (ROCEPHIN) 2,000 mg in sterile water 20 mL IV syringe  2,000 mg IntraVENous Q12H Jeremy Hernadez MD   2,000 mg at 09/01/22 1541    melatonin tablet 3 mg  3 mg Oral Nightly PRN Jeremy Hernadez MD   3 mg at 08/27/22 2136    atorvastatin (LIPITOR) tablet 20 mg  20 mg Oral Daily Jeremy Hernadez MD   20 mg at 08/31/22 2042    acetaminophen (TYLENOL) tablet 650 mg  650 mg Oral Q6H PRN Jeremy Hernadez MD   650 mg at 09/01/22 6200    Or    acetaminophen (TYLENOL) suppository 650 mg  650 mg Rectal Q6H PRN Jeremy Hernadez MD        senna (SENOKOT) tablet 8.6 mg  1 tablet Oral Daily PRN Jeremy Hernadez MD            No Known Allergies    ROS:   Constitutional: Negative for fever. Positive for activity change and negative for appetite change. HENT: Negative for epistaxis. Eyes: Negative for diploplia, blurred vision. Respiratory: Negative for chest tightness, + for cough shortness of breath and wheezing. Cardiovascular: pertinent positives in HPI  Gastrointestinal: Negative for abdominal pain and blood in stool. All other review of systems are negative       PHYSICAL EXAM:   Vitals:    09/01/22 0953 09/01/22 1123 09/01/22 1142 09/01/22 1459   BP:  (!) 114/55 (!) 146/65 (!) 151/48   Pulse:  (!) 101 67 95   Resp:  19 18 17   Temp:  97.5 °F (36.4 °C) 97.7 °F (36.5 °C) 97.2 °F (36.2 °C)   TempSrc:  Temporal Oral Temporal   SpO2: 98% 100% 100% 100%   Weight:       Height:          Constitutional: Well-developed, Short of breath. Eyes: conjunctivae normal, no xanthelasma   Ears, Nose, Throat: oral mucosa moist, no cyanosis   CV: no JVD. Irregular rhythm. Normal S1S2 and no S3. No murmurs, rubs, or gallops.    Lungs: decreased bases bilaterally, normal respiratory effort without used of accessory muscles + for wheezing. Abdomen: soft, non-tender, non distended  Musculoskeletal: no digital clubbing, +1-2 pitting BLE edema   Skin: warm, no rashes. Left chest wall extraction site glued, Small right side chest wall incision site well approximated also, no swelling or hemtatoma   Right groin:is bruised with no edema and is soft. Data:    Recent Labs     08/30/22  0703 08/30/22 2005 08/31/22  0515 09/01/22  0330 09/01/22  1325   WBC 11.9*  --  9.3 8.6  --    HGB 7.8*   < > 8.5* 7.3* 9.9*   HCT 25.1*   < > 27.6* 23.3* 30.9*     --  184 145  --     < > = values in this interval not displayed. Recent Labs     08/30/22  0703      K 4.2   CL 98   CO2 26   BUN 10   CREATININE 1.1   CALCIUM 8.8        Lab Results   Component Value Date/Time    MG 1.8 08/27/2022 07:25 AM     No results for input(s): TSH in the last 72 hours. No results for input(s): INR in the last 72 hours. Telemetry 09/01/22  Sinus rhythm with episodes of Non conducted P waves and intermittent RV pacing and atrial ectomy rates  bpm.         EKG:        SHIRA 8/22/22   Summary   No SHIRA indication of Endocarditis. Normal left ventricular chamber size and systolic function. Interatrial septum appears intact. Normal right ventricle size and function. Pacemaker leads noted in right atrium and right ventricle. Normal aortic root size. Moderate atherosclerosis in the descending thoracic aorta. Epicardial fat vs. small pericardial effusion. No intra cardiac mass or thrombus. Technically sub-optimal images. Compared to prior sub-optimal TTE from 8/19/22. Signature    ----------------------------------------------------------------   Electronically signed by Naveed Gamez MD(Interpreting   physician) on 08/22/2022 06:50 PM    Echocardiogram: 8/19/22   Summary   Normal left ventricular chamber size. Normal left ventricular systolic function. Visually estimated LVEF is 55-60 %.    No wall motion abnormalities. Normal diastolic function. Normal left atrial pressure. Normal right ventricle structure and function. Normal left atrial size. Normal right atrial size   Likely normal estimated PA pressure. Pacer/ ICD wire present in the right heart. No gross evidence of infective endocarditis. Consider SHIRA for better   accuracy if clinically indicated. Compared to prior echo from 2021, no significant changes noted. Signature    ----------------------------------------------------------------   Electronically signed by Brett Balbuena MD(Interpreting   physician) on 08/19/2022 04:16 PM      Device Interrogation/Reprogramming 9/1/2022   Make/Model: Medtroinc MICRA AV   DOI: 08/29/22  Battery: Greater than 8 years  Carlos TEVIZZ therapy: VVIR  ppm  Pacing %: RV = 82.9 %,   Lead function:  RV lead: sensing = 9.1 mV, impedance = 520, threshold = 0.5 V @ 0.24 msec  Lead programming:  RV lead: sensitivity = 2.0 mV, output = 2.13 V @ 0.24 msec  Arrhythmias: none   Reprogramming included: VDD 50 bpm.    Overall device function is normal  All device programmable settings were evaluated per above and in the scanned document, along with iterative adjustments (capture thresholds) to assess and select the most appropriate final programming to provide for consistent delivery of the appropriate therapy and to verify function of the device. Assessment/Plan:      1. Dual-chamber pacemaker explanted 08/25/22  - Initial indication for AV block with a long pause/ ? CHB in past on ILR.   - Date of implant July 28, 2021  - Pacemaker check in June revealed 6.5% atrial and 3% ventricular pacing with DDI programming at lower rate of 60 bpm.  - Initial interrogation during [facility] admission on 8/23/22 showed 27% AV and 25% RV paced.  - Noted bifascicular block with 2:1 block and second degree AV block type I on tele when pacer set at VVI 40 with RV paced <1%.   - On Toprol 50 mg bid which held since 8/24/22   - Underwent full extraction on 8/25/22   - Currently in intermittent second degree AV block type I, 2 to 1 AV block and high grade AV block with HR  bpm and no significant pause seen. 2. MICRA AV in situ   - DOI 08/29/22  - Normal device function   - Slight ecchymosis of groin     3. Bacteremia/sepsis--enterococcus faecalis  - Probably port infection per ID; mediport removed 8/20/22   - Persistent bacteremia despite IV antibiotics  8-16-22 blood cultures E faecalis x 2  8-17-22 blood cultures E faecalis x 1  8-18-22 blood culture + E faecalis x1  8-19-22 blood cultures neg to date  8-20-22 Port ( tip ) cx - neg tod date   8-20-22 blood cultures neg to date  8-23-22 blood cultures neg to date  8-25-22 blood cultures neg to date   - On IV Ampicillin and Rocephin  - Dr. Naty Nickerson following. 3.  Lung cancer, squamous cell--patient undergoing chemotherapy  -  Stage IIIB T2aN3 Squamous Cell Carcinoma of Lung  diagnosis 5/27/2021, PD-L1 0%    - 7/6/21-9/7/21 treatment with concurrent chemotherapy/radiation therapy   - 11/2/21 consolidated with Imfinzi (Durvalumab) per ID IV every 4 weeks   - 6/30/22 PET showing progression and was started on Carbo, Gemzar and Keytruda on 6/30/22  - 8/9 /22 received C2D8 of Carboplatin AUC 2 with Gemzar 1000 mg/m2 IV on D1,8 and Keytruda IV on D1 on a 21 day cycle    4. Neutropenia and thrombocytopenia related to chemotherapy  - Improved. 5. Persistent anemia.    - Post PRBC transfusion 8/21/22, 08/30/22, 09/01/22  - Count 9.9 post PRBC transfusion     6. History of PE   - Diagnosed in 2021   - On Eliquis in the past and currently on hold. 7.  Small circumferential pericardial effusion with largest component anterior to the RV. -Noted on echo 08/31/2022    Recommendations:    Repeat Limited TTE Tomorrow   Resume Toprol XL 25mg BID. Continue to hold  Eliquis. Continue to monitor on telemetry. Make device VVIR  bpm turn on AV synchrony.        Thank you for allowing me to participate in your patient's care. Please call me if there are any questions or concerns. I have spent a total of 10 minutes with the patient and the family reviewing the above stated recommendations. And a total of >50% of that time involved face-to-face time providing counseling and/or coordination of care with the other providers, reviewing records/tests, counseling/education of the patient, ordering medications/tests/procedures, coordinating care, and documenting clinical information in the EHR. Discussed with Dr. Cortney Ford. Brendan Kendall, APRN - CNP  Cardiac Electrophysiology  [facility]) Physicians  Electrophysiology  3:56 PM  9/1/2022    67year-old male with a history of high-grade AV block status post pacemaker implant (7/20/2021), PE on [address] [place], [place] [id number], and lung cancer on chemotherapy/radiation. In August 2022, patient was diagnosed with Enterococcus faecalis bacteremia. On 8/19/22, a TTE reported LVEF of 55-60%, no IE, and no periciardial effusion. On 8/22/22, a SHIRA was technically suboptimal, but reported no evidence of IE, LVEF = \"normal\", and epicardial fat v small pericardial effusion. On 8/25/2022, he underwent extraction of the device. He is on IV abx until 10/13/22 via right brachial PICC. On 8/29/2022, he underwent Micra implant. His beta-blocker was restarted following the procedure. This procedure was complicated by right groin hematoma. US reported small (3.1 cm) hematoma. He was administered 1 unit of packed red blood cells. Hematoma progressively improving. Device interrogation post-implant with stable device function. Device programmed to VVIR  bpm. Patient complains of increasing dyspnea. TTE today reports new small circumferential pericardial effusion with largest component superficial to RV without evidence of tamponade. Recommend repeat limited TTE 9/2/22. Continue to hold [facility] [address]. Device appears to function adequately on review  of telemetry.  However, given new pericardial effusion, I will ask [employer] re-interrogate device today and had stable function. AV synchronization initiated today. Device programmed VDD 50 bpm.     Antonina Villa DO  [id number]      Attending Supervising Physicians Attestation Statement  I performed at least 51% of the work. I was present with the nurse practitioner during the history and exam. I discussed the findings and plans with the nurse practitioner and agree as documented in his note.     Electronically signed by Antonina Villa DO on 9/1/22 at 4:45 PM EDT

## 2022-09-01 NOTE — PROGRESS NOTES
Subjective:  Chart reviewed  Hematoma thigh post-procedure  H/H decreased today 7.3    Objective:    BP (!) 154/78   Pulse 61   Temp (!) 96.4 °F (35.8 °C) (Temporal)   Resp 18   Ht 5' 6\" (1.676 m)   Wt 197 lb 3.2 oz (89.4 kg)   SpO2 99%   BMI 31.83 kg/m²     CBC with Differential:    Lab Results   Component Value Date/Time    WBC 8.6 09/01/2022 03:30 AM    RBC 2.61 09/01/2022 03:30 AM    HGB 7.3 09/01/2022 03:30 AM    HCT 23.3 09/01/2022 03:30 AM     09/01/2022 03:30 AM    MCV 89.3 09/01/2022 03:30 AM    MCH 28.0 09/01/2022 03:30 AM    MCHC 31.3 09/01/2022 03:30 AM    RDW 25.2 09/01/2022 03:30 AM    NRBC 0.9 09/01/2022 03:30 AM    BLASTSPCT 0.9 08/24/2022 05:22 AM    METASPCT 3.5 08/30/2022 07:03 AM    LYMPHOPCT 13.8 09/01/2022 03:30 AM    PROMYELOPCT 1.7 09/01/2022 03:30 AM    MONOPCT 13.0 09/01/2022 03:30 AM    MYELOPCT 1.7 09/01/2022 03:30 AM    BASOPCT 0.4 09/01/2022 03:30 AM    MONOSABS 1.12 09/01/2022 03:30 AM    LYMPHSABS 1.20 09/01/2022 03:30 AM    EOSABS 0.00 09/01/2022 03:30 AM    BASOSABS 0.00 09/01/2022 03:30 AM     CMP:    Lab Results   Component Value Date/Time     08/30/2022 07:03 AM    K 4.2 08/30/2022 07:03 AM    K 3.7 08/17/2022 04:17 AM    CL 98 08/30/2022 07:03 AM    CO2 26 08/30/2022 07:03 AM    BUN 10 08/30/2022 07:03 AM    CREATININE 1.1 08/30/2022 07:03 AM    GFRAA >60 08/30/2022 07:03 AM    LABGLOM >60 08/30/2022 07:03 AM    GLUCOSE 107 08/30/2022 07:03 AM    PROT 5.9 08/20/2022 04:44 PM    LABALBU 3.2 08/20/2022 04:44 PM    CALCIUM 8.8 08/30/2022 07:03 AM    BILITOT <0.2 08/20/2022 04:44 PM    ALKPHOS 90 08/20/2022 04:44 PM    AST 25 08/20/2022 04:44 PM    ALT 29 08/20/2022 04:44 PM               Current Facility-Administered Medications:     0.9 % sodium chloride infusion, , IntraVENous, PRN, MATTHEW Moore    perflutren lipid microspheres (DEFINITY) injection 1.65 mg, 1.5 mL, IntraVENous, ONCE PRN, Kenney Dickson, APRN - CNP    perflutren lipid microspheres (DEFINITY) injection 1.65 mg, 1.5 mL, IntraVENous, ONCE PRN, Cristóbal Rascon DO    0.9 % sodium chloride infusion, , IntraVENous, PRN, Jeremy Hernadez MD    levalbuterol (XOPENEX) nebulization 0.63 mg, 0.63 mg, Nebulization, Q8H PRN, Brandon Lopez MD    [facility] [place] by provider] metoprolol succinate (TOPROL XL) extended release tablet 25 mg, 25 mg, Oral, BID WC, Jeremy Hernadez MD    sodium chloride flush 0.9 % injection 5-40 mL, 5-40 mL, IntraVENous, 2 times per day, Jeremy Hernadez MD, 10 mL at 08/31/22 2043    sodium chloride flush 0.9 % injection 5-40 mL, 5-40 mL, IntraVENous, PRN, Jeremy Hernadez MD, 10 mL at 08/30/22 1541    0.9 % sodium chloride infusion, , IntraVENous, PRN, Jeremy Hernadez MD    guaiFENesin tablet 400 mg, 400 mg, Oral, TID, Jeremy Hernadez MD, 400 mg at 08/31/22 2042    Arformoterol Tartrate (BROVANA) nebulizer solution 15 mcg, 15 mcg, Nebulization, BID, Jeremy Hernadez MD, 15 mcg at 08/31/22 2029    budesonide (PULMICORT) nebulizer suspension 500 mcg, 0.5 mg, Nebulization, BID, Jeremy Hernadez MD, 500 mcg at 08/31/22 2029    heparin flush 100 UNIT/ML injection 300 Units, 3 mL, IntraVENous, 2 times per day, Jeremy Hernadez MD, 300 Units at 08/31/22 2043    heparin flush 100 UNIT/ML injection 300 Units, 3 mL, IntraCATHeter, PRN, Jeremy Hernadez MD    morphine (PF) injection 2 mg, 2 mg, IntraVENous, Q4H PRN, Jeremy Hernadez MD    sodium chloride (OCEAN, BABY AYR) 0.65 % nasal spray 1 spray, 1 spray, Each Nostril, PRN, Jeremy Hernadez MD    trimethobenzamide (TIGAN) injection 200 mg, 200 mg, IntraMUSCular, Q6H PRN, Jeremy Hernadez MD    oxyCODONE-acetaminophen (PERCOCET) 5-325 MG per tablet 1 tablet, 1 tablet, Oral, Q4H PRN, 1 tablet at 08/28/22 0703 **OR** oxyCODONE-acetaminophen (PERCOCET) 5-325 MG per tablet 2 tablet, 2 tablet, Oral, Q4H PRN, Jeremy Hernadez MD    clotrimazole (MYCELEX) lindsay 10 mg, 10 mg, Oral, 4x daily, Jeremy Hernadez MD, 10 mg at 08/31/22 2042    potassium chloride (KLOR-CON M) extended release tablet 40 mEq, 40

## 2022-09-01 NOTE — PROGRESS NOTES
[address] [place] [address] [facility]  [facility]  Progress Note          C/C : High grade Enterococcus bacteremia, sepsis       SUBJECTIVE:  Patient is tolerating medications. No reported adverse drug reactions. No nausea or emesis  Edge of bed; some SOB  The insertion site of the pacer had some bleeding, hb checked was 7.8, given 1 unit of blood to keep above 8. Per EP, device doing well. Afebrile and awake  Review of systems:  As stated above in the chief complaint, otherwise negative. Medications:  Scheduled Meds:   [Held by provider] metoprolol succinate  25 mg Oral BID WC    sodium chloride flush  5-40 mL IntraVENous 2 times per day    guaiFENesin  400 mg Oral TID    Arformoterol Tartrate  15 mcg Nebulization BID    budesonide  0.5 mg Nebulization BID    heparin flush  3 mL IntraVENous 2 times per day    clotrimazole  10 mg Oral 4x daily    ampicillin IV  2,000 mg IntraVENous 6 times per day    cefTRIAXone (ROCEPHIN) IV  2,000 mg IntraVENous Q12H    atorvastatin  20 mg Oral Daily     Continuous Infusions:   sodium chloride      sodium chloride      sodium chloride       PRN Meds:sodium chloride, perflutren lipid microspheres, perflutren lipid microspheres, sodium chloride, levalbuterol, sodium chloride flush, sodium chloride, heparin flush, morphine, sodium chloride, trimethobenzamide, oxyCODONE-acetaminophen **OR** oxyCODONE-acetaminophen, potassium chloride **OR** potassium alternative oral replacement **OR** potassium chloride, melatonin, acetaminophen **OR** acetaminophen, senna    OBJECTIVE:  BP (!) 146/65   Pulse 67   Temp 97.7 °F (36.5 °C) (Oral)   Resp 18   Ht 5' 6\" (1.676 m)   Wt 197 lb 3.2 oz (89.4 kg)   SpO2 100%   BMI 31.83 kg/m²   Temp  Av.3 °F (36.3 °C)  Min: 96.4 °F (35.8 °C)  Max: 97.8 °F (36.6 °C)    Constitutional: The patient is awake, alert, and oriented. Skin: Warm and dry. No rashes were noted. Old pacer site no redness  HEENT: Round and reactive pupils.   Moist mucous membranes. No ulcerations or thrush. Neck: Supple to movements. Chest: Bilateral equal breath sounds. No crackles/rhonci  Cardiovascular: S1 and S2 are rhythmic and regular. No murmurs appreciated. Abdomen: Positive bowel sounds to auscultation. Benign to palpation. No masses felt. No hepatosplenomegaly.   Genitourinary: male  Extremities: ++ edema RT GROIN ECCHYMOSIS   Lines:   Right arm PICC line ( 8/27)       Laboratory and Tests Review:  Lab Results   Component Value Date    WBC 8.6 09/01/2022    WBC 9.3 08/31/2022    WBC 11.9 (H) 08/30/2022    HGB 7.3 (L) 09/01/2022    HCT 23.3 (L) 09/01/2022    MCV 89.3 09/01/2022     09/01/2022     Lab Results   Component Value Date    NEUTROABS 6.19 09/01/2022    NEUTROABS 6.51 08/31/2022    NEUTROABS 9.40 (H) 08/30/2022     No results found for: [facility]  Lab Results   Component Value Date    ALT 29 08/20/2022    AST 25 08/20/2022    ALKPHOS 90 08/20/2022    BILITOT <0.2 08/20/2022     Lab Results   Component Value Date/Time     08/30/2022 07:03 AM    K 4.2 08/30/2022 07:03 AM    K 3.7 08/17/2022 04:17 AM    CL 98 08/30/2022 07:03 AM    CO2 26 08/30/2022 07:03 AM    BUN 10 08/30/2022 07:03 AM    CREATININE 1.1 08/30/2022 07:03 AM    CREATININE 0.8 08/29/2022 06:52 AM    CREATININE 0.9 08/28/2022 06:56 AM    GFRAA >60 08/30/2022 07:03 AM    LABGLOM >60 08/30/2022 07:03 AM    GLUCOSE 107 08/30/2022 07:03 AM    PROT 5.9 08/20/2022 04:44 PM    LABALBU 3.2 08/20/2022 04:44 PM    CALCIUM 8.8 08/30/2022 07:03 AM    BILITOT <0.2 08/20/2022 04:44 PM    ALKPHOS 90 08/20/2022 04:44 PM    AST 25 08/20/2022 04:44 PM    ALT 29 08/20/2022 04:44 PM     Lab Results   Component Value Date    CRP 9.6 (H) 05/24/2022     No results found for: Floydene Setter  Radiology:  Reviewed CT lung    Microbiology:   Lab Results   Component Value Date/Time    BC 5 Days no growth 08/25/2022 05:04 PM    BC 5 Days no growth 08/23/2022 10:23 AM    BC 5 Days no growth 08/19/2022 05:05 AM    ORG Enterococcus faecalis 08/18/2022 07:10 AM    ORG Enterococcus faecalis 08/18/2022 07:05 AM    ORG Enterococcus faecalis 08/17/2022 05:40 PM     Lab Results   Component Value Date/Time    BLOODCULT2 5 Days no growth 08/25/2022 05:05 PM    BLOODCULT2 5 Days no growth 08/23/2022 10:27 AM    BLOODCULT2 5 Days no growth 08/20/2022 05:30 AM    ORG Enterococcus faecalis 08/18/2022 07:10 AM    ORG Enterococcus faecalis 08/18/2022 07:05 AM    ORG Enterococcus faecalis 08/17/2022 05:40 PM     No results found for: WNDABS  No results found for: RESPSMEAR      Component Value Date/Time    FUNGSM No Fungal elements seen 08/25/2022 1403     No results found for: CULTRESP  Culture Catheter Tip   Date Value Ref Range Status   08/20/2022 Growth not present  Final     No results found for: BFCS  Culture Surgical   Date Value Ref Range Status   08/25/2022 Growth not present  Final   08/25/2022 Growth not present  Final     Urine Culture, Routine   Date Value Ref Range Status   08/31/2022 Growth not present, incubation continues  Preliminary   08/16/2022   Final    10 to 100,000 CFU/mL  Mixed heydi isolated. Further workup and sensitivity testing  is not routinely indicated and will not be performed.   Mixed heydi isolated includes:  Mixed gram positive organisms     05/18/2019 <25,000 CFU/ml  Final     No results found for: [facility]    MICROBIOLOGY:  8-16-22 blood cultures E faecalis x 2  8-17-22 blood cultures E faecalis x 1  8-18-22 blood culture + E faecalis x1  8-19-22 blood cultures neg to date  8-20-22 Port ( tip ) cx - neg tod date   8-20-22 blood cultures neg to date  8/25/22 pacer cx pending  8/25/2 blood cx pending      ASSESSMENT:  High grade E faecalis bacteremia probably port infection; RO endocarditis and pacer infection s/p mediport removal ( 8/20) - and  placement of a new pacemaker ([facility]  Immunocompromised host c pancytopenia;- improved   Sq cell of lung    Thrush-improved      PLAN:  Ampicillin 2 grams IV q 4 hrs and ceftriaxone 2 grams IV q 12 hrs until 10-13-22    PICC line ordered-inserted in right brachial   Monitor labs    Linus Rajan MD  1:09 PM  9/1/2022

## 2022-09-01 NOTE — PROGRESS NOTES
[facility]                                Progress note    Subjective:    Patient is alert and oriented, resting in bed  Feels short of breath again over the past couple of days  Wife remains at bedside  No acute issues or complaints today  Objective:    BP (!) 151/48   Pulse 95   Temp 97.2 °F (36.2 °C) (Temporal)   Resp 17   Ht 5' 6\" (1.676 m)   Wt 197 lb 3.2 oz (89.4 kg)   SpO2 100%   BMI 31.83 kg/m²     In: 1267.8 [P.O.:580; Blood:387.8]  Out: 2200   In: 1267.8   Out: 2200 [Urine:2100]    General appearance: NAD, conversant, somewhat dyspneic  HEENT: AT/NC, MMM  Neck: FROM, supple  Lungs: Clear to auscultation  CV: RRR, no MRGs-right-sided port removed 8/20, left-sided pacemaker removed   Vasc: Radial pulses 2+  Abdomen: Soft, non-tender; no masses or HSM  Extremities: No  or digital cyanosis, 2+ BLE edema   Skin: Generalized bruising  Psych: Alert and oriented to person, place and time  Neuro: Alert and interactive     Recent Labs     08/30/22  0703 08/30/22  2005 08/31/22  0515 09/01/22  0330 09/01/22  1325   WBC 11.9*  --  9.3 8.6  --    HGB 7.8*   < > 8.5* 7.3* 9.9*   HCT 25.1*   < > 27.6* 23.3* 30.9*     --  184 145  --     < > = values in this interval not displayed. Recent Labs     08/30/22  0703      K 4.2   CL 98   CO2 26   BUN 10   CREATININE 1.1   CALCIUM 8.8       Assessment:    Principal Problem:    Pneumonia  Active Problems:    Pancytopenia (HCC)    Pacemaker infection ([address]    Sepsis due to Enterococcus ([address]    Bacteremia    High-grade atrioventricular block    Second degree AV block, Mobitz type I    Hematoma    Presence of leadless cardiac pacemaker    COPD (chronic obstructive pulmonary disease) (HCC)    HTN (hypertension)    Cardiac pacemaker in situ    Squamous carcinoma of lung ([address]    History pulmonary embolism ([address] on Eliquis  Resolved Problems:    * No resolved hospital problems.  *      Plan:  Patient is a 65-year-old male with a hx of squamous cell lung carcinoma who is active chemo therapy who is admitted to [facility] for  Sepsis/bacteremia/immunocompromise status-acutely ill status    -Mediport removed 8/20  -EP for removal of pacer, completed 8/25  -EP for new pacemaker placement, completed 8/29/2022  -questionable hematoma in groin postprocedure-status post 2 unit PRBC transfusion to keep hemoglobin near 8  -On daptomycin per infectious disease > switched to IV Rocephin and ampicillin  -SHIRA 8/22/2022-unremarkable, no evidence of endocarditis, ejection fraction  -Resume Trelegy as he takes at home-okay to use   -[facility]    Leukocytosis, likely reactive from surgery   -Monitor CBC daily  -WBC - 15.8> 8.6 today  -UA/urine culture, monitor for infection  -Complaining of breaking out Fair amount of phlegm    Electrolyte imbalance  Supplement as needed  -Mg+ 1.3 > replaced with 2 g  Labs pending    Pancytopenia-improved  -Consult heme-onc-following  -Continue to follow H&H  -Platelets 4 > transfuse 1 unit platelets > 23 > 549  -Hold Eliquis-consider holding off for few weeks       Hematuria  -Check urine culture  -Hold Eliquis    DVT Prophylaxis PCD's  PT/OT  Discharge planning-monitor overnight, okay for discharge once okay with EP service to [facility]      Manisha Guzman MD  6:21 PM  9/1/2022

## 2022-09-02 ENCOUNTER — TELEPHONE (OUTPATIENT)
Dept: NON INVASIVE DIAGNOSTICS | Age: 72
End: 2022-09-02

## 2022-09-02 LAB
ANION GAP SERPL CALCULATED.3IONS-SCNC: 9 MMOL/L (ref 7–16)
ANISOCYTOSIS: ABNORMAL
BASOPHILS ABSOLUTE: 0.08 E9/L (ref 0–0.2)
BASOPHILS RELATIVE PERCENT: 0.9 % (ref 0–2)
BUN BLDV-MCNC: 10 MG/DL (ref 6–23)
CALCIUM SERPL-MCNC: 8.5 MG/DL (ref 8.6–10.2)
CHLORIDE BLD-SCNC: 101 MMOL/L (ref 98–107)
CO2: 32 MMOL/L (ref 22–29)
CREAT SERPL-MCNC: 0.8 MG/DL (ref 0.7–1.2)
CULTURE, BLOOD 3: NORMAL
EOSINOPHILS ABSOLUTE: 0.08 E9/L (ref 0.05–0.5)
EOSINOPHILS RELATIVE PERCENT: 0.9 % (ref 0–6)
GFR AFRICAN AMERICAN: >60
GFR NON-AFRICAN AMERICAN: >60 ML/MIN/1.73
GLUCOSE BLD-MCNC: 100 MG/DL (ref 74–99)
HCT VFR BLD CALC: 28.2 % (ref 37–54)
HCT VFR BLD CALC: 30.2 % (ref 37–54)
HEMOGLOBIN: 9 G/DL (ref 12.5–16.5)
HEMOGLOBIN: 9.3 G/DL (ref 12.5–16.5)
HYPOCHROMIA: ABNORMAL
LYMPHOCYTES ABSOLUTE: 0.53 E9/L (ref 1.5–4)
LYMPHOCYTES RELATIVE PERCENT: 6.1 % (ref 20–42)
MAGNESIUM: 1.5 MG/DL (ref 1.6–2.6)
MCH RBC QN AUTO: 28.2 PG (ref 26–35)
MCHC RBC AUTO-ENTMCNC: 31.9 % (ref 32–34.5)
MCV RBC AUTO: 88.4 FL (ref 80–99.9)
MONOCYTES ABSOLUTE: 1.42 E9/L (ref 0.1–0.95)
MONOCYTES RELATIVE PERCENT: 15.6 % (ref 2–12)
MYELOCYTE PERCENT: 0.9 % (ref 0–0)
NEUTROPHILS ABSOLUTE: 6.85 E9/L (ref 1.8–7.3)
NEUTROPHILS RELATIVE PERCENT: 75.6 % (ref 43–80)
NUCLEATED RED BLOOD CELLS: 0.9 /100 WBC
OVALOCYTES: ABNORMAL
PDW BLD-RTO: 23 FL (ref 11.5–15)
PLATELET # BLD: 134 E9/L (ref 130–450)
PMV BLD AUTO: 11.5 FL (ref 7–12)
POIKILOCYTES: ABNORMAL
POLYCHROMASIA: ABNORMAL
POTASSIUM REFLEX MAGNESIUM: 3.3 MMOL/L (ref 3.5–5)
RBC # BLD: 3.19 E12/L (ref 3.8–5.8)
SODIUM BLD-SCNC: 142 MMOL/L (ref 132–146)
URINE CULTURE, ROUTINE: NORMAL
WBC # BLD: 8.9 E9/L (ref 4.5–11.5)

## 2022-09-02 PROCEDURE — 6370000000 HC RX 637 (ALT 250 FOR IP): Performed by: INTERNAL MEDICINE

## 2022-09-02 PROCEDURE — 6360000002 HC RX W HCPCS: Performed by: INTERNAL MEDICINE

## 2022-09-02 PROCEDURE — 85025 COMPLETE CBC W/AUTO DIFF WBC: CPT

## 2022-09-02 PROCEDURE — 2580000003 HC RX 258: Performed by: INTERNAL MEDICINE

## 2022-09-02 PROCEDURE — 85018 HEMOGLOBIN: CPT

## 2022-09-02 PROCEDURE — 93005 ELECTROCARDIOGRAM TRACING: CPT | Performed by: INTERNAL MEDICINE

## 2022-09-02 PROCEDURE — 83735 ASSAY OF MAGNESIUM: CPT

## 2022-09-02 PROCEDURE — 80048 BASIC METABOLIC PNL TOTAL CA: CPT

## 2022-09-02 PROCEDURE — 6360000002 HC RX W HCPCS: Performed by: NURSE PRACTITIONER

## 2022-09-02 PROCEDURE — 94640 AIRWAY INHALATION TREATMENT: CPT

## 2022-09-02 PROCEDURE — 2140000000 HC CCU INTERMEDIATE R&B

## 2022-09-02 PROCEDURE — 85014 HEMATOCRIT: CPT

## 2022-09-02 PROCEDURE — 6370000000 HC RX 637 (ALT 250 FOR IP): Performed by: NURSE PRACTITIONER

## 2022-09-02 PROCEDURE — 93308 TTE F-UP OR LMTD: CPT

## 2022-09-02 PROCEDURE — 99233 SBSQ HOSP IP/OBS HIGH 50: CPT | Performed by: STUDENT IN AN ORGANIZED HEALTH CARE EDUCATION/TRAINING PROGRAM

## 2022-09-02 PROCEDURE — 36415 COLL VENOUS BLD VENIPUNCTURE: CPT

## 2022-09-02 RX ORDER — POTASSIUM CHLORIDE 20 MEQ/1
20 TABLET, EXTENDED RELEASE ORAL
Status: DISCONTINUED | OUTPATIENT
Start: 2022-09-03 | End: 2022-09-03 | Stop reason: HOSPADM

## 2022-09-02 RX ORDER — MAGNESIUM SULFATE IN WATER 40 MG/ML
4000 INJECTION, SOLUTION INTRAVENOUS ONCE
Status: COMPLETED | OUTPATIENT
Start: 2022-09-02 | End: 2022-09-02

## 2022-09-02 RX ORDER — FUROSEMIDE 20 MG/1
20 TABLET ORAL DAILY
Status: DISCONTINUED | OUTPATIENT
Start: 2022-09-02 | End: 2022-09-03 | Stop reason: HOSPADM

## 2022-09-02 RX ADMIN — MAGNESIUM SULFATE HEPTAHYDRATE 4000 MG: 40 INJECTION, SOLUTION INTRAVENOUS at 11:22

## 2022-09-02 RX ADMIN — CLOTRIMAZOLE 10 MG: 10 LOZENGE ORAL at 12:48

## 2022-09-02 RX ADMIN — METOPROLOL SUCCINATE 25 MG: 25 TABLET, EXTENDED RELEASE ORAL at 16:49

## 2022-09-02 RX ADMIN — ARFORMOTEROL TARTRATE 15 MCG: 15 SOLUTION RESPIRATORY (INHALATION) at 21:06

## 2022-09-02 RX ADMIN — CEFTRIAXONE 2000 MG: 2 INJECTION, POWDER, FOR SOLUTION INTRAMUSCULAR; INTRAVENOUS at 18:21

## 2022-09-02 RX ADMIN — SODIUM CHLORIDE, PRESERVATIVE FREE 10 ML: 5 INJECTION INTRAVENOUS at 20:50

## 2022-09-02 RX ADMIN — ATORVASTATIN CALCIUM 20 MG: 20 TABLET, FILM COATED ORAL at 20:50

## 2022-09-02 RX ADMIN — CEFTRIAXONE 2000 MG: 2 INJECTION, POWDER, FOR SOLUTION INTRAMUSCULAR; INTRAVENOUS at 04:04

## 2022-09-02 RX ADMIN — GUAIFENESIN 400 MG: 400 TABLET ORAL at 20:49

## 2022-09-02 RX ADMIN — CLOTRIMAZOLE 10 MG: 10 LOZENGE ORAL at 20:50

## 2022-09-02 RX ADMIN — BUDESONIDE 500 MCG: 0.5 SUSPENSION RESPIRATORY (INHALATION) at 09:13

## 2022-09-02 RX ADMIN — AMPICILLIN SODIUM 2000 MG: 2 INJECTION, POWDER, FOR SOLUTION INTRAVENOUS at 16:50

## 2022-09-02 RX ADMIN — GUAIFENESIN 400 MG: 400 TABLET ORAL at 16:48

## 2022-09-02 RX ADMIN — AMPICILLIN SODIUM 2000 MG: 2 INJECTION, POWDER, FOR SOLUTION INTRAVENOUS at 05:59

## 2022-09-02 RX ADMIN — METOPROLOL SUCCINATE 25 MG: 25 TABLET, EXTENDED RELEASE ORAL at 08:13

## 2022-09-02 RX ADMIN — Medication 300 UNITS: at 20:50

## 2022-09-02 RX ADMIN — SODIUM CHLORIDE, PRESERVATIVE FREE 10 ML: 5 INJECTION INTRAVENOUS at 08:12

## 2022-09-02 RX ADMIN — Medication 300 UNITS: at 08:12

## 2022-09-02 RX ADMIN — MELATONIN 3 MG ORAL TABLET 3 MG: 3 TABLET ORAL at 20:49

## 2022-09-02 RX ADMIN — POTASSIUM CHLORIDE 40 MEQ: 1500 TABLET, EXTENDED RELEASE ORAL at 11:09

## 2022-09-02 RX ADMIN — GUAIFENESIN 400 MG: 400 TABLET ORAL at 08:13

## 2022-09-02 RX ADMIN — AMPICILLIN SODIUM 2000 MG: 2 INJECTION, POWDER, FOR SOLUTION INTRAVENOUS at 02:31

## 2022-09-02 RX ADMIN — CLOTRIMAZOLE 10 MG: 10 LOZENGE ORAL at 16:48

## 2022-09-02 RX ADMIN — CLOTRIMAZOLE 10 MG: 10 LOZENGE ORAL at 08:13

## 2022-09-02 RX ADMIN — BUDESONIDE 500 MCG: 0.5 SUSPENSION RESPIRATORY (INHALATION) at 21:06

## 2022-09-02 RX ADMIN — ARFORMOTEROL TARTRATE 15 MCG: 15 SOLUTION RESPIRATORY (INHALATION) at 09:13

## 2022-09-02 RX ADMIN — AMPICILLIN SODIUM 2000 MG: 2 INJECTION, POWDER, FOR SOLUTION INTRAVENOUS at 11:11

## 2022-09-02 RX ADMIN — AMPICILLIN SODIUM 2000 MG: 2 INJECTION, POWDER, FOR SOLUTION INTRAVENOUS at 20:53

## 2022-09-02 RX ADMIN — FUROSEMIDE 20 MG: 20 TABLET ORAL at 16:48

## 2022-09-02 ASSESSMENT — PAIN SCALES - GENERAL
PAINLEVEL_OUTOF10: 0
PAINLEVEL_OUTOF10: 5
PAINLEVEL_OUTOF10: 0

## 2022-09-02 ASSESSMENT — PAIN - FUNCTIONAL ASSESSMENT: PAIN_FUNCTIONAL_ASSESSMENT: ACTIVITIES ARE NOT PREVENTED

## 2022-09-02 ASSESSMENT — PAIN DESCRIPTION - LOCATION: LOCATION: GENERALIZED

## 2022-09-02 NOTE — PROGRESS NOTES
Tameka Reyes notified of abnormal rhythm on telemetry, ekg done & VS obtained. Will continue to monitor throughout shift today.

## 2022-09-02 NOTE — PROGRESS NOTES
[facility],2[address] Floor and [facility] [place]- Electrophysiology  Inpatient progress note  PATIENT: Alvin Brown  MEDICAL RECORD NUMBER: [id number]  DATE OF SERVICE:  9/2/2022  ATTENDING ELECTROPHYSIOLOGIST: Annette Toribio DO    PRIMARY ELECTROPHYSIOLOGIST: Diane Clemens  REFERRING PHYSICIAN: No ref. provider found and Chema Santizo MD  CHIEF COMPLAINT: Weakness and fatigue, fever    HPI: This is a 67 y.o. male with a history of pretension, hyperlipidemia, COPD and a history of long-term smoking habit as well as recurrent syncopal episodes starting in 2012. ILR 2020 showed CHB and subsequent pacemaker implant on 7/20/2021. He was also diagnosed with lung cancer around the same time and has been undergoing chemotherapy and radiation. He finished his last chemo cycle last week and presented to the emergency room with symptoms of ongoing fatigue weakness and fever. He was found to be neutropenic as well as thrombocytopenic. Subsequently blood cultures have been positive for Enterococcus faecalis. Blood cultures from 8/16, 8/17 and 8/18 have all been positive despite IV antibiotics. Patient also remains neutropenic and thrombocytopenic with a platelet count of 96,624 today. No other cardiac symptoms. No new symptoms of chest pain or dyspnea on exertion. No syncope or near syncope.    8/21/22: Patient denies any new complaints today. Was transfused earlier today for hemoglobin of 7.2. No cardiac symptoms. 8/22/22: Patient remains asymptomatic. Underwent SHIRA today. White count remains low however hemoglobin and platelet count have improved    8/23/22:  No complaints. Awaiting plan for extraction to confirm CT surgery back up. SHIRA negative yesterday. Afebrile     8/23/22: Pacemaker set at VVI 40 yesterday afternoon, has been stable without change in symptoms. On monitor, it appears to be wenckebach vs 2:1 block with rates 60-70's     8/25/22: NPO for extraction today. No complaints overnight. Continues with stable heart rates what appears wenckebach vs 2:1 AV block     8/26/22: stable, family in room. He is up at side of bed, pain is tolerable, controlled. Left chest incision from extraction site stable. He continues to have 2:1 block vs wenckebach on monitor, stable rates 50-70 bpm. Denies dizziness or lightheadedness. 8/27/22: Feeling well. Reports some dizziness. Monitor showed sinus tachycardia with second degree AV block type I, 2 to 1 AV block and intermittent high grade AV block without any significant pauses. 8/28/22: The patient is seen in the hospital for follow up. He reports no dizziness or syncope. He remains in NSR with second degree AV block type I and 2 to 1 AV block. 08/30/22: The patient is seen in the hospital, she underwent placement of a MICRA yesterday today the groin is bruised today and her H/H was 7.8 today and he has been transfused with 1 unit. He is significantly SOB today and wheezy. The Device is functioning normal.     08/31/2022: The patient is seen in hospital follow-up, ultrasound revealed small right groin hematoma, due to ongoing dyspnea and an abundance of caution and limited echo was performed and showed a small circumferential pericardial effusion without tamponade. 09/01/2022: The patient is seen in hospital follow-up the Limited echo showed a small effusion he remains stable without significant hypotension his right groin site is stable at this time. 09/02/2022: The patient is seen in hospital follow-up, Limited echo showed a stable effusion today and the groin site although bruised is also stable. He is significant generalized edema.      Patient Active Problem List    Diagnosis Date Noted    Hematoma 08/30/2022     Priority: Medium    Presence of leadless cardiac pacemaker 08/30/2022     Priority: Medium    High-grade atrioventricular block 08/27/2022     Priority: Medium    Second degree AV block, Mobitz type I 08/27/2022     Priority: Medium Bacteremia 08/23/2022     Priority: Medium    Pacemaker infection ([address] 08/22/2022     Priority: Medium    Sepsis due to Enterococcus ([address] 08/22/2022     Priority: Medium    Pancytopenia ([address] 08/17/2022     Priority: Medium    COVID-19 05/25/2022     Priority: Medium    Squamous carcinoma of lung ([address] 09/18/2021    History pulmonary embolism ([address] on Eliquis 09/18/2021    Hyponatremia 09/18/2021    Hypomagnesemia 09/18/2021    Wide-complex tachycardia ([address] 09/18/2021    Acute on chronic respiratory failure with hypoxia ([address] 09/17/2021    Cardiac pacemaker in situ 07/20/2021    Intermittent complete heart block ([facility] 07/19/2021    Pneumonia 01/31/2019    COPD (chronic obstructive pulmonary disease) ([facility] 01/31/2019    BPH (benign prostatic hyperplasia) 01/31/2019    HTN (hypertension) 01/31/2019    HLD (hyperlipidemia) 01/31/2019    Syncope and collapse 01/18/2019       Past Medical History:   Diagnosis Date    CHB (complete heart block) ([facility] 07/2021    COPD (chronic obstructive pulmonary disease) ([facility]     Syncope 09/2020    dr Catalina Alcantar wearing a monitor    Syncope 07/2021       Family History   Problem Relation Age of Onset    Cancer Father         prostate    Cancer Paternal Uncle         anal       Social History     Tobacco Use    Smoking status: Former     Packs/day: 2.00     Years: 25.00     Pack years: 50.00     Types: Cigarettes     Quit date: 12/26/2011     Years since quitting: 10.6    Smokeless tobacco: Never   Substance Use Topics    Alcohol use: Not Currently     Alcohol/week: 1.0 standard drink     Types: 1 Glasses of wine per week     Comment: 1 glass of wine per day prior       Current Facility-Administered Medications   Medication Dose Route Frequency Provider Last Rate Last Admin    magnesium sulfate 4000 mg in 100 mL IVPB premix  4,000 mg IntraVENous Once Jailyn Mcfarlane APRN - CNP 25 mL/hr at 09/02/22 1122 4,000 mg at 09/02/22 1122    0.9 % sodium chloride infusion   IntraVENous PRN [facility]        metoprolol succinate (TOPROL XL) extended release tablet 25 mg  25 mg Oral BID  Merrydony LELO Michel - CNP   25 mg at 09/02/22 7756    perflutren lipid microspheres (DEFINITY) injection 1.65 mg  1.5 mL IntraVENous ONCE PRN Guy Valdezdon, APRN - CNP        perflutren lipid microspheres (DEFINITY) injection 1.65 mg  1.5 mL IntraVENous ONCE PRN Tor Mohs, DO        0.9 % sodium chloride infusion   IntraVENous PRN Magaly Day MD        levalbuterol (XOPENEX) nebulization 0.63 mg  0.63 mg Nebulization Q8H PRN Lu Lockett MD        sodium chloride flush 0.9 % injection 5-40 mL  5-40 mL IntraVENous 2 times per day Magaly Day MD   10 mL at 09/02/22 0812    sodium chloride flush 0.9 % injection 5-40 mL  5-40 mL IntraVENous PRN Magaly Day MD   10 mL at 08/30/22 1541    0.9 % sodium chloride infusion   IntraVENous PRN Magaly Day MD        guaiFENesin tablet 400 mg  400 mg Oral TID Magaly Day MD   400 mg at 09/02/22 0813    Arformoterol Tartrate (BROVANA) nebulizer solution 15 mcg  15 mcg Nebulization BID Magaly Day MD   15 mcg at 09/02/22 0913    budesonide (PULMICORT) nebulizer suspension 500 mcg  0.5 mg Nebulization BID Magaly Day MD   500 mcg at 09/02/22 0913    heparin flush 100 UNIT/ML injection 300 Units  3 mL IntraVENous 2 times per day Magaly Day MD   300 Units at 09/02/22 0812    heparin flush 100 UNIT/ML injection 300 Units  3 mL IntraCATHeter PRN Magaly Day MD        morphine (PF) injection 2 mg  2 mg IntraVENous Q4H PRN Magaly Day MD        sodium chloride (OCEAN, BABY AYR) 0.65 % nasal spray 1 spray  1 spray Each Nostril PRN Magaly Day MD        trimethobenzamide (TIGAN) injection 200 mg  200 mg IntraMUSCular Q6H PRN Magaly Day MD        oxyCODONE-acetaminophen (PERCOCET) 5-325 MG per tablet 1 tablet  1 tablet Oral Q4H PRN Magaly Day MD   1 tablet at 08/28/22 0703    Or    oxyCODONE-acetaminophen (PERCOCET) 5-325 MG per tablet 2 tablet  2 (88.7 kg)    Height:          Constitutional: Well-developed, Short of breath. Eyes: conjunctivae normal, no xanthelasma   Ears, Nose, Throat: oral mucosa moist, no cyanosis   CV: no JVD. Irregular rhythm. Normal S1S2 and no S3. No murmurs, rubs, or gallops. Lungs: decreased bases bilaterally, normal respiratory effort without used of accessory muscles + for wheezing. Abdomen: soft, non-tender, non distended  Musculoskeletal: no digital clubbing, +3 pitting BLE edema. Skin: warm, no rashes. Left chest wall extraction site glued, Small right side chest wall incision site well approximated also, no swelling or hemtatoma   Right groin:is bruised with no edema and is soft. Data:    Recent Labs     08/31/22  0515 09/01/22  0330 09/01/22  1325 09/02/22  0430 09/02/22  1258   WBC 9.3 8.6  --  8.9  --    HGB 8.5* 7.3* 9.9* 9.0* 9.3*   HCT 27.6* 23.3* 30.9* 28.2* 30.2*    145  --  134  --      Recent Labs     09/02/22  0430      K 3.3*      CO2 32*   BUN 10   CREATININE 0.8   CALCIUM 8.5*        Lab Results   Component Value Date/Time    MG 1.5 09/02/2022 04:30 AM     No results for input(s): TSH in the last 72 hours. No results for input(s): INR in the last 72 hours. Telemetry 09/02/22  Sinus rhythm with episodes of Non conducted P waves and intermittent RV pacing and atrial ectomy rates  bpm.         EKG:        SHIRA 8/22/22   Summary   No SHIRA indication of Endocarditis. Normal left ventricular chamber size and systolic function. Interatrial septum appears intact. Normal right ventricle size and function. Pacemaker leads noted in right atrium and right ventricle. Normal aortic root size. Moderate atherosclerosis in the descending thoracic aorta. Epicardial fat vs. small pericardial effusion. No intra cardiac mass or thrombus. Technically sub-optimal images. Compared to prior sub-optimal TTE from 8/19/22.     Signature ----------------------------------------------------------------   Electronically signed by Genie Lima MD(Interpreting   physician) on 08/22/2022 06:50 PM    Echocardiogram: 8/19/22   Summary   Normal left ventricular chamber size. Normal left ventricular systolic function. Visually estimated LVEF is 55-60 %. No wall motion abnormalities. Normal diastolic function. Normal left atrial pressure. Normal right ventricle structure and function. Normal left atrial size. Normal right atrial size   Likely normal estimated PA pressure. Pacer/ ICD wire present in the right heart. No gross evidence of infective endocarditis. Consider SHIRA for better   accuracy if clinically indicated. Compared to prior echo from 2021, no significant changes noted. Signature    ----------------------------------------------------------------   Electronically signed by Merlin Arnold MD(Interpreting   physician) on 08/19/2022 04:16 PM      Device Interrogation/Reprogramming 09/01/22   Make/Model: Medtroinc MICRA AV   DOI: 08/29/22  Battery: Greater than 8 years  [facility] therapy: VVIR  ppm  Pacing %: RV = 82.9 %,   Lead function:  RV lead: sensing = 9.1 mV, impedance = 520, threshold = 0.5 V @ 0.24 msec  Lead programming:  RV lead: sensitivity = 2.0 mV, output = 2.13 V @ 0.24 msec  Arrhythmias: none   Reprogramming included: VDD 50 bpm.    Overall device function is normal  All device programmable settings were evaluated per above and in the scanned document, along with iterative adjustments (capture thresholds) to assess and select the most appropriate final programming to provide for consistent delivery of the appropriate therapy and to verify function of the device. Assessment/Plan:      1.   Dual-chamber pacemaker explanted 08/25/22  - Initial indication for AV block with a long pause/ ? CHB in past on ILR.   - Date of implant July 28, 2021  - Pacemaker check in June revealed 6.5% atrial and 3% ventricular pacing with DDI programming at lower rate of 60 bpm.  - Initial interrogation during [facility] admission on 8/23/22 showed 27% AV and 25% RV paced.  - Noted bifascicular block with 2:1 block and second degree AV block type I on tele when pacer set at VVI 40 with RV paced <1%. - On Toprol 50 mg bid which held since 8/24/22   - Underwent full extraction on 8/25/22   - Currently in intermittent second degree AV block type I, 2 to 1 AV block and high grade AV block with HR  bpm and no significant pause seen. 2. MICRA AV in situ   - DOI 08/29/22  - Normal device function   - Slight ecchymosis of groin   - VDD 50    3. Bacteremia/sepsis--enterococcus faecalis  - Probably port infection per ID; mediport removed 8/20/22   - Persistent bacteremia despite IV antibiotics  8-16-22 blood cultures E faecalis x 2  8-17-22 blood cultures E faecalis x 1  8-18-22 blood culture + E faecalis x1  8-19-22 blood cultures neg to date  8-20-22 Port ( tip ) cx - neg tod date   8-20-22 blood cultures neg to date  8-23-22 blood cultures neg to date  8-25-22 blood cultures neg to date   - On IV Ampicillin and Rocephin  - Dr. Brandon Richards following. 3.  Lung cancer, squamous cell--patient undergoing chemotherapy  -  Stage IIIB T2aN3 Squamous Cell Carcinoma of Lung  diagnosis 5/27/2021, PD-L1 0%    - 7/6/21-9/7/21 treatment with concurrent chemotherapy/radiation therapy   - 11/2/21 consolidated with Imfinzi (Durvalumab) per ID IV every 4 weeks   - 6/30/22 PET showing progression and was started on Carbo, Gemzar and Keytruda on 6/30/22  - 8/9 /22 received C2D8 of Carboplatin AUC 2 with Gemzar 1000 mg/m2 IV on D1,8 and Keytruda IV on D1 on a 21 day cycle    4. Neutropenia and thrombocytopenia related to chemotherapy  - Improved. 5. Persistent anemia.    - Post PRBC transfusion 8/21/22, 08/30/22, 09/01/22  - Count 9.3 post PRBC transfusion 09/01/22    6.  History of PE   - Diagnosed in 09/2021   - On Eliquis in the past and currently on hold.     7.  Small circumferential pericardial effusion with largest component anterior to the RV. -Noted on echo 08/31/2022 reduced on 09/02/22 echo. Recommendations:  Repeat Limited TTE on 09/15/22  Continue Toprol XL 25mg BID. Continue to hold  Eliquis until the 09/15/22 echo is read and then possible to restart if effusion is reduced. Continue to monitor on telemetry. Make device VDD 50  bpm turn on AV synchrony. PO lasix 20mg daily. EP to sign off. Thank you for allowing me to participate in your patient's care. Please call me if there are any questions or concerns. I have spent a total of 10 minutes with the patient and the family reviewing the above stated recommendations. And a total of >50% of that time involved face-to-face time providing counseling and/or coordination of care with the other providers, reviewing records/tests, counseling/education of the patient, ordering medications/tests/procedures, coordinating care, and documenting clinical information in the EHR. Discussed with Dr. Lalit Alexis. LELO Medeiros - CNP  Cardiac Electrophysiology  [facility]) Physicians  Electrophysiology  2:15 PM  9/2/2022    65-year-old male with a history of high-grade AV block status post pacemaker implant (7/20/2021), PE on [address] [place] [address] Road, and lung cancer on chemotherapy/radiation. In August 2022, patient was diagnosed with Enterococcus faecalis bacteremia. On 8/19/22, a TTE reported LVEF of 55-60%, no IE, and no periciardial effusion. On 8/22/22, a SHIRA was technically suboptimal, but reported no evidence of IE, LVEF = \"normal\", and epicardial fat v small pericardial effusion. On 8/25/2022, he underwent extraction of the device. He is on IV abx until 10/13/22 via right brachial PICC. On 8/29/2022, he underwent Micra implant. His beta-blocker was restarted following the procedure. This procedure was complicated by right groin hematoma. US reported small (3.1 cm) hematoma.  He was administered 1 unit of packed red blood cells. Hematoma progressively improving. Device interrogation post-implant with stable device function. Device programmed to VVIR  bpm. Patient complains of increasing dyspnea. TTE today reports new small circumferential pericardial effusion with largest component superficial to RV without evidence of tamponade. Device check on 9/1/22 with stable function and programmed VDD 50 bpm. Repeat limited TTE 9/2/22 with stable small pericardial effusion. Continue to hold [address]. Repeat TTE on 9/15/22. Device clinic next week with Dr Corey Lazcano. Change IV lasix to PO and schedule K+ and Mg++ repletion. EP service will sign off. Please contact with questions/concerns. DO Adeline Macdonald Cardiac Electrophysiology  [address] [facility]      Attending Supervising Physicians Attestation Statement  I performed at least 51% of the work. I was present with the nurse practitioner during the history and exam. I discussed the findings and plans with the nurse practitioner and agree as documented in his note.     Electronically signed by Christa Eason DO on 9/2/22 at 4:25 PM EDT

## 2022-09-02 NOTE — PROGRESS NOTES
Pulmonary Progress Note    Admit Date: 8/16/2022                            PCP: Carl Garcia MD  Principal Problem:    Pneumonia  Active Problems:    Pancytopenia ([facility] [address]    Pacemaker infection ([facility] [address]    Sepsis due to Enterococcus ([facility] [address]    Bacteremia    High-grade atrioventricular block    Second degree AV block, Mobitz type I    Hematoma    Presence of leadless cardiac pacemaker    COPD (chronic obstructive pulmonary disease) (HCC)    HTN (hypertension)    Cardiac pacemaker in situ    Squamous carcinoma of lung ([facility] [address]    History pulmonary embolism ([facility] [address] on Eliquis  Resolved Problems:    * No resolved hospital problems.  *      Subjective:  Patient seen sitting up in bedside chair  on 4L NC   With continued complaints of dyspnea on exertion  He denies shortness of breath at rest, cough or wheezing   Feel swollen and tired but better since receiving blood yesterday   Awaiting LTACH     Medications:   sodium chloride      sodium chloride      sodium chloride          magnesium sulfate  4,000 mg IntraVENous Once    furosemide  20 mg Oral Daily    metoprolol succinate  25 mg Oral BID WC    sodium chloride flush  5-40 mL IntraVENous 2 times per day    guaiFENesin  400 mg Oral TID    Arformoterol Tartrate  15 mcg Nebulization BID    budesonide  0.5 mg Nebulization BID    heparin flush  3 mL IntraVENous 2 times per day    clotrimazole  10 mg Oral 4x daily    ampicillin IV  2,000 mg IntraVENous 6 times per day    cefTRIAXone (ROCEPHIN) IV  2,000 mg IntraVENous Q12H    atorvastatin  20 mg Oral Daily       Vitals:  VITALS:  /77   Pulse 90   Temp 97 °F (36.1 °C) (Temporal)   Resp 16   Ht 5' 6\" (1.676 m)   Wt 195 lb 9.6 oz (88.7 kg)   SpO2 99%   BMI 31.57 kg/m²   24HR INTAKE/OUTPUT:    Intake/Output Summary (Last 24 hours) at 9/2/2022 1438  Last data filed at 9/2/2022 1403  Gross per 24 hour   Intake 779.09 ml   Output 500 ml   Net 279.09 ml     CURRENT PULSE OXIMETRY:  SpO2: 99 %  24HR PULSE OXIMETRY RANGE: SpO2  Av.7 %  Min: 95 %  Max: 100 %  CVP:    VENT SETTINGS:      Additional Respiratory Assessments  Heart Rate: 90  Resp: 16  SpO2: 99 %      EXAM:  General: No distress. Alert. Eyes: No sclera icterus. No conjunctival injection. ENT: No discharge. Pharynx clear. Neck: Trachea midline. Normal thyroid. Resp: No accessory muscle use. No crackles. No wheezing. No rhonchi. Diminished bilaterally   CV: Regular rate. Regular rhythm. No mumur or rub. +3 pitting BLE  ABD: Non-tender. Non-distended. No masses. No organmegaly. Normal bowel sounds. Skin: Warm and dry. No nodule on exposed extremities. No rash on exposed extremities. R groin bruising   M/S: No cyanosis. No joint deformity. No clubbing. Neuro: Awake. Follows commands. A&Ox 3    I/O: I/O last 3 completed shifts: In: 1286.9 [P.O.:240; Blood:387.8; IV Piggyback:659.1]  Out: 1900 [CKFZX:2797]  I/O this shift: In: 420 [P.O.:420]  Out: 400 [Urine:400]     Results:  CBC:   Recent Labs     22  0515 22  0330 22  1325 22  0430 22  1258   WBC 9.3 8.6  --  8.9  --    HGB 8.5* 7.3* 9.9* 9.0* 9.3*   HCT 27.6* 23.3* 30.9* 28.2* 30.2*   MCV 89.6 89.3  --  88.4  --     145  --  134  --      BMP:   Recent Labs     22  0430      K 3.3*      CO2 32*   BUN 10   CREATININE 0.8     LFT: No results for input(s): ALKPHOS, ALT, AST, PROT, BILITOT, BILIDIR, LABALBU in the last 72 hours. PT/INR: No results for input(s): PROTIME, INR in the last 72 hours. Cultures:  No results for input(s): CULTRESP in the last 72 hours. ABG:   No results for input(s): PH, PO2, PCO2, HCO3, BE, O2SAT in the last 72 hours.     Films:  CT Head WO Contrast 2022  EXAMINATION: CT OF THE HEAD WITHOUT CONTRAST  2022 4:42 pm TECHNIQUE: CT of the head was performed without the administration of intravenous contrast. Automated exposure control, iterative reconstruction, and/or weight based adjustment of the mA/kV was utilized to 5/24/2022 CTA chest   5/24/2022 CTA chest: 3.4 x 5.4 cm left hilar and perihilar mass extending to LLL encircling the pulmonary artery and the bronchi consistent with progressive malignancy with occlusion of left lower lobe bronchi with postobstructive pneumonitis  8/18 CT chest:   A tiny pericardial effusion is noted. There is   coronary artery calcification. Left subclavian pacemaker is noted. The   previously noted infiltrative left hilar mass extending to left upper lobe   and left lower lobe surrounding the broncho pulmonary vasculature is noted   currently measuring 4.6 x 4.3 cm with progression. There is some associated   atelectasis. The right lung is clear. There is no pleural effusion       08/19/22 ECHO:  Normal left ventricular chamber size. Normal left ventricular systolic function. Visually estimated LVEF is 55-60 %. No wall motion abnormalities. Normal diastolic function. Normal left atrial pressure. Normal right ventricle structure and function. Normal left atrial size. Normal right atrial size   Likely normal estimated PA pressure. Pacer/ ICD wire present in the right heart. No gross evidence of infective endocarditis. Consider SHIRA for better   accuracy if clinically indicated. Compared to prior echo from 2021, no significant changes noted. SHIRA on 8/22/22   No SHIRA indication of Endocarditis. Normal left ventricular chamber size and systolic function. Interatrial septum appears intact. Normal right ventricle size and function. Pacemaker leads noted in right atrium and right ventricle. Normal aortic root size. Moderate atherosclerosis in the descending thoracic aorta. Epicardial fat vs. small pericardial effusion. No intra cardiac mass or thrombus. Technically sub-optimal images. Compared to prior sub-optimal TTE from 8/19/22.   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 8/18/2022 CT chest w/o: The previously noted infiltrative left hilar mass extending to EAN and LLL surrounding the broncho pulmonary vasculature is noted currently measuring 4.6 x 4.3 cm with progression with atelectasis. 8/18: 2L NC, US BLE neg DVT   8/19: 2L NC   8/20 RA, Mediport removed   8/21 RA received PRBC    8/22 2.5L; SHIRA neg for endocarditis   8/23: RA;    8/24: 3L NC 99%  8/25: 3L NC 98%. Pacer removed by CTS/EP  8/26: room air 97%  8/27 on 3lnc  8/30 3L nc had leadless PPM placed 8/29/2022. S/p 1 uPRBC and IV lasix x 1  8/31: 4L NC, 100%.    9/1: 4L, 1 unit PRBC today   9/2: 4L      Stage IIIB T2aN3 Squamous Cell Carcinoma of Lung  diagnosis 5/27/2021, PD-L1 0% ( 7/6/21-9/7/21 treatment with concurrent chemotherapy/radiation therapy; 11/2/21 consolidated with Imfinzi (Durvalumab) per ID IV every 4 weeks; 6/30/22 PET showing progression and was started on Carbo, Gemzar and Keytruda on 6/30/22; 8/9 /22 r ceived C2D8 of Carboplatin AUC 2 with Gemzar 1000 mg/m2 IV on D1,8 and Keytruda IV on D1 on a 21 day cycle)  Enterococcus faecalis bacteremia/septicemia, bacteremia cultures + on 8/16, 8/17, 8/20 s/p mediport removal    With dual-chamber pacemaker with h/o wide-complex tachycardia (HCC)--atrial tachycardia with RV pacing  - pacer out 8/25/2022  - leadless pacer implantation 8/29/2022  H/O SARS-CoV 2 Pneumonitis 5/24/2022  Chronic hypoxic resp failure, RA during the day and 2L NC at night  Probable obstructive sleep apnea  Persistent pancytopenia secondary to chemotherapy  B/l Lower Extremity Swelling and numbness  H/O RLL subsegmental PE- CTA Chest 9/17/2021 was on eliquis now on hold   Radiation pneumonitis/fibrosis left lung   COPD, not in exacerbation, uses trelegy at home   Right Groin Hematoma - 3.1 cm, US right groin 8/31/2022      Plan:  Seen on 4LNC, patient states his baseline is 4L NC at HS. Will need ambulatory pulse ox prior to discharge. Continue Brovana, Pulmicort   Xopenex and mucinex to thin secretions. Encouraged IS   Oncology following. Transfuse if Hgb < 7.5. H/H 8.5/27.6 (8/31) received one unit  9/1 hgb 7.3 repeat > 9 9/2  IV ATB per ID - ampicillin 2 grams IV q 4 hrs and ceftriaxone 2 grams IV q 12 hrs. S/p R PICC 8/27  Continue IS for pulmonary hygiene   New Leadless PPM placed 08/29 with Dr. Jazmin Hernandez. S/p old pacer removal on 8/25, cultures pending. Repeat CXR 8/30 stable. Eliquis to resume when ok with CTS/EP, holding until at least 9/2/22  US right groin pending, per EP -small 3.1 cm hematoma   Supportive care   Generalized edema and ecchymosis noted , received lasix 40 x 1  9/1 now on lasix 20 oral daily   Awaiting transfer to [facility]   Will benefit from outpatient PFT and PSG         Electronically signed by LELO Rich on 9/2/2022 at 2:38 PM    Attending Attestation Note:    Patient seen and examined with Hospital Staff, NP. I have extensively reviewed the chart lab work and imaging. I agree with above. Modifications and amendment of note made as necessary.     In addition, the following apply:    Current Facility-Administered Medications   Medication Dose Route Frequency Provider Last Rate Last Admin    furosemide (LASIX) tablet 20 mg  20 mg Oral Daily LELO Robbins CNP        0.9 % sodium chloride infusion   IntraVENous PRN Drake Holstein, PA        metoprolol succinate (TOPROL XL) extended release tablet 25 mg  25 mg Oral BID  LELO Robbins CNP   25 mg at 09/02/22 0813    perflutren lipid microspheres (DEFINITY) injection 1.65 mg  1.5 mL IntraVENous ONCE PRN LELO Robbins CNP perflutren lipid microspheres (DEFINITY) injection 1.65 mg  1.5 mL IntraVENous ONCE PRN Cristóbal Rascon DO        0.9 % sodium chloride infusion   IntraVENous PRN Jennifer Ulrich MD        levalbuterol (XOPENEX) nebulization 0.63 mg  0.63 mg Nebulization Q8H PRN Carlene Graff MD        sodium chloride flush 0.9 % injection 5-40 mL  5-40 mL IntraVENous 2 times per day Jennifer Ulrich MD   10 mL at 09/02/22 0812    sodium chloride flush 0.9 % injection 5-40 mL  5-40 mL IntraVENous PRN Jennifer Ulrich MD   10 mL at 08/30/22 1541    0.9 % sodium chloride infusion   IntraVENous PRN Jennifer Ulrich MD        guaiFENesin tablet 400 mg  400 mg Oral TID Jennifer Ulrich MD   400 mg at 09/02/22 0813    Arformoterol Tartrate (BROVANA) nebulizer solution 15 mcg  15 mcg Nebulization BID Jennifer Ulrich MD   15 mcg at 09/02/22 0913    budesonide (PULMICORT) nebulizer suspension 500 mcg  0.5 mg Nebulization BID Jennifer Ulrich MD   500 mcg at 09/02/22 0913    heparin flush 100 UNIT/ML injection 300 Units  3 mL IntraVENous 2 times per day Jennifer Ulrich MD   300 Units at 09/02/22 0812    heparin flush 100 UNIT/ML injection 300 Units  3 mL IntraCATHeter PRN Jennifer Ulrich MD        morphine (PF) injection 2 mg  2 mg IntraVENous Q4H PRN Jennifer Ulrich MD        sodium chloride (OCEAN, BABY AYR) 0.65 % nasal spray 1 spray  1 spray Each Nostril PRN Jennifer Ulrich MD        trimethobenzamide (TIGAN) injection 200 mg  200 mg IntraMUSCular Q6H PRN Jennifer Ulrich MD        oxyCODONE-acetaminophen (PERCOCET) 5-325 MG per tablet 1 tablet  1 tablet Oral Q4H PRN Jennifer Ulrich MD   1 tablet at 08/28/22 0703    Or    oxyCODONE-acetaminophen (PERCOCET) 5-325 MG per tablet 2 tablet  2 tablet Oral Q4H PRN Jennifer Ulrich MD        clotrimazole (MYCELEX) lindsay 10 mg  10 mg Oral 4x daily Jennifer Ulrich MD   10 mg at 09/02/22 1248    potassium chloride (KLOR-CON M) extended release tablet 40 mEq  40 mEq Oral PRN Jennifer Ulrich MD   40 mEq at 09/02/22 1109    Or    potassium bicarb-citric acid (EFFER-K) effervescent tablet 40 mEq  40 mEq Oral PRN Jimmy Gonzalez MD        Or    potassium chloride 10 mEq/100 mL IVPB (Peripheral Line)  10 mEq IntraVENous PRN Jimmy Gonzalez MD        ampicillin 2000 mg ivpb mini bag  2,000 mg IntraVENous 6 times per day Jimmy Gonzalez MD   Stopped at 09/02/22 1140    cefTRIAXone (ROCEPHIN) 2,000 mg in sterile water 20 mL IV syringe  2,000 mg IntraVENous Q12H Jimmy Gonzalez MD   2,000 mg at 09/02/22 0404    melatonin tablet 3 mg  3 mg Oral Nightly PRN Jimmy Gonzalez MD   3 mg at 08/27/22 2136    atorvastatin (LIPITOR) tablet 20 mg  20 mg Oral Daily Jimmy Gonzalez MD   20 mg at 09/01/22 2109    acetaminophen (TYLENOL) tablet 650 mg  650 mg Oral Q6H PRN Jimmy Gonzalez MD   650 mg at 09/01/22 4397    Or    acetaminophen (TYLENOL) suppository 650 mg  650 mg Rectal Q6H PRN Jimmy Gonzalez MD        senna (SENOKOT) tablet 8.6 mg  1 tablet Oral Daily PRN Jimmy Gonzalez MD          - outpatient PFT, PSG  - pacer out 8/25/2022  - leadless pacer implantation 8/29/2022  - Right Groin US 8/31/2022: Small 3.1 cm hematoma.   - IV abx per ID (ampicillin 2 grams IV q 4 hrs and ceftriaxone 2 grams IV q 12 hrs), right PICC placement 8/27/2022  - Brovana, Pulmicort  - Xopenex q8 hours PRN  - resume Trelegy upon D/C  - Oncology following.   - PT/OT, nutritional support, strength training   - Eliquis to resume when OK with CTS/EP teams      - Blood Products: 3 units PRBC, 2 units SDP    Ahsan Deal MD  9/2/2022  3:48 PM

## 2022-09-02 NOTE — PATIENT CARE CONFERENCE
[facility] Quality Flow/Interdisciplinary Rounds Progress Note        Quality Flow Rounds held on September 2, 2022    Disciplines Attending:  Bedside Nurse, , , and Nursing Unit Leadership    Stacey Watts was admitted on 8/16/2022  2:38 PM    Anticipated Discharge Date:  Expected Discharge Date: 09/01/22    Disposition:    Kojo Score:  Kojo Scale Score: 20    Readmission Risk              Risk of Unplanned Readmission:  24           Discussed patient goal for the day, patient clinical progression, and barriers to discharge.   The following Goal(s) of the Day/Commitment(s) have been identified:  Labs - Report Results      Vanessa Keith RN  September 2, 2022

## 2022-09-02 NOTE — CARE COORDINATION
[facility] liaison, Mary Grace Anna, was updated on pt. [facility] [facility] [facility] take [facility] over weekend. No pre cert or covid test is needed. Sw/cm will follow.

## 2022-09-02 NOTE — PROGRESS NOTES
[facility]                                Progress note    Subjective:    Patient is alert and oriented, resting in bed  Feels short of breath again over the past couple of days  Wife remains at bedside  No acute issues or complaints today  Objective:    /69   Pulse 53   Temp 97 °F (36.1 °C) (Temporal)   Resp 16   Ht 5' 6\" (1.676 m)   Wt 195 lb 9.6 oz (88.7 kg)   SpO2 99%   BMI 31.57 kg/m²     In: 869.1 [P.O.:510]  Out: 400   In: 869.1   Out: 400 [Urine:400]    General appearance: NAD, conversant, somewhat dyspneic  HEENT: AT/NC, MMM  Neck: FROM, supple  Lungs: Clear to auscultation  CV: RRR, no MRGs-right-sided port removed 8/20, left-sided pacemaker removed   Vasc: Radial pulses 2+  Abdomen: Soft, non-tender; no masses or HSM  Extremities: No  or digital cyanosis, 2+ BLE edema   Skin: Generalized bruising  Psych: Alert and oriented to person, place and time  Neuro: Alert and interactive     Recent Labs     08/31/22  0515 09/01/22  0330 09/01/22  1325 09/02/22  0430 09/02/22  1258   WBC 9.3 8.6  --  8.9  --    HGB 8.5* 7.3* 9.9* 9.0* 9.3*   HCT 27.6* 23.3* 30.9* 28.2* 30.2*    145  --  134  --        Recent Labs     09/02/22  0430      K 3.3*      CO2 32*   BUN 10   CREATININE 0.8   CALCIUM 8.5*       Assessment:    Principal Problem:    Pneumonia  Active Problems:    Pancytopenia (HCC)    Pacemaker infection (HCC)    Sepsis due to Enterococcus (HCC)    Bacteremia    High-grade atrioventricular block    Second degree AV block, Mobitz type I    Hematoma    Presence of leadless cardiac pacemaker    COPD (chronic obstructive pulmonary disease) (HCC)    HTN (hypertension)    Cardiac pacemaker in situ    Squamous carcinoma of lung ([facility] [address]    History pulmonary embolism ([facility] [address] on Eliquis  Resolved Problems:    * No resolved hospital problems.  *      Plan:  Patient is a 48-year-old male with a hx of squamous cell lung carcinoma who is active chemo therapy who is admitted to intermediate for  Sepsis/bacteremia/immunocompromise status-acutely ill status    -Mediport removed 8/20  -EP for removal of pacer, completed 8/25  -EP for new pacemaker placement, completed 8/29/2022  -questionable hematoma in groin postprocedure-status post 2 unit PRBC transfusion to keep hemoglobin near 8  -On daptomycin per infectious disease > switched to IV Rocephin and ampicillin  -SHIRA 8/22/2022-unremarkable, no evidence of endocarditis, ejection fraction  -Resume Trelegy as he takes at home-okay to use   -PICC placed for ongoing antibiotic therapy at [facility]  -Repeat echocardiogram 9/2/2022 with normal LV, otherwise unremarkable    Leukocytosis, likely reactive from surgery   -Monitor CBC daily  -WBC - 15.8> 8.6 today  -UA/urine culture, monitor for infection  -Complaining of breaking out Fair amount of phlegm    Electrolyte imbalance  Supplement as needed  Supplement magnesium  Supplement potassium 1    Pancytopenia-improved  -Consult heme-onc-following  -Continue to follow H&H  -Platelets 4 > transfuse 1 unit platelets > 23 > 385 today  -Hold Eliquis-consider holding off for few weeks       Hematuria  -Check urine culture  -Hold Eliquis    DVT Prophylaxis PCD's  PT/OT  Discharge planning-monitor overnight, okay for discharge once okay with EP service to [facility]      Thien Childs MD  7:35 PM  9/2/2022

## 2022-09-02 NOTE — PROGRESS NOTES
Subjective: The patient is awake and alert. No problems overnight. Wife is at bedside. He reports of feeling better over the last couple of days. His appetite is mildly improved. Has mild to moderate shortness of breath and dyspnea with exertional activity. Denies chest pain, angina, abdominal discomfort. No nausea or vomiting. Tolerating diet. Objective:    BP (!) 149/76   Pulse 99   Temp 97.7 °F (36.5 °C) (Temporal)   Resp 16   Ht 5' 6\" (1.676 m)   Wt 195 lb 9.6 oz (88.7 kg)   SpO2 99%   BMI 31.57 kg/m²     General: NAD  HEENT: No thrush or mucositis, EOMI  Heart:  RRR, no murmurs, gallops, or rubs. Lungs:  CTA bilaterally, no wheeze, rales or rhonchi  Abd: BS present, nontender, nondistended, no masses  Extrem:  No clubbing, cyanosis.   BLE 2+ edema  Lymphatics: No palpable adenopathy in cervical and supraclavicular regions  Skin: Intact, no petechia or purpura    CBC with Differential:    Lab Results   Component Value Date/Time    WBC 8.9 09/02/2022 04:30 AM    RBC 3.19 09/02/2022 04:30 AM    HGB 9.3 09/02/2022 12:58 PM    HCT 30.2 09/02/2022 12:58 PM     09/02/2022 04:30 AM    MCV 88.4 09/02/2022 04:30 AM    MCH 28.2 09/02/2022 04:30 AM    MCHC 31.9 09/02/2022 04:30 AM    RDW 23.0 09/02/2022 04:30 AM    NRBC 0.9 09/02/2022 04:30 AM    BLASTSPCT 0.9 08/24/2022 05:22 AM    METASPCT 3.5 08/30/2022 07:03 AM    LYMPHOPCT 6.1 09/02/2022 04:30 AM    PROMYELOPCT 1.7 09/01/2022 03:30 AM    MONOPCT 15.6 09/02/2022 04:30 AM    MYELOPCT 0.9 09/02/2022 04:30 AM    BASOPCT 0.9 09/02/2022 04:30 AM    MONOSABS 1.42 09/02/2022 04:30 AM    LYMPHSABS 0.53 09/02/2022 04:30 AM    EOSABS 0.08 09/02/2022 04:30 AM    BASOSABS 0.08 09/02/2022 04:30 AM     CMP:    Lab Results   Component Value Date/Time     09/02/2022 04:30 AM    K 3.3 09/02/2022 04:30 AM     09/02/2022 04:30 AM    CO2 32 09/02/2022 04:30 AM    BUN 10 09/02/2022 04:30 AM    CREATININE 0.8 09/02/2022 04:30 AM    GFRAA >60 09/02/2022 04:30 AM    LABGLOM >60 09/02/2022 04:30 AM    GLUCOSE 100 09/02/2022 04:30 AM    PROT 5.9 08/20/2022 04:44 PM    LABALBU 3.2 08/20/2022 04:44 PM    CALCIUM 8.5 09/02/2022 04:30 AM    BILITOT <0.2 08/20/2022 04:44 PM    ALKPHOS 90 08/20/2022 04:44 PM    AST 25 08/20/2022 04:44 PM    ALT 29 08/20/2022 04:44 PM          Current Facility-Administered Medications:     magnesium sulfate 4000 mg in 100 mL IVPB premix, 4,000 mg, IntraVENous, Once, LELO Blanchard CNP, Last Rate: 25 mL/hr at 09/02/22 1122, 4,000 mg at 09/02/22 1122    0.9 % sodium chloride infusion, , IntraVENous, PRN, MATTHEW Owens    metoprolol succinate (TOPROL XL) extended release tablet 25 mg, 25 mg, Oral, BID WC, LELO Blanchard CNP, 25 mg at 09/02/22 0813    perflutren lipid microspheres (DEFINITY) injection 1.65 mg, 1.5 mL, IntraVENous, ONCE PRN, LELO Blanchard CNP    perflutren lipid microspheres (DEFINITY) injection 1.65 mg, 1.5 mL, IntraVENous, ONCE PRN, Cirstóbal Rascon DO    0.9 % sodium chloride infusion, , IntraVENous, PRN, Michi Andre MD    levalbuterol (XOPENEX) nebulization 0.63 mg, 0.63 mg, Nebulization, Q8H PRN, Triny Bergman MD    sodium chloride flush 0.9 % injection 5-40 mL, 5-40 mL, IntraVENous, 2 times per day, Michi Andre MD, 10 mL at 09/02/22 0812    sodium chloride flush 0.9 % injection 5-40 mL, 5-40 mL, IntraVENous, PRN, Michi Andre MD, 10 mL at 08/30/22 1541    0.9 % sodium chloride infusion, , IntraVENous, PRN, Michi Andre MD    guaiFENesin tablet 400 mg, 400 mg, Oral, TID, Michi Andre MD, 400 mg at 09/02/22 0813    Arformoterol Tartrate (BROVANA) nebulizer solution 15 mcg, 15 mcg, Nebulization, BID, Michi Andre MD, 15 mcg at 09/02/22 0913    budesonide (PULMICORT) nebulizer suspension 500 mcg, 0.5 mg, Nebulization, BID, Michi Andre MD, 500 mcg at 09/02/22 0913    heparin flush 100 UNIT/ML injection 300 Units, 3 mL, IntraVENous, 2 times per day, Michi Andre MD, 300 Units at 09/02/22 2284    heparin flush 100 UNIT/ML injection 300 Units, 3 mL, IntraCATHeter, PRN, Kary Dorsey MD    morphine (PF) injection 2 mg, 2 mg, IntraVENous, Q4H PRN, Kary Dorsey MD    sodium chloride (OCEAN, BABY AYR) 0.65 % nasal spray 1 spray, 1 spray, Each Nostril, PRN, Kary Dorsey MD    trimethobenzamide (TIGAN) injection 200 mg, 200 mg, IntraMUSCular, Q6H PRN, Kary Dorsey MD    oxyCODONE-acetaminophen (PERCOCET) 5-325 MG per tablet 1 tablet, 1 tablet, Oral, Q4H PRN, 1 tablet at 08/28/22 0703 **OR** oxyCODONE-acetaminophen (PERCOCET) 5-325 MG per tablet 2 tablet, 2 tablet, Oral, Q4H PRN, Kary Dorsey MD    clotrimazole (MYCELEX) lindsay 10 mg, 10 mg, Oral, 4x daily, Kary Dorsey MD, 10 mg at 09/02/22 1248    potassium chloride (KLOR-CON M) extended release tablet 40 mEq, 40 mEq, Oral, PRN, 40 mEq at 09/02/22 1109 **OR** potassium bicarb-citric acid (EFFER-K) effervescent tablet 40 mEq, 40 mEq, Oral, PRN **OR** potassium chloride 10 mEq/100 mL IVPB (Peripheral Line), 10 mEq, IntraVENous, PRN, Kary Dorsey MD    ampicillin 2000 mg ivpb mini bag, 2,000 mg, IntraVENous, 6 times per day, Kary Dorsey MD, Stopped at 09/02/22 1140    cefTRIAXone (ROCEPHIN) 2,000 mg in sterile water 20 mL IV syringe, 2,000 mg, IntraVENous, Q12H, Kary Dorsey MD, 2,000 mg at 09/02/22 0404    melatonin tablet 3 mg, 3 mg, Oral, Nightly PRN, Kary Dorsey MD, 3 mg at 08/27/22 2136    atorvastatin (LIPITOR) tablet 20 mg, 20 mg, Oral, Daily, Kary Dorsey MD, 20 mg at 09/01/22 2109    acetaminophen (TYLENOL) tablet 650 mg, 650 mg, Oral, Q6H PRN, 650 mg at 09/01/22 0323 **OR** acetaminophen (TYLENOL) suppository 650 mg, 650 mg, Rectal, Q6H PRN, Kary Dorsey MD    senna (SENOKOT) tablet 8.6 mg, 1 tablet, Oral, Daily PRN, Kary Dorsey MD    US SOFT TISSUE LIMITED AREA   Final Result   Small 3.1 cm hematoma. XR CHEST (2 VW)   Final Result   Mildly waning atelectasis and or scarring on the left.   Obstructive airways chemotherapy once antibiotics course completed. He will require another Mediport down the road. Eliquis can be restarted when okay with EP. Labs are stable. Does not require G-CSF or transfusion support. Calcium and magnesium replacement.        Electronically signed by Judeth Galeazzi, MD on 9/2/2022 at 1:57 PM

## 2022-09-02 NOTE — PROGRESS NOTES
[address] [place] [address] [facility]  Progress Note          C/C : High grade Enterococcus bacteremia, sepsis       SUBJECTIVE:  Patient is tolerating medications. No reported adverse drug reactions. No nausea or emesis  Repots leg swelling   Afebrile and awake    Review of systems:  As stated above in the chief complaint, otherwise negative. Medications:  Scheduled Meds:   magnesium sulfate  4,000 mg IntraVENous Once    metoprolol succinate  25 mg Oral BID WC    sodium chloride flush  5-40 mL IntraVENous 2 times per day    guaiFENesin  400 mg Oral TID    Arformoterol Tartrate  15 mcg Nebulization BID    budesonide  0.5 mg Nebulization BID    heparin flush  3 mL IntraVENous 2 times per day    clotrimazole  10 mg Oral 4x daily    ampicillin IV  2,000 mg IntraVENous 6 times per day    cefTRIAXone (ROCEPHIN) IV  2,000 mg IntraVENous Q12H    atorvastatin  20 mg Oral Daily     Continuous Infusions:   sodium chloride      sodium chloride      sodium chloride       PRN Meds:sodium chloride, perflutren lipid microspheres, perflutren lipid microspheres, sodium chloride, levalbuterol, sodium chloride flush, sodium chloride, heparin flush, morphine, sodium chloride, trimethobenzamide, oxyCODONE-acetaminophen **OR** oxyCODONE-acetaminophen, potassium chloride **OR** potassium alternative oral replacement **OR** potassium chloride, melatonin, acetaminophen **OR** acetaminophen, senna    OBJECTIVE:  BP (!) 149/76   Pulse 99   Temp 97.7 °F (36.5 °C) (Temporal)   Resp 16   Ht 5' 6\" (1.676 m)   Wt 195 lb 9.6 oz (88.7 kg)   SpO2 99%   BMI 31.57 kg/m²   Temp  Av °F (36.1 °C)  Min: 96.3 °F (35.7 °C)  Max: 97.7 °F (36.5 °C)    Constitutional: The patient is awake, alert, and oriented. Skin: Warm and dry. No rashes were noted. Old pacer site no redness  HEENT: Round and reactive pupils. Moist mucous membranes. No ulcerations or thrush. Neck: Supple to movements. Chest: Bilateral equal breath sounds. No crackles/rhonci  Cardiovascular: S1 and S2 are rhythmic and regular. No murmurs appreciated.    Abdomen: soft, non tender, bowel sound +  Extremities: ++ edema    Lines:   Right arm PICC line ( 8/27)       Laboratory and Tests Review:  Lab Results   Component Value Date    WBC 8.9 09/02/2022    WBC 8.6 09/01/2022    WBC 9.3 08/31/2022    HGB 9.3 (L) 09/02/2022    HCT 30.2 (L) 09/02/2022    MCV 88.4 09/02/2022     09/02/2022     Lab Results   Component Value Date    NEUTROABS 6.85 09/02/2022    NEUTROABS 6.19 09/01/2022    NEUTROABS 6.51 08/31/2022     No results found for: CRPHS  Lab Results   Component Value Date    ALT 29 08/20/2022    AST 25 08/20/2022    ALKPHOS 90 08/20/2022    BILITOT <0.2 08/20/2022     Lab Results   Component Value Date/Time     09/02/2022 04:30 AM    K 3.3 09/02/2022 04:30 AM     09/02/2022 04:30 AM    CO2 32 09/02/2022 04:30 AM    BUN 10 09/02/2022 04:30 AM    CREATININE 0.8 09/02/2022 04:30 AM    CREATININE 1.1 08/30/2022 07:03 AM    CREATININE 0.8 08/29/2022 06:52 AM    GFRAA >60 09/02/2022 04:30 AM    LABGLOM >60 09/02/2022 04:30 AM    GLUCOSE 100 09/02/2022 04:30 AM    PROT 5.9 08/20/2022 04:44 PM    LABALBU 3.2 08/20/2022 04:44 PM    CALCIUM 8.5 09/02/2022 04:30 AM    BILITOT <0.2 08/20/2022 04:44 PM    ALKPHOS 90 08/20/2022 04:44 PM    AST 25 08/20/2022 04:44 PM    ALT 29 08/20/2022 04:44 PM     Lab Results   Component Value Date    CRP 9.6 (H) 05/24/2022     No results found for: [address]  Radiology:  Reviewed CT lung    Microbiology:   Lab Results   Component Value Date/Time    BC 5 Days no growth 08/25/2022 05:04 PM    BC 5 Days no growth 08/23/2022 10:23 AM    BC 5 Days no growth 08/19/2022 05:05 AM    ORG Enterococcus faecalis 08/18/2022 07:10 AM    ORG Enterococcus faecalis 08/18/2022 07:05 AM    ORG Enterococcus faecalis 08/17/2022 05:40 PM     Lab Results   Component Value Date/Time    BLOODCULT2 5 Days no growth 08/25/2022 05:05 PM    BLOODCULT2 5 Days no growth 08/23/2022 10:27 AM    BLOODCULT2 5 Days no growth 08/20/2022 05:30 AM    ORG Enterococcus faecalis 08/18/2022 07:10 AM    ORG Enterococcus faecalis 08/18/2022 07:05 AM    ORG Enterococcus faecalis 08/17/2022 05:40 PM     No results found for: WNDABS  No results found for: RESPSMEAR      Component Value Date/Time    FUNGSM No Fungal elements seen 08/25/2022 1403     No results found for: CULTRESP  Culture Catheter Tip   Date Value Ref Range Status   08/20/2022 Growth not present  Final     No results found for: BFCS  Culture Surgical   Date Value Ref Range Status   08/25/2022 Growth not present  Final   08/25/2022 Growth not present  Final     Urine Culture, Routine   Date Value Ref Range Status   08/31/2022 Growth not present  Final   08/16/2022   Final    10 to 100,000 CFU/mL  Mixed heydi isolated. Further workup and sensitivity testing  is not routinely indicated and will not be performed. Mixed heydi isolated includes:  Mixed gram positive organisms     05/18/2019 <25,000 CFU/ml  Final     Echo -    Summary    Left ventricle is normal in size . No regional wall motion abnormalities seen. Normal left ventricular ejection fraction. Mild mitral annular calcification. The aortic valve appears mildly sclerotic. There is a small circumferential pericardial effusion noted with most    significant component anterior to right atrium.            MICROBIOLOGY:  8-16-22 blood cultures E faecalis x 2  8-17-22 blood cultures E faecalis x 1  8-18-22 blood culture + E faecalis x1  8-19-22 blood cultures neg to date  8-20-22 Port ( tip ) cx - neg tod date   8-20-22 blood cultures neg to date  8/25/22 pacer cx pending  8/25/2 blood cx pending      ASSESSMENT:  High grade E faecalis bacteremia probably port infection; RO endocarditis and pacer infection s/p mediport removal ( 8/20) - and  placement of a new pacemaker (MI[facility]  Immunocompromised host c pancytopenia;- improved   Sq cell of lung Thrush-improved      PLAN:  Ampicillin 2 grams IV q 4 hrs and ceftriaxone 2 grams IV q 12 hrs until 10-13-22  Monitor labs  To Jun Arrieta MD  1:27 PM  9/2/2022

## 2022-09-02 NOTE — TELEPHONE ENCOUNTER
----- Message from LELO Robbins CNP sent at 8/31/2022  5:52 PM EDT -----  Anabel Torrez was seen inpatient, due to bleeding, device complications he will need to seen in office on 09/05/2022 with the device clinic, he is known to Dr. Jazmin Hernandez.

## 2022-09-03 VITALS
HEART RATE: 92 BPM | TEMPERATURE: 97.4 F | DIASTOLIC BLOOD PRESSURE: 69 MMHG | SYSTOLIC BLOOD PRESSURE: 135 MMHG | BODY MASS INDEX: 32.33 KG/M2 | RESPIRATION RATE: 18 BRPM | WEIGHT: 201.2 LBS | OXYGEN SATURATION: 99 % | HEIGHT: 66 IN

## 2022-09-03 LAB
ANION GAP SERPL CALCULATED.3IONS-SCNC: 8 MMOL/L (ref 7–16)
BUN BLDV-MCNC: 10 MG/DL (ref 6–23)
CALCIUM SERPL-MCNC: 8.5 MG/DL (ref 8.6–10.2)
CHLORIDE BLD-SCNC: 100 MMOL/L (ref 98–107)
CO2: 31 MMOL/L (ref 22–29)
CREAT SERPL-MCNC: 0.8 MG/DL (ref 0.7–1.2)
GFR AFRICAN AMERICAN: >60
GFR NON-AFRICAN AMERICAN: >60 ML/MIN/1.73
GLUCOSE BLD-MCNC: 130 MG/DL (ref 74–99)
HCT VFR BLD CALC: 29.6 % (ref 37–54)
HEMOGLOBIN: 9.4 G/DL (ref 12.5–16.5)
MAGNESIUM: 2 MG/DL (ref 1.6–2.6)
MCH RBC QN AUTO: 29.2 PG (ref 26–35)
MCHC RBC AUTO-ENTMCNC: 31.8 % (ref 32–34.5)
MCV RBC AUTO: 91.9 FL (ref 80–99.9)
PDW BLD-RTO: 23.6 FL (ref 11.5–15)
PLATELET # BLD: 122 E9/L (ref 130–450)
PMV BLD AUTO: 12.1 FL (ref 7–12)
POTASSIUM REFLEX MAGNESIUM: 3.3 MMOL/L (ref 3.5–5)
RBC # BLD: 3.22 E12/L (ref 3.8–5.8)
SODIUM BLD-SCNC: 139 MMOL/L (ref 132–146)
WBC # BLD: 9.1 E9/L (ref 4.5–11.5)

## 2022-09-03 PROCEDURE — 80048 BASIC METABOLIC PNL TOTAL CA: CPT

## 2022-09-03 PROCEDURE — 2580000003 HC RX 258: Performed by: INTERNAL MEDICINE

## 2022-09-03 PROCEDURE — 85027 COMPLETE CBC AUTOMATED: CPT

## 2022-09-03 PROCEDURE — 6370000000 HC RX 637 (ALT 250 FOR IP): Performed by: NURSE PRACTITIONER

## 2022-09-03 PROCEDURE — 6360000002 HC RX W HCPCS: Performed by: INTERNAL MEDICINE

## 2022-09-03 PROCEDURE — 94640 AIRWAY INHALATION TREATMENT: CPT

## 2022-09-03 PROCEDURE — 83735 ASSAY OF MAGNESIUM: CPT

## 2022-09-03 PROCEDURE — 36415 COLL VENOUS BLD VENIPUNCTURE: CPT

## 2022-09-03 PROCEDURE — 2700000000 HC OXYGEN THERAPY PER DAY

## 2022-09-03 PROCEDURE — 6370000000 HC RX 637 (ALT 250 FOR IP): Performed by: INTERNAL MEDICINE

## 2022-09-03 RX ORDER — ATORVASTATIN CALCIUM 20 MG/1
20 TABLET, FILM COATED ORAL DAILY
Qty: 30 TABLET | Refills: 3 | Status: SHIPPED | OUTPATIENT
Start: 2022-09-03 | End: 2022-10-16

## 2022-09-03 RX ORDER — FUROSEMIDE 20 MG/1
20 TABLET ORAL DAILY
Qty: 60 TABLET | Refills: 3 | Status: SHIPPED | OUTPATIENT
Start: 2022-09-04

## 2022-09-03 RX ORDER — GUAIFENESIN 400 MG/1
400 TABLET ORAL 3 TIMES DAILY
Qty: 56 TABLET | Refills: 0 | Status: SHIPPED | OUTPATIENT
Start: 2022-09-03 | End: 2022-10-31 | Stop reason: ALTCHOICE

## 2022-09-03 RX ORDER — CEFTRIAXONE 1 G/1
2000 INJECTION, POWDER, FOR SOLUTION INTRAMUSCULAR; INTRAVENOUS EVERY 24 HOURS
Qty: 60 EACH | Refills: 1 | DISCHARGE
Start: 2022-09-03 | End: 2022-10-09 | Stop reason: ALTCHOICE

## 2022-09-03 RX ORDER — METOPROLOL SUCCINATE 25 MG/1
25 TABLET, EXTENDED RELEASE ORAL 2 TIMES DAILY WITH MEALS
Qty: 30 TABLET | Refills: 3 | Status: SHIPPED | OUTPATIENT
Start: 2022-09-03

## 2022-09-03 RX ADMIN — Medication 300 UNITS: at 09:16

## 2022-09-03 RX ADMIN — CEFTRIAXONE 2000 MG: 2 INJECTION, POWDER, FOR SOLUTION INTRAMUSCULAR; INTRAVENOUS at 03:15

## 2022-09-03 RX ADMIN — GUAIFENESIN 400 MG: 400 TABLET ORAL at 09:17

## 2022-09-03 RX ADMIN — METOPROLOL SUCCINATE 25 MG: 25 TABLET, EXTENDED RELEASE ORAL at 17:04

## 2022-09-03 RX ADMIN — CLOTRIMAZOLE 10 MG: 10 LOZENGE ORAL at 15:41

## 2022-09-03 RX ADMIN — POTASSIUM CHLORIDE 20 MEQ: 1500 TABLET, EXTENDED RELEASE ORAL at 09:18

## 2022-09-03 RX ADMIN — SODIUM CHLORIDE, PRESERVATIVE FREE 10 ML: 5 INJECTION INTRAVENOUS at 09:16

## 2022-09-03 RX ADMIN — POTASSIUM CHLORIDE 40 MEQ: 1500 TABLET, EXTENDED RELEASE ORAL at 09:20

## 2022-09-03 RX ADMIN — CEFTRIAXONE 2000 MG: 2 INJECTION, POWDER, FOR SOLUTION INTRAMUSCULAR; INTRAVENOUS at 15:41

## 2022-09-03 RX ADMIN — BUDESONIDE 500 MCG: 0.5 SUSPENSION RESPIRATORY (INHALATION) at 09:18

## 2022-09-03 RX ADMIN — CLOTRIMAZOLE 10 MG: 10 LOZENGE ORAL at 11:53

## 2022-09-03 RX ADMIN — GUAIFENESIN 400 MG: 400 TABLET ORAL at 15:41

## 2022-09-03 RX ADMIN — AMPICILLIN SODIUM 2000 MG: 2 INJECTION, POWDER, FOR SOLUTION INTRAVENOUS at 16:58

## 2022-09-03 RX ADMIN — AMPICILLIN SODIUM 2000 MG: 2 INJECTION, POWDER, FOR SOLUTION INTRAVENOUS at 09:30

## 2022-09-03 RX ADMIN — FUROSEMIDE 20 MG: 20 TABLET ORAL at 09:17

## 2022-09-03 RX ADMIN — AMPICILLIN SODIUM 2000 MG: 2 INJECTION, POWDER, FOR SOLUTION INTRAVENOUS at 13:54

## 2022-09-03 RX ADMIN — AMPICILLIN SODIUM 2000 MG: 2 INJECTION, POWDER, FOR SOLUTION INTRAVENOUS at 05:15

## 2022-09-03 RX ADMIN — AMPICILLIN SODIUM 2000 MG: 2 INJECTION, POWDER, FOR SOLUTION INTRAVENOUS at 00:50

## 2022-09-03 RX ADMIN — ARFORMOTEROL TARTRATE 15 MCG: 15 SOLUTION RESPIRATORY (INHALATION) at 09:18

## 2022-09-03 RX ADMIN — CLOTRIMAZOLE 10 MG: 10 LOZENGE ORAL at 09:16

## 2022-09-03 RX ADMIN — METOPROLOL SUCCINATE 25 MG: 25 TABLET, EXTENDED RELEASE ORAL at 09:17

## 2022-09-03 NOTE — DISCHARGE SUMMARY
[facility]   Discharge summary   Patient ID:  Alvin Ambriz  [id number]  81 y.o.  1950    Admit date: 8/16/2022    Discharge date and time: 9/3/2022    Admission Diagnoses:   Patient Active Problem List   Diagnosis    Syncope and collapse    Pneumonia    COPD (chronic obstructive pulmonary disease) (HCC)    BPH (benign prostatic hyperplasia)    HTN (hypertension)    HLD (hyperlipidemia)    Intermittent complete heart block (HCC)    Cardiac pacemaker in situ    Acute on chronic respiratory failure with hypoxia (HCC)    Squamous carcinoma of lung (HCC)    History pulmonary embolism ([address] on Eliquis    Hyponatremia    Hypomagnesemia    Wide-complex tachycardia (HCC)    COVID-19    Pancytopenia (HCC)    Pacemaker infection ([facility]    Sepsis due to Enterococcus ([facility]    Bacteremia    High-grade atrioventricular block    Second degree AV block, Mobitz type I    Hematoma    Presence of leadless cardiac pacemaker       Discharge Diagnoses: High-grade MRSA bacteremia, infected pacemaker, volume overload    Consults: cardiology, pulmonary/intensive care, ID, and hematology/oncology    Procedures: Mediport extraction, pacemaker extraction, placement of dual-chamber pacemaker    Hospital Course: Patient is a 25-year-old male with a hx of squamous cell lung carcinoma who is active chemo therapy who is admitted to [facility] for  Sepsis/bacteremia/immunocompromise status-acutely ill status     -Mediport removed 8/20  -EP for removal of pacer, completed 8/25  -EP for new pacemaker placement, completed 8/29/2022  -questionable hematoma in groin postprocedure-status post 2 unit PRBC transfusion to keep hemoglobin near 8  -On daptomycin per infectious disease > switched to IV Rocephin and ampicillin  -SHIRA 8/22/2022-unremarkable, no evidence of endocarditis, ejection fraction  -Resume Trelegy as he takes at home-okay to use   -PICC placed for ongoing antibiotic therapy at [facility]  -Repeat echocardiogram 9/2/2022 with normal LV, otherwise unremarkable     Leukocytosis-resolved, secondary to above  -Monitor CBC daily  -WBC - 15.8> 9.1 at time of discharge  -UA/urine culture, monitor for infection  -Complaining of breaking out Fair amount of phlegm  -Remains on IV antibiotics per infectious disease-Rocephin and ampicillin on discharge to select specialty     Electrolyte imbalance  Supplement as needed  Supplement magnesium  Supplement potassium 1     Pancytopenia-improved  -Consult heme-onc-following  -Continue to follow H&H  -Platelets 4 > transfuse 1 unit platelets > 23 > 770 today  -Hold Eliquis-consider holding off for few weeks given recurrent hematuria/drop in hemoglobin        Hematuria  -Check urine culture  -Hold Eliquis       Recent Labs     09/01/22  0330 09/01/22  1325 09/02/22  0430 09/02/22  1258 09/03/22  0600   WBC 8.6  --  8.9  --  9.1   HGB 7.3*   < > 9.0* 9.3* 9.4*   HCT 23.3*   < > 28.2* 30.2* 29.6*     --  134  --  122*    < > = values in this interval not displayed. Recent Labs     09/02/22  0430 09/03/22  0600    139   K 3.3* 3.3*    100   CO2 32* 31*   BUN 10 10   CREATININE 0.8 0.8   CALCIUM 8.5* 8.5*       CT Head WO Contrast    Result Date: 8/16/2022  EXAMINATION: CT OF THE HEAD WITHOUT CONTRAST  8/16/2022 4:42 pm TECHNIQUE: CT of the head was performed without the administration of intravenous contrast. Automated exposure control, iterative reconstruction, and/or weight based adjustment of the mA/kV was utilized to reduce the radiation dose to as low as reasonably achievable. COMPARISON: July 19, 2021 HISTORY: ORDERING SYSTEM PROVIDED HISTORY: headache, hx of cancer TECHNOLOGIST PROVIDED HISTORY: Reason for exam:->headache, hx of cancer Has a \"code stroke\" or \"stroke alert\" been called? ->No Decision Support Exception - unselect if not a suspected or confirmed emergency medical condition->Emergency Medical Condition (MA) What reading provider will be dictating this exam?->CRC daily  Replaces: simvastatin 40 MG tablet     cefTRIAXone 1 g injection  Commonly known as: ROCEPHIN  Infuse 2,000 mg intravenously every 24 hours     furosemide 20 MG tablet  Commonly known as: LASIX  Take 1 tablet by mouth daily  Start taking on: September 4, 2022     guaiFENesin 400 MG tablet  Take 1 tablet by mouth in the morning, at noon, and at bedtime            CHANGE how you take these medications      metoprolol succinate 25 MG extended release tablet  Commonly known as: TOPROL XL  Take 1 tablet by mouth 2 times daily (with meals)  What changed:   medication strength  how much to take            CONTINUE taking these medications      predniSONE 5 MG tablet  Commonly known as: DELTASONE     umeclidinium-vilanterol 62.5-25 MCG/INH Aepb inhaler  Commonly known as: ANORO ELLIPTA     * Ventolin  (90 Base) MCG/ACT inhaler  Generic drug: albuterol sulfate HFA     * albuterol 0.63 MG/3ML nebulizer solution  Commonly known as: ACCUNEB           * This list has 2 medication(s) that are the same as other medications prescribed for you. Read the directions carefully, and ask your doctor or other care provider to review them with you.                 STOP taking these medications      apixaban 5 MG Tabs tablet  Commonly known as: ELIQUIS     simvastatin 40 MG tablet  Commonly known as: ZOCOR  Replaced by: atorvastatin 20 MG tablet               Where to Get Your Medications        These medications were sent to [facility] [phone]  [place] [address]      Phone: [phone]   atorvastatin 20 MG tablet  furosemide 20 MG tablet  guaiFENesin 400 MG tablet  metoprolol succinate 25 MG extended release tablet       Information about where to get these medications is not yet available    Ask your nurse or doctor about these medications  ampicillin  infusion  cefTRIAXone 1 g injection       Activity: activity as tolerated  Diet: cardiac diet    Pt has been advised to: Follow-up with Zeinab Ann MD in 1 week.   Follow-up with consultants as recommended by them    Note that over 30 minutes was spent in preparing discharge papers, discussing discharge with patient, medication review, etc.    Signed:  Ayaz Rouse MD  9/3/2022  12:38 PM

## 2022-09-03 NOTE — PROGRESS NOTES
Pulmonary Progress Note    Admit Date: 8/16/2022                            PCP: Annamarie Koenig MD  Principal Problem:    Pneumonia  Active Problems:    Pancytopenia ([facility] [address]    Pacemaker infection ([facility] [address]    Sepsis due to Enterococcus ([facility] [address]    Bacteremia    High-grade atrioventricular block    Second degree AV block, Mobitz type I    Hematoma    Presence of leadless cardiac pacemaker    COPD (chronic obstructive pulmonary disease) (HCC)    HTN (hypertension)    Cardiac pacemaker in situ    Squamous carcinoma of lung ([facility] [address]    History pulmonary embolism ([facility] [address] on Eliquis  Resolved Problems:    * No resolved hospital problems.  *      Subjective:  Patient seen sitting up in bedside chair  on 4L NC   With continued complaints of dyspnea on exertion  He denies shortness of breath at rest, cough or wheezing   Wife at bedside  [address] he feels more SOB after am treatment     Medications:   sodium chloride      sodium chloride      sodium chloride          furosemide  20 mg Oral Daily    potassium chloride  20 mEq Oral Daily with breakfast    metoprolol succinate  25 mg Oral BID WC    sodium chloride flush  5-40 mL IntraVENous 2 times per day    guaiFENesin  400 mg Oral TID    Arformoterol Tartrate  15 mcg Nebulization BID    budesonide  0.5 mg Nebulization BID    heparin flush  3 mL IntraVENous 2 times per day    clotrimazole  10 mg Oral 4x daily    ampicillin IV  2,000 mg IntraVENous 6 times per day    cefTRIAXone (ROCEPHIN) IV  2,000 mg IntraVENous Q12H    atorvastatin  20 mg Oral Daily       Vitals:  VITALS:  BP (!) 140/81   Pulse 56   Temp 97.1 °F (36.2 °C) (Temporal)   Resp 20   Ht 5' 6\" (1.676 m)   Wt 201 lb 3.2 oz (91.3 kg)   SpO2 99%   BMI 32.47 kg/m²   24HR INTAKE/OUTPUT:    Intake/Output Summary (Last 24 hours) at 9/3/2022 1209  Last data filed at 9/3/2022 0928  Gross per 24 hour   Intake 450 ml   Output 450 ml   Net 0 ml     CURRENT PULSE OXIMETRY:  SpO2: 99 %  24HR PULSE OXIMETRY RANGE:  SpO2 Av.1 %  Min: 97 %  Max: 100 %  CVP:    VENT SETTINGS:      Additional Respiratory Assessments  Heart Rate: 56  Resp: 20  SpO2: 99 %      EXAM:  General: No distress. Alert. Eyes: No sclera icterus. No conjunctival injection. ENT: No discharge. Pharynx clear. Neck: Trachea midline. Normal thyroid. Resp: No accessory muscle use. No crackles. No wheezing. No rhonchi. Diminished bilaterally   CV: Regular rate. Regular rhythm. No mumur or rub. +3 pitting BLE  ABD: Non-tender. Non-distended. No masses. No organmegaly. Normal bowel sounds. Skin: Warm and dry. No nodule on exposed extremities. No rash on exposed extremities. R groin bruising scattered ecchymosis   M/S: No cyanosis. No joint deformity. No clubbing. Neuro: Awake. Follows commands. A&Ox 3    I/O: I/O last 3 completed shifts: In: 869.1 [P.O.:510; IV Piggyback:359.1]  Out: 750 [Urine:750]  I/O this shift:  In: 240 [P.O.:240]  Out: -      Results:  CBC:   Recent Labs     22  0330 22  1325 22  0430 22  1258 22  0600   WBC 8.6  --  8.9  --  9.1   HGB 7.3*   < > 9.0* 9.3* 9.4*   HCT 23.3*   < > 28.2* 30.2* 29.6*   MCV 89.3  --  88.4  --  91.9     --  134  --  122*    < > = values in this interval not displayed. BMP:   Recent Labs     22  0430 22  0600    139   K 3.3* 3.3*    100   CO2 32* 31*   BUN 10 10   CREATININE 0.8 0.8     LFT: No results for input(s): ALKPHOS, ALT, AST, PROT, BILITOT, BILIDIR, LABALBU in the last 72 hours. PT/INR: No results for input(s): PROTIME, INR in the last 72 hours. Cultures:  No results for input(s): CULTRESP in the last 72 hours. ABG:   No results for input(s): PH, PO2, PCO2, HCO3, BE, O2SAT in the last 72 hours.     Films:  CT Head WO Contrast 2022  EXAMINATION: CT OF THE HEAD WITHOUT CONTRAST  2022 4:42 pm TECHNIQUE: CT of the head was performed without the administration of intravenous contrast. Automated exposure control, iterative reconstruction, and/or weight based adjustment of the mA/kV was utilized to reduce the radiation dose to as low as reasonably achievable. COMPARISON: July 19, 2021 HISTORY: ORDERING SYSTEM PROVIDED HISTORY: headache, hx of cancer TECHNOLOGIST PROVIDED HISTORY: Reason for exam:->headache, hx of cancer Has a \"code stroke\" or \"stroke alert\" been called? ->No Decision Support Exception - unselect if not a suspected or confirmed emergency medical condition->Emergency Medical Condition (MA) What reading provider will be dictating this exam?->CRC FINDINGS: BRAIN/VENTRICLES: There is no acute intracranial hemorrhage, mass effect or midline shift. No abnormal extra-axial fluid collection. The gray-white differentiation is maintained without evidence of an acute infarct. There is no evidence of hydrocephalus. ORBITS: The visualized portion of the orbits demonstrate no acute abnormality. SINUSES: The visualized paranasal sinuses and mastoid air cells demonstrate no acute abnormality. SOFT TISSUES/SKULL:  No acute abnormality of the visualized skull or soft tissues. No acute intracranial abnormality. Cortical atrophy and periventricular leukomalacia. XR CHEST PORTABLE 8/16/2022  EXAMINATION: ONE XRAY VIEW OF THE CHEST 8/16/2022 3:49 pm COMPARISON: May 24, 2022 HISTORY: ORDERING SYSTEM PROVIDED HISTORY: cough, lung cancer TECHNOLOGIST PROVIDED HISTORY: Reason for exam:->cough, lung cancer What reading provider will be dictating this exam?->CRC FINDINGS: There is opacity in the left lower lung. There are chronic markings at the right lung base. Emphysematous changes in the upper lungs. The heart is not enlarged. There is no pneumothorax. There is mild blunting of the left costophrenic angle. Pacemaker present. Continued opacity in the left lower lung and linear scarring in the left perihilar region. COPD.     8/27 8/30    CXR 8/27: left pleural effusion  2 VW CXR: 8/30: left pleural effusion, unchanged. Atelectasis improved    8/18/22 CT chest w/o                                      5/24/2022 CTA chest   5/24/2022 CTA chest: 3.4 x 5.4 cm left hilar and perihilar mass extending to LLL encircling the pulmonary artery and the bronchi consistent with progressive malignancy with occlusion of left lower lobe bronchi with postobstructive pneumonitis  8/18 CT chest:   A tiny pericardial effusion is noted. There is   coronary artery calcification. Left subclavian pacemaker is noted. The   previously noted infiltrative left hilar mass extending to left upper lobe   and left lower lobe surrounding the broncho pulmonary vasculature is noted   currently measuring 4.6 x 4.3 cm with progression. There is some associated   atelectasis. The right lung is clear. There is no pleural effusion       08/19/22 ECHO:  Normal left ventricular chamber size. Normal left ventricular systolic function. Visually estimated LVEF is 55-60 %. No wall motion abnormalities. Normal diastolic function. Normal left atrial pressure. Normal right ventricle structure and function. Normal left atrial size. Normal right atrial size   Likely normal estimated PA pressure. Pacer/ ICD wire present in the right heart. No gross evidence of infective endocarditis. Consider SHIRA for better   accuracy if clinically indicated. Compared to prior echo from 2021, no significant changes noted. SHIRA on 8/22/22   No SHIRA indication of Endocarditis. Normal left ventricular chamber size and systolic function. Interatrial septum appears intact. Normal right ventricle size and function. Pacemaker leads noted in right atrium and right ventricle. Normal aortic root size. Moderate atherosclerosis in the descending thoracic aorta. Epicardial fat vs. small pericardial effusion. No intra cardiac mass or thrombus. Technically sub-optimal images. Compared to prior sub-optimal TTE from 8/19/22.   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 8/18/2022 CT chest w/o: The previously noted infiltrative left hilar mass extending to EAN and LLL surrounding the broncho pulmonary vasculature is noted currently measuring 4.6 x 4.3 cm with progression with atelectasis. 8/18: 2L NC, US BLE neg DVT   8/19: 2L NC   8/20 RA, Mediport removed   8/21 RA received PRBC    8/22 2.5L; SHIRA neg for endocarditis   8/23: RA;    8/24: 3L NC 99%  8/25: 3L NC 98%. Pacer removed by CTS/EP  8/26: room air 97%  8/27 on 3lnc  8/30 3L nc had leadless PPM placed 8/29/2022. S/p 1 uPRBC and IV lasix x 1  8/31: 4L NC, 100%.    9/1: 4L, 1 unit PRBC today   9/2: 4L  9/3: 4L      Stage IIIB T2aN3 Squamous Cell Carcinoma of Lung  diagnosis 5/27/2021, PD-L1 0% ( 7/6/21-9/7/21 treatment with concurrent chemotherapy/radiation therapy; 11/2/21 consolidated with Imfinzi (Durvalumab) per ID IV every 4 weeks; 6/30/22 PET showing progression and was started on Carbo, Gemzar and Keytruda on 6/30/22; 8/9 /22 r ceived C2D8 of Carboplatin AUC 2 with Gemzar 1000 mg/m2 IV on D1,8 and Keytruda IV on D1 on a 21 day cycle)  Enterococcus faecalis bacteremia/septicemia, bacteremia cultures + on 8/16, 8/17, 8/20 s/p mediport removal    With dual-chamber pacemaker with h/o wide-complex tachycardia (HCC)--atrial tachycardia with RV pacing  - pacer out 8/25/2022  - leadless pacer implantation 8/29/2022  H/O SARS-CoV 2 Pneumonitis 5/24/2022  Chronic hypoxic resp failure, RA during the day and 2L NC at night  Probable obstructive sleep apnea  Persistent pancytopenia secondary to chemotherapy  B/l Lower Extremity Swelling and numbness  H/O RLL subsegmental PE- CTA Chest 9/17/2021 was on eliquis now on hold   Radiation pneumonitis/fibrosis left lung   COPD, not in exacerbation, uses trelegy at home   Right Groin Hematoma - 3.1 cm, US right groin 8/31/2022      Plan:  Seen on 4LNC, patient states his baseline is 4L NC at HS. Will need ambulatory pulse ox prior to discharge. Continue Brovana, Pulmicort   Xopenex and mucinex to thin secretions. Encouraged IS   Oncology following. Transfuse if Hgb < 7.5. H/H 8.5/27.6 (8/31) received one unit  9/1 hgb 7.3 repeat > 9 9/2  IV ATB per ID - ampicillin 2 grams IV q 4 hrs and ceftriaxone 2 grams IV q 12 hrs. S/p R PICC 8/27  Continue IS for pulmonary hygiene   New Leadless PPM placed 08/29 with Dr. Mey Guerrero. S/p old pacer removal on 8/25, cultures pending. Repeat CXR 8/30 stable.    Eliquis to resume when ok with CTS/EP, holding until at least 9/2/22  US right groin pending, per EP -small 3.1 cm hematoma   Supportive care   Generalized edema and ecchymosis noted , received lasix 40 x 1  9/1 now on lasix 20 oral daily   Awaiting transfer [facility]   Will benefit from outpatient PFT and PSG   Stable fot LTACH from pulm perspective         Electronically signed by LELO Barbosa - CNS on 9/3/2022 at 12:09 PM

## 2022-09-03 NOTE — PROGRESS NOTES
[address] [place] [address] [facility]  Progress Note          C/C : High grade Enterococcus bacteremia, sepsis       SUBJECTIVE:  Patient is tolerating medications. No reported adverse drug reactions. No nausea or emesis  Repots leg swelling   Afebrile and awake    Review of systems:  As stated above in the chief complaint, otherwise negative. Medications:  Scheduled Meds:   furosemide  20 mg Oral Daily    potassium chloride  20 mEq Oral Daily with breakfast    metoprolol succinate  25 mg Oral BID WC    sodium chloride flush  5-40 mL IntraVENous 2 times per day    guaiFENesin  400 mg Oral TID    Arformoterol Tartrate  15 mcg Nebulization BID    budesonide  0.5 mg Nebulization BID    heparin flush  3 mL IntraVENous 2 times per day    clotrimazole  10 mg Oral 4x daily    ampicillin IV  2,000 mg IntraVENous 6 times per day    cefTRIAXone (ROCEPHIN) IV  2,000 mg IntraVENous Q12H    atorvastatin  20 mg Oral Daily     Continuous Infusions:   sodium chloride      sodium chloride      sodium chloride       PRN Meds:sodium chloride, perflutren lipid microspheres, perflutren lipid microspheres, sodium chloride, levalbuterol, sodium chloride flush, sodium chloride, heparin flush, morphine, sodium chloride, trimethobenzamide, oxyCODONE-acetaminophen **OR** oxyCODONE-acetaminophen, potassium chloride **OR** potassium alternative oral replacement **OR** potassium chloride, melatonin, acetaminophen **OR** acetaminophen, senna    OBJECTIVE:  BP (!) 140/81   Pulse 56   Temp 97.1 °F (36.2 °C) (Temporal)   Resp 20   Ht 5' 6\" (1.676 m)   Wt 201 lb 3.2 oz (91.3 kg)   SpO2 99%   BMI 32.47 kg/m²   Temp  Av.1 °F (36.2 °C)  Min: 96.9 °F (36.1 °C)  Max: 97.7 °F (36.5 °C)    Constitutional: The patient is awake, alert, and oriented. Skin: Warm and dry. No rashes were noted. Old pacer site no redness  HEENT: Round and reactive pupils. Moist mucous membranes. No ulcerations or thrush.   Neck: Supple to movements. Chest: Bilateral equal breath sounds. No crackles/rhonci  Cardiovascular: S1 and S2 are rhythmic and regular. No murmurs appreciated.    Abdomen: soft, non tender, bowel sound +  Extremities: ++ edema    Lines:   Right arm PICC line ( 8/27)       Laboratory and Tests Review:  Lab Results   Component Value Date    WBC 9.1 09/03/2022    WBC 8.9 09/02/2022    WBC 8.6 09/01/2022    HGB 9.4 (L) 09/03/2022    HCT 29.6 (L) 09/03/2022    MCV 91.9 09/03/2022     (L) 09/03/2022     Lab Results   Component Value Date    NEUTROABS 6.85 09/02/2022    NEUTROABS 6.19 09/01/2022    NEUTROABS 6.51 08/31/2022     No results found for: [facility]  Lab Results   Component Value Date    ALT 29 08/20/2022    AST 25 08/20/2022    ALKPHOS 90 08/20/2022    BILITOT <0.2 08/20/2022     Lab Results   Component Value Date/Time     09/03/2022 06:00 AM    K 3.3 09/03/2022 06:00 AM     09/03/2022 06:00 AM    CO2 31 09/03/2022 06:00 AM    BUN 10 09/03/2022 06:00 AM    CREATININE 0.8 09/03/2022 06:00 AM    CREATININE 0.8 09/02/2022 04:30 AM    CREATININE 1.1 08/30/2022 07:03 AM    GFRAA >60 09/03/2022 06:00 AM    LABGLOM >60 09/03/2022 06:00 AM    GLUCOSE 130 09/03/2022 06:00 AM    PROT 5.9 08/20/2022 04:44 PM    LABALBU 3.2 08/20/2022 04:44 PM    CALCIUM 8.5 09/03/2022 06:00 AM    BILITOT <0.2 08/20/2022 04:44 PM    ALKPHOS 90 08/20/2022 04:44 PM    AST 25 08/20/2022 04:44 PM    ALT 29 08/20/2022 04:44 PM     Lab Results   Component Value Date    CRP 9.6 (H) 05/24/2022     No results found for: [address]  Radiology:  Reviewed CT lung    Microbiology:   Lab Results   Component Value Date/Time    BC 5 Days no growth 08/25/2022 05:04 PM    BC 5 Days no growth 08/23/2022 10:23 AM    BC 5 Days no growth 08/19/2022 05:05 AM    ORG Enterococcus faecalis 08/18/2022 07:10 AM    ORG Enterococcus faecalis 08/18/2022 07:05 AM    ORG Enterococcus faecalis 08/17/2022 05:40 PM     Lab Results   Component Value Date/Time    BLOODCULT2 5 Days no growth 08/25/2022 05:05 PM    BLOODCULT2 5 Days no growth 08/23/2022 10:27 AM    BLOODCULT2 5 Days no growth 08/20/2022 05:30 AM    ORG Enterococcus faecalis 08/18/2022 07:10 AM    ORG Enterococcus faecalis 08/18/2022 07:05 AM    ORG Enterococcus faecalis 08/17/2022 05:40 PM     No results found for: WNDABS  No results found for: RESPSMEAR      Component Value Date/Time    FUNGSM No Fungal elements seen 08/25/2022 1403     No results found for: CULTRESP  Culture Catheter Tip   Date Value Ref Range Status   08/20/2022 Growth not present  Final     No results found for: BFCS  Culture Surgical   Date Value Ref Range Status   08/25/2022 Growth not present  Final   08/25/2022 Growth not present  Final     Urine Culture, Routine   Date Value Ref Range Status   08/31/2022 Growth not present  Final   08/16/2022   Final    10 to 100,000 CFU/mL  Mixed heydi isolated. Further workup and sensitivity testing  is not routinely indicated and will not be performed. Mixed heydi isolated includes:  Mixed gram positive organisms     05/18/2019 <25,000 CFU/ml  Final     Echo -    Summary    Left ventricle is normal in size . No regional wall motion abnormalities seen. Normal left ventricular ejection fraction. Mild mitral annular calcification. The aortic valve appears mildly sclerotic. There is a small circumferential pericardial effusion noted with most    significant component anterior to right atrium. 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    Immunocompromised host c pancytopenia;- improved   Sq cell of lung    Thrush-improved   Right groin ecchymosis - improving       PLAN:  Ampicillin 2 grams IV q 4 hrs and ceftriaxone 2 grams IV q 12 hrs until 10-13-22  Monitor labs  To Johana Lim MD  12:02 PM  9/3/2022

## 2022-09-03 NOTE — PATIENT CARE CONFERENCE
[facility] Quality Flow/Interdisciplinary Rounds Progress Note        Quality Flow Rounds held on September 3, 2022    Disciplines Attending:  Bedside Nurse, , , and Nursing Unit Leadership    Michelle Ocampo was admitted on 8/16/2022  2:38 PM    Anticipated Discharge Date:  Expected Discharge Date: 09/01/22    Disposition:    Kojo Score:  Kojo Scale Score: 20    Readmission Risk              Risk of Unplanned Readmission:  28           Discussed patient goal for the day, patient clinical progression, and barriers to discharge.   The following Goal(s) of the Day/Commitment(s) have been identified:  Diagnostics - Report Results and Labs - Report Results      Marissa Rojas RN  September 3, 2022

## 2022-09-03 NOTE — PLAN OF CARE
-Reviewed chart for possible HF, at time of review patient does not have history of nor has been admitted for HF  -Advised to follow up with PCP post hospital discharge.
Problem: Discharge Planning  Goal: Discharge to home or other facility with appropriate resources  8/20/2022 1436 by Tara Latif RN  Outcome: Progressing  8/20/2022 0521 by Aga June RN  Outcome: Progressing     Problem: Safety - Adult  Goal: Free from fall injury  8/20/2022 1436 by Tara Latif RN  Outcome: Progressing  8/20/2022 0521 by Aga June RN  Outcome: Progressing     Problem: ABCDS Injury Assessment  Goal: Absence of physical injury  8/20/2022 1436 by Tara Latif RN  Outcome: Progressing  8/20/2022 0521 by Aga June RN  Outcome: Progressing
Problem: Discharge Planning  Goal: Discharge to home or other facility with appropriate resources  8/21/2022 1358 by Tim Mejia RN  Outcome: Progressing  8/21/2022 0516 by Sergei Godfrey RN  Outcome: Progressing  Flowsheets (Taken 8/20/2022 2100)  Discharge to home or other facility with appropriate resources: Identify barriers to discharge with patient and caregiver     Problem: Safety - Adult  Goal: Free from fall injury  8/21/2022 1358 by Tim Mejia RN  Outcome: Progressing  8/21/2022 0516 by Sergei Godfrey RN  Outcome: Progressing  Flowsheets (Taken 8/20/2022 2100)  Free From Fall Injury: Instruct family/caregiver on patient safety     Problem: ABCDS Injury Assessment  Goal: Absence of physical injury  Outcome: Progressing
Problem: Discharge Planning  Goal: Discharge to home or other facility with appropriate resources  8/25/2022 0307 by Bailee Jansen RN  Outcome: Progressing     Problem: Safety - Adult  Goal: Free from fall injury  8/25/2022 0307 by Bailee Jansen RN  Outcome: Progressing     Problem: ABCDS Injury Assessment  Goal: Absence of physical injury  8/25/2022 0307 by Bailee Jansen RN  Outcome: Progressing
Problem: Discharge Planning  Goal: Discharge to home or other facility with appropriate resources  8/27/2022 0114 by Nayeli Best RN  Outcome: Progressing  8/26/2022 1736 by Leticia Ceja RN  Outcome: Progressing     Problem: Safety - Adult  Goal: Free from fall injury  Outcome: Progressing     Problem: ABCDS Injury Assessment  Goal: Absence of physical injury  Outcome: Progressing     Problem: Pain  Goal: Verbalizes/displays adequate comfort level or baseline comfort level  8/27/2022 0114 by Nayeli Best RN  Outcome: Progressing  8/26/2022 1736 by Leticia Ceja RN  Outcome: Progressing     Problem: Nutrition Deficit:  Goal: Optimize nutritional status  8/27/2022 0114 by Nayeli Best RN  Outcome: Progressing  8/26/2022 1736 by Leticia Ceja RN  Outcome: Progressing
Problem: Discharge Planning  Goal: Discharge to home or other facility with appropriate resources  8/27/2022 0727 by Olu Alas RN  Outcome: Progressing  8/27/2022 0114 by Selam Weber RN  Outcome: Progressing  8/26/2022 1736 by Olu Alas RN  Outcome: Progressing     Problem: Safety - Adult  Goal: Free from fall injury  8/27/2022 0727 by Olu Alas RN  Outcome: Progressing  8/27/2022 0114 by Selam Weber RN  Outcome: Progressing     Problem: ABCDS Injury Assessment  Goal: Absence of physical injury  8/27/2022 0727 by Olu Alas RN  Outcome: Progressing  8/27/2022 0114 by Selam Weber RN  Outcome: Progressing     Problem: Pain  Goal: Verbalizes/displays adequate comfort level or baseline comfort level  8/27/2022 0114 by Selam Weber RN  Outcome: Progressing  8/26/2022 1736 by Olu Alas RN  Outcome: Progressing     Problem: Nutrition Deficit:  Goal: Optimize nutritional status  8/27/2022 0114 by Selam Weber RN  Outcome: Progressing  8/26/2022 1736 by Olu Alas RN  Outcome: Progressing     Problem: Cardiovascular - Adult  Goal: Maintains optimal cardiac output and hemodynamic stability  Outcome: Progressing  Goal: Absence of cardiac dysrhythmias or at baseline  Outcome: Progressing     Problem: Metabolic/Fluid and Electrolytes - Adult  Goal: Electrolytes maintained within normal limits  Outcome: Progressing  Goal: Hemodynamic stability and optimal renal function maintained  Outcome: Progressing
Problem: Discharge Planning  Goal: Discharge to home or other facility with appropriate resources  8/31/2022 1220 by Guy Carter RN  Outcome: Not Progressing  8/30/2022 2307 by Paulie Quezada RN  Outcome: Progressing     Problem: Discharge Planning  Goal: Discharge to home or other facility with appropriate resources  8/31/2022 1220 by Guy Carter RN  Outcome: Not Progressing  8/30/2022 2307 by Paulie Quezada RN  Outcome: Progressing
Problem: Discharge Planning  Goal: Discharge to home or other facility with appropriate resources  9/3/2022 1028 by Donaldo Chao RN  Outcome: Progressing     Problem: Safety - Adult  Goal: Free from fall injury  9/3/2022 1028 by Donaldo Chao RN  Outcome: Progressing     Problem: Pain  Goal: Verbalizes/displays adequate comfort level or baseline comfort level  9/3/2022 1028 by Donaldo Chao RN  Outcome: Progressing     Problem: Nutrition Deficit:  Goal: Optimize nutritional status  Outcome: Progressing
Problem: Discharge Planning  Goal: Discharge to home or other facility with appropriate resources  9/3/2022 1614 by Ynaick Charles RN  Outcome: Progressing     Problem: Safety - Adult  Goal: Free from fall injury  9/3/2022 1614 by Yanick Charles RN  Outcome: Progressing     Problem: ABCDS Injury Assessment  Goal: Absence of physical injury  Outcome: Progressing     Problem: Pain  Goal: Verbalizes/displays adequate comfort level or baseline comfort level  9/3/2022 1614 by Yanick Charles RN  Outcome: Progressing
Problem: Discharge Planning  Goal: Discharge to home or other facility with appropriate resources  Outcome: Progressing     Problem: Safety - Adult  Goal: Free from fall injury  9/3/2022 0118 by Jennie Perez RN  Outcome: Progressing  9/3/2022 0116 by Jennie Perez RN  Outcome: Progressing     Problem: Pain  Goal: Verbalizes/displays adequate comfort level or baseline comfort level  Outcome: Progressing     Problem: Cardiovascular - Adult  Goal: Maintains optimal cardiac output and hemodynamic stability  Outcome: Progressing     Problem: Cardiovascular - Adult  Goal: Absence of cardiac dysrhythmias or at baseline  Outcome: Progressing     Problem: Metabolic/Fluid and Electrolytes - Adult  Goal: Electrolytes maintained within normal limits  Outcome: Progressing     Problem: Metabolic/Fluid and Electrolytes - Adult  Goal: Hemodynamic stability and optimal renal function maintained  Outcome: Progressing
Problem: Discharge Planning  Goal: Discharge to home or other facility with appropriate resources  Outcome: Progressing     Problem: Safety - Adult  Goal: Free from fall injury  Outcome: Progressing     Problem: ABCDS Injury Assessment  Goal: Absence of physical injury  Outcome: Progressing
Problem: Discharge Planning  Goal: Discharge to home or other facility with appropriate resources  Outcome: Progressing     Problem: Safety - Adult  Goal: Free from fall injury  Outcome: Progressing     Problem: ABCDS Injury Assessment  Goal: Absence of physical injury  Outcome: Progressing     Problem: Pain  Goal: Verbalizes/displays adequate comfort level or baseline comfort level  Outcome: Progressing
Problem: Discharge Planning  Goal: Discharge to home or other facility with appropriate resources  Outcome: Progressing     Problem: Safety - Adult  Goal: Free from fall injury  Outcome: Progressing     Problem: ABCDS Injury Assessment  Goal: Absence of physical injury  Outcome: Progressing     Problem: Pain  Goal: Verbalizes/displays adequate comfort level or baseline comfort level  Outcome: Progressing     Problem: Nutrition Deficit:  Goal: Optimize nutritional status  Outcome: Progressing
Problem: Discharge Planning  Goal: Discharge to home or other facility with appropriate resources  Outcome: Progressing     Problem: Safety - Adult  Goal: Free from fall injury  Outcome: Progressing     Problem: ABCDS Injury Assessment  Goal: Absence of physical injury  Outcome: Progressing     Problem: Pain  Goal: Verbalizes/displays adequate comfort level or baseline comfort level  Outcome: Progressing     Problem: Nutrition Deficit:  Goal: Optimize nutritional status  Outcome: Progressing     Problem: Cardiovascular - Adult  Goal: Maintains optimal cardiac output and hemodynamic stability  Outcome: Progressing  Goal: Absence of cardiac dysrhythmias or at baseline  Outcome: Progressing     Problem: Metabolic/Fluid and Electrolytes - Adult  Goal: Electrolytes maintained within normal limits  Outcome: Progressing  Goal: Hemodynamic stability and optimal renal function maintained  Outcome: Progressing
Problem: Discharge Planning  Goal: Discharge to home or other facility with appropriate resources  Outcome: Progressing     Problem: Safety - Adult  Goal: Free from fall injury  Outcome: Progressing     Problem: ABCDS Injury Assessment  Goal: Absence of physical injury  Outcome: Progressing     Problem: Pain  Goal: Verbalizes/displays adequate comfort level or baseline comfort level  Outcome: Progressing     Problem: Nutrition Deficit:  Goal: Optimize nutritional status  Outcome: Progressing  Nutrient intake appropriate for improving, restoring, or maintaining nutritional needs: Recommend appropriate diets, oral nutritional supplements, and vitamin/mineral supplements     Problem: Cardiovascular - Adult  Goal: Maintains optimal cardiac output and hemodynamic stability  Outcome: Progressing     Problem: Cardiovascular - Adult  Goal: Absence of cardiac dysrhythmias or at baseline  Outcome: Progressing
Problem: Discharge Planning  Goal: Discharge to home or other facility with appropriate resources  Outcome: Progressing  Flowsheets (Taken 8/20/2022 2100)  Discharge to home or other facility with appropriate resources: Identify barriers to discharge with patient and caregiver     Problem: Safety - Adult  Goal: Free from fall injury  Outcome: Progressing  Flowsheets (Taken 8/20/2022 2100)  Free From Fall Injury: Instruct family/caregiver on patient safety     Problem: ABCDS Injury Assessment  Goal: Absence of physical injury  Recent Flowsheet Documentation  Taken 8/20/2022 2100 by Twan Ceballos RN  Absence of Physical Injury: Implement safety measures based on patient assessment
Problem: Safety - Adult  Goal: Free from fall injury  9/1/2022 2119 by Cristi Holloway RN  Outcome: Rigoberto Wiley (Taken 9/1/2022 2117)  Free From Fall Injury: Instruct family/caregiver on patient safety  9/1/2022 1438 by Nils Vaca RN  Outcome: Progressing     Problem: ABCDS Injury Assessment  Goal: Absence of physical injury  9/1/2022 2119 by Cristi Holloway RN  Outcome: Progressing  9/1/2022 1438 by Nils Vaca RN  Outcome: Progressing     Problem: Cardiovascular - Adult  Goal: Maintains optimal cardiac output and hemodynamic stability  9/1/2022 1438 by Nils Vaca RN  Outcome: Progressing
Problem: Safety - Adult  Goal: Free from fall injury  Outcome: Progressing  Flowsheets (Taken 8/25/2022 0825 by Gen Arreola RN)  Free From Fall Injury: Instruct family/caregiver on patient safety     Problem: ABCDS Injury Assessment  Goal: Absence of physical injury  Outcome: Progressing  Flowsheets (Taken 8/25/2022 0825 by Gen Arreola RN)  Absence of Physical Injury: Implement safety measures based on patient assessment     Problem: Pain  Goal: Verbalizes/displays adequate comfort level or baseline comfort level  Outcome: Progressing  Flowsheets (Taken 8/25/2022 0825 by Gen Arreola RN)  Verbalizes/displays adequate comfort level or baseline comfort level:   Encourage patient to monitor pain and request assistance   Assess pain using appropriate pain scale   Administer analgesics based on type and severity of pain and evaluate response   Implement non-pharmacological measures as appropriate and evaluate response
Monitor cardiac rate and rhythm   Assess for signs of decreased cardiac output   Administer antiarrhythmia medication and electrolyte replacement as ordered  8/28/2022 0459 by Alfredo Bruno RN  Outcome: Progressing     Problem: Metabolic/Fluid and Electrolytes - Adult  Goal: Electrolytes maintained within normal limits  8/28/2022 0743 by Consuelo Birch RN  Outcome: Progressing  Flowsheets (Taken 8/28/2022 0629)  Electrolytes maintained within normal limits:   Monitor labs and assess patient for signs and symptoms of electrolyte imbalances   Administer electrolyte replacement as ordered   Monitor response to electrolyte replacements, including repeat lab results as appropriate  8/28/2022 0459 by Alfredo Bruno RN  Outcome: Progressing  Goal: Hemodynamic stability and optimal renal function maintained  8/28/2022 0743 by Consuelo Birch RN  Outcome: Progressing  Flowsheets (Taken 8/28/2022 0710)  Hemodynamic stability and optimal renal function maintained:   Monitor labs and assess for signs and symptoms of volume excess or deficit   Monitor intake, output and patient weight   Encourage oral intake as appropriate  8/28/2022 0459 by Alfredo Bruno RN  Outcome: Progressing

## 2022-09-03 NOTE — DISCHARGE INSTR - COC
PORT SURGERY Right 2022    PORT REMOVAL performed by Holden Calero MD at [address]    TRANSESOPHAGEAL ECHOCARDIOGRAM  2022    Dr Ling White N/A 2022    Medtronic Micra AV Leadless PPM Insertion (Dr. Lucila Canales)       Immunization History:   Immunization History   Administered Date(s) Administered    Influenza, High Dose (Fluzone 72 yrs and older) 10/19/2020       Active Problems:  Patient Active Problem List   Diagnosis Code    Syncope and collapse R55    Pneumonia J18.9    COPD (chronic obstructive pulmonary disease) (HCC) J44.9    BPH (benign prostatic hyperplasia) N40.0    HTN (hypertension) I10    HLD (hyperlipidemia) E78.5    Intermittent complete heart block (HCC) I44.2    Cardiac pacemaker in situ Z95.0    Acute on chronic respiratory failure with hypoxia (HCC) J96.21    Squamous carcinoma of lung (HCC) C34.90    History pulmonary embolism ([address] on Eliquis I26.99    Hyponatremia E87.1    Hypomagnesemia E83.42    Wide-complex tachycardia (HCC) I47.2    COVID-19 U07.1    Pancytopenia ([address] D61.818    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 8XXA    Presence of leadless cardiac pacemaker Z95.0       Isolation/Infection:   Isolation            No Isolation          Patient Infection Status       Infection Onset Added Last Indicated Last Indicated By Review Planned Expiration Resolved Resolved By    None active    Resolved    COVID-19 (Rule Out) 22 Respiratory Panel, Molecular, with COVID-19 (Restricted: peds pts or suitable admitted adults) (Ordered)   22 Rule-Out Test Resulted    COVID-19 (Rule Out) 22 COVID-19, Rapid (Ordered)   22 Rule-Out Test Resulted    COVID-19 22 COVID-19, Rapid   22  COVID-19 (Rule Out) 05/24/22 05/24/22 05/24/22 COVID-19, Rapid (Ordered)   05/24/22 Rule-Out Test Resulted    COVID-19 (Rule Out) 09/18/21 09/18/21 09/18/21 Respiratory Panel, Molecular, with COVID-19 (Restricted: peds pts or suitable admitted adults) (Ordered)   09/18/21 Rule-Out Test Resulted    COVID-19 (Rule Out) 09/17/21 09/17/21 09/17/21 COVID-19, Rapid (Ordered)   09/17/21 Rule-Out Test Resulted            Nurse Assessment:  Last Vital Signs: /74   Pulse 92   Temp 97.9 °F (36.6 °C) (Temporal)   Resp 16   Ht 5' 6\" (1.676 m)   Wt 201 lb 3.2 oz (91.3 kg)   SpO2 100%   BMI 32.47 kg/m²     Last documented pain score (0-10 scale): Pain Level: 5  Last Weight:   Wt Readings from Last 1 Encounters:   09/03/22 201 lb 3.2 oz (91.3 kg)     Mental Status:  oriented and alert    IV Access:  - PICC - site  R Upper Arm, insertion date: 8/27    Nursing Mobility/ADLs:  Walking   Independent  Transfer  Assisted  Bathing  Independent  Dressing  Independent  Toileting  Independent  Feeding  Independent  Med Admin  Assisted  Med Delivery   whole    Wound Care Documentation and Therapy:  Puncture 08/25/22 Groin (Active)   Closure Adhesive bandage 08/25/22 1357   Number of days: 9       Wound 08/26/22 Arm Distal;Lower;Right;Dorsal (Active)   Dressing Status Clean;Dry; Intact 09/03/22 1500   Dressing/Treatment Adhesive bandage 09/03/22 1500   Wound Length (cm) 2.5 cm 08/26/22 0556   Wound Width (cm) 0.5 cm 08/26/22 0556   Wound Depth (cm) 0.01 cm 08/26/22 0556   Wound Surface Area (cm^2) 1.25 cm^2 08/26/22 0556   Wound Volume (cm^3) 0.0125 cm^3 08/26/22 0556   Number of days: 8       Incision 08/20/22 Chest Right;Upper (Active)   Dressing Status Other (Comment) 09/03/22 1500   Incision Cleansed Cleansed with saline 09/03/22 0733   Dressing/Treatment Open to air 09/03/22 1500   Closure Surgical glue 09/03/22 1500   Margins Approximated 09/03/22 1500   Incision Assessment Dry 09/03/22 1500   Drainage Amount None 09/03/22 1500   Odor None 09/03/22 1500   Liliana-incision Assessment Intact; Warm 09/03/22 1500   Number of days: 14       Incision 08/25/22 Chest Left (Active)   Dressing Status Other (Comment) 09/03/22 1500   Incision Cleansed Cleansed with saline 09/03/22 0733   Dressing/Treatment Open to air 09/03/22 1500   Closure Sutures 09/03/22 1500   Incision Assessment Dry 09/03/22 1500   Drainage Amount None 09/03/22 1500   Odor None 08/28/22 1600   Liliana-incision Assessment Intact 09/02/22 0810   Number of days: 9       Incision 07/20/21 Chest Lateral;Left;Upper (Active)   Number of days: 410       Incision 07/20/21 Chest Left;Upper (Active)   Number of days: 410        Elimination:  Continence: Bowel: Yes  Bladder: Yes  Urinary Catheter: None   Colostomy/Ileostomy/Ileal Conduit: No       Date of Last BM: 9/2    Intake/Output Summary (Last 24 hours) at 9/3/2022 1736  Last data filed at 9/3/2022 1354  Gross per 24 hour   Intake 330 ml   Output 650 ml   Net -320 ml     I/O last 3 completed shifts: In: 869.1 [P.O.:510; IV Piggyback:359.1]  Out: 750 [Urine:750]    Safety Concerns:     None    Impairments/Disabilities:      None    Nutrition Therapy:  Current Nutrition Therapy:   - Oral Diet:  General    Routes of Feeding: Oral  Liquids: No Restrictions  Daily Fluid Restriction: no  Last Modified Barium Swallow with Video (Video Swallowing Test): not done    Treatments at the Time of Hospital Discharge:   Respiratory Treatments: Brovana;pulmicort  Oxygen Therapy:  is on oxygen at 4 L/min per nasal cannula.   Ventilator:    - No ventilator support    Rehab Therapies: Physical Therapy and Occupational Therapy  Weight Bearing Status/Restrictions: No weight bearing restrictions  Other Medical Equipment (for information only, NOT a DME order):  NA    Other Treatments: ***    Patient's personal belongings (please select all that are sent with patient):  Cindy    RN SIGNATURE:  Electronically signed by Marissa Rojas RN on 9/3/22 at 5:44 PM EDT    CASE MANAGEMENT/SOCIAL WORK SECTION    Inpatient Status Date: ***    Readmission Risk Assessment Score:  Readmission Risk              Risk of Unplanned Readmission:  29           Discharging to Facility/ Agency   Name:   Address:  Phone:  Fax:    Dialysis Facility (if applicable)   Name:  Address:  Dialysis Schedule:  Phone:  Fax:    / signature: {Esignature:[id number]}    PHYSICIAN SECTION    Prognosis: {Prognosis:7457826966}    Condition at Discharge: [address] [place] [address] Patient Condition:[id number]}    Rehab Potential (if transferring to Rehab): {Prognosis:3546512193}    Recommended Labs or Other Treatments After Discharge: ***    Physician Certification: I certify the above information and transfer of Janene Hathaway  is necessary for the continuing treatment of the diagnosis listed and that he requires {Admit to Appropriate Level of Care:64883} for {GREATER/LESS:021450468} 30 days.      Update Admission H&P: {CHP DME Changes in VEWVR:330100678}    PHYSICIAN SIGNATURE:  {Esignature:[id number]}

## 2022-09-03 NOTE — CARE COORDINATION
Pt discharging today to [facility], spoke with Tessie Shaikh pt to go to room 4207b at 8pm, notified unit RN of time. Called wife Lisandra Newton to notify of discharge/time. Claudia Hanson, MSW, LSW

## 2022-09-06 LAB
EKG ATRIAL RATE: 85 BPM
EKG P-R INTERVAL: 240 MS
EKG Q-T INTERVAL: 232 MS
EKG QRS DURATION: 152 MS
EKG QTC CALCULATION (BAZETT): 363 MS
EKG R AXIS: 127 DEGREES
EKG T AXIS: -54 DEGREES
EKG VENTRICULAR RATE: 147 BPM

## 2022-09-08 ENCOUNTER — TELEPHONE (OUTPATIENT)
Dept: NON INVASIVE DIAGNOSTICS | Age: 72
End: 2022-09-08

## 2022-09-08 DIAGNOSIS — I31.39 PERICARDIAL EFFUSION: Primary | ICD-10-CM

## 2022-09-08 NOTE — TELEPHONE ENCOUNTER
Spoke to [address] of the [employer]. She is going to schedule the patient an in patient echo. If patient is discharged I will notify Bernadine Granda.

## 2022-09-08 NOTE — TELEPHONE ENCOUNTER
----- Message from LELO Villaseñor CNP sent at 9/2/2022  2:09 PM EDT -----  Lelan Son was seen inpatient and had a MICRA he will need a wound check on 09/07 he will need a limited echo on 09/15/22 for effusion follow-up and possible resumption of Eliquis thereafter he is know to Dr. Jagjit Contreras and maybe going to [facility] post discharge. Thanks.

## 2022-09-08 NOTE — TELEPHONE ENCOUNTER
Pt is scheduled 9/15/2022 for device check. Called the [place] echo dept to schedule an echo on 9/15/2022 and she will get back to me after she checks to see if he can be added.

## 2022-09-13 ENCOUNTER — HOSPITAL ENCOUNTER (OUTPATIENT)
Dept: NON INVASIVE DIAGNOSTICS | Age: 72
Discharge: HOME | End: 2022-09-13
Payer: MEDICARE

## 2022-09-13 PROCEDURE — 93308 TTE F-UP OR LMTD: CPT

## 2022-09-15 ENCOUNTER — HOSPITAL ENCOUNTER (OUTPATIENT)
Dept: NON INVASIVE DIAGNOSTICS | Age: 72
Discharge: HOME | End: 2022-09-15
Payer: MEDICARE

## 2022-09-15 DIAGNOSIS — I31.39 PERICARDIAL EFFUSION: ICD-10-CM

## 2022-09-15 PROCEDURE — 93308 TTE F-UP OR LMTD: CPT

## 2022-09-19 ENCOUNTER — TELEPHONE (OUTPATIENT)
Dept: CARDIOTHORACIC SURGERY | Age: 72
End: 2022-09-19

## 2022-10-03 LAB
FUNGUS (MYCOLOGY) CULTURE: NORMAL
FUNGUS (MYCOLOGY) CULTURE: NORMAL
FUNGUS STAIN: NORMAL
FUNGUS STAIN: NORMAL

## 2022-10-05 ENCOUNTER — APPOINTMENT (OUTPATIENT)
Dept: RADIATION ONCOLOGY | Age: 72
End: 2022-10-05
Attending: RADIOLOGY
Payer: MEDICARE

## 2022-10-05 DIAGNOSIS — A41.9 BACTERIAL SEPSIS (HCC): ICD-10-CM

## 2022-10-05 RX ORDER — HEPARIN SODIUM (PORCINE) LOCK FLUSH IV SOLN 100 UNIT/ML 100 UNIT/ML
500 SOLUTION INTRAVENOUS PRN
Status: CANCELLED | OUTPATIENT
Start: 2022-10-05

## 2022-10-05 RX ORDER — SODIUM CHLORIDE 9 MG/ML
5-250 INJECTION, SOLUTION INTRAVENOUS PRN
Status: CANCELLED | OUTPATIENT
Start: 2022-10-05

## 2022-10-05 RX ORDER — SODIUM CHLORIDE 0.9 % (FLUSH) 0.9 %
5-40 SYRINGE (ML) INJECTION PRN
Status: CANCELLED | OUTPATIENT
Start: 2022-10-05

## 2022-10-07 ENCOUNTER — HOSPITAL ENCOUNTER (OUTPATIENT)
Dept: INFUSION THERAPY | Age: 72
Setting detail: INFUSION SERIES
Discharge: HOME | End: 2022-10-07
Payer: MEDICARE

## 2022-10-07 ENCOUNTER — HOSPITAL ENCOUNTER (OUTPATIENT)
Dept: ULTRASOUND IMAGING | Age: 72
Discharge: HOME | End: 2022-10-09
Payer: MEDICARE

## 2022-10-07 ENCOUNTER — HOSPITAL ENCOUNTER (OUTPATIENT)
Age: 72
Discharge: HOME | End: 2022-10-09
Payer: MEDICARE

## 2022-10-07 VITALS
TEMPERATURE: 98 F | OXYGEN SATURATION: 99 % | RESPIRATION RATE: 16 BRPM | DIASTOLIC BLOOD PRESSURE: 78 MMHG | HEART RATE: 104 BPM | SYSTOLIC BLOOD PRESSURE: 134 MMHG

## 2022-10-07 DIAGNOSIS — R78.81 BACTEREMIA: ICD-10-CM

## 2022-10-07 DIAGNOSIS — I82.621 ACUTE DEEP VEIN THROMBOSIS (DVT) OF RIGHT UPPER EXTREMITY, UNSPECIFIED VEIN (HCC): ICD-10-CM

## 2022-10-07 DIAGNOSIS — A41.9 BACTERIAL SEPSIS (HCC): Primary | ICD-10-CM

## 2022-10-07 LAB
ALBUMIN SERPL-MCNC: 3.6 G/DL (ref 3.5–5.2)
ALP BLD-CCNC: 106 U/L (ref 40–129)
ALT SERPL-CCNC: 12 U/L (ref 0–40)
ANION GAP SERPL CALCULATED.3IONS-SCNC: 10 MMOL/L (ref 7–16)
ANISOCYTOSIS: ABNORMAL
AST SERPL-CCNC: 31 U/L (ref 0–39)
BASOPHILS ABSOLUTE: 0 E9/L (ref 0–0.2)
BASOPHILS RELATIVE PERCENT: 0.4 % (ref 0–2)
BILIRUB SERPL-MCNC: 0.4 MG/DL (ref 0–1.2)
BUN BLDV-MCNC: 15 MG/DL (ref 6–23)
C-REACTIVE PROTEIN: 3.5 MG/DL (ref 0–0.4)
CALCIUM SERPL-MCNC: 9.9 MG/DL (ref 8.6–10.2)
CHLORIDE BLD-SCNC: 101 MMOL/L (ref 98–107)
CO2: 29 MMOL/L (ref 22–29)
CREAT SERPL-MCNC: 0.8 MG/DL (ref 0.7–1.2)
EOSINOPHILS ABSOLUTE: 0.4 E9/L (ref 0.05–0.5)
EOSINOPHILS RELATIVE PERCENT: 4.2 % (ref 0–6)
GFR AFRICAN AMERICAN: >60
GFR NON-AFRICAN AMERICAN: >60 ML/MIN/1.73
GLUCOSE BLD-MCNC: 123 MG/DL (ref 74–99)
HCT VFR BLD CALC: 33.4 % (ref 37–54)
HEMOGLOBIN: 10 G/DL (ref 12.5–16.5)
LYMPHOCYTES ABSOLUTE: 0.29 E9/L (ref 1.5–4)
LYMPHOCYTES RELATIVE PERCENT: 3.4 % (ref 20–42)
MCH RBC QN AUTO: 26.7 PG (ref 26–35)
MCHC RBC AUTO-ENTMCNC: 29.9 % (ref 32–34.5)
MCV RBC AUTO: 89.1 FL (ref 80–99.9)
MONOCYTES ABSOLUTE: 0.57 E9/L (ref 0.1–0.95)
MONOCYTES RELATIVE PERCENT: 5.9 % (ref 2–12)
NEUTROPHILS ABSOLUTE: 8.27 E9/L (ref 1.8–7.3)
NEUTROPHILS RELATIVE PERCENT: 86.5 % (ref 43–80)
OVALOCYTES: ABNORMAL
PDW BLD-RTO: 18.2 FL (ref 11.5–15)
PLATELET # BLD: 130 E9/L (ref 130–450)
PMV BLD AUTO: 11.5 FL (ref 7–12)
POIKILOCYTES: ABNORMAL
POLYCHROMASIA: ABNORMAL
POTASSIUM SERPL-SCNC: 3.6 MMOL/L (ref 3.5–5)
RBC # BLD: 3.75 E12/L (ref 3.8–5.8)
SEDIMENTATION RATE, ERYTHROCYTE: 25 MM/HR (ref 0–15)
SODIUM BLD-SCNC: 140 MMOL/L (ref 132–146)
TOTAL CK: 616 U/L (ref 20–200)
TOTAL PROTEIN: 5.9 G/DL (ref 6.4–8.3)
WBC # BLD: 9.5 E9/L (ref 4.5–11.5)

## 2022-10-07 PROCEDURE — 36592 COLLECT BLOOD FROM PICC: CPT

## 2022-10-07 PROCEDURE — 96365 THER/PROPH/DIAG IV INF INIT: CPT

## 2022-10-07 PROCEDURE — 2580000003 HC RX 258: Performed by: INTERNAL MEDICINE

## 2022-10-07 PROCEDURE — 82550 ASSAY OF CK (CPK): CPT

## 2022-10-07 PROCEDURE — 80053 COMPREHEN METABOLIC PANEL: CPT

## 2022-10-07 PROCEDURE — 93971 EXTREMITY STUDY: CPT

## 2022-10-07 PROCEDURE — 86140 C-REACTIVE PROTEIN: CPT

## 2022-10-07 PROCEDURE — 93971 EXTREMITY STUDY: CPT | Performed by: RADIOLOGY

## 2022-10-07 PROCEDURE — 85025 COMPLETE CBC W/AUTO DIFF WBC: CPT

## 2022-10-07 PROCEDURE — 6360000002 HC RX W HCPCS: Performed by: INTERNAL MEDICINE

## 2022-10-07 PROCEDURE — 96361 HYDRATE IV INFUSION ADD-ON: CPT

## 2022-10-07 PROCEDURE — 36593 DECLOT VASCULAR DEVICE: CPT

## 2022-10-07 PROCEDURE — 36415 COLL VENOUS BLD VENIPUNCTURE: CPT

## 2022-10-07 PROCEDURE — 85651 RBC SED RATE NONAUTOMATED: CPT

## 2022-10-07 RX ORDER — GABAPENTIN 100 MG/1
100 CAPSULE ORAL 2 TIMES DAILY
COMMUNITY

## 2022-10-07 RX ORDER — 0.9 % SODIUM CHLORIDE 0.9 %
500 INTRAVENOUS SOLUTION INTRAVENOUS ONCE
OUTPATIENT
Start: 2022-10-08 | End: 2022-10-08

## 2022-10-07 RX ORDER — HEPARIN SODIUM (PORCINE) LOCK FLUSH IV SOLN 100 UNIT/ML 100 UNIT/ML
500 SOLUTION INTRAVENOUS PRN
Status: CANCELLED | OUTPATIENT
Start: 2022-10-07

## 2022-10-07 RX ORDER — SODIUM CHLORIDE 0.9 % (FLUSH) 0.9 %
5-40 SYRINGE (ML) INJECTION PRN
OUTPATIENT
Start: 2022-10-08

## 2022-10-07 RX ORDER — DAPTOMYCIN 50 MG/ML
500 INJECTION, POWDER, LYOPHILIZED, FOR SOLUTION INTRAVENOUS DAILY
COMMUNITY
End: 2022-10-31 | Stop reason: ALTCHOICE

## 2022-10-07 RX ORDER — LANOLIN ALCOHOL/MO/W.PET/CERES
3 CREAM (GRAM) TOPICAL DAILY
COMMUNITY

## 2022-10-07 RX ORDER — DIPHENHYDRAMINE HYDROCHLORIDE 50 MG/ML
50 INJECTION INTRAMUSCULAR; INTRAVENOUS
Status: CANCELLED | OUTPATIENT
Start: 2022-10-09

## 2022-10-07 RX ORDER — SODIUM CHLORIDE 0.9 % (FLUSH) 0.9 %
5-40 SYRINGE (ML) INJECTION PRN
Status: CANCELLED | OUTPATIENT
Start: 2022-10-09

## 2022-10-07 RX ORDER — 0.9 % SODIUM CHLORIDE 0.9 %
500 INTRAVENOUS SOLUTION INTRAVENOUS ONCE
Status: COMPLETED | OUTPATIENT
Start: 2022-10-07 | End: 2022-10-07

## 2022-10-07 RX ORDER — POTASSIUM CHLORIDE 750 MG/1
10 CAPSULE, EXTENDED RELEASE ORAL DAILY
COMMUNITY

## 2022-10-07 RX ORDER — HEPARIN SODIUM (PORCINE) LOCK FLUSH IV SOLN 100 UNIT/ML 100 UNIT/ML
500 SOLUTION INTRAVENOUS PRN
Status: CANCELLED | OUTPATIENT
Start: 2022-10-09

## 2022-10-07 RX ORDER — DIPHENHYDRAMINE HCL 25 MG
50 TABLET ORAL ONCE
Status: CANCELLED
Start: 2022-10-09 | End: 2022-10-09

## 2022-10-07 RX ORDER — HEPARIN SODIUM (PORCINE) LOCK FLUSH IV SOLN 100 UNIT/ML 100 UNIT/ML
500 SOLUTION INTRAVENOUS PRN
OUTPATIENT
Start: 2022-10-08

## 2022-10-07 RX ORDER — EPINEPHRINE 1 MG/ML
0.3 INJECTION, SOLUTION, CONCENTRATE INTRAVENOUS PRN
Status: CANCELLED | OUTPATIENT
Start: 2022-10-09

## 2022-10-07 RX ORDER — SODIUM CHLORIDE 0.9 % (FLUSH) 0.9 %
5-40 SYRINGE (ML) INJECTION PRN
Status: CANCELLED | OUTPATIENT
Start: 2022-10-07

## 2022-10-07 RX ORDER — SODIUM CHLORIDE 0.9 % (FLUSH) 0.9 %
5-40 SYRINGE (ML) INJECTION PRN
Status: DISCONTINUED | OUTPATIENT
Start: 2022-10-07 | End: 2022-10-08 | Stop reason: HOSPADM

## 2022-10-07 RX ORDER — SODIUM CHLORIDE 9 MG/ML
5-250 INJECTION, SOLUTION INTRAVENOUS PRN
Status: CANCELLED | OUTPATIENT
Start: 2022-10-09

## 2022-10-07 RX ORDER — HEPARIN SODIUM (PORCINE) LOCK FLUSH IV SOLN 100 UNIT/ML 100 UNIT/ML
500 SOLUTION INTRAVENOUS PRN
Status: DISCONTINUED | OUTPATIENT
Start: 2022-10-07 | End: 2022-10-08 | Stop reason: HOSPADM

## 2022-10-07 RX ADMIN — SODIUM CHLORIDE, PRESERVATIVE FREE 10 ML: 5 INJECTION INTRAVENOUS at 16:54

## 2022-10-07 RX ADMIN — ALTEPLASE 2 MG: 2.2 INJECTION, POWDER, LYOPHILIZED, FOR SOLUTION INTRAVENOUS at 13:09

## 2022-10-07 RX ADMIN — SODIUM CHLORIDE, PRESERVATIVE FREE 10 ML: 5 INJECTION INTRAVENOUS at 11:47

## 2022-10-07 RX ADMIN — HEPARIN 500 UNITS: 100 SYRINGE at 14:30

## 2022-10-07 RX ADMIN — HEPARIN 500 UNITS: 100 SYRINGE at 16:54

## 2022-10-07 RX ADMIN — SODIUM CHLORIDE, PRESERVATIVE FREE 10 ML: 5 INJECTION INTRAVENOUS at 11:48

## 2022-10-07 RX ADMIN — WATER 10 ML: 1 INJECTION INTRAMUSCULAR; INTRAVENOUS; SUBCUTANEOUS at 13:09

## 2022-10-07 RX ADMIN — SODIUM CHLORIDE, PRESERVATIVE FREE 10 ML: 5 INJECTION INTRAVENOUS at 13:10

## 2022-10-07 RX ADMIN — DAPTOMYCIN 500 MG: 500 INJECTION, POWDER, LYOPHILIZED, FOR SOLUTION INTRAVENOUS at 11:52

## 2022-10-07 RX ADMIN — SODIUM CHLORIDE, PRESERVATIVE FREE 10 ML: 5 INJECTION INTRAVENOUS at 14:30

## 2022-10-07 RX ADMIN — ALTEPLASE 2 MG: 2.2 INJECTION, POWDER, LYOPHILIZED, FOR SOLUTION INTRAVENOUS at 13:10

## 2022-10-07 RX ADMIN — SODIUM CHLORIDE 500 ML: 9 INJECTION, SOLUTION INTRAVENOUS at 14:30

## 2022-10-07 NOTE — PROGRESS NOTES
Received a call from Infectious Disease that patient is to go  for stat ultrasound of the right arm tonight after his infusion is finished. Patient and wife are aware.

## 2022-10-07 NOTE — PROGRESS NOTES
Infusion completed and patient discharged in stable condition via wheelchair to ultrasound in wheelchair with oxygen by wife.

## 2022-10-07 NOTE — PROGRESS NOTES
Spoke to Marianne duke with ID office regarding issue with patient PICC-it is very touchy and positional. Per IV team patient's line has been in since 8/27 and from recent xray 10-3 appears to be in subclavian and to have come out about 5cm or so. This makes it more of a midline vs PICC. She will discuss with doctor and call us back.

## 2022-10-07 NOTE — PROGRESS NOTES
Patient admitted to medical infusion for IV Daptomycin infusion. Several issues noted here. There were no stats listed on chart for picc line insertion. IV team perfect serve to find these details. Picc line both ports very difficult to flush. Blood return noted but sluggish. Dr. Chandana Burton office called and spoke with the nurse DERRICK. Okay given to cath danielle line and to use heparin for flushes. Today's platelet count is normal. Both  ports treated with altepase and good blood returns noted but still very difficult to flush. IV team located specs on the line and noted that with xray it appears that the line has migrated out and is probably in the shoulder and therefore is positional.  Patient must sit with his right arm bent across stomach for pump not to alarm. CK enzymes very high at 616. Call placed to infectious disease to report and to make future appointment for this patient.

## 2022-10-07 NOTE — PROGRESS NOTES
Talked to [facility] from [facility] office about patient PICC line. Per Ck [facility] ok to continue to use PICC line if ultrasound is negative.

## 2022-10-07 NOTE — PROGRESS NOTES
Received orders from infectious disease for IV fluids  250 cc over half hour for total of 500cc . IV fluids started at  14:30. Picc line is very positional with frequent occlusions. Occlusions clear once arm placed across the stomach. Orders also received to hydrate the patient tomorrow also and to get repeat CK on Sunday. Orders received to to have patient hold his Atorvastatin until he is  off of Daptomycin. Wife and patient made aware of this medication change. His antibiotics will be extended through October 18. Patient and wife aware.

## 2022-10-08 ENCOUNTER — HOSPITAL ENCOUNTER (OUTPATIENT)
Dept: INFUSION THERAPY | Age: 72
Setting detail: INFUSION SERIES
Discharge: HOME | End: 2022-10-08
Payer: MEDICARE

## 2022-10-08 VITALS
SYSTOLIC BLOOD PRESSURE: 119 MMHG | DIASTOLIC BLOOD PRESSURE: 68 MMHG | RESPIRATION RATE: 18 BRPM | OXYGEN SATURATION: 99 % | TEMPERATURE: 97.2 F | HEART RATE: 58 BPM

## 2022-10-08 DIAGNOSIS — A41.9 BACTERIAL SEPSIS (HCC): Primary | ICD-10-CM

## 2022-10-08 PROCEDURE — 2580000003 HC RX 258: Performed by: INTERNAL MEDICINE

## 2022-10-08 PROCEDURE — 6360000002 HC RX W HCPCS: Performed by: INTERNAL MEDICINE

## 2022-10-08 PROCEDURE — 96361 HYDRATE IV INFUSION ADD-ON: CPT

## 2022-10-08 PROCEDURE — 96365 THER/PROPH/DIAG IV INF INIT: CPT

## 2022-10-08 PROCEDURE — 96360 HYDRATION IV INFUSION INIT: CPT

## 2022-10-08 PROCEDURE — 2580000003 HC RX 258

## 2022-10-08 RX ORDER — HEPARIN SODIUM (PORCINE) LOCK FLUSH IV SOLN 100 UNIT/ML 100 UNIT/ML
500 SOLUTION INTRAVENOUS PRN
Status: CANCELLED | OUTPATIENT
Start: 2022-10-09

## 2022-10-08 RX ORDER — SODIUM CHLORIDE 9 MG/ML
5-250 INJECTION, SOLUTION INTRAVENOUS PRN
Status: CANCELLED | OUTPATIENT
Start: 2022-10-09

## 2022-10-08 RX ORDER — SODIUM CHLORIDE 9 MG/ML
5-250 INJECTION, SOLUTION INTRAVENOUS PRN
Status: DISCONTINUED | OUTPATIENT
Start: 2022-10-08 | End: 2022-10-09 | Stop reason: HOSPADM

## 2022-10-08 RX ORDER — SODIUM CHLORIDE 0.9 % (FLUSH) 0.9 %
SYRINGE (ML) INJECTION
Status: COMPLETED
Start: 2022-10-08 | End: 2022-10-08

## 2022-10-08 RX ORDER — HEPARIN SODIUM (PORCINE) LOCK FLUSH IV SOLN 100 UNIT/ML 100 UNIT/ML
500 SOLUTION INTRAVENOUS PRN
Status: DISCONTINUED | OUTPATIENT
Start: 2022-10-08 | End: 2022-10-09 | Stop reason: HOSPADM

## 2022-10-08 RX ORDER — SODIUM CHLORIDE 0.9 % (FLUSH) 0.9 %
5-40 SYRINGE (ML) INJECTION PRN
Status: CANCELLED | OUTPATIENT
Start: 2022-10-09

## 2022-10-08 RX ORDER — DIPHENHYDRAMINE HCL 25 MG
50 TABLET ORAL ONCE
Status: CANCELLED
Start: 2022-10-09 | End: 2022-10-09

## 2022-10-08 RX ORDER — DIPHENHYDRAMINE HYDROCHLORIDE 50 MG/ML
50 INJECTION INTRAMUSCULAR; INTRAVENOUS
Status: CANCELLED | OUTPATIENT
Start: 2022-10-09

## 2022-10-08 RX ORDER — SODIUM CHLORIDE 0.9 % (FLUSH) 0.9 %
5-40 SYRINGE (ML) INJECTION PRN
Status: DISCONTINUED | OUTPATIENT
Start: 2022-10-08 | End: 2022-10-09 | Stop reason: HOSPADM

## 2022-10-08 RX ORDER — EPINEPHRINE 1 MG/ML
0.3 INJECTION, SOLUTION, CONCENTRATE INTRAVENOUS PRN
Status: CANCELLED | OUTPATIENT
Start: 2022-10-09

## 2022-10-08 RX ADMIN — SODIUM CHLORIDE, PRESERVATIVE FREE 10 ML: 5 INJECTION INTRAVENOUS at 11:16

## 2022-10-08 RX ADMIN — SODIUM CHLORIDE 250 ML/HR: 9 INJECTION, SOLUTION INTRAVENOUS at 09:10

## 2022-10-08 RX ADMIN — SODIUM CHLORIDE, PRESERVATIVE FREE 10 ML: 5 INJECTION INTRAVENOUS at 09:08

## 2022-10-08 RX ADMIN — HEPARIN 500 UNITS: 100 SYRINGE at 11:17

## 2022-10-08 RX ADMIN — DAPTOMYCIN 500 MG: 500 INJECTION, POWDER, LYOPHILIZED, FOR SOLUTION INTRAVENOUS at 09:17

## 2022-10-08 RX ADMIN — SODIUM CHLORIDE, PRESERVATIVE FREE 10 ML: 5 INJECTION INTRAVENOUS at 09:09

## 2022-10-08 RX ADMIN — SODIUM CHLORIDE, PRESERVATIVE FREE 10 ML: 5 INJECTION INTRAVENOUS at 11:15

## 2022-10-08 RX ADMIN — HEPARIN 500 UNITS: 100 SYRINGE at 11:14

## 2022-10-09 ENCOUNTER — HOSPITAL ENCOUNTER (OUTPATIENT)
Dept: INFUSION THERAPY | Age: 72
Setting detail: INFUSION SERIES
Discharge: HOME | End: 2022-10-09
Payer: MEDICARE

## 2022-10-09 VITALS
BODY MASS INDEX: 27.89 KG/M2 | HEIGHT: 67 IN | HEART RATE: 56 BPM | DIASTOLIC BLOOD PRESSURE: 72 MMHG | SYSTOLIC BLOOD PRESSURE: 146 MMHG | OXYGEN SATURATION: 100 % | RESPIRATION RATE: 20 BRPM | WEIGHT: 177.7 LBS | TEMPERATURE: 96.5 F

## 2022-10-09 DIAGNOSIS — A41.9 BACTERIAL SEPSIS (HCC): Primary | ICD-10-CM

## 2022-10-09 LAB — TOTAL CK: 574 U/L (ref 20–200)

## 2022-10-09 PROCEDURE — 2580000003 HC RX 258: Performed by: INTERNAL MEDICINE

## 2022-10-09 PROCEDURE — 6360000002 HC RX W HCPCS: Performed by: INTERNAL MEDICINE

## 2022-10-09 PROCEDURE — 82550 ASSAY OF CK (CPK): CPT

## 2022-10-09 PROCEDURE — 36592 COLLECT BLOOD FROM PICC: CPT

## 2022-10-09 PROCEDURE — 96365 THER/PROPH/DIAG IV INF INIT: CPT

## 2022-10-09 RX ORDER — HEPARIN SODIUM (PORCINE) LOCK FLUSH IV SOLN 100 UNIT/ML 100 UNIT/ML
500 SOLUTION INTRAVENOUS PRN
Status: DISCONTINUED | OUTPATIENT
Start: 2022-10-09 | End: 2022-10-10 | Stop reason: HOSPADM

## 2022-10-09 RX ORDER — DIPHENHYDRAMINE HYDROCHLORIDE 50 MG/ML
50 INJECTION INTRAMUSCULAR; INTRAVENOUS
Status: CANCELLED | OUTPATIENT
Start: 2022-10-10

## 2022-10-09 RX ORDER — HEPARIN SODIUM (PORCINE) LOCK FLUSH IV SOLN 100 UNIT/ML 100 UNIT/ML
500 SOLUTION INTRAVENOUS PRN
Status: CANCELLED | OUTPATIENT
Start: 2022-10-10

## 2022-10-09 RX ORDER — DIPHENHYDRAMINE HCL 25 MG
50 TABLET ORAL ONCE
Status: CANCELLED
Start: 2022-10-10 | End: 2022-10-10

## 2022-10-09 RX ORDER — FLUTICASONE FUROATE, UMECLIDINIUM BROMIDE AND VILANTEROL TRIFENATATE 200; 62.5; 25 UG/1; UG/1; UG/1
1 POWDER RESPIRATORY (INHALATION) DAILY
COMMUNITY
Start: 2022-07-11

## 2022-10-09 RX ORDER — SODIUM CHLORIDE 9 MG/ML
5-250 INJECTION, SOLUTION INTRAVENOUS PRN
Status: CANCELLED | OUTPATIENT
Start: 2022-10-10

## 2022-10-09 RX ORDER — SODIUM CHLORIDE 0.9 % (FLUSH) 0.9 %
5-40 SYRINGE (ML) INJECTION PRN
Status: CANCELLED | OUTPATIENT
Start: 2022-10-10

## 2022-10-09 RX ORDER — SODIUM CHLORIDE 0.9 % (FLUSH) 0.9 %
5-40 SYRINGE (ML) INJECTION PRN
Status: DISCONTINUED | OUTPATIENT
Start: 2022-10-09 | End: 2022-10-10 | Stop reason: HOSPADM

## 2022-10-09 RX ORDER — EPINEPHRINE 1 MG/ML
0.3 INJECTION, SOLUTION, CONCENTRATE INTRAVENOUS PRN
Status: CANCELLED | OUTPATIENT
Start: 2022-10-10

## 2022-10-09 RX ADMIN — HEPARIN 500 UNITS: 100 SYRINGE at 10:09

## 2022-10-09 RX ADMIN — HEPARIN 500 UNITS: 100 SYRINGE at 10:57

## 2022-10-09 RX ADMIN — SODIUM CHLORIDE, PRESERVATIVE FREE 10 ML: 5 INJECTION INTRAVENOUS at 10:21

## 2022-10-09 RX ADMIN — SODIUM CHLORIDE, PRESERVATIVE FREE 20 ML: 5 INJECTION INTRAVENOUS at 10:08

## 2022-10-09 RX ADMIN — DAPTOMYCIN 500 MG: 500 INJECTION, POWDER, LYOPHILIZED, FOR SOLUTION INTRAVENOUS at 10:26

## 2022-10-09 RX ADMIN — SODIUM CHLORIDE, PRESERVATIVE FREE 10 ML: 5 INJECTION INTRAVENOUS at 10:57

## 2022-10-10 ENCOUNTER — HOSPITAL ENCOUNTER (OUTPATIENT)
Dept: INFUSION THERAPY | Age: 72
Setting detail: INFUSION SERIES
Discharge: HOME | End: 2022-10-10
Payer: MEDICARE

## 2022-10-10 VITALS
OXYGEN SATURATION: 100 % | RESPIRATION RATE: 18 BRPM | SYSTOLIC BLOOD PRESSURE: 131 MMHG | TEMPERATURE: 98.5 F | DIASTOLIC BLOOD PRESSURE: 55 MMHG | HEART RATE: 50 BPM

## 2022-10-10 DIAGNOSIS — A41.9 BACTERIAL SEPSIS (HCC): Primary | ICD-10-CM

## 2022-10-10 LAB
ALBUMIN SERPL-MCNC: 3.6 G/DL (ref 3.5–5.2)
ALP BLD-CCNC: 114 U/L (ref 40–129)
ALT SERPL-CCNC: 18 U/L (ref 0–40)
ANION GAP SERPL CALCULATED.3IONS-SCNC: 11 MMOL/L (ref 7–16)
ANISOCYTOSIS: ABNORMAL
AST SERPL-CCNC: 34 U/L (ref 0–39)
BASOPHILS ABSOLUTE: 0.03 E9/L (ref 0–0.2)
BASOPHILS RELATIVE PERCENT: 0.3 % (ref 0–2)
BILIRUB SERPL-MCNC: 0.5 MG/DL (ref 0–1.2)
BUN BLDV-MCNC: 13 MG/DL (ref 6–23)
C-REACTIVE PROTEIN: 3.5 MG/DL (ref 0–0.4)
CALCIUM SERPL-MCNC: 9.2 MG/DL (ref 8.6–10.2)
CHLORIDE BLD-SCNC: 103 MMOL/L (ref 98–107)
CO2: 28 MMOL/L (ref 22–29)
CREAT SERPL-MCNC: 0.8 MG/DL (ref 0.7–1.2)
EOSINOPHILS ABSOLUTE: 0.03 E9/L (ref 0.05–0.5)
EOSINOPHILS RELATIVE PERCENT: 0.3 % (ref 0–6)
GFR AFRICAN AMERICAN: >60
GFR NON-AFRICAN AMERICAN: >60 ML/MIN/1.73
GLUCOSE BLD-MCNC: 225 MG/DL (ref 74–99)
HCT VFR BLD CALC: 34 % (ref 37–54)
HEMOGLOBIN: 10.2 G/DL (ref 12.5–16.5)
IMMATURE GRANULOCYTES #: 0.05 E9/L
IMMATURE GRANULOCYTES %: 0.6 % (ref 0–5)
LYMPHOCYTES ABSOLUTE: 0.4 E9/L (ref 1.5–4)
LYMPHOCYTES RELATIVE PERCENT: 4.7 % (ref 20–42)
MCH RBC QN AUTO: 27.3 PG (ref 26–35)
MCHC RBC AUTO-ENTMCNC: 30 % (ref 32–34.5)
MCV RBC AUTO: 90.9 FL (ref 80–99.9)
MONOCYTES ABSOLUTE: 0.33 E9/L (ref 0.1–0.95)
MONOCYTES RELATIVE PERCENT: 3.8 % (ref 2–12)
NEUTROPHILS ABSOLUTE: 7.76 E9/L (ref 1.8–7.3)
NEUTROPHILS RELATIVE PERCENT: 90.3 % (ref 43–80)
OVALOCYTES: ABNORMAL
PDW BLD-RTO: 17.9 FL (ref 11.5–15)
PLATELET # BLD: 126 E9/L (ref 130–450)
PMV BLD AUTO: 12 FL (ref 7–12)
POIKILOCYTES: ABNORMAL
POLYCHROMASIA: ABNORMAL
POTASSIUM SERPL-SCNC: 3.9 MMOL/L (ref 3.5–5)
RBC # BLD: 3.74 E12/L (ref 3.8–5.8)
SEDIMENTATION RATE, ERYTHROCYTE: 23 MM/HR (ref 0–15)
SODIUM BLD-SCNC: 142 MMOL/L (ref 132–146)
TEAR DROP CELLS: ABNORMAL
TOTAL CK: 427 U/L (ref 20–200)
TOTAL PROTEIN: 5.9 G/DL (ref 6.4–8.3)
WBC # BLD: 8.6 E9/L (ref 4.5–11.5)

## 2022-10-10 PROCEDURE — 86140 C-REACTIVE PROTEIN: CPT

## 2022-10-10 PROCEDURE — 80053 COMPREHEN METABOLIC PANEL: CPT

## 2022-10-10 PROCEDURE — 82550 ASSAY OF CK (CPK): CPT

## 2022-10-10 PROCEDURE — 36592 COLLECT BLOOD FROM PICC: CPT

## 2022-10-10 PROCEDURE — 6360000002 HC RX W HCPCS: Performed by: INTERNAL MEDICINE

## 2022-10-10 PROCEDURE — 85651 RBC SED RATE NONAUTOMATED: CPT

## 2022-10-10 PROCEDURE — 85025 COMPLETE CBC W/AUTO DIFF WBC: CPT

## 2022-10-10 PROCEDURE — 2580000003 HC RX 258: Performed by: INTERNAL MEDICINE

## 2022-10-10 PROCEDURE — 36415 COLL VENOUS BLD VENIPUNCTURE: CPT

## 2022-10-10 PROCEDURE — 96365 THER/PROPH/DIAG IV INF INIT: CPT

## 2022-10-10 RX ORDER — DIPHENHYDRAMINE HYDROCHLORIDE 50 MG/ML
50 INJECTION INTRAMUSCULAR; INTRAVENOUS
Status: CANCELLED | OUTPATIENT
Start: 2022-10-11

## 2022-10-10 RX ORDER — SODIUM CHLORIDE 0.9 % (FLUSH) 0.9 %
5-40 SYRINGE (ML) INJECTION PRN
Status: CANCELLED | OUTPATIENT
Start: 2022-10-11

## 2022-10-10 RX ORDER — HEPARIN SODIUM (PORCINE) LOCK FLUSH IV SOLN 100 UNIT/ML 100 UNIT/ML
500 SOLUTION INTRAVENOUS PRN
Status: CANCELLED | OUTPATIENT
Start: 2022-10-11

## 2022-10-10 RX ORDER — DIPHENHYDRAMINE HCL 25 MG
50 TABLET ORAL ONCE
Status: CANCELLED
Start: 2022-10-11 | End: 2022-10-11

## 2022-10-10 RX ORDER — SODIUM CHLORIDE 9 MG/ML
5-250 INJECTION, SOLUTION INTRAVENOUS PRN
Status: CANCELLED | OUTPATIENT
Start: 2022-10-11

## 2022-10-10 RX ORDER — SODIUM CHLORIDE 0.9 % (FLUSH) 0.9 %
5-40 SYRINGE (ML) INJECTION PRN
Status: DISCONTINUED | OUTPATIENT
Start: 2022-10-10 | End: 2022-10-11 | Stop reason: HOSPADM

## 2022-10-10 RX ORDER — HEPARIN SODIUM (PORCINE) LOCK FLUSH IV SOLN 100 UNIT/ML 100 UNIT/ML
500 SOLUTION INTRAVENOUS PRN
Status: DISCONTINUED | OUTPATIENT
Start: 2022-10-10 | End: 2022-10-11 | Stop reason: HOSPADM

## 2022-10-10 RX ORDER — EPINEPHRINE 1 MG/ML
0.3 INJECTION, SOLUTION, CONCENTRATE INTRAVENOUS PRN
Status: CANCELLED | OUTPATIENT
Start: 2022-10-11

## 2022-10-10 RX ADMIN — DAPTOMYCIN 500 MG: 500 INJECTION, POWDER, LYOPHILIZED, FOR SOLUTION INTRAVENOUS at 13:55

## 2022-10-10 RX ADMIN — SODIUM CHLORIDE, PRESERVATIVE FREE 10 ML: 5 INJECTION INTRAVENOUS at 13:56

## 2022-10-10 RX ADMIN — SODIUM CHLORIDE, PRESERVATIVE FREE 10 ML: 5 INJECTION INTRAVENOUS at 14:34

## 2022-10-10 RX ADMIN — SODIUM CHLORIDE, PRESERVATIVE FREE 10 ML: 5 INJECTION INTRAVENOUS at 13:53

## 2022-10-10 RX ADMIN — HEPARIN 500 UNITS: 100 SYRINGE at 14:34

## 2022-10-10 RX ADMIN — HEPARIN 500 UNITS: 100 SYRINGE at 13:56

## 2022-10-10 ASSESSMENT — PAIN DESCRIPTION - LOCATION: LOCATION: LEG

## 2022-10-10 ASSESSMENT — PAIN DESCRIPTION - DESCRIPTORS: DESCRIPTORS: SHARP

## 2022-10-10 ASSESSMENT — PAIN DESCRIPTION - ONSET: ONSET: GRADUAL

## 2022-10-10 ASSESSMENT — PAIN - FUNCTIONAL ASSESSMENT: PAIN_FUNCTIONAL_ASSESSMENT: PREVENTS OR INTERFERES SOME ACTIVE ACTIVITIES AND ADLS

## 2022-10-10 ASSESSMENT — PAIN DESCRIPTION - FREQUENCY: FREQUENCY: INTERMITTENT

## 2022-10-10 ASSESSMENT — PAIN DESCRIPTION - ORIENTATION: ORIENTATION: RIGHT

## 2022-10-10 ASSESSMENT — PAIN DESCRIPTION - PAIN TYPE: TYPE: ACUTE PAIN

## 2022-10-10 ASSESSMENT — PAIN SCALES - GENERAL: PAINLEVEL_OUTOF10: 6

## 2022-10-10 NOTE — PROGRESS NOTES
Dressing changed to Picc line. Large kink noted when dressing was changed. Attempted to straighten line and line did flush more easily. Repeat CK decreasing. Labs faxed to DR. Blank office.

## 2022-10-10 NOTE — PROGRESS NOTES
Spoke with Katey at [facility] to report repeat elevated CK enymes from Saturday which is 574. She will speak with the doctor and get back to us.

## 2022-10-10 NOTE — FLOWSHEET NOTE
Patient tolerated infusion well. Patient alert and oriented x3. No distress noted. Vital signs stable. Patient denies any new or worsening pain. No complaints of muscle weakness or pain. Patient educated on signs and symptoms of reaction to medication. Educated patient on possible side effects and treatment of medication. Patient verbalized understanding. Offered patient education an/or discharge material.  Patient declined. Patient denies any needs. All questions answered.

## 2022-10-11 ENCOUNTER — HOSPITAL ENCOUNTER (OUTPATIENT)
Dept: INFUSION THERAPY | Age: 72
Setting detail: INFUSION SERIES
Discharge: HOME | End: 2022-10-11
Payer: MEDICARE

## 2022-10-11 VITALS
SYSTOLIC BLOOD PRESSURE: 131 MMHG | DIASTOLIC BLOOD PRESSURE: 62 MMHG | TEMPERATURE: 98.6 F | OXYGEN SATURATION: 99 % | WEIGHT: 180.6 LBS | HEIGHT: 67 IN | BODY MASS INDEX: 28.35 KG/M2 | HEART RATE: 50 BPM | RESPIRATION RATE: 18 BRPM

## 2022-10-11 DIAGNOSIS — A41.9 BACTERIAL SEPSIS (HCC): Primary | ICD-10-CM

## 2022-10-11 PROCEDURE — 96365 THER/PROPH/DIAG IV INF INIT: CPT

## 2022-10-11 PROCEDURE — 2580000003 HC RX 258: Performed by: INTERNAL MEDICINE

## 2022-10-11 PROCEDURE — 6360000002 HC RX W HCPCS: Performed by: INTERNAL MEDICINE

## 2022-10-11 RX ORDER — SODIUM CHLORIDE 0.9 % (FLUSH) 0.9 %
5-40 SYRINGE (ML) INJECTION PRN
Status: DISCONTINUED | OUTPATIENT
Start: 2022-10-11 | End: 2022-10-12 | Stop reason: HOSPADM

## 2022-10-11 RX ORDER — DIPHENHYDRAMINE HYDROCHLORIDE 50 MG/ML
50 INJECTION INTRAMUSCULAR; INTRAVENOUS
Status: CANCELLED | OUTPATIENT
Start: 2022-10-12

## 2022-10-11 RX ORDER — SODIUM CHLORIDE 0.9 % (FLUSH) 0.9 %
5-40 SYRINGE (ML) INJECTION PRN
Status: CANCELLED | OUTPATIENT
Start: 2022-10-12

## 2022-10-11 RX ORDER — HEPARIN SODIUM (PORCINE) LOCK FLUSH IV SOLN 100 UNIT/ML 100 UNIT/ML
500 SOLUTION INTRAVENOUS PRN
Status: DISCONTINUED | OUTPATIENT
Start: 2022-10-11 | End: 2022-10-12 | Stop reason: HOSPADM

## 2022-10-11 RX ORDER — HEPARIN SODIUM (PORCINE) LOCK FLUSH IV SOLN 100 UNIT/ML 100 UNIT/ML
500 SOLUTION INTRAVENOUS PRN
Status: CANCELLED | OUTPATIENT
Start: 2022-10-12

## 2022-10-11 RX ORDER — SODIUM CHLORIDE 9 MG/ML
5-250 INJECTION, SOLUTION INTRAVENOUS PRN
Status: CANCELLED | OUTPATIENT
Start: 2022-10-12

## 2022-10-11 RX ORDER — EPINEPHRINE 1 MG/ML
0.3 INJECTION, SOLUTION, CONCENTRATE INTRAVENOUS PRN
Status: CANCELLED | OUTPATIENT
Start: 2022-10-12

## 2022-10-11 RX ORDER — DIPHENHYDRAMINE HCL 25 MG
50 TABLET ORAL ONCE
Status: CANCELLED
Start: 2022-10-12 | End: 2022-10-12

## 2022-10-11 RX ADMIN — DAPTOMYCIN 500 MG: 500 INJECTION, POWDER, LYOPHILIZED, FOR SOLUTION INTRAVENOUS at 13:43

## 2022-10-11 RX ADMIN — SODIUM CHLORIDE, PRESERVATIVE FREE 10 ML: 5 INJECTION INTRAVENOUS at 13:42

## 2022-10-11 RX ADMIN — SODIUM CHLORIDE, PRESERVATIVE FREE 10 ML: 5 INJECTION INTRAVENOUS at 14:14

## 2022-10-11 RX ADMIN — HEPARIN 500 UNITS: 100 SYRINGE at 13:39

## 2022-10-11 RX ADMIN — SODIUM CHLORIDE, PRESERVATIVE FREE 20 ML: 5 INJECTION INTRAVENOUS at 13:39

## 2022-10-11 RX ADMIN — HEPARIN 500 UNITS: 100 SYRINGE at 14:14

## 2022-10-11 NOTE — PROGRESS NOTES
Patient tolerated daptomycin infusion well. Patient alert and oriented x3. No distress noted. Vital signs stable. Patient denies any new or worsening pain. Offered patient education and/or discharge material.  Patient declined. Patient denies any needs. All questions answered. D/C with wife.

## 2022-10-12 ENCOUNTER — HOSPITAL ENCOUNTER (EMERGENCY)
Age: 72
Discharge: HOME | End: 2022-10-13
Attending: EMERGENCY MEDICINE
Payer: MEDICARE

## 2022-10-12 ENCOUNTER — HOSPITAL ENCOUNTER (OUTPATIENT)
Dept: INFUSION THERAPY | Age: 72
Setting detail: INFUSION SERIES
Discharge: HOME | End: 2022-10-12
Payer: MEDICARE

## 2022-10-12 ENCOUNTER — APPOINTMENT (OUTPATIENT)
Dept: GENERAL RADIOLOGY | Age: 72
End: 2022-10-12
Payer: MEDICARE

## 2022-10-12 ENCOUNTER — APPOINTMENT (OUTPATIENT)
Dept: CT IMAGING | Age: 72
End: 2022-10-12
Payer: MEDICARE

## 2022-10-12 VITALS
OXYGEN SATURATION: 98 % | DIASTOLIC BLOOD PRESSURE: 60 MMHG | RESPIRATION RATE: 18 BRPM | HEART RATE: 50 BPM | SYSTOLIC BLOOD PRESSURE: 127 MMHG | TEMPERATURE: 98.2 F

## 2022-10-12 DIAGNOSIS — A41.9 BACTERIAL SEPSIS (HCC): Primary | ICD-10-CM

## 2022-10-12 DIAGNOSIS — R06.00 DYSPNEA, UNSPECIFIED TYPE: Primary | ICD-10-CM

## 2022-10-12 DIAGNOSIS — J90 PLEURAL EFFUSION ON LEFT: ICD-10-CM

## 2022-10-12 LAB
ALBUMIN SERPL-MCNC: 3.5 G/DL (ref 3.5–5.2)
ALP BLD-CCNC: 111 U/L (ref 40–129)
ALT SERPL-CCNC: 16 U/L (ref 0–40)
ANION GAP SERPL CALCULATED.3IONS-SCNC: 12 MMOL/L (ref 7–16)
AST SERPL-CCNC: 24 U/L (ref 0–39)
BASOPHILS ABSOLUTE: 0 E9/L (ref 0–0.2)
BASOPHILS RELATIVE PERCENT: 0.2 % (ref 0–2)
BILIRUB SERPL-MCNC: 0.4 MG/DL (ref 0–1.2)
BUN BLDV-MCNC: 11 MG/DL (ref 6–23)
CALCIUM SERPL-MCNC: 9.4 MG/DL (ref 8.6–10.2)
CHLORIDE BLD-SCNC: 101 MMOL/L (ref 98–107)
CO2: 27 MMOL/L (ref 22–29)
CREAT SERPL-MCNC: 1 MG/DL (ref 0.7–1.2)
D DIMER: 460 NG/ML DDU
EOSINOPHILS ABSOLUTE: 0 E9/L (ref 0.05–0.5)
EOSINOPHILS RELATIVE PERCENT: 0.5 % (ref 0–6)
GFR AFRICAN AMERICAN: >60
GFR NON-AFRICAN AMERICAN: >60 ML/MIN/1.73
GLUCOSE BLD-MCNC: 122 MG/DL (ref 74–99)
HCT VFR BLD CALC: 34.9 % (ref 37–54)
HEMOGLOBIN: 10.4 G/DL (ref 12.5–16.5)
HYPOCHROMIA: ABNORMAL
LYMPHOCYTES ABSOLUTE: 0.66 E9/L (ref 1.5–4)
LYMPHOCYTES RELATIVE PERCENT: 7.8 % (ref 20–42)
MCH RBC QN AUTO: 27.2 PG (ref 26–35)
MCHC RBC AUTO-ENTMCNC: 29.8 % (ref 32–34.5)
MCV RBC AUTO: 91.4 FL (ref 80–99.9)
MONOCYTES ABSOLUTE: 0.33 E9/L (ref 0.1–0.95)
MONOCYTES RELATIVE PERCENT: 3.5 % (ref 2–12)
NEUTROPHILS ABSOLUTE: 7.39 E9/L (ref 1.8–7.3)
NEUTROPHILS RELATIVE PERCENT: 88.7 % (ref 43–80)
OVALOCYTES: ABNORMAL
PDW BLD-RTO: 18 FL (ref 11.5–15)
PLATELET # BLD: 144 E9/L (ref 130–450)
PMV BLD AUTO: 12 FL (ref 7–12)
POTASSIUM SERPL-SCNC: 4.1 MMOL/L (ref 3.5–5)
PRO-BNP: 1222 PG/ML (ref 0–125)
PROCALCITONIN: 0.05 NG/ML (ref 0–0.08)
RBC # BLD: 3.82 E12/L (ref 3.8–5.8)
SARS-COV-2, NAAT: NOT DETECTED
SODIUM BLD-SCNC: 140 MMOL/L (ref 132–146)
TOTAL PROTEIN: 6.2 G/DL (ref 6.4–8.3)
TROPONIN, HIGH SENSITIVITY: 20 NG/L (ref 0–11)
TROPONIN, HIGH SENSITIVITY: 22 NG/L (ref 0–11)
WBC # BLD: 8.3 E9/L (ref 4.5–11.5)

## 2022-10-12 PROCEDURE — 87635 SARS-COV-2 COVID-19 AMP PRB: CPT

## 2022-10-12 PROCEDURE — 93005 ELECTROCARDIOGRAM TRACING: CPT

## 2022-10-12 PROCEDURE — 83880 ASSAY OF NATRIURETIC PEPTIDE: CPT

## 2022-10-12 PROCEDURE — 85378 FIBRIN DEGRADE SEMIQUANT: CPT

## 2022-10-12 PROCEDURE — 99285 EMERGENCY DEPT VISIT HI MDM: CPT

## 2022-10-12 PROCEDURE — 2580000003 HC RX 258: Performed by: INTERNAL MEDICINE

## 2022-10-12 PROCEDURE — 6360000004 HC RX CONTRAST MEDICATION: Performed by: RADIOLOGY

## 2022-10-12 PROCEDURE — 85025 COMPLETE CBC W/AUTO DIFF WBC: CPT

## 2022-10-12 PROCEDURE — 84145 PROCALCITONIN (PCT): CPT

## 2022-10-12 PROCEDURE — 96523 IRRIG DRUG DELIVERY DEVICE: CPT

## 2022-10-12 PROCEDURE — 80053 COMPREHEN METABOLIC PANEL: CPT

## 2022-10-12 PROCEDURE — 6360000002 HC RX W HCPCS: Performed by: INTERNAL MEDICINE

## 2022-10-12 PROCEDURE — 36415 COLL VENOUS BLD VENIPUNCTURE: CPT

## 2022-10-12 PROCEDURE — 71045 X-RAY EXAM CHEST 1 VIEW: CPT

## 2022-10-12 PROCEDURE — 71275 CT ANGIOGRAPHY CHEST: CPT

## 2022-10-12 PROCEDURE — 84484 ASSAY OF TROPONIN QUANT: CPT

## 2022-10-12 RX ORDER — SODIUM CHLORIDE 0.9 % (FLUSH) 0.9 %
5-40 SYRINGE (ML) INJECTION PRN
Status: DISCONTINUED | OUTPATIENT
Start: 2022-10-12 | End: 2022-10-13 | Stop reason: HOSPADM

## 2022-10-12 RX ORDER — SODIUM CHLORIDE 0.9 % (FLUSH) 0.9 %
10 SYRINGE (ML) INJECTION PRN
Status: DISCONTINUED | OUTPATIENT
Start: 2022-10-12 | End: 2022-10-13 | Stop reason: HOSPADM

## 2022-10-12 RX ORDER — HEPARIN SODIUM (PORCINE) LOCK FLUSH IV SOLN 100 UNIT/ML 100 UNIT/ML
500 SOLUTION INTRAVENOUS PRN
Status: DISCONTINUED | OUTPATIENT
Start: 2022-10-12 | End: 2022-10-13 | Stop reason: HOSPADM

## 2022-10-12 RX ORDER — SODIUM CHLORIDE 9 MG/ML
5-250 INJECTION, SOLUTION INTRAVENOUS PRN
Status: CANCELLED | OUTPATIENT
Start: 2022-10-13

## 2022-10-12 RX ORDER — DIPHENHYDRAMINE HCL 25 MG
50 TABLET ORAL ONCE
Status: CANCELLED
Start: 2022-10-13 | End: 2022-10-13

## 2022-10-12 RX ORDER — HEPARIN SODIUM (PORCINE) LOCK FLUSH IV SOLN 100 UNIT/ML 100 UNIT/ML
500 SOLUTION INTRAVENOUS PRN
Status: CANCELLED | OUTPATIENT
Start: 2022-10-13

## 2022-10-12 RX ORDER — HEPARIN SODIUM (PORCINE) LOCK FLUSH IV SOLN 100 UNIT/ML 100 UNIT/ML
500 SOLUTION INTRAVENOUS PRN
OUTPATIENT
Start: 2022-10-12

## 2022-10-12 RX ORDER — SODIUM CHLORIDE 0.9 % (FLUSH) 0.9 %
5-40 SYRINGE (ML) INJECTION PRN
OUTPATIENT
Start: 2022-10-12

## 2022-10-12 RX ORDER — SODIUM CHLORIDE 0.9 % (FLUSH) 0.9 %
5-40 SYRINGE (ML) INJECTION PRN
Status: CANCELLED | OUTPATIENT
Start: 2022-10-13

## 2022-10-12 RX ORDER — 0.9 % SODIUM CHLORIDE 0.9 %
500 INTRAVENOUS SOLUTION INTRAVENOUS ONCE
OUTPATIENT
Start: 2022-10-12 | End: 2022-10-12

## 2022-10-12 RX ORDER — EPINEPHRINE 1 MG/ML
0.3 INJECTION, SOLUTION, CONCENTRATE INTRAVENOUS PRN
Status: CANCELLED | OUTPATIENT
Start: 2022-10-13

## 2022-10-12 RX ORDER — DIPHENHYDRAMINE HYDROCHLORIDE 50 MG/ML
50 INJECTION INTRAMUSCULAR; INTRAVENOUS
Status: CANCELLED | OUTPATIENT
Start: 2022-10-13

## 2022-10-12 RX ADMIN — SODIUM CHLORIDE, PRESERVATIVE FREE 10 ML: 5 INJECTION INTRAVENOUS at 14:10

## 2022-10-12 RX ADMIN — Medication 500 UNITS: at 14:11

## 2022-10-12 RX ADMIN — SODIUM CHLORIDE, PRESERVATIVE FREE 10 ML: 5 INJECTION INTRAVENOUS at 14:30

## 2022-10-12 RX ADMIN — Medication 500 UNITS: at 14:30

## 2022-10-12 RX ADMIN — IOPAMIDOL 60 ML: 755 INJECTION, SOLUTION INTRAVENOUS at 21:21

## 2022-10-12 ASSESSMENT — PAIN SCALES - GENERAL: PAINLEVEL_OUTOF10: 5

## 2022-10-12 ASSESSMENT — PAIN DESCRIPTION - PAIN TYPE: TYPE: ACUTE PAIN

## 2022-10-12 ASSESSMENT — PAIN DESCRIPTION - LOCATION: LOCATION: LEG

## 2022-10-12 ASSESSMENT — PAIN DESCRIPTION - ONSET: ONSET: GRADUAL

## 2022-10-12 ASSESSMENT — PAIN DESCRIPTION - DIRECTION: RADIATING_TOWARDS: NON RADIATING.

## 2022-10-12 ASSESSMENT — PAIN DESCRIPTION - ORIENTATION: ORIENTATION: RIGHT

## 2022-10-12 ASSESSMENT — PAIN DESCRIPTION - FREQUENCY: FREQUENCY: INTERMITTENT

## 2022-10-12 ASSESSMENT — PAIN DESCRIPTION - DESCRIPTORS: DESCRIPTORS: NAGGING

## 2022-10-12 ASSESSMENT — PAIN - FUNCTIONAL ASSESSMENT: PAIN_FUNCTIONAL_ASSESSMENT: PREVENTS OR INTERFERES SOME ACTIVE ACTIVITIES AND ADLS

## 2022-10-12 NOTE — PROGRESS NOTES
Patient here for his Daptomycin dose. Wife and patient are complaining that patient is short of breath with any walking even with oxygen in place. Call placed to Dr. Jinny Rea and spoke with [facility]. 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 Return call from office was to hold daptomycin today and patient will be seeing Dr. Vannessa Way today at 15:30. Line flushed and patient discharged in stable condition.

## 2022-10-12 NOTE — PROGRESS NOTES
Received a call from  [facility]. Dr. Joon Cabrera will not be able to see patient until Friday. Called infectious disease and spoke with [facility] about moving forward with daptomycin for Thursday and Friday. She will speak with Dr. Kimberli Ortez and get back to us.

## 2022-10-12 NOTE — ED NOTES
Department of Emergency Medicine  FIRST PROVIDER TRIAGE NOTE             Independent MLP           10/12/22  4:58 PM EDT    Date of Encounter: 10/12/22   MRN: [id number]      HPI: Debi Gardner is a 67 y.o. male who presents to the ED for Shortness of Breath (Pt c/o SoB for the past 2 days. He was told to come to the ED by his ID Dr. Kaleigh Perera his SoB is baseline)   Sent by ID    ROS: Negative for cp or vomiting. PE: Gen Appearance/Constitutional: alert  Musculoskeletal: moves all extremities x 4     Initial Plan of Care: All treatment areas with department are currently occupied. Plan to order/Initiate the following while awaiting opening in ED: labs and EKG.   Initiate Treatment-Testing, Proceed toTreatment Area When Bed Available for ED Attending/ANGP to Continue Care    Electronically signed by LELO Lynn CNP   DD: 10/12/22      LELO Peterson CNP  10/12/22 5676

## 2022-10-13 ENCOUNTER — HOSPITAL ENCOUNTER (OUTPATIENT)
Dept: INFUSION THERAPY | Age: 72
Setting detail: INFUSION SERIES
Discharge: HOME | End: 2022-10-13
Payer: MEDICARE

## 2022-10-13 VITALS
DIASTOLIC BLOOD PRESSURE: 61 MMHG | OXYGEN SATURATION: 99 % | SYSTOLIC BLOOD PRESSURE: 110 MMHG | TEMPERATURE: 98.2 F | RESPIRATION RATE: 20 BRPM

## 2022-10-13 VITALS
DIASTOLIC BLOOD PRESSURE: 62 MMHG | HEART RATE: 61 BPM | OXYGEN SATURATION: 98 % | TEMPERATURE: 97.9 F | SYSTOLIC BLOOD PRESSURE: 148 MMHG | RESPIRATION RATE: 20 BRPM

## 2022-10-13 DIAGNOSIS — A41.9 BACTERIAL SEPSIS (HCC): Primary | ICD-10-CM

## 2022-10-13 LAB
ALBUMIN SERPL-MCNC: 3.6 G/DL (ref 3.5–5.2)
ALP BLD-CCNC: 101 U/L (ref 40–129)
ALT SERPL-CCNC: 14 U/L (ref 0–40)
ANION GAP SERPL CALCULATED.3IONS-SCNC: 10 MMOL/L (ref 7–16)
ANISOCYTOSIS: ABNORMAL
AST SERPL-CCNC: 20 U/L (ref 0–39)
BASOPHILS ABSOLUTE: 0 E9/L (ref 0–0.2)
BASOPHILS RELATIVE PERCENT: 0.3 % (ref 0–2)
BILIRUB SERPL-MCNC: 0.6 MG/DL (ref 0–1.2)
BUN BLDV-MCNC: 9 MG/DL (ref 6–23)
CALCIUM SERPL-MCNC: 9.4 MG/DL (ref 8.6–10.2)
CHLORIDE BLD-SCNC: 98 MMOL/L (ref 98–107)
CO2: 30 MMOL/L (ref 22–29)
CREAT SERPL-MCNC: 0.7 MG/DL (ref 0.7–1.2)
EOSINOPHILS ABSOLUTE: 0.2 E9/L (ref 0.05–0.5)
EOSINOPHILS RELATIVE PERCENT: 2.6 % (ref 0–6)
GFR AFRICAN AMERICAN: >60
GFR NON-AFRICAN AMERICAN: >60 ML/MIN/1.73
GLUCOSE BLD-MCNC: 135 MG/DL (ref 74–99)
HCT VFR BLD CALC: 33.3 % (ref 37–54)
HEMOGLOBIN: 10 G/DL (ref 12.5–16.5)
LYMPHOCYTES ABSOLUTE: 0.08 E9/L (ref 1.5–4)
LYMPHOCYTES RELATIVE PERCENT: 0.9 % (ref 20–42)
MCH RBC QN AUTO: 27.2 PG (ref 26–35)
MCHC RBC AUTO-ENTMCNC: 30 % (ref 32–34.5)
MCV RBC AUTO: 90.5 FL (ref 80–99.9)
MONOCYTES ABSOLUTE: 0.45 E9/L (ref 0.1–0.95)
MONOCYTES RELATIVE PERCENT: 6.1 % (ref 2–12)
NEUTROPHILS ABSOLUTE: 6.75 E9/L (ref 1.8–7.3)
NEUTROPHILS RELATIVE PERCENT: 90.4 % (ref 43–80)
OVALOCYTES: ABNORMAL
PDW BLD-RTO: 17.8 FL (ref 11.5–15)
PLATELET # BLD: 124 E9/L (ref 130–450)
PMV BLD AUTO: 11.2 FL (ref 7–12)
POIKILOCYTES: ABNORMAL
POTASSIUM SERPL-SCNC: 3.7 MMOL/L (ref 3.5–5)
RBC # BLD: 3.68 E12/L (ref 3.8–5.8)
SODIUM BLD-SCNC: 138 MMOL/L (ref 132–146)
TEAR DROP CELLS: ABNORMAL
TOTAL PROTEIN: 5.9 G/DL (ref 6.4–8.3)
WBC # BLD: 7.5 E9/L (ref 4.5–11.5)

## 2022-10-13 PROCEDURE — 6360000002 HC RX W HCPCS: Performed by: INTERNAL MEDICINE

## 2022-10-13 PROCEDURE — 80053 COMPREHEN METABOLIC PANEL: CPT

## 2022-10-13 PROCEDURE — 36591 DRAW BLOOD OFF VENOUS DEVICE: CPT

## 2022-10-13 PROCEDURE — 36592 COLLECT BLOOD FROM PICC: CPT

## 2022-10-13 PROCEDURE — 85025 COMPLETE CBC W/AUTO DIFF WBC: CPT

## 2022-10-13 PROCEDURE — 96365 THER/PROPH/DIAG IV INF INIT: CPT

## 2022-10-13 PROCEDURE — 2580000003 HC RX 258: Performed by: INTERNAL MEDICINE

## 2022-10-13 RX ORDER — SODIUM CHLORIDE 0.9 % (FLUSH) 0.9 %
5-40 SYRINGE (ML) INJECTION PRN
Status: DISCONTINUED | OUTPATIENT
Start: 2022-10-13 | End: 2022-10-14 | Stop reason: HOSPADM

## 2022-10-13 RX ORDER — SODIUM CHLORIDE 9 MG/ML
5-250 INJECTION, SOLUTION INTRAVENOUS PRN
Status: CANCELLED | OUTPATIENT
Start: 2022-10-14

## 2022-10-13 RX ORDER — DIPHENHYDRAMINE HYDROCHLORIDE 50 MG/ML
50 INJECTION INTRAMUSCULAR; INTRAVENOUS
Status: CANCELLED | OUTPATIENT
Start: 2022-10-14

## 2022-10-13 RX ORDER — EPINEPHRINE 1 MG/ML
0.3 INJECTION, SOLUTION, CONCENTRATE INTRAVENOUS PRN
Status: CANCELLED | OUTPATIENT
Start: 2022-10-14

## 2022-10-13 RX ORDER — HEPARIN SODIUM (PORCINE) LOCK FLUSH IV SOLN 100 UNIT/ML 100 UNIT/ML
500 SOLUTION INTRAVENOUS PRN
Status: CANCELLED | OUTPATIENT
Start: 2022-10-14

## 2022-10-13 RX ORDER — DIPHENHYDRAMINE HCL 25 MG
50 TABLET ORAL ONCE
Status: CANCELLED
Start: 2022-10-14 | End: 2022-10-14

## 2022-10-13 RX ORDER — HEPARIN SODIUM (PORCINE) LOCK FLUSH IV SOLN 100 UNIT/ML 100 UNIT/ML
500 SOLUTION INTRAVENOUS PRN
Status: DISCONTINUED | OUTPATIENT
Start: 2022-10-13 | End: 2022-10-14 | Stop reason: HOSPADM

## 2022-10-13 RX ORDER — SODIUM CHLORIDE 0.9 % (FLUSH) 0.9 %
5-40 SYRINGE (ML) INJECTION PRN
Status: CANCELLED | OUTPATIENT
Start: 2022-10-14

## 2022-10-13 RX ADMIN — DAPTOMYCIN 500 MG: 500 INJECTION, POWDER, LYOPHILIZED, FOR SOLUTION INTRAVENOUS at 12:22

## 2022-10-13 RX ADMIN — SODIUM CHLORIDE, PRESERVATIVE FREE 10 ML: 5 INJECTION INTRAVENOUS at 12:26

## 2022-10-13 RX ADMIN — SODIUM CHLORIDE, PRESERVATIVE FREE 10 ML: 5 INJECTION INTRAVENOUS at 12:22

## 2022-10-13 RX ADMIN — SODIUM CHLORIDE, PRESERVATIVE FREE 10 ML: 5 INJECTION INTRAVENOUS at 12:58

## 2022-10-13 ASSESSMENT — ENCOUNTER SYMPTOMS
SHORTNESS OF BREATH: 1
RHINORRHEA: 0
ABDOMINAL PAIN: 0
COUGH: 0
DIARRHEA: 0
PHOTOPHOBIA: 0
TROUBLE SWALLOWING: 0
VOICE CHANGE: 0
NAUSEA: 0
VOMITING: 0

## 2022-10-13 NOTE — DISCHARGE INSTRUCTIONS
Thank you for the opportunity to serve in your medical care today. Please be sure to take your normal prescribed medication as directed. Follow up with your doctor is critical for optimal healing. Should you have any new or worsening symptoms, please return to the Emergency Department for further work-up and evaluation.

## 2022-10-13 NOTE — PROGRESS NOTES
Patient tolerated daptomycin infusion well. Patient alert and oriented x3. No distress noted. Vital signs stable. Patient denies any new or worsening pain. Offered patient education an/or discharge material.  Patient declined. Patient denies any needs. All questions answered. PICC was removed per order without complications.

## 2022-10-13 NOTE — PROGRESS NOTES
Spoke to Vini hannon with ID office. She states per Dr. [place] Escort patient ok to get dose of dapto today and then stop antibiotics and pull PICC line. She will send the order over to us.

## 2022-10-13 NOTE — ED PROVIDER NOTES
HPI   Patient is a 62-year-old male with past medical history of COPD, squamous carcinoma of the lung not an active chemo, pancytopenia, high degree AV block status post pacemaker placement, hypertension, hyperlipidemia and recent prolonged admission for bacteremia presenting to the emergency department due to shortness of breath. Patient states that he was recently hospitalized for over 50 days due to a bloodstream infection. He was discharged last week. Patient had pacemaker and Mediport removed during this admission and had a PICC line placed for intravenous antibiotics after discharge. Patient states that he has been getting infusions regularly with antibiotics prescribed by Dr. Elizabeth Oliver his infectious disease doctor. Patient has been on daptomycin 500 mg IV daily. Apparently, patient was at the infusion center today and had mentioned that he had been short of breath. As such, Dr. Elizabeth Oliver was notified and advised that the patient come to the emergency department for further evaluation due to his history of lung cancer. Patient generally follows with Dr. Destini Howard for pulmonology. Apparently, his chemotherapy treatment for his lung cancer has been on hold ever since his recent hospitalization. Patient has been on chronic oxygen at home at 3 L ever since his discharge from the hospital.  Patient does note that he is short of breath at baseline due to his chronic conditions. On initial evaluation in the ED, he is noting that he is not more short of breath than normal.  Patient did not actually want to come to the emergency department but came at the advice of his specialist.  Patient otherwise is denying any other associated symptoms including chest pain, nausea, vomiting, diaphoresis, cough, and congestion, sore throat, lightheadedness, dizziness, syncope, myalgias, abdominal pain, urinary symptoms, constipation or diarrhea. His symptoms are mild to moderate with no remitting or exacerbating factors. Patient denying any recent sick contacts. On initial evaluation, he is in no acute respiratory distress. Patient is breathing comfortably on his normal oxygen requirements at 3 L. Bedside SPO2 appropriate. Review of Systems   Constitutional:  Negative for chills and fever. HENT:  Negative for congestion, rhinorrhea, trouble swallowing and voice change. Eyes:  Negative for photophobia and visual disturbance. Respiratory:  Positive for shortness of breath. Negative for cough. Cardiovascular:  Negative for chest pain and palpitations. Gastrointestinal:  Negative for abdominal pain, diarrhea, nausea and vomiting. Endocrine: Negative for polydipsia, polyphagia and polyuria. Genitourinary:  Negative for dysuria, flank pain, frequency, hematuria and urgency. Musculoskeletal:  Negative for arthralgias and myalgias. Skin:  Negative for rash and wound. Neurological:  Negative for dizziness, numbness and headaches. Psychiatric/Behavioral:  Negative for behavioral problems and confusion. Physical Exam  Constitutional:       General: He is not in acute distress. Appearance: Normal appearance. He is not ill-appearing. HENT:      Head: Normocephalic and atraumatic. Right Ear: External ear normal.      Left Ear: External ear normal.      Nose: Nose normal.      Comments: Nasal cannula in place. Mouth/Throat:      Mouth: Mucous membranes are moist.      Pharynx: Oropharynx is clear. Eyes:      Pupils: Pupils are equal, round, and reactive to light. Cardiovascular:      Rate and Rhythm: Normal rate and regular rhythm. Pulses: Normal pulses. Heart sounds: Normal heart sounds. Pulmonary:      Effort: Pulmonary effort is normal. No tachypnea or respiratory distress. Breath sounds: No stridor. Decreased breath sounds present. No wheezing or rales. Comments: Diminished breath sounds globally likely secondary to body habitus and poor inspiratory effort.   More diminished in the left lung base. Chest:      Chest wall: No mass. Abdominal:      Palpations: Abdomen is soft. Tenderness: There is no abdominal tenderness. There is no guarding or rebound. Musculoskeletal:         General: Normal range of motion. Cervical back: Normal range of motion and neck supple. Right lower leg: No tenderness. No edema. Left lower leg: No tenderness. No edema. Skin:     General: Skin is warm and dry. Capillary Refill: Capillary refill takes less than 2 seconds. Neurological:      General: No focal deficit present. Mental Status: He is alert and oriented to person, place, and time. Cranial Nerves: No cranial nerve deficit. Coordination: Coordination normal.   Psychiatric:         Mood and Affect: Mood normal.         Behavior: Behavior normal.        Procedures   EKG: This EKG is signed and interpreted by me. Paced rhythm. Ventricular rate between 60 and 70 bpm.  No evidence of acute STEMI. Intervals normal.  Changes compared to previous EKG. MDM   Patient is a 69-year-old male with past medical history of COPD, squamous carcinoma of the lung not an active chemo, pancytopenia, high degree AV block status post pacemaker placement, hypertension, hyperlipidemia and recent prolonged admission for bacteremia presenting to the emergency department due to shortness of breath. On initial evaluation, patient is well-appearing, afebrile, hemodynamically stable and in no acute distress. Blood pressure mildly elevated at 186/66. No concerning physical exam findings noted. Lungs were clear to auscultation bilaterally with no wheezes, rales or rhonchi. Lung sounds diminished globally but more prominent in the left lower lung. No respiratory distress noted. Patient was breathing comfortably on his normal oxygen requirements at 3 L.  SpO2 appropriate on bedside monitor. Work-up in the emergency department generally unremarkable.   D-dimer was elevated at 460 however CTA pulmonary negative for acute PE.  CT scan does make note of moderate left pleural effusion which is slightly increased from previous studies. Cardiac work-up negative. Initial troponin 20 with a repeat of 22, delta 2. Patient denying any active chest pain. Troponin elevation likely type II in the setting of demand from shortness of breath and oxygen requirements. Patient denying any active chest pain. Patient was ambulated in the emergency department and was not hypoxic or complaining of worsening symptoms. Work-up results were discussed with the patient at length including findings of left pleural effusion. Patient states that he is aware of the pleural effusion and generally follows with Dr. Grover Marquez. After shared decision making, plan was for discharge with close outpatient pulmonology and PCP follow-up as we discussed. Patient states that he has an appointment with pulmonology next week that he will keep. Discussed strict return precautions in case of new or worsening symptoms. Patient discharged in stable condition. ED Course as of 10/13/22 0101   Wed Oct 12, 2022   2254 Patient ambulated while walking and pulse stayed above 96% on his chronic oxygen requirements at 3 L. Patient was not complaining of worsening SOB. [PP]   9141 Long discussion with the patient regarding work-up results from today. Patient is aware of the pleural effusion found on his CT scan. He states that he follows with Dr. Grover Marquez for pulmonology and has an appointment in 1 week. Patient states that he is not feeling more short of breath and his symptoms have been improving while in the ED. After shared decision making, plan is for discharge close outpatient PCP and pulmonology follow-up as we discussed. Patient was given strict return precautions in case of new or worsening symptoms.  [PP]      ED Course User Index  [PP] Beatrice Tolliver DO      --------------------------------------------- PAST HISTORY ---------------------------------------------  Past Medical History:  has a past medical history of CHB (complete heart block) ([facility] 75.), COPD (chronic obstructive pulmonary disease) ([facility] 75.), Syncope, and Syncope. Past Surgical History:  has a past surgical history that includes Colonoscopy; hernia repair (Bilateral, 2011 2001); bronchoscopy (N/A, 05/27/2021); bronchoscopy (N/A, 05/27/2021); bronchoscopy (N/A, 05/27/2021); Pacemaker insertion (07/20/2021); Port Surgery (Right, 08/20/2022); transesophageal echocardiogram (08/22/2022); Pacemaker Change (N/A, 08/25/2022); Cardiac catheterization (N/A, 08/25/2022); and Ventricular cardiac pacer insertion (N/A, 08/29/2022). Social History:  reports that he quit smoking about 10 years ago. His smoking use included cigarettes. He has a 50.00 pack-year smoking history. He has been exposed to tobacco smoke. He has never used smokeless tobacco. He reports that he does not currently use alcohol after a past usage of about 1.0 standard drink per week. He reports that he does not use drugs. Family History: family history includes Cancer in his father and paternal uncle. The patients home medications have been reviewed. Allergies: Patient has no known allergies.     -------------------------------------------------- RESULTS -------------------------------------------------  Labs:  Results for orders placed or performed during the hospital encounter of 10/12/22   COVID-19, Rapid    Specimen: Nasopharyngeal Swab   Result Value Ref Range    SARS-CoV-2, NAAT Not Detected Not Detected   CBC with Auto Differential   Result Value Ref Range    WBC 8.3 4.5 - 11.5 E9/L    RBC 3.82 3.80 - 5.80 E12/L    Hemoglobin 10.4 (L) 12.5 - 16.5 g/dL    Hematocrit 34.9 (L) 37.0 - 54.0 %    MCV 91.4 80.0 - 99.9 fL    MCH 27.2 26.0 - 35.0 pg    MCHC 29.8 (L) 32.0 - 34.5 %    RDW 18.0 (H) 11.5 - 15.0 fL    Platelets 686 205 - 944 E9/L    MPV 12.0 7.0 - 12.0 fL    Neutrophils % 88.7 (H) 43.0 - 80.0 %    Lymphocytes % 7.8 (L) 20.0 - 42.0 %    Monocytes % 3.5 2.0 - 12.0 %    Eosinophils % 0.5 0.0 - 6.0 %    Basophils % 0.2 0.0 - 2.0 %    Neutrophils Absolute 7.39 (H) 1.80 - 7.30 E9/L    Lymphocytes Absolute 0.66 (L) 1.50 - 4.00 E9/L    Monocytes Absolute 0.33 0.10 - 0.95 E9/L    Eosinophils Absolute 0.00 (L) 0.05 - 0.50 E9/L    Basophils Absolute 0.00 0.00 - 0.20 E9/L    Hypochromia 1+     Ovalocytes 1+    Comprehensive Metabolic Panel   Result Value Ref Range    Sodium 140 132 - 146 mmol/L    Potassium 4.1 3.5 - 5.0 mmol/L    Chloride 101 98 - 107 mmol/L    CO2 27 22 - 29 mmol/L    Anion Gap 12 7 - 16 mmol/L    Glucose 122 (H) 74 - 99 mg/dL    BUN 11 6 - 23 mg/dL    Creatinine 1.0 0.7 - 1.2 mg/dL    GFR Non-African American >60 >=60 mL/min/1.73    GFR African American >60     Calcium 9.4 8.6 - 10.2 mg/dL    Total Protein 6.2 (L) 6.4 - 8.3 g/dL    Albumin 3.5 3.5 - 5.2 g/dL    Total Bilirubin 0.4 0.0 - 1.2 mg/dL    Alkaline Phosphatase 111 40 - 129 U/L    ALT 16 0 - 40 U/L    AST 24 0 - 39 U/L   Troponin   Result Value Ref Range    Troponin, High Sensitivity 20 (H) 0 - 11 ng/L   Brain Natriuretic Peptide   Result Value Ref Range    Pro-BNP 1,222 (H) 0 - 125 pg/mL   D-Dimer, Quantitative   Result Value Ref Range    D-Dimer, Quant 460 ng/mL DDU   Procalcitonin   Result Value Ref Range    Procalcitonin 0.05 0.00 - 0.08 ng/mL   Troponin   Result Value Ref Range    Troponin, High Sensitivity 22 (H) 0 - 11 ng/L       Radiology:  CTA PULMONARY W CONTRAST   Final Result   No evidence of pulmonary embolism. Left perihilar mass again noted, similar to slightly increased in size   compared to the prior examination. Narrowing of left lower lobe bronchials   and pulmonary arteries. Previously described spiculated right lower lobe nodule appears subsolid on   today's study. Moderate left pleural effusion.          XR CHEST PORTABLE   Final Result   Continued left-sided pleural effusion with bibasilar atelectasis and or   infiltrate. There are obstructive airway disease possible scarring at the lung bases as   well. There is no significant interval change from 3 October 2022.             ------------------------- NURSING NOTES AND VITALS REVIEWED ---------------------------  Date / Time Roomed:  10/12/2022  5:27 PM  ED Bed Assignment:  [id number]    The nursing notes within the ED encounter and vital signs as below have been reviewed. BP (!) 159/70   Pulse 65   Temp 97.9 °F (36.6 °C) (Temporal)   Resp 21   SpO2 92%   Oxygen Saturation Interpretation: Normal      ------------------------------------------ PROGRESS NOTES ------------------------------------------  I have spoken with the patient and discussed todays results, in addition to providing specific details for the plan of care and counseling regarding the diagnosis and prognosis. Their questions are answered at this time and they are agreeable with the plan. I discussed at length with them reasons for immediate return here for re evaluation. They will followup with primary care by calling their office tomorrow. --------------------------------- ADDITIONAL PROVIDER NOTES ---------------------------------  At this time the patient is without objective evidence of an acute process requiring hospitalization or inpatient management. They have remained hemodynamically stable throughout their entire ED visit and are stable for discharge with outpatient follow-up. The plan has been discussed in detail and they are aware of the specific conditions for emergent return, as well as the importance of follow-up. New Prescriptions    No medications on file       Diagnosis:  1. Dyspnea, unspecified type    2. Pleural effusion on left        Disposition:  Patient's disposition: Discharge to home  Patient's condition is stable.          Carmelo Berry DO  Resident  10/13/22 2042

## 2022-10-13 NOTE — ED NOTES
Patient and wife verbalized understanding of dc and follow up orders as well as medication administration. Patient ambulated to wheelchair, using home oxygen, and left ed with wife, no further questions, nad.       Mabel Nair RN  10/13/22 [id number]

## 2022-10-14 ENCOUNTER — OFFICE VISIT (OUTPATIENT)
Dept: NON INVASIVE DIAGNOSTICS | Age: 72
End: 2022-10-14
Payer: MEDICARE

## 2022-10-14 VITALS
WEIGHT: 178 LBS | OXYGEN SATURATION: 93 % | BODY MASS INDEX: 27.94 KG/M2 | HEIGHT: 67 IN | SYSTOLIC BLOOD PRESSURE: 128 MMHG | RESPIRATION RATE: 22 BRPM | DIASTOLIC BLOOD PRESSURE: 78 MMHG | HEART RATE: 80 BPM

## 2022-10-14 DIAGNOSIS — R06.89 DYSPNEA AND RESPIRATORY ABNORMALITIES: Primary | ICD-10-CM

## 2022-10-14 DIAGNOSIS — Z95.0 CARDIAC PACEMAKER IN SITU: ICD-10-CM

## 2022-10-14 DIAGNOSIS — R06.00 DYSPNEA AND RESPIRATORY ABNORMALITIES: Primary | ICD-10-CM

## 2022-10-14 PROCEDURE — 99214 OFFICE O/P EST MOD 30 MIN: CPT | Performed by: INTERNAL MEDICINE

## 2022-10-14 PROCEDURE — G8484 FLU IMMUNIZE NO ADMIN: HCPCS | Performed by: INTERNAL MEDICINE

## 2022-10-14 PROCEDURE — 1036F TOBACCO NON-USER: CPT | Performed by: INTERNAL MEDICINE

## 2022-10-14 PROCEDURE — G8417 CALC BMI ABV UP PARAM F/U: HCPCS | Performed by: INTERNAL MEDICINE

## 2022-10-14 PROCEDURE — 1123F ACP DISCUSS/DSCN MKR DOCD: CPT | Performed by: INTERNAL MEDICINE

## 2022-10-14 PROCEDURE — 3017F COLORECTAL CA SCREEN DOC REV: CPT | Performed by: INTERNAL MEDICINE

## 2022-10-14 PROCEDURE — 93279 PRGRMG DEV EVAL PM/LDLS PM: CPT | Performed by: INTERNAL MEDICINE

## 2022-10-14 PROCEDURE — G8427 DOCREV CUR MEDS BY ELIG CLIN: HCPCS | Performed by: INTERNAL MEDICINE

## 2022-10-14 NOTE — PROGRESS NOTES
Cardiac Electrophysiology Outpatient Progress Note    Osborne Brittle  1950  Date of Service: 10/14/2022  Referring Provider/PCP: Yoan Pyle MD  Chief Complaint:   Dyspnea on exertion  Leadless pacemaker in situ        Patient Active Problem List    Diagnosis Date Noted    Hematoma 08/30/2022     Priority: Medium    Presence of leadless cardiac pacemaker 08/30/2022     Priority: Medium    High-grade atrioventricular block 08/27/2022     Priority: Medium    Second degree AV block, Mobitz type I 08/27/2022     Priority: Medium    Bacteremia 08/23/2022     Priority: Medium    Pacemaker infection ([address] 08/22/2022     Priority: Medium    Sepsis due to Enterococcus ([address] 08/22/2022     Priority: Medium    Pancytopenia ([address] 08/17/2022     Priority: Medium    COVID-19 05/25/2022     Priority: Medium    Bacterial sepsis ([address] 10/05/2022     Overview Note:     Diagnosis updated to problem list by Yousif Pabon, [facility], [address] 10/5/2022 2:02 PM, due to new billing requirements, based on transcribed order from Dr. Jacky Barrientos. Squamous carcinoma of lung ([address] 09/18/2021    History pulmonary embolism ([address] on Eliquis 09/18/2021    Hyponatremia 09/18/2021    Hypomagnesemia 09/18/2021    Wide-complex tachycardia 09/18/2021    Acute on chronic respiratory failure with hypoxia ([address] 09/17/2021    Cardiac pacemaker in situ 07/20/2021    Intermittent complete heart block ([address] 07/19/2021    Pneumonia 01/31/2019    COPD (chronic obstructive pulmonary disease) ([address] 01/31/2019    BPH (benign prostatic hyperplasia) 01/31/2019    HTN (hypertension) 01/31/2019    HLD (hyperlipidemia) 01/31/2019    Syncope and collapse 01/18/2019       Current Outpatient Medications   Medication Sig Dispense Refill    OXYGEN Inhale 3 L/min into the lungs continuous      apixaban (ELIQUIS) 5 MG TABS tablet Take 5 mg by mouth 2 times daily      gabapentin (NEURONTIN) 100 MG capsule Take 100 mg by mouth in the morning and at bedtime. melatonin 3 MG TABS tablet Take 3 mg by mouth daily      potassium chloride (MICRO-K) 10 MEQ extended release capsule Take 10 mEq by mouth daily      metoprolol succinate (TOPROL XL) 25 MG extended release tablet Take 1 tablet by mouth 2 times daily (with meals) 30 tablet 3    furosemide (LASIX) 20 MG tablet Take 1 tablet by mouth daily 60 tablet 3    guaiFENesin 400 MG tablet Take 1 tablet by mouth in the morning, at noon, and at bedtime (Patient taking differently: Take 400 mg by mouth daily) 56 tablet 0    predniSONE (DELTASONE) 5 MG tablet Take 5 mg by mouth daily      albuterol (ACCUNEB) 0.63 MG/3ML nebulizer solution Take 1 ampule by nebulization every 6 hours as needed for Wheezing      albuterol sulfate HFA (PROVENTIL;VENTOLIN;PROAIR) 108 (90 Base) MCG/ACT inhaler Inhale 2 puffs into the lungs every 6 hours as needed for Wheezing      Fluticasone-Umeclidin-Vilant (TRELEGY ELLIPTA) 200-62.5-25 MCG/INH AEPB Inhale 1 puff into the lungs daily Once the anoro ellipta is gone (Patient not taking: Reported on 10/14/2022)      DAPTOmycin (CUBICIN) 500 MG injection Infuse 500 mg intravenously daily (Patient not taking: Reported on 10/14/2022)      atorvastatin (LIPITOR) 20 MG tablet Take 1 tablet by mouth daily (Patient not taking: Reported on 10/14/2022) 30 tablet 3     No current facility-administered medications for this visit. No Known Allergies    SUBJECTIVE: Cara Johnson presents to the office today for follow-up of his symptoms of progressive CURTIS after his recent discharge from the hospital.  He has a history of hypertension, hyperlipidemia, COPD related to long-term smoking habit, lung cancer and is post pacemaker implant in July 2021 for episodes of complete heart block noted on a loop recorder. He was hospitalized this year, August 2022 when he was also found to have enterococcal bacteremia and his-year-old pacemaker had to be extracted.   He then underwent placement of a leadless pacemaker for intermittent complete heart block noted during that hospitalization. He received 6 weeks of IV antibiotics and was just released from [facility] approximately 7 to 10 days ago. His activity level has not improved very much and the patient and his wife tell me that he was quite sedentary even in select specialty. He has now been noticing increasing shortness of breath even with mild activity. Recent CT of the chest has shown a pleural effusion and the patient is scheduled to see Dr. Sarah Mayo in 2 weeks. He is here today to have his pacemaker checked as well as any other cardiac interventions that might be needed. No dyspnea at rest  No chest pain  No syncope or near syncope  ROS:   Constitutional: Negative for fever, +activity change and appetite change. HENT: Negative for epistaxis, difficulty swallowing. Eyes: Negative for blurred vision or double vision. Respiratory: Negative for cough, chest tightness, shortness of breath and wheezing. Cardiovascular: Negative for chest pain, palpitations and leg swelling. Gastrointestinal: Negative for abdominal pain, heartburn, or blood in stool. Genitourinary: Negative for hematuria, burning or frequency. Musculoskeletal: Negative for myalgias, stiffness, or swelling. Skin: Negative for rash, pain, or lumps. Neurological: Negative for syncope, seizures, or headaches. Psychiatric/Behavioral: Negative for confusion and agitation. The patient is not nervous/anxious. PHYSICAL EXAM:  Vitals:    10/14/22 1156   BP: 128/78   Site: Left Upper Arm   Position: Sitting   Cuff Size: Medium Adult   Pulse: 80   Resp: 22   SpO2: 93%   Weight: 178 lb (80.7 kg)   Height: 5' 7\" (1.702 m)     Constitutional: Oriented to person, place, and time. Well-developed and cooperative. Head: Normocephalic and atraumatic. Eyes: Conjunctivae are normal. Pupils are equal, round, and reactive to light. Neck: No hepatojugular reflux and no JVD present. Cardiovascular: Normally placed PMI, normal S1 and S2 with left sternal border and the apex, grade 2/6 systolic ejection murmur at the aortic area and left sternal border  Pulmonary/Chest: Effort normal and breath sounds normal. No respiratory distress. Abdominal: Soft. Normal appearance and bowel sounds are normal.    Musculoskeletal: Normal range of motion of all extremities, no muscle weakness. Neurological: Alert and oriented to person, place, and time. Gait normal.   Skin: Skin is warm and dry. No bruising, no ecchymosis and no rash noted. Extremity: No clubbing or cyanosis. No edema. Psychiatric: Normal mood and affect.  Thought content normal.     Pertinent Labs:  CBC:   WBC   Date Value Ref Range Status   10/13/2022 7.5 4.5 - 11.5 E9/L Final   10/12/2022 8.3 4.5 - 11.5 E9/L Final   10/10/2022 8.6 4.5 - 11.5 E9/L Final     Hemoglobin   Date Value Ref Range Status   10/13/2022 10.0 (L) 12.5 - 16.5 g/dL Final   10/12/2022 10.4 (L) 12.5 - 16.5 g/dL Final   10/10/2022 10.2 (L) 12.5 - 16.5 g/dL Final     Hematocrit   Date Value Ref Range Status   10/13/2022 33.3 (L) 37.0 - 54.0 % Final   10/12/2022 34.9 (L) 37.0 - 54.0 % Final   10/10/2022 34.0 (L) 37.0 - 54.0 % Final     Platelets   Date Value Ref Range Status   10/13/2022 124 (L) 130 - 450 E9/L Final   10/12/2022 144 130 - 450 E9/L Final   10/10/2022 126 (L) 130 - 450 E9/L Final     BMP:   Lab Results   Component Value Date/Time     10/13/2022 12:28 PM    K 3.7 10/13/2022 12:28 PM    K 3.3 09/03/2022 06:00 AM    CL 98 10/13/2022 12:28 PM    CO2 30 10/13/2022 12:28 PM    BUN 9 10/13/2022 12:28 PM    CREATININE 0.7 10/13/2022 12:28 PM    GLUCOSE 135 10/13/2022 12:28 PM    CALCIUM 9.4 10/13/2022 12:28 PM      ABGs: No results found for: PHART, PO2ART, FVX2OSN  INR:   Lab Results   Component Value Date    INR 1.2 08/24/2022    INR 1.0 05/27/2021    INR 1.0 01/30/2019     BNP: No results found for: BNP  TSH:   Lab Results   Component Value Date    TSH 0.771 07/21/2021      Cardiac Injury Profile: Total CK   Date Value Ref Range Status   10/10/2022 427 (H) 20 - 200 U/L Final     Troponin   Date Value Ref Range Status   10/29/2020 <0.01 0.00 - 0.03 ng/mL Final     Comment:     TROPONIN T BLOOD LEVELS:         0.03 ng/mL     Upper Reference Limit  0.04 - 0.09 ng/mL     Possible myocardial injury      >= 0.10 ng/mL     Myocardial injury       Lipid Profile:   Lab Results   Component Value Date/Time    TRIG 52 05/18/2019 04:19 AM    HDL 74 05/18/2019 04:19 AM    LDLCALC 64 05/18/2019 04:19 AM    CHOL 148 05/18/2019 04:19 AM      Hemoglobin A1C:   Lab Results   Component Value Date    LABA1C 6.5 (H) 09/17/2021         Pertinent Cardiac Testing:     ECG 10/14/2022: NSR,1st degree AVB,RBBB    ECHOCARDIOGRAM:    No evidence for hemodynamically significant pericardial effusion. Ejection fraction is visually estimated at 65%. No regional wall motion abnormalities seen. Normal right ventricle structure and function. Signature      ----------------------------------------------------------------   Electronically signed by Adrianna Lay DO(Interpreting   physician) on 09/13/2022 10:20 AM    Device Evaluation    Make/model Medtronic Micra  Mode-- VDD     R waves   17.1    MV     Impedance    470     Ohms   Pacing threshold   0.5 V@  0.24   msec   Pacing percentage       V  48%--Am--38%,--18.6%    Battery longevity-->8 years,3.16 V  Arrhythmias--none      Evaluation and reprogramming included   Overall device function is normal  All  device programmable settings were evaluated per above, along with iterative adjustments (capture thresholds) to assess and select the most appropriate final programming for consistent delivery off the appropriate therapy and to verify function of the device.         I have independently reviewed all of the ECGs and rhythm strips per above    I have personally reviewed the laboratory, cardiac diagnostic and radiographic testing as outlined above:      Impression:     1. Cardiac pacemaker in situ--dual-chamber pacemaker placed originally in 2021  Currently has a Medtronic leadless Micra(AV)  Device function is appropriate as noted above    2. Lung cancer, squamous cell-stage IIIb  Patient treated with concurrent chemo and radiation therapy in 2021  - 11/2/21 consolidated with Imfinzi (Durvalumab) per ID IV every 4 weeks   - 6/30/22 PET showing progression and was started on Carbo, Gemzar and Keytruda on 6/30/22  - 8/9 /22 received C2D8 of Carboplatin AUC 2 with Gemzar 1000 mg/m2 IV on D1,8 and Keytruda IV on D1 on a 21 day cycle    3. COPD--long-term smoking habit  Most recent CT scan shows  Impression   No evidence of pulmonary embolism. Left perihilar mass again noted, similar to slightly increased in size   compared to the prior examination. Narrowing of left lower lobe bronchials   and pulmonary arteries. Previously described spiculated right lower lobe nodule appears subsolid on   today's study. Moderate left pleural effusion. 4.  Hypertension, longstanding--currently controlled on current medical therapy      Recommendations:    1. Pulmonary evaluation for evacuation of the pleural effusion  2. Pacemaker check in this office in 3 months  3. Patient referred to cardiac rehab to increase activity under supervision. I have spent a total of 40-45 minutes with the patient and the family reviewing the above stated recommendations. And a total of >50% of that time involved face-to-face time providing counseling and or coordination of care with the other providers, reviewing records/tests, counseling/education of the patient, ordering medications/tests/procedures, coordinating care, and documenting clinical information in the EHR. Thank you for allowing me to participate in your patient's care.     Denise Santos MD, [facility]  Cardiac Electrophysiology  [facility] ([facility] Physicians  [facility] [place]: [facility] [facility]

## 2022-10-17 ENCOUNTER — HOSPITAL ENCOUNTER (OUTPATIENT)
Dept: INFUSION THERAPY | Age: 72
Setting detail: INFUSION SERIES
End: 2022-10-17

## 2022-10-17 LAB
EKG ATRIAL RATE: 105 BPM
EKG P-R INTERVAL: 96 MS
EKG Q-T INTERVAL: 256 MS
EKG QRS DURATION: 136 MS
EKG QTC CALCULATION (BAZETT): 425 MS
EKG R AXIS: 135 DEGREES
EKG T AXIS: -23 DEGREES
EKG VENTRICULAR RATE: 166 BPM

## 2022-10-24 NOTE — DISCHARGE INSTRUCTIONS
Visit Discharge/Physician Orders    Discharge condition: Stable    Assessment of pain at discharge: minimal    Anesthetic used: none    Discharge to: Home    Left via:Private automobile    Accompanied by: n/a    ECF/HHA:     Dressing Orders: no open wounds. May use aquaphor/lotion with vitamin E for dry skin. Wear compression socks daily    Treatment Orders:    [address] [place] [address],[address] followup visit _________call as needed____________________  (Please note your next appointment above and if you are unable to keep, kindly give a 24 hour notice. Thank you.)    Physician signature:__________________________      If you experience any of the following, please call the [facility] during business hours:    * Increase in Pain  * Temperature over 101  * Increase in drainage from your wound  * Drainage with a foul odor  * Bleeding  * Increase in swelling  * Need for compression bandage changes due to slippage, breakthrough drainage. If you need medical attention outside of the business hours of the [facility] please contact your PCP or go to the nearest emergency room.

## 2022-10-27 ENCOUNTER — HOSPITAL ENCOUNTER (OUTPATIENT)
Dept: RADIATION ONCOLOGY | Age: 72
Discharge: HOME | End: 2022-10-27
Attending: RADIOLOGY
Payer: MEDICARE

## 2022-10-27 VITALS
OXYGEN SATURATION: 95 % | WEIGHT: 176.9 LBS | TEMPERATURE: 97.9 F | RESPIRATION RATE: 18 BRPM | HEART RATE: 52 BPM | SYSTOLIC BLOOD PRESSURE: 120 MMHG | BODY MASS INDEX: 27.71 KG/M2 | DIASTOLIC BLOOD PRESSURE: 54 MMHG

## 2022-10-27 DIAGNOSIS — C34.82 MALIGNANT NEOPLASM OF OVERLAPPING SITES OF LEFT LUNG (HCC): Primary | ICD-10-CM

## 2022-10-27 DIAGNOSIS — Z92.3 S/P RADIATION THERAPY > 12 WKS AGO: ICD-10-CM

## 2022-10-27 PROCEDURE — 99213 OFFICE O/P EST LOW 20 MIN: CPT | Performed by: NURSE PRACTITIONER

## 2022-10-27 PROCEDURE — 99213 OFFICE O/P EST LOW 20 MIN: CPT

## 2022-10-27 RX ORDER — ATORVASTATIN CALCIUM 20 MG/1
20 TABLET, FILM COATED ORAL DAILY
COMMUNITY

## 2022-10-27 RX ORDER — MULTIVIT WITH MINERALS/LUTEIN
250 TABLET ORAL DAILY
COMMUNITY

## 2022-10-27 NOTE — PROGRESS NOTES
Ronnie Vergara  10/27/2022  1:08 PM      Vitals:    10/27/22 1304   BP: (!) 120/54   Pulse: 52   Resp: 18   Temp: 97.9 °F (36.6 °C)   SpO2: 95%    : Wt Readings from Last 3 Encounters:   10/27/22 176 lb 14.4 oz (80.2 kg)   10/14/22 178 lb (80.7 kg)   10/11/22 180 lb 9.6 oz (81.9 kg)                Current Outpatient Medications:     atorvastatin (LIPITOR) 20 MG tablet, Take 20 mg by mouth daily, Disp: , Rfl:     Multiple Vitamin (MULTIVITAMIN ADULT PO), Take by mouth, Disp: , Rfl:     Ascorbic Acid (VITAMIN C) 250 MG tablet, Take 250 mg by mouth daily, Disp: , Rfl:     OXYGEN, Inhale 3 L/min into the lungs continuous, Disp: , Rfl:     Fluticasone-Umeclidin-Vilant (TRELEGY ELLIPTA) 200-62.5-25 MCG/INH AEPB, Inhale 1 puff into the lungs daily Once the anoro ellipta is gone (Patient not taking: No sig reported), Disp: , Rfl:     apixaban (ELIQUIS) 5 MG TABS tablet, Take 5 mg by mouth 2 times daily, Disp: , Rfl:     DAPTOmycin (CUBICIN) 500 MG injection, Infuse 500 mg intravenously daily (Patient not taking: Reported on 10/14/2022), Disp: , Rfl:     gabapentin (NEURONTIN) 100 MG capsule, Take 100 mg by mouth in the morning and at bedtime. , Disp: , Rfl:     melatonin 3 MG TABS tablet, Take 3 mg by mouth daily, Disp: , Rfl:     potassium chloride (MICRO-K) 10 MEQ extended release capsule, Take 10 mEq by mouth daily, Disp: , Rfl:     metoprolol succinate (TOPROL XL) 25 MG extended release tablet, Take 1 tablet by mouth 2 times daily (with meals), Disp: 30 tablet, Rfl: 3    furosemide (LASIX) 20 MG tablet, Take 1 tablet by mouth daily, Disp: 60 tablet, Rfl: 3    guaiFENesin 400 MG tablet, Take 1 tablet by mouth in the morning, at noon, and at bedtime (Patient taking differently: Take 400 mg by mouth daily), Disp: 56 tablet, Rfl: 0    predniSONE (DELTASONE) 5 MG tablet, Take 5 mg by mouth daily, Disp: , Rfl:     albuterol (ACCUNEB) 0.63 MG/3ML nebulizer solution, Take 1 ampule by nebulization every 6 hours as needed for Wheezing, Disp: , Rfl:     albuterol sulfate HFA (PROVENTIL;VENTOLIN;PROAIR) 108 (90 Base) MCG/ACT inhaler, Inhale 2 puffs into the lungs every 6 hours as needed for Wheezing, Disp: , Rfl:       Patient is seen today in follow up with his wife, Helio Uribe, for cT2cN2 NSCLC of the left lung stage IIIB with Nolan LIND NP. The patient received concurrent chemoradiation therapy from 07/06/2021-09/07/2021 directed to left lung mass and lymph nodes, total dose 30 fractions/ 6000 cGY. From 08/16-09/03/2022, the patient was admitted to the hospital with sepsis, and his pacemaker and medi-port were removed. He is following with Dr Isabella Rangel for medical oncology and was just seen in his office last week. Patient reports that he is doing well since completing RT and recovering from his inpatient hospital stay. All questions and concerns were addressed from a nursing perspective and both the patient and his wife verbalized understanding. FALLS RISK SCREENING ASSESSMENT    Instructions:  Assess the patient and enter the appropriate indicators that are present for fall risk identification. Total the numbers entered and assign a fall risk score from Table 2.  Reassess patient at a minimum every 12 weeks or with status change. Assessment   Date  10/27/2022     1. Mental Ability: confusion/cognitively impaired No - 0       2. Elimination Issues: incontinence, frequency No - 0       3. Ambulatory: use of assistive devices (walker, cane, off-loading devices), attached to equipment (IV pole, oxygen) Yes - 2     4. Sensory Limitations: dizziness, vertigo, impaired vision Yes - 3       5. Age 72 years or greater - 1       10. Medication: diuretics, strong analgesics, hypnotics, sedatives, antihypertensive agents   Yes - 3   7. Falls:  recent history of falls within the last 3 months (not to include slipping or tripping)   No - 0   TOTAL 9    If score of 4 or greater was education given?  Yes       TABLE 2   Risk Score Risk Level Plan of Care   0-3 Little or  No Risk 1. Provide assistance as indicated for ambulation activities  2. Reorient confused/cognitively impaired patient  3. Call-light/bell within patient's reach  4. Chair/bed in low position, stretcher/bed with siderails up except when performing patient care activities  5. Educate patient/family/caregiver on falls prevention  6.  Reassess in 12 weeks or with any noted change in patient condition which places them at a risk for a fall   4-6 Moderate Risk 1. Provide assistance as indicated for ambulation activities  2. Reorient confused/cognitively impaired patient  3. Call-light/bell within patient's reach  4. Chair/bed in low position, stretcher/bed with siderails up except when performing patient care activities  5. Educate patient/family/caregiver on falls prevention  6. Falls risk precaution (Yellow sticker Level II) placed on patient chart   7 or   Higher High Risk 1. Place patient in easily observable treatment room  2. Patient attended at all times by family member or staff  3. Provide assistance as indicated for ambulation activities  4. Reorient confused/cognitively impaired patient  5. Call-light/bell within patient's reach  6. Chair/bed in low position, stretcher/bed with siderails up except when performing patient care activities  7. Educate patient/family/caregiver on falls prevention  8. Falls risk precaution (Yellow sticker Level III) placed on patient chart           MALNUTRITION RISK SCREENING ASSESSMENT    10/27/2022   Patient:  Maria Teresa Mabry  Sex:  male    Instructions:  Assess the patient and enter the appropriate indicators that are present for nutrition risk identification. Total the numbers entered and assign a risk score. Follow the appropriate action for total score listed below. Assessment   Date  10/27/2022     Have you lost weight without trying? 0- No     Have you been eating poorly because of a decreased appetite? 0- No   3. Do you have a diagnosis of head and neck cancer? 0- No                                                                                    TOTAL 0          Score of 0-1: No action  Score 2 or greater:   For Non-Diabetic Patient: Recommend adding Ensure Complete 2 x daily and provide patient with Ensure wellness bag with coupons  For Diabetic Patient: Recommend adding Glucerna Shake 2 x daily and provide patient with Glucerna Wellness bag with coupons  Route to the dietitian via Douglas Dickens RN

## 2022-10-27 NOTE — PROGRESS NOTES
Radiation Oncology   6 Month Follow Up Note    10/27/2022     Colton Rizo  Vitals:    10/27/22 1304   BP: (!) 120/54   Pulse: 52   Resp: 18   Temp: 97.9 °F (36.6 °C)   SpO2: 95%     Wt Readings from Last 1 Encounters:   10/27/22 176 lb 14.4 oz (80.2 kg)     CC: Patient is here for 6 month Radiation Oncology follow-up visit. Diagnosis: NSCLC of the left lung. HPI:  Colton Rizo is a pleasant 67 y.o. male with a past medical history HTN, syncope, CHB, S/p pacemaker insertion, former tobacco smoker, COPD, RLL PE and anticoagulation with Eliquis. The patient had abnormal CXR 05/03/2021. CT chest  05/06/2021 showed a 3.4 cm left hilar mass with mild mediastinal adenopathy. No additional nodules. Severe emphysema changes. On 05/272021 underwent EBUS bronchoscopy: the left bronchial tree revealed circumferential narrowing, intrinsic compressing mass, obstruction in the area of the EAN, LINGULA and LLL. 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 06/11/2021 PET/CT scan abnormal metabolic activity within the know left perihilar lung region associated with multiple essentially normal sized mediastinal lymph nodes but with abnormal metabolic activity extending from the right paratracheal region inferiorly to the precarinal region as well as subcarinal. The patient treated with concurrent chemoradiation therapy from 07/06/2021- 09/07/2021. XRT directed to left lung mass + LNs, total dose 6000 cGy / 30 fractions. After completing concurrent chemoradiation therapy the patient presented to ER on 09/17/2021 with complaints of shortness of breath and weakness. CTA chest revealed subsegmental acute pulmonary embolus in the RLL pulmonary arterial circulation. Also 2 focal mass lesions in the LLL, one focal mass lesion is more centrally located in the posterior lateral basal segment of the LLL, it measures 2 x 1.5 cm.  The other focal lesion is located in the left infrahilar region relate with this abnormal segment of the LLL, it measures 2 x 1.5 cm (Left hilar mass previously measured 3.4 x 3.3 x 2.6 cm on CT scan 05/06/2021). Diagnosed with RLL pulmonary emboli, started on anticoagulation with Eliquis. Discharged to home on 09/22/2021. The patient seen in Radiation Oncology symptom management visit on 10/22/2021, at that visit patient reports SOB with activity at baseline and productive cough of clear mucous. The patient see again in 6  month Radiation Oncology follow-up visit on 04/04/2022 at this visit patient rep[orts chronic SOB with exertion as unchanged. The patient was receiving Imfinzi per Medical Oncology. The patient is seen today in 6 month Radiation Oncology follow-up visit, he is accompanied by his wife Mare Rosales into the exam room. The patient and his wife report patient hospitalized extended time (around 7  weeks) due to COVID-19 pneumonia, bacteremia with sepsis. The patient diagnosed with E faecalis bacteremia, had removal of his Mediport and pacemaker. Had placement of PICC line for IV antibiotics ordered per Infectious disease. On 08/29/2022 had placement of leadless pacemaker implantation. The patient [facility] prior to being discharged to home. Today the patient reports SOB with activity and easily fatigued due to deconditioning from prolonged hospitalization. The patient has follow-up with EP cardiology and has been referred to cardiac rehab. Most recent CT CHEST complete 09/21/2022 (during admission at [facility]): show a new 9 mm average axial diameter spiculated pulmonary nodule in the right lower lobe suspicious for neoplasm. The patient informs me due to progressive disease Imfinzi has been discontinued and he has been started on Keytruda, Gemzar and Carboplatin under direction of Medical Oncology.  The patient informs me he is receiving chemo-immunotherapy 2  weeks on, 1 week off reoccurring pattern. Patient is following with:    Nely Farias. Most recent office visit progress note unavailable for my review. Pulmonary- Dr. Larnell Riedel. Appointment scheduled tomorrow 10/28/2022.      EP/Cardiology- Dr. Vipul Pryor      Past Medical History:   Diagnosis Date    CHB (complete heart block) ([facility] [address] 07/2021    COPD (chronic obstructive pulmonary disease) ([facility] [address]     Syncope 09/2020    dr Nava Staff wearing a monitor    Syncope 07/2021       Past Surgical History:   Procedure Laterality Date    BRONCHOSCOPY N/A 05/27/2021    BRONCHOSCOPY W/EBUS FNA performed by Alfonso Singh MD at [address] [facility] N/A 05/27/2021    BRONCHOSCOPY DIAGNOSTIC OR CELL [address] ONLY performed by Alfonso Singh MD at [address] [facility] N/A 05/27/2021    BRONCHOSCOPY ADD ON COMPUTER ASSISTED performed by Alfonso Singh MD at [address] [facility] N/A 08/25/2022    CARDIAC LASER LEAD EXCHANGE performed by Baron Rizwana MD at [address] Bilateral 2011 2001    groin     PACEMAKER CHANGE N/A 08/25/2022    CARDIAC LASER LEAD EXTRACTION, LASER LEAD EXTRACTION LEFT SIDED DEVICE, PACEMAKER DEVICE EXTRACTION performed by Baron Rizwana MD at [address] [facility]  07/20/2021    DUAL PPM  (ABDULLAHI)  DR. Shi Moya    PORT SURGERY Right 08/20/2022    PORT REMOVAL performed by Kelly Hancock MD at [address]    TRANSESOPHAGEAL ECHOCARDIOGRAM  08/22/2022    Dr Alexandra Bishop N/A 08/29/2022    Medtronic Micra AV Leadless PPM Insertion (Dr. Vipul Pryor)         Social History     Socioeconomic History    Marital status:      Spouse name: Juanita Lindo    Number of children: 0    Years of education: Not on file    Highest education level: Not on file   Occupational History    Not on file   Tobacco Use    Smoking status: Former     Packs/day: 2.00     Years: 25.00     Pack years: 50.00     Types: Cigarettes Quit date: 12/26/2011     Years since quitting: 10.8     Passive exposure: Past    Smokeless tobacco: Never   Vaping Use    Vaping Use: Never used   Substance and Sexual Activity    Alcohol use: Not Currently     Alcohol/week: 1.0 standard drink     Types: 1 Glasses of wine per week     Comment: 1 glass of wine per day prior    Drug use: No    Sexual activity: Yes     Partners: Female   Other Topics Concern    Not on file   Social History Narrative    2 cups coffee daily. Social Determinants of Health     Financial Resource Strain: Not on file   Food Insecurity: Not on file   Transportation Needs: Not on file   Physical Activity: Not on file   Stress: Not on file   Social Connections: Not on file   Intimate Partner Violence: Not on file   Housing Stability: Not on file         Family History   Problem Relation Age of Onset    Cancer Father         prostate    Cancer Paternal Uncle         anal       Allergies:   Patient has no known allergies. Current Outpatient Medications   Medication Sig Dispense Refill    atorvastatin (LIPITOR) 20 MG tablet Take 20 mg by mouth daily      Multiple Vitamin (MULTIVITAMIN ADULT PO) Take by mouth      Ascorbic Acid (VITAMIN C) 250 MG tablet Take 250 mg by mouth daily      OXYGEN Inhale 3 L/min into the lungs continuous      Fluticasone-Umeclidin-Vilant (TRELEGY ELLIPTA) 200-62.5-25 MCG/INH AEPB Inhale 1 puff into the lungs daily Once the anoro ellipta is gone      apixaban (ELIQUIS) 5 MG TABS tablet Take 5 mg by mouth 2 times daily      gabapentin (NEURONTIN) 100 MG capsule Take 100 mg by mouth in the morning and at bedtime.       melatonin 3 MG TABS tablet Take 3 mg by mouth daily      potassium chloride (MICRO-K) 10 MEQ extended release capsule Take 10 mEq by mouth daily      metoprolol succinate (TOPROL XL) 25 MG extended release tablet Take 1 tablet by mouth 2 times daily (with meals) 30 tablet 3    furosemide (LASIX) 20 MG tablet Take 1 tablet by mouth daily 60 tablet 3    predniSONE (DELTASONE) 5 MG tablet Take 5 mg by mouth daily      albuterol (ACCUNEB) 0.63 MG/3ML nebulizer solution Take 1 ampule by nebulization every 6 hours as needed for Wheezing      albuterol sulfate HFA (PROVENTIL;VENTOLIN;PROAIR) 108 (90 Base) MCG/ACT inhaler Inhale 2 puffs into the lungs every 6 hours as needed for Wheezing       No current facility-administered medications for this encounter. REVIEW OF SYSTEMS:    Constitutional:  No fever, chills or rigors. Eyes: No changes in vision. No eye discharge or pain  ENT: No headaches, hearing loss or vertigo. No mouth sores or sore throat. No change in taste or smell. Cardiovascular: No chest discomfort or palpitations. No loss of consciousness or phlebitis. Respiratory: + SOB with activity. No productive cough or hemoptysis. No wheezing or pleuritic pain. Gastrointestinal: No abdominal pain, appetite loss, blood in stools. No change in bowel habits. No hematemesis   Genitourinary: Patient acknowledges no dysuria, trouble voiding, or hematuria. No increased frequency. Musculoskeletal: + weakness, deconditioned after prolonged hospitalization. No gait disturbance or joint complaints. Integumentary: No rash or pruritis. Neurological: No headache, diplopia or syncope episodes. No change in gait, balance, coordination, mood, affect, memory, mentation, behavior. Psychiatric: No anxiety, or depression. Endocrine: No temperature intolerance. No excessive thirst, fluid intake, or urination. No tremor. Hematologic/Lymphatic: No abnormal bruising or bleeding, blood clots or swollen lymph nodes. Allergic/Immunologic: No nasal congestion or hives. PHYSICAL EXAMINATION:   Vitals:    10/27/22 1304   BP: (!) 120/54   Pulse: 52   Resp: 18   Temp: 97.9 °F (36.6 °C)   TempSrc: Skin   SpO2: 95%   Weight: 176 lb 14.4 oz (80.2 kg)       Body mass index is 27.71 kg/m². Appearance: Well-developed, well-nourished 67year old male.    Skin: Warm and dry, no rash or hives. Head: Normocephalic, atraumatic  Eyes: EOMI, no conjunctival erythema  ENMT: No pharyngeal erythema, MMM, no rhinorrhea. Neck: Supple, no elevated JVP  Lungs: Clear to auscultation bilaterally. No wheezes, rales or rhonchi. Cardiac: Regular rate and rhythm, +S1S2, no murmurs apparent  Abdomen: Soft, round and non-tender. Bowel sounds present x 4. Extremities: Moves all extremities x 4, no lower extremity edema  Neurologic: Alert and oriented x 3. No focal motor deficits apparent         IMAGING:    Most recent CT chest dated 09/09/2022 and CTA pulmonary dated 10/12/2022 results reviewed. ASSESSMENT/PLAN:    cT2 cN2 NSCLC. S/p concurrent chemoradiation therapy completion 09/07/2021. Most recent CT chest revealed progressive disease-  new RLL lung nodule. The patient now receiving Keytruda, Gemzar and Carboplatin per Medical Oncology. Imaging as per Medical Oncology. Recent prolonged hospitalization due to bacteremia sepsis requiring removal of pacemaker and Mediport. S/p placement of leadless pacemaker implantation per EP Cardiology. The patient with deconditioning post hospitalization has been referred to Cardiac Rehab per EP Cardiology. I discussed follow up plans with Wolfgang Mariscal. At this time, Continue Oncology follow-up with primary Medical Oncologist Dr. Grace Carpio. See Radiation Oncology on as needed basis and/or re-referral for future radiotherapy treatments as clinically indicated. Wolfgang Mariscal instructed to follow up with other physicians involved in their care as directed (including but not limited to Medical Oncology, Pulmonary, EP Cardiology, Infectious disease and Primary Care). The patient was given our contact number in the event that if at any time they change their mind and would like to return to the clinic to see either myself or one of the Radiation Oncologists, they can simply call us and we would be happy to see them.         Thank you for involving us in the management of this extremely pleasant patient. More than 25 min was in direct contact with pt coordinating/giving care. >50% of the visit was spent in counseling the pt on the following: Follow up care    The nurses notes were reviewed and incorporated into this assessment and plan.         Warren Medina, MSN, APRN-CNP  Certified Nurse Practitioner for [address] [place] [address] Claudio: [phone]/ F: [phone]   [facility] Claudio: [phone] / F: [phone]

## 2022-10-31 ENCOUNTER — HOSPITAL ENCOUNTER (OUTPATIENT)
Dept: WOUND CARE | Age: 72
Discharge: HOME | End: 2022-10-31
Payer: MEDICARE

## 2022-10-31 VITALS
HEART RATE: 57 BPM | TEMPERATURE: 97.7 F | SYSTOLIC BLOOD PRESSURE: 157 MMHG | DIASTOLIC BLOOD PRESSURE: 57 MMHG | RESPIRATION RATE: 18 BRPM

## 2022-10-31 PROCEDURE — 99212 OFFICE O/P EST SF 10 MIN: CPT

## 2022-10-31 NOTE — PROGRESS NOTES
Wound Healing Center  History and Physical/Consultation  Podiatry    Referring Physician : Lydia Neely MD  [address] RECORD NUMBER:  [id number]  AGE: 67 y.o. GENDER: male  : 1950  EPISODE DATE:  10/31/2022  Subjective:     Chief Complaint   Patient presents with    Wound Check         HISTORY of PRESENT ILLNESS HPI     Becky Phan is a 67 y.o. male who presents today for wound/ulcer evaluation. History of Wound Context:  The patient has had a wound of bilateral legs which was first noted approximately >1 month. This has been treated with offloading. On their initial visit to the wound healing center, 10/31/22 ,  the patient has noted that the wound has been improving. The patient has not had similar previous wounds in the past.      Pt is not on abx at time of initial visit.       Wound/Ulcer Pain Timing/Severity: none  Quality of pain: N/A  Severity:  0 / 10   Modifying Factors: None  Associated Signs/Symptoms: edema    Ulcer Identification:  Ulcer Type: venous  Contributing Factors: venous stasis    Diabetic/Pressure/Non Pressure Ulcers onl y:  Ulcer: N/A    If patient has diabetic lower extremity wounds  Jones Classification of diabetic lower extremity wounds:    Grade Description   []  0 No open wound   []  1 Superficial ulcer involving the full skin thickness   []  2 Deep ulcer involves ligament, tendon, joint capsule, or fascia  No bone involvement or abscess presence   []  3 Deep Ulcer with abcess formation and/or osteomyelitis   []  4 Localized gangrene   []  5 Extensive gangrene of the foot     Wound: N/A        PAST MEDICAL HISTORY      Diagnosis Date    CHB (complete heart block) ([facility] [address] 2021    COPD (chronic obstructive pulmonary disease) ([facility] [address]     Syncope 2020    dr Jamison Bales wearing a monitor    Syncope 2021     Past Surgical History:   Procedure Laterality Date    BRONCHOSCOPY N/A 2021    BRONCHOSCOPY W/EBUS FNA performed by Ketty Baker MD at [facility] ENDOSCOPY    BRONCHOSCOPY N/A 05/27/2021    BRONCHOSCOPY DIAGNOSTIC OR CELL [address] ONLY performed by Wagner Murray MD at [address] [place] [address] N/A 05/27/2021    BRONCHOSCOPY ADD ON COMPUTER ASSISTED performed by Wagner Murray MD at [address] [facility] N/A 08/25/2022    CARDIAC LASER LEAD EXCHANGE performed by Deidra Parham MD at [address] [facility] [address] Bilateral 2011 2001    groin     PACEMAKER CHANGE N/A 08/25/2022    CARDIAC LASER LEAD EXTRACTION, LASER LEAD EXTRACTION LEFT SIDED DEVICE, PACEMAKER DEVICE EXTRACTION performed by Deidra Parham MD at [address]  07/20/2021    DUAL PPM  (ABDULLAHI)  DR. Claire Willson    PORT SURGERY Right 08/20/2022    PORT REMOVAL performed by Michael Díaz MD at [address]    TRANSESOPHAGEAL ECHOCARDIOGRAM  08/22/2022    Dr Paulino Harp N/A 08/29/2022    Medtronic Micra AV Leadless PPM Insertion (Dr. Maite Nelson)     Family History   Problem Relation Age of Onset    Cancer Father         prostate    Cancer Paternal Uncle         anal     Social History     Tobacco Use    Smoking status: Former     Packs/day: 2.00     Years: 25.00     Pack years: 50.00     Types: Cigarettes     Quit date: 12/26/2011     Years since quitting: 10.8     Passive exposure: Past    Smokeless tobacco: Never   Vaping Use    Vaping Use: Never used   Substance Use Topics    Alcohol use: Not Currently     Alcohol/week: 1.0 standard drink     Types: 1 Glasses of wine per week     Comment: 1 glass of wine per day prior    Drug use: No     No Known Allergies  Current Outpatient Medications on File Prior to Encounter   Medication Sig Dispense Refill    atorvastatin (LIPITOR) 20 MG tablet Take 20 mg by mouth daily      Multiple Vitamin (MULTIVITAMIN ADULT PO) Take by mouth      Ascorbic Acid (VITAMIN C) 250 MG tablet Take 250 mg by mouth daily      OXYGEN Inhale 3 L/min into the lungs continuous Fluticasone-Umeclidin-Vilant (TRELEGY ELLIPTA) 200-62.5-25 MCG/INH AEPB Inhale 1 puff into the lungs daily Once the anoro ellipta is gone      apixaban (ELIQUIS) 5 MG TABS tablet Take 5 mg by mouth 2 times daily      gabapentin (NEURONTIN) 100 MG capsule Take 100 mg by mouth in the morning and at bedtime. melatonin 3 MG TABS tablet Take 3 mg by mouth daily      potassium chloride (MICRO-K) 10 MEQ extended release capsule Take 10 mEq by mouth daily      metoprolol succinate (TOPROL XL) 25 MG extended release tablet Take 1 tablet by mouth 2 times daily (with meals) 30 tablet 3    furosemide (LASIX) 20 MG tablet Take 1 tablet by mouth daily 60 tablet 3    predniSONE (DELTASONE) 5 MG tablet Take 5 mg by mouth daily      albuterol (ACCUNEB) 0.63 MG/3ML nebulizer solution Take 1 ampule by nebulization every 6 hours as needed for Wheezing      albuterol sulfate HFA (PROVENTIL;VENTOLIN;PROAIR) 108 (90 Base) MCG/ACT inhaler Inhale 2 puffs into the lungs every 6 hours as needed for Wheezing      [DISCONTINUED] simvastatin (ZOCOR) 40 MG tablet Take 40 mg by mouth daily        No current facility-administered medications on file prior to encounter.        REVIEW OF SYSTEMS   ROS : All others Negative if blank [], Positive if [x]  General Vascular   [] Fevers [] Claudication   [] Chills [] Rest Pain   Skin Neurologic   [] Tissue Loss [x] Lower extremity neuropathy     Objective:    BP (!) 157/57   Pulse 57   Temp 97.7 °F (36.5 °C) (Temporal)   Resp 18   Wt Readings from Last 3 Encounters:   10/27/22 176 lb 14.4 oz (80.2 kg)   10/14/22 178 lb (80.7 kg)   10/11/22 180 lb 9.6 oz (81.9 kg)       PHYSICAL EXAM   CONSTITUTIONAL:   Awake, alert, cooperative   PSYCHIATRIC :  Oriented to time, place and person      normal insight to disease process  EXTREMITIES:   R LE Open wounds are not noted   Skin color is abnormal with hyperpigmentation   Edema is  noted   Sensation intact to light touch   Palpation of the foot does cause pain   4/5 strength DF/PF  L LE Open wounds are noted   Skin color is abnormal with hyperpigmentation   Edema is  noted   Sensation intact to light touch   Palpation of the foot does cause pain   4/5 strength DF/PF  R dorsalis pedis +2 L dorsalis pedis +2   R posterior tibial +2 L posterior tibial +2     Assessment:     Problem List Items Addressed This Visit    None      Pre Debridement Measurements:  Are located in the [facility]  Documentation Flow Sheet  Post Debridement Measurements:  Wound/Ulcer Descriptions are Pre Debridement except measurements:     Puncture 08/25/22 Groin (Active)   Number of days: 67       Wound 08/26/22 Arm Distal;Lower;Right;Dorsal (Active)   Number of days: 66     Incision 08/20/22 Chest Right;Upper (Active)   Number of days: 72       Incision 08/25/22 Chest Left (Active)   Number of days: 67       Incision 07/20/21 Chest Lateral;Left;Upper (Active)   Number of days: 467       Incision 07/20/21 Chest Left;Upper (Active)   Number of days: 467       Procedure Note  Indications:  Based on my examination of this patient's wound(s)/ulcer(s) today, debridement is not required to promote healing and evaluate the wound base. Performed by: Dominick Sorto DPM    Consent obtained:  Yes    Time out taken:  Yes    Pain Control: Anesthetic  Anesthetic: None       Plan:     Pt is not a smoker   - Discussed relationship of smoking and negative affects on wound healing   - Emphasized importance of tobacco avoidace/cessation   - No Script for nicotine patch given to patient   - No Information regarding support groups for smoking cessation given    In my professional opinion and based off the information that is available at this time this patient has appropriate indication for HBO Therapy: No    Treatment Note please see attached Discharge Instructions    Written patient dismissal instructions given to patient and signed by patient or POA.          Discharge Instructions         Visit Discharge/Physician Orders    Discharge condition: Stable    Assessment of pain at discharge: minimal    Anesthetic used: none    Discharge to: Home    Left via:Private automobile    Accompanied by: n/a    ECF/HHA:     Dressing Orders: no open wounds. May use aquaphor/lotion with vitamin E for dry skin. Wear compression socks daily    Treatment Orders:    [facility] followup visit _________call as needed____________________  (Please note your next appointment above and if you are unable to keep, kindly give a 24 hour notice. Thank you.)    Physician signature:__________________________      If you experience any of the following, please call the [address] [place] [address] [facility] during business hours:    * Increase in Pain  * Temperature over 101  * Increase in drainage from your wound  * Drainage with a foul odor  * Bleeding  * Increase in swelling  * Need for compression bandage changes due to slippage, breakthrough drainage. If you need medical attention outside of the business hours of the [address] [place] [address] [facility] please contact your PCP or go to the nearest emergency room.          Electronically signed by Juany Bui DPM on 10/31/2022 at 11:21 AM

## 2022-11-02 ENCOUNTER — HOSPITAL ENCOUNTER (OUTPATIENT)
Dept: CARDIAC REHAB | Age: 72
Discharge: HOME | End: 2022-11-02

## 2022-11-02 VITALS
HEART RATE: 60 BPM | RESPIRATION RATE: 16 BRPM | HEIGHT: 67 IN | WEIGHT: 170 LBS | BODY MASS INDEX: 26.68 KG/M2 | OXYGEN SATURATION: 100 %

## 2022-11-02 PROCEDURE — 9900000038 HC CARDIAC REHAB PHASE 3 - MONTHLY

## 2022-11-02 ASSESSMENT — PATIENT HEALTH QUESTIONNAIRE - PHQ9
SUM OF ALL RESPONSES TO PHQ QUESTIONS 1-9: 0
SUM OF ALL RESPONSES TO PHQ9 QUESTIONS 1 & 2: 0
SUM OF ALL RESPONSES TO PHQ QUESTIONS 1-9: 0
1. LITTLE INTEREST OR PLEASURE IN DOING THINGS: 0
2. FEELING DOWN, DEPRESSED OR HOPELESS: 0
SUM OF ALL RESPONSES TO PHQ QUESTIONS 1-9: 0
SUM OF ALL RESPONSES TO PHQ QUESTIONS 1-9: 0

## 2022-11-02 ASSESSMENT — EJECTION FRACTION: EF_VALUE: 65

## 2022-11-02 NOTE — CARDIO/PULMONARY
Lexx man came in with his wife for his cardiac rehab phase 3 eval on 02 at 3 liters nasal cannula. Client has had some dyspnea and respiratory abnormalities. Also had cardiac pacemaker in situ. He is deconditioned and he is here to increase strength and endurance as well as possible decrease his consistent use 02 at 3 liters. Breath sounds essentially clear and client reports no leg edema at present. He currently goes to the [facility] for chemo for lung cancer and feels weakened a lot. So hopefully cardiac rehab will help these concerns for him. His wife is a great source of support for him. He will begin cardiac rehab 3 days a week and will observe his tolerance for rehab and report any abnormalities to his doctors when necessary. He agreed.

## 2022-12-02 ENCOUNTER — TELEPHONE (OUTPATIENT)
Dept: NON INVASIVE DIAGNOSTICS | Age: 72
End: 2022-12-02

## 2022-12-02 NOTE — TELEPHONE ENCOUNTER
Call from patient questioning when he is due to send his remote transmission. Instructed patient is due to send his remote transmission on 1/17/2022. Patient also states he has an appointment nect week at Dr. Yung Schmidt office. I explained that this appointment was cancelled back in September and that he just saw Dr. Sasha Lombardo in October. It appears that the patient is on a recall for 1/2023 to see Dr. Sasha Lombardo. Patient voiced understanding.        Valerie Cloud

## 2022-12-26 ENCOUNTER — HOSPITAL ENCOUNTER (INPATIENT)
Age: 72
LOS: 4 days | Discharge: HOME | DRG: 811 | End: 2022-12-30
Attending: EMERGENCY MEDICINE | Admitting: INTERNAL MEDICINE
Payer: MEDICARE

## 2022-12-26 ENCOUNTER — APPOINTMENT (OUTPATIENT)
Dept: GENERAL RADIOLOGY | Age: 72
DRG: 811 | End: 2022-12-26
Payer: MEDICARE

## 2022-12-26 DIAGNOSIS — D64.9 ANEMIA, UNSPECIFIED TYPE: Primary | ICD-10-CM

## 2022-12-26 DIAGNOSIS — Z85.118 HISTORY OF LUNG CANCER: ICD-10-CM

## 2022-12-26 DIAGNOSIS — J44.1 COPD EXACERBATION (HCC): ICD-10-CM

## 2022-12-26 DIAGNOSIS — D69.6 LOW PLATELET COUNT (HCC): ICD-10-CM

## 2022-12-26 LAB
ABO/RH: NORMAL
ALBUMIN SERPL-MCNC: 4 G/DL (ref 3.5–5.2)
ALP BLD-CCNC: 101 U/L (ref 40–129)
ALT SERPL-CCNC: 25 U/L (ref 0–40)
ANION GAP SERPL CALCULATED.3IONS-SCNC: 11 MMOL/L (ref 7–16)
ANISOCYTOSIS: ABNORMAL
ANTIBODY SCREEN: NORMAL
AST SERPL-CCNC: 25 U/L (ref 0–39)
BASOPHILS ABSOLUTE: 0 E9/L (ref 0–0.2)
BASOPHILS RELATIVE PERCENT: 0.2 % (ref 0–2)
BILIRUB SERPL-MCNC: 0.5 MG/DL (ref 0–1.2)
BLOOD BANK DISPENSE STATUS: NORMAL
BLOOD BANK PRODUCT CODE: NORMAL
BPU ID: NORMAL
BUN BLDV-MCNC: 17 MG/DL (ref 6–23)
CALCIUM SERPL-MCNC: 9.5 MG/DL (ref 8.6–10.2)
CHLORIDE BLD-SCNC: 104 MMOL/L (ref 98–107)
CO2: 26 MMOL/L (ref 22–29)
CREAT SERPL-MCNC: 0.8 MG/DL (ref 0.7–1.2)
DESCRIPTION BLOOD BANK: NORMAL
EOSINOPHILS ABSOLUTE: 0.04 E9/L (ref 0.05–0.5)
EOSINOPHILS RELATIVE PERCENT: 0.9 % (ref 0–6)
GFR SERPL CREATININE-BSD FRML MDRD: >60 ML/MIN/1.73
GLUCOSE BLD-MCNC: 146 MG/DL (ref 74–99)
HCT VFR BLD CALC: 18.5 % (ref 37–54)
HEMOGLOBIN: 5.9 G/DL (ref 12.5–16.5)
HYPOCHROMIA: ABNORMAL
INFLUENZA A BY PCR: NOT DETECTED
INFLUENZA B BY PCR: NOT DETECTED
LYMPHOCYTES ABSOLUTE: 0.59 E9/L (ref 1.5–4)
LYMPHOCYTES RELATIVE PERCENT: 13 % (ref 20–42)
MCH RBC QN AUTO: 26.5 PG (ref 26–35)
MCHC RBC AUTO-ENTMCNC: 31.9 % (ref 32–34.5)
MCV RBC AUTO: 83 FL (ref 80–99.9)
MONOCYTES ABSOLUTE: 0.14 E9/L (ref 0.1–0.95)
MONOCYTES RELATIVE PERCENT: 2.6 % (ref 2–12)
NEUTROPHILS ABSOLUTE: 3.78 E9/L (ref 1.8–7.3)
NEUTROPHILS RELATIVE PERCENT: 83.5 % (ref 43–80)
NUCLEATED RED BLOOD CELLS: 0.9 /100 WBC
OVALOCYTES: ABNORMAL
PDW BLD-RTO: 22 FL (ref 11.5–15)
PLATELET # BLD: 24 E9/L (ref 130–450)
PLATELET CONFIRMATION: NORMAL
PMV BLD AUTO: ABNORMAL FL (ref 7–12)
POIKILOCYTES: ABNORMAL
POTASSIUM REFLEX MAGNESIUM: 4.4 MMOL/L (ref 3.5–5)
RBC # BLD: 2.23 E12/L (ref 3.8–5.8)
SARS-COV-2, NAAT: NOT DETECTED
SODIUM BLD-SCNC: 141 MMOL/L (ref 132–146)
TARGET CELLS: ABNORMAL
TOTAL PROTEIN: 5.9 G/DL (ref 6.4–8.3)
TROPONIN, HIGH SENSITIVITY: 27 NG/L (ref 0–11)
TROPONIN, HIGH SENSITIVITY: 28 NG/L (ref 0–11)
WBC # BLD: 4.5 E9/L (ref 4.5–11.5)

## 2022-12-26 PROCEDURE — 86923 COMPATIBILITY TEST ELECTRIC: CPT

## 2022-12-26 PROCEDURE — 99285 EMERGENCY DEPT VISIT HI MDM: CPT

## 2022-12-26 PROCEDURE — 36430 TRANSFUSION BLD/BLD COMPNT: CPT

## 2022-12-26 PROCEDURE — 85025 COMPLETE CBC W/AUTO DIFF WBC: CPT

## 2022-12-26 PROCEDURE — 80053 COMPREHEN METABOLIC PANEL: CPT

## 2022-12-26 PROCEDURE — 86900 BLOOD TYPING SEROLOGIC ABO: CPT

## 2022-12-26 PROCEDURE — 94664 DEMO&/EVAL PT USE INHALER: CPT

## 2022-12-26 PROCEDURE — 86850 RBC ANTIBODY SCREEN: CPT

## 2022-12-26 PROCEDURE — C9113 INJ PANTOPRAZOLE SODIUM, VIA: HCPCS

## 2022-12-26 PROCEDURE — 6370000000 HC RX 637 (ALT 250 FOR IP): Performed by: FAMILY MEDICINE

## 2022-12-26 PROCEDURE — P9073 PLATELETS PHERESIS PATH REDU: HCPCS

## 2022-12-26 PROCEDURE — 6370000000 HC RX 637 (ALT 250 FOR IP)

## 2022-12-26 PROCEDURE — 2060000000 HC ICU INTERMEDIATE R&B

## 2022-12-26 PROCEDURE — 71045 X-RAY EXAM CHEST 1 VIEW: CPT

## 2022-12-26 PROCEDURE — 84484 ASSAY OF TROPONIN QUANT: CPT

## 2022-12-26 PROCEDURE — 87635 SARS-COV-2 COVID-19 AMP PRB: CPT

## 2022-12-26 PROCEDURE — 87502 INFLUENZA DNA AMP PROBE: CPT

## 2022-12-26 PROCEDURE — 6360000002 HC RX W HCPCS: Performed by: FAMILY MEDICINE

## 2022-12-26 PROCEDURE — 6360000002 HC RX W HCPCS

## 2022-12-26 PROCEDURE — P9016 RBC LEUKOCYTES REDUCED: HCPCS

## 2022-12-26 PROCEDURE — 86901 BLOOD TYPING SEROLOGIC RH(D): CPT

## 2022-12-26 PROCEDURE — 94640 AIRWAY INHALATION TREATMENT: CPT

## 2022-12-26 PROCEDURE — 93005 ELECTROCARDIOGRAM TRACING: CPT

## 2022-12-26 PROCEDURE — 2700000000 HC OXYGEN THERAPY PER DAY

## 2022-12-26 RX ORDER — ONDANSETRON 2 MG/ML
4 INJECTION INTRAMUSCULAR; INTRAVENOUS EVERY 6 HOURS PRN
Status: DISCONTINUED | OUTPATIENT
Start: 2022-12-26 | End: 2022-12-30 | Stop reason: HOSPADM

## 2022-12-26 RX ORDER — ACETAMINOPHEN 325 MG/1
650 TABLET ORAL EVERY 6 HOURS PRN
Status: DISCONTINUED | OUTPATIENT
Start: 2022-12-26 | End: 2022-12-30 | Stop reason: HOSPADM

## 2022-12-26 RX ORDER — GABAPENTIN 100 MG/1
100 CAPSULE ORAL 2 TIMES DAILY
Status: DISCONTINUED | OUTPATIENT
Start: 2022-12-26 | End: 2022-12-30 | Stop reason: HOSPADM

## 2022-12-26 RX ORDER — ACETAMINOPHEN 650 MG/1
650 SUPPOSITORY RECTAL EVERY 6 HOURS PRN
Status: DISCONTINUED | OUTPATIENT
Start: 2022-12-26 | End: 2022-12-30 | Stop reason: HOSPADM

## 2022-12-26 RX ORDER — ALBUTEROL SULFATE 90 UG/1
2 AEROSOL, METERED RESPIRATORY (INHALATION) EVERY 6 HOURS PRN
Status: DISCONTINUED | OUTPATIENT
Start: 2022-12-26 | End: 2022-12-26 | Stop reason: SDUPTHER

## 2022-12-26 RX ORDER — METOPROLOL SUCCINATE 25 MG/1
25 TABLET, EXTENDED RELEASE ORAL 2 TIMES DAILY WITH MEALS
Status: DISCONTINUED | OUTPATIENT
Start: 2022-12-26 | End: 2022-12-30 | Stop reason: HOSPADM

## 2022-12-26 RX ORDER — IPRATROPIUM BROMIDE AND ALBUTEROL SULFATE 2.5; .5 MG/3ML; MG/3ML
1 SOLUTION RESPIRATORY (INHALATION) ONCE
Status: DISCONTINUED | OUTPATIENT
Start: 2022-12-26 | End: 2022-12-26

## 2022-12-26 RX ORDER — POTASSIUM CHLORIDE 750 MG/1
10 TABLET, EXTENDED RELEASE ORAL DAILY
Status: DISCONTINUED | OUTPATIENT
Start: 2022-12-27 | End: 2022-12-30 | Stop reason: HOSPADM

## 2022-12-26 RX ORDER — IPRATROPIUM BROMIDE AND ALBUTEROL SULFATE 2.5; .5 MG/3ML; MG/3ML
3 SOLUTION RESPIRATORY (INHALATION) ONCE
Status: COMPLETED | OUTPATIENT
Start: 2022-12-26 | End: 2022-12-26

## 2022-12-26 RX ORDER — ATORVASTATIN CALCIUM 20 MG/1
20 TABLET, FILM COATED ORAL DAILY
Status: DISCONTINUED | OUTPATIENT
Start: 2022-12-26 | End: 2022-12-30 | Stop reason: HOSPADM

## 2022-12-26 RX ORDER — ONDANSETRON 4 MG/1
4 TABLET, ORALLY DISINTEGRATING ORAL EVERY 8 HOURS PRN
Status: DISCONTINUED | OUTPATIENT
Start: 2022-12-26 | End: 2022-12-30 | Stop reason: HOSPADM

## 2022-12-26 RX ORDER — ARFORMOTEROL TARTRATE 15 UG/2ML
15 SOLUTION RESPIRATORY (INHALATION) 2 TIMES DAILY
Status: DISCONTINUED | OUTPATIENT
Start: 2022-12-26 | End: 2022-12-30 | Stop reason: HOSPADM

## 2022-12-26 RX ORDER — SODIUM CHLORIDE 0.9 % (FLUSH) 0.9 %
5-40 SYRINGE (ML) INJECTION EVERY 12 HOURS SCHEDULED
Status: DISCONTINUED | OUTPATIENT
Start: 2022-12-26 | End: 2022-12-30 | Stop reason: HOSPADM

## 2022-12-26 RX ORDER — SODIUM CHLORIDE 0.9 % (FLUSH) 0.9 %
5-40 SYRINGE (ML) INJECTION PRN
Status: DISCONTINUED | OUTPATIENT
Start: 2022-12-26 | End: 2022-12-30 | Stop reason: HOSPADM

## 2022-12-26 RX ORDER — SODIUM CHLORIDE 9 MG/ML
INJECTION, SOLUTION INTRAVENOUS PRN
Status: DISCONTINUED | OUTPATIENT
Start: 2022-12-26 | End: 2022-12-30 | Stop reason: HOSPADM

## 2022-12-26 RX ORDER — SODIUM CHLORIDE 9 MG/ML
INJECTION, SOLUTION INTRAVENOUS PRN
Status: CANCELLED | OUTPATIENT
Start: 2022-12-26

## 2022-12-26 RX ORDER — BUDESONIDE 0.25 MG/2ML
0.25 INHALANT ORAL 2 TIMES DAILY
Status: DISCONTINUED | OUTPATIENT
Start: 2022-12-26 | End: 2022-12-30 | Stop reason: HOSPADM

## 2022-12-26 RX ORDER — PANTOPRAZOLE SODIUM 40 MG/10ML
80 INJECTION, POWDER, LYOPHILIZED, FOR SOLUTION INTRAVENOUS ONCE
Status: COMPLETED | OUTPATIENT
Start: 2022-12-26 | End: 2022-12-26

## 2022-12-26 RX ORDER — IPRATROPIUM BROMIDE AND ALBUTEROL SULFATE 2.5; .5 MG/3ML; MG/3ML
1 SOLUTION RESPIRATORY (INHALATION)
Status: DISCONTINUED | OUTPATIENT
Start: 2022-12-26 | End: 2022-12-30 | Stop reason: HOSPADM

## 2022-12-26 RX ORDER — ALBUTEROL SULFATE 0.63 MG/3ML
1 SOLUTION RESPIRATORY (INHALATION) EVERY 6 HOURS PRN
Status: DISCONTINUED | OUTPATIENT
Start: 2022-12-26 | End: 2022-12-28

## 2022-12-26 RX ORDER — SODIUM CHLORIDE 9 MG/ML
INJECTION, SOLUTION INTRAVENOUS CONTINUOUS
Status: DISCONTINUED | OUTPATIENT
Start: 2022-12-26 | End: 2022-12-30 | Stop reason: HOSPADM

## 2022-12-26 RX ORDER — FUROSEMIDE 20 MG/1
20 TABLET ORAL DAILY
Status: DISCONTINUED | OUTPATIENT
Start: 2022-12-26 | End: 2022-12-30 | Stop reason: HOSPADM

## 2022-12-26 RX ORDER — LANOLIN ALCOHOL/MO/W.PET/CERES
3 CREAM (GRAM) TOPICAL NIGHTLY
Status: DISCONTINUED | OUTPATIENT
Start: 2022-12-26 | End: 2022-12-30 | Stop reason: HOSPADM

## 2022-12-26 RX ORDER — PREDNISONE 1 MG/1
5 TABLET ORAL DAILY
Status: DISCONTINUED | OUTPATIENT
Start: 2022-12-27 | End: 2022-12-30 | Stop reason: HOSPADM

## 2022-12-26 RX ADMIN — PANTOPRAZOLE SODIUM 80 MG: 40 INJECTION, POWDER, FOR SOLUTION INTRAVENOUS at 14:25

## 2022-12-26 RX ADMIN — IPRATROPIUM BROMIDE AND ALBUTEROL SULFATE 1 AMPULE: 2.5; .5 SOLUTION RESPIRATORY (INHALATION) at 16:06

## 2022-12-26 RX ADMIN — GABAPENTIN 100 MG: 100 CAPSULE ORAL at 22:52

## 2022-12-26 RX ADMIN — IPRATROPIUM BROMIDE AND ALBUTEROL SULFATE 1 AMPULE: 2.5; .5 SOLUTION RESPIRATORY (INHALATION) at 20:21

## 2022-12-26 RX ADMIN — MELATONIN 3 MG ORAL TABLET 3 MG: 3 TABLET ORAL at 22:52

## 2022-12-26 RX ADMIN — ARFORMOTEROL TARTRATE 15 MCG: 15 SOLUTION RESPIRATORY (INHALATION) at 20:21

## 2022-12-26 RX ADMIN — BUDESONIDE 250 MCG: 0.25 SUSPENSION RESPIRATORY (INHALATION) at 20:21

## 2022-12-26 RX ADMIN — IPRATROPIUM BROMIDE AND ALBUTEROL SULFATE 3 AMPULE: .5; 2.5 SOLUTION RESPIRATORY (INHALATION) at 11:21

## 2022-12-26 ASSESSMENT — LIFESTYLE VARIABLES
HOW MANY STANDARD DRINKS CONTAINING ALCOHOL DO YOU HAVE ON A TYPICAL DAY: PATIENT DOES NOT DRINK
HOW OFTEN DO YOU HAVE A DRINK CONTAINING ALCOHOL: NEVER

## 2022-12-26 ASSESSMENT — PAIN DESCRIPTION - LOCATION: LOCATION: CHEST

## 2022-12-26 ASSESSMENT — PAIN - FUNCTIONAL ASSESSMENT: PAIN_FUNCTIONAL_ASSESSMENT: 0-10

## 2022-12-26 ASSESSMENT — PAIN SCALES - GENERAL: PAINLEVEL_OUTOF10: 4

## 2022-12-26 NOTE — ED PROVIDER NOTES
65-year-old male with history of lung cancer total of 5 chemotherapy session last Monday and Wednesday, COPD and stage IV emphysema on 3 L baseline presents to the emergency department for concerns of shortness of breath. His symptoms started couple days ago with gradual onset. He worsens with exertion better with rest.  He does report some pain with inspiration. He has increased cough with clear sputum. He recently finished antibiotics a few weeks ago for diverticulitis. His breathing treatment at home do not seem to be helping as much. He denies the following: Fever, chills, abdominal pain, nausea, vomiting, hemoptysis, urinary symptoms, GI bleed. He has a history of blood clots and is on Eliquis. Chief Complaint   Patient presents with    Shortness of Breath     Patient is c/o Shortness of breath, on 3L at baseline, lung CA, and stage 4 emphysema. Also having some 4/10 pain with inspiration         Review of Systems   Stated in the HPI above  Physical Exam  Constitutional:       Appearance: He is not ill-appearing. HENT:      Head: Normocephalic and atraumatic. Eyes:      Extraocular Movements: Extraocular movements intact. Pupils: Pupils are equal, round, and reactive to light. Neck:      Vascular: No JVD. Cardiovascular:      Rate and Rhythm: Regular rhythm. Bradycardia present. Pulmonary:      Effort: Pulmonary effort is normal.      Comments: Slightly diminished breath sounds on the right. No crackles wheezing or rhonchi noted. Abdominal:      Palpations: Abdomen is soft. Tenderness: There is no abdominal tenderness. Genitourinary:     Comments: Fecal occult blood test positive  Musculoskeletal:         General: Normal range of motion. Right lower leg: No tenderness. No edema. Left lower leg: No tenderness. No edema. Skin:     General: Skin is warm. Capillary Refill: Capillary refill takes less than 2 seconds.    Neurological:      General: No focal deficit present. Mental Status: He is alert and oriented to person, place, and time. Psychiatric:         Mood and Affect: Mood normal.         Behavior: Behavior normal.        Procedures     EKG: This EKG is signed by emergency department physician. Rate: 58  Rhythm: Ventricular paced rhythm  AXIS: Undetermined  ST Changes: No ST elevations  Interpretation: Ventricular paced rhythms with occasional PVCs. Comparison: No acute changes compared to prior EKG. MDM    60-year-old male with history of lung cancer total of 5 chemotherapy session last Monday and Wednesday, COPD and stage IV emphysema on 3 L baseline presents to the emergency department for concerns of shortness of breath. His symptoms started couple days ago with gradual onset. He denies the following: Fever, chills, abdominal pain, nausea, vomiting, hemoptysis, urinary symptoms, GI bleed. He has a history of blood clots and is on Eliquis. Upon entering the room the patient is hemodynamically stable he is 98% on his baseline 2 L nasal cannula he is in no acute respiratory distress. He is not using accessory muscles for respiration. There is diminished breath sounds on the right posterior lung base. No crackles or wheezing no rhonchi noted. Heart is regular rhythm rhythm with controlled rate. Distal pulses and pedal pulses are intact bilaterally. Legs are symmetric with no erythema no edema no calf tenderness. Patient is given DuoNeb treatment with much improvement of symptoms. Chest x-ray was ordered to look for signs of new pneumonia and effusions or pneumothorax. There is imaging is negative for acute process. Labs were notable for a low hemoglobin count of 5.9 and a low platelet count of 24 which is below the patient's baseline even on chemotherapy. 2 packed red blood cells ordered 1 is given the other is pending. 1 pack of platelets is transfused.   Patient did have a positive fecal occult blood test.  Protonix 80 mg IV bolus was given for a prophylaxis GI bleed. On reexamination. Abdomen is soft and nontender. The decision was made not to reverse patient's Eliquis due to the fact patient is clinically appearing in no acute distress. He is normotensive and his not using sensory muscles for respiration and satting at 100% on his base nasal cannula. I signed the patient out to my co-resident and informed him if the patient blood pressure drops to 90 or below MAP of 65 or he is getting hypoxic or increased work of breathing to consider reversing Eliquis with [facility] and changes admission to the ICU. I spoke with admitting team who agreed to admit the patient for anemia, thrombocytopenia and possible upper GI bleed. see ED course below for further information. ED Course as of 12/26/22 1803   Mon Dec 26, 2022   1327 2 packed red blood cells ordered, platelet ordered as well. Patient has a positive fecal occult blood test.  Brown stool per rectum. Patient agreed to get blood and platelet transfusion [MN]   1351 I spoke with the admitting team and discussed the patient case in great detail. I explained to them that we will not be reversing Eliquis because the patient is hemodynamically stable and he is not actively bleeding. There were 2 packed red blood cells and 1 unit of platelets along with Protonix as ordered for the patient's low hemoglobin and platelet levels. They agreed to admit the patient for further evaluation. [MN]   8274 Upon reevaluation patient was told he will be admitted to the hospital and he agreed with the plan along with his wife at bedside. Patient was getting up and using the urinal and became shortness of breath. Wheezing was heard on auscultation. Another DuoNeb treatment is ordered for the patient. [MN]   1437 Repeat troponin shows no significant delta change.   Elevated troponins likely from demand ischemia due to anemia [MN]   1443 Upon reevaluation patient is hemodynamically stable MAP is above 65. He is in no respiratory distress he says he is feeling much better after resting. Blood and platelet product are ready the nurse will go and pick it up. [MN]      ED Course User Index  [MN] Gabe Gitelman, DO      Medications   0.9 % sodium chloride infusion (has no administration in time range)   ipratropium-albuterol (DUONEB) nebulizer solution 1 ampule (1 ampule Inhalation Given 12/26/22 1606)   ipratropium-albuterol (DUONEB) nebulizer solution 3 ampule (3 ampules Inhalation Given 12/26/22 1121)   pantoprazole (PROTONIX) injection 80 mg (80 mg IntraVENous Given 12/26/22 1425)          --------------------------------------------- PAST HISTORY ---------------------------------------------  Past Medical History:  has a past medical history of CHB (complete heart block) ([facility] [address] COPD (chronic obstructive pulmonary disease) ([facility] [address] Hypertension, NSCLC of left lung ([facility] [address] Pulmonary emboli ([facility] [address] Syncope, and Syncope. Past Surgical History:  has a past surgical history that includes Colonoscopy; hernia repair (Bilateral, 2011 2001); bronchoscopy (N/A, 05/27/2021); bronchoscopy (N/A, 05/27/2021); bronchoscopy (N/A, 05/27/2021); Pacemaker insertion (07/20/2021); Port Surgery (Right, 08/20/2022); transesophageal echocardiogram (08/22/2022); Pacemaker Change (N/A, 08/25/2022); Cardiac catheterization (N/A, 08/25/2022); and Ventricular cardiac pacer insertion (N/A, 08/29/2022). Social History:  reports that he quit smoking about 11 years ago. His smoking use included cigarettes. He has a 50.00 pack-year smoking history. He has been exposed to tobacco smoke. He has never used smokeless tobacco. He reports that he does not currently use alcohol after a past usage of about 1.0 standard drink per week. He reports that he does not use drugs. Family History: family history includes Cancer in his father and paternal uncle. The patients home medications have been reviewed.     Allergies: Patient has no known allergies.     -------------------------------------------------- RESULTS -------------------------------------------------    LABS:  Results for orders placed or performed during the hospital encounter of 12/26/22   COVID-19, Rapid    Specimen: Nasopharyngeal Swab   Result Value Ref Range    SARS-CoV-2, NAAT Not Detected Not Detected   Rapid influenza A/B antigens    Specimen: Nasopharyngeal   Result Value Ref Range    Influenza A by PCR Not Detected Not Detected    Influenza B by PCR Not Detected Not Detected   CBC with Auto Differential   Result Value Ref Range    WBC 4.5 4.5 - 11.5 E9/L    RBC 2.23 (L) 3.80 - 5.80 E12/L    Hemoglobin 5.9 (LL) 12.5 - 16.5 g/dL    Hematocrit 18.5 (L) 37.0 - 54.0 %    MCV 83.0 80.0 - 99.9 fL    MCH 26.5 26.0 - 35.0 pg    MCHC 31.9 (L) 32.0 - 34.5 %    RDW 22.0 (H) 11.5 - 15.0 fL    Platelets 24 (L) 312 - 450 E9/L    MPV NOT CALC 7.0 - 12.0 fL    Neutrophils % 83.5 (H) 43.0 - 80.0 %    Lymphocytes % 13.0 (L) 20.0 - 42.0 %    Monocytes % 2.6 2.0 - 12.0 %    Eosinophils % 0.9 0.0 - 6.0 %    Basophils % 0.2 0.0 - 2.0 %    Neutrophils Absolute 3.78 1.80 - 7.30 E9/L    Lymphocytes Absolute 0.59 (L) 1.50 - 4.00 E9/L    Monocytes Absolute 0.14 0.10 - 0.95 E9/L    Eosinophils Absolute 0.04 (L) 0.05 - 0.50 E9/L    Basophils Absolute 0.00 0.00 - 0.20 E9/L    nRBC 0.9 /100 WBC    Anisocytosis 3+     Hypochromia 1+     Poikilocytes 2+     Ovalocytes 1+     Target Cells 1+    Comprehensive Metabolic Panel w/ Reflex to MG   Result Value Ref Range    Sodium 141 132 - 146 mmol/L    Potassium reflex Magnesium 4.4 3.5 - 5.0 mmol/L    Chloride 104 98 - 107 mmol/L    CO2 26 22 - 29 mmol/L    Anion Gap 11 7 - 16 mmol/L    Glucose 146 (H) 74 - 99 mg/dL    BUN 17 6 - 23 mg/dL    Creatinine 0.8 0.7 - 1.2 mg/dL    Est, Glom Filt Rate >60 >=60 mL/min/1.73    Calcium 9.5 8.6 - 10.2 mg/dL    Total Protein 5.9 (L) 6.4 - 8.3 g/dL    Albumin 4.0 3.5 - 5.2 g/dL    Total Bilirubin 0.5 0.0 - 1.2 mg/dL Alkaline Phosphatase 101 40 - 129 U/L    ALT 25 0 - 40 U/L    AST 25 0 - 39 U/L   Troponin   Result Value Ref Range    Troponin, High Sensitivity 27 (H) 0 - 11 ng/L   Platelet Confirmation   Result Value Ref Range    Platelet Confirmation CONFIRMED    Troponin   Result Value Ref Range    Troponin, High Sensitivity 28 (H) 0 - 11 ng/L   EKG 12 Lead   Result Value Ref Range    Ventricular Rate 58 BPM    Atrial Rate 48 BPM    QRS Duration 156 ms    Q-T Interval 474 ms    QTc Calculation (Bazett) 465 ms    P Axis 33 degrees    R Axis -91 degrees    T Axis 89 degrees   TYPE AND SCREEN   Result Value Ref Range    ABO/Rh O NEG     Antibody Screen NEG    PREPARE RBC (CROSSMATCH), 2 Units   Result Value Ref Range    Product Code Blood Bank G2225E17     Description Blood Bank Red Blood Cells, Apheresis, Leuko-reduced     Unit Number [id number]     Dispense Status Blood Bank issued     Product Code Blood Bank G4571I93     Description Blood Bank Red Blood Cells, Leuko-reduced     Unit Number [id number]     Dispense Status Blood Bank issued    PREPARE PLATELETS, 1 Product   Result Value Ref Range    Product Code Blood Bank Q2289G16     Description Blood Bank      Unit Number [id number]     Dispense Status Blood Bank issued        RADIOLOGY:  XR CHEST PORTABLE   Final Result   1. Persistent small ill-defined mass seen within the left perihilar region   2. Pleuroparenchymal scarring within the left perihilar region   3. Left pleural effusion   4. Emphysematous changes                 ------------------------- NURSING NOTES AND VITALS REVIEWED ---------------------------  Date / Time Roomed:  12/26/2022 10:41 AM  ED Bed Assignment:  15/15    The nursing notes within the ED encounter and vital signs as below have been reviewed.      Patient Vitals for the past 24 hrs:   BP Temp Pulse Resp SpO2 Height Weight   12/26/22 1744 (!) 151/58 -- 51 18 100 % -- --   12/26/22 1645 (!) 147/54 -- 56 21 -- -- --   12/26/22 1630 (!) 157/61 -- 61 18 -- -- --   12/26/22 1615 (!) 135/52 -- 62 21 -- -- --   12/26/22 1608 -- -- 58 16 100 % -- --   12/26/22 1600 (!) 160/47 -- (!) 46 19 100 % -- --   12/26/22 1545 (!) 149/65 -- (!) 47 14 100 % -- --   12/26/22 1530 (!) 152/59 -- (!) 48 15 100 % -- --   12/26/22 1515 (!) 154/39 -- (!) 49 15 100 % -- --   12/26/22 1504 119/64 98.1 °F (36.7 °C) 50 16 100 % -- --   12/26/22 1400 (!) 112/100 -- -- 20 -- -- --   12/26/22 1345 -- -- 58 24 -- -- --   12/26/22 1330 -- -- 61 25 -- -- --   12/26/22 1315 -- -- 59 20 -- -- --   12/26/22 1300 -- -- (!) 49 17 -- -- --   12/26/22 1245 -- -- 56 26 -- -- --   12/26/22 1230 -- -- (!) 49 16 -- -- --   12/26/22 1032 (!) 153/36 97.6 °F (36.4 °C) 51 18 98 % 5' 6\" (1.676 m) 165 lb (74.8 kg)       Oxygen Saturation Interpretation: Normal        Counseling:  I have spoken with the patient and discussed todays results, in addition to providing specific details for the plan of care and counseling regarding the diagnosis and prognosis. Their questions are answered at this time and they are agreeable with the plan of admission. This patient has remained hemodynamically stable during their ED course. Diagnosis:  1. Anemia, unspecified type    2. Low platelet count (HCC)    3. COPD exacerbation ([address]    4. History of lung cancer        Disposition:  Patient's disposition: Admit to medical floor with telemetry  Patient's condition is stable. Attending was present and available throughout encounter including all critical portions;  See Attending Note/Attestation for Final [address], DO  Resident  12/26/22 2993

## 2022-12-27 LAB
ANION GAP SERPL CALCULATED.3IONS-SCNC: 10 MMOL/L (ref 7–16)
APTT: 25.8 SEC (ref 24.5–35.1)
BUN BLDV-MCNC: 17 MG/DL (ref 6–23)
CALCIUM SERPL-MCNC: 8.8 MG/DL (ref 8.6–10.2)
CHLORIDE BLD-SCNC: 103 MMOL/L (ref 98–107)
CO2: 27 MMOL/L (ref 22–29)
CREAT SERPL-MCNC: 0.9 MG/DL (ref 0.7–1.2)
EKG ATRIAL RATE: 48 BPM
EKG P AXIS: 33 DEGREES
EKG Q-T INTERVAL: 474 MS
EKG QRS DURATION: 156 MS
EKG QTC CALCULATION (BAZETT): 465 MS
EKG R AXIS: -91 DEGREES
EKG T AXIS: 89 DEGREES
EKG VENTRICULAR RATE: 58 BPM
GFR SERPL CREATININE-BSD FRML MDRD: >60 ML/MIN/1.73
GLUCOSE BLD-MCNC: 94 MG/DL (ref 74–99)
HCT VFR BLD CALC: 22.7 % (ref 37–54)
HCT VFR BLD CALC: 22.9 % (ref 37–54)
HCT VFR BLD CALC: 23.6 % (ref 37–54)
HCT VFR BLD CALC: 25.1 % (ref 37–54)
HEMOGLOBIN: 7.7 G/DL (ref 12.5–16.5)
HEMOGLOBIN: 7.8 G/DL (ref 12.5–16.5)
HEMOGLOBIN: 7.9 G/DL (ref 12.5–16.5)
HEMOGLOBIN: 8.2 G/DL (ref 12.5–16.5)
INR BLD: 1.3
IRON SATURATION: 93 % (ref 20–55)
IRON: 292 MCG/DL (ref 59–158)
MAGNESIUM: 1.5 MG/DL (ref 1.6–2.6)
POTASSIUM REFLEX MAGNESIUM: 3.5 MMOL/L (ref 3.5–5)
PRO-BNP: 1414 PG/ML (ref 0–125)
PROTHROMBIN TIME: 14 SEC (ref 9.3–12.4)
SODIUM BLD-SCNC: 140 MMOL/L (ref 132–146)
TOTAL IRON BINDING CAPACITY: 315 MCG/DL (ref 250–450)

## 2022-12-27 PROCEDURE — 6360000002 HC RX W HCPCS: Performed by: FAMILY MEDICINE

## 2022-12-27 PROCEDURE — 6370000000 HC RX 637 (ALT 250 FOR IP)

## 2022-12-27 PROCEDURE — 94640 AIRWAY INHALATION TREATMENT: CPT

## 2022-12-27 PROCEDURE — 85014 HEMATOCRIT: CPT

## 2022-12-27 PROCEDURE — 80048 BASIC METABOLIC PNL TOTAL CA: CPT

## 2022-12-27 PROCEDURE — 93010 ELECTROCARDIOGRAM REPORT: CPT | Performed by: INTERNAL MEDICINE

## 2022-12-27 PROCEDURE — C9113 INJ PANTOPRAZOLE SODIUM, VIA: HCPCS | Performed by: FAMILY MEDICINE

## 2022-12-27 PROCEDURE — 6360000002 HC RX W HCPCS: Performed by: NURSE PRACTITIONER

## 2022-12-27 PROCEDURE — 6370000000 HC RX 637 (ALT 250 FOR IP): Performed by: FAMILY MEDICINE

## 2022-12-27 PROCEDURE — 83880 ASSAY OF NATRIURETIC PEPTIDE: CPT

## 2022-12-27 PROCEDURE — 2700000000 HC OXYGEN THERAPY PER DAY

## 2022-12-27 PROCEDURE — 2060000000 HC ICU INTERMEDIATE R&B

## 2022-12-27 PROCEDURE — 2580000003 HC RX 258: Performed by: FAMILY MEDICINE

## 2022-12-27 PROCEDURE — 83735 ASSAY OF MAGNESIUM: CPT

## 2022-12-27 PROCEDURE — 85730 THROMBOPLASTIN TIME PARTIAL: CPT

## 2022-12-27 PROCEDURE — A4216 STERILE WATER/SALINE, 10 ML: HCPCS | Performed by: FAMILY MEDICINE

## 2022-12-27 PROCEDURE — 83550 IRON BINDING TEST: CPT

## 2022-12-27 PROCEDURE — 85018 HEMOGLOBIN: CPT

## 2022-12-27 PROCEDURE — 36415 COLL VENOUS BLD VENIPUNCTURE: CPT

## 2022-12-27 PROCEDURE — 83540 ASSAY OF IRON: CPT

## 2022-12-27 PROCEDURE — 85610 PROTHROMBIN TIME: CPT

## 2022-12-27 RX ORDER — MAGNESIUM SULFATE IN WATER 40 MG/ML
2000 INJECTION, SOLUTION INTRAVENOUS ONCE
Status: COMPLETED | OUTPATIENT
Start: 2022-12-27 | End: 2022-12-27

## 2022-12-27 RX ADMIN — METOPROLOL SUCCINATE 25 MG: 25 TABLET, EXTENDED RELEASE ORAL at 07:35

## 2022-12-27 RX ADMIN — PREDNISONE 5 MG: 5 TABLET ORAL at 09:43

## 2022-12-27 RX ADMIN — POTASSIUM CHLORIDE 10 MEQ: 750 TABLET, EXTENDED RELEASE ORAL at 07:35

## 2022-12-27 RX ADMIN — SODIUM CHLORIDE, PRESERVATIVE FREE 10 ML: 5 INJECTION INTRAVENOUS at 21:06

## 2022-12-27 RX ADMIN — GABAPENTIN 100 MG: 100 CAPSULE ORAL at 21:04

## 2022-12-27 RX ADMIN — ARFORMOTEROL TARTRATE 15 MCG: 15 SOLUTION RESPIRATORY (INHALATION) at 07:43

## 2022-12-27 RX ADMIN — SODIUM CHLORIDE: 9 INJECTION, SOLUTION INTRAVENOUS at 01:13

## 2022-12-27 RX ADMIN — ATORVASTATIN CALCIUM 20 MG: 20 TABLET, FILM COATED ORAL at 07:35

## 2022-12-27 RX ADMIN — GABAPENTIN 100 MG: 100 CAPSULE ORAL at 07:35

## 2022-12-27 RX ADMIN — BUDESONIDE 250 MCG: 0.25 SUSPENSION RESPIRATORY (INHALATION) at 07:43

## 2022-12-27 RX ADMIN — MAGNESIUM SULFATE HEPTAHYDRATE 2000 MG: 40 INJECTION, SOLUTION INTRAVENOUS at 08:22

## 2022-12-27 RX ADMIN — IPRATROPIUM BROMIDE AND ALBUTEROL SULFATE 1 AMPULE: 2.5; .5 SOLUTION RESPIRATORY (INHALATION) at 19:37

## 2022-12-27 RX ADMIN — SODIUM CHLORIDE, PRESERVATIVE FREE 40 MG: 5 INJECTION INTRAVENOUS at 07:35

## 2022-12-27 RX ADMIN — MELATONIN 3 MG ORAL TABLET 3 MG: 3 TABLET ORAL at 21:04

## 2022-12-27 RX ADMIN — IPRATROPIUM BROMIDE AND ALBUTEROL SULFATE 1 AMPULE: 2.5; .5 SOLUTION RESPIRATORY (INHALATION) at 12:28

## 2022-12-27 RX ADMIN — FUROSEMIDE 20 MG: 20 TABLET ORAL at 07:35

## 2022-12-27 RX ADMIN — ARFORMOTEROL TARTRATE 15 MCG: 15 SOLUTION RESPIRATORY (INHALATION) at 19:37

## 2022-12-27 RX ADMIN — BUDESONIDE 250 MCG: 0.25 SUSPENSION RESPIRATORY (INHALATION) at 19:37

## 2022-12-27 RX ADMIN — IPRATROPIUM BROMIDE AND ALBUTEROL SULFATE 1 AMPULE: 2.5; .5 SOLUTION RESPIRATORY (INHALATION) at 07:43

## 2022-12-27 RX ADMIN — SODIUM CHLORIDE: 9 INJECTION, SOLUTION INTRAVENOUS at 12:11

## 2022-12-27 RX ADMIN — IPRATROPIUM BROMIDE AND ALBUTEROL SULFATE 1 AMPULE: 2.5; .5 SOLUTION RESPIRATORY (INHALATION) at 17:00

## 2022-12-27 NOTE — H&P
[facility]  History and Physical      CHIEF COMPLAINT:    Chief Complaint   Patient presents with    Shortness of Breath     Patient is c/o Shortness of breath, on 3L at baseline, lung CA, and stage 4 emphysema. Also having some 4/10 pain with inspiration          Patient of Roseann Nguyen MD presents with:  Anemia    History of Present Illness:   Patient is a 69-year-old male with past medical history of complete heart block status post pacemaker placement, COPD, HTN, squamous cell lung cancer of the left lung, PE on Eliquis who presents emergency room for shortness of breath. 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 Labs were obtained and patient was found to have a low magnesium of 1.5, an H&H of 5.9/18.5 in which a unit of RBC was transfused with an H&H of 7.8/22.9 following. Chest x-ray was obtained and was done for left pleural effusion with persistent small ill-defined mass in the left perihilar region. Patient is on his baseline 3 L supplemental oxygen. Patient is admitted to intermediate telemetry for further work-up and treatment. On evaluation he indicates he feels somewhat better today. Denies any acute complaints. He denies noticing any ana m red blood per rectum. No hematemesis hematuria or epistaxis is reported. He takes Eliquis regularly and has not had issues with GI bleed in the past      REVIEW OF SYSTEMS:  Pertinent negatives are above in HPI. 10 point ROS otherwise negative. Past Medical History:   Diagnosis Date    CHB (complete heart block) ([facility] [address] 07/2021    COPD (chronic obstructive pulmonary disease) (HCC)     Hypertension     NSCLC of left lung ([facility] [address] 05/27/2021    SCC per biopsy. Pulmonary emboli ([facility] [address] 09/17/2021    Anticoagulation with Eliquis.     Syncope 09/2020    Syncope 07/2021         Past Surgical History:   Procedure Laterality Date    BRONCHOSCOPY N/A 05/27/2021    BRONCHOSCOPY W/EBUS FNA performed by Belkis Reid MD at [address] N/A 05/27/2021    BRONCHOSCOPY DIAGNOSTIC OR CELL [address] ONLY performed by Belkis Reid MD at [address] N/A 05/27/2021    BRONCHOSCOPY ADD ON COMPUTER ASSISTED performed by Belkis Reid MD at [address] N/A 08/25/2022    CARDIAC LASER LEAD EXCHANGE performed by Pily Heard MD at [address] Bilateral 2011 2001    groin     PACEMAKER CHANGE N/A 08/25/2022    CARDIAC LASER LEAD EXTRACTION, LASER LEAD EXTRACTION LEFT SIDED DEVICE, PACEMAKER DEVICE EXTRACTION performed by Pily Heard MD at [address]  07/20/2021    DUAL PPM  ([facility])  DR. Aurelio Rivera    PORT SURGERY Right 08/20/2022    PORT REMOVAL performed by Amaila Amaral MD at [address]    TRANSESOPHAGEAL ECHOCARDIOGRAM  08/22/2022    Dr Sobeida Rajan N/A 08/29/2022    [employer] Micra AV Leadless PPM Insertion (Dr. Woody Noguera)       Medications Prior to Admission:    Not in a hospital admission. Note that the patient's home medications were reviewed and the above list is accurate to the best of my knowledge at the time of the exam.    Allergies:    Patient has no known allergies. Social History:    reports that he quit smoking about 11 years ago. His smoking use included cigarettes. He has a 50.00 pack-year smoking history. He has been exposed to tobacco smoke. He has never used smokeless tobacco. He reports that he does not currently use alcohol after a past usage of about 1.0 standard drink per week. He reports that he does not use drugs. Family History:   family history includes Cancer in his father and paternal uncle.       PHYSICAL EXAM:    Vitals:  BP (!) 143/62   Pulse 55   Temp 97.8 °F (36.6 °C) (Oral)   Resp 19   Ht 5' 6\" (1.676 m)   Wt 165 lb (74.8 kg)   SpO2 100%   BMI 26.63 kg/m²       General appearance: NAD, conversant  Eyes: Sclerae anicteric, PERRLA  HEENT: AT/NC, MMM  Neck: FROM, supple, no thyromegaly  Lymph: No cervical / supraclavicular lymphadenopathy  Lungs: Clear to auscultation, WOB normal  CV: RRR, no MRGs, no lower extremity edema  Abdomen: Soft, non-tender; no masses or HSM, +BS  Extremities: FROM without synovitis. No clubbing or cyanosis of the hands. Skin: no rash, induration, lesions, or ulcers  Psych: Calm and cooperative. Normal judgement and insight. Normal mood and affect. Neuro: Alert and interactive, face symmetric, speech fluent. LABS:  All labs reviewed. Of note:  CBC:   Lab Results   Component Value Date/Time    WBC 4.5 12/26/2022 11:04 AM    RBC 2.23 12/26/2022 11:04 AM    HGB 7.8 12/27/2022 08:28 AM    HCT 22.9 12/27/2022 08:28 AM    MCV 83.0 12/26/2022 11:04 AM    MCH 26.5 12/26/2022 11:04 AM    MCHC 31.9 12/26/2022 11:04 AM    RDW 22.0 12/26/2022 11:04 AM    PLT 24 12/26/2022 11:04 AM    MPV NOT CALC 12/26/2022 11:04 AM     CMP:    Lab Results   Component Value Date/Time     12/27/2022 05:06 AM    K 3.5 12/27/2022 05:06 AM     12/27/2022 05:06 AM    CO2 27 12/27/2022 05:06 AM    BUN 17 12/27/2022 05:06 AM    CREATININE 0.9 12/27/2022 05:06 AM    GFRAA >60 10/13/2022 12:28 PM    LABGLOM >60 12/27/2022 05:06 AM    GLUCOSE 94 12/27/2022 05:06 AM    PROT 5.9 12/26/2022 11:04 AM    LABALBU 4.0 12/26/2022 11:04 AM    CALCIUM 8.8 12/27/2022 05:06 AM    BILITOT 0.5 12/26/2022 11:04 AM    ALKPHOS 101 12/26/2022 11:04 AM    AST 25 12/26/2022 11:04 AM    ALT 25 12/26/2022 11:04 AM       Imaging:  CXR: Persistent small ill-defined mass seen within the left perihilar region. Left pleural effusion. EKG:  Ventricular-paced rhythm with occasional and consecutive premature ventricular complexes    Telemetry:  I've personally reviewed the patient's telemetry:  Normal sinus rhythm    ASSESSMENT/PLAN:  Principal Problem:    Anemia  Resolved Problems:    * No resolved hospital problems.  *    Patient is a 78-year-old male admitted to [facility] for  Acute blood loss anemia likely contributing to his SOB with underlying hx of COPD and lung CA  -Discontinue Eliquis for now-acute bleed likely from oral anticoagulation in combination with chemotherapy regimen-no evidence of acute blood loss anemia per rectum  -H&H 5.9/18.5 > 1 unit of RBC transfused > 7.8/22.9  -Active chemo for Stage IIIB Squamous cell lung cancer of the left hilum, follows with the [facility]   -Actively takes Eliquis for history of DVTs at home, currently held. -Check BNP as it was elevated on previous admission and CXR with left pleural effusion   -IVF NS @ 100, will likely need to d/c if BNP elevated   -Currently on 3 L nasal cannula which is at baseline  -Heme-onc consult as patient known to them  -If hemoglobin continues to decline, will consult general surgery    Hypomagnesemia/electrolyte disturbances  -Mg+ 1.5, replace with 2g, recheck in the AM    Bradycardia  -Pacer interrogation    Case discussed with wife at bedside  Medication for other comorbidities continue as appropriate dose adjustment as necessary. DVT prophylaxis PCD's  PT OT  Discharge planning    Code status: Full  Requires Inpatient level of care.     Jerzy Peralta MD

## 2022-12-27 NOTE — ED NOTES
Carissa Desouza NP notified of patient's H&H following the 2 units of PRBCs patient received.      Stefanie Juarez RN  12/27/22 9813

## 2022-12-28 ENCOUNTER — APPOINTMENT (OUTPATIENT)
Dept: CT IMAGING | Age: 72
DRG: 811 | End: 2022-12-28
Payer: MEDICARE

## 2022-12-28 LAB
ANION GAP SERPL CALCULATED.3IONS-SCNC: 10 MMOL/L (ref 7–16)
ANISOCYTOSIS: ABNORMAL
BASOPHILS ABSOLUTE: 0 E9/L (ref 0–0.2)
BASOPHILS RELATIVE PERCENT: 0 % (ref 0–2)
BUN BLDV-MCNC: 15 MG/DL (ref 6–23)
CALCIUM SERPL-MCNC: 8.9 MG/DL (ref 8.6–10.2)
CHLORIDE BLD-SCNC: 102 MMOL/L (ref 98–107)
CO2: 27 MMOL/L (ref 22–29)
CREAT SERPL-MCNC: 0.8 MG/DL (ref 0.7–1.2)
EOSINOPHILS ABSOLUTE: 0.02 E9/L (ref 0.05–0.5)
EOSINOPHILS RELATIVE PERCENT: 0.7 % (ref 0–6)
GFR SERPL CREATININE-BSD FRML MDRD: >60 ML/MIN/1.73
GLUCOSE BLD-MCNC: 111 MG/DL (ref 74–99)
HCT VFR BLD CALC: 22.7 % (ref 37–54)
HCT VFR BLD CALC: 23 % (ref 37–54)
HCT VFR BLD CALC: 25.4 % (ref 37–54)
HCT VFR BLD CALC: 25.9 % (ref 37–54)
HEMOGLOBIN: 7.7 G/DL (ref 12.5–16.5)
HEMOGLOBIN: 7.7 G/DL (ref 12.5–16.5)
HEMOGLOBIN: 8.5 G/DL (ref 12.5–16.5)
HEMOGLOBIN: 8.7 G/DL (ref 12.5–16.5)
HYPOCHROMIA: ABNORMAL
IMMATURE GRANULOCYTES #: 0.01 E9/L
IMMATURE GRANULOCYTES %: 0.4 % (ref 0–5)
LYMPHOCYTES ABSOLUTE: 0.57 E9/L (ref 1.5–4)
LYMPHOCYTES RELATIVE PERCENT: 20.5 % (ref 20–42)
MCH RBC QN AUTO: 28.5 PG (ref 26–35)
MCHC RBC AUTO-ENTMCNC: 33.9 % (ref 32–34.5)
MCV RBC AUTO: 84.1 FL (ref 80–99.9)
MONOCYTES ABSOLUTE: 0.49 E9/L (ref 0.1–0.95)
MONOCYTES RELATIVE PERCENT: 17.6 % (ref 2–12)
NEUTROPHILS ABSOLUTE: 1.69 E9/L (ref 1.8–7.3)
NEUTROPHILS RELATIVE PERCENT: 60.8 % (ref 43–80)
OVALOCYTES: ABNORMAL
PDW BLD-RTO: 19.9 FL (ref 11.5–15)
PLATELET # BLD: 45 E9/L (ref 130–450)
PLATELET CONFIRMATION: NORMAL
PMV BLD AUTO: 10.3 FL (ref 7–12)
POIKILOCYTES: ABNORMAL
POLYCHROMASIA: ABNORMAL
POTASSIUM SERPL-SCNC: 3.5 MMOL/L (ref 3.5–5)
RBC # BLD: 2.7 E12/L (ref 3.8–5.8)
SODIUM BLD-SCNC: 139 MMOL/L (ref 132–146)
WBC # BLD: 2.8 E9/L (ref 4.5–11.5)

## 2022-12-28 PROCEDURE — 2060000000 HC ICU INTERMEDIATE R&B

## 2022-12-28 PROCEDURE — A4216 STERILE WATER/SALINE, 10 ML: HCPCS | Performed by: FAMILY MEDICINE

## 2022-12-28 PROCEDURE — 6360000004 HC RX CONTRAST MEDICATION: Performed by: RADIOLOGY

## 2022-12-28 PROCEDURE — C9113 INJ PANTOPRAZOLE SODIUM, VIA: HCPCS | Performed by: FAMILY MEDICINE

## 2022-12-28 PROCEDURE — 71260 CT THORAX DX C+: CPT

## 2022-12-28 PROCEDURE — 2700000000 HC OXYGEN THERAPY PER DAY

## 2022-12-28 PROCEDURE — 6360000002 HC RX W HCPCS: Performed by: FAMILY MEDICINE

## 2022-12-28 PROCEDURE — 2580000003 HC RX 258: Performed by: FAMILY MEDICINE

## 2022-12-28 PROCEDURE — 85025 COMPLETE CBC W/AUTO DIFF WBC: CPT

## 2022-12-28 PROCEDURE — 85018 HEMOGLOBIN: CPT

## 2022-12-28 PROCEDURE — 6370000000 HC RX 637 (ALT 250 FOR IP): Performed by: FAMILY MEDICINE

## 2022-12-28 PROCEDURE — 36415 COLL VENOUS BLD VENIPUNCTURE: CPT

## 2022-12-28 PROCEDURE — 85014 HEMATOCRIT: CPT

## 2022-12-28 PROCEDURE — 80048 BASIC METABOLIC PNL TOTAL CA: CPT

## 2022-12-28 PROCEDURE — 94640 AIRWAY INHALATION TREATMENT: CPT

## 2022-12-28 PROCEDURE — 6370000000 HC RX 637 (ALT 250 FOR IP)

## 2022-12-28 RX ORDER — ALBUTEROL SULFATE 90 UG/1
2 AEROSOL, METERED RESPIRATORY (INHALATION) EVERY 6 HOURS PRN
Status: DISCONTINUED | OUTPATIENT
Start: 2022-12-28 | End: 2022-12-30 | Stop reason: HOSPADM

## 2022-12-28 RX ORDER — ALBUTEROL SULFATE 90 UG/1
2 AEROSOL, METERED RESPIRATORY (INHALATION) EVERY 6 HOURS PRN
Status: DISCONTINUED | OUTPATIENT
Start: 2022-12-28 | End: 2022-12-28 | Stop reason: CLARIF

## 2022-12-28 RX ORDER — ALBUTEROL SULFATE 2.5 MG/3ML
2.5 SOLUTION RESPIRATORY (INHALATION) EVERY 6 HOURS PRN
Status: DISCONTINUED | OUTPATIENT
Start: 2022-12-28 | End: 2022-12-28

## 2022-12-28 RX ADMIN — BUDESONIDE 250 MCG: 0.25 SUSPENSION RESPIRATORY (INHALATION) at 08:16

## 2022-12-28 RX ADMIN — ARFORMOTEROL TARTRATE 15 MCG: 15 SOLUTION RESPIRATORY (INHALATION) at 08:16

## 2022-12-28 RX ADMIN — IPRATROPIUM BROMIDE AND ALBUTEROL SULFATE 1 AMPULE: 2.5; .5 SOLUTION RESPIRATORY (INHALATION) at 16:01

## 2022-12-28 RX ADMIN — MELATONIN 3 MG ORAL TABLET 3 MG: 3 TABLET ORAL at 22:30

## 2022-12-28 RX ADMIN — IPRATROPIUM BROMIDE AND ALBUTEROL SULFATE 1 AMPULE: 2.5; .5 SOLUTION RESPIRATORY (INHALATION) at 20:18

## 2022-12-28 RX ADMIN — IOPAMIDOL 60 ML: 755 INJECTION, SOLUTION INTRAVENOUS at 16:40

## 2022-12-28 RX ADMIN — BUDESONIDE 250 MCG: 0.25 SUSPENSION RESPIRATORY (INHALATION) at 20:18

## 2022-12-28 RX ADMIN — GABAPENTIN 100 MG: 100 CAPSULE ORAL at 09:36

## 2022-12-28 RX ADMIN — SODIUM CHLORIDE, PRESERVATIVE FREE 10 ML: 5 INJECTION INTRAVENOUS at 19:59

## 2022-12-28 RX ADMIN — POTASSIUM CHLORIDE 10 MEQ: 750 TABLET, EXTENDED RELEASE ORAL at 09:36

## 2022-12-28 RX ADMIN — GABAPENTIN 100 MG: 100 CAPSULE ORAL at 19:58

## 2022-12-28 RX ADMIN — PREDNISONE 5 MG: 5 TABLET ORAL at 09:44

## 2022-12-28 RX ADMIN — METOPROLOL SUCCINATE 25 MG: 25 TABLET, EXTENDED RELEASE ORAL at 09:36

## 2022-12-28 RX ADMIN — FUROSEMIDE 20 MG: 20 TABLET ORAL at 09:35

## 2022-12-28 RX ADMIN — METOPROLOL SUCCINATE 25 MG: 25 TABLET, EXTENDED RELEASE ORAL at 16:14

## 2022-12-28 RX ADMIN — SODIUM CHLORIDE, PRESERVATIVE FREE 10 ML: 5 INJECTION INTRAVENOUS at 09:45

## 2022-12-28 RX ADMIN — SODIUM CHLORIDE, PRESERVATIVE FREE 40 MG: 5 INJECTION INTRAVENOUS at 09:35

## 2022-12-28 RX ADMIN — IPRATROPIUM BROMIDE AND ALBUTEROL SULFATE 1 AMPULE: 2.5; .5 SOLUTION RESPIRATORY (INHALATION) at 08:16

## 2022-12-28 RX ADMIN — ARFORMOTEROL TARTRATE 15 MCG: 15 SOLUTION RESPIRATORY (INHALATION) at 20:17

## 2022-12-28 RX ADMIN — ATORVASTATIN CALCIUM 20 MG: 20 TABLET, FILM COATED ORAL at 09:36

## 2022-12-28 NOTE — CARE COORDINATION
Spoke with Pt and Wife about Transition plan of Care. Pt lives with wife with 1 step to enter. Home Oxygen is through [address]. No other DME. PCP: Dr. Ermelinda Castro. [facility]. Wife to provide transport at discharge. Pt goes to [facility] for [facility]. Discharge Plan is to return home when medically stable. [email or web address] to follow for discharge needs.    Latha Ruvalcaba, L.S.W.  [phone]

## 2022-12-28 NOTE — PROGRESS NOTES
[facility]                                Progress note    Subjective: The patient is awake and alert. No acute events overnight. Denies chest pain, angina, SOB     Objective:    BP (!) 154/68   Pulse 70   Temp 96.9 °F (36.1 °C) (Temporal)   Resp 20   Ht 5' 6\" (1.676 m)   Wt 165 lb (74.8 kg)   SpO2 100%   BMI 26.63 kg/m²     In: 240 [P.O.:240]  Out: -   In: 240   Out: -     General appearance: NAD, conversant  HEENT: AT/NC, MMM  Neck: FROM, supple  Lungs: Clear to auscultation, mediport right chest  CV: RRR, no MRGs, pacemaker  Vasc: Radial pulses 2+  Abdomen: Soft, non-tender; no masses or HSM  Extremities: No peripheral edema or digital cyanosis, slight bilateral  lower extremity edema  Skin: no rash, lesions or ulcers  Psych: Alert and oriented to person, place and time  Neuro: Alert and interactive     Recent Labs     12/26/22  1104 12/27/22  0114 12/27/22  1438 12/28/22  0210 12/28/22  0650   WBC 4.5  --   --  2.8*  --    HGB 5.9*   < > 8.2* 7.7* 7.7*   HCT 18.5*   < > 25.1* 22.7* 23.0*   PLT 24*  --   --  45*  --     < > = values in this interval not displayed. Recent Labs     12/26/22  1104 12/27/22  0506 12/28/22  0210    140 139   K 4.4 3.5 3.5    103 102   CO2 26 27 27   BUN 17 17 15   CREATININE 0.8 0.9 0.8   CALCIUM 9.5 8.8 8.9       Assessment:    Principal Problem:    Anemia  Resolved Problems:    * No resolved hospital problems.  *      Plan:  Patient is a 67-year-old male admitted to [facility] for  Acute blood loss anemia likely contributing to his SOB with underlying hx of COPD and lung CA  -Discontinue Eliquis for now-acute bleed likely from oral anticoagulation in combination with chemotherapy regimen-no evidence of acute blood loss anemia per rectum  -H&H 5.9/18.5 > 1 unit of RBC transfused > 7.8/22.9  -Active chemo for Stage IIIB Squamous cell lung cancer of the left hilum, follows with the [facility]   -Actively takes Eliquis for history of DVTs at home, currently held.    -Check BNP as it was elevated on previous admission and CXR with left pleural effusion   -IVF NS @ 100, will likely need to d/c if BNP elevated   -Currently on 3 L nasal cannula which is at baseline  -Heme-onc consult as patient known to them  -If hemoglobin continues to decline, will consult general surgery     Hypomagnesemia/electrolyte disturbances  -Mg+ 1.5, replace with 2g, recheck in the AM     Bradycardia  -Pacer interrogation    12/28/2022  Hgb stable 7.7  Platelet 45 today  Continue to hold anticoagulation  Hem/onc following  Continue to monitor labs  Consult general surgery    Code status:   Consultants: Hem/Onc     DVT Prophylaxis - PCD  PT/OT  Discharge planning     LELO Laureano - CNP  2:03 PM  12/28/2022

## 2022-12-28 NOTE — PROGRESS NOTES
Subjective: The patient is awake and alert. No problems overnight. He had 1 episode of bleeding noted on 12/25/2022. Denies any further obvious melena or GIB. No chest pain, angina, dyspnea or abdominal discomfort. No nausea or vomiting. Tolerating diet. Objective:    BP (!) 154/68   Pulse 70   Temp 96.9 °F (36.1 °C) (Temporal)   Resp 20   Ht 5' 6\" (1.676 m)   Wt 165 lb (74.8 kg)   SpO2 100%   BMI 26.63 kg/m²     General: NAD  HEENT: No thrush or mucositis, EOMI  Heart:  RRR, no murmurs, gallops, or rubs.   Lungs:  CTA bilaterally, no wheeze, rales or rhonchi  Abd: BS present, nontender, nondistended, no masses  Extrem:  No clubbing, cyanosis, or edema  Lymphatics: No palpable adenopathy in cervical and supraclavicular regions  Skin: Intact, no petechia or purpura    CBC with Differential:    Lab Results   Component Value Date/Time    WBC 2.8 12/28/2022 02:10 AM    RBC 2.70 12/28/2022 02:10 AM    HGB 7.7 12/28/2022 06:50 AM    HCT 23.0 12/28/2022 06:50 AM    PLT 45 12/28/2022 02:10 AM    MCV 84.1 12/28/2022 02:10 AM    MCH 28.5 12/28/2022 02:10 AM    MCHC 33.9 12/28/2022 02:10 AM    RDW 19.9 12/28/2022 02:10 AM    NRBC 0.9 12/26/2022 11:04 AM    BLASTSPCT 0.9 08/24/2022 05:22 AM    METASPCT 3.5 08/30/2022 07:03 AM    LYMPHOPCT 20.5 12/28/2022 02:10 AM    PROMYELOPCT 1.7 09/01/2022 03:30 AM    MONOPCT 17.6 12/28/2022 02:10 AM    MYELOPCT 0.9 09/02/2022 04:30 AM    BASOPCT 0.0 12/28/2022 02:10 AM    MONOSABS 0.49 12/28/2022 02:10 AM    LYMPHSABS 0.57 12/28/2022 02:10 AM    EOSABS 0.02 12/28/2022 02:10 AM    BASOSABS 0.00 12/28/2022 02:10 AM     CMP:    Lab Results   Component Value Date/Time     12/28/2022 02:10 AM    K 3.5 12/28/2022 02:10 AM    K 3.5 12/27/2022 05:06 AM     12/28/2022 02:10 AM    CO2 27 12/28/2022 02:10 AM    BUN 15 12/28/2022 02:10 AM    CREATININE 0.8 12/28/2022 02:10 AM    GFRAA >60 10/13/2022 12:28 PM    LABGLOM >60 12/28/2022 02:10 AM    GLUCOSE 111 12/28/2022 02:10 AM    PROT 5.9 12/26/2022 11:04 AM    LABALBU 4.0 12/26/2022 11:04 AM    CALCIUM 8.9 12/28/2022 02:10 AM    BILITOT 0.5 12/26/2022 11:04 AM    ALKPHOS 101 12/26/2022 11:04 AM    AST 25 12/26/2022 11:04 AM    ALT 25 12/26/2022 11:04 AM          Current Facility-Administered Medications:     0.9 % sodium chloride infusion, , IntraVENous, PRN, Angelia Herron DO    ipratropium-albuterol (DUONEB) nebulizer solution 1 ampule, 1 ampule, Inhalation, Q4H WA, Angelia Herron DO, 1 ampule at 12/28/22 0816    albuterol (ACCUNEB) nebulizer solution 0.63 mg, 1 ampule, Nebulization, Q6H PRN, Sabrina Less Learn, APRN - CNP    atorvastatin (LIPITOR) tablet 20 mg, 20 mg, Oral, Daily, Sabrina Less Learn, APRN - CNP, 20 mg at 12/28/22 0936    furosemide (LASIX) tablet 20 mg, 20 mg, Oral, Daily, Sabrina Less Learn, APRN - CNP, 20 mg at 12/28/22 0935    gabapentin (NEURONTIN) capsule 100 mg, 100 mg, Oral, BID, Sabrina Less Learn, APRN - CNP, 100 mg at 12/28/22 0936    melatonin tablet 3 mg, 3 mg, Oral, Nightly, Sabrina Less Learn, APRN - CNP, 3 mg at 12/27/22 2104    metoprolol succinate (TOPROL XL) extended release tablet 25 mg, 25 mg, Oral, BID WC, Sabrina Less Learn, APRN - CNP, 25 mg at 12/28/22 0936    potassium chloride (KLOR-CON M) extended release tablet 10 mEq, 10 mEq, Oral, Daily, Sabrina Less Learn, APRN - CNP, 10 mEq at 12/28/22 0936    predniSONE (DELTASONE) tablet 5 mg, 5 mg, Oral, Daily, Sabrina Less Learn, APRN - CNP, 5 mg at 12/28/22 0944    0.9 % sodium chloride infusion, , IntraVENous, Continuous, Sabrina Less Learn, APRN - CNP, Stopped at 12/27/22 1430    sodium chloride flush 0.9 % injection 5-40 mL, 5-40 mL, IntraVENous, 2 times per day, Sabrina Less Learn, APRN - CNP, 10 mL at 12/28/22 0945    sodium chloride flush 0.9 % injection 5-40 mL, 5-40 mL, IntraVENous, PRN, Sabrina Less Learn, APRN - CNP    0.9 % sodium chloride infusion, , IntraVENous, PRN, Sabrina Less Learn, APRN - CNP    ondansetron (ZOFRAN-ODT) disintegrating tablet 4 mg, 4 mg, Oral, Q8H PRN **OR** ondansetron (ZOFRAN) injection 4 mg, 4 mg, IntraVENous, Q6H PRN, Alma Nettles, APRN - CNP    acetaminophen (TYLENOL) tablet 650 mg, 650 mg, Oral, Q6H PRN **OR** acetaminophen (TYLENOL) suppository 650 mg, 650 mg, Rectal, Q6H PRN, Alma Nettles, APRN - CNP    pantoprazole (PROTONIX) 40 mg in sodium chloride (PF) 0.9 % 10 mL injection, 40 mg, IntraVENous, Daily, Alma Nettles, APRN - CNP, 40 mg at 12/28/22 0935    budesonide (PULMICORT) nebulizer suspension 250 mcg, 0.25 mg, Nebulization, BID, Alma Nettles, APRN - CNP, 250 mcg at 12/28/22 0816    Arformoterol Tartrate (BROVANA) nebulizer solution 15 mcg, 15 mcg, Nebulization, BID, Alma Nettles, APRN - CNP, 15 mcg at 12/28/22 0816    XR CHEST PORTABLE   Final Result   1. Persistent small ill-defined mass seen within the left perihilar region   2. Pleuroparenchymal scarring within the left perihilar region   3. Left pleural effusion   4. Emphysematous changes             Assessment:    Principal Problem:    Anemia  Resolved Problems:    * No resolved hospital problems. 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 Treated with concurrent chemoRT fb Imfinzi from 11/2/21. Progression on PET from 6/24/22. Started Lalit Box and Keytruda on 6/30/22. He was  due for C6 today. 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 Admitted with SOB and found to have hgb 5.9 g/dL. +FOBT. Plan:  Anemia multifactorial, chemotherapy induced and GIB.    Repeat CT Chest to evaluate disease response  Transfusion support to maintain hgb > 7 g/dL  Hold anticoagulation for now    Electronically signed by Sadia Hawkins MD on 12/28/2022 at 12:10 PM

## 2022-12-29 LAB
ANISOCYTOSIS: ABNORMAL
BASOPHILS ABSOLUTE: 0.02 E9/L (ref 0–0.2)
BASOPHILS RELATIVE PERCENT: 0.9 % (ref 0–2)
EOSINOPHILS ABSOLUTE: 0.02 E9/L (ref 0.05–0.5)
EOSINOPHILS RELATIVE PERCENT: 0.9 % (ref 0–6)
HCT VFR BLD CALC: 23 % (ref 37–54)
HCT VFR BLD CALC: 25.6 % (ref 37–54)
HCT VFR BLD CALC: 26.1 % (ref 37–54)
HCT VFR BLD CALC: 26.3 % (ref 37–54)
HEMOGLOBIN: 7.6 G/DL (ref 12.5–16.5)
HEMOGLOBIN: 8.4 G/DL (ref 12.5–16.5)
HEMOGLOBIN: 8.5 G/DL (ref 12.5–16.5)
HEMOGLOBIN: 8.9 G/DL (ref 12.5–16.5)
LYMPHOCYTES ABSOLUTE: 0.34 E9/L (ref 1.5–4)
LYMPHOCYTES RELATIVE PERCENT: 13.1 % (ref 20–42)
MCH RBC QN AUTO: 28.9 PG (ref 26–35)
MCHC RBC AUTO-ENTMCNC: 33.8 % (ref 32–34.5)
MCV RBC AUTO: 85.4 FL (ref 80–99.9)
MONOCYTES ABSOLUTE: 0.23 E9/L (ref 0.1–0.95)
MONOCYTES RELATIVE PERCENT: 8.8 % (ref 2–12)
MYELOCYTE PERCENT: 0.9 % (ref 0–0)
NEUTROPHILS ABSOLUTE: 1.98 E9/L (ref 1.8–7.3)
NEUTROPHILS RELATIVE PERCENT: 75.4 % (ref 43–80)
NUCLEATED RED BLOOD CELLS: 0.9 /100 WBC
OVALOCYTES: ABNORMAL
PDW BLD-RTO: 20.7 FL (ref 11.5–15)
PLATELET # BLD: 81 E9/L (ref 130–450)
PLATELET CONFIRMATION: NORMAL
PMV BLD AUTO: 12.4 FL (ref 7–12)
POIKILOCYTES: ABNORMAL
RBC # BLD: 3.08 E12/L (ref 3.8–5.8)
TEAR DROP CELLS: ABNORMAL
WBC # BLD: 2.6 E9/L (ref 4.5–11.5)

## 2022-12-29 PROCEDURE — 2060000000 HC ICU INTERMEDIATE R&B

## 2022-12-29 PROCEDURE — 94640 AIRWAY INHALATION TREATMENT: CPT

## 2022-12-29 PROCEDURE — 85025 COMPLETE CBC W/AUTO DIFF WBC: CPT

## 2022-12-29 PROCEDURE — 6360000002 HC RX W HCPCS: Performed by: FAMILY MEDICINE

## 2022-12-29 PROCEDURE — 2580000003 HC RX 258: Performed by: FAMILY MEDICINE

## 2022-12-29 PROCEDURE — C9113 INJ PANTOPRAZOLE SODIUM, VIA: HCPCS | Performed by: FAMILY MEDICINE

## 2022-12-29 PROCEDURE — 36415 COLL VENOUS BLD VENIPUNCTURE: CPT

## 2022-12-29 PROCEDURE — 2700000000 HC OXYGEN THERAPY PER DAY

## 2022-12-29 PROCEDURE — 6370000000 HC RX 637 (ALT 250 FOR IP)

## 2022-12-29 PROCEDURE — 85018 HEMOGLOBIN: CPT

## 2022-12-29 PROCEDURE — 6370000000 HC RX 637 (ALT 250 FOR IP): Performed by: FAMILY MEDICINE

## 2022-12-29 PROCEDURE — 85014 HEMATOCRIT: CPT

## 2022-12-29 RX ORDER — PANTOPRAZOLE SODIUM 40 MG/10ML
40 INJECTION, POWDER, LYOPHILIZED, FOR SOLUTION INTRAVENOUS 2 TIMES DAILY
Status: DISCONTINUED | OUTPATIENT
Start: 2022-12-29 | End: 2022-12-29 | Stop reason: CLARIF

## 2022-12-29 RX ORDER — SUCRALFATE 1 G/1
1 TABLET ORAL
Status: DISCONTINUED | OUTPATIENT
Start: 2022-12-29 | End: 2022-12-30 | Stop reason: HOSPADM

## 2022-12-29 RX ORDER — PANTOPRAZOLE SODIUM 40 MG/1
40 TABLET, DELAYED RELEASE ORAL 2 TIMES DAILY
Status: DISCONTINUED | OUTPATIENT
Start: 2022-12-29 | End: 2022-12-29 | Stop reason: CLARIF

## 2022-12-29 RX ORDER — PANTOPRAZOLE SODIUM 40 MG/1
40 TABLET, DELAYED RELEASE ORAL EVERY 12 HOURS
Status: DISCONTINUED | OUTPATIENT
Start: 2022-12-29 | End: 2022-12-30 | Stop reason: HOSPADM

## 2022-12-29 RX ADMIN — BUDESONIDE 250 MCG: 0.25 SUSPENSION RESPIRATORY (INHALATION) at 20:00

## 2022-12-29 RX ADMIN — METOPROLOL SUCCINATE 25 MG: 25 TABLET, EXTENDED RELEASE ORAL at 16:26

## 2022-12-29 RX ADMIN — SODIUM CHLORIDE, PRESERVATIVE FREE 10 ML: 5 INJECTION INTRAVENOUS at 20:02

## 2022-12-29 RX ADMIN — SODIUM CHLORIDE, PRESERVATIVE FREE 10 ML: 5 INJECTION INTRAVENOUS at 08:26

## 2022-12-29 RX ADMIN — ARFORMOTEROL TARTRATE 15 MCG: 15 SOLUTION RESPIRATORY (INHALATION) at 20:00

## 2022-12-29 RX ADMIN — SUCRALFATE 1 G: 1 TABLET ORAL at 20:02

## 2022-12-29 RX ADMIN — BUDESONIDE 250 MCG: 0.25 SUSPENSION RESPIRATORY (INHALATION) at 09:41

## 2022-12-29 RX ADMIN — ATORVASTATIN CALCIUM 20 MG: 20 TABLET, FILM COATED ORAL at 08:25

## 2022-12-29 RX ADMIN — IPRATROPIUM BROMIDE AND ALBUTEROL SULFATE 1 AMPULE: 2.5; .5 SOLUTION RESPIRATORY (INHALATION) at 20:00

## 2022-12-29 RX ADMIN — PREDNISONE 5 MG: 5 TABLET ORAL at 08:25

## 2022-12-29 RX ADMIN — GABAPENTIN 100 MG: 100 CAPSULE ORAL at 08:25

## 2022-12-29 RX ADMIN — SODIUM CHLORIDE, PRESERVATIVE FREE 40 MG: 5 INJECTION INTRAVENOUS at 08:26

## 2022-12-29 RX ADMIN — FUROSEMIDE 20 MG: 20 TABLET ORAL at 08:25

## 2022-12-29 RX ADMIN — IPRATROPIUM BROMIDE AND ALBUTEROL SULFATE 1 AMPULE: 2.5; .5 SOLUTION RESPIRATORY (INHALATION) at 17:48

## 2022-12-29 RX ADMIN — IPRATROPIUM BROMIDE AND ALBUTEROL SULFATE 1 AMPULE: 2.5; .5 SOLUTION RESPIRATORY (INHALATION) at 09:41

## 2022-12-29 RX ADMIN — IPRATROPIUM BROMIDE AND ALBUTEROL SULFATE 1 AMPULE: 2.5; .5 SOLUTION RESPIRATORY (INHALATION) at 13:56

## 2022-12-29 RX ADMIN — GABAPENTIN 100 MG: 100 CAPSULE ORAL at 20:01

## 2022-12-29 RX ADMIN — POTASSIUM CHLORIDE 10 MEQ: 750 TABLET, EXTENDED RELEASE ORAL at 08:25

## 2022-12-29 RX ADMIN — METOPROLOL SUCCINATE 25 MG: 25 TABLET, EXTENDED RELEASE ORAL at 08:26

## 2022-12-29 RX ADMIN — PANTOPRAZOLE SODIUM 40 MG: 40 TABLET, DELAYED RELEASE ORAL at 20:02

## 2022-12-29 RX ADMIN — ARFORMOTEROL TARTRATE 15 MCG: 15 SOLUTION RESPIRATORY (INHALATION) at 09:41

## 2022-12-29 ASSESSMENT — PAIN SCALES - GENERAL: PAINLEVEL_OUTOF10: 0

## 2022-12-29 NOTE — PROGRESS NOTES
[facility]                                Progress note    Subjective: The patient is awake and alert. Hemoglobin has remained completely stable since his initial requirement for PRBC transfusion  He did have some black bowel movement today. Objective:    BP (!) 115/52   Pulse 59   Temp 97.7 °F (36.5 °C) (Temporal)   Resp 20   Ht 5' 6\" (1.676 m)   Wt 165 lb (74.8 kg)   SpO2 99%   BMI 26.63 kg/m²     In: 480 [P.O.:480]  Out: -   In: 480   Out: -     General appearance: NAD, conversant  HEENT: AT/NC, MMM  Neck: FROM, supple  Lungs: Clear to auscultation, mediport right chest  CV: RRR, no MRGs, pacemaker  Vasc: Radial pulses 2+  Abdomen: Soft, non-tender; no masses or HSM  Extremities: No peripheral edema or digital cyanosis, slight bilateral  lower extremity edema  Skin: no rash, lesions or ulcers  Psych: Alert and oriented to person, place and time  Neuro: Alert and interactive     Recent Labs     12/28/22  0210 12/28/22  0650 12/28/22  1955 12/29/22  0158 12/29/22  0938   WBC 2.8*  --   --   --   --    HGB 7.7*   < > 8.5* 7.6* 8.4*   HCT 22.7*   < > 25.4* 23.0* 25.6*   PLT 45*  --   --   --   --     < > = values in this interval not displayed. Recent Labs     12/27/22  0506 12/28/22  0210    139   K 3.5 3.5    102   CO2 27 27   BUN 17 15   CREATININE 0.9 0.8   CALCIUM 8.8 8.9       Assessment:    Principal Problem:    Anemia  Resolved Problems:    * No resolved hospital problems.  *      Plan:  Patient is a 74-year-old male admitted to [facility] for  Acute blood loss anemia likely contributing to his SOB with underlying hx of COPD and lung CA  -Discontinue Eliquis for now-acute bleed likely from oral anticoagulation in combination with chemotherapy regimen-no evidence of acute blood loss anemia per rectum  -H&H 5.9/18.5 > 1 unit of RBC transfused > 7.8/22.9  -Active chemo for Stage IIIB Squamous cell lung cancer of the left hilum, follows [facility] -Actively takes Eliquis for history of DVTs at home, currently held.    -Check BNP as it was elevated on previous admission and CXR with left pleural effusion   -IVF NS @ 100, will likely need to d/c if BNP elevated   -Currently on 3 L nasal cannula which is at baseline  -Heme-onc consult as patient known to them  -If hemoglobin continues to decline, will consult general surgery     Hypomagnesemia/electrolyte disturbances  -Mg+ 1.5, replace with 2g, recheck in the AM     Bradycardia  -Pacer interrogation    12/28/2022  Hgb stable 7.7  Platelet 45 today  Continue to hold anticoagulation  Hem/onc following  Continue to monitor labs      12/29/2022  Hgb remains stable at 8.4  Continue to hold anticoagulation  Vital signs stable  Continues on 3 liters O2  General surgery evaluation prior to discharge, patient known to Dr. Harris Moyer    Code status:   Consultants: Hem/Onc     DVT Prophylaxis - PCD  PT/OT  Discharge Shiloh France MD   12:53 PM  12/29/2022

## 2022-12-29 NOTE — CARE COORDINATION
HG this morning 7.6 down from 8.5. Discharge Plan is to return home when medically stable. Pt does outpatient therapy at [facility]. SW/CM to follow for discharge needs.    KEY GaitanS.W.  [phone]

## 2022-12-29 NOTE — PROGRESS NOTES
Call placed to Dr. Sunni Whatley surgery regarding consult. Dr. Marino Pfeiffer does not practice at this location. Spoke with patient he is comfortable and agreeable to consult general surgeon on call here.      Anam Somers RN, BSN

## 2022-12-29 NOTE — PROGRESS NOTES
Comprehensive Nutrition Assessment    Type and Reason for Visit:  Positive Nutrition Screen, Initial (wt loss-unsure of amount)    Nutrition Recommendations/Plan:    Continue current diet. Added hi pro, low amaris ONS BID  Will monitor while inpatient. Malnutrition Assessment:  Malnutrition Status: At risk for malnutrition (Comment) (significant wt loss, Ca w/Ca related treatments) (12/29/22 1803)    Context:  Chronic Illness     Findings of the 6 clinical characteristics of malnutrition:  Energy Intake:  No significant decrease in energy intake  Weight Loss:  7.5% over 3 months     Body Fat Loss:  Unable to assess     Muscle Mass Loss:  Unable to assess    Fluid Accumulation:  No significant fluid accumulation     Strength:  Not Performed    Nutrition Assessment:    ADM: Acute blood loss anemia . Active chemo for Stage IIIB Squamous cell lung cancer of the left hilum, follows with the [facility] . Pt at nut'l risk for catabolic illness w/CA tx's & significant wt loss. Will add ONS BID & monitor. .    Nutrition Related Findings:    A&O, SOB, Bilateral tops of feet-edema, soft rounded abd, active BS Wound Type: None       Current Nutrition Intake & Therapies:    Average Meal Intake: %  Average Supplements Intake: None Ordered  ADULT DIET; Regular  ADULT ORAL NUTRITION SUPPLEMENT; Breakfast, Dinner; Low Calorie/High Protein Oral Supplement    Anthropometric Measures:  Height: 5' 6\" (167.6 cm)  Ideal Body Weight (IBW): 142 lbs (65 kg)    Admission Body Weight: 165 lb (74.8 kg) (12/26 not specified)  Current Body Weight: 165 lb (74.8 kg), 116.2 % IBW. Weight Source: Not Specified (12/26)  Current BMI (kg/m2): 26.6  Usual Body Weight: 188 lb 11.2 oz (85.6 kg) (9/3/22 measured wt)  % Weight Change (Calculated): -12.6  Weight Adjustment For: No Adjustment                 BMI Categories: Overweight (BMI 25.0-29. 9)    Estimated Daily Nutrient Needs:  Energy Requirements Based On: Kcal/kg  Weight Used for Energy Requirements: Current  Energy (kcal/day): 9106-6807  Weight Used for Protein Requirements: Ideal  Protein (g/day):   Method Used for Fluid Requirements: 1 ml/kcal  Fluid (ml/day): 5325-4047    Nutrition Diagnosis:   Unintended weight loss related to catabolic illness as evidenced by weight loss 7.5% in 3 months    Nutrition Interventions:   Food and/or Nutrient Delivery: Continue Current Diet, Start Oral Nutrition Supplement (Added hi pro, low amaris ONS BID)  Nutrition Education/Counseling: No recommendation at this time  Coordination of Nutrition Care: Continue to monitor while inpatient       Goals:     Goals: Meet at least 75% of estimated needs, by next RD assessment       Nutrition Monitoring and Evaluation:      Food/Nutrient Intake Outcomes: Food and Nutrient Intake, Supplement Intake  Physical Signs/Symptoms Outcomes: Biochemical Data, Weight, Skin, Nutrition Focused Physical Findings, Fluid Status or Edema    Discharge Planning:    Continue Oral Nutrition Supplement, Continue current diet     Idalmis Gentile RD  Contact: [phone]

## 2022-12-29 NOTE — PLAN OF CARE
Problem: Discharge Planning  Goal: Discharge to home or other facility with appropriate resources  12/29/2022 1119 by Horace Meléndez RN  Outcome: Progressing  12/28/2022 2313 by Ilene Lai RN  Outcome: Progressing     Problem: Skin/Tissue Integrity  Goal: Absence of new skin breakdown  Description: 1. Monitor for areas of redness and/or skin breakdown  2. Assess vascular access sites hourly  3. Every 4-6 hours minimum:  Change oxygen saturation probe site  4. Every 4-6 hours:  If on nasal continuous positive airway pressure, respiratory therapy assess nares and determine need for appliance change or resting period.   12/29/2022 1119 by Horace Meléndez RN  Outcome: Progressing  12/28/2022 2313 by Ilene Lai RN  Outcome: Progressing     Problem: Safety - Adult  Goal: Free from fall injury  12/29/2022 1119 by Horace Meléndez RN  Outcome: Progressing  12/28/2022 2313 by Ilene Lai RN  Outcome: Progressing     Problem: Pain  Goal: Verbalizes/displays adequate comfort level or baseline comfort level  Outcome: Progressing

## 2022-12-29 NOTE — PROGRESS NOTES
Subjective: The patient is awake and alert. Black stools. Surgery consulted  Feeling better than on admission  SOB with exertion, mild leg swelling (not new)  No chest pain, angina, dyspnea or abdominal discomfort. No nausea or vomiting. Tolerating diet. Objective:    BP (!) 115/52   Pulse 59   Temp 97.7 °F (36.5 °C) (Temporal)   Resp 20   Ht 5' 6\" (1.676 m)   Wt 165 lb (74.8 kg)   SpO2 99%   BMI 26.63 kg/m²     General: NAD  HEENT: No thrush or mucositis, EOMI  Heart:  RRR, no murmurs, gallops, or rubs. , pacemaker  Lungs:  air exchanged diminished throughout lung fields most prominent in left base, no wheeze, rales or rhonchi  Abd: BS present, nontender, nondistended, no masses  Extrem:  SCDs bilaterally, No clubbing, cyanosis  Lymphatics: No palpable adenopathy in cervical and supraclavicular regions  Skin: Intact, no petechia or purpura    CBC with Differential:    Lab Results   Component Value Date/Time    WBC 2.8 12/28/2022 02:10 AM    RBC 2.70 12/28/2022 02:10 AM    HGB 8.4 12/29/2022 09:38 AM    HCT 25.6 12/29/2022 09:38 AM    PLT 45 12/28/2022 02:10 AM    MCV 84.1 12/28/2022 02:10 AM    MCH 28.5 12/28/2022 02:10 AM    MCHC 33.9 12/28/2022 02:10 AM    RDW 19.9 12/28/2022 02:10 AM    NRBC 0.9 12/26/2022 11:04 AM    BLASTSPCT 0.9 08/24/2022 05:22 AM    METASPCT 3.5 08/30/2022 07:03 AM    LYMPHOPCT 20.5 12/28/2022 02:10 AM    PROMYELOPCT 1.7 09/01/2022 03:30 AM    MONOPCT 17.6 12/28/2022 02:10 AM    MYELOPCT 0.9 09/02/2022 04:30 AM    BASOPCT 0.0 12/28/2022 02:10 AM    MONOSABS 0.49 12/28/2022 02:10 AM    LYMPHSABS 0.57 12/28/2022 02:10 AM    EOSABS 0.02 12/28/2022 02:10 AM    BASOSABS 0.00 12/28/2022 02:10 AM     CMP:    Lab Results   Component Value Date/Time     12/28/2022 02:10 AM    K 3.5 12/28/2022 02:10 AM    K 3.5 12/27/2022 05:06 AM     12/28/2022 02:10 AM    CO2 27 12/28/2022 02:10 AM    BUN 15 12/28/2022 02:10 AM    CREATININE 0.8 12/28/2022 02:10 AM    GFRAA >60 10/13/2022 12:28 PM    LABGLOM >60 12/28/2022 02:10 AM    GLUCOSE 111 12/28/2022 02:10 AM    PROT 5.9 12/26/2022 11:04 AM    LABALBU 4.0 12/26/2022 11:04 AM    CALCIUM 8.9 12/28/2022 02:10 AM    BILITOT 0.5 12/26/2022 11:04 AM    ALKPHOS 101 12/26/2022 11:04 AM    AST 25 12/26/2022 11:04 AM    ALT 25 12/26/2022 11:04 AM          Current Facility-Administered Medications:     albuterol sulfate HFA (PROVENTIL;VENTOLIN;PROAIR) 108 (90 Base) MCG/ACT inhaler 2 puff, 2 puff, Inhalation, Q6H PRN, Larissa Clements MD    0.9 % sodium chloride infusion, , IntraVENous, PRN, Cornelia Coburn DO    ipratropium-albuterol (DUONEB) nebulizer solution 1 ampule, 1 ampule, Inhalation, Q4H Angelia PEREZ DO, 1 ampule at 12/29/22 0941    atorvastatin (LIPITOR) tablet 20 mg, 20 mg, Oral, Daily, Afua Nettles, APRN - CNP, 20 mg at 12/29/22 0825    furosemide (LASIX) tablet 20 mg, 20 mg, Oral, Daily, Afua Nettles, APRN - CNP, 20 mg at 12/29/22 0825    gabapentin (NEURONTIN) capsule 100 mg, 100 mg, Oral, BID, Afua Nettles APRN - CNP, 100 mg at 12/29/22 0825    melatonin tablet 3 mg, 3 mg, Oral, Nightly, Afua Nettles, APRN - CNP, 3 mg at 12/28/22 2230    metoprolol succinate (TOPROL XL) extended release tablet 25 mg, 25 mg, Oral, BID , Afua Nettles APRN - CNP, 25 mg at 12/29/22 2040    potassium chloride (KLOR-CON M) extended release tablet 10 mEq, 10 mEq, Oral, Daily, Afua Nettles APRN - CNP, 10 mEq at 12/29/22 0825    predniSONE (DELTASONE) tablet 5 mg, 5 mg, Oral, Daily, Afua Nettles, APRN - CNP, 5 mg at 12/29/22 0825    0.9 % sodium chloride infusion, , IntraVENous, Continuous, Afua Nettles, APRN - CNP, Stopped at 12/27/22 1430    sodium chloride flush 0.9 % injection 5-40 mL, 5-40 mL, IntraVENous, 2 times per day, Afua Nettles, APRN - CNP, 10 mL at 12/29/22 0826    sodium chloride flush 0.9 % injection 5-40 mL, 5-40 mL, IntraVENous, PRN, Afua Nettles, APRN - CNP    0.9 % sodium chloride infusion, , IntraVENous, PRN, Lola Nettles APRN - CNP    ondansetron (ZOFRAN-ODT) disintegrating tablet 4 mg, 4 mg, Oral, Q8H PRN **OR** ondansetron (ZOFRAN) injection 4 mg, 4 mg, IntraVENous, Q6H PRN, Lola Nettles, APRN - CNP    acetaminophen (TYLENOL) tablet 650 mg, 650 mg, Oral, Q6H PRN **OR** acetaminophen (TYLENOL) suppository 650 mg, 650 mg, Rectal, Q6H PRN, Lola Nettles APRN - CNP    pantoprazole (PROTONIX) 40 mg in sodium chloride (PF) 0.9 % 10 mL injection, 40 mg, IntraVENous, Daily, Lola Nettles APRN - CNP, 40 mg at 12/29/22 0826    budesonide (PULMICORT) nebulizer suspension 250 mcg, 0.25 mg, Nebulization, BID, Lola Nettles APRN - CNP, 250 mcg at 12/29/22 0941    Arformoterol Tartrate (BROVANA) nebulizer solution 15 mcg, 15 mcg, Nebulization, BID, Lola Nettles, APRN - CNP, 15 mcg at 12/29/22 0941    CT CHEST W CONTRAST   Final Result   Decreased size of left hilar/infrahilar mass. Decreased associated   postobstructive atelectasis in the left lung. New 5 mm average axial diameter solid pulmonary nodule in the right middle   lobe. Decreased, now trace, left pleural effusion. Nonemergent incidental findings as above. XR CHEST PORTABLE   Final Result   1. Persistent small ill-defined mass seen within the left perihilar region   2. Pleuroparenchymal scarring within the left perihilar region   3. Left pleural effusion   4. Emphysematous changes             Assessment:    Principal Problem:    Anemia  Resolved Problems:    * No resolved hospital problems. 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 Treated with concurrent chemoRT fb Imfinzi from 11/2/21. Progression on PET from 6/24/22. Started Manuel Nipper and Keytruda on 6/30/22. He was due for C6 today. 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 Admitted with SOB and found to have hgb 5.9 g/dL. +FOBT.      Plan:  Anemia multifactorial, chemotherapy induced and GIB. CT chest with disease response, small 5 mm nodule noted, uncertain clinical significance but with overall response patient likely to continue current treatment regimen.     Transfusion support to maintain hgb > 7 g/dL  Hold anticoagulation for now  Iron levels high, taken after PRBC given  Leukopenia, check CDC diff plt    Electronically signed by Roselinda Meckel, PA on 12/29/2022 at 11:28 AM

## 2022-12-29 NOTE — PLAN OF CARE
Problem: Discharge Planning  Goal: Discharge to home or other facility with appropriate resources  12/29/2022 1132 by Kurt Moses RN  Outcome: Progressing  12/29/2022 1119 by Kurt Moses RN  Outcome: Progressing  12/28/2022 2313 by Yue Howell RN  Outcome: Progressing     Problem: Skin/Tissue Integrity  Goal: Absence of new skin breakdown  Description: 1. Monitor for areas of redness and/or skin breakdown  2. Assess vascular access sites hourly  3. Every 4-6 hours minimum:  Change oxygen saturation probe site  4. Every 4-6 hours:  If on nasal continuous positive airway pressure, respiratory therapy assess nares and determine need for appliance change or resting period.   12/29/2022 1132 by Kurt Moses RN  Outcome: Progressing  12/29/2022 1119 by Kurt Moses RN  Outcome: Progressing  12/28/2022 2313 by Yue Howell RN  Outcome: Progressing     Problem: Safety - Adult  Goal: Free from fall injury  12/29/2022 1132 by Kurt Moses RN  Outcome: Progressing  12/29/2022 1119 by Kurt Moses RN  Outcome: Progressing  12/28/2022 2313 by Yue Howell RN  Outcome: Progressing     Problem: Pain  Goal: Verbalizes/displays adequate comfort level or baseline comfort level  12/29/2022 1132 by Kurt Moses RN  Outcome: Progressing  12/29/2022 1119 by Kurt Moses RN  Outcome: Progressing

## 2022-12-29 NOTE — CONSULTS
GENERAL SURGERY  CONSULT NOTE  12/29/2022    Physician Consulted: Dr. Mercy Espinoza  Reason for Consult: Anemia and melena  Referring Physician: Dr. Virgilio Irvin is a 67 y.o. male with history of COPD, HTN, non-small cell lung cancer of the left lung currently on chemo, history of prior DVT/PE on Eliquis, history of complete heart block s/p pacemaker placement who presents for evaluation of initially presented to the ED on 12/26 secondary to increasing shortness of breath/fatigue. Patient was found on initial evaluation in the ED to have, hemoglobin of 5.9, platelets 24 which corrected appropriately to 7.7 after 2 units PRBCs and 1 unit of platelets. Patient does have a prior history of anemia of baseline at 9-10 on chemotherapy. Patient is also noted to prior to be pancytopenic. Initially anemia was thought to be due to anticoagulation with possible GI bleed and pancytopenia related to chemotherapy. Patient denies any prior GI bleeds. Patient states last colonoscopy was greater than 10 years ago was noted to be negative for acute abnormality at that time with exception of diverticulosis. Patient denies any prior abdominal surgical procedures other than 2 prior hernia repairs. Patient denies any curren smoking, alcohol use. Patient is on prednisone 5 mg daily secondary to his respiratory status/lung cancer. Patient was noted to have 1 episode of melena overnight. General surgery is consulted secondary to patient's continued anemia with melena. Patient's hemoglobin has been stable since admission with most recent trend of 7.7-8.7 correcting appropriately after 1 unit PRBC 12/29. Past Medical History:   Diagnosis Date    CHB (complete heart block) ([facility] [address] 07/2021    COPD (chronic obstructive pulmonary disease) (HCC)     Hypertension     NSCLC of left lung ([facility] [address] 05/27/2021    SCC per biopsy. Pulmonary emboli ([facility] [address] 09/17/2021    Anticoagulation with Eliquis.     Syncope 09/2020    Syncope 07/2021       Past Surgical History:   Procedure Laterality Date    BRONCHOSCOPY N/A 05/27/2021    BRONCHOSCOPY W/EBUS FNA performed by Radha Ferguson MD at [address] [facility] N/A 05/27/2021    BRONCHOSCOPY DIAGNOSTIC OR CELL [address] ONLY performed by Radha Ferguson MD at [address] [facility] N/A 05/27/2021    BRONCHOSCOPY ADD ON COMPUTER ASSISTED [facility] Rad[facility] [address] [facility] N/A 08/25/2022    CARDIAC LASER LEAD EXCHANGE performed by Abdirahman Mendes MD at [address] [facility] [address] Bilateral 2011 2001    groin     PACEMAKER CHANGE N/A 08/25/2022    CARDIAC LASER LEAD EXTRACTION, LASER LEAD EXTRACTION LEFT SIDED DEVICE, PACEMAKER DEVICE EXTRACTION performed by Abdirahman Mendes MD at [address]  07/20/2021    DUAL PPM  (ABDULLAHI)  DR. Simeon Velasco    PORT SURGERY Right 08/20/2022    PORT REMOVAL performed by Tana Sherman MD at [address]    TRANSESOPHAGEAL ECHOCARDIOGRAM  08/22/2022    Dr Abdias Pedroza N/A 08/29/2022    Medtronic Micra AV Leadless PPM Insertion (Dr. Lisa Haq)       Medications Prior to Admission:    Prior to Admission medications    Medication Sig Start Date End Date Taking?  Authorizing Provider   atorvastatin (LIPITOR) 20 MG tablet Take 20 mg by mouth daily    Historical Provider, MD   Multiple Vitamin (MULTIVITAMIN ADULT PO) Take by mouth    Historical Provider, MD   Ascorbic Acid (VITAMIN C) 250 MG tablet Take 250 mg by mouth daily    Historical Provider, MD   OXYGEN Inhale 3 L/min into the lungs continuous    Historical Provider, MD   Fluticasone-Umeclidin-Vilant (TRELEGY ELLIPTA) 200-62.5-25 MCG/INH AEPB Inhale 1 puff into the lungs daily Once the anoro ellipta is gone 7/11/22   Historical Provider, MD   apixaban (ELIQUIS) 5 MG TABS tablet Take 5 mg by mouth 2 times daily    Historical Provider, MD   gabapentin (NEURONTIN) 100 MG capsule Take 100 mg by mouth in the morning and at bedtime. Historical Provider, MD   melatonin 3 MG TABS tablet Take 3 mg by mouth daily    Historical Provider, MD   potassium chloride (MICRO-K) 10 MEQ extended release capsule Take 10 mEq by mouth daily    Historical Provider, MD   metoprolol succinate (TOPROL XL) 25 MG extended release tablet Take 1 tablet by mouth 2 times daily (with meals) 9/3/22   Luisana Covington MD   furosemide (LASIX) 20 MG tablet Take 1 tablet by mouth daily 22   Luisana Covington MD   predniSONE (DELTASONE) 5 MG tablet Take 5 mg by mouth daily    Mumtaz Al MD   albuterol (ACCUNEB) 0.63 MG/3ML nebulizer solution Take 1 ampule by nebulization every 6 hours as needed for Wheezing    Historical Provider, MD   albuterol sulfate HFA (PROVENTIL;VENTOLIN;PROAIR) 108 (90 Base) MCG/ACT inhaler Inhale 2 puffs into the lungs every 6 hours as needed for Wheezing    Historical Provider, MD   simvastatin (ZOCOR) 40 MG tablet Take 40 mg by mouth daily   9/3/22  Historical Provider, MD       No Known Allergies    Family History   Problem Relation Age of Onset    Cancer Father         prostate    Cancer Paternal Uncle         anal       Social History     Tobacco Use    Smoking status: Former     Packs/day: 2.00     Years: 25.00     Pack years: 50.00     Types: Cigarettes     Quit date: 2011     Years since quittin.0     Passive exposure: Past    Smokeless tobacco: Never   Vaping Use    Vaping Use: Never used   Substance Use Topics    Alcohol use: Not Currently     Alcohol/week: 1.0 standard drink     Types: 1 Glasses of wine per week     Comment: 1 glass of wine per day prior    Drug use: No         Review of Systems reviewed and otherwise negative as noted in HPI    PHYSICAL EXAM:    Vitals:    22 1636   BP: 132/74   Pulse: 72   Resp:    Temp:    SpO2:        General Appearance:  awake, alert, oriented, in no acute distress  Skin:  Skin color, texture, turgor normal. No rashes or lesions.   Head/face:  NCAT  Eyes:  No gross abnormalities. Lungs:  Normal expansion. Clear to auscultation. No rales, rhonchi, or wheezing.,  Tolerating 3 L nasal cannula  Heart:  Heart regular rate and rhythm  Abdomen:  Soft, non-tender, normal bowel sounds. No bruits, organomegaly or masses. Extremities: Extremities warm to touch, pink, with no edema. Male Rectal:  Normal male: no hemorrhoids, normal rectal tone, no masses, prostate not enlarged, no nodularity  Minimal amount of melena which is FOBT positive. LABS:    CBC  Recent Labs     12/28/22  0210 12/28/22  0650 12/29/22  1422   WBC 2.8*  --   --    HGB 7.7*   < > 8.5*   HCT 22.7*   < > 26.1*   PLT 45*  --   --     < > = values in this interval not displayed. BMP  Recent Labs     12/28/22  0210      K 3.5      CO2 27   BUN 15   CREATININE 0.8   CALCIUM 8.9     Liver Function  No results for input(s): AMYLASE, LIPASE, BILITOT, BILIDIR, AST, ALT, ALKPHOS, PROT, LABALBU in the last 72 hours. No results for input(s): LACTATE in the last 72 hours. Recent Labs     12/27/22  0506   INR 1.3       RADIOLOGY    XR CHEST PORTABLE    Result Date: 12/26/2022  EXAMINATION: ONE XRAY VIEW OF THE CHEST 12/26/2022 11:22 am COMPARISON: None. HISTORY: ORDERING SYSTEM PROVIDED HISTORY: [facility] and SOB TECHNOLOGIST PROVIDED HISTORY: Reason for exam:->[facility] and SOB What reading provider will be dictating this exam?->CRC FINDINGS: The cardiac silhouette is within normals. There is a right-sided MediPort catheter. The right lung is grossly clear There is a poorly defined mass seen within the right perihilar region with adjacent pleuroparenchymal scarring. There is a small chronic left pleural effusion. 1. Persistent small ill-defined mass seen within the left perihilar region 2. Pleuroparenchymal scarring within the left perihilar region 3. Left pleural effusion 4.  Emphysematous changes         ASSESSMENT:  67 y.o. male with multifactorial anemia likely related to chemotherapy and possible GI bleed related to thrombocytopenia    PLAN:  -No plans for acute endoscopic invention  -Patient's current episode of melena/GI bleed is likely related to patient's current thrombocytopenia and possible underlying gastritis. -EGD would likely not change patient's final management. Plan to treat empirically with PPI twice daily and Carafate for possible upper GI bleed.   -Discussed with Dr. Fabrice Carl    Electronically signed by Jero Milton DO on 12/29/22 at 5:55 PM EST

## 2022-12-30 VITALS
BODY MASS INDEX: 26.52 KG/M2 | HEIGHT: 66 IN | SYSTOLIC BLOOD PRESSURE: 149 MMHG | RESPIRATION RATE: 18 BRPM | HEART RATE: 65 BPM | WEIGHT: 165 LBS | OXYGEN SATURATION: 100 % | TEMPERATURE: 96.8 F | DIASTOLIC BLOOD PRESSURE: 64 MMHG

## 2022-12-30 LAB
ANISOCYTOSIS: ABNORMAL
BASOPHILS ABSOLUTE: 0.02 E9/L (ref 0–0.2)
BASOPHILS RELATIVE PERCENT: 0.9 % (ref 0–2)
EOSINOPHILS ABSOLUTE: 0.04 E9/L (ref 0.05–0.5)
EOSINOPHILS RELATIVE PERCENT: 1.7 % (ref 0–6)
HCT VFR BLD CALC: 23.2 % (ref 37–54)
HEMOGLOBIN: 7.5 G/DL (ref 12.5–16.5)
LYMPHOCYTES ABSOLUTE: 0.53 E9/L (ref 1.5–4)
LYMPHOCYTES RELATIVE PERCENT: 23.7 % (ref 20–42)
MCH RBC QN AUTO: 28.7 PG (ref 26–35)
MCHC RBC AUTO-ENTMCNC: 32.3 % (ref 32–34.5)
MCV RBC AUTO: 88.9 FL (ref 80–99.9)
MONOCYTES ABSOLUTE: 0.35 E9/L (ref 0.1–0.95)
MONOCYTES RELATIVE PERCENT: 15.8 % (ref 2–12)
NEUTROPHILS ABSOLUTE: 1.28 E9/L (ref 1.8–7.3)
NEUTROPHILS RELATIVE PERCENT: 57.9 % (ref 43–80)
NUCLEATED RED BLOOD CELLS: 9.6 /100 WBC
OVALOCYTES: ABNORMAL
PDW BLD-RTO: 20.2 FL (ref 11.5–15)
PLATELET # BLD: 73 E9/L (ref 130–450)
PLATELET CONFIRMATION: NORMAL
PMV BLD AUTO: 12.2 FL (ref 7–12)
POIKILOCYTES: ABNORMAL
POLYCHROMASIA: ABNORMAL
RBC # BLD: 2.61 E12/L (ref 3.8–5.8)
TARGET CELLS: ABNORMAL
WBC # BLD: 2.2 E9/L (ref 4.5–11.5)

## 2022-12-30 PROCEDURE — 2580000003 HC RX 258: Performed by: FAMILY MEDICINE

## 2022-12-30 PROCEDURE — 36415 COLL VENOUS BLD VENIPUNCTURE: CPT

## 2022-12-30 PROCEDURE — 85025 COMPLETE CBC W/AUTO DIFF WBC: CPT

## 2022-12-30 PROCEDURE — 6360000002 HC RX W HCPCS: Performed by: FAMILY MEDICINE

## 2022-12-30 PROCEDURE — 6370000000 HC RX 637 (ALT 250 FOR IP): Performed by: FAMILY MEDICINE

## 2022-12-30 PROCEDURE — 2700000000 HC OXYGEN THERAPY PER DAY

## 2022-12-30 PROCEDURE — 99222 1ST HOSP IP/OBS MODERATE 55: CPT | Performed by: SURGERY

## 2022-12-30 PROCEDURE — 6370000000 HC RX 637 (ALT 250 FOR IP)

## 2022-12-30 PROCEDURE — 94640 AIRWAY INHALATION TREATMENT: CPT

## 2022-12-30 RX ORDER — PANTOPRAZOLE SODIUM 40 MG/1
40 TABLET, DELAYED RELEASE ORAL EVERY 12 HOURS
Qty: 30 TABLET | Refills: 3 | Status: ON HOLD | OUTPATIENT
Start: 2022-12-30

## 2022-12-30 RX ORDER — SUCRALFATE 1 G/1
1 TABLET ORAL
Qty: 120 TABLET | Refills: 3 | Status: SHIPPED | OUTPATIENT
Start: 2022-12-30 | End: 2022-12-30 | Stop reason: SDUPTHER

## 2022-12-30 RX ORDER — SUCRALFATE 1 G/1
1 TABLET ORAL
Qty: 120 TABLET | Refills: 3 | Status: ON HOLD | OUTPATIENT
Start: 2022-12-30

## 2022-12-30 RX ORDER — SUCRALFATE 1 G/1
1 TABLET ORAL
Qty: 120 TABLET | Refills: 3
Start: 2022-12-30 | End: 2022-12-30 | Stop reason: SDUPTHER

## 2022-12-30 RX ORDER — PANTOPRAZOLE SODIUM 40 MG/1
40 TABLET, DELAYED RELEASE ORAL EVERY 12 HOURS
Qty: 30 TABLET | Refills: 3 | Status: SHIPPED | OUTPATIENT
Start: 2022-12-30 | End: 2022-12-30 | Stop reason: SDUPTHER

## 2022-12-30 RX ADMIN — GABAPENTIN 100 MG: 100 CAPSULE ORAL at 08:07

## 2022-12-30 RX ADMIN — SUCRALFATE 1 G: 1 TABLET ORAL at 10:33

## 2022-12-30 RX ADMIN — METOPROLOL SUCCINATE 25 MG: 25 TABLET, EXTENDED RELEASE ORAL at 08:07

## 2022-12-30 RX ADMIN — PREDNISONE 5 MG: 5 TABLET ORAL at 08:07

## 2022-12-30 RX ADMIN — POTASSIUM CHLORIDE 10 MEQ: 750 TABLET, EXTENDED RELEASE ORAL at 08:07

## 2022-12-30 RX ADMIN — FUROSEMIDE 20 MG: 20 TABLET ORAL at 08:08

## 2022-12-30 RX ADMIN — ATORVASTATIN CALCIUM 20 MG: 20 TABLET, FILM COATED ORAL at 08:40

## 2022-12-30 RX ADMIN — SUCRALFATE 1 G: 1 TABLET ORAL at 04:56

## 2022-12-30 RX ADMIN — IPRATROPIUM BROMIDE AND ALBUTEROL SULFATE 1 AMPULE: 2.5; .5 SOLUTION RESPIRATORY (INHALATION) at 12:08

## 2022-12-30 RX ADMIN — IPRATROPIUM BROMIDE AND ALBUTEROL SULFATE 1 AMPULE: 2.5; .5 SOLUTION RESPIRATORY (INHALATION) at 08:25

## 2022-12-30 RX ADMIN — SODIUM CHLORIDE, PRESERVATIVE FREE 10 ML: 5 INJECTION INTRAVENOUS at 08:11

## 2022-12-30 RX ADMIN — ARFORMOTEROL TARTRATE 15 MCG: 15 SOLUTION RESPIRATORY (INHALATION) at 08:25

## 2022-12-30 RX ADMIN — PANTOPRAZOLE SODIUM 40 MG: 40 TABLET, DELAYED RELEASE ORAL at 08:07

## 2022-12-30 RX ADMIN — MELATONIN 3 MG ORAL TABLET 3 MG: 3 TABLET ORAL at 01:08

## 2022-12-30 RX ADMIN — BUDESONIDE 250 MCG: 0.25 SUSPENSION RESPIRATORY (INHALATION) at 08:25

## 2022-12-30 NOTE — PLAN OF CARE
Problem: Discharge Planning  Goal: Discharge to home or other facility with appropriate resources  12/30/2022 0043 by Andre Gottron, RN  Outcome: Progressing  12/29/2022 1132 by Balwinder Hand RN  Outcome: Progressing  12/29/2022 1119 by Balwinder Hand RN  Outcome: Progressing     Problem: Skin/Tissue Integrity  Goal: Absence of new skin breakdown  Description: 1. Monitor for areas of redness and/or skin breakdown  2. Assess vascular access sites hourly  3. Every 4-6 hours minimum:  Change oxygen saturation probe site  4. Every 4-6 hours:  If on nasal continuous positive airway pressure, respiratory therapy assess nares and determine need for appliance change or resting period.   12/30/2022 0043 by Andre Gottron, RN  Outcome: Progressing  12/29/2022 1132 by Balwinder Hand RN  Outcome: Progressing  12/29/2022 1119 by Balwinder Hand RN  Outcome: Progressing     Problem: Safety - Adult  Goal: Free from fall injury  12/30/2022 0043 by Andre Gottron, RN  Outcome: Progressing  12/29/2022 1132 by Balwinder Hand RN  Outcome: Progressing  12/29/2022 1119 by Balwinder Hand RN  Outcome: Progressing     Problem: Pain  Goal: Verbalizes/displays adequate comfort level or baseline comfort level  12/30/2022 0043 by Andre Gottron, RN  Outcome: Progressing  12/29/2022 1132 by Balwinder Hand RN  Outcome: Progressing  12/29/2022 1119 by Balwinder Hand RN  Outcome: Progressing     Problem: Nutrition Deficit:  Goal: Optimize nutritional status  Outcome: Progressing

## 2022-12-30 NOTE — PROGRESS NOTES
Attending Attestation   Patient seen and examined, agree with resident note except for changes made by me, for remaining HP/Consult/progress note details please see resident HP/Consult/progress note. General Surgery Progress Note:    CC: anemia    S: resting no signs bleeding, has had some melena, no cp sob,       Objective:  @BP (!) 149/64   Pulse 65   Temp 96.8 °F (36 °C) (Temporal)   Resp 18   Ht 5' 6\" (1.676 m)   Wt 165 lb (74.8 kg)   SpO2 100%   BMI 26.63 kg/m² @    Physical -     Gen: NAD PERRL conj pink/pale  Resp: Breathing Comfortably, no resp distress BS with exp wheeze r worse than left   CV: Reg Rate no extra heart sounds   Abd: soft non tender   EXT nvi some pedal edema     Assessment/Plan: pancytopenic hx Lung ca on chemo    PPI carafate for likely stress gastritis, no plans for endoscopy currently      Toño Coello MD FACS   See d/c summary     2:18 PM                                                                                              GENERAL SURGERY  CONSULT NOTE  12/29/2022     Physician Consulted: Dr. Horace Medina  Reason for Consult: Anemia and melena  Referring Physician: Dr. Valentin LOBATO  Ana Sol is a 67 y.o. male with history of COPD, HTN, non-small cell lung cancer of the left lung currently on chemo, history of prior DVT/PE on Eliquis, history of complete heart block s/p pacemaker placement who presents for evaluation of initially presented to the ED on 12/26 secondary to increasing shortness of breath/fatigue. Patient was found on initial evaluation in the ED to have, hemoglobin of 5.9, platelets 24 which corrected appropriately to 7.7 after 2 units PRBCs and 1 unit of platelets. Patient does have a prior history of anemia of baseline at 9-10 on chemotherapy. Patient is also noted to prior to be pancytopenic. Initially anemia was thought to be due to anticoagulation with possible GI bleed and pancytopenia related to chemotherapy.   Patient denies any prior GI bleeds. Patient states last colonoscopy was greater than 10 years ago was noted to be negative for acute abnormality at that time with exception of diverticulosis. Patient denies any prior abdominal surgical procedures other than 2 prior hernia repairs. Patient denies any curren smoking, alcohol use. Patient is on prednisone 5 mg daily secondary to his respiratory status/lung cancer. Patient was noted to have 1 episode of melena overnight. General surgery is consulted secondary to patient's continued anemia with melena. Patient's hemoglobin has been stable since admission with most recent trend of 7.7-8.7 correcting appropriately after 1 unit PRBC 12/29. Past Medical History        Past Medical History:   Diagnosis Date    CHB (complete heart block) ([facility] [address] 07/2021    COPD (chronic obstructive pulmonary disease) (HCC)      Hypertension      NSCLC of left lung ([facility] [address] 05/27/2021     SCC per biopsy. Pulmonary emboli ([facility] [address] 09/17/2021     Anticoagulation with Eliquis.     Syncope 09/2020    Syncope 07/2021            Past Surgical History         Past Surgical History:   Procedure Laterality Date    BRONCHOSCOPY N/A 05/27/2021     BRONCHOSCOPY W/EBUS FNA performed by Angela Brooks MD at [address] [facility] [address] N/A 05/27/2021     BRONCHOSCOPY DIAGNOSTIC OR CELL [address] ONLY performed by Angela Brooks MD at [address] N/A 05/27/2021     BRONCHOSCOPY ADD ON COMPUTER ASSISTED performed by Angela Brooks MD at [address] [facility] [facility] N/A 08/25/2022     CARDIAC LASER LEAD EXCHANGE performed by Gerardo Peck MD at [address] [place] [address] 2011 2001     groin     PACEMAKER CHANGE N/A 08/25/2022     CARDIAC LASER LEAD EXTRACTION, LASER LEAD EXTRACTION LEFT SIDED DEVICE, PACEMAKER DEVICE EXTRACTION performed by Gerardo Peck MD at [address] [facility]   07/20/2021     DUAL PPM  (ABDULLAHI)  DR. Cassandra Padron    PORT SURGERY Right 08/20/2022 PORT REMOVAL performed by Ruddy Hedrick MD at [facility] OR    TRANSESOPHAGEAL ECHOCARDIOGRAM   08/22/2022     Dr Bonita Han N/A 08/29/2022     Medtronic Micra AV Leadless PPM Insertion (Dr. Lesli Bautista)            Medications Prior to Admission:    Home Medications           Prior to Admission medications    Medication Sig Start Date End Date Taking? Authorizing Provider   atorvastatin (LIPITOR) 20 MG tablet Take 20 mg by mouth daily       Historical Provider, MD   Multiple Vitamin (MULTIVITAMIN ADULT PO) Take by mouth       Historical Provider, MD   Ascorbic Acid (VITAMIN C) 250 MG tablet Take 250 mg by mouth daily       Historical Provider, MD   OXYGEN Inhale 3 L/min into the lungs continuous       Historical Provider, MD   Fluticasone-Umeclidin-Vilant (TRELEGY ELLIPTA) 200-62.5-25 MCG/INH AEPB Inhale 1 puff into the lungs daily Once the anoro ellipta is gone 7/11/22     Historical Provider, MD   apixaban (ELIQUIS) 5 MG TABS tablet Take 5 mg by mouth 2 times daily       Historical Provider, MD   gabapentin (NEURONTIN) 100 MG capsule Take 100 mg by mouth in the morning and at bedtime.        Historical Provider, MD   melatonin 3 MG TABS tablet Take 3 mg by mouth daily       Historical Provider, MD   potassium chloride (MICRO-K) 10 MEQ extended release capsule Take 10 mEq by mouth daily       Historical Provider, MD   metoprolol succinate (TOPROL XL) 25 MG extended release tablet Take 1 tablet by mouth 2 times daily (with meals) 9/3/22     Rosalind Jimenez MD   furosemide (LASIX) 20 MG tablet Take 1 tablet by mouth daily 9/4/22     Rosalind Jimenez MD   predniSONE (DELTASONE) 5 MG tablet Take 5 mg by mouth daily       Julio César Smith MD   albuterol (ACCUNEB) 0.63 MG/3ML nebulizer solution Take 1 ampule by nebulization every 6 hours as needed for Wheezing       Historical Provider, MD   albuterol sulfate HFA (PROVENTIL;VENTOLIN;PROAIR) 108 (90 Base) MCG/ACT inhaler Inhale 2 puffs into the lungs every 6 hours as needed for Wheezing       Historical Provider, MD   simvastatin (ZOCOR) 40 MG tablet Take 40 mg by mouth daily    9/3/22   Historical Provider, MD            No Known Allergies     Family History         Family History   Problem Relation Age of Onset    Cancer Father           prostate    Cancer Paternal Uncle           anal            Social History            Tobacco Use    Smoking status: Former       Packs/day: 2.00       Years: 25.00       Pack years: 50.00       Types: Cigarettes       Quit date: 2011       Years since quittin.0       Passive exposure: Past    Smokeless tobacco: Never   Vaping Use    Vaping Use: Never used   Substance Use Topics    Alcohol use: Not Currently       Alcohol/week: 1.0 standard drink       Types: 1 Glasses of wine per week       Comment: 1 glass of wine per day prior    Drug use: No            Review of Systems reviewed and otherwise negative as noted in HPI     PHYSICAL EXAM:         Vitals:     22 1636   BP: 132/74   Pulse: 72   Resp:     Temp:     SpO2:           General Appearance:  awake, alert, oriented, in no acute distress  Skin:  Skin color, texture, turgor normal. No rashes or lesions. Head/face:  NCAT  Eyes:  No gross abnormalities. Lungs:  Normal expansion. Clear to auscultation. No rales, rhonchi, or wheezing.,  Tolerating 3 L nasal cannula  Heart:  Heart regular rate and rhythm  Abdomen:  Soft, non-tender, normal bowel sounds. No bruits, organomegaly or masses. Extremities: Extremities warm to touch, pink, with no edema. Male Rectal:  Normal male: no hemorrhoids, normal rectal tone, no masses, prostate not enlarged, no nodularity  Minimal amount of melena which is FOBT positive. LABS:     CBC        Recent Labs     22  0210 22  0650 22  1422   WBC 2.8*  --   --    HGB 7.7*   < > 8.5*   HCT 22.7*   < > 26.1*   PLT 45*  --   --     < > = values in this interval not displayed.       BMP      Recent Labs 12/28/22  0210      K 3.5      CO2 27   BUN 15   CREATININE 0.8   CALCIUM 8.9      Liver Function  No results for input(s): AMYLASE, LIPASE, BILITOT, BILIDIR, AST, ALT, ALKPHOS, PROT, LABALBU in the last 72 hours. No results for input(s): LACTATE in the last 72 hours. Recent Labs     12/27/22  0506   INR 1.3         RADIOLOGY     XR CHEST PORTABLE     Result Date: 12/26/2022  EXAMINATION: ONE XRAY VIEW OF THE CHEST 12/26/2022 11:22 am COMPARISON: None. HISTORY: ORDERING SYSTEM PROVIDED HISTORY: [facility] and SOB TECHNOLOGIST PROVIDED HISTORY: Reason for exam:->[facility] and SOB What reading provider will be dictating this exam?->CRC FINDINGS: The cardiac silhouette is within normals. There is a right-sided MediPort catheter. The right lung is grossly clear There is a poorly defined mass seen within the right perihilar region with adjacent pleuroparenchymal scarring. There is a small chronic left pleural effusion. 1. Persistent small ill-defined mass seen within the left perihilar region 2. Pleuroparenchymal scarring within the left perihilar region 3. Left pleural effusion 4. Emphysematous changes            ASSESSMENT:  67 y.o. male with multifactorial anemia likely related to chemotherapy and possible GI bleed related to thrombocytopenia     PLAN:  -No plans for acute endoscopic invention  -Patient's current episode of melena/GI bleed is likely related to patient's current thrombocytopenia and possible underlying gastritis. -EGD would likely not change patient's final management.   Plan to treat empirically with PPI twice daily and Carafate for possible upper GI bleed

## 2022-12-30 NOTE — CARE COORDINATION
Discharge order is in. Pt and Wife requested Resumption of Care order for Outpatient Therapy for PT/RT at [facility]. Informed Charge RN. Wife is present to transport when paperwork is ready. Discharge Plan is to return home.    Jazlyn Ramos, L.S.W.  [phone]

## 2022-12-30 NOTE — PLAN OF CARE
Problem: Discharge Planning  Goal: Discharge to home or other facility with appropriate resources  12/30/2022 1438 by Alf Hung RN  Outcome: Completed  12/30/2022 0043 by Jv Hathaway RN  Outcome: Progressing     Problem: Skin/Tissue Integrity  Goal: Absence of new skin breakdown  Description: 1. Monitor for areas of redness and/or skin breakdown  2. Assess vascular access sites hourly  3. Every 4-6 hours minimum:  Change oxygen saturation probe site  4. Every 4-6 hours:  If on nasal continuous positive airway pressure, respiratory therapy assess nares and determine need for appliance change or resting period.   12/30/2022 1438 by Alf Hung RN  Outcome: Completed  12/30/2022 0043 by Jv Hathaway RN  Outcome: Progressing     Problem: Safety - Adult  Goal: Free from fall injury  12/30/2022 1438 by Alf Hung RN  Outcome: Completed  12/30/2022 0043 by Jv Hathaway RN  Outcome: Progressing     Problem: Pain  Goal: Verbalizes/displays adequate comfort level or baseline comfort level  12/30/2022 1438 by Alf Hung RN  Outcome: Completed  12/30/2022 0043 by Jv Hathaway RN  Outcome: Progressing     Problem: Nutrition Deficit:  Goal: Optimize nutritional status  12/30/2022 1438 by Alf Hung RN  Outcome: Completed  12/30/2022 0043 by Jv Hathaway RN  Outcome: Progressing

## 2022-12-30 NOTE — PROGRESS NOTES
Subjective: The patient is awake and alert. Black stools. Surgery consulted  Patient in wheel chair ready to be discharged  No chest pain, angina, dyspnea or abdominal discomfort. No nausea or vomiting. Tolerating diet.       Objective:    BP (!) 149/64   Pulse 53   Temp 96.8 °F (36 °C) (Temporal)   Resp 18   Ht 5' 6\" (1.676 m)   Wt 165 lb (74.8 kg)   SpO2 100%   BMI 26.63 kg/m²     General: NAD  HEENT: No thrush or mucositis, EOMI  Heart:  RRR, pacemaker  Lungs:  respirations easy and not labored, O2 via NC  Extrem:  SCDs bilaterally, No clubbing, cyanosis  Skin: pale, Intact, no petechia or purpura    CBC with Differential:    Lab Results   Component Value Date/Time    WBC 2.2 12/30/2022 06:27 AM    RBC 2.61 12/30/2022 06:27 AM    HGB 7.5 12/30/2022 06:27 AM    HCT 23.2 12/30/2022 06:27 AM    PLT 73 12/30/2022 06:27 AM    MCV 88.9 12/30/2022 06:27 AM    MCH 28.7 12/30/2022 06:27 AM    MCHC 32.3 12/30/2022 06:27 AM    RDW 20.2 12/30/2022 06:27 AM    NRBC 0.9 12/29/2022 05:28 PM    BLASTSPCT 0.9 08/24/2022 05:22 AM    METASPCT 3.5 08/30/2022 07:03 AM    LYMPHOPCT 13.1 12/29/2022 05:28 PM    PROMYELOPCT 1.7 09/01/2022 03:30 AM    MONOPCT 8.8 12/29/2022 05:28 PM    MYELOPCT 0.9 12/29/2022 05:28 PM    BASOPCT 0.9 12/29/2022 05:28 PM    MONOSABS 0.23 12/29/2022 05:28 PM    LYMPHSABS 0.34 12/29/2022 05:28 PM    EOSABS 0.02 12/29/2022 05:28 PM    BASOSABS 0.02 12/29/2022 05:28 PM     CMP:    Lab Results   Component Value Date/Time     12/28/2022 02:10 AM    K 3.5 12/28/2022 02:10 AM    K 3.5 12/27/2022 05:06 AM     12/28/2022 02:10 AM    CO2 27 12/28/2022 02:10 AM    BUN 15 12/28/2022 02:10 AM    CREATININE 0.8 12/28/2022 02:10 AM    GFRAA >60 10/13/2022 12:28 PM    LABGLOM >60 12/28/2022 02:10 AM    GLUCOSE 111 12/28/2022 02:10 AM    PROT 5.9 12/26/2022 11:04 AM    LABALBU 4.0 12/26/2022 11:04 AM    CALCIUM 8.9 12/28/2022 02:10 AM    BILITOT 0.5 12/26/2022 11:04 AM    ALKPHOS 101 12/26/2022 11:04 AM    AST 25 12/26/2022 11:04 AM    ALT 25 12/26/2022 11:04 AM          Current Facility-Administered Medications:     sucralfate (CARAFATE) tablet 1 g, 1 g, Oral, 4x Daily AC & HS, Marcela Oliveira DO, 1 g at 12/30/22 0456    pantoprazole (PROTONIX) 40 mg in sodium chloride (PF) 0.9 % 10 mL injection, 40 mg, IntraVENous, Q12H **OR** pantoprazole (PROTONIX) tablet 40 mg, 40 mg, Oral, Q12H, Marcela Oliveira DO, 40 mg at 12/30/22 0807    albuterol sulfate HFA (PROVENTIL;VENTOLIN;PROAIR) 108 (90 Base) MCG/ACT inhaler 2 puff, 2 puff, Inhalation, Q6H PRN, Tomasa Mckeon MD    0.9 % sodium chloride infusion, , IntraVENous, PRN, Андрей Griggs DO    ipratropium-albuterol (DUONEB) nebulizer solution 1 ampule, 1 ampule, Inhalation, Q4H WAAngelia DO, 1 ampule at 12/29/22 2000    atorvastatin (LIPITOR) tablet 20 mg, 20 mg, Oral, Daily, Leonarda Nettles, APRN - CNP, 20 mg at 12/29/22 0825    furosemide (LASIX) tablet 20 mg, 20 mg, Oral, Daily, Leonarda Nettles, APRN - CNP, 20 mg at 12/30/22 6248    gabapentin (NEURONTIN) capsule 100 mg, 100 mg, Oral, BID, Leonarda Nettles, APRN - CNP, 100 mg at 12/30/22 7657    melatonin tablet 3 mg, 3 mg, Oral, Nightly, Leonarda Nettles, APRN - CNP, 3 mg at 12/30/22 0108    metoprolol succinate (TOPROL XL) extended release tablet 25 mg, 25 mg, Oral, BID WC, Leonarda Nettles, APRN - CNP, 25 mg at 12/30/22 0337    potassium chloride (KLOR-CON M) extended release tablet 10 mEq, 10 mEq, Oral, Daily, Leonarda Nettles APRN - CNP, 10 mEq at 12/30/22 9774    predniSONE (DELTASONE) tablet 5 mg, 5 mg, Oral, Daily, LELO Lund - CNP, 5 mg at 12/30/22 0807    0.9 % sodium chloride infusion, , IntraVENous, Continuous, LELO Lund - CNP, Stopped at 12/27/22 1430    sodium chloride flush 0.9 % injection 5-40 mL, 5-40 mL, IntraVENous, 2 times per day, LELO Lund - CNP, 10 mL at 12/30/22 0811    sodium chloride flush 0.9 % injection 5-40 mL, 5-40 mL, IntraVENous, PRN, Vertell Blinks Learn, APRN - CNP    0.9 % sodium chloride infusion, , IntraVENous, PRN, Vertell Blinks Learn, APRN - CNP    ondansetron (ZOFRAN-ODT) disintegrating tablet 4 mg, 4 mg, Oral, Q8H PRN **OR** ondansetron (ZOFRAN) injection 4 mg, 4 mg, IntraVENous, Q6H PRN, Vertell Blinks Learn, APRN - CNP    acetaminophen (TYLENOL) tablet 650 mg, 650 mg, Oral, Q6H PRN **OR** acetaminophen (TYLENOL) suppository 650 mg, 650 mg, Rectal, Q6H PRN, Vertell Blinks Learn, APRN - CNP    budesonide (PULMICORT) nebulizer suspension 250 mcg, 0.25 mg, Nebulization, BID, Vertell Blinks Learn, APRN - CNP, 250 mcg at 12/29/22 2000    Arformoterol Tartrate (BROVANA) nebulizer solution 15 mcg, 15 mcg, Nebulization, BID, Vertell Blinks Learn, APRN - CNP, 15 mcg at 12/29/22 2000    CT CHEST W CONTRAST   Final Result   Decreased size of left hilar/infrahilar mass. Decreased associated   postobstructive atelectasis in the left lung. New 5 mm average axial diameter solid pulmonary nodule in the right middle   lobe. Decreased, now trace, left pleural effusion. Nonemergent incidental findings as above. XR CHEST PORTABLE   Final Result   1. Persistent small ill-defined mass seen within the left perihilar region   2. Pleuroparenchymal scarring within the left perihilar region   3. Left pleural effusion   4. Emphysematous changes             Assessment:    Principal Problem:    Anemia  Resolved Problems:    * No resolved hospital problems. 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 Treated with concurrent chemoRT fb Imfinzi from 11/2/21. Progression on PET from 6/24/22. Started [place] and Keytruda on 6/30/22. He was due for C6 today. 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 Admitted with SOB and found to have hgb 5.9 g/dL. +FOBT.      Plan:  Patient's baseline Hgb 9-10 with current comorbidities and was decreased to blood loss within hematoma FOBT+, concern for GI blood loss source  Patient needs to be on anticoagulation with a history of PE and underlying malignancy    Anemia multifactorial, chemotherapy induced and GIB. CT chest with disease response, small 5 mm nodule noted, uncertain clinical significance but with overall response patient likely to continue current treatment regimen. Transfusion support to maintain hgb > 7 g/dL  Hold anticoagulation for now  Iron levels high, taken after PRBC given  Leukopenia, check CDC diff plt    Patient needs seen early next week for check on bloodwork.     Electronically signed by MATTHEW Longo on 12/30/2022 at 8:21 AM

## 2023-01-01 ENCOUNTER — APPOINTMENT (OUTPATIENT)
Dept: GENERAL RADIOLOGY | Age: 73
DRG: 190 | End: 2023-01-01
Payer: MEDICARE

## 2023-01-01 ENCOUNTER — APPOINTMENT (OUTPATIENT)
Dept: CT IMAGING | Age: 73
DRG: 190 | End: 2023-01-01
Payer: MEDICARE

## 2023-01-01 ENCOUNTER — HOSPITAL ENCOUNTER (INPATIENT)
Age: 73
LOS: 5 days | DRG: 190 | End: 2023-03-06
Attending: EMERGENCY MEDICINE | Admitting: INTERNAL MEDICINE
Payer: MEDICARE

## 2023-01-01 VITALS
HEART RATE: 50 BPM | TEMPERATURE: 98.9 F | HEIGHT: 66 IN | WEIGHT: 178 LBS | SYSTOLIC BLOOD PRESSURE: 162 MMHG | BODY MASS INDEX: 28.61 KG/M2 | DIASTOLIC BLOOD PRESSURE: 82 MMHG | OXYGEN SATURATION: 100 %

## 2023-01-01 DIAGNOSIS — D64.9 ANEMIA, UNSPECIFIED TYPE: ICD-10-CM

## 2023-01-01 DIAGNOSIS — J18.9 PNEUMONIA OF LOWER LOBE DUE TO INFECTIOUS ORGANISM, UNSPECIFIED LATERALITY: Primary | ICD-10-CM

## 2023-01-01 DIAGNOSIS — D69.6 THROMBOCYTOPENIA (HCC): ICD-10-CM

## 2023-01-01 DIAGNOSIS — J98.01 ACUTE BRONCHOSPASM: ICD-10-CM

## 2023-01-01 LAB
AADO2: 568.8 MMHG
ABO/RH: NORMAL
ADENOVIRUS BY PCR: NOT DETECTED
ALBUMIN SERPL-MCNC: 3.7 G/DL (ref 3.5–5.2)
ALBUMIN SERPL-MCNC: 3.8 G/DL (ref 3.5–5.2)
ALP BLD-CCNC: 105 U/L (ref 40–129)
ALP BLD-CCNC: 136 U/L (ref 40–129)
ALT SERPL-CCNC: 201 U/L (ref 0–40)
ALT SERPL-CCNC: 27 U/L (ref 0–40)
ANION GAP SERPL CALCULATED.3IONS-SCNC: 10 MMOL/L (ref 7–16)
ANION GAP SERPL CALCULATED.3IONS-SCNC: 10 MMOL/L (ref 7–16)
ANION GAP SERPL CALCULATED.3IONS-SCNC: 11 MMOL/L (ref 7–16)
ANION GAP SERPL CALCULATED.3IONS-SCNC: 12 MMOL/L (ref 7–16)
ANION GAP SERPL CALCULATED.3IONS-SCNC: 12 MMOL/L (ref 7–16)
ANION GAP SERPL CALCULATED.3IONS-SCNC: 15 MMOL/L (ref 7–16)
ANION GAP SERPL CALCULATED.3IONS-SCNC: 20 MMOL/L (ref 7–16)
ANION GAP SERPL CALCULATED.3IONS-SCNC: 9 MMOL/L (ref 7–16)
ANISOCYTOSIS: ABNORMAL
ANTIBODY SCREEN: NORMAL
AST SERPL-CCNC: 248 U/L (ref 0–39)
AST SERPL-CCNC: 28 U/L (ref 0–39)
ATYPICAL LYMPHOCYTE RELATIVE PERCENT: 0.9 % (ref 0–4)
B.E.: -3.5 MMOL/L (ref -3–3)
B.E.: -9.6 MMOL/L (ref -3–3)
B.E.: 0.3 MMOL/L (ref -3–3)
B.E.: 1.5 MMOL/L (ref -3–3)
B.E.: 2.4 MMOL/L (ref -3–3)
BASOPHILIC STIPPLING: ABNORMAL
BASOPHILS ABSOLUTE: 0 E9/L (ref 0–0.2)
BASOPHILS ABSOLUTE: 0.02 E9/L (ref 0–0.2)
BASOPHILS RELATIVE PERCENT: 0 % (ref 0–2)
BASOPHILS RELATIVE PERCENT: 0.5 % (ref 0–2)
BASOPHILS RELATIVE PERCENT: 0.6 % (ref 0–2)
BASOPHILS RELATIVE PERCENT: 0.9 % (ref 0–2)
BASOPHILS RELATIVE PERCENT: 1.6 % (ref 0–2)
BILIRUB SERPL-MCNC: 0.6 MG/DL (ref 0–1.2)
BILIRUB SERPL-MCNC: 0.7 MG/DL (ref 0–1.2)
BLASTS RELATIVE PERCENT: 0.9 % (ref 0–0)
BLASTS RELATIVE PERCENT: 0.9 % (ref 0–0)
BLASTS RELATIVE PERCENT: 1.7 % (ref 0–0)
BLOOD BANK DISPENSE STATUS: NORMAL
BLOOD BANK PRODUCT CODE: NORMAL
BORDETELLA PARAPERTUSSIS BY PCR: NOT DETECTED
BORDETELLA PERTUSSIS BY PCR: NOT DETECTED
BPU ID: NORMAL
BUN BLDV-MCNC: 17 MG/DL (ref 6–23)
BUN BLDV-MCNC: 17 MG/DL (ref 6–23)
BUN BLDV-MCNC: 19 MG/DL (ref 6–23)
BUN BLDV-MCNC: 24 MG/DL (ref 6–23)
BUN BLDV-MCNC: 28 MG/DL (ref 6–23)
BUN BLDV-MCNC: 29 MG/DL (ref 6–23)
BUN BLDV-MCNC: 33 MG/DL (ref 6–23)
BUN BLDV-MCNC: 35 MG/DL (ref 6–23)
BUN BLDV-MCNC: 38 MG/DL (ref 6–23)
BUN BLDV-MCNC: 43 MG/DL (ref 6–23)
CALCIUM SERPL-MCNC: 8.6 MG/DL (ref 8.6–10.2)
CALCIUM SERPL-MCNC: 8.7 MG/DL (ref 8.6–10.2)
CALCIUM SERPL-MCNC: 9 MG/DL (ref 8.6–10.2)
CALCIUM SERPL-MCNC: 9.1 MG/DL (ref 8.6–10.2)
CALCIUM SERPL-MCNC: 9.5 MG/DL (ref 8.6–10.2)
CALCIUM SERPL-MCNC: 9.6 MG/DL (ref 8.6–10.2)
CALCIUM SERPL-MCNC: 9.9 MG/DL (ref 8.6–10.2)
CALCIUM SERPL-MCNC: 9.9 MG/DL (ref 8.6–10.2)
CHLAMYDOPHILIA PNEUMONIAE BY PCR: NOT DETECTED
CHLORIDE BLD-SCNC: 100 MMOL/L (ref 98–107)
CHLORIDE BLD-SCNC: 101 MMOL/L (ref 98–107)
CHLORIDE BLD-SCNC: 102 MMOL/L (ref 98–107)
CHLORIDE BLD-SCNC: 103 MMOL/L (ref 98–107)
CHLORIDE BLD-SCNC: 104 MMOL/L (ref 98–107)
CHLORIDE BLD-SCNC: 98 MMOL/L (ref 98–107)
CHLORIDE URINE RANDOM: 82 MMOL/L
CO2: 19 MMOL/L (ref 22–29)
CO2: 23 MMOL/L (ref 22–29)
CO2: 24 MMOL/L (ref 22–29)
CO2: 26 MMOL/L (ref 22–29)
CO2: 27 MMOL/L (ref 22–29)
CO2: 28 MMOL/L (ref 22–29)
CO2: 28 MMOL/L (ref 22–29)
CO2: 29 MMOL/L (ref 22–29)
COHB: 0.2 % (ref 0–1.5)
COHB: 0.3 % (ref 0–1.5)
COHB: 0.8 % (ref 0–1.5)
COHB: 1.3 % (ref 0–1.5)
COHB: 1.4 % (ref 0–1.5)
COMMENT: ABNORMAL
COMMENT: ABNORMAL
CORONAVIRUS 229E BY PCR: NOT DETECTED
CORONAVIRUS HKU1 BY PCR: DETECTED
CORONAVIRUS NL63 BY PCR: NOT DETECTED
CORONAVIRUS OC43 BY PCR: NOT DETECTED
CREAT SERPL-MCNC: 0.9 MG/DL (ref 0.7–1.2)
CREAT SERPL-MCNC: 1 MG/DL (ref 0.7–1.2)
CREAT SERPL-MCNC: 1.3 MG/DL (ref 0.7–1.2)
CREAT SERPL-MCNC: 1.5 MG/DL (ref 0.7–1.2)
CREAT SERPL-MCNC: 1.5 MG/DL (ref 0.7–1.2)
CREAT SERPL-MCNC: 1.7 MG/DL (ref 0.7–1.2)
CREATININE URINE: 61 MG/DL (ref 40–278)
CRITICAL: ABNORMAL
CULTURE, STOOL: NORMAL
DATE ANALYZED: ABNORMAL
DATE OF COLLECTION: ABNORMAL
DESCRIPTION BLOOD BANK: NORMAL
EKG ATRIAL RATE: 110 BPM
EKG ATRIAL RATE: 49 BPM
EKG ATRIAL RATE: 53 BPM
EKG ATRIAL RATE: 82 BPM
EKG ATRIAL RATE: 94 BPM
EKG P AXIS: -39 DEGREES
EKG P AXIS: 65 DEGREES
EKG P AXIS: 79 DEGREES
EKG P-R INTERVAL: 112 MS
EKG P-R INTERVAL: 328 MS
EKG P-R INTERVAL: 348 MS
EKG P-R INTERVAL: 364 MS
EKG Q-T INTERVAL: 120 MS
EKG Q-T INTERVAL: 310 MS
EKG Q-T INTERVAL: 432 MS
EKG Q-T INTERVAL: 474 MS
EKG Q-T INTERVAL: 484 MS
EKG QRS DURATION: 106 MS
EKG QRS DURATION: 124 MS
EKG QRS DURATION: 154 MS
EKG QRS DURATION: 156 MS
EKG QRS DURATION: 160 MS
EKG QTC CALCULATION (BAZETT): 190 MS
EKG QTC CALCULATION (BAZETT): 419 MS
EKG QTC CALCULATION (BAZETT): 428 MS
EKG QTC CALCULATION (BAZETT): 432 MS
EKG QTC CALCULATION (BAZETT): 469 MS
EKG R AXIS: -100 DEGREES
EKG R AXIS: -96 DEGREES
EKG R AXIS: -98 DEGREES
EKG R AXIS: 60 DEGREES
EKG R AXIS: 93 DEGREES
EKG T AXIS: -137 DEGREES
EKG T AXIS: 0 DEGREES
EKG T AXIS: 85 DEGREES
EKG T AXIS: 86 DEGREES
EKG T AXIS: 88 DEGREES
EKG VENTRICULAR RATE: 110 BPM
EKG VENTRICULAR RATE: 151 BPM
EKG VENTRICULAR RATE: 48 BPM
EKG VENTRICULAR RATE: 49 BPM
EKG VENTRICULAR RATE: 71 BPM
EOSINOPHILS ABSOLUTE: 0 E9/L (ref 0.05–0.5)
EOSINOPHILS RELATIVE PERCENT: 0 % (ref 0–6)
EOSINOPHILS RELATIVE PERCENT: 0.2 % (ref 0–6)
FIO2: 100 %
GFR SERPL CREATININE-BSD FRML MDRD: 42 ML/MIN/1.73
GFR SERPL CREATININE-BSD FRML MDRD: 49 ML/MIN/1.73
GFR SERPL CREATININE-BSD FRML MDRD: 49 ML/MIN/1.73
GFR SERPL CREATININE-BSD FRML MDRD: 58 ML/MIN/1.73
GFR SERPL CREATININE-BSD FRML MDRD: >60 ML/MIN/1.73
GIARDIA ANTIGEN STOOL: NORMAL
GLUCOSE BLD-MCNC: 107 MG/DL (ref 74–99)
GLUCOSE BLD-MCNC: 113 MG/DL (ref 74–99)
GLUCOSE BLD-MCNC: 115 MG/DL (ref 74–99)
GLUCOSE BLD-MCNC: 141 MG/DL (ref 74–99)
GLUCOSE BLD-MCNC: 153 MG/DL (ref 74–99)
GLUCOSE BLD-MCNC: 155 MG/DL (ref 74–99)
GLUCOSE BLD-MCNC: 171 MG/DL (ref 74–99)
GLUCOSE BLD-MCNC: 182 MG/DL (ref 74–99)
GLUCOSE BLD-MCNC: 185 MG/DL (ref 74–99)
GLUCOSE BLD-MCNC: 252 MG/DL (ref 74–99)
GLUCOSE BLD-MCNC: 323 MG/DL (ref 74–99)
GRAM STAIN ORDERABLE: NORMAL
HCO3: 17.6 MMOL/L (ref 22–26)
HCO3: 22 MMOL/L (ref 22–26)
HCO3: 24.8 MMOL/L (ref 22–26)
HCO3: 25 MMOL/L (ref 22–26)
HCO3: 26.5 MMOL/L (ref 22–26)
HCT VFR BLD CALC: 18.4 % (ref 37–54)
HCT VFR BLD CALC: 21.7 % (ref 37–54)
HCT VFR BLD CALC: 21.9 % (ref 37–54)
HCT VFR BLD CALC: 22.5 % (ref 37–54)
HCT VFR BLD CALC: 22.5 % (ref 37–54)
HCT VFR BLD CALC: 22.6 % (ref 37–54)
HCT VFR BLD CALC: 22.7 % (ref 37–54)
HCT VFR BLD CALC: 24.1 % (ref 37–54)
HCT VFR BLD CALC: 26.4 % (ref 37–54)
HEMOGLOBIN: 6 G/DL (ref 12.5–16.5)
HEMOGLOBIN: 7.1 G/DL (ref 12.5–16.5)
HEMOGLOBIN: 7.2 G/DL (ref 12.5–16.5)
HEMOGLOBIN: 7.2 G/DL (ref 12.5–16.5)
HEMOGLOBIN: 7.3 G/DL (ref 12.5–16.5)
HEMOGLOBIN: 7.4 G/DL (ref 12.5–16.5)
HEMOGLOBIN: 7.4 G/DL (ref 12.5–16.5)
HEMOGLOBIN: 7.8 G/DL (ref 12.5–16.5)
HEMOGLOBIN: 8.5 G/DL (ref 12.5–16.5)
HHB: 2.1 % (ref 0–5)
HHB: 4.2 % (ref 0–5)
HHB: 7.2 % (ref 0–5)
HHB: 8.5 % (ref 0–5)
HHB: 9 % (ref 0–5)
HUMAN METAPNEUMOVIRUS BY PCR: NOT DETECTED
HUMAN RHINOVIRUS/ENTEROVIRUS BY PCR: NOT DETECTED
HYPOCHROMIA: ABNORMAL
HYPOCHROMIA: ABNORMAL
IMMATURE RETIC FRACT: 39.3 % (ref 2.3–13.4)
INFLUENZA A BY PCR: NOT DETECTED
INFLUENZA A: NOT DETECTED
INFLUENZA B BY PCR: NOT DETECTED
INFLUENZA B: NOT DETECTED
INR BLD: 1.3
L. PNEUMOPHILA SEROGP 1 UR AG: NORMAL
LAB: ABNORMAL
LACTIC ACID: 1.6 MMOL/L (ref 0.5–2.2)
LACTIC ACID: 1.9 MMOL/L (ref 0.5–2.2)
LACTIC ACID: 1.9 MMOL/L (ref 0.5–2.2)
LACTIC ACID: 2.5 MMOL/L (ref 0.5–2.2)
LACTIC ACID: 5 MMOL/L (ref 0.5–2.2)
LYMPHOCYTES ABSOLUTE: 0.3 E9/L (ref 1.5–4)
LYMPHOCYTES ABSOLUTE: 0.3 E9/L (ref 1.5–4)
LYMPHOCYTES ABSOLUTE: 0.36 E9/L (ref 1.5–4)
LYMPHOCYTES ABSOLUTE: 0.4 E9/L (ref 1.5–4)
LYMPHOCYTES ABSOLUTE: 1.84 E9/L (ref 1.5–4)
LYMPHOCYTES RELATIVE PERCENT: 10.4 % (ref 20–42)
LYMPHOCYTES RELATIVE PERCENT: 10.6 % (ref 20–42)
LYMPHOCYTES RELATIVE PERCENT: 27 % (ref 20–42)
LYMPHOCYTES RELATIVE PERCENT: 8.3 % (ref 20–42)
LYMPHOCYTES RELATIVE PERCENT: 8.5 % (ref 20–42)
Lab: ABNORMAL
MACRO-OVALOCYTES: ABNORMAL
MACRO-OVALOCYTES: ABNORMAL
MAGNESIUM: 1.2 MG/DL (ref 1.6–2.6)
MAGNESIUM: 1.6 MG/DL (ref 1.6–2.6)
MAGNESIUM: 1.6 MG/DL (ref 1.6–2.6)
MAGNESIUM: 2.1 MG/DL (ref 1.6–2.6)
MAGNESIUM: 2.1 MG/DL (ref 1.6–2.6)
MCH RBC QN AUTO: 30.9 PG (ref 26–35)
MCH RBC QN AUTO: 31 PG (ref 26–35)
MCH RBC QN AUTO: 31 PG (ref 26–35)
MCH RBC QN AUTO: 31.1 PG (ref 26–35)
MCH RBC QN AUTO: 31.2 PG (ref 26–35)
MCH RBC QN AUTO: 31.3 PG (ref 26–35)
MCH RBC QN AUTO: 32.2 PG (ref 26–35)
MCH RBC QN AUTO: 32.6 PG (ref 26–35)
MCHC RBC AUTO-ENTMCNC: 32 % (ref 32–34.5)
MCHC RBC AUTO-ENTMCNC: 32.2 % (ref 32–34.5)
MCHC RBC AUTO-ENTMCNC: 32.4 % (ref 32–34.5)
MCHC RBC AUTO-ENTMCNC: 32.6 % (ref 32–34.5)
MCHC RBC AUTO-ENTMCNC: 32.6 % (ref 32–34.5)
MCHC RBC AUTO-ENTMCNC: 32.7 % (ref 32–34.5)
MCV RBC AUTO: 100 FL (ref 80–99.9)
MCV RBC AUTO: 95 FL (ref 80–99.9)
MCV RBC AUTO: 95.2 FL (ref 80–99.9)
MCV RBC AUTO: 95.6 FL (ref 80–99.9)
MCV RBC AUTO: 95.8 FL (ref 80–99.9)
MCV RBC AUTO: 96.4 FL (ref 80–99.9)
MCV RBC AUTO: 97.8 FL (ref 80–99.9)
MCV RBC AUTO: 99.1 FL (ref 80–99.9)
METAMYELOCYTES RELATIVE PERCENT: 3.8 % (ref 0–1)
METAMYELOCYTES RELATIVE PERCENT: 9.7 % (ref 0–1)
METER GLUCOSE: 111 MG/DL (ref 74–99)
METER GLUCOSE: 118 MG/DL (ref 74–99)
METER GLUCOSE: 121 MG/DL (ref 74–99)
METER GLUCOSE: 132 MG/DL (ref 74–99)
METER GLUCOSE: 137 MG/DL (ref 74–99)
METER GLUCOSE: 150 MG/DL (ref 74–99)
METER GLUCOSE: 154 MG/DL (ref 74–99)
METER GLUCOSE: 191 MG/DL (ref 74–99)
METER GLUCOSE: 195 MG/DL (ref 74–99)
METER GLUCOSE: 196 MG/DL (ref 74–99)
METER GLUCOSE: 209 MG/DL (ref 74–99)
METER GLUCOSE: 213 MG/DL (ref 74–99)
METER GLUCOSE: 216 MG/DL (ref 74–99)
METER GLUCOSE: 222 MG/DL (ref 74–99)
METER GLUCOSE: 229 MG/DL (ref 74–99)
METER GLUCOSE: >500 MG/DL (ref 74–99)
METHB: 0.2 % (ref 0–1.5)
METHB: 0.3 % (ref 0–1.5)
METHB: 0.5 % (ref 0–1.5)
MODE: ABNORMAL
MONOCYTES ABSOLUTE: 0.25 E9/L (ref 0.1–0.95)
MONOCYTES ABSOLUTE: 0.3 E9/L (ref 0.1–0.95)
MONOCYTES ABSOLUTE: 0.4 E9/L (ref 0.1–0.95)
MONOCYTES ABSOLUTE: 1.03 E9/L (ref 0.1–0.95)
MONOCYTES ABSOLUTE: 2.38 E9/L (ref 0.1–0.95)
MONOCYTES RELATIVE PERCENT: 10.6 % (ref 2–12)
MONOCYTES RELATIVE PERCENT: 26.9 % (ref 2–12)
MONOCYTES RELATIVE PERCENT: 34.8 % (ref 2–12)
MONOCYTES RELATIVE PERCENT: 7 % (ref 2–12)
MONOCYTES RELATIVE PERCENT: 8.5 % (ref 2–12)
MRSA CULTURE ONLY: NORMAL
MYCOPLASMA PNEUMONIAE BY PCR: NOT DETECTED
MYELOCYTE PERCENT: 0.9 % (ref 0–0)
MYELOCYTE PERCENT: 2.8 % (ref 0–0)
MYELOCYTE PERCENT: 2.8 % (ref 0–0)
NEUTROPHILS ABSOLUTE: 2.11 E9/L (ref 1.8–7.3)
NEUTROPHILS ABSOLUTE: 2.43 E9/L (ref 1.8–7.3)
NEUTROPHILS ABSOLUTE: 2.45 E9/L (ref 1.8–7.3)
NEUTROPHILS ABSOLUTE: 2.95 E9/L (ref 1.8–7.3)
NEUTROPHILS ABSOLUTE: 3.52 E9/L (ref 1.8–7.3)
NEUTROPHILS RELATIVE PERCENT: 35.6 % (ref 43–80)
NEUTROPHILS RELATIVE PERCENT: 61.1 % (ref 43–80)
NEUTROPHILS RELATIVE PERCENT: 67.3 % (ref 43–80)
NEUTROPHILS RELATIVE PERCENT: 73.6 % (ref 43–80)
NEUTROPHILS RELATIVE PERCENT: 81.7 % (ref 43–80)
NUCLEATED RED BLOOD CELLS: 10.4 /100 WBC
NUCLEATED RED BLOOD CELLS: 12.3 /100 WBC
NUCLEATED RED BLOOD CELLS: 18.5 /100 WBC
NUCLEATED RED BLOOD CELLS: 18.6 /100 WBC
NUCLEATED RED BLOOD CELLS: 2.6 /100 WBC
O2 CONTENT: 10.8 ML/DL
O2 CONTENT: 10.8 ML/DL
O2 CONTENT: 12.1 ML/DL
O2 CONTENT: 12.8 ML/DL
O2 CONTENT: 13.1 ML/DL
O2 SATURATION: 90.9 % (ref 92–98.5)
O2 SATURATION: 91.5 % (ref 92–98.5)
O2 SATURATION: 92.8 % (ref 92–98.5)
O2 SATURATION: 95.7 % (ref 92–98.5)
O2 SATURATION: 97.9 % (ref 92–98.5)
O2HB: 89.4 % (ref 94–97)
O2HB: 91 % (ref 94–97)
O2HB: 92.3 % (ref 94–97)
O2HB: 93.9 % (ref 94–97)
O2HB: 96.8 % (ref 94–97)
OPERATOR ID: 1222
OPERATOR ID: 1632
OPERATOR ID: 1893
OPERATOR ID: 2962
OPERATOR ID: ABNORMAL
OVALOCYTES: ABNORMAL
PARAINFLUENZA VIRUS 1 BY PCR: NOT DETECTED
PARAINFLUENZA VIRUS 2 BY PCR: NOT DETECTED
PARAINFLUENZA VIRUS 3 BY PCR: NOT DETECTED
PARAINFLUENZA VIRUS 4 BY PCR: NOT DETECTED
PATHOLOGIST REVIEW: NORMAL
PATIENT TEMP: 37 C
PCO2: 35.1 MMHG (ref 35–45)
PCO2: 38.6 MMHG (ref 35–45)
PCO2: 39.2 MMHG (ref 35–45)
PCO2: 41.2 MMHG (ref 35–45)
PCO2: 43.9 MMHG (ref 35–45)
PDW BLD-RTO: 19.6 FL (ref 11.5–15)
PDW BLD-RTO: 19.9 FL (ref 11.5–15)
PDW BLD-RTO: 20.6 FL (ref 11.5–15)
PDW BLD-RTO: 21 FL (ref 11.5–15)
PDW BLD-RTO: 21.1 FL (ref 11.5–15)
PDW BLD-RTO: 21.2 FL (ref 11.5–15)
PDW BLD-RTO: 21.2 FL (ref 11.5–15)
PDW BLD-RTO: 21.3 FL (ref 11.5–15)
PEEP/CPAP: 6 CMH2O
PFO2: 0.78 MMHG/%
PH BLOOD GAS: 7.22 (ref 7.35–7.45)
PH BLOOD GAS: 7.34 (ref 7.35–7.45)
PH BLOOD GAS: 7.42 (ref 7.35–7.45)
PH BLOOD GAS: 7.45 (ref 7.35–7.45)
PH BLOOD GAS: 7.47 (ref 7.35–7.45)
PHOSPHORUS: 2.8 MG/DL (ref 2.5–4.5)
PHOSPHORUS: 3.1 MG/DL (ref 2.5–4.5)
PLATELET # BLD: 21 E9/L (ref 130–450)
PLATELET # BLD: 35 E9/L (ref 130–450)
PLATELET # BLD: 50 E9/L (ref 130–450)
PLATELET # BLD: 68 E9/L (ref 130–450)
PLATELET # BLD: 72 E9/L (ref 130–450)
PLATELET # BLD: 78 E9/L (ref 130–450)
PLATELET # BLD: 79 E9/L (ref 130–450)
PLATELET # BLD: 91 E9/L (ref 130–450)
PLATELET CONFIRMATION: NORMAL
PMV BLD AUTO: 13.1 FL (ref 7–12)
PMV BLD AUTO: ABNORMAL FL (ref 7–12)
PO2: 116.2 MMHG (ref 75–100)
PO2: 67.8 MMHG (ref 75–100)
PO2: 78 MMHG (ref 75–100)
PO2: 80.4 MMHG (ref 75–100)
PO2: 86.6 MMHG (ref 75–100)
POIKILOCYTES: ABNORMAL
POLYCHROMASIA: ABNORMAL
POTASSIUM REFLEX MAGNESIUM: 3.2 MMOL/L (ref 3.5–5)
POTASSIUM REFLEX MAGNESIUM: 4 MMOL/L (ref 3.5–5)
POTASSIUM REFLEX MAGNESIUM: 4 MMOL/L (ref 3.5–5)
POTASSIUM SERPL-SCNC: 3.8 MMOL/L (ref 3.5–5)
POTASSIUM SERPL-SCNC: 4 MMOL/L (ref 3.5–5)
POTASSIUM SERPL-SCNC: 4.6 MMOL/L (ref 3.5–5)
POTASSIUM SERPL-SCNC: 4.8 MMOL/L (ref 3.5–5)
POTASSIUM SERPL-SCNC: 4.9 MMOL/L (ref 3.5–5)
POTASSIUM SERPL-SCNC: 4.9 MMOL/L (ref 3.5–5)
POTASSIUM SERPL-SCNC: 5.77 MMOL/L (ref 3.5–5)
POTASSIUM SERPL-SCNC: 6 MMOL/L (ref 3.5–5)
POTASSIUM, UR: 56.5 MMOL/L
PRO-BNP: 1089 PG/ML (ref 0–125)
PRO-BNP: 5466 PG/ML (ref 0–125)
PRO-BNP: ABNORMAL PG/ML (ref 0–125)
PROCALCITONIN: 0.76 NG/ML (ref 0–0.08)
PROCALCITONIN: 1.49 NG/ML (ref 0–0.08)
PROCALCITONIN: 1.5 NG/ML (ref 0–0.08)
PROMYELOCYTES PERCENT: 0.9 % (ref 0–0)
PROMYELOCYTES PERCENT: 1.9 % (ref 0–0)
PROTHROMBIN TIME: 14 SEC (ref 9.3–12.4)
RBC # BLD: 1.84 E12/L (ref 3.8–5.8)
RBC # BLD: 2.27 E12/L (ref 3.8–5.8)
RBC # BLD: 2.3 E12/L (ref 3.8–5.8)
RBC # BLD: 2.3 E12/L (ref 3.8–5.8)
RBC # BLD: 2.37 E12/L (ref 3.8–5.8)
RBC # BLD: 2.38 E12/L (ref 3.8–5.8)
RBC # BLD: 2.52 E12/L (ref 3.8–5.8)
RBC # BLD: 2.74 E12/L (ref 3.8–5.8)
RESPIRATORY SYNCYTIAL VIRUS BY PCR: NOT DETECTED
RETIC HGB EQUIVALENT: 33 PG (ref 28.2–36.6)
RETICULOCYTE ABSOLUTE COUNT: 0.08 E12/L
RETICULOCYTE COUNT PCT: 3.1 % (ref 0.4–1.9)
RI(T): 7.29
ROTAVIRUS ANTIGEN: NORMAL
RR MECHANICAL: 20 B/MIN
RR MECHANICAL: 20 B/MIN
SARS-COV-2 RNA, RT PCR: NOT DETECTED
SARS-COV-2, PCR: NOT DETECTED
SODIUM BLD-SCNC: 134 MMOL/L (ref 132–146)
SODIUM BLD-SCNC: 136 MMOL/L (ref 132–146)
SODIUM BLD-SCNC: 136 MMOL/L (ref 132–146)
SODIUM BLD-SCNC: 138 MMOL/L (ref 132–146)
SODIUM BLD-SCNC: 139 MMOL/L (ref 132–146)
SODIUM BLD-SCNC: 141 MMOL/L (ref 132–146)
SODIUM BLD-SCNC: 142 MMOL/L (ref 132–146)
SODIUM URINE: 60 MMOL/L
SOURCE, BLOOD GAS: ABNORMAL
STREP PNEUMONIAE ANTIGEN, URINE: NORMAL
TEAR DROP CELLS: ABNORMAL
THB: 10 G/DL (ref 11.5–16.5)
THB: 8.1 G/DL (ref 11.5–16.5)
THB: 8.5 G/DL (ref 11.5–16.5)
THB: 8.7 G/DL (ref 11.5–16.5)
THB: 9.9 G/DL (ref 11.5–16.5)
TIME ANALYZED: 1156
TIME ANALYZED: 1451
TIME ANALYZED: 448
TIME ANALYZED: 524
TIME ANALYZED: 840
TOTAL PROTEIN: 5.9 G/DL (ref 6.4–8.3)
TOTAL PROTEIN: 6.2 G/DL (ref 6.4–8.3)
TOXIC GRANULATION: ABNORMAL
TROPONIN, HIGH SENSITIVITY: 200 NG/L (ref 0–11)
TROPONIN, HIGH SENSITIVITY: 24 NG/L (ref 0–11)
TROPONIN, HIGH SENSITIVITY: 28 NG/L (ref 0–11)
TROPONIN, HIGH SENSITIVITY: 441 NG/L (ref 0–11)
TROPONIN, HIGH SENSITIVITY: 9 NG/L (ref 0–11)
UREA NITROGEN, UR: 631 MG/DL (ref 800–1666)
VACUOLATED NEUTROPHILS: ABNORMAL
VACUOLATED NEUTROPHILS: ABNORMAL
VT MECHANICAL: 450 ML
VT MECHANICAL: 450 ML
WBC # BLD: 1.6 E9/L (ref 4.5–11.5)
WBC # BLD: 2.7 E9/L (ref 4.5–11.5)
WBC # BLD: 2.8 E9/L (ref 4.5–11.5)
WBC # BLD: 3.6 E9/L (ref 4.5–11.5)
WBC # BLD: 3.8 E9/L (ref 4.5–11.5)
WBC # BLD: 4.4 E9/L (ref 4.5–11.5)
WBC # BLD: 4.9 E9/L (ref 4.5–11.5)
WBC # BLD: 6.8 E9/L (ref 4.5–11.5)
WHITE BLOOD CELLS (WBC), STOOL: NORMAL

## 2023-01-01 PROCEDURE — 82962 GLUCOSE BLOOD TEST: CPT

## 2023-01-01 PROCEDURE — 71045 X-RAY EXAM CHEST 1 VIEW: CPT

## 2023-01-01 PROCEDURE — 6370000000 HC RX 637 (ALT 250 FOR IP): Performed by: FAMILY MEDICINE

## 2023-01-01 PROCEDURE — 6370000000 HC RX 637 (ALT 250 FOR IP): Performed by: INTERNAL MEDICINE

## 2023-01-01 PROCEDURE — 6360000002 HC RX W HCPCS: Performed by: INTERNAL MEDICINE

## 2023-01-01 PROCEDURE — 6370000000 HC RX 637 (ALT 250 FOR IP)

## 2023-01-01 PROCEDURE — 93279 PRGRMG DEV EVAL PM/LDLS PM: CPT | Performed by: INTERNAL MEDICINE

## 2023-01-01 PROCEDURE — 80053 COMPREHEN METABOLIC PANEL: CPT

## 2023-01-01 PROCEDURE — 84484 ASSAY OF TROPONIN QUANT: CPT

## 2023-01-01 PROCEDURE — 94669 MECHANICAL CHEST WALL OSCILL: CPT

## 2023-01-01 PROCEDURE — 2580000003 HC RX 258: Performed by: FAMILY MEDICINE

## 2023-01-01 PROCEDURE — 87425 ROTAVIRUS AG IA: CPT

## 2023-01-01 PROCEDURE — 96375 TX/PRO/DX INJ NEW DRUG ADDON: CPT

## 2023-01-01 PROCEDURE — 2580000003 HC RX 258: Performed by: INTERNAL MEDICINE

## 2023-01-01 PROCEDURE — 94640 AIRWAY INHALATION TREATMENT: CPT

## 2023-01-01 PROCEDURE — 36591 DRAW BLOOD OFF VENOUS DEVICE: CPT

## 2023-01-01 PROCEDURE — 87329 GIARDIA AG IA: CPT

## 2023-01-01 PROCEDURE — G0378 HOSPITAL OBSERVATION PER HR: HCPCS

## 2023-01-01 PROCEDURE — 93005 ELECTROCARDIOGRAM TRACING: CPT | Performed by: INTERNAL MEDICINE

## 2023-01-01 PROCEDURE — 85027 COMPLETE CBC AUTOMATED: CPT

## 2023-01-01 PROCEDURE — 2700000000 HC OXYGEN THERAPY PER DAY

## 2023-01-01 PROCEDURE — 80048 BASIC METABOLIC PNL TOTAL CA: CPT

## 2023-01-01 PROCEDURE — 2500000003 HC RX 250 WO HCPCS: Performed by: EMERGENCY MEDICINE

## 2023-01-01 PROCEDURE — 2000000000 HC ICU R&B

## 2023-01-01 PROCEDURE — 6360000002 HC RX W HCPCS: Performed by: NURSE PRACTITIONER

## 2023-01-01 PROCEDURE — 2500000003 HC RX 250 WO HCPCS

## 2023-01-01 PROCEDURE — 86850 RBC ANTIBODY SCREEN: CPT

## 2023-01-01 PROCEDURE — 84100 ASSAY OF PHOSPHORUS: CPT

## 2023-01-01 PROCEDURE — 82805 BLOOD GASES W/O2 SATURATION: CPT

## 2023-01-01 PROCEDURE — 6360000004 HC RX CONTRAST MEDICATION: Performed by: RADIOLOGY

## 2023-01-01 PROCEDURE — 2580000003 HC RX 258

## 2023-01-01 PROCEDURE — 99223 1ST HOSP IP/OBS HIGH 75: CPT

## 2023-01-01 PROCEDURE — 84300 ASSAY OF URINE SODIUM: CPT

## 2023-01-01 PROCEDURE — 84133 ASSAY OF URINE POTASSIUM: CPT

## 2023-01-01 PROCEDURE — 93005 ELECTROCARDIOGRAM TRACING: CPT | Performed by: EMERGENCY MEDICINE

## 2023-01-01 PROCEDURE — 6360000002 HC RX W HCPCS: Performed by: EMERGENCY MEDICINE

## 2023-01-01 PROCEDURE — 92611 MOTION FLUOROSCOPY/SWALLOW: CPT

## 2023-01-01 PROCEDURE — 6360000002 HC RX W HCPCS: Performed by: FAMILY MEDICINE

## 2023-01-01 PROCEDURE — 96366 THER/PROPH/DIAG IV INF ADDON: CPT

## 2023-01-01 PROCEDURE — 87449 NOS EACH ORGANISM AG IA: CPT

## 2023-01-01 PROCEDURE — 97161 PT EVAL LOW COMPLEX 20 MIN: CPT

## 2023-01-01 PROCEDURE — 85045 AUTOMATED RETICULOCYTE COUNT: CPT

## 2023-01-01 PROCEDURE — 2500000003 HC RX 250 WO HCPCS: Performed by: INTERNAL MEDICINE

## 2023-01-01 PROCEDURE — 89055 LEUKOCYTE ASSESSMENT FECAL: CPT

## 2023-01-01 PROCEDURE — 96365 THER/PROPH/DIAG IV INF INIT: CPT

## 2023-01-01 PROCEDURE — 92610 EVALUATE SWALLOWING FUNCTION: CPT

## 2023-01-01 PROCEDURE — 84145 PROCALCITONIN (PCT): CPT

## 2023-01-01 PROCEDURE — 94668 MNPJ CHEST WALL SBSQ: CPT

## 2023-01-01 PROCEDURE — 93005 ELECTROCARDIOGRAM TRACING: CPT

## 2023-01-01 PROCEDURE — 97165 OT EVAL LOW COMPLEX 30 MIN: CPT

## 2023-01-01 PROCEDURE — P9016 RBC LEUKOCYTES REDUCED: HCPCS

## 2023-01-01 PROCEDURE — 94667 MNPJ CHEST WALL 1ST: CPT

## 2023-01-01 PROCEDURE — 86900 BLOOD TYPING SEROLOGIC ABO: CPT

## 2023-01-01 PROCEDURE — 85025 COMPLETE CBC W/AUTO DIFF WBC: CPT

## 2023-01-01 PROCEDURE — 87045 FECES CULTURE AEROBIC BACT: CPT

## 2023-01-01 PROCEDURE — 82570 ASSAY OF URINE CREATININE: CPT

## 2023-01-01 PROCEDURE — 93010 ELECTROCARDIOGRAM REPORT: CPT | Performed by: INTERNAL MEDICINE

## 2023-01-01 PROCEDURE — 36415 COLL VENOUS BLD VENIPUNCTURE: CPT

## 2023-01-01 PROCEDURE — 99285 EMERGENCY DEPT VISIT HI MDM: CPT

## 2023-01-01 PROCEDURE — 83880 ASSAY OF NATRIURETIC PEPTIDE: CPT

## 2023-01-01 PROCEDURE — 6360000002 HC RX W HCPCS

## 2023-01-01 PROCEDURE — 87205 SMEAR GRAM STAIN: CPT

## 2023-01-01 PROCEDURE — 74230 X-RAY XM SWLNG FUNCJ C+: CPT

## 2023-01-01 PROCEDURE — 83735 ASSAY OF MAGNESIUM: CPT

## 2023-01-01 PROCEDURE — 85610 PROTHROMBIN TIME: CPT

## 2023-01-01 PROCEDURE — 83605 ASSAY OF LACTIC ACID: CPT

## 2023-01-01 PROCEDURE — 0202U NFCT DS 22 TRGT SARS-COV-2: CPT

## 2023-01-01 PROCEDURE — 71275 CT ANGIOGRAPHY CHEST: CPT

## 2023-01-01 PROCEDURE — 6370000000 HC RX 637 (ALT 250 FOR IP): Performed by: EMERGENCY MEDICINE

## 2023-01-01 PROCEDURE — 87636 SARSCOV2 & INF A&B AMP PRB: CPT

## 2023-01-01 PROCEDURE — 36430 TRANSFUSION BLD/BLD COMPNT: CPT

## 2023-01-01 PROCEDURE — 82947 ASSAY GLUCOSE BLOOD QUANT: CPT

## 2023-01-01 PROCEDURE — 82436 ASSAY OF URINE CHLORIDE: CPT

## 2023-01-01 PROCEDURE — 36600 WITHDRAWAL OF ARTERIAL BLOOD: CPT

## 2023-01-01 PROCEDURE — 94660 CPAP INITIATION&MGMT: CPT

## 2023-01-01 PROCEDURE — 85014 HEMATOCRIT: CPT

## 2023-01-01 PROCEDURE — 96367 TX/PROPH/DG ADDL SEQ IV INF: CPT

## 2023-01-01 PROCEDURE — 87081 CULTURE SCREEN ONLY: CPT

## 2023-01-01 PROCEDURE — 2060000000 HC ICU INTERMEDIATE R&B

## 2023-01-01 PROCEDURE — 84540 ASSAY OF URINE/UREA-N: CPT

## 2023-01-01 PROCEDURE — 99222 1ST HOSP IP/OBS MODERATE 55: CPT | Performed by: INTERNAL MEDICINE

## 2023-01-01 PROCEDURE — 93005 ELECTROCARDIOGRAM TRACING: CPT | Performed by: FAMILY MEDICINE

## 2023-01-01 PROCEDURE — 86901 BLOOD TYPING SEROLOGIC RH(D): CPT

## 2023-01-01 PROCEDURE — 74018 RADEX ABDOMEN 1 VIEW: CPT

## 2023-01-01 PROCEDURE — 96368 THER/DIAG CONCURRENT INF: CPT

## 2023-01-01 PROCEDURE — 84132 ASSAY OF SERUM POTASSIUM: CPT

## 2023-01-01 PROCEDURE — 94664 DEMO&/EVAL PT USE INHALER: CPT

## 2023-01-01 PROCEDURE — 86923 COMPATIBILITY TEST ELECTRIC: CPT

## 2023-01-01 PROCEDURE — 92526 ORAL FUNCTION THERAPY: CPT

## 2023-01-01 PROCEDURE — 85018 HEMOGLOBIN: CPT

## 2023-01-01 PROCEDURE — 2580000003 HC RX 258: Performed by: EMERGENCY MEDICINE

## 2023-01-01 PROCEDURE — 96376 TX/PRO/DX INJ SAME DRUG ADON: CPT

## 2023-01-01 RX ORDER — MORPHINE SULFATE 4 MG/ML
4 INJECTION, SOLUTION INTRAMUSCULAR; INTRAVENOUS
Status: DISCONTINUED | OUTPATIENT
Start: 2023-01-01 | End: 2023-01-01 | Stop reason: HOSPADM

## 2023-01-01 RX ORDER — SODIUM CHLORIDE 9 MG/ML
INJECTION, SOLUTION INTRAVENOUS PRN
Status: DISCONTINUED | OUTPATIENT
Start: 2023-01-01 | End: 2023-01-01

## 2023-01-01 RX ORDER — MAGNESIUM SULFATE IN WATER 40 MG/ML
2000 INJECTION, SOLUTION INTRAVENOUS ONCE
Status: COMPLETED | OUTPATIENT
Start: 2023-01-01 | End: 2023-01-01

## 2023-01-01 RX ORDER — INSULIN LISPRO 100 [IU]/ML
0-4 INJECTION, SOLUTION INTRAVENOUS; SUBCUTANEOUS
Status: DISCONTINUED | OUTPATIENT
Start: 2023-01-01 | End: 2023-01-01

## 2023-01-01 RX ORDER — POTASSIUM CHLORIDE 29.8 MG/ML
40 INJECTION INTRAVENOUS ONCE
Status: COMPLETED | OUTPATIENT
Start: 2023-01-01 | End: 2023-01-01

## 2023-01-01 RX ORDER — SENNA PLUS 8.6 MG/1
2 TABLET ORAL NIGHTLY
Status: DISCONTINUED | OUTPATIENT
Start: 2023-01-01 | End: 2023-01-01 | Stop reason: HOSPADM

## 2023-01-01 RX ORDER — KETOROLAC TROMETHAMINE 30 MG/ML
15 INJECTION, SOLUTION INTRAMUSCULAR; INTRAVENOUS EVERY 6 HOURS PRN
Status: DISCONTINUED | OUTPATIENT
Start: 2023-01-01 | End: 2023-01-01 | Stop reason: HOSPADM

## 2023-01-01 RX ORDER — IPRATROPIUM BROMIDE AND ALBUTEROL SULFATE 2.5; .5 MG/3ML; MG/3ML
1 SOLUTION RESPIRATORY (INHALATION) ONCE
Status: COMPLETED | OUTPATIENT
Start: 2023-01-01 | End: 2023-01-01

## 2023-01-01 RX ORDER — GLYCOPYRROLATE 0.2 MG/ML
0.2 INJECTION INTRAMUSCULAR; INTRAVENOUS EVERY 4 HOURS PRN
Status: DISCONTINUED | OUTPATIENT
Start: 2023-01-01 | End: 2023-01-01 | Stop reason: HOSPADM

## 2023-01-01 RX ORDER — MORPHINE SULFATE 2 MG/ML
2 INJECTION, SOLUTION INTRAMUSCULAR; INTRAVENOUS
Status: DISCONTINUED | OUTPATIENT
Start: 2023-01-01 | End: 2023-01-01 | Stop reason: HOSPADM

## 2023-01-01 RX ORDER — IPRATROPIUM BROMIDE AND ALBUTEROL SULFATE 2.5; .5 MG/3ML; MG/3ML
1 SOLUTION RESPIRATORY (INHALATION)
Status: DISCONTINUED | OUTPATIENT
Start: 2023-01-01 | End: 2023-01-01 | Stop reason: HOSPADM

## 2023-01-01 RX ORDER — METHYLPREDNISOLONE SODIUM SUCCINATE 125 MG/2ML
60 INJECTION, POWDER, LYOPHILIZED, FOR SOLUTION INTRAMUSCULAR; INTRAVENOUS EVERY 6 HOURS
Status: COMPLETED | OUTPATIENT
Start: 2023-01-01 | End: 2023-01-01

## 2023-01-01 RX ORDER — SODIUM BICARBONATE 42 MG/ML
5 INJECTION, SOLUTION INTRAVENOUS ONCE
Status: DISCONTINUED | OUTPATIENT
Start: 2023-01-01 | End: 2023-01-01 | Stop reason: SDUPTHER

## 2023-01-01 RX ORDER — LORAZEPAM 0.5 MG/1
0.5 TABLET ORAL EVERY 4 HOURS PRN
Status: DISCONTINUED | OUTPATIENT
Start: 2023-01-01 | End: 2023-01-01

## 2023-01-01 RX ORDER — MORPHINE SULFATE 4 MG/ML
4 INJECTION, SOLUTION INTRAMUSCULAR; INTRAVENOUS EVERY 4 HOURS PRN
Status: DISCONTINUED | OUTPATIENT
Start: 2023-01-01 | End: 2023-01-01

## 2023-01-01 RX ORDER — ACETAMINOPHEN 160 MG/5ML
650 SOLUTION ORAL EVERY 4 HOURS PRN
Status: DISCONTINUED | OUTPATIENT
Start: 2023-01-01 | End: 2023-01-01

## 2023-01-01 RX ORDER — MORPHINE SULFATE 2 MG/ML
INJECTION, SOLUTION INTRAMUSCULAR; INTRAVENOUS
Status: COMPLETED
Start: 2023-01-01 | End: 2023-01-01

## 2023-01-01 RX ORDER — PREDNISONE 20 MG/1
40 TABLET ORAL DAILY
Status: DISCONTINUED | OUTPATIENT
Start: 2023-01-01 | End: 2023-01-01

## 2023-01-01 RX ORDER — ONDANSETRON 4 MG/1
4 TABLET, ORALLY DISINTEGRATING ORAL EVERY 8 HOURS PRN
Status: DISCONTINUED | OUTPATIENT
Start: 2023-01-01 | End: 2023-01-01

## 2023-01-01 RX ORDER — SODIUM CHLORIDE 0.9 % (FLUSH) 0.9 %
5-40 SYRINGE (ML) INJECTION EVERY 12 HOURS SCHEDULED
Status: DISCONTINUED | OUTPATIENT
Start: 2023-01-01 | End: 2023-01-01 | Stop reason: HOSPADM

## 2023-01-01 RX ORDER — METHYLPREDNISOLONE SODIUM SUCCINATE 40 MG/ML
40 INJECTION, POWDER, LYOPHILIZED, FOR SOLUTION INTRAMUSCULAR; INTRAVENOUS EVERY 8 HOURS
Status: DISCONTINUED | OUTPATIENT
Start: 2023-01-01 | End: 2023-01-01

## 2023-01-01 RX ORDER — PREDNISONE 20 MG/1
30 TABLET ORAL DAILY
Status: DISCONTINUED | OUTPATIENT
Start: 2023-01-01 | End: 2023-01-01

## 2023-01-01 RX ORDER — SODIUM CHLORIDE 0.9 % (FLUSH) 0.9 %
5-40 SYRINGE (ML) INJECTION EVERY 12 HOURS SCHEDULED
Status: DISCONTINUED | OUTPATIENT
Start: 2023-01-01 | End: 2023-01-01

## 2023-01-01 RX ORDER — PREDNISONE 20 MG/1
20 TABLET ORAL DAILY
Status: DISCONTINUED | OUTPATIENT
Start: 2023-03-09 | End: 2023-01-01

## 2023-01-01 RX ORDER — ATORVASTATIN CALCIUM 20 MG/1
20 TABLET, FILM COATED ORAL NIGHTLY
Status: DISCONTINUED | OUTPATIENT
Start: 2023-01-01 | End: 2023-01-01

## 2023-01-01 RX ORDER — ONDANSETRON 2 MG/ML
4 INJECTION INTRAMUSCULAR; INTRAVENOUS EVERY 6 HOURS PRN
Status: DISCONTINUED | OUTPATIENT
Start: 2023-01-01 | End: 2023-01-01 | Stop reason: HOSPADM

## 2023-01-01 RX ORDER — INSULIN LISPRO 100 [IU]/ML
0-4 INJECTION, SOLUTION INTRAVENOUS; SUBCUTANEOUS NIGHTLY
Status: DISCONTINUED | OUTPATIENT
Start: 2023-01-01 | End: 2023-01-01

## 2023-01-01 RX ORDER — PREDNISONE 1 MG/1
10 TABLET ORAL DAILY
Status: DISCONTINUED | OUTPATIENT
Start: 2023-03-12 | End: 2023-01-01

## 2023-01-01 RX ORDER — ACETAMINOPHEN 650 MG/1
650 SUPPOSITORY RECTAL EVERY 6 HOURS PRN
Status: DISCONTINUED | OUTPATIENT
Start: 2023-01-01 | End: 2023-01-01 | Stop reason: ALTCHOICE

## 2023-01-01 RX ORDER — ALBUTEROL SULFATE 90 UG/1
2 AEROSOL, METERED RESPIRATORY (INHALATION) EVERY 6 HOURS PRN
Status: DISCONTINUED | OUTPATIENT
Start: 2023-01-01 | End: 2023-01-01 | Stop reason: CLARIF

## 2023-01-01 RX ORDER — METHYLPREDNISOLONE SODIUM SUCCINATE 40 MG/ML
40 INJECTION, POWDER, LYOPHILIZED, FOR SOLUTION INTRAMUSCULAR; INTRAVENOUS EVERY 12 HOURS
Status: COMPLETED | OUTPATIENT
Start: 2023-01-01 | End: 2023-01-01

## 2023-01-01 RX ORDER — ACETAMINOPHEN 325 MG/1
650 TABLET ORAL EVERY 4 HOURS PRN
Status: DISCONTINUED | OUTPATIENT
Start: 2023-01-01 | End: 2023-01-01 | Stop reason: HOSPADM

## 2023-01-01 RX ORDER — SUCRALFATE 1 G/1
1 TABLET ORAL
Status: DISCONTINUED | OUTPATIENT
Start: 2023-01-01 | End: 2023-01-01

## 2023-01-01 RX ORDER — SODIUM CHLORIDE 9 MG/ML
INJECTION, SOLUTION INTRAVENOUS PRN
Status: DISCONTINUED | OUTPATIENT
Start: 2023-01-01 | End: 2023-01-01 | Stop reason: HOSPADM

## 2023-01-01 RX ORDER — 0.9 % SODIUM CHLORIDE 0.9 %
250 INTRAVENOUS SOLUTION INTRAVENOUS ONCE
Status: COMPLETED | OUTPATIENT
Start: 2023-01-01 | End: 2023-01-01

## 2023-01-01 RX ORDER — GUAIFENESIN/DEXTROMETHORPHAN 100-10MG/5
5 SYRUP ORAL EVERY 4 HOURS PRN
Status: DISCONTINUED | OUTPATIENT
Start: 2023-01-01 | End: 2023-01-01 | Stop reason: HOSPADM

## 2023-01-01 RX ORDER — PANTOPRAZOLE SODIUM 40 MG/1
40 TABLET, DELAYED RELEASE ORAL EVERY 12 HOURS
Status: DISCONTINUED | OUTPATIENT
Start: 2023-01-01 | End: 2023-01-01 | Stop reason: HOSPADM

## 2023-01-01 RX ORDER — LORAZEPAM 2 MG/ML
0.5 INJECTION INTRAMUSCULAR EVERY 4 HOURS PRN
Status: DISCONTINUED | OUTPATIENT
Start: 2023-01-01 | End: 2023-01-01 | Stop reason: DRUGHIGH

## 2023-01-01 RX ORDER — ALBUTEROL SULFATE 0.63 MG/3ML
1 SOLUTION RESPIRATORY (INHALATION) EVERY 6 HOURS PRN
Status: DISCONTINUED | OUTPATIENT
Start: 2023-01-01 | End: 2023-01-01 | Stop reason: CLARIF

## 2023-01-01 RX ORDER — ARFORMOTEROL TARTRATE 15 UG/2ML
15 SOLUTION RESPIRATORY (INHALATION) 2 TIMES DAILY
Status: DISCONTINUED | OUTPATIENT
Start: 2023-01-01 | End: 2023-01-01 | Stop reason: HOSPADM

## 2023-01-01 RX ORDER — ACETAMINOPHEN 650 MG/1
650 SUPPOSITORY RECTAL EVERY 4 HOURS PRN
Status: DISCONTINUED | OUTPATIENT
Start: 2023-01-01 | End: 2023-01-01 | Stop reason: HOSPADM

## 2023-01-01 RX ORDER — DEXTROSE MONOHYDRATE 25 G/50ML
25 INJECTION, SOLUTION INTRAVENOUS PRN
Status: DISCONTINUED | OUTPATIENT
Start: 2023-01-01 | End: 2023-01-01 | Stop reason: HOSPADM

## 2023-01-01 RX ORDER — DOXYCYCLINE HYCLATE 100 MG/1
100 CAPSULE ORAL EVERY 12 HOURS SCHEDULED
Status: DISCONTINUED | OUTPATIENT
Start: 2023-01-01 | End: 2023-01-01

## 2023-01-01 RX ORDER — ALBUTEROL SULFATE 2.5 MG/3ML
2.5 SOLUTION RESPIRATORY (INHALATION) 4 TIMES DAILY PRN
Status: DISCONTINUED | OUTPATIENT
Start: 2023-01-01 | End: 2023-01-01

## 2023-01-01 RX ORDER — FLUTICASONE PROPIONATE 50 MCG
1 SPRAY, SUSPENSION (ML) NASAL DAILY
Status: DISCONTINUED | OUTPATIENT
Start: 2023-01-01 | End: 2023-01-01 | Stop reason: HOSPADM

## 2023-01-01 RX ORDER — LOPERAMIDE HYDROCHLORIDE 2 MG/1
2 CAPSULE ORAL PRN
Status: DISCONTINUED | OUTPATIENT
Start: 2023-01-01 | End: 2023-01-01 | Stop reason: HOSPADM

## 2023-01-01 RX ORDER — ACETAMINOPHEN 160 MG/5ML
650 SOLUTION ORAL EVERY 4 HOURS PRN
Status: DISCONTINUED | OUTPATIENT
Start: 2023-01-01 | End: 2023-01-01 | Stop reason: HOSPADM

## 2023-01-01 RX ORDER — SODIUM CHLORIDE 0.9 % (FLUSH) 0.9 %
10 SYRINGE (ML) INJECTION PRN
Status: DISCONTINUED | OUTPATIENT
Start: 2023-01-01 | End: 2023-01-01 | Stop reason: HOSPADM

## 2023-01-01 RX ORDER — POTASSIUM CHLORIDE 750 MG/1
10 TABLET, EXTENDED RELEASE ORAL DAILY
Status: DISCONTINUED | OUTPATIENT
Start: 2023-01-01 | End: 2023-01-01

## 2023-01-01 RX ORDER — METHYLPREDNISOLONE SODIUM SUCCINATE 125 MG/2ML
125 INJECTION, POWDER, LYOPHILIZED, FOR SOLUTION INTRAMUSCULAR; INTRAVENOUS ONCE
Status: COMPLETED | OUTPATIENT
Start: 2023-01-01 | End: 2023-01-01

## 2023-01-01 RX ORDER — IPRATROPIUM BROMIDE AND ALBUTEROL SULFATE 2.5; .5 MG/3ML; MG/3ML
1 SOLUTION RESPIRATORY (INHALATION) EVERY 6 HOURS PRN
Status: DISCONTINUED | OUTPATIENT
Start: 2023-01-01 | End: 2023-01-01

## 2023-01-01 RX ORDER — ONDANSETRON 2 MG/ML
4 INJECTION INTRAMUSCULAR; INTRAVENOUS EVERY 6 HOURS PRN
Status: DISCONTINUED | OUTPATIENT
Start: 2023-01-01 | End: 2023-01-01

## 2023-01-01 RX ORDER — FUROSEMIDE 10 MG/ML
INJECTION INTRAMUSCULAR; INTRAVENOUS
Status: DISCONTINUED
Start: 2023-01-01 | End: 2023-01-01

## 2023-01-01 RX ORDER — ACETAMINOPHEN 325 MG/1
650 TABLET ORAL EVERY 6 HOURS PRN
Status: DISCONTINUED | OUTPATIENT
Start: 2023-01-01 | End: 2023-01-01 | Stop reason: ALTCHOICE

## 2023-01-01 RX ORDER — POLYETHYLENE GLYCOL 3350 17 G/17G
17 POWDER, FOR SOLUTION ORAL DAILY
Status: DISCONTINUED | OUTPATIENT
Start: 2023-01-01 | End: 2023-01-01 | Stop reason: HOSPADM

## 2023-01-01 RX ORDER — GABAPENTIN 100 MG/1
100 CAPSULE ORAL 2 TIMES DAILY
Status: DISCONTINUED | OUTPATIENT
Start: 2023-01-01 | End: 2023-01-01

## 2023-01-01 RX ORDER — DEXTROSE MONOHYDRATE 100 MG/ML
INJECTION, SOLUTION INTRAVENOUS CONTINUOUS PRN
Status: DISCONTINUED | OUTPATIENT
Start: 2023-01-01 | End: 2023-01-01 | Stop reason: HOSPADM

## 2023-01-01 RX ORDER — METOPROLOL SUCCINATE 50 MG/1
25 TABLET, EXTENDED RELEASE ORAL 2 TIMES DAILY
Status: DISCONTINUED | OUTPATIENT
Start: 2023-01-01 | End: 2023-01-01

## 2023-01-01 RX ORDER — FUROSEMIDE 40 MG/1
40 TABLET ORAL DAILY
Status: DISCONTINUED | OUTPATIENT
Start: 2023-01-01 | End: 2023-01-01

## 2023-01-01 RX ORDER — ACETAMINOPHEN 650 MG/1
650 SUPPOSITORY RECTAL EVERY 4 HOURS PRN
Status: DISCONTINUED | OUTPATIENT
Start: 2023-01-01 | End: 2023-01-01

## 2023-01-01 RX ORDER — BUDESONIDE 0.25 MG/2ML
0.25 INHALANT ORAL 2 TIMES DAILY
Status: DISCONTINUED | OUTPATIENT
Start: 2023-01-01 | End: 2023-01-01 | Stop reason: HOSPADM

## 2023-01-01 RX ORDER — ACETAMINOPHEN 325 MG/1
650 TABLET ORAL EVERY 4 HOURS PRN
Status: DISCONTINUED | OUTPATIENT
Start: 2023-01-01 | End: 2023-01-01

## 2023-01-01 RX ORDER — SODIUM CHLORIDE 0.9 % (FLUSH) 0.9 %
5-40 SYRINGE (ML) INJECTION PRN
Status: DISCONTINUED | OUTPATIENT
Start: 2023-01-01 | End: 2023-01-01

## 2023-01-01 RX ORDER — SODIUM CHLORIDE 9 MG/ML
INJECTION, SOLUTION INTRAVENOUS CONTINUOUS
Status: ACTIVE | OUTPATIENT
Start: 2023-01-01 | End: 2023-01-01

## 2023-01-01 RX ORDER — LORAZEPAM 2 MG/ML
1 INJECTION INTRAMUSCULAR EVERY 6 HOURS PRN
Status: DISCONTINUED | OUTPATIENT
Start: 2023-01-01 | End: 2023-01-01 | Stop reason: HOSPADM

## 2023-01-01 RX ORDER — FUROSEMIDE 20 MG/1
20 TABLET ORAL DAILY
Status: DISCONTINUED | OUTPATIENT
Start: 2023-01-01 | End: 2023-01-01

## 2023-01-01 RX ORDER — DIPHENHYDRAMINE HCL 25 MG
25 TABLET ORAL ONCE
Status: DISCONTINUED | OUTPATIENT
Start: 2023-01-01 | End: 2023-01-01 | Stop reason: HOSPADM

## 2023-01-01 RX ORDER — LANOLIN ALCOHOL/MO/W.PET/CERES
3 CREAM (GRAM) TOPICAL NIGHTLY
Status: DISCONTINUED | OUTPATIENT
Start: 2023-01-01 | End: 2023-01-01

## 2023-01-01 RX ADMIN — GABAPENTIN 100 MG: 100 CAPSULE ORAL at 09:02

## 2023-01-01 RX ADMIN — METOPROLOL SUCCINATE 25 MG: 25 TABLET, EXTENDED RELEASE ORAL at 20:37

## 2023-01-01 RX ADMIN — APIXABAN 5 MG: 5 TABLET, FILM COATED ORAL at 20:57

## 2023-01-01 RX ADMIN — DOXYCYCLINE 100 MG: 100 INJECTION, POWDER, LYOPHILIZED, FOR SOLUTION INTRAVENOUS at 16:16

## 2023-01-01 RX ADMIN — PANTOPRAZOLE SODIUM 40 MG: 40 TABLET, DELAYED RELEASE ORAL at 08:32

## 2023-01-01 RX ADMIN — DOXYCYCLINE 100 MG: 100 CAPSULE ORAL at 09:25

## 2023-01-01 RX ADMIN — MELATONIN 3 MG ORAL TABLET 3 MG: 3 TABLET ORAL at 19:38

## 2023-01-01 RX ADMIN — Medication 10 ML: at 22:45

## 2023-01-01 RX ADMIN — SUCRALFATE 1 G: 1 TABLET ORAL at 16:41

## 2023-01-01 RX ADMIN — Medication 10 ML: at 20:56

## 2023-01-01 RX ADMIN — ATORVASTATIN CALCIUM 20 MG: 20 TABLET, FILM COATED ORAL at 20:44

## 2023-01-01 RX ADMIN — CEFEPIME 2000 MG: 2 INJECTION, POWDER, FOR SOLUTION INTRAVENOUS at 17:47

## 2023-01-01 RX ADMIN — DEXMEDETOMIDINE 0.2 MCG/KG/HR: 100 INJECTION, SOLUTION INTRAVENOUS at 21:12

## 2023-01-01 RX ADMIN — BUDESONIDE 250 MCG: 0.25 SUSPENSION RESPIRATORY (INHALATION) at 19:31

## 2023-01-01 RX ADMIN — ARFORMOTEROL TARTRATE 15 MCG: 15 SOLUTION RESPIRATORY (INHALATION) at 09:06

## 2023-01-01 RX ADMIN — IPRATROPIUM BROMIDE AND ALBUTEROL SULFATE 1 AMPULE: .5; 2.5 SOLUTION RESPIRATORY (INHALATION) at 12:01

## 2023-01-01 RX ADMIN — GUAIFENESIN AND DEXTROMETHORPHAN 5 ML: 100; 10 SYRUP ORAL at 17:59

## 2023-01-01 RX ADMIN — SODIUM BICARBONATE 50 MEQ: 84 INJECTION INTRAVENOUS at 08:51

## 2023-01-01 RX ADMIN — MORPHINE SULFATE 4 MG: 4 INJECTION, SOLUTION INTRAMUSCULAR; INTRAVENOUS at 08:51

## 2023-01-01 RX ADMIN — Medication 10 ML: at 20:58

## 2023-01-01 RX ADMIN — GABAPENTIN 100 MG: 100 CAPSULE ORAL at 08:20

## 2023-01-01 RX ADMIN — SUCRALFATE 1 G: 1 TABLET ORAL at 17:57

## 2023-01-01 RX ADMIN — Medication 10 ML: at 20:13

## 2023-01-01 RX ADMIN — SUCRALFATE 1 G: 1 TABLET ORAL at 11:49

## 2023-01-01 RX ADMIN — DOXYCYCLINE 100 MG: 100 CAPSULE ORAL at 09:02

## 2023-01-01 RX ADMIN — PANTOPRAZOLE SODIUM 40 MG: 40 TABLET, DELAYED RELEASE ORAL at 20:54

## 2023-01-01 RX ADMIN — MAGNESIUM SULFATE HEPTAHYDRATE 2000 MG: 40 INJECTION, SOLUTION INTRAVENOUS at 00:03

## 2023-01-01 RX ADMIN — LORAZEPAM 0.5 MG: 2 INJECTION INTRAMUSCULAR; INTRAVENOUS at 03:11

## 2023-01-01 RX ADMIN — SODIUM CHLORIDE 250 ML: 9 INJECTION, SOLUTION INTRAVENOUS at 14:00

## 2023-01-01 RX ADMIN — CEFEPIME 2000 MG: 2 INJECTION, POWDER, FOR SOLUTION INTRAVENOUS at 03:18

## 2023-01-01 RX ADMIN — IOPAMIDOL 75 ML: 755 INJECTION, SOLUTION INTRAVENOUS at 14:17

## 2023-01-01 RX ADMIN — METHYLPREDNISOLONE SODIUM SUCCINATE 40 MG: 40 INJECTION, POWDER, FOR SOLUTION INTRAMUSCULAR; INTRAVENOUS at 17:56

## 2023-01-01 RX ADMIN — BUDESONIDE 250 MCG: 0.25 SUSPENSION RESPIRATORY (INHALATION) at 19:15

## 2023-01-01 RX ADMIN — FUROSEMIDE 20 MG: 20 TABLET ORAL at 07:45

## 2023-01-01 RX ADMIN — PANTOPRAZOLE SODIUM 40 MG: 40 TABLET, DELAYED RELEASE ORAL at 08:20

## 2023-01-01 RX ADMIN — APIXABAN 5 MG: 5 TABLET, FILM COATED ORAL at 19:38

## 2023-01-01 RX ADMIN — PANTOPRAZOLE SODIUM 40 MG: 40 TABLET, DELAYED RELEASE ORAL at 19:38

## 2023-01-01 RX ADMIN — DOXYCYCLINE 100 MG: 100 CAPSULE ORAL at 20:37

## 2023-01-01 RX ADMIN — ARFORMOTEROL TARTRATE 15 MCG: 15 SOLUTION RESPIRATORY (INHALATION) at 20:18

## 2023-01-01 RX ADMIN — MELATONIN 3 MG ORAL TABLET 3 MG: 3 TABLET ORAL at 20:55

## 2023-01-01 RX ADMIN — POTASSIUM BICARBONATE 20 MEQ: 782 TABLET, EFFERVESCENT ORAL at 08:20

## 2023-01-01 RX ADMIN — SUCRALFATE 1 G: 1 TABLET ORAL at 20:44

## 2023-01-01 RX ADMIN — SUCRALFATE 1 G: 1 TABLET ORAL at 20:54

## 2023-01-01 RX ADMIN — CEFEPIME 2000 MG: 2 INJECTION, POWDER, FOR SOLUTION INTRAVENOUS at 02:06

## 2023-01-01 RX ADMIN — IPRATROPIUM BROMIDE AND ALBUTEROL SULFATE 1 AMPULE: .5; 2.5 SOLUTION RESPIRATORY (INHALATION) at 16:26

## 2023-01-01 RX ADMIN — IPRATROPIUM BROMIDE AND ALBUTEROL SULFATE 1 AMPULE: .5; 2.5 SOLUTION RESPIRATORY (INHALATION) at 16:15

## 2023-01-01 RX ADMIN — METOPROLOL SUCCINATE 25 MG: 25 TABLET, EXTENDED RELEASE ORAL at 20:44

## 2023-01-01 RX ADMIN — METHYLPREDNISOLONE SODIUM SUCCINATE 60 MG: 125 INJECTION, POWDER, FOR SOLUTION INTRAMUSCULAR; INTRAVENOUS at 11:09

## 2023-01-01 RX ADMIN — IPRATROPIUM BROMIDE AND ALBUTEROL SULFATE 1 AMPULE: .5; 2.5 SOLUTION RESPIRATORY (INHALATION) at 17:19

## 2023-01-01 RX ADMIN — CEFEPIME 2000 MG: 2 INJECTION, POWDER, FOR SOLUTION INTRAVENOUS at 10:41

## 2023-01-01 RX ADMIN — IPRATROPIUM BROMIDE AND ALBUTEROL SULFATE 1 AMPULE: 2.5; .5 SOLUTION RESPIRATORY (INHALATION) at 15:59

## 2023-01-01 RX ADMIN — ARFORMOTEROL TARTRATE 15 MCG: 15 SOLUTION RESPIRATORY (INHALATION) at 08:06

## 2023-01-01 RX ADMIN — GUAIFENESIN AND DEXTROMETHORPHAN 5 ML: 100; 10 SYRUP ORAL at 13:57

## 2023-01-01 RX ADMIN — METOPROLOL SUCCINATE 25 MG: 25 TABLET, EXTENDED RELEASE ORAL at 20:54

## 2023-01-01 RX ADMIN — ARFORMOTEROL TARTRATE 15 MCG: 15 SOLUTION RESPIRATORY (INHALATION) at 19:31

## 2023-01-01 RX ADMIN — PANTOPRAZOLE SODIUM 40 MG: 40 TABLET, DELAYED RELEASE ORAL at 09:25

## 2023-01-01 RX ADMIN — SODIUM CHLORIDE, PRESERVATIVE FREE 10 ML: 5 INJECTION INTRAVENOUS at 08:33

## 2023-01-01 RX ADMIN — GABAPENTIN 100 MG: 100 CAPSULE ORAL at 20:54

## 2023-01-01 RX ADMIN — FLUTICASONE PROPIONATE 1 SPRAY: 50 SPRAY, METERED NASAL at 08:21

## 2023-01-01 RX ADMIN — ARFORMOTEROL TARTRATE 15 MCG: 15 SOLUTION RESPIRATORY (INHALATION) at 22:57

## 2023-01-01 RX ADMIN — LORAZEPAM 0.5 MG: 0.5 TABLET ORAL at 18:25

## 2023-01-01 RX ADMIN — DOXYCYCLINE 100 MG: 100 CAPSULE ORAL at 20:44

## 2023-01-01 RX ADMIN — SUCRALFATE 1 G: 1 TABLET ORAL at 11:47

## 2023-01-01 RX ADMIN — SUCRALFATE 1 G: 1 TABLET ORAL at 05:13

## 2023-01-01 RX ADMIN — ARFORMOTEROL TARTRATE 15 MCG: 15 SOLUTION RESPIRATORY (INHALATION) at 07:55

## 2023-01-01 RX ADMIN — BUDESONIDE 250 MCG: 0.25 SUSPENSION RESPIRATORY (INHALATION) at 20:18

## 2023-01-01 RX ADMIN — BUDESONIDE 250 MCG: 0.25 SUSPENSION RESPIRATORY (INHALATION) at 20:11

## 2023-01-01 RX ADMIN — ATORVASTATIN CALCIUM 20 MG: 20 TABLET, FILM COATED ORAL at 20:54

## 2023-01-01 RX ADMIN — GABAPENTIN 100 MG: 100 CAPSULE ORAL at 20:38

## 2023-01-01 RX ADMIN — DOXYCYCLINE 100 MG: 100 CAPSULE ORAL at 08:20

## 2023-01-01 RX ADMIN — PANTOPRAZOLE SODIUM 40 MG: 40 TABLET, DELAYED RELEASE ORAL at 20:57

## 2023-01-01 RX ADMIN — IPRATROPIUM BROMIDE AND ALBUTEROL SULFATE 1 AMPULE: .5; 2.5 SOLUTION RESPIRATORY (INHALATION) at 11:20

## 2023-01-01 RX ADMIN — APIXABAN 5 MG: 5 TABLET, FILM COATED ORAL at 20:38

## 2023-01-01 RX ADMIN — ARFORMOTEROL TARTRATE 15 MCG: 15 SOLUTION RESPIRATORY (INHALATION) at 20:40

## 2023-01-01 RX ADMIN — Medication 10 ML: at 19:39

## 2023-01-01 RX ADMIN — Medication 50 MEQ: at 08:51

## 2023-01-01 RX ADMIN — SODIUM CHLORIDE: 9 INJECTION, SOLUTION INTRAVENOUS at 16:44

## 2023-01-01 RX ADMIN — CEFEPIME 2000 MG: 2 INJECTION, POWDER, FOR SOLUTION INTRAVENOUS at 09:31

## 2023-01-01 RX ADMIN — SUCRALFATE 1 G: 1 TABLET ORAL at 20:57

## 2023-01-01 RX ADMIN — SODIUM CHLORIDE: 9 INJECTION, SOLUTION INTRAVENOUS at 13:14

## 2023-01-01 RX ADMIN — ATORVASTATIN CALCIUM 20 MG: 20 TABLET, FILM COATED ORAL at 20:38

## 2023-01-01 RX ADMIN — SUCRALFATE 1 G: 1 TABLET ORAL at 06:54

## 2023-01-01 RX ADMIN — CEFEPIME 2000 MG: 2 INJECTION, POWDER, FOR SOLUTION INTRAVENOUS at 19:54

## 2023-01-01 RX ADMIN — ARFORMOTEROL TARTRATE 15 MCG: 15 SOLUTION RESPIRATORY (INHALATION) at 20:11

## 2023-01-01 RX ADMIN — CEFEPIME 2000 MG: 2 INJECTION, POWDER, FOR SOLUTION INTRAVENOUS at 02:42

## 2023-01-01 RX ADMIN — FUROSEMIDE 40 MG: 40 TABLET ORAL at 12:49

## 2023-01-01 RX ADMIN — PANTOPRAZOLE SODIUM 40 MG: 40 TABLET, DELAYED RELEASE ORAL at 07:45

## 2023-01-01 RX ADMIN — METOPROLOL SUCCINATE 25 MG: 25 TABLET, EXTENDED RELEASE ORAL at 09:25

## 2023-01-01 RX ADMIN — CEFEPIME 2000 MG: 2 INJECTION, POWDER, FOR SOLUTION INTRAVENOUS at 11:17

## 2023-01-01 RX ADMIN — ATORVASTATIN CALCIUM 20 MG: 20 TABLET, FILM COATED ORAL at 19:42

## 2023-01-01 RX ADMIN — Medication 10 ML: at 21:22

## 2023-01-01 RX ADMIN — BUDESONIDE 250 MCG: 0.25 SUSPENSION RESPIRATORY (INHALATION) at 23:00

## 2023-01-01 RX ADMIN — POTASSIUM CHLORIDE 10 MEQ: 750 TABLET, EXTENDED RELEASE ORAL at 07:44

## 2023-01-01 RX ADMIN — FLUTICASONE PROPIONATE 1 SPRAY: 50 SPRAY, METERED NASAL at 09:40

## 2023-01-01 RX ADMIN — BUDESONIDE 250 MCG: 0.25 SUSPENSION RESPIRATORY (INHALATION) at 07:55

## 2023-01-01 RX ADMIN — IPRATROPIUM BROMIDE AND ALBUTEROL SULFATE 1 AMPULE: .5; 2.5 SOLUTION RESPIRATORY (INHALATION) at 16:34

## 2023-01-01 RX ADMIN — ARFORMOTEROL TARTRATE 15 MCG: 15 SOLUTION RESPIRATORY (INHALATION) at 19:15

## 2023-01-01 RX ADMIN — POTASSIUM CHLORIDE 10 MEQ: 750 TABLET, EXTENDED RELEASE ORAL at 20:55

## 2023-01-01 RX ADMIN — PANTOPRAZOLE SODIUM 40 MG: 40 TABLET, DELAYED RELEASE ORAL at 09:02

## 2023-01-01 RX ADMIN — GABAPENTIN 100 MG: 100 CAPSULE ORAL at 20:55

## 2023-01-01 RX ADMIN — LORAZEPAM 0.5 MG: 2 INJECTION INTRAMUSCULAR; INTRAVENOUS at 20:00

## 2023-01-01 RX ADMIN — ATORVASTATIN CALCIUM 20 MG: 20 TABLET, FILM COATED ORAL at 20:57

## 2023-01-01 RX ADMIN — GABAPENTIN 100 MG: 100 CAPSULE ORAL at 08:32

## 2023-01-01 RX ADMIN — METHYLPREDNISOLONE SODIUM SUCCINATE 40 MG: 40 INJECTION, POWDER, FOR SOLUTION INTRAMUSCULAR; INTRAVENOUS at 13:54

## 2023-01-01 RX ADMIN — DEXTROSE MONOHYDRATE 25 G: 25 INJECTION, SOLUTION INTRAVENOUS at 10:16

## 2023-01-01 RX ADMIN — IPRATROPIUM BROMIDE AND ALBUTEROL SULFATE 1 AMPULE: .5; 2.5 SOLUTION RESPIRATORY (INHALATION) at 08:04

## 2023-01-01 RX ADMIN — CEFEPIME 2000 MG: 2 INJECTION, POWDER, FOR SOLUTION INTRAVENOUS at 17:10

## 2023-01-01 RX ADMIN — Medication 10 ML: at 11:09

## 2023-01-01 RX ADMIN — IPRATROPIUM BROMIDE AND ALBUTEROL SULFATE 1 AMPULE: .5; 2.5 SOLUTION RESPIRATORY (INHALATION) at 20:11

## 2023-01-01 RX ADMIN — METHYLPREDNISOLONE SODIUM SUCCINATE 125 MG: 125 INJECTION, POWDER, FOR SOLUTION INTRAMUSCULAR; INTRAVENOUS at 16:16

## 2023-01-01 RX ADMIN — IPRATROPIUM BROMIDE AND ALBUTEROL SULFATE 1 AMPULE: .5; 2.5 SOLUTION RESPIRATORY (INHALATION) at 07:55

## 2023-01-01 RX ADMIN — Medication 10 ML: at 09:26

## 2023-01-01 RX ADMIN — INSULIN LISPRO 1 UNITS: 100 INJECTION, SOLUTION INTRAVENOUS; SUBCUTANEOUS at 18:41

## 2023-01-01 RX ADMIN — POTASSIUM CHLORIDE 10 MEQ: 750 TABLET, EXTENDED RELEASE ORAL at 08:32

## 2023-01-01 RX ADMIN — MAGNESIUM SULFATE HEPTAHYDRATE 2000 MG: 40 INJECTION, SOLUTION INTRAVENOUS at 21:04

## 2023-01-01 RX ADMIN — IPRATROPIUM BROMIDE AND ALBUTEROL SULFATE 1 AMPULE: .5; 2.5 SOLUTION RESPIRATORY (INHALATION) at 19:15

## 2023-01-01 RX ADMIN — METHYLPREDNISOLONE SODIUM SUCCINATE 40 MG: 40 INJECTION, POWDER, FOR SOLUTION INTRAMUSCULAR; INTRAVENOUS at 02:05

## 2023-01-01 RX ADMIN — CEFEPIME 2000 MG: 2 INJECTION, POWDER, FOR SOLUTION INTRAVENOUS at 02:19

## 2023-01-01 RX ADMIN — GABAPENTIN 100 MG: 100 CAPSULE ORAL at 09:24

## 2023-01-01 RX ADMIN — BUDESONIDE 250 MCG: 0.25 SUSPENSION RESPIRATORY (INHALATION) at 09:02

## 2023-01-01 RX ADMIN — SUCRALFATE 1 G: 1 TABLET ORAL at 06:25

## 2023-01-01 RX ADMIN — DOXYCYCLINE 100 MG: 100 CAPSULE ORAL at 19:49

## 2023-01-01 RX ADMIN — SUCRALFATE 1 G: 1 TABLET ORAL at 19:38

## 2023-01-01 RX ADMIN — DORNASE ALFA 2.5 MG: 1 SOLUTION RESPIRATORY (INHALATION) at 09:19

## 2023-01-01 RX ADMIN — IPRATROPIUM BROMIDE AND ALBUTEROL SULFATE 1 AMPULE: .5; 2.5 SOLUTION RESPIRATORY (INHALATION) at 09:02

## 2023-01-01 RX ADMIN — ARFORMOTEROL TARTRATE 15 MCG: 15 SOLUTION RESPIRATORY (INHALATION) at 09:02

## 2023-01-01 RX ADMIN — CEFEPIME 2000 MG: 2 INJECTION, POWDER, FOR SOLUTION INTRAVENOUS at 08:28

## 2023-01-01 RX ADMIN — IPRATROPIUM BROMIDE AND ALBUTEROL SULFATE 1 AMPULE: .5; 2.5 SOLUTION RESPIRATORY (INHALATION) at 20:18

## 2023-01-01 RX ADMIN — MORPHINE SULFATE 2 MG: 2 INJECTION, SOLUTION INTRAMUSCULAR; INTRAVENOUS at 04:00

## 2023-01-01 RX ADMIN — SODIUM CHLORIDE: 9 INJECTION, SOLUTION INTRAVENOUS at 19:50

## 2023-01-01 RX ADMIN — DOXYCYCLINE 100 MG: 100 CAPSULE ORAL at 20:57

## 2023-01-01 RX ADMIN — FUROSEMIDE 20 MG: 20 TABLET ORAL at 09:25

## 2023-01-01 RX ADMIN — SUCRALFATE 1 G: 1 TABLET ORAL at 09:25

## 2023-01-01 RX ADMIN — METHYLPREDNISOLONE SODIUM SUCCINATE 40 MG: 40 INJECTION, POWDER, FOR SOLUTION INTRAMUSCULAR; INTRAVENOUS at 02:21

## 2023-01-01 RX ADMIN — IPRATROPIUM BROMIDE AND ALBUTEROL SULFATE 1 AMPULE: .5; 2.5 SOLUTION RESPIRATORY (INHALATION) at 13:18

## 2023-01-01 RX ADMIN — POTASSIUM CHLORIDE 10 MEQ: 750 TABLET, EXTENDED RELEASE ORAL at 09:25

## 2023-01-01 RX ADMIN — ATORVASTATIN CALCIUM 20 MG: 20 TABLET, FILM COATED ORAL at 20:55

## 2023-01-01 RX ADMIN — IPRATROPIUM BROMIDE AND ALBUTEROL SULFATE 1 AMPULE: .5; 2.5 SOLUTION RESPIRATORY (INHALATION) at 19:31

## 2023-01-01 RX ADMIN — IPRATROPIUM BROMIDE AND ALBUTEROL SULFATE 1 AMPULE: .5; 2.5 SOLUTION RESPIRATORY (INHALATION) at 09:22

## 2023-01-01 RX ADMIN — FUROSEMIDE 40 MG: 40 TABLET ORAL at 09:02

## 2023-01-01 RX ADMIN — MAGNESIUM SULFATE HEPTAHYDRATE 2000 MG: 40 INJECTION, SOLUTION INTRAVENOUS at 08:28

## 2023-01-01 RX ADMIN — MELATONIN 3 MG ORAL TABLET 3 MG: 3 TABLET ORAL at 20:54

## 2023-01-01 RX ADMIN — CEFEPIME 2000 MG: 2 INJECTION, POWDER, FOR SOLUTION INTRAVENOUS at 01:48

## 2023-01-01 RX ADMIN — PANTOPRAZOLE SODIUM 40 MG: 40 TABLET, DELAYED RELEASE ORAL at 20:55

## 2023-01-01 RX ADMIN — SODIUM CHLORIDE, PRESERVATIVE FREE 10 ML: 5 INJECTION INTRAVENOUS at 17:56

## 2023-01-01 RX ADMIN — SUCRALFATE 1 G: 1 TABLET ORAL at 17:52

## 2023-01-01 RX ADMIN — INSULIN HUMAN 10 UNITS: 100 INJECTION, SOLUTION PARENTERAL at 10:14

## 2023-01-01 RX ADMIN — IPRATROPIUM BROMIDE AND ALBUTEROL SULFATE 1 AMPULE: .5; 2.5 SOLUTION RESPIRATORY (INHALATION) at 07:45

## 2023-01-01 RX ADMIN — BUDESONIDE 250 MCG: 0.25 SUSPENSION RESPIRATORY (INHALATION) at 08:04

## 2023-01-01 RX ADMIN — APIXABAN 5 MG: 5 TABLET, FILM COATED ORAL at 08:20

## 2023-01-01 RX ADMIN — IPRATROPIUM BROMIDE AND ALBUTEROL SULFATE 1 AMPULE: .5; 2.5 SOLUTION RESPIRATORY (INHALATION) at 11:35

## 2023-01-01 RX ADMIN — Medication 10 ML: at 08:21

## 2023-01-01 RX ADMIN — BUDESONIDE 250 MCG: 0.25 SUSPENSION RESPIRATORY (INHALATION) at 07:45

## 2023-01-01 RX ADMIN — DOXYCYCLINE 100 MG: 100 CAPSULE ORAL at 08:32

## 2023-01-01 RX ADMIN — BUDESONIDE 250 MCG: 0.25 SUSPENSION RESPIRATORY (INHALATION) at 20:19

## 2023-01-01 RX ADMIN — IPRATROPIUM BROMIDE AND ALBUTEROL SULFATE 1 AMPULE: .5; 2.5 SOLUTION RESPIRATORY (INHALATION) at 09:06

## 2023-01-01 RX ADMIN — POTASSIUM CHLORIDE 40 MEQ: 29.8 INJECTION, SOLUTION INTRAVENOUS at 00:01

## 2023-01-01 RX ADMIN — DORNASE ALFA 2.5 MG: 1 SOLUTION RESPIRATORY (INHALATION) at 09:23

## 2023-01-01 RX ADMIN — IPRATROPIUM BROMIDE AND ALBUTEROL SULFATE 1 AMPULE: .5; 2.5 SOLUTION RESPIRATORY (INHALATION) at 12:58

## 2023-01-01 RX ADMIN — CEFEPIME 2000 MG: 2 INJECTION, POWDER, FOR SOLUTION INTRAVENOUS at 10:04

## 2023-01-01 RX ADMIN — BUDESONIDE 250 MCG: 0.25 SUSPENSION RESPIRATORY (INHALATION) at 09:22

## 2023-01-01 RX ADMIN — BUDESONIDE 250 MCG: 0.25 SUSPENSION RESPIRATORY (INHALATION) at 09:06

## 2023-01-01 RX ADMIN — MELATONIN 3 MG ORAL TABLET 3 MG: 3 TABLET ORAL at 20:37

## 2023-01-01 RX ADMIN — MELATONIN 3 MG ORAL TABLET 3 MG: 3 TABLET ORAL at 20:44

## 2023-01-01 RX ADMIN — SUCRALFATE 1 G: 1 TABLET ORAL at 18:11

## 2023-01-01 RX ADMIN — ARFORMOTEROL TARTRATE 15 MCG: 15 SOLUTION RESPIRATORY (INHALATION) at 07:45

## 2023-01-01 RX ADMIN — SUCRALFATE 1 G: 1 TABLET ORAL at 08:32

## 2023-01-01 RX ADMIN — DOXYCYCLINE 100 MG: 100 CAPSULE ORAL at 20:54

## 2023-01-01 RX ADMIN — INSULIN LISPRO 1 UNITS: 100 INJECTION, SOLUTION INTRAVENOUS; SUBCUTANEOUS at 13:01

## 2023-01-01 RX ADMIN — POLYETHYLENE GLYCOL 3350 17 G: 17 POWDER, FOR SOLUTION ORAL at 08:21

## 2023-01-01 RX ADMIN — DOXYCYCLINE 100 MG: 100 CAPSULE ORAL at 06:50

## 2023-01-01 RX ADMIN — SENNOSIDES 17.2 MG: 8.6 TABLET, FILM COATED ORAL at 19:38

## 2023-01-01 RX ADMIN — LORAZEPAM 0.5 MG: 0.5 TABLET ORAL at 11:47

## 2023-01-01 RX ADMIN — INSULIN LISPRO 1 UNITS: 100 INJECTION, SOLUTION INTRAVENOUS; SUBCUTANEOUS at 14:44

## 2023-01-01 RX ADMIN — IPRATROPIUM BROMIDE AND ALBUTEROL SULFATE 1 AMPULE: .5; 2.5 SOLUTION RESPIRATORY (INHALATION) at 12:14

## 2023-01-01 RX ADMIN — IPRATROPIUM BROMIDE AND ALBUTEROL SULFATE 1 AMPULE: 2.5; .5 SOLUTION RESPIRATORY (INHALATION) at 13:40

## 2023-01-01 RX ADMIN — SODIUM CHLORIDE, PRESERVATIVE FREE 10 ML: 5 INJECTION INTRAVENOUS at 02:06

## 2023-01-01 RX ADMIN — METOPROLOL SUCCINATE 25 MG: 25 TABLET, EXTENDED RELEASE ORAL at 07:45

## 2023-01-01 RX ADMIN — SUCRALFATE 1 G: 1 TABLET ORAL at 07:44

## 2023-01-01 RX ADMIN — Medication 10 ML: at 21:41

## 2023-01-01 RX ADMIN — IPRATROPIUM BROMIDE AND ALBUTEROL SULFATE 1 AMPULE: .5; 2.5 SOLUTION RESPIRATORY (INHALATION) at 15:23

## 2023-01-01 RX ADMIN — GABAPENTIN 100 MG: 100 CAPSULE ORAL at 19:38

## 2023-01-01 RX ADMIN — METHYLPREDNISOLONE SODIUM SUCCINATE 60 MG: 125 INJECTION, POWDER, FOR SOLUTION INTRAMUSCULAR; INTRAVENOUS at 16:43

## 2023-01-01 RX ADMIN — PANTOPRAZOLE SODIUM 40 MG: 40 TABLET, DELAYED RELEASE ORAL at 20:37

## 2023-01-01 RX ADMIN — PANTOPRAZOLE SODIUM 40 MG: 40 TABLET, DELAYED RELEASE ORAL at 20:44

## 2023-01-01 RX ADMIN — SUCRALFATE 1 G: 1 TABLET ORAL at 11:03

## 2023-01-01 RX ADMIN — SUCRALFATE 1 G: 1 TABLET ORAL at 05:08

## 2023-01-01 RX ADMIN — SENNOSIDES 17.2 MG: 8.6 TABLET, FILM COATED ORAL at 20:57

## 2023-01-01 RX ADMIN — SUCRALFATE 1 G: 1 TABLET ORAL at 20:38

## 2023-01-01 RX ADMIN — FUROSEMIDE 20 MG: 20 TABLET ORAL at 08:32

## 2023-01-01 RX ADMIN — CEFEPIME 2000 MG: 2 INJECTION, POWDER, FOR SOLUTION INTRAVENOUS at 18:04

## 2023-01-01 RX ADMIN — MELATONIN 3 MG ORAL TABLET 3 MG: 3 TABLET ORAL at 20:57

## 2023-01-01 RX ADMIN — ARFORMOTEROL TARTRATE 15 MCG: 15 SOLUTION RESPIRATORY (INHALATION) at 09:22

## 2023-01-01 RX ADMIN — GABAPENTIN 100 MG: 100 CAPSULE ORAL at 20:44

## 2023-01-01 RX ADMIN — METHYLPREDNISOLONE SODIUM SUCCINATE 40 MG: 40 INJECTION, POWDER, FOR SOLUTION INTRAMUSCULAR; INTRAVENOUS at 15:19

## 2023-01-01 RX ADMIN — BUDESONIDE 250 MCG: 0.25 SUSPENSION RESPIRATORY (INHALATION) at 20:40

## 2023-01-01 RX ADMIN — SODIUM CHLORIDE, PRESERVATIVE FREE 10 ML: 5 INJECTION INTRAVENOUS at 02:05

## 2023-01-01 RX ADMIN — IPRATROPIUM BROMIDE AND ALBUTEROL SULFATE 1 AMPULE: .5; 2.5 SOLUTION RESPIRATORY (INHALATION) at 20:40

## 2023-01-01 RX ADMIN — IPRATROPIUM BROMIDE AND ALBUTEROL SULFATE 1 AMPULE: .5; 2.5 SOLUTION RESPIRATORY (INHALATION) at 15:30

## 2023-01-01 RX ADMIN — APIXABAN 5 MG: 5 TABLET, FILM COATED ORAL at 09:02

## 2023-01-01 RX ADMIN — GABAPENTIN 100 MG: 100 CAPSULE ORAL at 07:45

## 2023-01-01 RX ADMIN — SUCRALFATE 1 G: 1 TABLET ORAL at 17:48

## 2023-01-01 RX ADMIN — CEFTRIAXONE 1000 MG: 1 INJECTION, POWDER, FOR SOLUTION INTRAMUSCULAR; INTRAVENOUS at 06:51

## 2023-01-01 RX ADMIN — CEFEPIME 2000 MG: 2 INJECTION, POWDER, FOR SOLUTION INTRAVENOUS at 17:44

## 2023-01-01 RX ADMIN — CEFEPIME 2000 MG: 2 INJECTION, POWDER, FOR SOLUTION INTRAVENOUS at 18:09

## 2023-01-01 RX ADMIN — GABAPENTIN 100 MG: 100 CAPSULE ORAL at 20:57

## 2023-01-01 RX ADMIN — METHYLPREDNISOLONE SODIUM SUCCINATE 60 MG: 125 INJECTION, POWDER, FOR SOLUTION INTRAMUSCULAR; INTRAVENOUS at 22:11

## 2023-01-01 ASSESSMENT — ENCOUNTER SYMPTOMS
ABDOMINAL DISTENTION: 0
DIARRHEA: 0
CHEST TIGHTNESS: 0
EYE DISCHARGE: 0
ABDOMINAL PAIN: 0
SHORTNESS OF BREATH: 1
EYE REDNESS: 0
COUGH: 1
SORE THROAT: 0
NAUSEA: 0
CONSTIPATION: 0
VOMITING: 0

## 2023-01-01 ASSESSMENT — PAIN - FUNCTIONAL ASSESSMENT: PAIN_FUNCTIONAL_ASSESSMENT: NONE - DENIES PAIN

## 2023-01-01 ASSESSMENT — PAIN SCALES - GENERAL
PAINLEVEL_OUTOF10: 0
PAINLEVEL_OUTOF10: 9
PAINLEVEL_OUTOF10: 0

## 2023-01-04 ENCOUNTER — TELEPHONE (OUTPATIENT)
Dept: NON INVASIVE DIAGNOSTICS | Age: 73
End: 2023-01-04

## 2023-01-04 NOTE — TELEPHONE ENCOUNTER
----- Message from Flip Mckinley MD sent at 12/30/2022  5:00 PM EST -----  He was admitted and discharged for GI bleeding and anemia. I was not involved in his care. He needs to talk to his primary care physician. MR  ----- Message -----  From: Dustin Smart  Sent: 12/30/2022   3:39 PM EST  To: Flip Mckinley MD    Patient was on Eliquis but it was stopped for a possible GI Bleed. The wife would like to know when can he resumed back on the Eliquis.

## 2023-01-12 NOTE — DISCHARGE SUMMARY
[facility]   Discharge summary   Patient ID:  April Panchal  [id number]  67 y.o.  1950    Admit date: 12/26/2022    Discharge date and time: 12/30/2022    Admission Diagnoses:   Patient Active Problem List   Diagnosis    Syncope and collapse    Pneumonia    COPD (chronic obstructive pulmonary disease) (HCC)    BPH (benign prostatic hyperplasia)    HTN (hypertension)    HLD (hyperlipidemia)    Intermittent complete heart block (HCC)    Cardiac pacemaker in situ    Acute on chronic respiratory failure with hypoxia (HCC)    Squamous carcinoma of lung (HCC)    History pulmonary embolism ([address] on Eliquis    Hyponatremia    Hypomagnesemia    Wide-complex tachycardia    COVID-19    Pancytopenia (HCC)    Pacemaker infection ([address]    Sepsis due to Enterococcus ([address]    Bacteremia    High-grade atrioventricular block    Second degree AV block, Mobitz type I    Hematoma    Presence of leadless cardiac pacemaker    Bacterial sepsis ([address]    Anemia       Discharge Diagnoses: Anemia    Consults: hematology/oncology    Procedures: None    Hospital Course: The patient is a 67 y.o. male of Marylou Arceo MD     Patient is a 50-year-old male admitted to [facility] for  Acute blood loss anemia likely contributing to his SOB with underlying hx of COPD and lung CA  -Discontinue Eliquis for now-acute bleed likely from oral anticoagulation in combination with chemotherapy regimen-no evidence of acute blood loss anemia per rectum  -H&H 5.9/18.5 > 1 unit of RBC transfused > 7.8/22.9  -Active chemo for Stage IIIB Squamous cell lung cancer of the left hilum, follows with the [facility]   -Actively takes Eliquis for history of DVTs at home, currently held.    -Check BNP as it was elevated on previous admission and CXR with left pleural effusion   -IVF NS @ 100, will likely need to d/c if BNP elevated   -Currently on 3 L nasal cannula which is at baseline  -Heme-onc consult as patient known to them  -If hemoglobin continues to decline, will consult general surgery     Hypomagnesemia/electrolyte disturbances  -Mg+ 1.5, replace with 2g, recheck in the AM     Bradycardia  -Pacer interrogation     12/28/2022  Hgb stable 7.7  Platelet 45 today  Continue to hold anticoagulation  Hem/onc following  Continue to monitor labs        12/29/2022  Hgb remains stable at 8.4  Continue to hold anticoagulation  Vital signs stable  Continues on 3 liters O2  General surgery evaluation prior to discharge, patient known to Dr. Ezekiel Sacks       No results for input(s): WBC, HGB, HCT, PLT in the last 72 hours. No results for input(s): NA, K, CL, CO2, BUN, CREATININE, GLU, CALCIUM in the last 72 hours. XR CHEST PORTABLE    Result Date: 12/26/2022  EXAMINATION: ONE XRAY VIEW OF THE CHEST 12/26/2022 11:22 am COMPARISON: None. HISTORY: ORDERING SYSTEM PROVIDED HISTORY: cogmary and SOB TECHNOLOGIST PROVIDED HISTORY: Reason for exam:->cogh and SOB What reading provider will be dictating this exam?->CRC FINDINGS: The cardiac silhouette is within normals. There is a right-sided MediPort catheter. The right lung is grossly clear There is a poorly defined mass seen within the right perihilar region with adjacent pleuroparenchymal scarring. There is a small chronic left pleural effusion. 1. Persistent small ill-defined mass seen within the left perihilar region 2. Pleuroparenchymal scarring within the left perihilar region 3. Left pleural effusion 4. Emphysematous changes       Discharge Exam:    HEENT: NCAT,  PERRLA, No JVD  Heart:  RRR, no murmurs, gallops, or rubs.   Lungs:  CTA bilaterally, no wheeze, rales or rhonchi  Abd: bowel sounds present, nontender, nondistended, no masses  Extrem:  No clubbing, cyanosis, or edema    Disposition: home     Patient Condition at Discharge: stable    Patient Instructions:      Medication List        START taking these medications      pantoprazole 40 MG tablet  Commonly known as: PROTONIX  Take 1 tablet by mouth in the morning and 1 tablet in the evening. sucralfate 1 GM tablet  Commonly known as: CARAFATE  Take 1 tablet by mouth 4 times daily (before meals and nightly)            CONTINUE taking these medications      * albuterol sulfate  (90 Base) MCG/ACT inhaler  Commonly known as: PROVENTIL;VENTOLIN;PROAIR     * albuterol 0.63 MG/3ML nebulizer solution  Commonly known as: ACCUNEB     atorvastatin 20 MG tablet  Commonly known as: LIPITOR     furosemide 20 MG tablet  Commonly known as: LASIX  Take 1 tablet by mouth daily     gabapentin 100 MG capsule  Commonly known as: NEURONTIN     melatonin 3 MG Tabs tablet     metoprolol succinate 25 MG extended release tablet  Commonly known as: TOPROL XL  Take 1 tablet by mouth 2 times daily (with meals)     MULTIVITAMIN ADULT PO     OXYGEN     potassium chloride 10 MEQ extended release capsule  Commonly known as: MICRO-K     predniSONE 5 MG tablet  Commonly known as: DELTASONE     Trelegy Ellipta 200-62.5-25 MCG/ACT Aepb inhaler  Generic drug: fluticasone-umeclidin-vilant     vitamin C 250 MG tablet           * This list has 2 medication(s) that are the same as other medications prescribed for you. Read the directions carefully, and ask your doctor or other care provider to review them with you. STOP taking these medications      apixaban 5 MG Tabs tablet  Commonly known as: ELIQUIS     simvastatin 40 MG tablet  Commonly known as: ZOCOR               Where to Get Your Medications        These medications were sent to [facility]/pharmacy #[id number], [place] - [address] Bell Ordonez [phone] Karlee Cool [phone]  [address] [place] [address] [address]      Phone: [phone]   pantoprazole 40 MG tablet       You can get these medications from any pharmacy    Bring a paper prescription for each of these medications  sucralfate 1 GM tablet       Activity: activity as tolerated  Diet: cardiac diet    Pt has been advised to:     Follow-up with Danica Dao Nkechi Sousa MD in 1 week.   Follow-up with consultants as recommended by them    Note that over 30 minutes was spent in preparing discharge papers, discussing discharge with patient, medication review, etc.    Signed:  Jose Watts MD  12/20/2022

## 2023-01-13 NOTE — PROGRESS NOTES
Physician Progress Note      PATIENT:               Shira Sagastume  CSN #:                  [id number]  :                       1950  ADMIT DATE:       2022 10:41 AM  DISCH DATE:        2022 2:31 PM  RESPONDING  PROVIDER #:        Gatito Bryant MD          QUERY TEXT:    Patient admitted with Acute blood loss anemia likely from oral anticoagulation   in combination with chemotherapy and has documented pancytopenia per general   Surgery progress note on 22 . If possible, please document in progress   notes and discharge summary further specificity regarding pancytopenia:    The medical record reflects the following:  Risk Factors: lung Cancer on chemotherapy  Clinical Indicators:  Per H&P: acute bleed likely from oral anticoagulation in combination with   chemotherapy regiment. 22: Per General Surgery: Pancytopenia hx Lung CA on chemo  22: WBC: 2.2; RBC 2.61; Platelets 73;  absolute neutrophils 1.28  Treatment: Laboratory studies; Blood transfusion of packed red cells;   Platelets transfusion; Hematology/oncology consult    Thank Gaby Brooks BSKAT, R.N.  Clinical Documentation Improvement  [phone]  Options provided:  -- Antineoplastic (chemotherapy) induced pancytopenia  -- Pancytopenia due to ##please specify cause, please specify cause. -- Pancytopenia due to Lung Cancer  -- Other - I will add my own diagnosis  -- Disagree - Not applicable / Not valid  -- Disagree - Clinically unable to determine / Unknown  -- Refer to Clinical Documentation Reviewer    PROVIDER RESPONSE TEXT:    Patient has antineoplastic (chemotherapy) induced pancytopenia.     Query created by: Ese Curiel on 2023 11:35 AM      Electronically signed by:  Gatito Bryant MD 2023 7:35 PM

## 2023-01-18 ENCOUNTER — HOSPITAL ENCOUNTER (INPATIENT)
Age: 73
DRG: 809 | End: 2023-01-18
Attending: STUDENT IN AN ORGANIZED HEALTH CARE EDUCATION/TRAINING PROGRAM | Admitting: INTERNAL MEDICINE
Payer: MEDICARE

## 2023-01-18 ENCOUNTER — APPOINTMENT (OUTPATIENT)
Dept: GENERAL RADIOLOGY | Age: 73
DRG: 809 | End: 2023-01-18
Payer: MEDICARE

## 2023-01-18 DIAGNOSIS — K92.2 GASTROINTESTINAL HEMORRHAGE, UNSPECIFIED GASTROINTESTINAL HEMORRHAGE TYPE: Primary | ICD-10-CM

## 2023-01-18 DIAGNOSIS — D62 ACUTE BLOOD LOSS ANEMIA: ICD-10-CM

## 2023-01-18 LAB
ABO/RH: NORMAL
ANION GAP SERPL CALCULATED.3IONS-SCNC: 16 MMOL/L (ref 7–16)
ANISOCYTOSIS: ABNORMAL
ANTIBODY SCREEN: NORMAL
BASOPHILS ABSOLUTE: 0 E9/L (ref 0–0.2)
BASOPHILS RELATIVE PERCENT: 0 % (ref 0–2)
BLOOD BANK DISPENSE STATUS: NORMAL
BLOOD BANK PRODUCT CODE: NORMAL
BPU ID: NORMAL
BUN BLDV-MCNC: 21 MG/DL (ref 6–23)
CALCIUM SERPL-MCNC: 9.9 MG/DL (ref 8.6–10.2)
CHLORIDE BLD-SCNC: 103 MMOL/L (ref 98–107)
CO2: 24 MMOL/L (ref 22–29)
CREAT SERPL-MCNC: 0.9 MG/DL (ref 0.7–1.2)
DESCRIPTION BLOOD BANK: NORMAL
EOSINOPHILS ABSOLUTE: 0 E9/L (ref 0.05–0.5)
EOSINOPHILS RELATIVE PERCENT: 0.4 % (ref 0–6)
GFR SERPL CREATININE-BSD FRML MDRD: >60 ML/MIN/1.73
GLUCOSE BLD-MCNC: 160 MG/DL (ref 74–99)
HCT VFR BLD CALC: 20.3 % (ref 37–54)
HCT VFR BLD CALC: 21 % (ref 37–54)
HEMOGLOBIN: 6.5 G/DL (ref 12.5–16.5)
HEMOGLOBIN: 6.9 G/DL (ref 12.5–16.5)
INFLUENZA A BY PCR: NOT DETECTED
INFLUENZA B BY PCR: NOT DETECTED
LYMPHOCYTES ABSOLUTE: 0.2 E9/L (ref 1.5–4)
LYMPHOCYTES RELATIVE PERCENT: 7 % (ref 20–42)
MCH RBC QN AUTO: 29.7 PG (ref 26–35)
MCHC RBC AUTO-ENTMCNC: 32 % (ref 32–34.5)
MCV RBC AUTO: 92.7 FL (ref 80–99.9)
MONOCYTES ABSOLUTE: 0 E9/L (ref 0.1–0.95)
MONOCYTES RELATIVE PERCENT: 2.1 % (ref 2–12)
NEUTROPHILS ABSOLUTE: 2.6 E9/L (ref 1.8–7.3)
NEUTROPHILS RELATIVE PERCENT: 93 % (ref 43–80)
NUCLEATED RED BLOOD CELLS: 0.9 /100 WBC
OVALOCYTES: ABNORMAL
PDW BLD-RTO: 22.7 FL (ref 11.5–15)
PLATELET # BLD: 14 E9/L (ref 130–450)
PLATELET CONFIRMATION: NORMAL
PMV BLD AUTO: ABNORMAL FL (ref 7–12)
POIKILOCYTES: ABNORMAL
POLYCHROMASIA: ABNORMAL
POTASSIUM REFLEX MAGNESIUM: 4.2 MMOL/L (ref 3.5–5)
PRO-BNP: 1578 PG/ML (ref 0–125)
RBC # BLD: 2.19 E12/L (ref 3.8–5.8)
SARS-COV-2, NAAT: NOT DETECTED
SODIUM BLD-SCNC: 143 MMOL/L (ref 132–146)
STOMATOCYTES: ABNORMAL
TEAR DROP CELLS: ABNORMAL
TROPONIN, HIGH SENSITIVITY: 33 NG/L (ref 0–11)
TROPONIN, HIGH SENSITIVITY: 37 NG/L (ref 0–11)
WBC # BLD: 2.8 E9/L (ref 4.5–11.5)

## 2023-01-18 PROCEDURE — 87635 SARS-COV-2 COVID-19 AMP PRB: CPT

## 2023-01-18 PROCEDURE — 2500000003 HC RX 250 WO HCPCS: Performed by: STUDENT IN AN ORGANIZED HEALTH CARE EDUCATION/TRAINING PROGRAM

## 2023-01-18 PROCEDURE — 80048 BASIC METABOLIC PNL TOTAL CA: CPT

## 2023-01-18 PROCEDURE — 6360000002 HC RX W HCPCS: Performed by: INTERNAL MEDICINE

## 2023-01-18 PROCEDURE — 36430 TRANSFUSION BLD/BLD COMPNT: CPT

## 2023-01-18 PROCEDURE — 86901 BLOOD TYPING SEROLOGIC RH(D): CPT

## 2023-01-18 PROCEDURE — 87502 INFLUENZA DNA AMP PROBE: CPT

## 2023-01-18 PROCEDURE — 86850 RBC ANTIBODY SCREEN: CPT

## 2023-01-18 PROCEDURE — 71045 X-RAY EXAM CHEST 1 VIEW: CPT

## 2023-01-18 PROCEDURE — 96375 TX/PRO/DX INJ NEW DRUG ADDON: CPT

## 2023-01-18 PROCEDURE — 85014 HEMATOCRIT: CPT

## 2023-01-18 PROCEDURE — 85018 HEMOGLOBIN: CPT

## 2023-01-18 PROCEDURE — 85025 COMPLETE CBC W/AUTO DIFF WBC: CPT

## 2023-01-18 PROCEDURE — 1200000000 HC SEMI PRIVATE

## 2023-01-18 PROCEDURE — 6360000002 HC RX W HCPCS: Performed by: STUDENT IN AN ORGANIZED HEALTH CARE EDUCATION/TRAINING PROGRAM

## 2023-01-18 PROCEDURE — A4216 STERILE WATER/SALINE, 10 ML: HCPCS | Performed by: STUDENT IN AN ORGANIZED HEALTH CARE EDUCATION/TRAINING PROGRAM

## 2023-01-18 PROCEDURE — 2580000003 HC RX 258: Performed by: INTERNAL MEDICINE

## 2023-01-18 PROCEDURE — 86900 BLOOD TYPING SEROLOGIC ABO: CPT

## 2023-01-18 PROCEDURE — 94664 DEMO&/EVAL PT USE INHALER: CPT

## 2023-01-18 PROCEDURE — 99285 EMERGENCY DEPT VISIT HI MDM: CPT

## 2023-01-18 PROCEDURE — 36415 COLL VENOUS BLD VENIPUNCTURE: CPT

## 2023-01-18 PROCEDURE — P9073 PLATELETS PHERESIS PATH REDU: HCPCS

## 2023-01-18 PROCEDURE — 94640 AIRWAY INHALATION TREATMENT: CPT

## 2023-01-18 PROCEDURE — P9016 RBC LEUKOCYTES REDUCED: HCPCS

## 2023-01-18 PROCEDURE — C9113 INJ PANTOPRAZOLE SODIUM, VIA: HCPCS | Performed by: STUDENT IN AN ORGANIZED HEALTH CARE EDUCATION/TRAINING PROGRAM

## 2023-01-18 PROCEDURE — 93005 ELECTROCARDIOGRAM TRACING: CPT | Performed by: STUDENT IN AN ORGANIZED HEALTH CARE EDUCATION/TRAINING PROGRAM

## 2023-01-18 PROCEDURE — 96365 THER/PROPH/DIAG IV INF INIT: CPT

## 2023-01-18 PROCEDURE — 86923 COMPATIBILITY TEST ELECTRIC: CPT

## 2023-01-18 PROCEDURE — 6370000000 HC RX 637 (ALT 250 FOR IP): Performed by: INTERNAL MEDICINE

## 2023-01-18 PROCEDURE — 84484 ASSAY OF TROPONIN QUANT: CPT

## 2023-01-18 PROCEDURE — P9035 PLATELET PHERES LEUKOREDUCED: HCPCS

## 2023-01-18 PROCEDURE — 2580000003 HC RX 258: Performed by: STUDENT IN AN ORGANIZED HEALTH CARE EDUCATION/TRAINING PROGRAM

## 2023-01-18 PROCEDURE — 83880 ASSAY OF NATRIURETIC PEPTIDE: CPT

## 2023-01-18 PROCEDURE — 30233R1 TRANSFUSION OF NONAUTOLOGOUS PLATELETS INTO PERIPHERAL VEIN, PERCUTANEOUS APPROACH: ICD-10-PCS | Performed by: SURGERY

## 2023-01-18 RX ORDER — METOPROLOL SUCCINATE 50 MG/1
25 TABLET, EXTENDED RELEASE ORAL 2 TIMES DAILY
Status: ON HOLD | COMMUNITY
End: 2023-01-26 | Stop reason: SDUPTHER

## 2023-01-18 RX ORDER — METOPROLOL SUCCINATE 25 MG/1
25 TABLET, EXTENDED RELEASE ORAL 2 TIMES DAILY
Status: DISCONTINUED | OUTPATIENT
Start: 2023-01-18 | End: 2023-01-26 | Stop reason: HOSPADM

## 2023-01-18 RX ORDER — DEXTROSE AND SODIUM CHLORIDE 5; .9 G/100ML; G/100ML
INJECTION, SOLUTION INTRAVENOUS CONTINUOUS
Status: DISCONTINUED | OUTPATIENT
Start: 2023-01-18 | End: 2023-01-23

## 2023-01-18 RX ORDER — BUDESONIDE 0.5 MG/2ML
0.5 INHALANT ORAL 2 TIMES DAILY
Status: DISCONTINUED | OUTPATIENT
Start: 2023-01-18 | End: 2023-01-26 | Stop reason: HOSPADM

## 2023-01-18 RX ORDER — ATORVASTATIN CALCIUM 20 MG/1
20 TABLET, FILM COATED ORAL DAILY
Status: DISCONTINUED | OUTPATIENT
Start: 2023-01-19 | End: 2023-01-26 | Stop reason: HOSPADM

## 2023-01-18 RX ORDER — LANOLIN ALCOHOL/MO/W.PET/CERES
3 CREAM (GRAM) TOPICAL NIGHTLY
Status: DISCONTINUED | OUTPATIENT
Start: 2023-01-18 | End: 2023-01-26 | Stop reason: HOSPADM

## 2023-01-18 RX ORDER — SUCRALFATE 1 G/1
1 TABLET ORAL
Status: DISCONTINUED | OUTPATIENT
Start: 2023-01-18 | End: 2023-01-26 | Stop reason: HOSPADM

## 2023-01-18 RX ORDER — SODIUM CHLORIDE 9 MG/ML
INJECTION, SOLUTION INTRAVENOUS PRN
Status: COMPLETED | OUTPATIENT
Start: 2023-01-18 | End: 2023-01-18

## 2023-01-18 RX ORDER — GABAPENTIN 100 MG/1
100 CAPSULE ORAL 2 TIMES DAILY
Status: DISCONTINUED | OUTPATIENT
Start: 2023-01-18 | End: 2023-01-26 | Stop reason: HOSPADM

## 2023-01-18 RX ORDER — SODIUM CHLORIDE 9 MG/ML
INJECTION, SOLUTION INTRAVENOUS PRN
Status: DISCONTINUED | OUTPATIENT
Start: 2023-01-18 | End: 2023-01-26 | Stop reason: HOSPADM

## 2023-01-18 RX ORDER — ALBUTEROL SULFATE 0.63 MG/3ML
1 SOLUTION RESPIRATORY (INHALATION) EVERY 6 HOURS PRN
Status: DISCONTINUED | OUTPATIENT
Start: 2023-01-18 | End: 2023-01-26 | Stop reason: HOSPADM

## 2023-01-18 RX ORDER — ONDANSETRON 2 MG/ML
4 INJECTION INTRAMUSCULAR; INTRAVENOUS EVERY 6 HOURS PRN
Status: DISCONTINUED | OUTPATIENT
Start: 2023-01-18 | End: 2023-01-26 | Stop reason: HOSPADM

## 2023-01-18 RX ORDER — PREDNISONE 1 MG/1
5 TABLET ORAL DAILY
Status: DISCONTINUED | OUTPATIENT
Start: 2023-01-19 | End: 2023-01-24

## 2023-01-18 RX ORDER — ARFORMOTEROL TARTRATE 15 UG/2ML
15 SOLUTION RESPIRATORY (INHALATION) 2 TIMES DAILY
Status: DISCONTINUED | OUTPATIENT
Start: 2023-01-18 | End: 2023-01-26 | Stop reason: HOSPADM

## 2023-01-18 RX ADMIN — SODIUM CHLORIDE 80 MG: 9 INJECTION, SOLUTION INTRAMUSCULAR; INTRAVENOUS; SUBCUTANEOUS at 13:41

## 2023-01-18 RX ADMIN — METOPROLOL SUCCINATE 25 MG: 25 TABLET, EXTENDED RELEASE ORAL at 21:48

## 2023-01-18 RX ADMIN — SODIUM CHLORIDE: 9 INJECTION, SOLUTION INTRAVENOUS at 18:12

## 2023-01-18 RX ADMIN — SUCRALFATE 1 G: 1 TABLET ORAL at 21:47

## 2023-01-18 RX ADMIN — ARFORMOTEROL TARTRATE 15 MCG: 15 SOLUTION RESPIRATORY (INHALATION) at 19:29

## 2023-01-18 RX ADMIN — MELATONIN 3 MG ORAL TABLET 3 MG: 3 TABLET ORAL at 21:47

## 2023-01-18 RX ADMIN — DEXTROSE AND SODIUM CHLORIDE: 5; 900 INJECTION, SOLUTION INTRAVENOUS at 20:43

## 2023-01-18 RX ADMIN — IPRATROPIUM BROMIDE 0.5 MG: 0.5 SOLUTION RESPIRATORY (INHALATION) at 19:31

## 2023-01-18 RX ADMIN — Medication 1000 MG: at 13:56

## 2023-01-18 RX ADMIN — GABAPENTIN 100 MG: 100 CAPSULE ORAL at 21:47

## 2023-01-18 RX ADMIN — BUDESONIDE 500 MCG: 0.5 SUSPENSION RESPIRATORY (INHALATION) at 19:30

## 2023-01-18 ASSESSMENT — ENCOUNTER SYMPTOMS
EYE REDNESS: 0
EYE DISCHARGE: 0
ABDOMINAL PAIN: 0
EYE PAIN: 0
NAUSEA: 0
SORE THROAT: 0
COUGH: 0
BACK PAIN: 0
DIARRHEA: 0
VOMITING: 0
WHEEZING: 0
SINUS PRESSURE: 0
SHORTNESS OF BREATH: 1

## 2023-01-18 ASSESSMENT — PAIN SCALES - GENERAL: PAINLEVEL_OUTOF10: 0

## 2023-01-18 ASSESSMENT — PAIN - FUNCTIONAL ASSESSMENT: PAIN_FUNCTIONAL_ASSESSMENT: NONE - DENIES PAIN

## 2023-01-18 NOTE — ED NOTES
Contacted unit spoke with Yuli Randolph sent on fax, patient in transport      [place], [address] [facility]  01/18/23 [id number]

## 2023-01-18 NOTE — ED PROVIDER NOTES
The history is provided by the patient and medical records. 67 male present emergency room: Shortness breath and short of breath for past couple days does elicit having history of lung cancer and gets chemotherapy every Wednesday. Normally wears 4 L oxygen at baseline requiring 6 L at this time EMS reports he was in the mid to low 80s on his home oxygen. He does also elicit having recent blood transfusion at the end of December. He states he was concerned about possible internal bleeding at that time however they were unable to find anything. Otherwise he does elicit occasionally having blood in his stool. Most recently couple days ago. Denies any fevers or abdominal pain no other acute complaints. Review of Systems   Constitutional:  Negative for chills and fever. HENT:  Negative for ear pain, sinus pressure and sore throat. Eyes:  Negative for pain, discharge and redness. Respiratory:  Positive for shortness of breath. Negative for cough and wheezing. Cardiovascular:  Negative for chest pain. Gastrointestinal:  Negative for abdominal pain, diarrhea, nausea and vomiting. Genitourinary:  Negative for dysuria and frequency. Musculoskeletal:  Negative for arthralgias and back pain. Skin:  Negative for rash and wound. Neurological:  Negative for weakness and headaches. Hematological:  Negative for adenopathy. All other systems reviewed and are negative. Physical Exam  Vitals and nursing note reviewed. Constitutional:       Appearance: He is well-developed. HENT:      Head: Normocephalic and atraumatic. Eyes:      Conjunctiva/sclera: Conjunctivae normal.   Cardiovascular:      Rate and Rhythm: Normal rate and regular rhythm. Heart sounds: Normal heart sounds. No murmur heard. Pulmonary:      Effort: Pulmonary effort is normal. No respiratory distress. Breath sounds: Normal breath sounds. No wheezing or rales.    Abdominal:      General: Bowel sounds are normal. Palpations: Abdomen is soft. Tenderness: There is no abdominal tenderness. There is no guarding or rebound. Musculoskeletal:         General: No tenderness or deformity. Cervical back: Normal range of motion and neck supple. Skin:     General: Skin is warm and dry. Neurological:      Mental Status: He is alert and oriented to person, place, and time. Cranial Nerves: No cranial nerve deficit. Coordination: Coordination normal.   Psychiatric:         Mood and Affect: Mood normal.         Thought Content: Thought content normal.        Procedures     MDM     Amount and/or Complexity of Data Reviewed  Clinical lab tests: reviewed  Tests in the radiology section of CPT®: reviewed  Decide to obtain previous medical records or to obtain history from someone other than the patient: yes         ED Course as of 01/20/23 2317   Wed Jan 18, 2023   1209 EKG: This EKG is signed by emergency department physician.     Rate: 52  Rhythm: Sinus  Interpretation: No acute ST elevation or depression sinus rhythm does have second-degree AV block, stable intervals otherwise left axis  Comparison: stable as compared to patient's most recent EKG      [CB]      ED Course User Index  [CB] Tashi Willis MD         Diagnostic results    LABS:    Labs Reviewed   CBC WITH AUTO DIFFERENTIAL - Abnormal; Notable for the following components:       Result Value    WBC 2.8 (*)     RBC 2.19 (*)     Hemoglobin 6.5 (*)     Hematocrit 20.3 (*)     RDW 22.7 (*)     Platelets 14 (*)     Neutrophils % 93.0 (*)     Lymphocytes % 7.0 (*)     Lymphocytes Absolute 0.20 (*)     Monocytes Absolute 0.00 (*)     Eosinophils Absolute 0.00 (*)     All other components within normal limits    Narrative:     CALL  [place]  H34 tel. ,  Hematology results called to and read back by, 01/18/2023 13:20, by [employer]   BASIC METABOLIC PANEL W/ REFLEX TO MG FOR LOW K - Abnormal; Notable for the following components:    Glucose 160 (*)     All other components within normal limits   TROPONIN - Abnormal; Notable for the following components:    Troponin, High Sensitivity 33 (*)     All other components within normal limits   BRAIN NATRIURETIC PEPTIDE - Abnormal; Notable for the following components:    Pro-BNP 1,578 (*)     All other components within normal limits   HEMOGLOBIN AND HEMATOCRIT - Abnormal; Notable for the following components:    Hemoglobin 6.9 (*)     Hematocrit 21.0 (*)     All other components within normal limits    Narrative:     CALL  [place]  H84 tel. ,  Hematology results called to and read back by Jacqui stallings rn, 01/18/2023  18:53, by [facility]   TROPONIN - Abnormal; Notable for the following components:    Troponin, High Sensitivity 37 (*)     All other components within normal limits   BASIC METABOLIC PANEL - Abnormal; Notable for the following components:    CO2 31 (*)     Glucose 110 (*)     All other components within normal limits   CBC - Abnormal; Notable for the following components:    WBC 2.2 (*)     RBC 2.62 (*)     Hemoglobin 7.5 (*)     Hematocrit 22.8 (*)     RDW 20.4 (*)     Platelets 22 (*)     All other components within normal limits   CBC - Abnormal; Notable for the following components:    WBC 3.0 (*)     RBC 2.45 (*)     Hemoglobin 7.3 (*)     Hematocrit 22.5 (*)     RDW 19.5 (*)     Platelets 12 (*)     All other components within normal limits    Narrative:     CALL  [place]  H84 tel. ,  Hematology results called to and read back by Son Duncan RN, 01/20/2023  06:56, by [facility]   CBC WITH AUTO DIFFERENTIAL - Abnormal; Notable for the following components:    WBC 3.2 (*)     RBC 2.77 (*)     Hemoglobin 8.1 (*)     Hematocrit 24.6 (*)     RDW 19.6 (*)     Platelets 13 (*)     Neutrophils % 87.6 (*)     Lymphocytes % 8.4 (*)     Lymphocytes Absolute 0.27 (*)     Eosinophils Absolute 0.01 (*)     All other components within normal limits    Narrative:     CALL  [place]  H84 tel. ,  Hematology results called to and read back by Juana Yanes RN, 01/20/2023  16:26, by [facility]   CBC WITH AUTO DIFFERENTIAL - Abnormal; Notable for the following components:    WBC 3.3 (*)     RBC 2.57 (*)     Hemoglobin 7.6 (*)     Hematocrit 22.8 (*)     RDW 19.5 (*)     Platelets 29 (*)     Neutrophils % 84.7 (*)     Lymphocytes % 9.9 (*)     Lymphocytes Absolute 0.33 (*)     Eosinophils Absolute 0.01 (*)     All other components within normal limits   COVID-19, RAPID   RAPID INFLUENZA A/B ANTIGENS   PLATELET CONFIRMATION   PLATELET CONFIRMATION   BASIC METABOLIC PANEL   PLATELET CONFIRMATION   PLATELET CONFIRMATION   PLATELET CONFIRMATION   BASIC METABOLIC PANEL   CBC   TYPE AND SCREEN   PREPARE RBC (CROSSMATCH)   PREPARE PLATELETS   PREPARE RBC (CROSSMATCH)   PREPARE PLATELETS       As interpreted by me, the above displayed labs are abnormal. All other labs obtained during this visit were within normal range or not returned as of this dictation. EKG Interpretation  Interpreted by emergency department physician, Mary Samayoa MD      See ed course        RADIOLOGY:   Non-plain film images such as CT, Ultrasound and MRI are read by the radiologist. Ji Baron radiographic images are visualized and preliminarily interpreted by the ED Provider with the below findings:    Interpretation per the Radiologist below, if available at the time of this note:    XR CHEST PORTABLE   Final Result   Left pleural effusion with probable left upper lobe lingular segment   atelectasis. No results found. No results found.     MDM    History From: the patient    CONSULTS: (Who and What was discussed)  IP CONSULT TO INTERNAL MEDICINE  IP CONSULT TO GENERAL SURGERY  IP CONSULT TO GENERAL SURGERY  IP CONSULT TO ONCOLOGY      Social Determinants of Health : None    Chronic Conditions affecting care:    has a past medical history of CHB (complete heart block) ([facility] [address] (07/2021), COPD (chronic obstructive pulmonary disease) ([facility] [address] Hypertension, NSCLC of left lung ([address] (05/27/2021), Pulmonary emboli ([facility] [address] (09/17/2021), Syncope (09/2020), and Syncope (07/2021). Records Reviewed( Source) none    CC/HPI Summary, DDx, ED Course, and Reassessment:   Differential diagnosis including but not limited to GI bleed, pneumonia  67 male present emergency department complaint of shortness of breath fatigue. Patient does elicit blood in the stool over the past several days. His hemoglobin is noted to be significantly low at 6.5 as well as platelets at 14. He was given transfusion of both packed red blood cells as well as platelets. BMP showed stable electrolytes and kidney function, influenza and COVID test negative. No evidence of pneumothorax on his chest x-ray otherwise agreed with radiologist and pleural effusions. Patient will be admitted in the hospital for his GI bleed requiring transfusions at this time he is agreeable this plan.     Disposition Considerations (Tests not ordered but considered, Shared Decision Making, Pt Expectation of Test or Tx.): patient safe to be discharged home       Medications   0.9 % sodium chloride infusion (has no administration in time range)   albuterol (ACCUNEB) nebulizer solution 0.63 mg (0.63 mg Nebulization Given 1/20/23 0820)   apixaban (ELIQUIS) tablet 5 mg ( Oral Automatically Held 1/24/23 2100)   atorvastatin (LIPITOR) tablet 20 mg (20 mg Oral Given 1/20/23 0811)   gabapentin (NEURONTIN) capsule 100 mg (100 mg Oral Given 1/20/23 2137)   melatonin tablet 3 mg (3 mg Oral Given 1/20/23 2137)   metoprolol succinate (TOPROL XL) extended release tablet 25 mg (25 mg Oral Given 1/20/23 2137)   predniSONE (DELTASONE) tablet 5 mg (5 mg Oral Given 1/20/23 0810)   sucralfate (CARAFATE) tablet 1 g (1 g Oral Given 1/20/23 2137)   ondansetron (ZOFRAN) injection 4 mg (has no administration in time range)   dextrose 5 % and 0.9 % sodium chloride infusion ( IntraVENous New Bag 1/19/23 0230)   budesonide (PULMICORT) nebulizer suspension 500 mcg (500 mcg Nebulization Not Given 1/20/23 2040)     And   arformoterol tartrate (BROVANA) nebulizer solution 15 mcg (15 mcg Nebulization Not Given 1/20/23 2041)     And   ipratropium (ATROVENT) 0.02 % nebulizer solution 0.5 mg (0.5 mg Nebulization Not Given 1/20/23 2041)   pantoprazole (PROTONIX) 40 mg in sodium chloride (PF) 0.9 % 10 mL injection (40 mg IntraVENous Given 1/20/23 1533)   0.9 % sodium chloride infusion (has no administration in time range)   calcium gluconate 1000 mg in dextrose 5% 100 mL IVPB (0 mg IntraVENous Stopped 1/18/23 1423)   0.9 % sodium chloride infusion (0 mL/hr IntraVENous Stopped 1/18/23 2147)         I am the primary provider of record      ED Course as of 01/20/23 2317   Wed Jan 18, 2023   1209 EKG: This EKG is signed by emergency department physician. Rate: 52  Rhythm: Sinus  Interpretation: No acute ST elevation or depression sinus rhythm does have second-degree AV block, stable intervals otherwise left axis  Comparison: stable as compared to patient's most recent EKG      [CB]      ED Course User Index  [CB] Shanon Cueto MD       --------------------------------------------- PAST HISTORY ---------------------------------------------  Past Medical History:  has a past medical history of CHB (complete heart block) ([facility] [address] COPD (chronic obstructive pulmonary disease) ([facility] [address] Hypertension, NSCLC of left lung ([facility] [address] Pulmonary emboli ([facility] [address] Syncope, and Syncope. Past Surgical History:  has a past surgical history that includes Colonoscopy; hernia repair (Bilateral, 2011 2001); bronchoscopy (N/A, 05/27/2021); bronchoscopy (N/A, 05/27/2021); bronchoscopy (N/A, 05/27/2021); Pacemaker insertion (07/20/2021); Port Surgery (Right, 08/20/2022); transesophageal echocardiogram (08/22/2022); Pacemaker Change (N/A, 08/25/2022); Cardiac catheterization (N/A, 08/25/2022); and Ventricular cardiac pacer insertion (N/A, 08/29/2022). Social History:  reports that he quit smoking about 11 years ago.  His smoking use included cigarettes. He has a 50.00 pack-year smoking history. He has been exposed to tobacco smoke. He has never used smokeless tobacco. He reports that he does not currently use alcohol after a past usage of about 1.0 standard drink per week. He reports that he does not use drugs. Family History: family history includes Cancer in his father and paternal uncle. The patients home medications have been reviewed. Allergies: Patient has no known allergies.     -------------------------------------------------- RESULTS -------------------------------------------------    LABS:  Results for orders placed or performed during the hospital encounter of 01/18/23   COVID-19, Rapid    Specimen: Nasopharyngeal Swab   Result Value Ref Range    SARS-CoV-2, NAAT Not Detected Not Detected   RAPID INFLUENZA A/B ANTIGENS    Specimen: Nasopharyngeal   Result Value Ref Range    Influenza A by PCR Not Detected Not Detected    Influenza B by PCR Not Detected Not Detected   CBC with Auto Differential   Result Value Ref Range    WBC 2.8 (L) 4.5 - 11.5 E9/L    RBC 2.19 (L) 3.80 - 5.80 E12/L    Hemoglobin 6.5 (LL) 12.5 - 16.5 g/dL    Hematocrit 20.3 (L) 37.0 - 54.0 %    MCV 92.7 80.0 - 99.9 fL    MCH 29.7 26.0 - 35.0 pg    MCHC 32.0 32.0 - 34.5 %    RDW 22.7 (H) 11.5 - 15.0 fL    Platelets 14 (LL) 339 - 450 E9/L    MPV NOT CALC 7.0 - 12.0 fL    Neutrophils % 93.0 (H) 43.0 - 80.0 %    Lymphocytes % 7.0 (L) 20.0 - 42.0 %    Monocytes % 2.1 2.0 - 12.0 %    Eosinophils % 0.4 0.0 - 6.0 %    Basophils % 0.0 0.0 - 2.0 %    Neutrophils Absolute 2.60 1.80 - 7.30 E9/L    Lymphocytes Absolute 0.20 (L) 1.50 - 4.00 E9/L    Monocytes Absolute 0.00 (L) 0.10 - 0.95 E9/L    Eosinophils Absolute 0.00 (L) 0.05 - 0.50 E9/L    Basophils Absolute 0.00 0.00 - 0.20 E9/L    nRBC 0.9 /100 WBC    Anisocytosis 1+     Polychromasia 2+     Poikilocytes 1+     Ovalocytes 2+     Stomatocytes 1+     Tear Drop Cells 1+    Basic Metabolic Panel w/ Reflex to MG   Result Value Ref Range    Sodium 143 132 - 146 mmol/L    Potassium reflex Magnesium 4.2 3.5 - 5.0 mmol/L    Chloride 103 98 - 107 mmol/L    CO2 24 22 - 29 mmol/L    Anion Gap 16 7 - 16 mmol/L    Glucose 160 (H) 74 - 99 mg/dL    BUN 21 6 - 23 mg/dL    Creatinine 0.9 0.7 - 1.2 mg/dL    Est, Glom Filt Rate >60 >=60 mL/min/1.73    Calcium 9.9 8.6 - 10.2 mg/dL   Troponin   Result Value Ref Range    Troponin, High Sensitivity 33 (H) 0 - 11 ng/L   Brain Natriuretic Peptide   Result Value Ref Range    Pro-BNP 1,578 (H) 0 - 125 pg/mL   Platelet Confirmation   Result Value Ref Range    Platelet Confirmation CONFIRMED    Hemoglobin and Hematocrit   Result Value Ref Range    Hemoglobin 6.9 (LL) 12.5 - 16.5 g/dL    Hematocrit 21.0 (L) 37.0 - 54.0 %   Troponin   Result Value Ref Range    Troponin, High Sensitivity 37 (H) 0 - 11 ng/L   Basic Metabolic Panel   Result Value Ref Range    Sodium 141 132 - 146 mmol/L    Potassium 3.9 3.5 - 5.0 mmol/L    Chloride 103 98 - 107 mmol/L    CO2 31 (H) 22 - 29 mmol/L    Anion Gap 7 7 - 16 mmol/L    Glucose 110 (H) 74 - 99 mg/dL    BUN 19 6 - 23 mg/dL    Creatinine 0.8 0.7 - 1.2 mg/dL    Est, Glom Filt Rate >60 >=60 mL/min/1.73    Calcium 9.1 8.6 - 10.2 mg/dL   CBC   Result Value Ref Range    WBC 2.2 (L) 4.5 - 11.5 E9/L    RBC 2.62 (L) 3.80 - 5.80 E12/L    Hemoglobin 7.5 (L) 12.5 - 16.5 g/dL    Hematocrit 22.8 (L) 37.0 - 54.0 %    MCV 87.0 80.0 - 99.9 fL    MCH 28.6 26.0 - 35.0 pg    MCHC 32.9 32.0 - 34.5 %    RDW 20.4 (H) 11.5 - 15.0 fL    Platelets 22 (L) 649 - 450 E9/L    MPV 9.0 7.0 - 12.0 fL   Platelet Confirmation   Result Value Ref Range    Platelet Confirmation CONFIRMED    Basic Metabolic Panel   Result Value Ref Range    Sodium 140 132 - 146 mmol/L    Potassium 3.9 3.5 - 5.0 mmol/L    Chloride 106 98 - 107 mmol/L    CO2 27 22 - 29 mmol/L    Anion Gap 7 7 - 16 mmol/L    Glucose 90 74 - 99 mg/dL    BUN 15 6 - 23 mg/dL    Creatinine 0.8 0.7 - 1.2 mg/dL    Est, Glom Filt Rate >60 >=60 mL/min/1.73    Calcium 9.0 8.6 - 10.2 mg/dL   CBC   Result Value Ref Range    WBC 3.0 (L) 4.5 - 11.5 E9/L    RBC 2.45 (L) 3.80 - 5.80 E12/L    Hemoglobin 7.3 (L) 12.5 - 16.5 g/dL    Hematocrit 22.5 (L) 37.0 - 54.0 %    MCV 91.8 80.0 - 99.9 fL    MCH 29.8 26.0 - 35.0 pg    MCHC 32.4 32.0 - 34.5 %    RDW 19.5 (H) 11.5 - 15.0 fL    Platelets 12 (LL) 537 - 450 E9/L    MPV NOT CALC 7.0 - 12.0 fL   Platelet Confirmation   Result Value Ref Range    Platelet Confirmation CONFIRMED    CBC with Auto Differential   Result Value Ref Range    WBC 3.2 (L) 4.5 - 11.5 E9/L    RBC 2.77 (L) 3.80 - 5.80 E12/L    Hemoglobin 8.1 (L) 12.5 - 16.5 g/dL    Hematocrit 24.6 (L) 37.0 - 54.0 %    MCV 88.8 80.0 - 99.9 fL    MCH 29.2 26.0 - 35.0 pg    MCHC 32.9 32.0 - 34.5 %    RDW 19.6 (H) 11.5 - 15.0 fL    Platelets 13 (LL) 369 - 450 E9/L    MPV NOT CALC 7.0 - 12.0 fL    Neutrophils % 87.6 (H) 43.0 - 80.0 %    Immature Granulocytes % 0.3 0.0 - 5.0 %    Lymphocytes % 8.4 (L) 20.0 - 42.0 %    Monocytes % 3.4 2.0 - 12.0 %    Eosinophils % 0.3 0.0 - 6.0 %    Basophils % 0.0 0.0 - 2.0 %    Neutrophils Absolute 2.83 1.80 - 7.30 E9/L    Immature Granulocytes # 0.01 E9/L    Lymphocytes Absolute 0.27 (L) 1.50 - 4.00 E9/L    Monocytes Absolute 0.11 0.10 - 0.95 E9/L    Eosinophils Absolute 0.01 (L) 0.05 - 0.50 E9/L    Basophils Absolute 0.00 0.00 - 0.20 E9/L    Anisocytosis 1+     Poikilocytes 1+     Schistocytes 1+     Ovalocytes 1+     Tear Drop Cells 1+    Platelet Confirmation   Result Value Ref Range    Platelet Confirmation CONFIRMED    CBC with Auto Differential   Result Value Ref Range    WBC 3.3 (L) 4.5 - 11.5 E9/L    RBC 2.57 (L) 3.80 - 5.80 E12/L    Hemoglobin 7.6 (L) 12.5 - 16.5 g/dL    Hematocrit 22.8 (L) 37.0 - 54.0 %    MCV 88.7 80.0 - 99.9 fL    MCH 29.6 26.0 - 35.0 pg    MCHC 33.3 32.0 - 34.5 %    RDW 19.5 (H) 11.5 - 15.0 fL    Platelets 29 (L) 178 - 450 E9/L    MPV 10.3 7.0 - 12.0 fL    Neutrophils % 84.7 (H) 43.0 - 80.0 % Immature Granulocytes % 0.3 0.0 - 5.0 %    Lymphocytes % 9.9 (L) 20.0 - 42.0 %    Monocytes % 4.8 2.0 - 12.0 %    Eosinophils % 0.3 0.0 - 6.0 %    Basophils % 0.0 0.0 - 2.0 %    Neutrophils Absolute 2.82 1.80 - 7.30 E9/L    Immature Granulocytes # 0.01 E9/L    Lymphocytes Absolute 0.33 (L) 1.50 - 4.00 E9/L    Monocytes Absolute 0.16 0.10 - 0.95 E9/L    Eosinophils Absolute 0.01 (L) 0.05 - 0.50 E9/L    Basophils Absolute 0.00 0.00 - 0.20 E9/L   Platelet Confirmation   Result Value Ref Range    Platelet Confirmation CONFIRMED    EKG 12 Lead   Result Value Ref Range    Ventricular Rate 52 BPM    Atrial Rate 92 BPM    QRS Duration 160 ms    Q-T Interval 510 ms    QTc Calculation (Bazett) 474 ms    P Axis 40 degrees    R Axis -88 degrees    T Axis 22 degrees   TYPE AND SCREEN   Result Value Ref Range    ABO/Rh O NEG     Antibody Screen NEG    PREPARE RBC (CROSSMATCH), 1 Units   Result Value Ref Range    Product Code Blood Bank [id number]     Description Blood Bank Red Blood Cells, Leuko-reduced     Unit Number [id number]     Dispense Status Blood Bank transfused    PREPARE PLATELETS, 1 Product   Result Value Ref Range    Product Code Blood Bank [id number]     Description Blood Bank      Unit Number [id number]     Dispense Status Blood Bank transfused    PREPARE RBC (CROSSMATCH), 1 Units   Result Value Ref Range    Product Code Blood Bank [id number]     Description Blood Bank Red Blood Cells, Leuko-reduced     Unit Number [id number]     Dispense Status Blood Bank transfused    PREPARE PLATELETS, 1 Product   Result Value Ref Range    Product Code Blood Bank [id number]     Description Blood Bank [address]     Unit Number [id number]     Dispense Status Blood Bank released     Product Code Blood Bank [id number]     Description Blood Bank      Unit Number [id number]     Dispense Status Blood Bank issued        RADIOLOGY:  XR CHEST PORTABLE   Final Result   Left pleural effusion with probable left upper lobe lingular segment   atelectasis.                 ------------------------- NURSING NOTES AND VITALS REVIEWED ---------------------------  Date / Time Roomed:  1/18/2023 12:01 PM  ED Bed Assignment:  [id number]    The nursing notes within the ED encounter and vital signs as below have been reviewed. Patient Vitals for the past 24 hrs:   BP Temp Temp src Pulse Resp SpO2   01/20/23 2130 (!) 154/48 97.2 °F (36.2 °C) Temporal 51 18 97 %   01/20/23 1558 -- -- -- 65 18 95 %   01/20/23 1551 (!) 152/51 98.3 °F (36.8 °C) Oral 54 16 99 %   01/20/23 1544 (!) 147/71 98.5 °F (36.9 °C) Oral (!) 49 16 100 %   01/20/23 1520 (!) 152/56 98.7 °F (37.1 °C) Oral 52 16 96 %   01/20/23 1501 (!) 179/65 -- -- (!) 49 18 100 %   01/20/23 1210 -- -- -- 68 18 99 %   01/20/23 0818 -- -- -- 57 18 99 %   01/20/23 0800 (!) 124/58 -- Oral 65 16 100 %       Oxygen Saturation Interpretation: on nasal canula    ------------------------------------------ PROGRESS NOTES ------------------------------------------  Re-evaluation(s):  Time: 1300  Patients symptoms show no change  Repeat physical examination is not changed    Counseling:  I have spoken with the patient and discussed todays results, in addition to providing specific details for the plan of care and counseling regarding the diagnosis and prognosis. Their questions are answered at this time and they are agreeable with the plan of admission.    --------------------------------- ADDITIONAL PROVIDER NOTES ---------------------------------  Consultations:  Spoke with hospitalist.  Discussed case. They will admit the patient. This patient's ED course included: a personal history and physicial examination, re-evaluation prior to disposition, multiple bedside re-evaluations, IV medications, cardiac monitoring, continuous pulse oximetry, and complex medical decision making and emergency management    This patient has remained hemodynamically stable during their ED course. Diagnosis:  1. Gastrointestinal hemorrhage, unspecified gastrointestinal hemorrhage type    2. Acute blood loss anemia        Disposition:  Patient's disposition: Admit to telemetry  Patient's condition is stable.          Shawna Eldridge MD  Resident  01/20/23 9819

## 2023-01-18 NOTE — ED NOTES
Contacted lab for results they stated they are not sure when they will be resulted they have so many things running, they will check and get back to us with an Shayy Paez, MARKO  01/18/23 6006

## 2023-01-18 NOTE — ED NOTES
Contacted lab to ensure a unit is repaired and ready to  staff rude on phone questioning why we would be calling asking if blood is ready.  Communication was disrespectful and unnecessary to a fellow member of [facility] medical team.      Sofya Chanel RN  01/18/23 6472

## 2023-01-18 NOTE — ED NOTES
Communicated to charge nurse Gavin Young, that assistance is needed on unit to send float for Blood  for administration      Caty Montes, MARKO  01/18/23 7498

## 2023-01-19 ENCOUNTER — TELEPHONE (OUTPATIENT)
Dept: NON INVASIVE DIAGNOSTICS | Age: 73
End: 2023-01-19

## 2023-01-19 LAB
ANION GAP SERPL CALCULATED.3IONS-SCNC: 7 MMOL/L (ref 7–16)
BUN BLDV-MCNC: 19 MG/DL (ref 6–23)
CALCIUM SERPL-MCNC: 9.1 MG/DL (ref 8.6–10.2)
CHLORIDE BLD-SCNC: 103 MMOL/L (ref 98–107)
CO2: 31 MMOL/L (ref 22–29)
CREAT SERPL-MCNC: 0.8 MG/DL (ref 0.7–1.2)
EKG ATRIAL RATE: 92 BPM
EKG P AXIS: 40 DEGREES
EKG Q-T INTERVAL: 510 MS
EKG QRS DURATION: 160 MS
EKG QTC CALCULATION (BAZETT): 474 MS
EKG R AXIS: -88 DEGREES
EKG T AXIS: 22 DEGREES
EKG VENTRICULAR RATE: 52 BPM
GFR SERPL CREATININE-BSD FRML MDRD: >60 ML/MIN/1.73
GLUCOSE BLD-MCNC: 110 MG/DL (ref 74–99)
HCT VFR BLD CALC: 22.8 % (ref 37–54)
HEMOGLOBIN: 7.5 G/DL (ref 12.5–16.5)
MCH RBC QN AUTO: 28.6 PG (ref 26–35)
MCHC RBC AUTO-ENTMCNC: 32.9 % (ref 32–34.5)
MCV RBC AUTO: 87 FL (ref 80–99.9)
PDW BLD-RTO: 20.4 FL (ref 11.5–15)
PLATELET # BLD: 22 E9/L (ref 130–450)
PLATELET CONFIRMATION: NORMAL
PMV BLD AUTO: 9 FL (ref 7–12)
POTASSIUM SERPL-SCNC: 3.9 MMOL/L (ref 3.5–5)
RBC # BLD: 2.62 E12/L (ref 3.8–5.8)
SODIUM BLD-SCNC: 141 MMOL/L (ref 132–146)
WBC # BLD: 2.2 E9/L (ref 4.5–11.5)

## 2023-01-19 PROCEDURE — 85027 COMPLETE CBC AUTOMATED: CPT

## 2023-01-19 PROCEDURE — 6360000002 HC RX W HCPCS: Performed by: INTERNAL MEDICINE

## 2023-01-19 PROCEDURE — 97535 SELF CARE MNGMENT TRAINING: CPT

## 2023-01-19 PROCEDURE — 36415 COLL VENOUS BLD VENIPUNCTURE: CPT

## 2023-01-19 PROCEDURE — 93010 ELECTROCARDIOGRAM REPORT: CPT | Performed by: INTERNAL MEDICINE

## 2023-01-19 PROCEDURE — C9113 INJ PANTOPRAZOLE SODIUM, VIA: HCPCS

## 2023-01-19 PROCEDURE — 99223 1ST HOSP IP/OBS HIGH 75: CPT | Performed by: SURGERY

## 2023-01-19 PROCEDURE — 6360000002 HC RX W HCPCS

## 2023-01-19 PROCEDURE — 2580000003 HC RX 258: Performed by: INTERNAL MEDICINE

## 2023-01-19 PROCEDURE — 1200000000 HC SEMI PRIVATE

## 2023-01-19 PROCEDURE — 80048 BASIC METABOLIC PNL TOTAL CA: CPT

## 2023-01-19 PROCEDURE — 2580000003 HC RX 258

## 2023-01-19 PROCEDURE — 2700000000 HC OXYGEN THERAPY PER DAY

## 2023-01-19 PROCEDURE — 94640 AIRWAY INHALATION TREATMENT: CPT

## 2023-01-19 PROCEDURE — A4216 STERILE WATER/SALINE, 10 ML: HCPCS

## 2023-01-19 PROCEDURE — 97530 THERAPEUTIC ACTIVITIES: CPT

## 2023-01-19 PROCEDURE — 6370000000 HC RX 637 (ALT 250 FOR IP): Performed by: INTERNAL MEDICINE

## 2023-01-19 PROCEDURE — 97161 PT EVAL LOW COMPLEX 20 MIN: CPT

## 2023-01-19 PROCEDURE — 97165 OT EVAL LOW COMPLEX 30 MIN: CPT

## 2023-01-19 RX ADMIN — PREDNISONE 5 MG: 5 TABLET ORAL at 08:10

## 2023-01-19 RX ADMIN — ARFORMOTEROL TARTRATE 15 MCG: 15 SOLUTION RESPIRATORY (INHALATION) at 20:07

## 2023-01-19 RX ADMIN — GABAPENTIN 100 MG: 100 CAPSULE ORAL at 08:10

## 2023-01-19 RX ADMIN — ATORVASTATIN CALCIUM 20 MG: 20 TABLET, FILM COATED ORAL at 08:10

## 2023-01-19 RX ADMIN — MELATONIN 3 MG ORAL TABLET 3 MG: 3 TABLET ORAL at 20:26

## 2023-01-19 RX ADMIN — BUDESONIDE 500 MCG: 0.5 SUSPENSION RESPIRATORY (INHALATION) at 20:07

## 2023-01-19 RX ADMIN — IPRATROPIUM BROMIDE 0.5 MG: 0.5 SOLUTION RESPIRATORY (INHALATION) at 09:29

## 2023-01-19 RX ADMIN — SUCRALFATE 1 G: 1 TABLET ORAL at 20:26

## 2023-01-19 RX ADMIN — SUCRALFATE 1 G: 1 TABLET ORAL at 11:13

## 2023-01-19 RX ADMIN — ARFORMOTEROL TARTRATE 15 MCG: 15 SOLUTION RESPIRATORY (INHALATION) at 09:29

## 2023-01-19 RX ADMIN — BUDESONIDE 500 MCG: 0.5 SUSPENSION RESPIRATORY (INHALATION) at 09:29

## 2023-01-19 RX ADMIN — IPRATROPIUM BROMIDE 0.5 MG: 0.5 SOLUTION RESPIRATORY (INHALATION) at 20:07

## 2023-01-19 RX ADMIN — SODIUM CHLORIDE, PRESERVATIVE FREE 40 MG: 5 INJECTION INTRAVENOUS at 12:41

## 2023-01-19 RX ADMIN — METOPROLOL SUCCINATE 25 MG: 25 TABLET, EXTENDED RELEASE ORAL at 20:26

## 2023-01-19 RX ADMIN — METOPROLOL SUCCINATE 25 MG: 25 TABLET, EXTENDED RELEASE ORAL at 08:10

## 2023-01-19 RX ADMIN — IPRATROPIUM BROMIDE 0.5 MG: 0.5 SOLUTION RESPIRATORY (INHALATION) at 16:08

## 2023-01-19 RX ADMIN — GABAPENTIN 100 MG: 100 CAPSULE ORAL at 20:26

## 2023-01-19 RX ADMIN — SUCRALFATE 1 G: 1 TABLET ORAL at 18:07

## 2023-01-19 RX ADMIN — SODIUM CHLORIDE, PRESERVATIVE FREE 40 MG: 5 INJECTION INTRAVENOUS at 02:28

## 2023-01-19 RX ADMIN — DEXTROSE AND SODIUM CHLORIDE: 5; 900 INJECTION, SOLUTION INTRAVENOUS at 02:30

## 2023-01-19 RX ADMIN — SUCRALFATE 1 G: 1 TABLET ORAL at 05:00

## 2023-01-19 ASSESSMENT — PAIN SCALES - GENERAL: PAINLEVEL_OUTOF10: 0

## 2023-01-19 NOTE — CARE COORDINATION
Patient with a history of anemia, COPD, lung CA, and pacer. He is admitted with Acute on chronic anemia likely exacerbated by upper GI source in the setting of pancytopenia with a platelet count of 14. Pt lives with wife in a 1 story home with 2 stairs to enter and 0 rail. Bed is on 1 floor and bath is on 1 floor. Shower is in basement with 1 flight and 1 rail to access. Pt ambulated with no AD PTA. Pt owns [employer] and cane. Pt is on 3L/min O2 during the day and 4L/min O2 at night at baseline. Pt is active with outpatient PT. Currently patient is on 4 LN/C, with O2 sat of 98%. I met with patient in room to discuss transition of care. Patient stated he lives with wife Jenny Segura, and attends outpatient therapy, which he plans to continue after discharge. Wife Jenny Segura will transport home after discharge.   Electronically signed by Haley Zhang RN on 1/19/2023 at 4:10 PM

## 2023-01-19 NOTE — TELEPHONE ENCOUNTER
----- Message from [facility] sent at 1/19/2023  1:25 PM EST -----  Patients wife calling wanting to check to see if his remote transmission came over. It was the first time the sent. Thank you! Message left on home answering machine of successful remote transmission from the 17th. Instructed them to leave remote plugged in at all times and to call us back with any questions or concerns. Patient is currently hospitalized with a GI Bleed.

## 2023-01-19 NOTE — H&P
Hospital Medicine History & Physical      PCP: Joyce Malcolm MD    Date of Admission: 1/18/2023    Date of Service: . JAN 19, 2023    Chief Complaint:  WEAKNESS      History Of Present Illness:     67 y.o. male presented with WEAKNESS,  FOUND TO HAVE A HGB OF 6.9 AND   LOW PLATELETS OF 14.  HE WAS  TRANSFUSED PLATELETS, AND BLOOD IN THE ER.  HE HAS A KNOWN HISTORY OF NON SMALL CELL LUNG CANCER DIAGNOSED SEVERAL YEARS AGO, AND WAS ON CHEMO AND RADIATION AT THAT TIME. THE TUMOR SHRUNK , HE STOOPED THE CHEMO, AND IT CAME BACK. HE RESTARTED CHEMO , LAST RECEIVING IT 3 WEEKS AGO. HE DOES ADMIT TO SEEING BRIGHT RED RECTAL BLEEDING . Past Medical History:          Diagnosis Date    CHB (complete heart block) ([facility] [address] 07/2021    COPD (chronic obstructive pulmonary disease) (HCC)     Hypertension     NSCLC of left lung ([facility] [address] 05/27/2021    SCC per biopsy. Pulmonary emboli ([facility] [address] 09/17/2021    Anticoagulation with Eliquis.     Syncope 09/2020    Syncope 07/2021       Past Surgical History:          Procedure Laterality Date    BRONCHOSCOPY N/A 05/27/2021    BRONCHOSCOPY W/EBUS FNA performed by Garo Méndez MD at [address] [facility] N/A 05/27/2021    BRONCHOSCOPY DIAGNOSTIC OR CELL [address] ONLY performed by Garo Méndez MD at [address] [facility] N/A 05/27/2021    BRONCHOSCOPY ADD ON COMPUTER ASSISTED performed by Garo Méndez MD at [address] [facility] N/A 08/25/2022    CARDIAC LASER LEAD EXCHANGE performed by Phong Hill MD at [address] [facility] [address] Bilateral 2011 2001    groin     PACEMAKER CHANGE N/A 08/25/2022    CARDIAC LASER LEAD EXTRACTION, LASER LEAD EXTRACTION LEFT SIDED DEVICE, PACEMAKER DEVICE EXTRACTION performed by Phogn Hill MD at [address]  07/20/2021    DUAL PPM  (ABDULLAHI)  DR. Griffin Olsen    PORT SURGERY Right 08/20/2022    PORT REMOVAL performed by Deandra Keating MD at [address]    TRANSESOPHAGEAL ECHOCARDIOGRAM  08/22/2022    Dr Judy Pack N/A 08/29/2022    Medtronic Micra AV Leadless PPM Insertion (Dr. Ella Ramos)       Medications Prior to Admission:      Prior to Admission medications    Medication Sig Start Date End Date Taking? Authorizing Provider   apixaban (ELIQUIS) 5 MG TABS tablet Take 5 mg by mouth 2 times daily   Yes Historical Provider, MD   metoprolol succinate (TOPROL XL) 50 MG extended release tablet Take 25 mg by mouth 2 times daily   Yes Historical Provider, MD   OXYGEN Inhale 4 L into the lungs nightly   Yes Historical Provider, MD   pantoprazole (PROTONIX) 40 MG tablet Take 1 tablet by mouth in the morning and 1 tablet in the evening. 12/30/22   Bello Child MD   sucralfate (CARAFATE) 1 GM tablet Take 1 tablet by mouth 4 times daily (before meals and nightly) 12/30/22   Bello Child MD   atorvastatin (LIPITOR) 20 MG tablet Take 20 mg by mouth daily    Historical Provider, MD   Multiple Vitamin (MULTIVITAMIN ADULT PO) Take 1 tablet by mouth daily    Historical Provider, MD   Ascorbic Acid (VITAMIN C) 250 MG tablet Take 250 mg by mouth daily    Historical Provider, MD   OXYGEN Inhale 3 L/min into the lungs daily    Historical Provider, MD   Fluticasone-Umeclidin-Vilant (TRELEGY ELLIPTA) 200-62.5-25 MCG/INH AEPB Inhale 1 puff into the lungs daily 7/11/22   Historical Provider, MD   gabapentin (NEURONTIN) 100 MG capsule Take 100 mg by mouth in the morning and at bedtime.     Historical Provider, MD   melatonin 3 MG TABS tablet Take 3 mg by mouth nightly    Historical Provider, MD   potassium chloride (MICRO-K) 10 MEQ extended release capsule Take 10 mEq by mouth daily    Historical Provider, MD   furosemide (LASIX) 20 MG tablet Take 1 tablet by mouth daily 9/4/22   Bello Child MD   predniSONE (DELTASONE) 5 MG tablet Take 5 mg by mouth daily    Lyndsay Szymanski Aaron Rockwell MD   albuterol (ACCUNEB) 0.63 MG/3ML nebulizer solution Take 1 ampule by nebulization every 6 hours as needed for Wheezing    Historical Provider, MD   albuterol sulfate HFA (PROVENTIL;VENTOLIN;PROAIR) 108 (90 Base) MCG/ACT inhaler Inhale 2 puffs into the lungs every 6 hours as needed for Wheezing    Historical Provider, MD   simvastatin (ZOCOR) 40 MG tablet Take 40 mg by mouth daily   9/3/22  Historical Provider, MD       Allergies:  Patient has no known allergies. Social History:    The patient currently lives  WITH HIS WIFE    TOBACCO:   reports that he quit smoking about 11 years ago. His smoking use included cigarettes. He has a 50.00 pack-year smoking history. He has been exposed to tobacco smoke. He has never used smokeless tobacco.  ETOH:   reports that he does not currently use alcohol after a past usage of about 1.0 standard drink per week. Family History:       Reviewed in detail and negative for DM, CAD, Cancer, CVA. Positive as follows:        Problem Relation Age of Onset    Cancer Father         prostate    Cancer Paternal Uncle         anal       REVIEW OF SYSTEMS:   Pertinent positives as noted in the HPI. All other systems reviewed and negative. PHYSICAL EXAM:    BP (!) 141/63   Pulse 55   Temp 97.9 °F (36.6 °C) (Oral)   Resp 28   Ht 5' 6\" (1.676 m)   Wt 174 lb 9.6 oz (79.2 kg)   SpO2 98%   BMI 28.18 kg/m²     General appearance:  No apparent distress, appears stated age. HEENT:  Normal cephalic, atraumatic without obvious deformity. Pupils equal, round, and reactive to light. Extra ocular muscles intact. Conjunctivae/corneas clear. Neck: Supple, with full range of motion. No jugular venous distention. Trachea midline. Respiratory:  Normal respiratory effort. Clear to auscultation,   Cardiovascular:  RRRR  Abdomen: Soft, non-tender, non-distended with normal bowel sounds. Musculoskeletal:  No clubbing, cyanosis or edema bilaterally.     Skin: smooth and dry Neurologic:   Cranial nerves: II-XII intact,   Psychiatric:  Alert and oriented x 3        Labs:     Recent Labs     01/18/23  1217 01/18/23  1833 01/19/23  0230   WBC 2.8*  --  2.2*   HGB 6.5* 6.9* 7.5*   HCT 20.3* 21.0* 22.8*   PLT 14*  --  22*     Recent Labs     01/18/23  1217 01/19/23  0230    141   K 4.2 3.9    103   CO2 24 31*   BUN 21 19   CREATININE 0.9 0.8   CALCIUM 9.9 9.1     No results for input(s): AST, ALT, BILIDIR, BILITOT, ALKPHOS in the last 72 hours. No results for input(s): INR in the last 72 hours. No results for input(s): Kayleigh Campos in the last 72 hours. Urinalysis:      Lab Results   Component Value Date/Time    NITRU Negative 08/16/2022 05:18 PM    WBCUA 2-5 08/16/2022 05:18 PM    BACTERIA FEW 08/16/2022 05:18 PM    RBCUA >20 08/16/2022 05:18 PM    BLOODU LARGE 08/16/2022 05:18 PM    SPECGRAV <=1.005 08/16/2022 05:18 PM    GLUCOSEU Negative 08/16/2022 05:18 PM       Radiology:           XR CHEST PORTABLE   Final Result   Left pleural effusion with probable left upper lobe lingular segment   atelectasis. ASSESSMENT:    Active Hospital Problems    Diagnosis Date Noted    GIB (gastrointestinal bleeding) [K92.2] 01/18/2023     Priority: Medium   NON SMALL CELL LUNG CANCER   H/O DVT AND PE  HLD      PLAN:  MONITOR LLAB   HEMATOLOGY CONSULTED   LIPITOR      DVT Prophylaxis: ELIQUIS  Diet: ADULT DIET; Regular; Low Fat/Low Chol/High Fiber/2 gm Na  Code Status: Full Code    PT/OT Eval Status: ORDERED    Dispo -  HOME    Electronically signed by Vivian Ridley DO on 1/19/2023 at [facility]       Thank you Hang Roche MD for the opportunity to be involved in this patient's care.  If you have any questions or concerns please feel free to contact me at [phone]

## 2023-01-19 NOTE — PROGRESS NOTES
Pt called nurse in room, reported black gritty stools, this nurse did not se it, put hat in room, explained to pt we need to see it, will continue to monitor

## 2023-01-19 NOTE — PROGRESS NOTES
Pt sat with patient for first 15 min, no side effects or reactions noted. Pt resting comfortably at this time.  Will continue to monitor

## 2023-01-19 NOTE — CONSULTS
GENERAL SURGERY  CONSULT NOTE  1/18/2023    Physician Consulted: Dr. Foreign Morocho  Reason for Consult: GIB, anemia  Referring Physician: Dr. Asiya LOBATO  Norma Young is a 67 y.o. male with history of COPD, HTN, non-small cell lung cancer of the left lung currently on chemo, history of prior DVT/PE on Eliquis, history of complete heart block s/p pacemaker placement who presents to the ED on 1/18 for evaluation of increasing shortness of breath/fatigue. Patient was found on initial evaluation in the ED to have, hemoglobin of 6.5, platelets 14 for which the pt was given 1u prbc and 1u plt. Hemoglobin did not respond appropriately and was 6.9 on repeat, and another unit was ordered. Patient does have a prior history of anemia of baseline at 9-10 on chemotherapy. Patient is also noted to have prev hx pancytopenia. Initially anemia was thought to be due to anticoagulation with possible GI bleed and pancytopenia related to chemotherapy. Patient states last colonoscopy was greater than 10 years ago was noted to be negative for acute abnormality at that time with exception of diverticulosis. Patient denies any prior abdominal surgical procedures other than 2 prior hernia repairs. Patient denies any current smoking, alcohol use. Patient is on prednisone 5 mg daily secondary to his respiratory status/lung cancer. General surgery was again consulted for patient's continued anemia and melena. Pt was previously evaluated by our service on 12/29 for the same presentation, symptoms, and similar lab values, and at that time it was felt that the patient's UGIB was likely related to chemotherapy, chronic thrombocytopenia, and/or stress gastritis, for which we recommended PPI and carafate and no acute endoscopic intervention.  Pt states he has been on his PPI and carafate since prev discharge, and aside from a slight blood tinge to one of his BM's on Saturday 1/14, he has not had any other episodes of hematochezia, and denies hematemesis. Admits to melena that has been intermittent for weeks. Afebrile, hemodynamically stable. Past Medical History:   Diagnosis Date    CHB (complete heart block) ([facility] [address] 07/2021    COPD (chronic obstructive pulmonary disease) (HCC)     Hypertension     NSCLC of left lung ([facility] [address] 05/27/2021    SCC per biopsy. Pulmonary emboli ([facility] [address] 09/17/2021    Anticoagulation with Eliquis. Syncope 09/2020    Syncope 07/2021       Past Surgical History:   Procedure Laterality Date    BRONCHOSCOPY N/A 05/27/2021    BRONCHOSCOPY W/EBUS FNA performed by Santosh Hunt MD at [address] [facility] N/A 05/27/2021    BRONCHOSCOPY DIAGNOSTIC OR CELL [address] ONLY performed by Santosh Hunt MD at [address] [facility] N/A 05/27/2021    BRONCHOSCOPY ADD ON COMPUTER ASSISTED performed by Santosh Hunt MD at [address] [facility] [facility] N/A 08/25/2022    CARDIAC LASER LEAD EXCHANGE performed by Minor Lyn MD at [address] [facility] [address] Bilateral 2011 2001    groin     PACEMAKER CHANGE N/A 08/25/2022    CARDIAC LASER LEAD EXTRACTION, LASER LEAD EXTRACTION LEFT SIDED DEVICE, PACEMAKER DEVICE EXTRACTION performed by Minor Lyn MD at [address]  07/20/2021    DUAL PPM  (ABDULLAHI)  DR. Phil Bridges    PORT SURGERY Right 08/20/2022    PORT REMOVAL performed by Jenni Mason MD at [address]    TRANSESOPHAGEAL ECHOCARDIOGRAM  08/22/2022    Dr Katherine Jiang N/A 08/29/2022    Medtronic Micra AV Leadless PPM Insertion (Dr. Blu Hicks)       Medications Prior to Admission    Prior to Admission medications    Medication Sig Start Date End Date Taking?  Authorizing Provider   apixaban (ELIQUIS) 5 MG TABS tablet Take 5 mg by mouth 2 times daily   Yes Historical Provider, MD   metoprolol succinate (TOPROL XL) 50 MG extended release tablet Take 25 mg by mouth 2 times daily   Yes Historical Provider, MD   OXYGEN Inhale 4 L into the lungs nightly   Yes Historical Provider, MD   pantoprazole (PROTONIX) 40 MG tablet Take 1 tablet by mouth in the morning and 1 tablet in the evening. 12/30/22   Caprice Kirkland MD   sucralfate (CARAFATE) 1 GM tablet Take 1 tablet by mouth 4 times daily (before meals and nightly) 12/30/22   Caprice Kirkland MD   atorvastatin (LIPITOR) 20 MG tablet Take 20 mg by mouth daily    Historical Provider, MD   Multiple Vitamin (MULTIVITAMIN ADULT PO) Take 1 tablet by mouth daily    Historical Provider, MD   Ascorbic Acid (VITAMIN C) 250 MG tablet Take 250 mg by mouth daily    Historical Provider, MD   OXYGEN Inhale 3 L/min into the lungs daily    Historical Provider, MD   Fluticasone-Umeclidin-Vilant (TRELEGY ELLIPTA) 200-62.5-25 MCG/INH AEPB Inhale 1 puff into the lungs daily 7/11/22   Historical Provider, MD   gabapentin (NEURONTIN) 100 MG capsule Take 100 mg by mouth in the morning and at bedtime.     Historical Provider, MD   melatonin 3 MG TABS tablet Take 3 mg by mouth nightly    Historical Provider, MD   potassium chloride (MICRO-K) 10 MEQ extended release capsule Take 10 mEq by mouth daily    Historical Provider, MD   furosemide (LASIX) 20 MG tablet Take 1 tablet by mouth daily 9/4/22   Caprice Kirkland MD   predniSONE (DELTASONE) 5 MG tablet Take 5 mg by mouth daily    Lazaro Mulligan MD   albuterol (ACCUNEB) 0.63 MG/3ML nebulizer solution Take 1 ampule by nebulization every 6 hours as needed for Wheezing    Historical Provider, MD   albuterol sulfate HFA (PROVENTIL;VENTOLIN;PROAIR) 108 (90 Base) MCG/ACT inhaler Inhale 2 puffs into the lungs every 6 hours as needed for Wheezing    Historical Provider, MD   simvastatin (ZOCOR) 40 MG tablet Take 40 mg by mouth daily   9/3/22  Historical Provider, MD       No Known Allergies    Family History   Problem Relation Age of Onset    Cancer Father         prostate    Cancer Paternal Uncle         anal       Social History     Tobacco Use    Smoking status: Former     Packs/day: 2.00 Years: 25.00     Pack years: 50.00     Types: Cigarettes     Quit date: 2011     Years since quittin.0     Passive exposure: Past    Smokeless tobacco: Never   Vaping Use    Vaping Use: Never used   Substance Use Topics    Alcohol use: Not Currently     Alcohol/week: 1.0 standard drink     Types: 1 Glasses of wine per week     Comment: 1 glass of wine per day prior    Drug use: No         Review of Systems: pertinent ROS listed in HPI, all others negative       PHYSICAL EXAM:    Vitals:    23 1929   BP:    Pulse:    Resp:    Temp:    SpO2: 100%       GENERAL:  NAD. A&Ox3. HEAD:  Normocephalic. Atraumatic. EYES:   No scleral icterus. PERRL. LUNGS:  No increased work of breathing. CARDIOVASCULAR: RR  ABDOMEN:  Soft, non-distended, non-tender. No guarding, rigidity, rebound. EXTREMITIES:   MAEx4. Atraumatic. No LE edema. SKIN:  Warm and dry  NEUROLOGIC:  GCS 15  RECTAL: No hemorrhoids. FOBT positive. Melena, no bright red blood, no masses or lesions. ASSESSMENT/PLAN:  67 y.o. male with multifactorial anemia likely related to chemotherapy and possible GI bleed related to thrombocytopenia    - Monitor H/H  -Patient's current episode of melena/GI bleed is likely related to patient's current thrombocytopenia and possible underlying gastritis. - No immediate plans for endoscopy as EGD would likely not change patient's final management.  However, if endoscopy is still desired by primary then we would need platelets transfused to plt count >50k before offering EGD.  - Continue PPI, carafate    Plan discussed w Dr. Valentino Staples, DO  Surgery Resident PGY-1  2023  7:58 PM

## 2023-01-19 NOTE — PROGRESS NOTES
[address]                                                  Patient Name: Elza Juarez    MRN: [id number]    : 1950    Room: [address] [place] [address]      Evaluating OT: Brayden Erazo, 82 Merissa Nagy OTR/L; [id number]      Referring Provider: Danika Redd DO    Specific Provider Orders/Date: OT Eval and Treat 23      Diagnosis:  GIB (gastrointestinal bleeding)    Surgery: none this admission     Pertinent Medical History:  has a past medical history of CHB (complete heart block) ([facility] [address] COPD (chronic obstructive pulmonary disease) ([facility] [address] Hypertension, NSCLC of left lung ([facility] [address] Pulmonary emboli ([facility] [address] Syncope, and Syncope.       Reason for Admission: SOB and fatigue    Recommended Adaptive Equipment:  own necessary equipment at this time     Precautions:  Fall Risk, IV, O2, Pacemaker, Active Chemotherapy (Lung CA), Dyspne with exertion     Assessment of current deficits:    [x] Functional mobility  [x]ADLs  [x] Strength               [x]Cognition    [x] Functional transfers   [x] IADLs         [x] Safety Awareness   [x]Endurance    [x] Fine Coordination              [x] Balance      [] Vision/perception   []Sensation     []Gross Motor Coordination  [] ROM  [] Delirium                   [] Motor Control     OT PLAN OF CARE   OT POC based on physician orders, patient diagnosis and results of clinical assessment    Frequency/Duration: 1-3 days/wk for 2 weeks PRN   Specific OT Treatment Interventions to include:   * Instruction/training on adapted ADL techniques and AE recommendations to increase functional independence within precautions       * Training on energy conservation strategies, correct breathing pattern and techniques to improve independence/tolerance for self-care routine  * Functional transfer/mobility training/DME recommendations for increased independence, safety, and fall prevention  * Patient/Family education to increase follow through with safety techniques and functional independence  * Recommendation of environmental modifications for increased safety with functional transfers/mobility and ADLs  * Therapeutic exercise to improve motor endurance, ROM, and functional strength for ADLs/functional transfers  * Therapeutic activities to facilitate/challenge dynamic balance, stand tolerance for increased safety and independence with ADLs      Home Living: Pt lives with wife in 1 story home with 2 steps and [facility] to enter. Bathroom located in basement with full flight of steps and BHR to access (wife present when pt manipulating steps)    Bathroom setup: tub/shower unit with shower chair and grab bar outside of tub   Equipment owned: shower chair, ww, cane    Prior Level of Function: Mod Ind <> Min A with ADLs - bathing tasks    Dependent on wife with IADLs - pt does not accompany wife to grocery store very often  ambulated with no AD prior  Driving: yes - however wife does [de-identified] of driving  Occupation: retired    Pain Level: 0/10  Cognition: A&O: 4/4  Follows multi step directions   Memory:  good   Sequencing:  fair+   Problem solving:  fair+   Judgement/safety:  fair+     Functional Assessment:  AM-PAC Daily Activity Raw Score: 18/24   Initial Eval Status  Date: 1/19/23 Treatment Status  Date: STGs = LTGs  Time frame: 10-14 days   Feeding Set Up   Tray set up  Independent    Grooming Stand by Assist   Simulated functional reach  Independent    UB Dressing Stand by Assist   Independent    LB Dressing Stand by Assist  Glen socks using figure 4 technique seated EOB  Independent    Bathing Stand by Assist  Independent    Toileting Stand by Assist   Lower surface transfer  Independent    Bed Mobility  Log Roll: IND  Supine to sit: Sup   Sit to supine: Sup   Supine to sit:  Independent   Sit to supine: Independent    Functional Transfers Sit to stand: Sup Stand to sit:Sup  Stand pivot: Sup  Commode: Sup  Sit to stand:IND   Stand to sit:IND  Stand pivot: IND  Commode: IND    Functional Mobility Sup with no AD   within household distance  IND    Balance Sitting:     Static - IND     Dynamic - IND  Standing: Sup  Standing: IND   Activity Tolerance FAIR  Rest breaks d/t dyspnea with exertion educated on task modifications   GOOD   Visual/  Perceptual Glasses:  yes        Vitals   SpO2 at rest on 4L NC: 98%  HR: 44-52  SpO2 following functional mobility on 4L NC: 97%  HR:54         BUE  ROM/Strength/  Fine motor Coordination Hand dominance: Right     RUE: ROM WFL     Strength: 4/5      Strength: FAIR     Coordination:  FAIR     LUE: ROM WFL     Strength: 4/5      Strength: FAIR     Coordination:  FAIR  increase BUE muscle strength for improved indep with functional transfers         Hearing: WFL   Sensation:  Bilateral feet - chronic neuropathy   Tone: WFL   Edema: unremarkable    Patient/Family Goal: pt goal is to return home with assist from wife as needed   Based on patient's functional performance as stated below and level of assistance needed prior to admission, this therapist believes that the patient would benefit from further skilled OT following hospital stay in an effort to increase safety, functional independence, and quality of life. Comment: Cleared by RN to see pt. Upon arrival patient lying supine in bed and agreeable to OT session. At end of session, patient seated EOB with tray  with call light and phone within reach, all lines and tubes intact. Overall patient demonstrated  decreased independence and safety during completion of ADL/functional transfer/mobility tasks. Pt would benefit from continued skilled OT to increase safety and independence with completion of ADL/IADL tasks for functional independence and quality of life. Treatment: .  Pt required vc's for proper technique/safety with hand placement/body mechanics/posture for bed mobility/ADLs/functional tranfers/mobility/ww management. Pt required vc's for sequencing/initiation of ADLs/functional transfers. Pt able to  sit EOB ~10 mins and at sink ~2 mins to increase core strength/balance/activity tolerance for ease with ADLs. Pt completed supine to sit with no nto dizziness or SOB. Simulated functional reach for brandon/doff socks seated EOB using figure 4 technique. Completed functional mobility within room/bathroom. No noted LOB or dizziness, increased dyspnea with exertion. Pt required increased time to complete ADLs/functional transfers due to overall fatigue and dyspnea with exertion. Pt educated on seated rest breaks to improve endurance and safety with tasks. Pt required skilled monitoring of SpO2 during session and education on energy conservation techniques. Pt required rest breaks during session and educated on pursed lip breathing. Pt appeared to have tolerated session well and appears motivated/cooperative/pleasant. Pt instructed on use of call light for assistance and fall prevention. Pt demo'ing fair understanding of education provided. Continue to educate. Rehab Potential: Good  for established goals       LTG: maximize independence with ADLs to return to PLOF    Patient and/or family were instructed on functional diagnosis, prognosis/goals and OT plan of care. Demonstrated FAIR understanding. [] Malnutrition indicators have been identified and nursing has been notified to ensure a dietitian consult is ordered. Eval Complexity: Low     Evaluation time includes thorough review of current medical information, gathering information on past medical & social history & PLOF, completion of standardized testing, informal observation of tasks, consultation with other medical professions/disciplines, assessment of data & development of POC/goals.      Time In: 1530  Time Out: 1555  Total Treatment Time: 10    Min Units   OT Eval Low 60911  x     OT Eval Medium 88218 OT Eval High [id number]      OT Re-Eval [id number]       Therapeutic Ex [id number]       Therapeutic Activities 47301       ADL/Self Care 75769  10  1   Orthotic Management 35189       Manual [id number]     Neuro Re-Ed 73905       Non-Billable Time          Evaluation Time additionally includes thorough review of current medical information, gathering information on past medical history/social history and prior level of function, interpretation of standardized testing/informal observation of tasks, assessment of data and development of plan of care and goals.       ANDRÉS Kaur OTR/L; [id number]

## 2023-01-19 NOTE — PROGRESS NOTES
Subjective:  Chart reviewed  Patient admitted with anemia, PRBC given in ED for a HGB 6.5  Recent admission for same complaint  Patient received chemotherapy treatment in the interim  The patient is awake and alert. SOB on admission and some improvement s/p transfusion  Black stools  No chest pain, angina, dyspnea or abdominal discomfort. No nausea or vomiting. Tolerating diet.       Objective:    BP (!) 141/63   Pulse 55   Temp 97.9 °F (36.6 °C) (Oral)   Resp 28   Ht 5' 6\" (1.676 m)   Wt 174 lb 9.6 oz (79.2 kg)   SpO2 98%   BMI 28.18 kg/m²     General: NAD, awake and alert  HEENT: NC/AT, sclera anicteric, mucosa dry no lesions or patches  Neck supple no JVD  Heart:  RRR, pacemaker, no murmur  Lungs:  respirations easy and not labored, diminished breath sound no crackles or wheezing, O2 via NC  Abd: BS+, soft nondistended  Extrem:  No clubbing, cyanosis, edema  Skin: pale, Intact, no petechia or purpura    CBC with Differential:    Lab Results   Component Value Date/Time    WBC 2.2 01/19/2023 02:30 AM    RBC 2.62 01/19/2023 02:30 AM    HGB 7.5 01/19/2023 02:30 AM    HCT 22.8 01/19/2023 02:30 AM    PLT 22 01/19/2023 02:30 AM    MCV 87.0 01/19/2023 02:30 AM    MCH 28.6 01/19/2023 02:30 AM    MCHC 32.9 01/19/2023 02:30 AM    RDW 20.4 01/19/2023 02:30 AM    NRBC 0.9 01/18/2023 12:17 PM    BLASTSPCT 0.9 08/24/2022 05:22 AM    METASPCT 3.5 08/30/2022 07:03 AM    LYMPHOPCT 7.0 01/18/2023 12:17 PM    PROMYELOPCT 1.7 09/01/2022 03:30 AM    MONOPCT 2.1 01/18/2023 12:17 PM    MYELOPCT 0.9 12/29/2022 05:28 PM    BASOPCT 0.0 01/18/2023 12:17 PM    MONOSABS 0.00 01/18/2023 12:17 PM    LYMPHSABS 0.20 01/18/2023 12:17 PM    EOSABS 0.00 01/18/2023 12:17 PM    BASOSABS 0.00 01/18/2023 12:17 PM     CMP:    Lab Results   Component Value Date/Time     01/19/2023 02:30 AM    K 3.9 01/19/2023 02:30 AM    K 4.2 01/18/2023 12:17 PM     01/19/2023 02:30 AM    CO2 31 01/19/2023 02:30 AM    BUN 19 01/19/2023 02:30 AM CREATININE 0.8 01/19/2023 02:30 AM    GFRAA >60 10/13/2022 12:28 PM    LABGLOM >60 01/19/2023 02:30 AM    GLUCOSE 110 01/19/2023 02:30 AM    PROT 5.9 12/26/2022 11:04 AM    LABALBU 4.0 12/26/2022 11:04 AM    CALCIUM 9.1 01/19/2023 02:30 AM    BILITOT 0.5 12/26/2022 11:04 AM    ALKPHOS 101 12/26/2022 11:04 AM    AST 25 12/26/2022 11:04 AM    ALT 25 12/26/2022 11:04 AM          Current Facility-Administered Medications:     0.9 % sodium chloride infusion, , IntraVENous, PRN, April Lyly, APRN - CNP    albuterol (ACCUNEB) nebulizer solution 0.63 mg, 1 ampule, Nebulization, Q6H PRN, Yemi Nieves DO    [Held by provider] apixaban (ELIQUIS) tablet 5 mg, 5 mg, Oral, BID, Joseph George DO    atorvastatin (LIPITOR) tablet 20 mg, 20 mg, Oral, Daily, Joseph George DO, 20 mg at 01/19/23 0810    gabapentin (NEURONTIN) capsule 100 mg, 100 mg, Oral, BID, Joseph Robertsa, DO, 100 mg at 01/19/23 0810    melatonin tablet 3 mg, 3 mg, Oral, Nightly, Joseph George DO, 3 mg at 01/18/23 2147    metoprolol succinate (TOPROL XL) extended release tablet 25 mg, 25 mg, Oral, BID, Joseph George, DO, 25 mg at 01/19/23 0810    predniSONE (DELTASONE) tablet 5 mg, 5 mg, Oral, Daily, Joseph George, DO, 5 mg at 01/19/23 0810    sucralfate (CARAFATE) tablet 1 g, 1 g, Oral, 4x Daily AC & HS, Joseph Robertsa, DO, 1 g at 01/19/23 1113    ondansetron (ZOFRAN) injection 4 mg, 4 mg, IntraVENous, Q6H PRN, Yemi Nieves DO    dextrose 5 % and 0.9 % sodium chloride infusion, , IntraVENous, Continuous, Joseph George DO, Last Rate: 100 mL/hr at 01/19/23 0230, New Bag at 01/19/23 0230    budesonide (PULMICORT) nebulizer suspension 500 mcg, 0.5 mg, Nebulization, BID, 500 mcg at 01/19/23 0929 **AND** arformoterol tartrate (BROVANA) nebulizer solution 15 mcg, 15 mcg, Nebulization, BID, 15 mcg at 01/19/23 0929 **AND** ipratropium (ATROVENT) 0.02 % nebulizer solution 0.5 mg, 0.5 mg, Nebulization, 4x daily, Reta Andersen Kinza DO, 0.5 mg at 01/19/23 1608    pantoprazole (PROTONIX) 40 mg in sodium chloride (PF) 0.9 % 10 mL injection, 40 mg, IntraVENous, Q12H, Eduar Yeager DO, 40 mg at 01/19/23 1241    XR CHEST PORTABLE   Final Result   Left pleural effusion with probable left upper lobe lingular segment   atelectasis. Assessment:    Principal Problem:    GIB (gastrointestinal bleeding)  Resolved Problems:    * No resolved hospital problems. 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 Treated with concurrent chemoRT fb Imfinzi from 11/2/21. Progression on PET from 6/24/22. Started Varsha Balk and Keytruda on 6/30/22. 1/4/2023 seen by Dr. Jeancarlos Peck and treated Dose reduce Gemzar to improve hematological toxicity. Proceed with C6D1 of Carboplatin AUC 2 with Gemzar 1000 mg/m2 IV on D1,8 and Keytruda IV on D1 on a 21 day cycle. RLL PE, Indefinite Eliquis 5 mg BID, okay to continue as long as platelets > 67R. Recommend second line Monoferric 1000 today. He has failed Ferrous sulfate 325 mg every other day. Discussed some of the  potential adverse effects including risk of infusion reaction, pruritus, rashes, taste alterations, skin irritation, allergic reactions and  even anaphylaxis. The patient is agreeable to proceed with IV therapy to help with her symptomatically anemia. Once iron stores are improved and if he still remains anemic then will consider GT therapy. A/P:  Anemia multifactorial, chemotherapy induced and GIB.    Pancytopenia, likely secondary to recent cytotoxic chemotherapy  Transfusion support to maintain hgb > 7.5 g/dL with underlying cardiac issues  Hold anticoagulation until platelet count >23R  Transfuse plt with probable active GI bleed (ordered)  CBC diff plt daily  Surgery evaluation, no interventions recommended currently    Electronically signed by MATTHEW Butts on 1/19/2023 at 5:00 PM

## 2023-01-19 NOTE — PROGRESS NOTES
[facility]  SURGICAL INTENSIVE CARE UNIT (SICU)  ATTENDING PHYSICIAN CRITICAL CARE PROGRESS NOTE     I have examined the patient, reviewed the record,and discussed the case with the APN/  Resident. I have reviewed all relevant labs and imaging data. Please refer to the  APN/ resident's note. I agree with the  assessment and plan with the following corrections/ additions made above     Please refer to resident consult Dr. Arron Welch reviewed BMP unremarkable troponin 33 proBNP 1578, white blood cell count 2.8, hemoglobin 6.5, platelet count 14 type and screen done    Acute on chronic anemia likely exacerbated by upper GI source in the setting of pancytopenia with a platelet count of 14    Patient with pancytopenia. Possible gastric ulcerations considering multiple comorbidities. Empirically treat with PPI and Carafate. If off of therapeutic anticoagulation and platelet count over 50 still with GI bleeding then we will plan on EGD. Patient at very high risk considering multiple comorbidities.       Lindy Ahuja MD

## 2023-01-19 NOTE — PROGRESS NOTES
Physical Therapy  Physical Therapy Initial Assessment     Name: Concha Cruz  : 1950  MRN: [id number]      Date of Service: 2023    Evaluating PT:  Abraham Duncan, PT, DPT, [id number]    Room #:  8943/0237-A  Diagnosis:  Acute blood loss anemia [D62]  GIB (gastrointestinal bleeding) [K92.2]  Gastrointestinal hemorrhage, unspecified gastrointestinal hemorrhage type [K92.2]  PMHx/PSHx:    Past Medical History:   Diagnosis Date    CHB (complete heart block) ([facility] [address] 2021    COPD (chronic obstructive pulmonary disease) ([facility] [address]     Hypertension     NSCLC of left lung ([facility] 2021    SCC per biopsy. Pulmonary emboli ([facility] 2021    Anticoagulation with Eliquis.     Syncope 2020    Syncope 2021      Past Surgical History:   Procedure Laterality Date    BRONCHOSCOPY N/A 2021    BRONCHOSCOPY W/EBUS FNA performed by Sabrina Stanley MD at [address] [facility] N/A 2021    BRONCHOSCOPY DIAGNOSTIC OR CELL [address] ONLY performed by Sabrina Stanley MD at [address] [facility] N/A 2021    BRONCHOSCOPY ADD ON COMPUTER ASSISTED performed by Sabrina Stanley MD at [address] [facility] N/A 2022    CARDIAC LASER LEAD EXCHANGE performed by Ruby Kimball MD at [address] [facility] Bilateral 2011    groin     PACEMAKER CHANGE N/A 2022    CARDIAC LASER LEAD EXTRACTION, LASER LEAD EXTRACTION LEFT SIDED DEVICE, PACEMAKER DEVICE EXTRACTION performed by Ruby Kimball MD at [address]  2021    DUAL PPM  (ABDULLAHI)  DR. Martin Cohen    PORT SURGERY Right 2022    PORT REMOVAL performed by Soha Loyola MD at [address]    TRANSESOPHAGEAL ECHOCARDIOGRAM  2022    Dr Ange Flores N/A 2022    Med[employer] Micra AV Leadless PPM Insertion (Dr. Maribell Sneed)      Procedure/Surgery:  none this admission  Precautions:  Fall risk, O2, IV, Active Chemotherapy (Lung CA), pacer, dyspnea with exertion   Equipment Needs:  TBD    SUBJECTIVE:    Pt lives with wife in a 1 story home with 2 stairs to enter and 0 rail. Bed is on 1 floor and bath is on 1 floor. Shower is in basement with 1 flight and 1 rail to access. Pt ambulated with no AD PTA. Pt owns [employer] and cane. Pt is on 3L/min O2 during the day and 4L/min O2 at night at baseline. Pt is active with outpatient PT.     OBJECTIVE:   Initial Evaluation  Date: 1/19/23 Treatment Short Term/ Long Term   Goals   AM-PAC 6 Clicks 55/67     Was pt agreeable to Eval/treatment? yes     Does pt have pain? No c/o pain     Bed Mobility  Rolling: NT  Supine to sit: NT  Sit to supine: NT  Scooting: Independent   Rolling: Independent   Supine to sit: Independent   Sit to supine: Independent   Scooting: Independent    Transfers Sit to stand: Sup  Stand to sit: Sup  Stand pivot: Sup/SBA no AD  Sit to stand: Independent   Stand to sit:  Independent   Stand pivot: Independent    Ambulation    75 feet with no AD Sup/SBA  >200 feet with no AD Independent    Stair negotiation: ascended and descended NT  12+ steps with 1 rail mod Independent    ROM BUE:  Refer to OT note  BLE:  WFL     Strength BUE:  Refer to OT note  LLE:  4+/5  RLE: 5/5  Improve by 1/3 MMT grade   Balance Sitting EOB:  Independent   Dynamic Standing:  Sup/SBA  Sitting EOB:  Independent   Dynamic Standing:  Independent      Pt is A & O x 4  Sensation:  Pt denies numbness and tingling to extremities  Edema:  unremarkable    Vitals:  Blood Pressure at rest - Blood Pressure post ambulation - Blood Pressure post session -   Heart Rate at rest 54 bpm Heart Rate post ambulation 60 bpm Heart Rate post session 54 bpm   SPO2 at rest 99% 4L/min SPO2 post ambulation 91% 4L/min SPO2 post session 97% 4L/min     Patient education  Pt educated on role of PT, safe functional mobility, importance of OOB mobility    Patient response to education:   Pt verbalized understanding Pt demonstrated skill Pt requires further education in this area   x x x     ASSESSMENT:    Conditions Requiring Skilled Therapeutic Intervention:    [x]Decreased strength     [x]Decreased ROM  [x]Decreased functional mobility  []Decreased balance   [x]Decreased endurance   []Decreased posture  [x]Decreased sensation  []Decreased coordination   []Decreased vision  []Decreased safety awareness   []Increased pain       Comments:  Cleared for session by RN. Pt was sitting EOB upon PT entry and agreeable to participate. Pt was able to maintain good seated balance at EOB throughout evaluation. Pt completed sit<>stand transfer from EOB (x2) with no hands on assistance. Ambulated short distance in room with no AD with safe pace and pattern. Assist required for line management. Noted dyspnea post ambulation. Seated rest break required to improve SPO2, educated on deep breathing techniques. Pt completed stand pivot transfer to chair with similar assist. Instructed on of spirometer. Pt seated in chair with all needs met and call light in reach at conclusion of session. RN notified. Treatment:  Patient practiced and was instructed in the following treatment:    Sitting EOB for >10 minutes for upright tolerance, postural awareness and BLE ROM  Transfer training - pt was given verbal and tactile cues to facilitate proper hand placement, technique and safety during sit to stand and stand to sit as well as provided with physical assistance. Gait training- pt was given verbal and tactile cues to facilitate safety and line management during ambulation as well as provided with physical assistance. Skilled monitoring of vitals throughout session. Pt's/ family goals   1. To return home    Prognosis is good for reaching above PT goals. Patient and or family understand(s) diagnosis, prognosis, and plan of care.   yes    PHYSICAL THERAPY PLAN OF CARE:    PT POC is established based on physician order and patient diagnosis     Referring provider/PT Order: 01/18/23 1915  PT eval and treat  Start:  01/18/23 1915,   End:  01/18/23 1915,   ONE TIME,   Standing Count:  1 Occurrences,   R         Joseph Sarah Hernandezte      Diagnosis:  Acute blood loss anemia [D62]  GIB (gastrointestinal bleeding) [K92.2]  Gastrointestinal hemorrhage, unspecified gastrointestinal hemorrhage type [K92.2]  Specific instructions for next treatment:  Improve endurance and stair training    Current Treatment Recommendations:     [x] Strengthening to improve independence with functional mobility   [x] ROM to improve independence with functional mobility   [x] Balance Training to improve static/dynamic balance and to reduce fall risk  [x] Endurance Training to improve activity tolerance during functional mobility   [x] Transfer Training to improve safety and independence with all functional transfers   [x] Gait Training to improve gait mechanics, endurance and assess need for appropriate assistive device  [x] Stair Training in preparation for safe discharge home and/or into the community   [x] Positioning to prevent skin breakdown and contractures  [x] Safety and Education Training   [x] Patient/Caregiver Education   [] HEP  [x] Other     PT long term treatment goals are located in above grid    Frequency of treatments: 2-5x/week x 1-2 weeks. Time in  1145  Time out  1218    Total Treatment Time  8 minutes     Evaluation Time includes thorough review of current medical information, gathering information on past medical history/social history and prior level of function, completion of standardized testing/informal observation of tasks, assessment of data and education on plan of care and goals.     CPT codes:  [x] Low Complexity PT evaluation 21420  [] Moderate Complexity PT evaluation 99678  [] High Complexity PT evaluation 22406  [] PT Re-evaluation 62309  [] Gait training 84078 0 minutes  [] Manual therapy 80153 0 minutes  [x] Therapeutic activities 42154 8 minutes  [] Therapeutic exercises 58474 0 minutes  [] Neuromuscular reeducation 82599 0 minutes     Tricia Burris PT, DPT  [id number]

## 2023-01-20 LAB
ANION GAP SERPL CALCULATED.3IONS-SCNC: 7 MMOL/L (ref 7–16)
ANISOCYTOSIS: ABNORMAL
BASOPHILS ABSOLUTE: 0 E9/L (ref 0–0.2)
BASOPHILS ABSOLUTE: 0 E9/L (ref 0–0.2)
BASOPHILS RELATIVE PERCENT: 0 % (ref 0–2)
BASOPHILS RELATIVE PERCENT: 0 % (ref 0–2)
BLOOD BANK DISPENSE STATUS: NORMAL
BLOOD BANK DISPENSE STATUS: NORMAL
BLOOD BANK PRODUCT CODE: NORMAL
BLOOD BANK PRODUCT CODE: NORMAL
BPU ID: NORMAL
BPU ID: NORMAL
BUN BLDV-MCNC: 15 MG/DL (ref 6–23)
CALCIUM SERPL-MCNC: 9 MG/DL (ref 8.6–10.2)
CHLORIDE BLD-SCNC: 106 MMOL/L (ref 98–107)
CO2: 27 MMOL/L (ref 22–29)
CREAT SERPL-MCNC: 0.8 MG/DL (ref 0.7–1.2)
DESCRIPTION BLOOD BANK: NORMAL
DESCRIPTION BLOOD BANK: NORMAL
EOSINOPHILS ABSOLUTE: 0.01 E9/L (ref 0.05–0.5)
EOSINOPHILS ABSOLUTE: 0.01 E9/L (ref 0.05–0.5)
EOSINOPHILS RELATIVE PERCENT: 0.3 % (ref 0–6)
EOSINOPHILS RELATIVE PERCENT: 0.3 % (ref 0–6)
GFR SERPL CREATININE-BSD FRML MDRD: >60 ML/MIN/1.73
GLUCOSE BLD-MCNC: 90 MG/DL (ref 74–99)
HCT VFR BLD CALC: 22.5 % (ref 37–54)
HCT VFR BLD CALC: 22.8 % (ref 37–54)
HCT VFR BLD CALC: 24.6 % (ref 37–54)
HEMOGLOBIN: 7.3 G/DL (ref 12.5–16.5)
HEMOGLOBIN: 7.6 G/DL (ref 12.5–16.5)
HEMOGLOBIN: 8.1 G/DL (ref 12.5–16.5)
IMMATURE GRANULOCYTES #: 0.01 E9/L
IMMATURE GRANULOCYTES #: 0.01 E9/L
IMMATURE GRANULOCYTES %: 0.3 % (ref 0–5)
IMMATURE GRANULOCYTES %: 0.3 % (ref 0–5)
LYMPHOCYTES ABSOLUTE: 0.27 E9/L (ref 1.5–4)
LYMPHOCYTES ABSOLUTE: 0.33 E9/L (ref 1.5–4)
LYMPHOCYTES RELATIVE PERCENT: 8.4 % (ref 20–42)
LYMPHOCYTES RELATIVE PERCENT: 9.9 % (ref 20–42)
MCH RBC QN AUTO: 29.2 PG (ref 26–35)
MCH RBC QN AUTO: 29.6 PG (ref 26–35)
MCH RBC QN AUTO: 29.8 PG (ref 26–35)
MCHC RBC AUTO-ENTMCNC: 32.4 % (ref 32–34.5)
MCHC RBC AUTO-ENTMCNC: 32.9 % (ref 32–34.5)
MCHC RBC AUTO-ENTMCNC: 33.3 % (ref 32–34.5)
MCV RBC AUTO: 88.7 FL (ref 80–99.9)
MCV RBC AUTO: 88.8 FL (ref 80–99.9)
MCV RBC AUTO: 91.8 FL (ref 80–99.9)
MONOCYTES ABSOLUTE: 0.11 E9/L (ref 0.1–0.95)
MONOCYTES ABSOLUTE: 0.16 E9/L (ref 0.1–0.95)
MONOCYTES RELATIVE PERCENT: 3.4 % (ref 2–12)
MONOCYTES RELATIVE PERCENT: 4.8 % (ref 2–12)
NEUTROPHILS ABSOLUTE: 2.82 E9/L (ref 1.8–7.3)
NEUTROPHILS ABSOLUTE: 2.83 E9/L (ref 1.8–7.3)
NEUTROPHILS RELATIVE PERCENT: 84.7 % (ref 43–80)
NEUTROPHILS RELATIVE PERCENT: 87.6 % (ref 43–80)
OVALOCYTES: ABNORMAL
PDW BLD-RTO: 19.5 FL (ref 11.5–15)
PDW BLD-RTO: 19.5 FL (ref 11.5–15)
PDW BLD-RTO: 19.6 FL (ref 11.5–15)
PLATELET # BLD: 12 E9/L (ref 130–450)
PLATELET # BLD: 13 E9/L (ref 130–450)
PLATELET # BLD: 29 E9/L (ref 130–450)
PLATELET CONFIRMATION: NORMAL
PMV BLD AUTO: 10.3 FL (ref 7–12)
PMV BLD AUTO: ABNORMAL FL (ref 7–12)
PMV BLD AUTO: ABNORMAL FL (ref 7–12)
POIKILOCYTES: ABNORMAL
POTASSIUM SERPL-SCNC: 3.9 MMOL/L (ref 3.5–5)
RBC # BLD: 2.45 E12/L (ref 3.8–5.8)
RBC # BLD: 2.57 E12/L (ref 3.8–5.8)
RBC # BLD: 2.77 E12/L (ref 3.8–5.8)
SCHISTOCYTES: ABNORMAL
SODIUM BLD-SCNC: 140 MMOL/L (ref 132–146)
TEAR DROP CELLS: ABNORMAL
WBC # BLD: 3 E9/L (ref 4.5–11.5)
WBC # BLD: 3.2 E9/L (ref 4.5–11.5)
WBC # BLD: 3.3 E9/L (ref 4.5–11.5)

## 2023-01-20 PROCEDURE — C9113 INJ PANTOPRAZOLE SODIUM, VIA: HCPCS

## 2023-01-20 PROCEDURE — 6370000000 HC RX 637 (ALT 250 FOR IP): Performed by: INTERNAL MEDICINE

## 2023-01-20 PROCEDURE — 2700000000 HC OXYGEN THERAPY PER DAY

## 2023-01-20 PROCEDURE — 85025 COMPLETE CBC W/AUTO DIFF WBC: CPT

## 2023-01-20 PROCEDURE — 2580000003 HC RX 258

## 2023-01-20 PROCEDURE — 6360000002 HC RX W HCPCS: Performed by: INTERNAL MEDICINE

## 2023-01-20 PROCEDURE — 80048 BASIC METABOLIC PNL TOTAL CA: CPT

## 2023-01-20 PROCEDURE — 99232 SBSQ HOSP IP/OBS MODERATE 35: CPT | Performed by: SURGERY

## 2023-01-20 PROCEDURE — 36430 TRANSFUSION BLD/BLD COMPNT: CPT

## 2023-01-20 PROCEDURE — 94640 AIRWAY INHALATION TREATMENT: CPT

## 2023-01-20 PROCEDURE — A4216 STERILE WATER/SALINE, 10 ML: HCPCS

## 2023-01-20 PROCEDURE — 6360000002 HC RX W HCPCS

## 2023-01-20 PROCEDURE — 36415 COLL VENOUS BLD VENIPUNCTURE: CPT

## 2023-01-20 PROCEDURE — 1200000000 HC SEMI PRIVATE

## 2023-01-20 PROCEDURE — 85027 COMPLETE CBC AUTOMATED: CPT

## 2023-01-20 RX ORDER — SODIUM CHLORIDE 9 MG/ML
INJECTION, SOLUTION INTRAVENOUS PRN
Status: DISCONTINUED | OUTPATIENT
Start: 2023-01-20 | End: 2023-01-26 | Stop reason: HOSPADM

## 2023-01-20 RX ADMIN — GABAPENTIN 100 MG: 100 CAPSULE ORAL at 08:10

## 2023-01-20 RX ADMIN — SODIUM CHLORIDE, PRESERVATIVE FREE 40 MG: 5 INJECTION INTRAVENOUS at 15:33

## 2023-01-20 RX ADMIN — SUCRALFATE 1 G: 1 TABLET ORAL at 21:37

## 2023-01-20 RX ADMIN — METOPROLOL SUCCINATE 25 MG: 25 TABLET, EXTENDED RELEASE ORAL at 08:10

## 2023-01-20 RX ADMIN — SUCRALFATE 1 G: 1 TABLET ORAL at 06:01

## 2023-01-20 RX ADMIN — GABAPENTIN 100 MG: 100 CAPSULE ORAL at 21:37

## 2023-01-20 RX ADMIN — ALBUTEROL SULFATE 0.63 MG: 0.63 SOLUTION RESPIRATORY (INHALATION) at 08:20

## 2023-01-20 RX ADMIN — METOPROLOL SUCCINATE 25 MG: 25 TABLET, EXTENDED RELEASE ORAL at 21:37

## 2023-01-20 RX ADMIN — SUCRALFATE 1 G: 1 TABLET ORAL at 11:07

## 2023-01-20 RX ADMIN — IPRATROPIUM BROMIDE 0.5 MG: 0.5 SOLUTION RESPIRATORY (INHALATION) at 12:09

## 2023-01-20 RX ADMIN — ARFORMOTEROL TARTRATE 15 MCG: 15 SOLUTION RESPIRATORY (INHALATION) at 08:17

## 2023-01-20 RX ADMIN — BUDESONIDE 500 MCG: 0.5 SUSPENSION RESPIRATORY (INHALATION) at 08:17

## 2023-01-20 RX ADMIN — PREDNISONE 5 MG: 5 TABLET ORAL at 08:10

## 2023-01-20 RX ADMIN — ATORVASTATIN CALCIUM 20 MG: 20 TABLET, FILM COATED ORAL at 08:11

## 2023-01-20 RX ADMIN — MELATONIN 3 MG ORAL TABLET 3 MG: 3 TABLET ORAL at 21:37

## 2023-01-20 RX ADMIN — IPRATROPIUM BROMIDE 0.5 MG: 0.5 SOLUTION RESPIRATORY (INHALATION) at 15:58

## 2023-01-20 RX ADMIN — IPRATROPIUM BROMIDE 0.5 MG: 0.5 SOLUTION RESPIRATORY (INHALATION) at 08:17

## 2023-01-20 RX ADMIN — SUCRALFATE 1 G: 1 TABLET ORAL at 17:31

## 2023-01-20 RX ADMIN — SODIUM CHLORIDE, PRESERVATIVE FREE 40 MG: 5 INJECTION INTRAVENOUS at 01:55

## 2023-01-20 ASSESSMENT — PAIN SCALES - GENERAL: PAINLEVEL_OUTOF10: 0

## 2023-01-20 NOTE — PROGRESS NOTES
GENERAL SURGERY  DAILY PROGRESS NOTE  1/20/2023  Chief Complaint   Patient presents with    Shortness of Breath     Started this AM, worse upon exertion, receiving chemo for lung CA       Subjective:  Patient with reported dark bowel movement overnight but is denying and further bleeding this am . No abdominal pain, nausea, or vomiting. HgB 7.5 from 6.9 after 1 unit of pRBCs yesterday. HgB this AM 7.3    Objective:  BP (!) 168/56   Pulse 52   Temp 97.3 °F (36.3 °C) (Oral)   Resp 16   Ht 5' 6\" (1.676 m)   Wt 174 lb 9.6 oz (79.2 kg)   SpO2 99%   BMI 28.18 kg/m²     General appearance: alert, cooperative and in no acute distress. Eyes: grossly normal  Lungs: nonlabored breathing on 4 L O2 nasal cannula. Heart: regular rate, hypertensive  Abdomen: soft, non-tender, non distended  Skin: No skin abnormalities  Neurologic: Alert and oriented x 3. Grossly normal  Musculoskeletal: No clubbing cyanosis or edema    Assessment/Plan:  67 y.o. male with multifactorial pancytopenia likely related to chemotherapy. Possible gastric ulcerations considering multiple comorbidities. Empirically treat with PPI and Carafate. Trend H/H and transfuse as needed. If off of therapeutic anticoagulation and platelet count over 50 and still with GI bleeding, can consider EGD. However, patient at very high risk considering multiple co morbidities platelet count back to 14   Please call if anything changes     Electronically signed by Albino Oliveira MD on 1/20/2023 at 7:05 AM    [facility] (SICU)  ATTENDING PHYSICIAN CRITICAL CARE PROGRESS NOTE     I have examined the patient, reviewed the record,and discussed the case with the APN/  Resident. I have reviewed all relevant labs and imaging data. Please refer to the  APN/ resident's note.  I agree with the  assessment and plan with the following corrections/ additions made above     Yolanda Davalos MD

## 2023-01-20 NOTE — CARE COORDINATION
Plan is home with wife when medically ready. Per general surgery, If off of therapeutic anticoagulation and platelet count over 50 and still with GI bleeding, can consider EGD. However, patient at very high risk considering multiple co morbidities platelet count back to 14. Wife to transport home after discharge.   Electronically signed by Teresa Palmer RN on 1/20/2023 at 11:08 AM

## 2023-01-20 NOTE — PROGRESS NOTES
Hospitalist Progress Note      PCP: Zainab Fernandes MD    Date of Admission: 1/18/2023        Hospital Course:  67 y.o. male presented with WEAKNESS,  FOUND TO HAVE A HGB OF 6.9 AND   LOW PLATELETS OF 14.  HE WAS  TRANSFUSED PLATELETS, AND BLOOD IN THE ER.  HE HAS A KNOWN HISTORY OF NON SMALL CELL LUNG CANCER DIAGNOSED SEVERAL YEARS AGO, AND WAS ON CHEMO AND RADIATION AT THAT TIME. THE TUMOR SHRUNK , HE STOOPED THE CHEMO, AND IT CAME BACK. HE RESTARTED CHEMO , LAST RECEIVING IT 3 WEEKS AGO. HE DOES ADMIT TO SEEING BRIGHT RED RECTAL BLEEDING ** PLATELETS 12 THIS MORNING, WILL TRANSFUSE        Subjective:   WEAK THIS MORNING          Medications:  Reviewed    Infusion Medications    sodium chloride      sodium chloride      dextrose 5 % and 0.9 % NaCl 100 mL/hr at 01/19/23 0230     Scheduled Medications    [Held by provider] apixaban  5 mg Oral BID    atorvastatin  20 mg Oral Daily    gabapentin  100 mg Oral BID    melatonin  3 mg Oral Nightly    metoprolol succinate  25 mg Oral BID    predniSONE  5 mg Oral Daily    sucralfate  1 g Oral 4x Daily AC & HS    budesonide  0.5 mg Nebulization BID    And    arformoterol tartrate  15 mcg Nebulization BID    And    ipratropium  0.5 mg Nebulization 4x daily    pantoprazole (PROTONIX) 40 mg injection  40 mg IntraVENous Q12H     PRN Meds: sodium chloride, sodium chloride, albuterol, ondansetron      Intake/Output Summary (Last 24 hours) at 1/20/2023 1600  Last data filed at 1/20/2023 1551  Gross per 24 hour   Intake 3661.33 ml   Output --   Net 3661.33 ml       Exam:    BP (!) 152/51   Pulse 65   Temp 98.3 °F (36.8 °C) (Oral)   Resp 18   Ht 5' 6\" (1.676 m)   Wt 174 lb 9.6 oz (79.2 kg)   SpO2 95%   BMI 28.18 kg/m²       General appearance:  No apparent distress, appears stated age. HEENT:  Normal cephalic, atraumatic without obvious deformity. Pupils equal, round, and reactive to light. Extra ocular muscles intact. Conjunctivae/corneas clear.   Neck: Supple, with full range of motion. No jugular venous distention. Trachea midline. Respiratory:  Normal respiratory effort. Clear to auscultation,   Cardiovascular:  RRRR  Abdomen: Soft, non-tender, non-distended with normal bowel sounds. Musculoskeletal:  No clubbing, cyanosis or edema bilaterally. Skin: smooth and dry   Neurologic:   Cranial nerves: II-XII intact,   Psychiatric:  Alert and oriented x 3             Labs:   Recent Labs     01/18/23  1217 01/18/23  1833 01/19/23  0230 01/20/23  0600   WBC 2.8*  --  2.2* 3.0*   HGB 6.5* 6.9* 7.5* 7.3*   HCT 20.3* 21.0* 22.8* 22.5*   PLT 14*  --  22* 12*     Recent Labs     01/18/23  1217 01/19/23  0230 01/20/23  0600    141 140   K 4.2 3.9 3.9    103 106   CO2 24 31* 27   BUN 21 19 15   CREATININE 0.9 0.8 0.8   CALCIUM 9.9 9.1 9.0     No results for input(s): AST, ALT, BILIDIR, BILITOT, ALKPHOS in the last 72 hours. No results for input(s): INR in the last 72 hours. No results for input(s): Verlena Augustin in the last 72 hours. No results for input(s): AST, ALT, ALB, BILIDIR, BILITOT, ALKPHOS in the last 72 hours. No results for input(s): LACTA in the last 72 hours. No results found for: Shannon Larry  No results found for: AMMONIA    Assessment:    Active Hospital Problems    Diagnosis Date Noted    GIB (gastrointestinal bleeding) [K92.2] 01/18/2023     Priority: Medium   NON SMALL CELL LUNG CANCER   H/O DVT AND PE  HLD  THROMBOCYTOPENIA     PLAN:  MONITOR LLAB   HEMATOLOGY CONSULTED   LIPITOR   TRANSFUSE PLATELETS     DVT Prophylaxis: ELIQUIS  Diet: ADULT DIET;  Regular; Low Fat/Low Chol/High Fiber/2 gm Na  Code Status: Full Code     PT/OT Eval Status: ORDERED     Dispo -  HOME    Electronically signed by Krista Jolly DO on 1/20/2023 at 4:01 PM [facility]

## 2023-01-20 NOTE — PROGRESS NOTES
Subjective:  Chart reviewed  Wife at bedside, patient with SOB and anxiety  The patient is awake and alert. Denies continued Black stools  No chest pain, angina, dyspnea or abdominal discomfort. No nausea or vomiting. Tolerating diet.       Objective:    BP (!) 141/63   Pulse 55   Temp 97.9 °F (36.6 °C) (Oral)   Resp 28   Ht 5' 6\" (1.676 m)   Wt 174 lb 9.6 oz (79.2 kg)   SpO2 98%   BMI 28.18 kg/m²     General: NAD, awake and alert  HEENT: NC/AT, sclera anicteric, mucosa dry no lesions or patches  Neck supple no JVD  Heart:  RRR, pacemaker, no murmur  Lungs:  respirations easy and not labored, diminished breath sound no crackles or wheezing, O2 via NC  Abd: BS+, soft nondistended  Extrem:  No clubbing, cyanosis, edema  Skin: pale, Intact, no petechia or purpura    CBC with Differential:    Lab Results   Component Value Date/Time    WBC 2.2 01/19/2023 02:30 AM    RBC 2.62 01/19/2023 02:30 AM    HGB 7.5 01/19/2023 02:30 AM    HCT 22.8 01/19/2023 02:30 AM    PLT 22 01/19/2023 02:30 AM    MCV 87.0 01/19/2023 02:30 AM    MCH 28.6 01/19/2023 02:30 AM    MCHC 32.9 01/19/2023 02:30 AM    RDW 20.4 01/19/2023 02:30 AM    NRBC 0.9 01/18/2023 12:17 PM    BLASTSPCT 0.9 08/24/2022 05:22 AM    METASPCT 3.5 08/30/2022 07:03 AM    LYMPHOPCT 7.0 01/18/2023 12:17 PM    PROMYELOPCT 1.7 09/01/2022 03:30 AM    MONOPCT 2.1 01/18/2023 12:17 PM    MYELOPCT 0.9 12/29/2022 05:28 PM    BASOPCT 0.0 01/18/2023 12:17 PM    MONOSABS 0.00 01/18/2023 12:17 PM    LYMPHSABS 0.20 01/18/2023 12:17 PM    EOSABS 0.00 01/18/2023 12:17 PM    BASOSABS 0.00 01/18/2023 12:17 PM     CMP:    Lab Results   Component Value Date/Time     01/19/2023 02:30 AM    K 3.9 01/19/2023 02:30 AM    K 4.2 01/18/2023 12:17 PM     01/19/2023 02:30 AM    CO2 31 01/19/2023 02:30 AM    BUN 19 01/19/2023 02:30 AM    CREATININE 0.8 01/19/2023 02:30 AM    GFRAA >60 10/13/2022 12:28 PM    LABGLOM >60 01/19/2023 02:30 AM    GLUCOSE 110 01/19/2023 02:30 AM    PROT 5.9 12/26/2022 11:04 AM    LABALBU 4.0 12/26/2022 11:04 AM    CALCIUM 9.1 01/19/2023 02:30 AM    BILITOT 0.5 12/26/2022 11:04 AM    ALKPHOS 101 12/26/2022 11:04 AM    AST 25 12/26/2022 11:04 AM    ALT 25 12/26/2022 11:04 AM          Current Facility-Administered Medications:     0.9 % sodium chloride infusion, , IntraVENous, PRN, April Lyly, APRN - CNP    albuterol (ACCUNEB) nebulizer solution 0.63 mg, 1 ampule, Nebulization, Q6H PRN, Holden Cerna DO    [Held by provider] apixaban (ELIQUIS) tablet 5 mg, 5 mg, Oral, BID, Joseph [place] Finely, DO    atorvastatin (LIPITOR) tablet 20 mg, 20 mg, Oral, Daily, Joseph [place] Finely, DO, 20 mg at 01/19/23 0810    gabapentin (NEURONTIN) capsule 100 mg, 100 mg, Oral, BID, Joseph [place] Finely, DO, 100 mg at 01/19/23 0810    melatonin tablet 3 mg, 3 mg, Oral, Nightly, Joseph Te Finely, DO, 3 mg at 01/18/23 2147    metoprolol succinate (TOPROL XL) extended release tablet 25 mg, 25 mg, Oral, BID, Joseph Te Finely, DO, 25 mg at 01/19/23 0810    predniSONE (DELTASONE) tablet 5 mg, 5 mg, Oral, Daily, Joseph [place] Finely, DO, 5 mg at 01/19/23 0810    sucralfate (CARAFATE) tablet 1 g, 1 g, Oral, 4x Daily AC & HS, Joseph Te Finely, DO, 1 g at 01/19/23 1113    ondansetron (ZOFRAN) injection 4 mg, 4 mg, IntraVENous, Q6H PRN, Holden Cerna DO    dextrose 5 % and 0.9 % sodium chloride infusion, , IntraVENous, Continuous, Joseph Te Finely, DO, Last Rate: 100 mL/hr at 01/19/23 0230, New Bag at 01/19/23 0230    budesonide (PULMICORT) nebulizer suspension 500 mcg, 0.5 mg, Nebulization, BID, 500 mcg at 01/19/23 0929 **AND** arformoterol tartrate (BROVANA) nebulizer solution 15 mcg, 15 mcg, Nebulization, BID, 15 mcg at 01/19/23 0929 **AND** ipratropium (ATROVENT) 0.02 % nebulizer solution 0.5 mg, 0.5 mg, Nebulization, 4x daily, Joseph Tiwari Finely, DO, 0.5 mg at 01/19/23 1608    pantoprazole (PROTONIX) 40 mg in sodium chloride (PF) 0.9 % 10 mL injection, 40 mg, IntraVENous, Q12H, Eduar Yeager, DO, 40 mg at 01/19/23 1241    XR CHEST PORTABLE   Final Result   Left pleural effusion with probable left upper lobe lingular segment   atelectasis. Assessment:    Principal Problem:    GIB (gastrointestinal bleeding)  Resolved Problems:    * No resolved hospital problems. 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 Treated with concurrent chemoRT fb Imfinzi from 11/2/21. Progression on PET from 6/24/22. Started Heather Sage and Keytruda on 6/30/22. 1/4/2023 seen by Dr. Sandra Khan and treated Dose reduce Gemzar to improve hematological toxicity. Proceed with C6D1 of Carboplatin AUC 2 with Gemzar 1000 mg/m2 IV on D1,8 and Keytruda IV on D1 on a 21 day cycle. RLL PE, Indefinite Eliquis 5 mg BID, okay to continue as long as platelets > 79M. Recommend second line Monoferric 1000 today. He has failed Ferrous sulfate 325 mg every other day. Discussed some of the  potential adverse effects including risk of infusion reaction, pruritus, rashes, taste alterations, skin irritation, allergic reactions and  even anaphylaxis. The patient is agreeable to proceed with IV therapy to help with her symptomatically anemia. Once iron stores are improved and if he still remains anemic then will consider GT therapy. A/P:  Anemia multifactorial, chemotherapy induced and GIB.    Pancytopenia, likely secondary to recent cytotoxic chemotherapy  Transfusion support to maintain hgb > 7.5 g/dL with underlying cardiac issues  Hold anticoagulation until platelet count >33J  Transfuse plt with probable active GI bleed (ordered)  CBC diff plt daily, repeat to determine if patient needs PRBC  Surgery evaluation, no interventions recommended currently    Electronically signed by MATTHEW Lopez on 1/19/2023 at 5:00 PM

## 2023-01-21 LAB
ANION GAP SERPL CALCULATED.3IONS-SCNC: 6 MMOL/L (ref 7–16)
ANISOCYTOSIS: ABNORMAL
BASOPHILS ABSOLUTE: 0.01 E9/L (ref 0–0.2)
BASOPHILS RELATIVE PERCENT: 0.4 % (ref 0–2)
BUN BLDV-MCNC: 10 MG/DL (ref 6–23)
CALCIUM SERPL-MCNC: 8.8 MG/DL (ref 8.6–10.2)
CHLORIDE BLD-SCNC: 105 MMOL/L (ref 98–107)
CO2: 29 MMOL/L (ref 22–29)
CREAT SERPL-MCNC: 0.6 MG/DL (ref 0.7–1.2)
EOSINOPHILS ABSOLUTE: 0.01 E9/L (ref 0.05–0.5)
EOSINOPHILS RELATIVE PERCENT: 0.4 % (ref 0–6)
GFR SERPL CREATININE-BSD FRML MDRD: >60 ML/MIN/1.73
GLUCOSE BLD-MCNC: 87 MG/DL (ref 74–99)
HCT VFR BLD CALC: 22.7 % (ref 37–54)
HEMOGLOBIN: 7.4 G/DL (ref 12.5–16.5)
HYPOCHROMIA: ABNORMAL
IMMATURE GRANULOCYTES #: 0.02 E9/L
IMMATURE GRANULOCYTES %: 0.7 % (ref 0–5)
LYMPHOCYTES ABSOLUTE: 0.36 E9/L (ref 1.5–4)
LYMPHOCYTES RELATIVE PERCENT: 13.2 % (ref 20–42)
MCH RBC QN AUTO: 29.8 PG (ref 26–35)
MCHC RBC AUTO-ENTMCNC: 32.6 % (ref 32–34.5)
MCV RBC AUTO: 91.5 FL (ref 80–99.9)
MONOCYTES ABSOLUTE: 0.18 E9/L (ref 0.1–0.95)
MONOCYTES RELATIVE PERCENT: 6.6 % (ref 2–12)
NEUTROPHILS ABSOLUTE: 2.15 E9/L (ref 1.8–7.3)
NEUTROPHILS RELATIVE PERCENT: 78.7 % (ref 43–80)
OVALOCYTES: ABNORMAL
PDW BLD-RTO: 18.9 FL (ref 11.5–15)
PLATELET # BLD: 23 E9/L (ref 130–450)
PLATELET CONFIRMATION: NORMAL
PMV BLD AUTO: 9.6 FL (ref 7–12)
POIKILOCYTES: ABNORMAL
POTASSIUM SERPL-SCNC: 3.5 MMOL/L (ref 3.5–5)
RBC # BLD: 2.48 E12/L (ref 3.8–5.8)
SODIUM BLD-SCNC: 140 MMOL/L (ref 132–146)
TEAR DROP CELLS: ABNORMAL
WBC # BLD: 2.7 E9/L (ref 4.5–11.5)

## 2023-01-21 PROCEDURE — 85025 COMPLETE CBC W/AUTO DIFF WBC: CPT

## 2023-01-21 PROCEDURE — C9113 INJ PANTOPRAZOLE SODIUM, VIA: HCPCS

## 2023-01-21 PROCEDURE — 6370000000 HC RX 637 (ALT 250 FOR IP): Performed by: PHYSICIAN ASSISTANT

## 2023-01-21 PROCEDURE — 6370000000 HC RX 637 (ALT 250 FOR IP): Performed by: INTERNAL MEDICINE

## 2023-01-21 PROCEDURE — 2700000000 HC OXYGEN THERAPY PER DAY

## 2023-01-21 PROCEDURE — A4216 STERILE WATER/SALINE, 10 ML: HCPCS

## 2023-01-21 PROCEDURE — 6370000000 HC RX 637 (ALT 250 FOR IP): Performed by: NURSE PRACTITIONER

## 2023-01-21 PROCEDURE — 2580000003 HC RX 258: Performed by: INTERNAL MEDICINE

## 2023-01-21 PROCEDURE — 2580000003 HC RX 258

## 2023-01-21 PROCEDURE — 1200000000 HC SEMI PRIVATE

## 2023-01-21 PROCEDURE — 80048 BASIC METABOLIC PNL TOTAL CA: CPT

## 2023-01-21 PROCEDURE — 6360000002 HC RX W HCPCS: Performed by: INTERNAL MEDICINE

## 2023-01-21 PROCEDURE — 6360000002 HC RX W HCPCS

## 2023-01-21 PROCEDURE — 94640 AIRWAY INHALATION TREATMENT: CPT

## 2023-01-21 PROCEDURE — 36415 COLL VENOUS BLD VENIPUNCTURE: CPT

## 2023-01-21 RX ORDER — MORPHINE SULFATE 10 MG/5ML
2.5 SOLUTION ORAL EVERY 4 HOURS PRN
Status: DISCONTINUED | OUTPATIENT
Start: 2023-01-21 | End: 2023-01-26 | Stop reason: HOSPADM

## 2023-01-21 RX ORDER — DIPHENHYDRAMINE HCL 25 MG
25 TABLET ORAL ONCE
Status: COMPLETED | OUTPATIENT
Start: 2023-01-21 | End: 2023-01-21

## 2023-01-21 RX ADMIN — MORPHINE SULFATE 2.5 MG: 10 SOLUTION ORAL at 20:55

## 2023-01-21 RX ADMIN — METOPROLOL SUCCINATE 25 MG: 25 TABLET, EXTENDED RELEASE ORAL at 09:37

## 2023-01-21 RX ADMIN — ARFORMOTEROL TARTRATE 15 MCG: 15 SOLUTION RESPIRATORY (INHALATION) at 19:44

## 2023-01-21 RX ADMIN — MELATONIN 3 MG ORAL TABLET 3 MG: 3 TABLET ORAL at 20:53

## 2023-01-21 RX ADMIN — IPRATROPIUM BROMIDE 0.5 MG: 0.5 SOLUTION RESPIRATORY (INHALATION) at 19:44

## 2023-01-21 RX ADMIN — MORPHINE SULFATE 2.5 MG: 10 SOLUTION ORAL at 13:57

## 2023-01-21 RX ADMIN — ARFORMOTEROL TARTRATE 15 MCG: 15 SOLUTION RESPIRATORY (INHALATION) at 08:52

## 2023-01-21 RX ADMIN — IPRATROPIUM BROMIDE 0.5 MG: 0.5 SOLUTION RESPIRATORY (INHALATION) at 12:32

## 2023-01-21 RX ADMIN — SUCRALFATE 1 G: 1 TABLET ORAL at 20:54

## 2023-01-21 RX ADMIN — SODIUM CHLORIDE, PRESERVATIVE FREE 40 MG: 5 INJECTION INTRAVENOUS at 13:56

## 2023-01-21 RX ADMIN — GABAPENTIN 100 MG: 100 CAPSULE ORAL at 20:54

## 2023-01-21 RX ADMIN — METOPROLOL SUCCINATE 25 MG: 25 TABLET, EXTENDED RELEASE ORAL at 20:54

## 2023-01-21 RX ADMIN — ALBUTEROL SULFATE 0.63 MG: 0.63 SOLUTION RESPIRATORY (INHALATION) at 19:44

## 2023-01-21 RX ADMIN — BUDESONIDE 500 MCG: 0.5 SUSPENSION RESPIRATORY (INHALATION) at 08:53

## 2023-01-21 RX ADMIN — DIPHENHYDRAMINE HYDROCHLORIDE 25 MG: 25 TABLET ORAL at 13:56

## 2023-01-21 RX ADMIN — ATORVASTATIN CALCIUM 20 MG: 20 TABLET, FILM COATED ORAL at 09:38

## 2023-01-21 RX ADMIN — DEXTROSE AND SODIUM CHLORIDE: 5; 900 INJECTION, SOLUTION INTRAVENOUS at 19:48

## 2023-01-21 RX ADMIN — BUDESONIDE 500 MCG: 0.5 SUSPENSION RESPIRATORY (INHALATION) at 19:44

## 2023-01-21 RX ADMIN — SUCRALFATE 1 G: 1 TABLET ORAL at 09:38

## 2023-01-21 RX ADMIN — SODIUM CHLORIDE, PRESERVATIVE FREE 40 MG: 5 INJECTION INTRAVENOUS at 01:53

## 2023-01-21 RX ADMIN — ALBUTEROL SULFATE 0.63 MG: 0.63 SOLUTION RESPIRATORY (INHALATION) at 12:33

## 2023-01-21 RX ADMIN — SUCRALFATE 1 G: 1 TABLET ORAL at 13:56

## 2023-01-21 RX ADMIN — IPRATROPIUM BROMIDE 0.5 MG: 0.5 SOLUTION RESPIRATORY (INHALATION) at 16:05

## 2023-01-21 RX ADMIN — PREDNISONE 5 MG: 5 TABLET ORAL at 09:38

## 2023-01-21 RX ADMIN — IPRATROPIUM BROMIDE 0.5 MG: 0.5 SOLUTION RESPIRATORY (INHALATION) at 08:54

## 2023-01-21 RX ADMIN — GABAPENTIN 100 MG: 100 CAPSULE ORAL at 09:38

## 2023-01-21 RX ADMIN — SUCRALFATE 1 G: 1 TABLET ORAL at 17:00

## 2023-01-21 ASSESSMENT — PAIN DESCRIPTION - DESCRIPTORS: DESCRIPTORS: TINGLING;NUMBNESS

## 2023-01-21 ASSESSMENT — PAIN DESCRIPTION - ORIENTATION: ORIENTATION: RIGHT;LEFT

## 2023-01-21 ASSESSMENT — PAIN DESCRIPTION - LOCATION: LOCATION: FOOT

## 2023-01-21 ASSESSMENT — PAIN SCALES - GENERAL: PAINLEVEL_OUTOF10: 4

## 2023-01-21 NOTE — PROGRESS NOTES
Hospitalist Progress Note      PCP: Siddharth Arauz MD    Date of Admission: 1/18/2023        Hospital Course:  67 y.o. male presented with WEAKNESS,  FOUND TO HAVE A HGB OF 6.9 AND   LOW PLATELETS OF 14.  HE WAS  TRANSFUSED PLATELETS, AND BLOOD IN THE ER.  HE HAS A KNOWN HISTORY OF NON SMALL CELL LUNG CANCER DIAGNOSED SEVERAL YEARS AGO, AND WAS ON CHEMO AND RADIATION AT THAT TIME. THE TUMOR SHRUNK , HE STOOPED THE CHEMO, AND IT CAME BACK. HE RESTARTED CHEMO , LAST RECEIVING IT 3 WEEKS AGO. HE DOES ADMIT TO SEEING BRIGHT RED RECTAL BLEEDING ** PLATELETS 12 THIS MORNING, WILL TRANSFUSE        Subjective:   Patient is resting on exam yet easily arouse able  SOB overnight  Currently on 4 liters O2              Medications:  Reviewed    Infusion Medications    sodium chloride      sodium chloride      dextrose 5 % and 0.9 % NaCl 100 mL/hr at 01/19/23 0230     Scheduled Medications    [Held by provider] apixaban  5 mg Oral BID    atorvastatin  20 mg Oral Daily    gabapentin  100 mg Oral BID    melatonin  3 mg Oral Nightly    metoprolol succinate  25 mg Oral BID    predniSONE  5 mg Oral Daily    sucralfate  1 g Oral 4x Daily AC & HS    budesonide  0.5 mg Nebulization BID    And    arformoterol tartrate  15 mcg Nebulization BID    And    ipratropium  0.5 mg Nebulization 4x daily    pantoprazole (PROTONIX) 40 mg injection  40 mg IntraVENous Q12H     PRN Meds: sodium chloride, morphine, sodium chloride, sodium chloride, albuterol, ondansetron      Intake/Output Summary (Last 24 hours) at 1/21/2023 1702  Last data filed at 1/20/2023 2140  Gross per 24 hour   Intake --   Output 400 ml   Net -400 ml       Exam:    /62   Pulse 51   Temp 97.3 °F (36.3 °C) (Oral)   Resp 18   Ht 5' 6\" (1.676 m)   Wt 174 lb 9.6 oz (79.2 kg)   SpO2 98%   BMI 28.18 kg/m²       General appearance:  No apparent distress, appears stated age. HEENT:  Normal cephalic, atraumatic without obvious deformity.  Pupils equal, round, and reactive to light. Extra ocular muscles intact. Conjunctivae/corneas clear. Neck: Supple, with full range of motion. No jugular venous distention. Trachea midline. Respiratory:  Normal respiratory effort. Clear to auscultation,   Cardiovascular:  RRRR  Abdomen: Soft, non-tender, non-distended with normal bowel sounds. Musculoskeletal:  No clubbing, cyanosis or edema bilaterally. Skin: smooth and dry   Neurologic:   Cranial nerves: II-XII intact,   Psychiatric:  Alert and oriented x 3             Labs:   Recent Labs     01/20/23  1530 01/20/23  1730 01/21/23  0550   WBC 3.2* 3.3* 2.7*   HGB 8.1* 7.6* 7.4*   HCT 24.6* 22.8* 22.7*   PLT 13* 29* 23*     Recent Labs     01/19/23  0230 01/20/23  0600 01/21/23  0550    140 140   K 3.9 3.9 3.5    106 105   CO2 31* 27 29   BUN 19 15 10   CREATININE 0.8 0.8 0.6*   CALCIUM 9.1 9.0 8.8     No results for input(s): AST, ALT, BILIDIR, BILITOT, ALKPHOS in the last 72 hours. No results for input(s): INR in the last 72 hours. No results for input(s): Les Stacie in the last 72 hours. No results for input(s): AST, ALT, ALB, BILIDIR, BILITOT, ALKPHOS in the last 72 hours. No results for input(s): LACTA in the last 72 hours. No results found for: Aleta Gun  No results found for: AMMONIA    Assessment:    Active Hospital Problems    Diagnosis Date Noted    GIB (gastrointestinal bleeding) [K92.2] 01/18/2023     Priority: Medium   NON SMALL CELL LUNG CANCER   H/O DVT AND PE  HLD  THROMBOCYTOPENIA     PLAN:  MONITOR LLAB   HEMATOLOGY CONSULTED   LIPITOR   TRANSFUSE PLATELETS    7/95/7861  Platelets today 23  IR for thoracentesis  Hold anticoags   General Surgery follow. Patient needs an EGD when platelet count over 50  Vital signs stable     DVT Prophylaxis: ELIQUIS  Diet: ADULT DIET;  Regular; Low Fat/Low Chol/High Fiber/2 gm Na  Code Status: Full Code     PT/OT Eval Status: ORDERED     Dispo -  HOME    Electronically signed by LELO Calzada - CNP on 1/21/2023 at [address]

## 2023-01-21 NOTE — PROGRESS NOTES
Subjective:  Chart reviewed  Wife at bedside  patient with SOB and anxiety overnight  The patient is awake and alert. No chest pain, angina, dyspnea or abdominal discomfort. No nausea or vomiting. Tolerating diet.       Objective:    BP (!) 141/63   Pulse 55   Temp 97.9 °F (36.6 °C) (Oral)   Resp 28   Ht 5' 6\" (1.676 m)   Wt 174 lb 9.6 oz (79.2 kg)   SpO2 98%   BMI 28.18 kg/m²     General: NAD, awake and alert  HEENT: NC/AT, sclera anicteric, mucosa dry no lesions or patches  Neck supple no JVD  Heart:  RRR, pacemaker, no murmur  Lungs:  respirations easy and not labored, diminished breath sound left greater than right, no crackles or wheezing, O2 via NC  Abd: BS+, soft nondistended  Extrem:  No clubbing, cyanosis, edema  Skin: pale, Intact, no petechia or purpura    CBC with Differential:    Lab Results   Component Value Date/Time    WBC 2.2 01/19/2023 02:30 AM    RBC 2.62 01/19/2023 02:30 AM    HGB 7.5 01/19/2023 02:30 AM    HCT 22.8 01/19/2023 02:30 AM    PLT 22 01/19/2023 02:30 AM    MCV 87.0 01/19/2023 02:30 AM    MCH 28.6 01/19/2023 02:30 AM    MCHC 32.9 01/19/2023 02:30 AM    RDW 20.4 01/19/2023 02:30 AM    NRBC 0.9 01/18/2023 12:17 PM    BLASTSPCT 0.9 08/24/2022 05:22 AM    METASPCT 3.5 08/30/2022 07:03 AM    LYMPHOPCT 7.0 01/18/2023 12:17 PM    PROMYELOPCT 1.7 09/01/2022 03:30 AM    MONOPCT 2.1 01/18/2023 12:17 PM    MYELOPCT 0.9 12/29/2022 05:28 PM    BASOPCT 0.0 01/18/2023 12:17 PM    MONOSABS 0.00 01/18/2023 12:17 PM    LYMPHSABS 0.20 01/18/2023 12:17 PM    EOSABS 0.00 01/18/2023 12:17 PM    BASOSABS 0.00 01/18/2023 12:17 PM     CMP:    Lab Results   Component Value Date/Time     01/19/2023 02:30 AM    K 3.9 01/19/2023 02:30 AM    K 4.2 01/18/2023 12:17 PM     01/19/2023 02:30 AM    CO2 31 01/19/2023 02:30 AM    BUN 19 01/19/2023 02:30 AM    CREATININE 0.8 01/19/2023 02:30 AM    GFRAA >60 10/13/2022 12:28 PM    LABGLOM >60 01/19/2023 02:30 AM    GLUCOSE 110 01/19/2023 02:30 AM PROT 5.9 12/26/2022 11:04 AM    LABALBU 4.0 12/26/2022 11:04 AM    CALCIUM 9.1 01/19/2023 02:30 AM    BILITOT 0.5 12/26/2022 11:04 AM    ALKPHOS 101 12/26/2022 11:04 AM    AST 25 12/26/2022 11:04 AM    ALT 25 12/26/2022 11:04 AM          Current Facility-Administered Medications:     0.9 % sodium chloride infusion, , IntraVENous, PRN, April Jon-Jairo, APRN - CNP    albuterol (ACCUNEB) nebulizer solution 0.63 mg, 1 ampule, Nebulization, Q6H PRN, Namrata Guardado, DO    [Held by provider] apixaban (ELIQUIS) tablet 5 mg, 5 mg, Oral, BID, Joseph Xavier, DO    atorvastatin (LIPITOR) tablet 20 mg, 20 mg, Oral, Daily, Joseph Xavier DO, 20 mg at 01/19/23 0810    gabapentin (NEURONTIN) capsule 100 mg, 100 mg, Oral, BID, Joseph Xavier, DO, 100 mg at 01/19/23 0810    melatonin tablet 3 mg, 3 mg, Oral, Nightly, Joseph Xavier DO, 3 mg at 01/18/23 2147    metoprolol succinate (TOPROL XL) extended release tablet 25 mg, 25 mg, Oral, BID, Joseph Xavier, DO, 25 mg at 01/19/23 0810    predniSONE (DELTASONE) tablet 5 mg, 5 mg, Oral, Daily, Joseph Xavier DO, 5 mg at 01/19/23 0810    sucralfate (CARAFATE) tablet 1 g, 1 g, Oral, 4x Daily AC & HS, Joseph Xavier, DO, 1 g at 01/19/23 1113    ondansetron (ZOFRAN) injection 4 mg, 4 mg, IntraVENous, Q6H PRN, Namrata Guardado, DO    dextrose 5 % and 0.9 % sodium chloride infusion, , IntraVENous, Continuous, Joseph Xavier DO, Last Rate: 100 mL/hr at 01/19/23 0230, New Bag at 01/19/23 0230    budesonide (PULMICORT) nebulizer suspension 500 mcg, 0.5 mg, Nebulization, BID, 500 mcg at 01/19/23 0929 **AND** arformoterol tartrate (BROVANA) nebulizer solution 15 mcg, 15 mcg, Nebulization, BID, 15 mcg at 01/19/23 0929 **AND** ipratropium (ATROVENT) 0.02 % nebulizer solution 0.5 mg, 0.5 mg, Nebulization, 4x daily, Joseph Xavier DO, 0.5 mg at 01/19/23 1608    pantoprazole (PROTONIX) 40 mg in sodium chloride (PF) 0.9 % 10 mL injection, 40 mg, IntraVENous, Q12H, Eduar Yeager, DO, 40 mg at 01/19/23 1241    XR CHEST PORTABLE   Final Result   Left pleural effusion with probable left upper lobe lingular segment   atelectasis. Assessment:    Principal Problem:    GIB (gastrointestinal bleeding)  Resolved Problems:    * No resolved hospital problems. 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 Treated with concurrent chemoRT fb Imfinzi from 11/2/21. Progression on PET from 6/24/22. Started Thersa Bunk and Keytruda on 6/30/22. 1/4/2023 seen by Dr. Gaston Raygoza and treated Dose reduce Gemzar to improve hematological toxicity. Proceed with C6D1 of Carboplatin AUC 2 with Gemzar 1000 mg/m2 IV on D1,8 and Keytruda IV on D1 on a 21 day cycle. RLL PE, Indefinite Eliquis 5 mg BID, okay to continue as long as platelets > 66J. Recommend second line Monoferric 1000 today. He has failed Ferrous sulfate 325 mg every other day. Discussed some of the  potential adverse effects including risk of infusion reaction, pruritus, rashes, taste alterations, skin irritation, allergic reactions and  even anaphylaxis. The patient is agreeable to proceed with IV therapy to help with her symptomatically anemia. Once iron stores are improved and if he still remains anemic then will consider GT therapy. A/P:  Anemia multifactorial, chemotherapy induced and GIB.    Pancytopenia, likely secondary to recent cytotoxic chemotherapy  Transfusion support to maintain hgb > 7.5 g/dL with underlying cardiac issues  Hold anticoagulation until platelet count >11A  Transfuse plt with probable active GI bleed (ordered)  CBC diff plt daily, repeat to determine if patient needs PRBC  Pleural effusion, consult IR for thoracentesis  Emphysema, for SOB add low dose morphine discussed with patient and wife at length  Surgery evaluation, no interventions recommended currently    Electronically signed by MATTHEW Mcmillan on 1/19/2023 at 5:00 PM

## 2023-01-22 ENCOUNTER — APPOINTMENT (OUTPATIENT)
Dept: GENERAL RADIOLOGY | Age: 73
DRG: 809 | End: 2023-01-22
Payer: MEDICARE

## 2023-01-22 VITALS
TEMPERATURE: 98.7 F | SYSTOLIC BLOOD PRESSURE: 147 MMHG | RESPIRATION RATE: 18 BRPM | OXYGEN SATURATION: 97 % | BODY MASS INDEX: 28.06 KG/M2 | WEIGHT: 174.6 LBS | HEART RATE: 53 BPM | DIASTOLIC BLOOD PRESSURE: 57 MMHG | HEIGHT: 66 IN

## 2023-01-22 LAB
ABO/RH: NORMAL
ANION GAP SERPL CALCULATED.3IONS-SCNC: 8 MMOL/L (ref 7–16)
ANTIBODY SCREEN: NORMAL
BLOOD BANK DISPENSE STATUS: NORMAL
BLOOD BANK PRODUCT CODE: NORMAL
BPU ID: NORMAL
BUN BLDV-MCNC: 7 MG/DL (ref 6–23)
CALCIUM SERPL-MCNC: 8.9 MG/DL (ref 8.6–10.2)
CHLORIDE BLD-SCNC: 107 MMOL/L (ref 98–107)
CO2: 29 MMOL/L (ref 22–29)
CREAT SERPL-MCNC: 0.7 MG/DL (ref 0.7–1.2)
DESCRIPTION BLOOD BANK: NORMAL
GFR SERPL CREATININE-BSD FRML MDRD: >60 ML/MIN/1.73
GLUCOSE BLD-MCNC: 94 MG/DL (ref 74–99)
HCT VFR BLD CALC: 21.6 % (ref 37–54)
HCT VFR BLD CALC: 26.1 % (ref 37–54)
HEMOGLOBIN: 7.1 G/DL (ref 12.5–16.5)
HEMOGLOBIN: 8.6 G/DL (ref 12.5–16.5)
MAGNESIUM: 1.2 MG/DL (ref 1.6–2.6)
MCH RBC QN AUTO: 29.1 PG (ref 26–35)
MCHC RBC AUTO-ENTMCNC: 32.9 % (ref 32–34.5)
MCV RBC AUTO: 88.5 FL (ref 80–99.9)
PDW BLD-RTO: 18.7 FL (ref 11.5–15)
PLATELET # BLD: 20 E9/L (ref 130–450)
PLATELET CONFIRMATION: NORMAL
PMV BLD AUTO: 9.4 FL (ref 7–12)
POTASSIUM SERPL-SCNC: 3.5 MMOL/L (ref 3.5–5)
RBC # BLD: 2.44 E12/L (ref 3.8–5.8)
SODIUM BLD-SCNC: 144 MMOL/L (ref 132–146)
WBC # BLD: 2.8 E9/L (ref 4.5–11.5)

## 2023-01-22 PROCEDURE — 6370000000 HC RX 637 (ALT 250 FOR IP): Performed by: PHYSICIAN ASSISTANT

## 2023-01-22 PROCEDURE — C9113 INJ PANTOPRAZOLE SODIUM, VIA: HCPCS

## 2023-01-22 PROCEDURE — 86900 BLOOD TYPING SEROLOGIC ABO: CPT

## 2023-01-22 PROCEDURE — 6370000000 HC RX 637 (ALT 250 FOR IP): Performed by: INTERNAL MEDICINE

## 2023-01-22 PROCEDURE — A4216 STERILE WATER/SALINE, 10 ML: HCPCS

## 2023-01-22 PROCEDURE — 85018 HEMOGLOBIN: CPT

## 2023-01-22 PROCEDURE — 94640 AIRWAY INHALATION TREATMENT: CPT

## 2023-01-22 PROCEDURE — 2580000003 HC RX 258: Performed by: INTERNAL MEDICINE

## 2023-01-22 PROCEDURE — 86850 RBC ANTIBODY SCREEN: CPT

## 2023-01-22 PROCEDURE — 85014 HEMATOCRIT: CPT

## 2023-01-22 PROCEDURE — 2700000000 HC OXYGEN THERAPY PER DAY

## 2023-01-22 PROCEDURE — 71045 X-RAY EXAM CHEST 1 VIEW: CPT

## 2023-01-22 PROCEDURE — 86923 COMPATIBILITY TEST ELECTRIC: CPT

## 2023-01-22 PROCEDURE — 83735 ASSAY OF MAGNESIUM: CPT

## 2023-01-22 PROCEDURE — P9016 RBC LEUKOCYTES REDUCED: HCPCS

## 2023-01-22 PROCEDURE — 6370000000 HC RX 637 (ALT 250 FOR IP): Performed by: NURSE PRACTITIONER

## 2023-01-22 PROCEDURE — 86901 BLOOD TYPING SEROLOGIC RH(D): CPT

## 2023-01-22 PROCEDURE — 6360000002 HC RX W HCPCS: Performed by: INTERNAL MEDICINE

## 2023-01-22 PROCEDURE — 6360000002 HC RX W HCPCS

## 2023-01-22 PROCEDURE — 36430 TRANSFUSION BLD/BLD COMPNT: CPT

## 2023-01-22 PROCEDURE — 36415 COLL VENOUS BLD VENIPUNCTURE: CPT

## 2023-01-22 PROCEDURE — 85027 COMPLETE CBC AUTOMATED: CPT

## 2023-01-22 PROCEDURE — 2580000003 HC RX 258

## 2023-01-22 PROCEDURE — 80048 BASIC METABOLIC PNL TOTAL CA: CPT

## 2023-01-22 PROCEDURE — 1200000000 HC SEMI PRIVATE

## 2023-01-22 RX ORDER — DIPHENHYDRAMINE HCL 25 MG
25 TABLET ORAL ONCE
Status: COMPLETED | OUTPATIENT
Start: 2023-01-22 | End: 2023-01-22

## 2023-01-22 RX ORDER — FUROSEMIDE 10 MG/ML
40 INJECTION INTRAMUSCULAR; INTRAVENOUS ONCE
Status: COMPLETED | OUTPATIENT
Start: 2023-01-22 | End: 2023-01-22

## 2023-01-22 RX ORDER — SODIUM CHLORIDE 9 MG/ML
INJECTION, SOLUTION INTRAVENOUS PRN
Status: DISCONTINUED | OUTPATIENT
Start: 2023-01-22 | End: 2023-01-26 | Stop reason: HOSPADM

## 2023-01-22 RX ADMIN — ARFORMOTEROL TARTRATE 15 MCG: 15 SOLUTION RESPIRATORY (INHALATION) at 08:15

## 2023-01-22 RX ADMIN — SUCRALFATE 1 G: 1 TABLET ORAL at 20:58

## 2023-01-22 RX ADMIN — SUCRALFATE 1 G: 1 TABLET ORAL at 16:50

## 2023-01-22 RX ADMIN — GABAPENTIN 100 MG: 100 CAPSULE ORAL at 08:08

## 2023-01-22 RX ADMIN — BUDESONIDE 500 MCG: 0.5 SUSPENSION RESPIRATORY (INHALATION) at 08:18

## 2023-01-22 RX ADMIN — BUDESONIDE 500 MCG: 0.5 SUSPENSION RESPIRATORY (INHALATION) at 19:35

## 2023-01-22 RX ADMIN — FUROSEMIDE 40 MG: 10 INJECTION, SOLUTION INTRAMUSCULAR; INTRAVENOUS at 16:49

## 2023-01-22 RX ADMIN — SODIUM CHLORIDE, PRESERVATIVE FREE 40 MG: 5 INJECTION INTRAVENOUS at 01:59

## 2023-01-22 RX ADMIN — SUCRALFATE 1 G: 1 TABLET ORAL at 10:28

## 2023-01-22 RX ADMIN — ARFORMOTEROL TARTRATE 15 MCG: 15 SOLUTION RESPIRATORY (INHALATION) at 19:35

## 2023-01-22 RX ADMIN — PREDNISONE 5 MG: 5 TABLET ORAL at 08:08

## 2023-01-22 RX ADMIN — IPRATROPIUM BROMIDE 0.5 MG: 0.5 SOLUTION RESPIRATORY (INHALATION) at 12:41

## 2023-01-22 RX ADMIN — DIPHENHYDRAMINE HYDROCHLORIDE 25 MG: 25 TABLET ORAL at 18:23

## 2023-01-22 RX ADMIN — METOPROLOL SUCCINATE 25 MG: 25 TABLET, EXTENDED RELEASE ORAL at 20:58

## 2023-01-22 RX ADMIN — SUCRALFATE 1 G: 1 TABLET ORAL at 05:29

## 2023-01-22 RX ADMIN — MORPHINE SULFATE 2.5 MG: 10 SOLUTION ORAL at 10:28

## 2023-01-22 RX ADMIN — IPRATROPIUM BROMIDE 0.5 MG: 0.5 SOLUTION RESPIRATORY (INHALATION) at 19:35

## 2023-01-22 RX ADMIN — MORPHINE SULFATE 2.5 MG: 10 SOLUTION ORAL at 17:42

## 2023-01-22 RX ADMIN — IPRATROPIUM BROMIDE 0.5 MG: 0.5 SOLUTION RESPIRATORY (INHALATION) at 08:17

## 2023-01-22 RX ADMIN — MELATONIN 3 MG ORAL TABLET 3 MG: 3 TABLET ORAL at 20:58

## 2023-01-22 RX ADMIN — METOPROLOL SUCCINATE 25 MG: 25 TABLET, EXTENDED RELEASE ORAL at 08:14

## 2023-01-22 RX ADMIN — SODIUM CHLORIDE, PRESERVATIVE FREE 40 MG: 5 INJECTION INTRAVENOUS at 16:50

## 2023-01-22 RX ADMIN — DEXTROSE AND SODIUM CHLORIDE: 5; 900 INJECTION, SOLUTION INTRAVENOUS at 10:34

## 2023-01-22 RX ADMIN — ALBUTEROL SULFATE 0.63 MG: 0.63 SOLUTION RESPIRATORY (INHALATION) at 16:21

## 2023-01-22 RX ADMIN — ALBUTEROL SULFATE 0.63 MG: 0.63 SOLUTION RESPIRATORY (INHALATION) at 08:16

## 2023-01-22 RX ADMIN — SALINE NASAL SPRAY 1 SPRAY: 1.5 SOLUTION NASAL at 08:09

## 2023-01-22 RX ADMIN — ATORVASTATIN CALCIUM 20 MG: 20 TABLET, FILM COATED ORAL at 08:08

## 2023-01-22 RX ADMIN — GABAPENTIN 100 MG: 100 CAPSULE ORAL at 20:58

## 2023-01-22 RX ADMIN — SALINE NASAL SPRAY 1 SPRAY: 1.5 SOLUTION NASAL at 21:00

## 2023-01-22 RX ADMIN — IPRATROPIUM BROMIDE 0.5 MG: 0.5 SOLUTION RESPIRATORY (INHALATION) at 16:22

## 2023-01-22 RX ADMIN — MORPHINE SULFATE 2.5 MG: 10 SOLUTION ORAL at 05:29

## 2023-01-22 ASSESSMENT — PAIN SCALES - GENERAL: PAINLEVEL_OUTOF10: 4

## 2023-01-22 NOTE — PROGRESS NOTES
Hospitalist Progress Note      PCP: Sourav Stephen MD    Date of Admission: 1/18/2023        Hospital Course:  67 y.o. male presented with WEAKNESS,  FOUND TO HAVE A HGB OF 6.9 AND   LOW PLATELETS OF 14.  HE WAS  TRANSFUSED PLATELETS, AND BLOOD IN THE ER.  HE HAS A KNOWN HISTORY OF NON SMALL CELL LUNG CANCER DIAGNOSED SEVERAL YEARS AGO, AND WAS ON CHEMO AND RADIATION AT THAT TIME. THE TUMOR SHRUNK , HE STOOPED THE CHEMO, AND IT CAME BACK. HE RESTARTED CHEMO , LAST RECEIVING IT 3 WEEKS AGO. HE DOES ADMIT TO SEEING BRIGHT RED RECTAL BLEEDING ** PLATELETS 12 THIS MORNING, WILL TRANSFUSE        Subjective:    He appears dyspneic on evaluation  Rales on bilateral lung fields  On IV fluids               Medications:  Reviewed    Infusion Medications    sodium chloride      sodium chloride      sodium chloride      [Held by provider] dextrose 5 % and 0.9 % NaCl 100 mL/hr at 01/22/23 1034     Scheduled Medications    furosemide  40 mg IntraVENous Once    [Held by provider] apixaban  5 mg Oral BID    atorvastatin  20 mg Oral Daily    gabapentin  100 mg Oral BID    melatonin  3 mg Oral Nightly    metoprolol succinate  25 mg Oral BID    predniSONE  5 mg Oral Daily    sucralfate  1 g Oral 4x Daily AC & HS    budesonide  0.5 mg Nebulization BID    And    arformoterol tartrate  15 mcg Nebulization BID    And    ipratropium  0.5 mg Nebulization 4x daily    pantoprazole (PROTONIX) 40 mg injection  40 mg IntraVENous Q12H     PRN Meds: sodium chloride, sodium chloride, morphine, sodium chloride, sodium chloride, albuterol, ondansetron      Intake/Output Summary (Last 24 hours) at 1/22/2023 1358  Last data filed at 1/22/2023 0533  Gross per 24 hour   Intake --   Output 925 ml   Net -925 ml       Exam:    BP (!) 107/53   Pulse 51   Temp 98.5 °F (36.9 °C)   Resp 18   Ht 5' 6\" (1.676 m)   Wt 174 lb 9.6 oz (79.2 kg)   SpO2 95%   BMI 28.18 kg/m²       General appearance:  No apparent distress, appears stated age.   HEENT: Normal cephalic, atraumatic without obvious deformity. Pupils equal, round, and reactive to light. Extra ocular muscles intact. Conjunctivae/corneas clear. Neck: Supple, with full range of motion. No jugular venous distention. Trachea midline. Respiratory:  Normal respiratory effort. Clear to auscultation,   Cardiovascular:  RRRR on IV fluids  Abdomen: Soft, non-tender, non-distended with normal bowel sounds. Musculoskeletal:  No clubbing, cyanosis or edema bilaterally. Skin: smooth and dry   Neurologic:   Cranial nerves: II-XII intact,   Psychiatric:  Alert and oriented x 3             Labs:   Recent Labs     01/20/23  1730 01/21/23  0550 01/22/23  0407   WBC 3.3* 2.7* 2.8*   HGB 7.6* 7.4* 7.1*   HCT 22.8* 22.7* 21.6*   PLT 29* 23* 20*     Recent Labs     01/20/23  0600 01/21/23  0550 01/22/23  0407    140 144   K 3.9 3.5 3.5    105 107   CO2 27 29 29   BUN 15 10 7   CREATININE 0.8 0.6* 0.7   CALCIUM 9.0 8.8 8.9     No results for input(s): AST, ALT, BILIDIR, BILITOT, ALKPHOS in the last 72 hours. No results for input(s): INR in the last 72 hours. No results for input(s): Selam Lowers in the last 72 hours. No results for input(s): AST, ALT, ALB, BILIDIR, BILITOT, ALKPHOS in the last 72 hours. No results for input(s): LACTA in the last 72 hours. No results found for: Lorri Roulette  No results found for: AMMONIA    Assessment:    Active Hospital Problems    Diagnosis Date Noted    GIB (gastrointestinal bleeding) [K92.2] 01/18/2023     Priority: Medium   NON SMALL CELL LUNG CANCER   H/O DVT AND PE  HLD  THROMBOCYTOPENIA     PLAN:  MONITOR LLAB   HEMATOLOGY CONSULTED   LIPITOR   TRANSFUSE PLATELETS    7/60/2198  Platelets today 23  IR for thoracentesis  Hold anticoags   General Surgery follow.   Patient needs an EGD when platelet count over 50  Vital signs stable    1/22/2023  Patient known to me from multiple hospitalizations historically  He remains pancytopenic unfortunately  Awaiting thoracentesis by interventional radiology as well as EGD to assess etiology of bleed  Check chest x-ray secondary to shortness of breath-volume overload,  vascular congestion  Hold IV fluids  IV Lasix 40x1  Recheck labs in a.m. Case discussed with wife    DVT Prophylaxis: ELIQUIS  Diet: ADULT DIET;  Regular; Low Fat/Low Chol/High Fiber/2 gm Na  Code Status: Full Code     PT/OT Eval Status: ORDERED     Dispo -  HOME    Electronically signed by Zoe Garcia MD on 1/22/2023 at 1:58 PM

## 2023-01-22 NOTE — PROGRESS NOTES
Subjective:  Chart reviewed  PRBC 1 unit, lasix x 40 mg given    Objective:    BP (!) 141/63   Pulse 55   Temp 97.9 °F (36.6 °C) (Oral)   Resp 28   Ht 5' 6\" (1.676 m)   Wt 174 lb 9.6 oz (79.2 kg)   SpO2 98%   BMI 28.18 kg/m²     CBC with Differential:    Lab Results   Component Value Date/Time    WBC 2.2 01/19/2023 02:30 AM    RBC 2.62 01/19/2023 02:30 AM    HGB 7.5 01/19/2023 02:30 AM    HCT 22.8 01/19/2023 02:30 AM    PLT 22 01/19/2023 02:30 AM    MCV 87.0 01/19/2023 02:30 AM    MCH 28.6 01/19/2023 02:30 AM    MCHC 32.9 01/19/2023 02:30 AM    RDW 20.4 01/19/2023 02:30 AM    NRBC 0.9 01/18/2023 12:17 PM    BLASTSPCT 0.9 08/24/2022 05:22 AM    METASPCT 3.5 08/30/2022 07:03 AM    LYMPHOPCT 7.0 01/18/2023 12:17 PM    PROMYELOPCT 1.7 09/01/2022 03:30 AM    MONOPCT 2.1 01/18/2023 12:17 PM    MYELOPCT 0.9 12/29/2022 05:28 PM    BASOPCT 0.0 01/18/2023 12:17 PM    MONOSABS 0.00 01/18/2023 12:17 PM    LYMPHSABS 0.20 01/18/2023 12:17 PM    EOSABS 0.00 01/18/2023 12:17 PM    BASOSABS 0.00 01/18/2023 12:17 PM     CMP:    Lab Results   Component Value Date/Time     01/19/2023 02:30 AM    K 3.9 01/19/2023 02:30 AM    K 4.2 01/18/2023 12:17 PM     01/19/2023 02:30 AM    CO2 31 01/19/2023 02:30 AM    BUN 19 01/19/2023 02:30 AM    CREATININE 0.8 01/19/2023 02:30 AM    GFRAA >60 10/13/2022 12:28 PM    LABGLOM >60 01/19/2023 02:30 AM    GLUCOSE 110 01/19/2023 02:30 AM    PROT 5.9 12/26/2022 11:04 AM    LABALBU 4.0 12/26/2022 11:04 AM    CALCIUM 9.1 01/19/2023 02:30 AM    BILITOT 0.5 12/26/2022 11:04 AM    ALKPHOS 101 12/26/2022 11:04 AM    AST 25 12/26/2022 11:04 AM    ALT 25 12/26/2022 11:04 AM          Current Facility-Administered Medications:     0.9 % sodium chloride infusion, , IntraVENous, PRN, LELO Mcnulty - CNP    albuterol (ACCUNEB) nebulizer solution 0.63 mg, 1 ampule, Nebulization, Q6H PRN, Sherice Fofana DO    [Held by provider] apixaban (ELIQUIS) tablet 5 mg, 5 mg, Oral, BID, Vonda Echols, DO    atorvastatin (LIPITOR) tablet 20 mg, 20 mg, Oral, Daily, Joseph Roshan Mina, DO, 20 mg at 01/19/23 0810    gabapentin (NEURONTIN) capsule 100 mg, 100 mg, Oral, BID, Joseph Roshan Mina, DO, 100 mg at 01/19/23 0810    melatonin tablet 3 mg, 3 mg, Oral, Nightly, Joseph Roshan Mina, DO, 3 mg at 01/18/23 2147    metoprolol succinate (TOPROL XL) extended release tablet 25 mg, 25 mg, Oral, BID, Joseph Roshan Mina, DO, 25 mg at 01/19/23 0810    predniSONE (DELTASONE) tablet 5 mg, 5 mg, Oral, Daily, Joseph Roshan Mina, DO, 5 mg at 01/19/23 0810    sucralfate (CARAFATE) tablet 1 g, 1 g, Oral, 4x Daily AC & HS, Joseph Roshan Mina, DO, 1 g at 01/19/23 1113    ondansetron (ZOFRAN) injection 4 mg, 4 mg, IntraVENous, Q6H PRN, Vanna , DO    dextrose 5 % and 0.9 % sodium chloride infusion, , IntraVENous, Continuous, Joseph Roshan Mina, DO, Last Rate: 100 mL/hr at 01/19/23 0230, New Bag at 01/19/23 0230    budesonide (PULMICORT) nebulizer suspension 500 mcg, 0.5 mg, Nebulization, BID, 500 mcg at 01/19/23 0929 **AND** arformoterol tartrate (BROVANA) nebulizer solution 15 mcg, 15 mcg, Nebulization, BID, 15 mcg at 01/19/23 0929 **AND** ipratropium (ATROVENT) 0.02 % nebulizer solution 0.5 mg, 0.5 mg, Nebulization, 4x daily, Joseph Roshan Mina, DO, 0.5 mg at 01/19/23 1608    pantoprazole (PROTONIX) 40 mg in sodium chloride (PF) 0.9 % 10 mL injection, 40 mg, IntraVENous, Q12H, Eduar Yeager, DO, 40 mg at 01/19/23 1241    XR CHEST PORTABLE   Final Result   Left pleural effusion with probable left upper lobe lingular segment   atelectasis. Assessment:    Principal Problem:    GIB (gastrointestinal bleeding)  Resolved Problems:    * No resolved hospital problems. 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 Treated with concurrent chemoRT fb Imfinzi from 11/2/21. Progression on PET from 6/24/22.  Started Tana Poor and [place] on 6/30/22. 1/4/2023 seen by Dr. Abrahan Elias and treated Dose reduce Gemzar to improve hematological toxicity. Proceed with C6D1 of Carboplatin AUC 2 with Gemzar 1000 mg/m2 IV on D1,8 and Keytruda IV on D1 on a 21 day cycle. RLL PE, Indefinite Eliquis 5 mg BID, okay to continue as long as platelets > 20N. Recommend second line Monoferric 1000 today. He has failed Ferrous sulfate 325 mg every other day. Discussed some of the  potential adverse effects including risk of infusion reaction, pruritus, rashes, taste alterations, skin irritation, allergic reactions and  even anaphylaxis. The patient is agreeable to proceed with IV therapy to help with her symptomatically anemia. Once iron stores are improved and if he still remains anemic then will consider GT therapy.      A/P:  Pancytopenia, likely secondary to recent cytotoxic chemotherapy  Anemia multifactorial, chemotherapy induced and GIB.   1 unit PRBC today, lasix 40 mg per primary service  Transfusion support to maintain hgb > 7.5 g/dL with underlying cardiac issues  Hold anticoagulation until platelet count >41C  Surgery evaluation, no interventions recommended currently    Pleural effusion, consult IR for thoracentesis  Emphysema, for SOB add low dose morphine discussed with patient and wife at length  SOB- low dose morphine 2.5 mg ordered every 4 hours as needed    Electronically signed by MATTHEW Meza on 1/19/2023 at 5:00 PM

## 2023-01-23 ENCOUNTER — APPOINTMENT (OUTPATIENT)
Dept: ULTRASOUND IMAGING | Age: 73
DRG: 809 | End: 2023-01-23
Payer: MEDICARE

## 2023-01-23 ENCOUNTER — APPOINTMENT (OUTPATIENT)
Dept: CT IMAGING | Age: 73
DRG: 809 | End: 2023-01-23
Payer: MEDICARE

## 2023-01-23 LAB
ANION GAP SERPL CALCULATED.3IONS-SCNC: 10 MMOL/L (ref 7–16)
ANISOCYTOSIS: ABNORMAL
BASOPHILS ABSOLUTE: 0 E9/L (ref 0–0.2)
BASOPHILS RELATIVE PERCENT: 0.3 % (ref 0–2)
BUN BLDV-MCNC: 8 MG/DL (ref 6–23)
CALCIUM SERPL-MCNC: 9 MG/DL (ref 8.6–10.2)
CHLORIDE BLD-SCNC: 104 MMOL/L (ref 98–107)
CO2: 29 MMOL/L (ref 22–29)
CREAT SERPL-MCNC: 0.9 MG/DL (ref 0.7–1.2)
EOSINOPHILS ABSOLUTE: 0 E9/L (ref 0.05–0.5)
EOSINOPHILS RELATIVE PERCENT: 0.7 % (ref 0–6)
GFR SERPL CREATININE-BSD FRML MDRD: >60 ML/MIN/1.73
GLUCOSE BLD-MCNC: 97 MG/DL (ref 74–99)
HCT VFR BLD CALC: 24.4 % (ref 37–54)
HEMOGLOBIN: 7.8 G/DL (ref 12.5–16.5)
INR BLD: 1.2
LYMPHOCYTES ABSOLUTE: 0.3 E9/L (ref 1.5–4)
LYMPHOCYTES RELATIVE PERCENT: 9.6 % (ref 20–42)
MCH RBC QN AUTO: 29.5 PG (ref 26–35)
MCHC RBC AUTO-ENTMCNC: 32 % (ref 32–34.5)
MCV RBC AUTO: 92.4 FL (ref 80–99.9)
MONOCYTES ABSOLUTE: 0.12 E9/L (ref 0.1–0.95)
MONOCYTES RELATIVE PERCENT: 4.4 % (ref 2–12)
NEUTROPHILS ABSOLUTE: 2.55 E9/L (ref 1.8–7.3)
NEUTROPHILS RELATIVE PERCENT: 85.1 % (ref 43–80)
NUCLEATED RED BLOOD CELLS: 1.8 /100 WBC
OVALOCYTES: ABNORMAL
PDW BLD-RTO: 18.5 FL (ref 11.5–15)
PLATELET # BLD: 22 E9/L (ref 130–450)
PLATELET CONFIRMATION: NORMAL
PMV BLD AUTO: 10.2 FL (ref 7–12)
POIKILOCYTES: ABNORMAL
POTASSIUM SERPL-SCNC: 3.4 MMOL/L (ref 3.5–5)
PROMYELOCYTES PERCENT: 0.9 % (ref 0–0)
PROTHROMBIN TIME: 12.7 SEC (ref 9.3–12.4)
RBC # BLD: 2.64 E12/L (ref 3.8–5.8)
SODIUM BLD-SCNC: 143 MMOL/L (ref 132–146)
TEAR DROP CELLS: ABNORMAL
WBC # BLD: 3 E9/L (ref 4.5–11.5)

## 2023-01-23 PROCEDURE — 6370000000 HC RX 637 (ALT 250 FOR IP): Performed by: PHYSICIAN ASSISTANT

## 2023-01-23 PROCEDURE — 1200000000 HC SEMI PRIVATE

## 2023-01-23 PROCEDURE — 6360000002 HC RX W HCPCS

## 2023-01-23 PROCEDURE — 85025 COMPLETE CBC W/AUTO DIFF WBC: CPT

## 2023-01-23 PROCEDURE — 71250 CT THORAX DX C-: CPT

## 2023-01-23 PROCEDURE — 6370000000 HC RX 637 (ALT 250 FOR IP): Performed by: INTERNAL MEDICINE

## 2023-01-23 PROCEDURE — 6360000002 HC RX W HCPCS: Performed by: INTERNAL MEDICINE

## 2023-01-23 PROCEDURE — 36415 COLL VENOUS BLD VENIPUNCTURE: CPT

## 2023-01-23 PROCEDURE — 93970 EXTREMITY STUDY: CPT

## 2023-01-23 PROCEDURE — 94640 AIRWAY INHALATION TREATMENT: CPT

## 2023-01-23 PROCEDURE — C9113 INJ PANTOPRAZOLE SODIUM, VIA: HCPCS

## 2023-01-23 PROCEDURE — A4216 STERILE WATER/SALINE, 10 ML: HCPCS

## 2023-01-23 PROCEDURE — 2700000000 HC OXYGEN THERAPY PER DAY

## 2023-01-23 PROCEDURE — 80048 BASIC METABOLIC PNL TOTAL CA: CPT

## 2023-01-23 PROCEDURE — 85610 PROTHROMBIN TIME: CPT

## 2023-01-23 PROCEDURE — 2580000003 HC RX 258

## 2023-01-23 RX ORDER — FLUTICASONE PROPIONATE 50 MCG
1 SPRAY, SUSPENSION (ML) NASAL DAILY
Status: DISCONTINUED | OUTPATIENT
Start: 2023-01-23 | End: 2023-01-26 | Stop reason: HOSPADM

## 2023-01-23 RX ORDER — DIPHENHYDRAMINE HCL 25 MG
25 TABLET ORAL EVERY 6 HOURS PRN
Status: DISCONTINUED | OUTPATIENT
Start: 2023-01-23 | End: 2023-01-26 | Stop reason: HOSPADM

## 2023-01-23 RX ORDER — POTASSIUM CHLORIDE 20 MEQ/1
40 TABLET, EXTENDED RELEASE ORAL ONCE
Status: COMPLETED | OUTPATIENT
Start: 2023-01-23 | End: 2023-01-23

## 2023-01-23 RX ADMIN — IPRATROPIUM BROMIDE 0.5 MG: 0.5 SOLUTION RESPIRATORY (INHALATION) at 16:15

## 2023-01-23 RX ADMIN — SUCRALFATE 1 G: 1 TABLET ORAL at 17:30

## 2023-01-23 RX ADMIN — DIPHENHYDRAMINE HYDROCHLORIDE 25 MG: 25 TABLET ORAL at 11:41

## 2023-01-23 RX ADMIN — MELATONIN 3 MG ORAL TABLET 3 MG: 3 TABLET ORAL at 21:41

## 2023-01-23 RX ADMIN — MORPHINE SULFATE 2.5 MG: 10 SOLUTION ORAL at 17:41

## 2023-01-23 RX ADMIN — MORPHINE SULFATE 2.5 MG: 10 SOLUTION ORAL at 21:42

## 2023-01-23 RX ADMIN — SODIUM CHLORIDE, PRESERVATIVE FREE 40 MG: 5 INJECTION INTRAVENOUS at 15:28

## 2023-01-23 RX ADMIN — METOPROLOL SUCCINATE 25 MG: 25 TABLET, EXTENDED RELEASE ORAL at 21:41

## 2023-01-23 RX ADMIN — IPRATROPIUM BROMIDE 0.5 MG: 0.5 SOLUTION RESPIRATORY (INHALATION) at 20:43

## 2023-01-23 RX ADMIN — ATORVASTATIN CALCIUM 20 MG: 20 TABLET, FILM COATED ORAL at 08:49

## 2023-01-23 RX ADMIN — PREDNISONE 5 MG: 5 TABLET ORAL at 08:49

## 2023-01-23 RX ADMIN — SUCRALFATE 1 G: 1 TABLET ORAL at 21:41

## 2023-01-23 RX ADMIN — IPRATROPIUM BROMIDE 0.5 MG: 0.5 SOLUTION RESPIRATORY (INHALATION) at 11:39

## 2023-01-23 RX ADMIN — MORPHINE SULFATE 2.5 MG: 10 SOLUTION ORAL at 07:54

## 2023-01-23 RX ADMIN — BUDESONIDE 500 MCG: 0.5 SUSPENSION RESPIRATORY (INHALATION) at 08:04

## 2023-01-23 RX ADMIN — GABAPENTIN 100 MG: 100 CAPSULE ORAL at 21:41

## 2023-01-23 RX ADMIN — BUDESONIDE 500 MCG: 0.5 SUSPENSION RESPIRATORY (INHALATION) at 20:42

## 2023-01-23 RX ADMIN — IPRATROPIUM BROMIDE 0.5 MG: 0.5 SOLUTION RESPIRATORY (INHALATION) at 08:03

## 2023-01-23 RX ADMIN — POTASSIUM CHLORIDE 40 MEQ: 1500 TABLET, EXTENDED RELEASE ORAL at 11:41

## 2023-01-23 RX ADMIN — ARFORMOTEROL TARTRATE 15 MCG: 15 SOLUTION RESPIRATORY (INHALATION) at 20:41

## 2023-01-23 RX ADMIN — SODIUM CHLORIDE, PRESERVATIVE FREE 40 MG: 5 INJECTION INTRAVENOUS at 01:59

## 2023-01-23 RX ADMIN — METOPROLOL SUCCINATE 25 MG: 25 TABLET, EXTENDED RELEASE ORAL at 08:51

## 2023-01-23 RX ADMIN — SUCRALFATE 1 G: 1 TABLET ORAL at 11:41

## 2023-01-23 RX ADMIN — GABAPENTIN 100 MG: 100 CAPSULE ORAL at 08:49

## 2023-01-23 RX ADMIN — ARFORMOTEROL TARTRATE 15 MCG: 15 SOLUTION RESPIRATORY (INHALATION) at 08:04

## 2023-01-23 RX ADMIN — FLUTICASONE PROPIONATE 1 SPRAY: 50 SPRAY, METERED NASAL at 15:27

## 2023-01-23 RX ADMIN — SUCRALFATE 1 G: 1 TABLET ORAL at 05:13

## 2023-01-23 ASSESSMENT — PAIN - FUNCTIONAL ASSESSMENT: PAIN_FUNCTIONAL_ASSESSMENT: ACTIVITIES ARE NOT PREVENTED

## 2023-01-23 ASSESSMENT — PAIN DESCRIPTION - LOCATION
LOCATION: FOOT
LOCATION: RIB CAGE;FOOT
LOCATION: GENERALIZED

## 2023-01-23 ASSESSMENT — PAIN DESCRIPTION - DESCRIPTORS
DESCRIPTORS: DISCOMFORT
DESCRIPTORS: DISCOMFORT
DESCRIPTORS: DISCOMFORT;ACHING

## 2023-01-23 ASSESSMENT — PAIN DESCRIPTION - ORIENTATION
ORIENTATION: RIGHT;LEFT
ORIENTATION: RIGHT;LEFT

## 2023-01-23 ASSESSMENT — PAIN SCALES - GENERAL
PAINLEVEL_OUTOF10: 4
PAINLEVEL_OUTOF10: 4
PAINLEVEL_OUTOF10: 7

## 2023-01-23 NOTE — PROGRESS NOTES
Subjective:  Chart reviewed  Wife at bedside, patient off floor getting CT chest  She requested pulmonology consult and wishes for them to complete thoracentesis if needed  Patient with reported rash on arms, discussed with primary service likely secondary to thrombocytopenia, non-pruritic  Patient's wife reports patients seemed to have a little improvement with SOB after taking morphine    Objective:    BP (!) 141/63   Pulse 55   Temp 97.9 °F (36.6 °C) (Oral)   Resp 28   Ht 5' 6\" (1.676 m)   Wt 174 lb 9.6 oz (79.2 kg)   SpO2 98%   BMI 28.18 kg/m²     Patient off floor  CBC with Differential:    Lab Results   Component Value Date/Time    WBC 2.2 01/19/2023 02:30 AM    RBC 2.62 01/19/2023 02:30 AM    HGB 7.5 01/19/2023 02:30 AM    HCT 22.8 01/19/2023 02:30 AM    PLT 22 01/19/2023 02:30 AM    MCV 87.0 01/19/2023 02:30 AM    MCH 28.6 01/19/2023 02:30 AM    MCHC 32.9 01/19/2023 02:30 AM    RDW 20.4 01/19/2023 02:30 AM    NRBC 0.9 01/18/2023 12:17 PM    BLASTSPCT 0.9 08/24/2022 05:22 AM    METASPCT 3.5 08/30/2022 07:03 AM    LYMPHOPCT 7.0 01/18/2023 12:17 PM    PROMYELOPCT 1.7 09/01/2022 03:30 AM    MONOPCT 2.1 01/18/2023 12:17 PM    MYELOPCT 0.9 12/29/2022 05:28 PM    BASOPCT 0.0 01/18/2023 12:17 PM    MONOSABS 0.00 01/18/2023 12:17 PM    LYMPHSABS 0.20 01/18/2023 12:17 PM    EOSABS 0.00 01/18/2023 12:17 PM    BASOSABS 0.00 01/18/2023 12:17 PM     CMP:    Lab Results   Component Value Date/Time     01/19/2023 02:30 AM    K 3.9 01/19/2023 02:30 AM    K 4.2 01/18/2023 12:17 PM     01/19/2023 02:30 AM    CO2 31 01/19/2023 02:30 AM    BUN 19 01/19/2023 02:30 AM    CREATININE 0.8 01/19/2023 02:30 AM    GFRAA >60 10/13/2022 12:28 PM    LABGLOM >60 01/19/2023 02:30 AM    GLUCOSE 110 01/19/2023 02:30 AM    PROT 5.9 12/26/2022 11:04 AM    LABALBU 4.0 12/26/2022 11:04 AM    CALCIUM 9.1 01/19/2023 02:30 AM    BILITOT 0.5 12/26/2022 11:04 AM    ALKPHOS 101 12/26/2022 11:04 AM    AST 25 12/26/2022 11:04 AM ALT 25 12/26/2022 11:04 AM          Current Facility-Administered Medications:     0.9 % sodium chloride infusion, , IntraVENous, PRN, April Lyly, LELO - CNP    albuterol (ACCUNEB) nebulizer solution 0.63 mg, 1 ampule, Nebulization, Q6H PRN, Yamila Plascencia DO    [Held by provider] apixaban (ELIQUIS) tablet 5 mg, 5 mg, Oral, BID, Joseph Gross DO    atorvastatin (LIPITOR) tablet 20 mg, 20 mg, Oral, Daily, Joseph Gross, DO, 20 mg at 01/19/23 0810    gabapentin (NEURONTIN) capsule 100 mg, 100 mg, Oral, BID, Joseph Gross DO, 100 mg at 01/19/23 0810    melatonin tablet 3 mg, 3 mg, Oral, Nightly, Joseph Gross DO, 3 mg at 01/18/23 2147    metoprolol succinate (TOPROL XL) extended release tablet 25 mg, 25 mg, Oral, BID, Joseph Gross DO, 25 mg at 01/19/23 0810    predniSONE (DELTASONE) tablet 5 mg, 5 mg, Oral, Daily, Joseph Gross DO, 5 mg at 01/19/23 0810    sucralfate (CARAFATE) tablet 1 g, 1 g, Oral, 4x Daily AC & HS, Joseph Gross DO, 1 g at 01/19/23 1113    ondansetron (ZOFRAN) injection 4 mg, 4 mg, IntraVENous, Q6H PRN, Yamila Plascencia DO    dextrose 5 % and 0.9 % sodium chloride infusion, , IntraVENous, Continuous, Joseph Gross DO, Last Rate: 100 mL/hr at 01/19/23 0230, New Bag at 01/19/23 0230    budesonide (PULMICORT) nebulizer suspension 500 mcg, 0.5 mg, Nebulization, BID, 500 mcg at 01/19/23 0929 **AND** arformoterol tartrate (BROVANA) nebulizer solution 15 mcg, 15 mcg, Nebulization, BID, 15 mcg at 01/19/23 0929 **AND** ipratropium (ATROVENT) 0.02 % nebulizer solution 0.5 mg, 0.5 mg, Nebulization, 4x daily, Joseph Gross DO, 0.5 mg at 01/19/23 1608    pantoprazole (PROTONIX) 40 mg in sodium chloride (PF) 0.9 % 10 mL injection, 40 mg, IntraVENous, Q12H, Eduar Yeager DO, 40 mg at 01/19/23 1241    XR CHEST PORTABLE   Final Result   Left pleural effusion with probable left upper lobe lingular segment   atelectasis. Assessment:    Principal Problem:    GIB (gastrointestinal bleeding)  Resolved Problems:    * No resolved hospital problems. 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 Treated with concurrent chemoRT fb Imfinzi from 11/2/21. Progression on PET from 6/24/22. Started Manuel Nipper and Keytruda on 6/30/22. 1/4/2023 seen by Dr. Jericho Lee and treated Dose reduce Gemzar to improve hematological toxicity. Proceed with C6D1 of Carboplatin AUC 2 with Gemzar 1000 mg/m2 IV on D1,8 and Keytruda IV on D1 on a 21 day cycle. RLL PE, Indefinite Eliquis 5 mg BID, okay to continue as long as platelets > 98G. Recommend second line Monoferric 1000 today. He has failed Ferrous sulfate 325 mg every other day. Discussed some of the  potential adverse effects including risk of infusion reaction, pruritus, rashes, taste alterations, skin irritation, allergic reactions and  even anaphylaxis. The patient is agreeable to proceed with IV therapy to help with her symptomatically anemia. Once iron stores are improved and if he still remains anemic then will consider GT therapy. A/P:  Anemia multifactorial, chemotherapy induced and GIB. Leukopenia/thrombocytopenia very slightly improved  Transfusion support to maintain hgb > 7.5 g/dL with underlying cardiac issues  Hold anticoagulation until platelet count >41J  Transfuse plt with probable active GI bleed (ordered)  CBC diff plt daily, repeat to determine if patient needs PRBC  Surgery evaluation, no interventions recommended currently  Dopplers ordered of LE  SOB- chronic with underlying emphysema, ?  Worsened by pleural effusion  Morphine 2.5 mg po every 4 hours as needed for SOB in the interim    Electronically signed by MATTHEW Glover on 1/19/2023 at 5:00 PM

## 2023-01-23 NOTE — PROGRESS NOTES
Patient received 1 unit of RBC this shift with no complications. Post transfusion patient and his wife stated they were unsure of redness noted to bilateral arms if this was there prior to transfusion or if this happened after he received oral morphine. Patient denied any new onset of SOB or any skin irritations. VSS one time dose of benadryl given per Dr. Blackman Scales resident. Patient is sitting on side of the bed alert and oriented reading.

## 2023-01-23 NOTE — PROGRESS NOTES
Hospitalist Progress Note      PCP: David Bettencourt MD    Date of Admission: 1/18/2023        Hospital Course:  67 y.o. male presented with WEAKNESS,  FOUND TO HAVE A HGB OF 6.9 AND   LOW PLATELETS OF 14.  HE WAS  TRANSFUSED PLATELETS, AND BLOOD IN THE ER.  HE HAS A KNOWN HISTORY OF NON SMALL CELL LUNG CANCER DIAGNOSED SEVERAL YEARS AGO, AND WAS ON CHEMO AND RADIATION AT THAT TIME. THE TUMOR SHRUNK , HE STOOPED THE CHEMO, AND IT CAME BACK. HE RESTARTED CHEMO , LAST RECEIVING IT 3 WEEKS AGO. HE DOES ADMIT TO SEEING BRIGHT RED RECTAL BLEEDING **  PLATELETS 22 TODAY, HAS PETECHIAE ON ARMS           Subjective:  CONCERNED ABOUT THORACENTESIS           Medications:  Reviewed    Infusion Medications    sodium chloride      sodium chloride      sodium chloride       Scheduled Medications    fluticasone  1 spray Each Nostril Daily    [Held by provider] apixaban  5 mg Oral BID    atorvastatin  20 mg Oral Daily    gabapentin  100 mg Oral BID    melatonin  3 mg Oral Nightly    metoprolol succinate  25 mg Oral BID    predniSONE  5 mg Oral Daily    sucralfate  1 g Oral 4x Daily AC & HS    budesonide  0.5 mg Nebulization BID    And    arformoterol tartrate  15 mcg Nebulization BID    And    ipratropium  0.5 mg Nebulization 4x daily    pantoprazole (PROTONIX) 40 mg injection  40 mg IntraVENous Q12H     PRN Meds: diphenhydrAMINE, sodium chloride, sodium chloride, morphine, sodium chloride, sodium chloride, albuterol, ondansetron      Intake/Output Summary (Last 24 hours) at 1/23/2023 1550  Last data filed at 1/23/2023 1333  Gross per 24 hour   Intake 506 ml   Output 1100 ml   Net -594 ml       Exam:    BP (!) 131/52   Pulse 51   Temp 97.1 °F (36.2 °C) (Oral)   Resp 20   Ht 5' 6\" (1.676 m)   Wt 174 lb 9.6 oz (79.2 kg)   SpO2 96%   BMI 28.18 kg/m²       General appearance:  No apparent distress, appears stated age. HEENT:  Normal cephalic, atraumatic without obvious deformity.  Pupils equal, round, and reactive to light.  Extra ocular muscles intact. Conjunctivae/corneas clear. Neck: Supple, with full range of motion. No jugular venous distention. Trachea midline. Respiratory:  Normal respiratory effort. Clear to auscultation,   Cardiovascular:  RRRR  Abdomen: Soft, non-tender, non-distended with normal bowel sounds. Musculoskeletal:  No clubbing, cyanosis  POS edema bilaterally. Skin: PETECHIAE ON ARMS,    Neurologic:   Cranial nerves: II-XII intact,   Psychiatric:  Alert and oriented x 3                Labs:   Recent Labs     01/21/23  0550 01/22/23  0407 01/22/23  1830 01/23/23  0629   WBC 2.7* 2.8*  --  3.0*   HGB 7.4* 7.1* 8.6* 7.8*   HCT 22.7* 21.6* 26.1* 24.4*   PLT 23* 20*  --  22*     Recent Labs     01/21/23  0550 01/22/23  0407 01/23/23  0629    144 143   K 3.5 3.5 3.4*    107 104   CO2 29 29 29   BUN 10 7 8   CREATININE 0.6* 0.7 0.9   CALCIUM 8.8 8.9 9.0     No results for input(s): AST, ALT, BILIDIR, BILITOT, ALKPHOS in the last 72 hours. Recent Labs     01/23/23  0900   INR 1.2     No results for input(s): Assunta Araya in the last 72 hours. No results for input(s): AST, ALT, ALB, BILIDIR, BILITOT, ALKPHOS in the last 72 hours. No results for input(s): LACTA in the last 72 hours. No results found for: Felizardo Boot  No results found for: AMMONIA    Assessment:    Active Hospital Problems    Diagnosis Date Noted    GIB (gastrointestinal bleeding) [K92.2] 01/18/2023     Priority: Medium   NON SMALL CELL LUNG CANCER   H/O DVT AND PE  HLD  THROMBOCYTOPENIA     PLAN:  MONITOR LLAB   HEMATOLOGY CONSULTED( KEEP HGB 7.5   AND  PLATELETS > 31N BEFORE RESTARTING ELIQUIS    LIPITOR   US OF LEGS     DVT Prophylaxis: ELIQUIS( ON HOLD)  Diet: ADULT DIET;  Regular; Low Fat/Low Chol/High Fiber/2 gm Na  Code Status: Full Code     PT/OT Eval Status: ORDERED     Dispo -  HOME    Electronically signed by Mark Anthony Jonas DO on 1/23/2023 at 3:50 PM [facility]

## 2023-01-23 NOTE — CONSULTS
[address] NOTE    Patient: Ping Adams  MRN: [id number]  : 1950    Encounter Date: 2023  Encounter Time: 12:34 PM     Date of Admission: .2023 12:01 PM    Consulting Physician:  Primary Care Physician:      Jerman Amezcua MD     [phone]    PROBLEM LIST:  Patient Active Problem List   Diagnosis    Syncope and collapse    Pneumonia    COPD (chronic obstructive pulmonary disease) ([address]    BPH (benign prostatic hyperplasia)    HTN (hypertension)    HLD (hyperlipidemia)    Intermittent complete heart block ([address]    Cardiac pacemaker in situ    Acute on chronic respiratory failure with hypoxia (HCC)    Squamous carcinoma of lung ([address]    History pulmonary embolism ([address] on Eliquis    Hyponatremia    Hypomagnesemia    Wide-complex tachycardia    COVID-19    Pancytopenia ([address]    Pacemaker infection ([address]    Sepsis due to Enterococcus ([address]    Bacteremia    High-grade atrioventricular block    Second degree AV block, Mobitz type I    Hematoma    Presence of leadless cardiac pacemaker    Bacterial sepsis ([address]    Anemia    GIB (gastrointestinal bleeding)     Reason for Consultation: Pleural Effusion     HPI:   Mr. Arnav Meier is a 66 y/o male with past medical history noted for COPD, cardiac arrhythmia (previously wore event monitor per EP), left hilar lymphadenopathy, squamous cell carcinoma, COVID 2022, pulmonary embolism (on eliquis), port placement, pacemaker placement (2021) that presented to [facility] with weakness, cough, shortness of breath, fatigue. He had moderate shortness of breath prior to admission which prompted him to seek medical attention. EBUS bronchoscopy on 2021 noted left hilar station 11L Stage III squamous cell carcinoma. Patient attained COVID vaccine about 2021. He had COVID 2022 which required in-hospital stay.   Patient has no increased work of breathing, weight changes, fevers, chills noted. Patient has oncology following and is on supplemental oxygen with 2L used. Patient on PO eliquis at this time. Patient had blood cultures positive for GPC in chains x 2 bottles, Enterococcus faecalis. At that time patient is pancytopenic with WBC 2.6, Hgb 6.9, Plt 4. This admission patient again presented with shortness of breath. He is now on 3L O2 NC. PAST MEDICAL HISTORY:     Past Medical History:   Diagnosis Date    CHB (complete heart block) ([facility] 07/2021    COPD (chronic obstructive pulmonary disease) (HCC)     Hypertension     NSCLC of left lung ([facility] 05/27/2021    SCC per biopsy. Pulmonary emboli ([facility] 09/17/2021    Anticoagulation with Eliquis.     Syncope 09/2020    Syncope 07/2021       PAST SURGICAL HISTORY:   Past Surgical History:   Procedure Laterality Date    BRONCHOSCOPY N/A 05/27/2021    BRONCHOSCOPY W/EBUS FNA performed by Nia Downs MD at [address] [facility] N/A 05/27/2021    BRONCHOSCOPY DIAGNOSTIC OR CELL [address] ONLY performed by Nia Downs MD at [address] [facility] N/A 05/27/2021    BRONCHOSCOPY ADD ON COMPUTER ASSISTED performed by Nia Downs MD at [address] [facility] N/A 08/25/2022    CARDIAC LASER LEAD EXCHANGE performed by John Ramos MD at [address] [facility] Bilateral 2011 2001    groin     PACEMAKER CHANGE N/A 08/25/2022    CARDIAC LASER LEAD EXTRACTION, LASER LEAD EXTRACTION LEFT SIDED DEVICE, PACEMAKER DEVICE EXTRACTION performed by John Ramos MD at [address]  07/20/2021    DUAL PPM  (ABDULLAHI)  DR. Milla Frost    PORT SURGERY Right 08/20/2022    PORT REMOVAL performed by Mk Mueller MD at [address]    TRANSESOPHAGEAL ECHOCARDIOGRAM  08/22/2022    Dr Benny Louis N/A 08/29/2022    Medtronic Micra AV Leadless PPM Insertion (Dr. ePrla Delarosa)       FAMILY HISTORY:   Family History   Problem Relation Age of Onset Cancer Father         prostate    Cancer Paternal Uncle         anal       SOCIAL HISTORY:   Social History     Socioeconomic History    Marital status:      Spouse name: Sravani Benavidez    Number of children: 0    Years of education: Not on file    Highest education level: Not on file   Occupational History    Not on file   Tobacco Use    Smoking status: Former     Packs/day: 2.00     Years: 25.00     Pack years: 50.00     Types: Cigarettes     Quit date: 2011     Years since quittin.0     Passive exposure: Past    Smokeless tobacco: Never   Vaping Use    Vaping Use: Never used   Substance and Sexual Activity    Alcohol use: Not Currently     Alcohol/week: 1.0 standard drink     Types: 1 Glasses of wine per week     Comment: 1 glass of wine per day prior    Drug use: No    Sexual activity: Yes     Partners: Female   Other Topics Concern    Not on file   Social History Narrative    2 cups coffee daily. Social Determinants of Health     Financial Resource Strain: Not on file   Food Insecurity: Not on file   Transportation Needs: Not on file   Physical Activity: Not on file   Stress: Not on file   Social Connections: Not on file   Intimate Partner Violence: Not on file   Housing Stability: Not on file     Social History     Tobacco Use   Smoking Status Former    Packs/day: 2.00    Years: 25.00    Pack years: 50.00    Types: Cigarettes    Quit date: 2011    Years since quittin.0    Passive exposure: Past   Smokeless Tobacco Never     Social History     Substance and Sexual Activity   Alcohol Use Not Currently    Alcohol/week: 1.0 standard drink    Types: 1 Glasses of wine per week    Comment: 1 glass of wine per day prior     Social History     Substance and Sexual Activity   Drug Use No     HOME MEDICATIONS:  Prior to Admission medications    Medication Sig Start Date End Date Taking?  Authorizing Provider   apixaban (ELIQUIS) 5 MG TABS tablet Take 5 mg by mouth 2 times daily   Yes Historical Provider, MD   metoprolol succinate (TOPROL XL) 50 MG extended release tablet Take 25 mg by mouth 2 times daily   Yes Historical Provider, MD   OXYGEN Inhale 4 L into the lungs nightly   Yes Historical Provider, MD   pantoprazole (PROTONIX) 40 MG tablet Take 1 tablet by mouth in the morning and 1 tablet in the evening. 12/30/22   Rosalind Jimenez MD   sucralfate (CARAFATE) 1 GM tablet Take 1 tablet by mouth 4 times daily (before meals and nightly) 12/30/22   Rosalind Jimenez MD   atorvastatin (LIPITOR) 20 MG tablet Take 20 mg by mouth daily    Historical Provider, MD   Multiple Vitamin (MULTIVITAMIN ADULT PO) Take 1 tablet by mouth daily    Historical Provider, MD   Ascorbic Acid (VITAMIN C) 250 MG tablet Take 250 mg by mouth daily    Historical Provider, MD   OXYGEN Inhale 3 L/min into the lungs daily    Historical Provider, MD   Fluticasone-Umeclidin-Vilant (TRELEGY ELLIPTA) 200-62.5-25 MCG/INH AEPB Inhale 1 puff into the lungs daily 7/11/22   Historical Provider, MD   gabapentin (NEURONTIN) 100 MG capsule Take 100 mg by mouth in the morning and at bedtime.     Historical Provider, MD   melatonin 3 MG TABS tablet Take 3 mg by mouth nightly    Historical Provider, MD   potassium chloride (MICRO-K) 10 MEQ extended release capsule Take 10 mEq by mouth daily    Historical Provider, MD   furosemide (LASIX) 20 MG tablet Take 1 tablet by mouth daily 9/4/22   Rosalind Jimenez MD   predniSONE (DELTASONE) 5 MG tablet Take 5 mg by mouth daily    Julio César Smith MD   albuterol (ACCUNEB) 0.63 MG/3ML nebulizer solution Take 1 ampule by nebulization every 6 hours as needed for Wheezing    Historical Provider, MD   albuterol sulfate HFA (PROVENTIL;VENTOLIN;PROAIR) 108 (90 Base) MCG/ACT inhaler Inhale 2 puffs into the lungs every 6 hours as needed for Wheezing    Historical Provider, MD   simvastatin (ZOCOR) 40 MG tablet Take 40 mg by mouth daily   9/3/22  Historical Provider, MD       CURRENT MEDICATIONS:  Current Facility-Administered Medications: diphenhydrAMINE (BENADRYL) tablet 25 mg, 25 mg, Oral, Q6H PRN  fluticasone (FLONASE) 50 MCG/ACT nasal spray 1 spray, 1 spray, Each Nostril, Daily  0.9 % sodium chloride infusion, , IntraVENous, PRN  sodium chloride (OCEAN, BABY AYR) 0.65 % nasal spray 1 spray, 1 spray, Each Nostril, Q6H PRN  morphine 10 MG/5ML solution 2.5 mg, 2.5 mg, Oral, Q4H PRN  0.9 % sodium chloride infusion, , IntraVENous, PRN  0.9 % sodium chloride infusion, , IntraVENous, PRN  albuterol (ACCUNEB) nebulizer solution 0.63 mg, 1 ampule, Nebulization, Q6H PRN  [Held by provider] apixaban (ELIQUIS) tablet 5 mg, 5 mg, Oral, BID  atorvastatin (LIPITOR) tablet 20 mg, 20 mg, Oral, Daily  gabapentin (NEURONTIN) capsule 100 mg, 100 mg, Oral, BID  melatonin tablet 3 mg, 3 mg, Oral, Nightly  metoprolol succinate (TOPROL XL) extended release tablet 25 mg, 25 mg, Oral, BID  predniSONE (DELTASONE) tablet 5 mg, 5 mg, Oral, Daily  sucralfate (CARAFATE) tablet 1 g, 1 g, Oral, 4x Daily AC & HS  ondansetron (ZOFRAN) injection 4 mg, 4 mg, IntraVENous, Q6H PRN  budesonide (PULMICORT) nebulizer suspension 500 mcg, 0.5 mg, Nebulization, BID **AND** arformoterol tartrate (BROVANA) nebulizer solution 15 mcg, 15 mcg, Nebulization, BID **AND** ipratropium (ATROVENT) 0.02 % nebulizer solution 0.5 mg, 0.5 mg, Nebulization, 4x daily  pantoprazole (PROTONIX) 40 mg in sodium chloride (PF) 0.9 % 10 mL injection, 40 mg, IntraVENous, Q12H    IV MEDICATIONS:   sodium chloride      sodium chloride      sodium chloride         ALLERGIES:  No Known Allergies    REVIEW OF SYSTEMS:  General ROS:     - Positive For:     - Negative For: chills, fatigue, fever, malaise, night sweats or sleep disturbance   ENT ROS:      - Positive For:     - Negative For: epistaxis, headaches, sinus pain, sneezing or sore throat   Allergy and Immunology ROS:     - Negative For: nasal congestion, postnasal drip or seasonal allergies   Hematological and Lymphatic ROS: - Negative For: bleeding problems, bruising, fatigue, night sweats or pallor   Respiratory ROS:      - Positive For:       - Negative For: hemoptysis, pleuritic type chest pains  Cardiovascular ROS:      - Positive For:      - Negative For: chest pain, dyspnea on exertion, irregular heartbeat, loss of consciousness, murmur, orthopnea or palpitations   Gastrointestinal ROS:      - Positive For:     - Negative For: abdominal pain, appetite loss, blood in stools, change in bowel habits, change in stools, constipation, diarrhea, hematemesis, melena, nausea/vomiting or stool incontinence   Genito-Urinary ROS:      - Negative For: change in urinary stream, dysuria, hematuria or incontinence   Musculoskeletal ROS:      - Negative For: joint pain, joint stiffness, joint swelling or muscle pain   Neurological ROS:     - Negative For: behavioral changes, confusion, dizziness, headaches, impaired coordination/balance or memory loss   Dermatological ROS:      - Negative For: hair changes, lumps, mole changes, nail changes or pruritus    PHYSICAL EXAMINATION:     VITAL SIGNS:  BP (!) 131/52   Pulse 51   Temp 97.1 °F (36.2 °C) (Oral)   Resp 20   Ht 5' 6\" (1.676 m)   Wt 174 lb 9.6 oz (79.2 kg)   SpO2 96%   BMI 28.18 kg/m²   Wt Readings from Last 3 Encounters:   23 174 lb 9.6 oz (79.2 kg)   22 165 lb (74.8 kg)   22 170 lb (77.1 kg)     Temp Readings from Last 3 Encounters:   23 97.1 °F (36.2 °C) (Oral)   22 96.8 °F (36 °C) (Temporal)   10/31/22 97.7 °F (36.5 °C) (Temporal)     TMAX:  BP Readings from Last 3 Encounters:   23 (!) 131/52   22 (!) 149/64   10/31/22 (!) 157/57     Pulse Readings from Last 3 Encounters:   23 51   22 65   22 60       CURRENT PULSE OXIMETRY: SpO2: 96 %  24HR PULSE OXIMETRY RANGE: SpO2  Av.3 %  Min: 91 %  Max: 97 %  CVP:      ________________________________________________________________________    VENTILATOR SETTINGS (if applicable): Additional Respiratory Assessments  Heart Rate: 51  Resp: 20  SpO2: 96 %  ETCO2:  Peak Inspiratory Pressure:  End-Inspiratory Plateau Pressure:    ABG:  No results for input(s): PH, PO2, PCO2, HCO3, BE, O2SAT, METHB, O2HB, COHB, O2CON, HHB, THB in the last 72 hours. ________________________________________________________________________    IV ACCESS:    NUTRITION: ADULT DIET; Regular; Low Fat/Low Chol/High Fiber/2 gm Na    INTAKE/OUTPUTS:  I/O last 3 completed shifts:   In: 506 [Blood:506]  Out: 1825 [Urine:1825]    Intake/Output Summary (Last 24 hours) at 1/23/2023 1234  Last data filed at 1/23/2023 0330  Gross per 24 hour   Intake 506 ml   Output 900 ml   Net -394 ml     General Appearance: alert and oriented to person, place and time, well-developed and well-nourished, in no acute distress   Eyes: pupils equal, round, and reactive to light, extraocular eye movements intact, conjunctivae normal and sclera anicteric   Neck: neck supple and non tender without mass, no thyromegaly, no thyroid nodules and no cervical adenopathy   Pulmonary/Chest: decreased breath sounds, no accessory muscles of inspiration, no focal wheezes  Cardiovascular: normal rate, regular rhythm, normal S1 and S2, no murmurs, rubs, clicks or gallops, distal pulses intact, no carotid bruits, no murmurs, no gallops, no carotid bruits and no JVD   Abdomen: soft, non-tender, non-distended, normal bowel sounds, no masses or organomegaly   Extremities: no cyanosis, no clubbing, no edema  Musculoskeletal: normal range of motion, no joint swelling, deformity or tenderness   Neurologic: reflexes normal and symmetric, no cranial nerve deficit noted    LABS/IMAGING:    CBC:  Lab Results   Component Value Date    WBC 3.0 (L) 01/23/2023    HGB 7.8 (L) 01/23/2023    HCT 24.4 (L) 01/23/2023    MCV 92.4 01/23/2023    PLT 22 (L) 01/23/2023    LYMPHOPCT 9.6 (L) 01/23/2023    RBC 2.64 (L) 01/23/2023    MCH 29.5 01/23/2023    MCHC 32.0 01/23/2023    RDW 18.5 (H) 01/23/2023    NEUTOPHILPCT 85.1 (H) 01/23/2023    MONOPCT 4.4 01/23/2023    BASOPCT 0.3 01/23/2023    NEUTROABS 2.55 01/23/2023    LYMPHSABS 0.30 (L) 01/23/2023    MONOSABS 0.12 01/23/2023    EOSABS 0.00 (L) 01/23/2023    BASOSABS 0.00 01/23/2023       Recent Labs     01/23/23  0629 01/22/23  1830 01/22/23  0407 01/21/23  0550   WBC 3.0*  --  2.8* 2.7*   HGB 7.8* 8.6* 7.1* 7.4*   HCT 24.4* 26.1* 21.6* 22.7*   MCV 92.4  --  88.5 91.5   PLT 22*  --  20* 23*       BMP:   Recent Labs     01/21/23  0550 01/22/23  0407 01/23/23  0629    144 143   K 3.5 3.5 3.4*    107 104   CO2 29 29 29   BUN 10 7 8   CREATININE 0.6* 0.7 0.9       MG:   Lab Results   Component Value Date/Time    MG 1.2 01/22/2023 04:07 AM     Ca/Phos:   Lab Results   Component Value Date    CALCIUM 9.0 01/23/2023     Amylase: No results found for: AMYLASE  Lipase: No results found for: LIPASE  LIVER PROFILE: No results for input(s): AST, ALT, LIPASE, BILIDIR, BILITOT, ALKPHOS in the last 72 hours. Invalid input(s): AMYLASE,  ALB    PT/INR:   Recent Labs     01/23/23  0900   PROTIME 12.7*   INR 1.2     APTT: No results for input(s): APTT in the last 72 hours.     Cardiac Enzymes:  Lab Results   Component Value Date    UXMLKSG 275 (H) 10/10/2022    TROPONINI <0.01 10/29/2020       Hgb A1C:   Lab Results   Component Value Date    LABA1C 6.5 (H) 09/17/2021     No results found for: EAG  PAOLA: No results found for: PAOLA  ESR:   Lab Results   Component Value Date    SEDRATE 23 (H) 10/10/2022     CRP:   Lab Results   Component Value Date    CRP 3.5 (H) 10/10/2022     D Dimer:   Lab Results   Component Value Date    DDIMER 460 10/12/2022     Folate and B12: No results found for: Kathline Backbone, No results found for: FOLATE    Lactic Acid:   Lab Results   Component Value Date    LACTA 1.4 05/24/2022     Ammonia:   Cortisol:  Thyroid Studies:  Lab Results   Component Value Date    TSH 0.771 07/21/2021     XR CHEST PORTABLE Final Result   Cardiomegaly with basilar subsegmental atelectasis and probable left pleural   effusion. Consider mild volume overload/CHF. XR CHEST PORTABLE   Final Result   Left pleural effusion with probable left upper lobe lingular segment   atelectasis. IR INTERVENTIONAL RADIOLOGY PROCEDURE REQUEST    (Results Pending)   CT CHEST WO CONTRAST    (Results Pending)     XR CHEST PORTABLE 8/16/2022  EXAMINATION: ONE XRAY VIEW OF THE CHEST 8/16/2022 3:49 pm COMPARISON: May 24, 2022 HISTORY: ORDERING SYSTEM PROVIDED HISTORY: cough, lung cancer TECHNOLOGIST PROVIDED HISTORY: Reason for exam:->cough, lung cancer What reading provider will be dictating this exam?->CRC FINDINGS: There is opacity in the left lower lung. There are chronic markings at the right lung base. Emphysematous changes in the upper lungs. The heart is not enlarged. There is no pneumothorax. There is mild blunting of the left costophrenic angle. Pacemaker present. Continued opacity in the left lower lung and linear scarring in the left perihilar region. COPD.   8/27 8/30  CXR 8/27: left pleural effusion  2 VW CXR: 8/30: left pleural effusion, unchanged. Atelectasis improved     8/18/22 CT chest w/o                                      5/24/2022 CTA chest   5/24/2022 CTA chest: 3.4 x 5.4 cm left hilar and perihilar mass extending to LLL encircling the pulmonary artery and the bronchi consistent with progressive malignancy with occlusion of left lower lobe bronchi with postobstructive pneumonitis  8/18 CT chest:   A tiny pericardial effusion is noted. There is   coronary artery calcification. Left subclavian pacemaker is noted. The   previously noted infiltrative left hilar mass extending to left upper lobe   and left lower lobe surrounding the broncho pulmonary vasculature is noted   currently measuring 4.6 x 4.3 cm with progression. There is some associated   atelectasis.   The right lung is clear. There is no pleural effusion         08/19/22 ECHO:  Normal left ventricular chamber size. Normal left ventricular systolic function. Visually estimated LVEF is 55-60 %. No wall motion abnormalities. Normal diastolic function. Normal left atrial pressure. Normal right ventricle structure and function. Normal left atrial size. Normal right atrial size   Likely normal estimated PA pressure. Pacer/ ICD wire present in the right heart. No gross evidence of infective endocarditis. Consider SHIRA for better   accuracy if clinically indicated. Compared to prior echo from 2021, no significant changes noted. SHIRA on 8/22/22   No SHIRA indication of Endocarditis. Normal left ventricular chamber size and systolic function. Interatrial septum appears intact. Normal right ventricle size and function. Pacemaker leads noted in right atrium and right ventricle. Normal aortic root size. Moderate atherosclerosis in the descending thoracic aorta. Epicardial fat vs. small pericardial effusion. No intra cardiac mass or thrombus. Technically sub-optimal images. Compared to prior sub-optimal TTE from 8/19/22.     ASSESSMENT:  B/L Pleural Effusions   Multi-Factorial Anemia   Stage IIIB T2aN3 Squamous Cell Carcinoma of Lung  diagnosis 5/27/2021, PD-L1 0% ( 7/6/21-9/7/21 treatment with concurrent chemotherapy/radiation therapy; 11/2/21 consolidated with Imfinzi (Durvalumab) per ID IV every 4 weeks; 6/30/22 PET showing progression and was started on Carbo, Gemzar and Keytruda on 6/30/22; 8/9 /22 r ceived C2D8 of Carboplatin AUC 2 with Gemzar 1000 mg/m2 IV on D1,8 and Keytruda IV on D1 on a 21 day cycle)  Enterococcus faecalis bacteremia/septicemia, bacteremia cultures + on 8/16, 8/17, 8/20 s/p mediport removal    S/P dual-chamber pacemaker with h/o wide-complex tachycardia (HCC)--atrial tachycardia with RV pacing  - pacer out 8/25/2022  - leadless pacer implantation 8/29/2022  H/O SARS-CoV 2 Pneumonitis 5/24/2022  Chronic hypoxic resp failure, RA during the day and 2L NC at night  Probable Obstructive Sleep Apnea  Persistent pancytopenia secondary to chemotherapy  WBC 3.0  Hgb 7.8  HCT 24.4  Plt 22  B/l Lower Extremity Swelling and numbness  H/O RLL subsegmental PE- CTA Chest 9/17/2021 was on eliquis now on hold   Radiation pneumonitis/fibrosis left lung   COPD, not in exacerbation, uses trelegy at home   Currently on Atrovent, Pulmicort, Brovana   Right Groin Hematoma - 3.1 cm, US right groin 8/31/2022     PLAN:  CT Chest WO contrast  IR set for left US thoracentesis, it is possible for pulmonary to do at bedside   Atrovent, Pulmicort, Brovana  O2, IS  PPI, carafate  Patient's current episode of melena/GI bleed is likely related to patient's current thrombocytopenia and possible underlying gastritis. No immediate plans for EGD per general surgery   Supportive care    - platelets will need to be given to keep PLT count > 25 K if thoracentesis procedure is to be completed     Thank you Jennifer Hart, DO very much for allowing me to see this patient in consultation and follow up. Care reviewed with nursing staff, medical and surgical specialty care, primary care and the patient's family as available. Restraints are ordered when the patient can do harm to him/herself by pulling out devices.     Barrett August MD, M.CYNTHIA.

## 2023-01-23 NOTE — PLAN OF CARE
Problem: Pain  Goal: Verbalizes/displays adequate comfort level or baseline comfort level  Outcome: Progressing     Problem: Safety - Adult  Goal: Free from fall injury  Outcome: Progressing  Flowsheets (Taken 1/23/2023 0800)  Free From Fall Injury: Instruct family/caregiver on patient safety

## 2023-01-23 NOTE — PROGRESS NOTES
Spoke with RN regarding patient's ordered Left Thoracentesis. INR reviewed, keep patient NPO after midnight, and hold all thinners for procedure. Platelets will have to be >50 for thoracentesis procedure. Patient is able to sign consent.

## 2023-01-23 NOTE — CARE COORDINATION
Per internal medicine, patient remains pancytopenic. Awaiting thoracentesis by interventional radiology as well as EGD to assess etiology of bleed, when platelet count is greater than 50. Platelets today are 22, Hb 7.8, and INR 1.2. Procedure on hold today due to low  platelet count. Plan is home with wife when medically ready. Wife to transport home after discharge.   Electronically signed by Prosper Aldana RN on 1/23/2023 at 11:44 AM

## 2023-01-23 NOTE — PROGRESS NOTES
Spoke with RN regarding patient's ordered L horacentesis. Ordering provider will need to order new INR, Platelets today 22. keep patient NPO, and Eliquis has been on hold since admission on 1/18/23. Patient is able to sign consent.   Request to be reviewed with KERRY Shrestha

## 2023-01-24 LAB
ANION GAP SERPL CALCULATED.3IONS-SCNC: 8 MMOL/L (ref 7–16)
ANISOCYTOSIS: ABNORMAL
BASOPHILS ABSOLUTE: 0 E9/L (ref 0–0.2)
BASOPHILS RELATIVE PERCENT: 0.3 % (ref 0–2)
BUN BLDV-MCNC: 9 MG/DL (ref 6–23)
CALCIUM SERPL-MCNC: 9.1 MG/DL (ref 8.6–10.2)
CHLORIDE BLD-SCNC: 106 MMOL/L (ref 98–107)
CO2: 33 MMOL/L (ref 22–29)
CREAT SERPL-MCNC: 0.8 MG/DL (ref 0.7–1.2)
EOSINOPHILS ABSOLUTE: 0.08 E9/L (ref 0.05–0.5)
EOSINOPHILS RELATIVE PERCENT: 2.6 % (ref 0–6)
GFR SERPL CREATININE-BSD FRML MDRD: >60 ML/MIN/1.73
GLUCOSE BLD-MCNC: 88 MG/DL (ref 74–99)
HCT VFR BLD CALC: 23.9 % (ref 37–54)
HEMOGLOBIN: 7.8 G/DL (ref 12.5–16.5)
HYPOCHROMIA: ABNORMAL
LYMPHOCYTES ABSOLUTE: 0.32 E9/L (ref 1.5–4)
LYMPHOCYTES RELATIVE PERCENT: 10.4 % (ref 20–42)
MCH RBC QN AUTO: 29.2 PG (ref 26–35)
MCHC RBC AUTO-ENTMCNC: 32.6 % (ref 32–34.5)
MCV RBC AUTO: 89.5 FL (ref 80–99.9)
MONOCYTES ABSOLUTE: 0.16 E9/L (ref 0.1–0.95)
MONOCYTES RELATIVE PERCENT: 5.2 % (ref 2–12)
NEUTROPHILS ABSOLUTE: 2.62 E9/L (ref 1.8–7.3)
NEUTROPHILS RELATIVE PERCENT: 81.8 % (ref 43–80)
OVALOCYTES: ABNORMAL
PDW BLD-RTO: 18.6 FL (ref 11.5–15)
PLATELET # BLD: 24 E9/L (ref 130–450)
PLATELET CONFIRMATION: NORMAL
PMV BLD AUTO: ABNORMAL FL (ref 7–12)
POIKILOCYTES: ABNORMAL
POTASSIUM SERPL-SCNC: 3.7 MMOL/L (ref 3.5–5)
RBC # BLD: 2.67 E12/L (ref 3.8–5.8)
SODIUM BLD-SCNC: 147 MMOL/L (ref 132–146)
TARGET CELLS: ABNORMAL
WBC # BLD: 3.2 E9/L (ref 4.5–11.5)

## 2023-01-24 PROCEDURE — 6360000002 HC RX W HCPCS

## 2023-01-24 PROCEDURE — 94640 AIRWAY INHALATION TREATMENT: CPT

## 2023-01-24 PROCEDURE — 80048 BASIC METABOLIC PNL TOTAL CA: CPT

## 2023-01-24 PROCEDURE — 1200000000 HC SEMI PRIVATE

## 2023-01-24 PROCEDURE — 6370000000 HC RX 637 (ALT 250 FOR IP): Performed by: PHYSICIAN ASSISTANT

## 2023-01-24 PROCEDURE — 36415 COLL VENOUS BLD VENIPUNCTURE: CPT

## 2023-01-24 PROCEDURE — 6360000002 HC RX W HCPCS: Performed by: INTERNAL MEDICINE

## 2023-01-24 PROCEDURE — C9113 INJ PANTOPRAZOLE SODIUM, VIA: HCPCS

## 2023-01-24 PROCEDURE — 2580000003 HC RX 258

## 2023-01-24 PROCEDURE — 6360000002 HC RX W HCPCS: Performed by: NURSE PRACTITIONER

## 2023-01-24 PROCEDURE — 85025 COMPLETE CBC W/AUTO DIFF WBC: CPT

## 2023-01-24 PROCEDURE — A4216 STERILE WATER/SALINE, 10 ML: HCPCS

## 2023-01-24 PROCEDURE — 2700000000 HC OXYGEN THERAPY PER DAY

## 2023-01-24 PROCEDURE — 6370000000 HC RX 637 (ALT 250 FOR IP): Performed by: INTERNAL MEDICINE

## 2023-01-24 RX ORDER — METHYLPREDNISOLONE SODIUM SUCCINATE 40 MG/ML
40 INJECTION, POWDER, LYOPHILIZED, FOR SOLUTION INTRAMUSCULAR; INTRAVENOUS EVERY 8 HOURS
Status: DISCONTINUED | OUTPATIENT
Start: 2023-01-24 | End: 2023-01-26

## 2023-01-24 RX ORDER — SENNA PLUS 8.6 MG/1
1 TABLET ORAL ONCE
Status: COMPLETED | OUTPATIENT
Start: 2023-01-24 | End: 2023-01-25

## 2023-01-24 RX ADMIN — IPRATROPIUM BROMIDE 0.5 MG: 0.5 SOLUTION RESPIRATORY (INHALATION) at 20:12

## 2023-01-24 RX ADMIN — MELATONIN 3 MG ORAL TABLET 3 MG: 3 TABLET ORAL at 21:10

## 2023-01-24 RX ADMIN — FLUTICASONE PROPIONATE 1 SPRAY: 50 SPRAY, METERED NASAL at 09:39

## 2023-01-24 RX ADMIN — IPRATROPIUM BROMIDE 0.5 MG: 0.5 SOLUTION RESPIRATORY (INHALATION) at 09:32

## 2023-01-24 RX ADMIN — SUCRALFATE 1 G: 1 TABLET ORAL at 21:10

## 2023-01-24 RX ADMIN — METOPROLOL SUCCINATE 25 MG: 25 TABLET, EXTENDED RELEASE ORAL at 21:10

## 2023-01-24 RX ADMIN — SODIUM CHLORIDE, PRESERVATIVE FREE 40 MG: 5 INJECTION INTRAVENOUS at 05:09

## 2023-01-24 RX ADMIN — IPRATROPIUM BROMIDE 0.5 MG: 0.5 SOLUTION RESPIRATORY (INHALATION) at 16:17

## 2023-01-24 RX ADMIN — METHYLPREDNISOLONE SODIUM SUCCINATE 40 MG: 40 INJECTION, POWDER, FOR SOLUTION INTRAMUSCULAR; INTRAVENOUS at 09:39

## 2023-01-24 RX ADMIN — ATORVASTATIN CALCIUM 20 MG: 20 TABLET, FILM COATED ORAL at 09:40

## 2023-01-24 RX ADMIN — MORPHINE SULFATE 2.5 MG: 10 SOLUTION ORAL at 21:10

## 2023-01-24 RX ADMIN — SUCRALFATE 1 G: 1 TABLET ORAL at 17:16

## 2023-01-24 RX ADMIN — ARFORMOTEROL TARTRATE 15 MCG: 15 SOLUTION RESPIRATORY (INHALATION) at 09:32

## 2023-01-24 RX ADMIN — SODIUM CHLORIDE, PRESERVATIVE FREE 40 MG: 5 INJECTION INTRAVENOUS at 13:17

## 2023-01-24 RX ADMIN — GABAPENTIN 100 MG: 100 CAPSULE ORAL at 21:10

## 2023-01-24 RX ADMIN — BUDESONIDE 500 MCG: 0.5 SUSPENSION RESPIRATORY (INHALATION) at 20:12

## 2023-01-24 RX ADMIN — ALBUTEROL SULFATE 0.63 MG: 0.63 SOLUTION RESPIRATORY (INHALATION) at 16:20

## 2023-01-24 RX ADMIN — SUCRALFATE 1 G: 1 TABLET ORAL at 13:17

## 2023-01-24 RX ADMIN — METOPROLOL SUCCINATE 25 MG: 25 TABLET, EXTENDED RELEASE ORAL at 09:40

## 2023-01-24 RX ADMIN — GABAPENTIN 100 MG: 100 CAPSULE ORAL at 09:40

## 2023-01-24 RX ADMIN — BUDESONIDE 500 MCG: 0.5 SUSPENSION RESPIRATORY (INHALATION) at 09:32

## 2023-01-24 RX ADMIN — ARFORMOTEROL TARTRATE 15 MCG: 15 SOLUTION RESPIRATORY (INHALATION) at 20:12

## 2023-01-24 RX ADMIN — METHYLPREDNISOLONE SODIUM SUCCINATE 40 MG: 40 INJECTION, POWDER, FOR SOLUTION INTRAMUSCULAR; INTRAVENOUS at 17:16

## 2023-01-24 RX ADMIN — SUCRALFATE 1 G: 1 TABLET ORAL at 05:09

## 2023-01-24 RX ADMIN — MORPHINE SULFATE 2.5 MG: 10 SOLUTION ORAL at 09:40

## 2023-01-24 ASSESSMENT — PAIN DESCRIPTION - PAIN TYPE: TYPE: CHRONIC PAIN

## 2023-01-24 ASSESSMENT — PAIN - FUNCTIONAL ASSESSMENT: PAIN_FUNCTIONAL_ASSESSMENT: ACTIVITIES ARE NOT PREVENTED

## 2023-01-24 ASSESSMENT — PAIN SCALES - GENERAL: PAINLEVEL_OUTOF10: 4

## 2023-01-24 NOTE — PROGRESS NOTES
Subjective:  Chart reviewed  Patient's SOB slightly better today, feels morphine helps some  Pulmonology doesn't recommend thoracentesis, following pleural effusion  Denies other complaints, N/V/D/C    Objective:    BP (!) 141/63   Pulse 55   Temp 97.9 °F (36.6 °C) (Oral)   Resp 28   Ht 5' 6\" (1.676 m)   Wt 174 lb 9.6 oz (79.2 kg)   SpO2 98%   BMI 28.18 kg/m²     General: NAD, awake and alert  HEENT: NC/AT, sclera anicteric, mucosa dry no lesions or patches  Neck supple no JVD  Heart:  RRR, pacemaker, no murmur  Lungs:  respirations easy and not labored, diminished breath sound no crackles or wheezing, O2 via NC  Abd: BS+, soft nondistended  Extrem:  No clubbing, cyanosis, edema  Skin: red patches on arms, pressure points    CBC with Differential:    Lab Results   Component Value Date/Time    WBC 2.2 01/19/2023 02:30 AM    RBC 2.62 01/19/2023 02:30 AM    HGB 7.5 01/19/2023 02:30 AM    HCT 22.8 01/19/2023 02:30 AM    PLT 22 01/19/2023 02:30 AM    MCV 87.0 01/19/2023 02:30 AM    MCH 28.6 01/19/2023 02:30 AM    MCHC 32.9 01/19/2023 02:30 AM    RDW 20.4 01/19/2023 02:30 AM    NRBC 0.9 01/18/2023 12:17 PM    BLASTSPCT 0.9 08/24/2022 05:22 AM    METASPCT 3.5 08/30/2022 07:03 AM    LYMPHOPCT 7.0 01/18/2023 12:17 PM    PROMYELOPCT 1.7 09/01/2022 03:30 AM    MONOPCT 2.1 01/18/2023 12:17 PM    MYELOPCT 0.9 12/29/2022 05:28 PM    BASOPCT 0.0 01/18/2023 12:17 PM    MONOSABS 0.00 01/18/2023 12:17 PM    LYMPHSABS 0.20 01/18/2023 12:17 PM    EOSABS 0.00 01/18/2023 12:17 PM    BASOSABS 0.00 01/18/2023 12:17 PM     CMP:    Lab Results   Component Value Date/Time     01/19/2023 02:30 AM    K 3.9 01/19/2023 02:30 AM    K 4.2 01/18/2023 12:17 PM     01/19/2023 02:30 AM    CO2 31 01/19/2023 02:30 AM    BUN 19 01/19/2023 02:30 AM    CREATININE 0.8 01/19/2023 02:30 AM    GFRAA >60 10/13/2022 12:28 PM    LABGLOM >60 01/19/2023 02:30 AM    GLUCOSE 110 01/19/2023 02:30 AM    PROT 5.9 12/26/2022 11:04 AM    LABALBU 4.0 12/26/2022 11:04 AM    CALCIUM 9.1 01/19/2023 02:30 AM    BILITOT 0.5 12/26/2022 11:04 AM    ALKPHOS 101 12/26/2022 11:04 AM    AST 25 12/26/2022 11:04 AM    ALT 25 12/26/2022 11:04 AM          Current Facility-Administered Medications:     0.9 % sodium chloride infusion, , IntraVENous, PRN, April Jon-Jairo, APRN - CNP    albuterol (ACCUNEB) nebulizer solution 0.63 mg, 1 ampule, Nebulization, Q6H PRN, Georgia Trevino DO    [Held by provider] apixaban (ELIQUIS) tablet 5 mg, 5 mg, Oral, BID, Joseph Zamudio DO    atorvastatin (LIPITOR) tablet 20 mg, 20 mg, Oral, Daily, Joseph Zamudio DO, 20 mg at 01/19/23 0810    gabapentin (NEURONTIN) capsule 100 mg, 100 mg, Oral, BID, Joseph Zamudio DO, 100 mg at 01/19/23 0810    melatonin tablet 3 mg, 3 mg, Oral, Nightly, Joseph Zamudio DO, 3 mg at 01/18/23 2147    metoprolol succinate (TOPROL XL) extended release tablet 25 mg, 25 mg, Oral, BID, Joseph Zamudio, DO, 25 mg at 01/19/23 0810    predniSONE (DELTASONE) tablet 5 mg, 5 mg, Oral, Daily, Joseph Zamudio DO, 5 mg at 01/19/23 0810    sucralfate (CARAFATE) tablet 1 g, 1 g, Oral, 4x Daily AC & HS, Joseph Zamudio DO, 1 g at 01/19/23 1113    ondansetron (ZOFRAN) injection 4 mg, 4 mg, IntraVENous, Q6H PRN, Georgia Trevino DO    dextrose 5 % and 0.9 % sodium chloride infusion, , IntraVENous, Continuous, Joseph Zamudio DO, Last Rate: 100 mL/hr at 01/19/23 0230, New Bag at 01/19/23 0230    budesonide (PULMICORT) nebulizer suspension 500 mcg, 0.5 mg, Nebulization, BID, 500 mcg at 01/19/23 0929 **AND** arformoterol tartrate (BROVANA) nebulizer solution 15 mcg, 15 mcg, Nebulization, BID, 15 mcg at 01/19/23 0929 **AND** ipratropium (ATROVENT) 0.02 % nebulizer solution 0.5 mg, 0.5 mg, Nebulization, 4x daily, Joseph Zamudio DO, 0.5 mg at 01/19/23 1608    pantoprazole (PROTONIX) 40 mg in sodium chloride (PF) 0.9 % 10 mL injection, 40 mg, IntraVENous, Q12H, Eduar Yeager DO, 40 mg at 01/19/23 1241    XR CHEST PORTABLE   Final Result   Left pleural effusion with probable left upper lobe lingular segment   atelectasis. Assessment:    Principal Problem:    GIB (gastrointestinal bleeding)  Resolved Problems:    * No resolved hospital problems. 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 Treated with concurrent chemoRT fb Imfinzi from 11/2/21. Progression on PET from 6/24/22. Started Manda Kalee and Keytruda on 6/30/22. 1/4/2023 seen by Dr. Dayan Grover and treated Dose reduce Gemzar to improve hematological toxicity. Proceed with C6D1 of Carboplatin AUC 2 with Gemzar 1000 mg/m2 IV on D1,8 and Keytruda IV on D1 on a 21 day cycle. RLL PE, Indefinite Eliquis 5 mg BID, okay to continue as long as platelets > 54M. Recommend second line Monoferric 1000 today. He has failed Ferrous sulfate 325 mg every other day. Discussed some of the  potential adverse effects including risk of infusion reaction, pruritus, rashes, taste alterations, skin irritation, allergic reactions and  even anaphylaxis. The patient is agreeable to proceed with IV therapy to help with her symptomatically anemia. Once iron stores are improved and if he still remains anemic then will consider GT therapy. A/P:  Anemia multifactorial, chemotherapy induced and GIB.    Leukopenia/thrombocytopenia very slightly improved  Transfusion support to maintain hgb > 7.5 g/dL with underlying cardiac issues  Hold anticoagulation until platelet count >25M  CBC diff plt daily, repeat to determine if patient needs PRBC  Dopplers ordered of LE    Surgery evaluation, no interventions recommended currently to workup GI source of blood loss    SOB- chronic with underlying emphysema, pulmonology following no thoracentesis recommended  Morphine 2.5 mg po every 4 hours as needed for SOB in the interim    Electronically signed by MATTHEW Vigil on 1/19/2023 at 5:00 PM

## 2023-01-24 NOTE — CARE COORDINATION
Per pulmonology today, seen and examined, not convinced his dyspnea will be significantly relieved by left thoracentesis as not that large. Will need platelet transfusion if done. Favor treating for acute exacerbation of copd with iv steroids, continuing nebs. Not convinced his dyspnea will be significantly relieved by left thoracentesis as not that large. Will need platelet transfusion if done. Favor treating for acute exacerbation of copd with iv steroids, continuing nebs. Awaiting thoracentesis by interventional radiology as well as EGD to assess etiology of bleed, when platelet count is greater than 50. Platelets today are 24, and Hb 7.8. Plan is home with wife when medically ready. Wife to transport home after discharge.   Electronically signed by Duane Zuniga RN on 1/24/2023 at 10:39 AM

## 2023-01-24 NOTE — PROGRESS NOTES
Hospitalist Progress Note      PCP: Francisco Rebolledo MD    Date of Admission: 1/18/2023        Hospital Course:   67 y.o. male presented with WEAKNESS,  FOUND TO HAVE A HGB OF 6.9 AND   LOW PLATELETS OF 14.  HE WAS  TRANSFUSED PLATELETS, AND BLOOD IN THE ER.  HE HAS A KNOWN HISTORY OF NON SMALL CELL LUNG CANCER DIAGNOSED SEVERAL YEARS AGO, AND WAS ON CHEMO AND RADIATION AT THAT TIME. THE TUMOR SHRUNK , HE STOOPED THE CHEMO, AND IT CAME BACK. HE RESTARTED CHEMO , LAST RECEIVING IT 3 WEEKS AGO.   HE DOES ADMIT TO SEEING BRIGHT RED RECTAL BLEEDING **  PLATELETS 22 TODAY, HAS PETECHIAE ON ARMS AND EDEMA OF LEGS** PULM NOT RECOMMENDING THORACENTESIS.  DR. Ck Vásquez SAYS HE HAS BEEN FOLLOWING IT FOR SEVERAL MONTHS           Subjective:  TIRED           Medications:  Reviewed    Infusion Medications    sodium chloride      sodium chloride      sodium chloride       Scheduled Medications    methylPREDNISolone  40 mg IntraVENous Q8H    fluticasone  1 spray Each Nostril Daily    [Held by provider] apixaban  5 mg Oral BID    atorvastatin  20 mg Oral Daily    gabapentin  100 mg Oral BID    melatonin  3 mg Oral Nightly    metoprolol succinate  25 mg Oral BID    sucralfate  1 g Oral 4x Daily AC & HS    budesonide  0.5 mg Nebulization BID    And    arformoterol tartrate  15 mcg Nebulization BID    And    ipratropium  0.5 mg Nebulization 4x daily    pantoprazole (PROTONIX) 40 mg injection  40 mg IntraVENous Q12H     PRN Meds: diphenhydrAMINE, sodium chloride, sodium chloride, morphine, sodium chloride, sodium chloride, albuterol, ondansetron      Intake/Output Summary (Last 24 hours) at 1/24/2023 1527  Last data filed at 1/24/2023 0940  Gross per 24 hour   Intake 240 ml   Output --   Net 240 ml       Exam:    /61   Pulse 51   Temp 97.6 °F (36.4 °C) (Oral)   Resp 24   Ht 5' 6\" (1.676 m)   Wt 174 lb 9.6 oz (79.2 kg)   SpO2 98%   BMI 28.18 kg/m²       General appearance:  No apparent distress, appears stated age.  HEENT:  Normal cephalic, atraumatic without obvious deformity. Pupils equal, round, and reactive to light. Extra ocular muscles intact. Conjunctivae/corneas clear. Neck: Supple, with full range of motion. No jugular venous distention. Trachea midline. Respiratory:  Normal respiratory effort. Clear to auscultation,   Cardiovascular:  RRRR  Abdomen: Soft, non-tender, non-distended with normal bowel sounds. Musculoskeletal:  No clubbing, cyanosis  POS edema bilaterally. Skin: PETECHIAE ON ARMS,    Neurologic:   Cranial nerves: II-XII intact,   Psychiatric:  Alert and oriented x 3                 Labs:   Recent Labs     01/22/23  0407 01/22/23  1830 01/23/23  0629 01/24/23  0515   WBC 2.8*  --  3.0* 3.2*   HGB 7.1* 8.6* 7.8* 7.8*   HCT 21.6* 26.1* 24.4* 23.9*   PLT 20*  --  22* 24*     Recent Labs     01/22/23  0407 01/23/23  0629 01/24/23  0515    143 147*   K 3.5 3.4* 3.7    104 106   CO2 29 29 33*   BUN 7 8 9   CREATININE 0.7 0.9 0.8   CALCIUM 8.9 9.0 9.1     No results for input(s): AST, ALT, BILIDIR, BILITOT, ALKPHOS in the last 72 hours. Recent Labs     01/23/23  0900   INR 1.2     No results for input(s): Larry Shackle in the last 72 hours. No results for input(s): AST, ALT, ALB, BILIDIR, BILITOT, ALKPHOS in the last 72 hours. No results for input(s): LACTA in the last 72 hours. No results found for: Tamara Halim  No results found for: AMMONIA    Assessment:    Active Hospital Problems    Diagnosis Date Noted    GIB (gastrointestinal bleeding) [K92.2] 01/18/2023     Priority: Medium   NON SMALL CELL LUNG CANCER   H/O DVT AND PE  HLD  THROMBOCYTOPENIA   EDEMA OFL LEGS    PLAN:  MONITOR LLAB   HEMATOLOGY CONSULTED( KEEP HGB 7.5   AND  PLATELETS > 67Z BEFORE RESTARTING ELIQUIS    LIPITOR   US OF LEGS(NEG)   TUBI       DVT Prophylaxis: ELIQUIS( ON HOLD)  Diet: ADULT DIET;  Regular; Low Fat/Low Chol/High Fiber/2 gm Na  Code Status: Full Code     PT/OT Eval Status: ORDERED Dispo -  HOME      Electronically signed by Syeda Cueto DO on 1/24/2023 at 3:27 PM [facility]

## 2023-01-24 NOTE — PROGRESS NOTES
[facility] Progress [facility]  [place]    Admit Date: 2023                            PCP: Di Villegas MD  Principal Problem:    GIB (gastrointestinal bleeding)  Resolved Problems:    * No resolved hospital problems. *      Subjective:  Resting on 4lnc- sats 98%  C/o dyspnea, leg edema, brusing  No cough or wz   Plans for possible thoracentesis today     Medications:   sodium chloride      sodium chloride      sodium chloride          fluticasone  1 spray Each Nostril Daily    [Held by provider] apixaban  5 mg Oral BID    atorvastatin  20 mg Oral Daily    gabapentin  100 mg Oral BID    melatonin  3 mg Oral Nightly    metoprolol succinate  25 mg Oral BID    predniSONE  5 mg Oral Daily    sucralfate  1 g Oral 4x Daily AC & HS    budesonide  0.5 mg Nebulization BID    And    arformoterol tartrate  15 mcg Nebulization BID    And    ipratropium  0.5 mg Nebulization 4x daily    pantoprazole (PROTONIX) 40 mg injection  40 mg IntraVENous Q12H       Vitals:  VITALS:  BP (!) 140/63   Pulse 51   Temp 97.6 °F (36.4 °C) (Temporal)   Resp 18   Ht 5' 6\" (1.676 m)   Wt 174 lb 9.6 oz (79.2 kg)   SpO2 98%   BMI 28.18 kg/m²   24HR INTAKE/OUTPUT:    Intake/Output Summary (Last 24 hours) at 2023 0813  Last data filed at 2023 1333  Gross per 24 hour   Intake --   Output 200 ml   Net -200 ml     CURRENT PULSE OXIMETRY:  SpO2: 98 %  24HR PULSE OXIMETRY RANGE:  SpO2  Av.3 %  Min: 93 %  Max: 98 %  CVP:    VENT SETTINGS:      Additional Respiratory Assessments  Heart Rate: 51  Resp: 18  SpO2: 98 %      EXAM:  General: No distress. Alert. On 4lnc  Neck: Trachea midline. Resp: No accessory muscle use. No crackles. No wheezing. No rhonchi. Diminished in  bases  CV: Regular rate. Regular rhythm. No mumur or rub. 1+ BLE edema. ABD: Non-tender. Non-distended. No masses. No organmegaly. Normal bowel sounds. obese  Skin: Warm and dry. Bruising. Mediport   M/S: No cyanosis. No joint deformity. No clubbing.    Neuro: Aox4    I/O: I/O last 3 completed shifts:  In: -   Out: 1100 [Urine:1100]  No intake/output data recorded. Results:  CBC:   Recent Labs     01/22/23  0407 01/22/23  1830 01/23/23  0629 01/24/23  0515   WBC 2.8*  --  3.0* 3.2*   HGB 7.1* 8.6* 7.8* 7.8*   HCT 21.6* 26.1* 24.4* 23.9*   MCV 88.5  --  92.4 89.5   PLT 20*  --  22* 24*     BMP:   Recent Labs     01/22/23  0407 01/23/23  0629 01/24/23  0515    143 147*   K 3.5 3.4* 3.7    104 106   CO2 29 29 33*   BUN 7 8 9   CREATININE 0.7 0.9 0.8     LFT: No results for input(s): ALKPHOS, ALT, AST, PROT, BILITOT, BILIDIR, LABALBU in the last 72 hours. PT/INR:   Recent Labs     01/23/23  0900   PROTIME 12.7*   INR 1.2     Cultures:  No results for input(s): CULTRESP in the last 72 hours. ABG:   No results for input(s): PH, PO2, PCO2, HCO3, BE, O2SAT in the last 72 hours. Films:  XR CHEST PORTABLE    Result Date: 1/18/2023  EXAMINATION: ONE XRAY VIEW OF THE CHEST 1/18/2023 1:02 pm COMPARISON: None. HISTORY: ORDERING SYSTEM PROVIDED HISTORY: sob TECHNOLOGIST PROVIDED HISTORY: Reason for exam:->sob What reading provider will be dictating this exam?->CRC FINDINGS: The lungs are without acute focal process. Probable left upper lobe lingular segment atelectasis. Left pleural effusion. No pneumothorax. The cardiomediastinal silhouette is without acute process. The osseous structures are without acute process. Right-sided port a catheter tip in the mid SVC. Left pleural effusion with probable left upper lobe lingular segment atelectasis. XR CHEST PORTABLE   Final Result   Cardiomegaly with basilar subsegmental atelectasis and probable left pleural   effusion. Consider mild volume overload/CHF. XR CHEST PORTABLE   Final Result   Left pleural effusion with probable left upper lobe lingular segment   atelectasis.            IR INTERVENTIONAL RADIOLOGY PROCEDURE REQUEST    (Results Pending)   CT CHEST WO CONTRAST    (Results Pending) XR CHEST PORTABLE 8/16/2022  EXAMINATION: ONE XRAY VIEW OF THE CHEST 8/16/2022 3:49 pm COMPARISON: May 24, 2022 HISTORY: ORDERING SYSTEM PROVIDED HISTORY: cough, lung cancer TECHNOLOGIST PROVIDED HISTORY: Reason for exam:->cough, lung cancer What reading provider will be dictating this exam?->CRC FINDINGS: There is opacity in the left lower lung. There are chronic markings at the right lung base. Emphysematous changes in the upper lungs. The heart is not enlarged. There is no pneumothorax. There is mild blunting of the left costophrenic angle. Pacemaker present. Continued opacity in the left lower lung and linear scarring in the left perihilar region. COPD.   8/27 8/30  CXR 8/27: left pleural effusion  2 VW CXR: 8/30: left pleural effusion, unchanged. Atelectasis improved     8/18/22 CT chest w/o                                      5/24/2022 CTA chest   5/24/2022 CTA chest: 3.4 x 5.4 cm left hilar and perihilar mass extending to LLL encircling the pulmonary artery and the bronchi consistent with progressive malignancy with occlusion of left lower lobe bronchi with postobstructive pneumonitis  8/18 CT chest:   A tiny pericardial effusion is noted. There is   coronary artery calcification. Left subclavian pacemaker is noted. The   previously noted infiltrative left hilar mass extending to left upper lobe   and left lower lobe surrounding the broncho pulmonary vasculature is noted   currently measuring 4.6 x 4.3 cm with progression. There is some associated   atelectasis. The right lung is clear. There is no pleural effusion         08/19/22 ECHO:  Normal left ventricular chamber size. Normal left ventricular systolic function. Visually estimated LVEF is 55-60 %. No wall motion abnormalities. Normal diastolic function. Normal left atrial pressure. Normal right ventricle structure and function. Normal left atrial size.    Normal right atrial size Likely normal estimated PA pressure. Pacer/ ICD wire present in the right heart. No gross evidence of infective endocarditis. Consider SHIRA for better   accuracy if clinically indicated. Compared to prior echo from 2021, no significant changes noted. SHIRA on 8/22/22   No SHIRA indication of Endocarditis. Normal left ventricular chamber size and systolic function. Interatrial septum appears intact. Normal right ventricle size and function. Pacemaker leads noted in right atrium and right ventricle. Normal aortic root size. Moderate atherosclerosis in the descending thoracic aorta. Epicardial fat vs. small pericardial effusion. No intra cardiac mass or thrombus. Technically sub-optimal images. Compared to prior sub-optimal TTE from 8/19/22. Assessment/plan:  68 y/o  male known to [facility] with past medical history noted for COPD, cardiac arrhythmia (previously wore event monitor per EP), left hilar lymphadenopathy, squamous cell carcinoma diagnosed by EBUS 5/27/21, COVID 5/2022, pulmonary embolism (on eliquis), pacemaker placement (7/2021) presented to [facility] 1/18/23 with weakness, cough, shortness of breath, fatigue.     In ER, cxr with left effuision  1/22 cxr with chf   1/23 Pulm consulted  1/24 on 4lnc          B/L Pleural Effusions   As seen on CT   IR consulted for possible thoracentesis   COPD exacerbation  On prednisone 5mg at home and Trelegy  Continue brovana, budesonide  Start solumedrol   Multi-Factorial Anemia   Stage IIIB T2aN3 Squamous Cell Carcinoma of Lung  diagnosis 5/27/2021, PD-L1 0% ( 7/6/21-9/7/21 treatment with concurrent chemotherapy/radiation therapy; 11/2/21 consolidated with Imfinzi (Durvalumab) per Oncology IV every 4 weeks; 6/30/22 PET showing progression and was started on Carbo, Gemzar and Keytruda on 6/30/22; 8/9 /22 r ceived C2D8 of Carboplatin AUC 2 with Gemzar 1000 mg/m2 IV on D1,8 and Keytruda IV on D1 on a 21 day cycle)  History of Enterococcus faecalis bacteremia/septicemia, bacteremia cultures + on 8/16, 8/17, 8/20 s/p mediport removal    h/o wide-complex tachycardia   s/p pacer out 8/25/2022;  leadless pacer implantation 8/29/2022  H/O SARS-CoV 2 Pneumonitis 5/24/2022  Chronic hypoxic resp failure  Continue o2  Wean O2   Probable Obstructive Sleep Apnea  Persistent pancytopenia secondary to chemotherapy  B/l Lower Extremity Swelling and numbness  H/O RLL subsegmental PE  Eliquis on hold for possible procedure   Radiation pneumonitis/fibrosis left lung     Electronically signed by LELO Pickard - CNP on 1/24/2023 at 8:13 AM    Seen and examined, agree with above. Am not convinced his dyspnea will be significantly relieved by left thoracentesis as not that large. Will need platelet transfusion if done. Favor treating for acute exacerbation of copd with iv steroids, continuing nebs.

## 2023-01-25 LAB
ANISOCYTOSIS: ABNORMAL
BASOPHILS ABSOLUTE: 0 E9/L (ref 0–0.2)
BASOPHILS RELATIVE PERCENT: 0 % (ref 0–2)
EOSINOPHILS ABSOLUTE: 0 E9/L (ref 0.05–0.5)
EOSINOPHILS RELATIVE PERCENT: 0 % (ref 0–6)
FERRITIN: 1389 NG/ML
HCT VFR BLD CALC: 21.5 % (ref 37–54)
HEMOGLOBIN: 7.4 G/DL (ref 12.5–16.5)
HYPOCHROMIA: ABNORMAL
IRON SATURATION: 24 % (ref 20–55)
IRON: 58 MCG/DL (ref 59–158)
LYMPHOCYTES ABSOLUTE: 0.12 E9/L (ref 1.5–4)
LYMPHOCYTES RELATIVE PERCENT: 4.3 % (ref 20–42)
MCH RBC QN AUTO: 29.6 PG (ref 26–35)
MCHC RBC AUTO-ENTMCNC: 34.4 % (ref 32–34.5)
MCV RBC AUTO: 86 FL (ref 80–99.9)
MONOCYTES ABSOLUTE: 0.27 E9/L (ref 0.1–0.95)
MONOCYTES RELATIVE PERCENT: 8.7 % (ref 2–12)
NEUTROPHILS ABSOLUTE: 2.61 E9/L (ref 1.8–7.3)
NEUTROPHILS RELATIVE PERCENT: 87 % (ref 43–80)
NUCLEATED RED BLOOD CELLS: 3.5 /100 WBC
PDW BLD-RTO: 18 FL (ref 11.5–15)
PLATELET # BLD: 29 E9/L (ref 130–450)
PLATELET CONFIRMATION: NORMAL
PMV BLD AUTO: ABNORMAL FL (ref 7–12)
POIKILOCYTES: ABNORMAL
RBC # BLD: 2.5 E12/L (ref 3.8–5.8)
TARGET CELLS: ABNORMAL
TOTAL IRON BINDING CAPACITY: 243 MCG/DL (ref 250–450)
WBC # BLD: 3 E9/L (ref 4.5–11.5)

## 2023-01-25 PROCEDURE — 82728 ASSAY OF FERRITIN: CPT

## 2023-01-25 PROCEDURE — 6360000002 HC RX W HCPCS: Performed by: INTERNAL MEDICINE

## 2023-01-25 PROCEDURE — 6360000002 HC RX W HCPCS: Performed by: NURSE PRACTITIONER

## 2023-01-25 PROCEDURE — 2580000003 HC RX 258

## 2023-01-25 PROCEDURE — 83880 ASSAY OF NATRIURETIC PEPTIDE: CPT

## 2023-01-25 PROCEDURE — 85025 COMPLETE CBC W/AUTO DIFF WBC: CPT

## 2023-01-25 PROCEDURE — 94640 AIRWAY INHALATION TREATMENT: CPT

## 2023-01-25 PROCEDURE — 6360000002 HC RX W HCPCS

## 2023-01-25 PROCEDURE — 1200000000 HC SEMI PRIVATE

## 2023-01-25 PROCEDURE — 2700000000 HC OXYGEN THERAPY PER DAY

## 2023-01-25 PROCEDURE — 6370000000 HC RX 637 (ALT 250 FOR IP): Performed by: INTERNAL MEDICINE

## 2023-01-25 PROCEDURE — A4216 STERILE WATER/SALINE, 10 ML: HCPCS

## 2023-01-25 PROCEDURE — C9113 INJ PANTOPRAZOLE SODIUM, VIA: HCPCS

## 2023-01-25 PROCEDURE — 97535 SELF CARE MNGMENT TRAINING: CPT

## 2023-01-25 PROCEDURE — 83550 IRON BINDING TEST: CPT

## 2023-01-25 PROCEDURE — 83540 ASSAY OF IRON: CPT

## 2023-01-25 PROCEDURE — 36415 COLL VENOUS BLD VENIPUNCTURE: CPT

## 2023-01-25 RX ORDER — FUROSEMIDE 10 MG/ML
20 INJECTION INTRAMUSCULAR; INTRAVENOUS ONCE
Status: COMPLETED | OUTPATIENT
Start: 2023-01-25 | End: 2023-01-25

## 2023-01-25 RX ORDER — BENZONATATE 100 MG/1
100 CAPSULE ORAL 3 TIMES DAILY PRN
Status: DISCONTINUED | OUTPATIENT
Start: 2023-01-25 | End: 2023-01-26 | Stop reason: HOSPADM

## 2023-01-25 RX ADMIN — METHYLPREDNISOLONE SODIUM SUCCINATE 40 MG: 40 INJECTION, POWDER, FOR SOLUTION INTRAMUSCULAR; INTRAVENOUS at 17:58

## 2023-01-25 RX ADMIN — SODIUM CHLORIDE, PRESERVATIVE FREE 40 MG: 5 INJECTION INTRAVENOUS at 06:29

## 2023-01-25 RX ADMIN — IPRATROPIUM BROMIDE 0.5 MG: 0.5 SOLUTION RESPIRATORY (INHALATION) at 12:42

## 2023-01-25 RX ADMIN — FUROSEMIDE 20 MG: 10 INJECTION, SOLUTION INTRAMUSCULAR; INTRAVENOUS at 09:44

## 2023-01-25 RX ADMIN — SUCRALFATE 1 G: 1 TABLET ORAL at 21:05

## 2023-01-25 RX ADMIN — GABAPENTIN 100 MG: 100 CAPSULE ORAL at 09:30

## 2023-01-25 RX ADMIN — SUCRALFATE 1 G: 1 TABLET ORAL at 17:58

## 2023-01-25 RX ADMIN — ARFORMOTEROL TARTRATE 15 MCG: 15 SOLUTION RESPIRATORY (INHALATION) at 19:59

## 2023-01-25 RX ADMIN — IPRATROPIUM BROMIDE 0.5 MG: 0.5 SOLUTION RESPIRATORY (INHALATION) at 19:59

## 2023-01-25 RX ADMIN — IPRATROPIUM BROMIDE 0.5 MG: 0.5 SOLUTION RESPIRATORY (INHALATION) at 09:14

## 2023-01-25 RX ADMIN — METOPROLOL SUCCINATE 25 MG: 25 TABLET, EXTENDED RELEASE ORAL at 09:32

## 2023-01-25 RX ADMIN — SODIUM CHLORIDE, PRESERVATIVE FREE 40 MG: 5 INJECTION INTRAVENOUS at 15:42

## 2023-01-25 RX ADMIN — GABAPENTIN 100 MG: 100 CAPSULE ORAL at 21:05

## 2023-01-25 RX ADMIN — METHYLPREDNISOLONE SODIUM SUCCINATE 40 MG: 40 INJECTION, POWDER, FOR SOLUTION INTRAMUSCULAR; INTRAVENOUS at 06:29

## 2023-01-25 RX ADMIN — ATORVASTATIN CALCIUM 20 MG: 20 TABLET, FILM COATED ORAL at 09:30

## 2023-01-25 RX ADMIN — SENNOSIDES 8.6 MG: 8.6 TABLET, FILM COATED ORAL at 06:29

## 2023-01-25 RX ADMIN — MELATONIN 3 MG ORAL TABLET 3 MG: 3 TABLET ORAL at 21:05

## 2023-01-25 RX ADMIN — FLUTICASONE PROPIONATE 1 SPRAY: 50 SPRAY, METERED NASAL at 09:31

## 2023-01-25 RX ADMIN — BUDESONIDE 500 MCG: 0.5 SUSPENSION RESPIRATORY (INHALATION) at 19:59

## 2023-01-25 RX ADMIN — BUDESONIDE 500 MCG: 0.5 SUSPENSION RESPIRATORY (INHALATION) at 09:14

## 2023-01-25 RX ADMIN — SUCRALFATE 1 G: 1 TABLET ORAL at 06:29

## 2023-01-25 RX ADMIN — IPRATROPIUM BROMIDE 0.5 MG: 0.5 SOLUTION RESPIRATORY (INHALATION) at 17:55

## 2023-01-25 RX ADMIN — ARFORMOTEROL TARTRATE 15 MCG: 15 SOLUTION RESPIRATORY (INHALATION) at 09:14

## 2023-01-25 RX ADMIN — METHYLPREDNISOLONE SODIUM SUCCINATE 40 MG: 40 INJECTION, POWDER, FOR SOLUTION INTRAMUSCULAR; INTRAVENOUS at 09:31

## 2023-01-25 ASSESSMENT — PAIN SCALES - GENERAL: PAINLEVEL_OUTOF10: 0

## 2023-01-25 NOTE — PROGRESS NOTES
Subjective:  Chart reviewed  Patient feels slightly better, SOB improved  Patient does not want morphine on discharge he is worried about having a narcotic at home  Wheezing today but overall feeling much better  Leg swelling chronic issue  Denies other complaints, N/V/D/C    Objective:    BP (!) 141/63   Pulse 55   Temp 97.9 °F (36.6 °C) (Oral)   Resp 28   Ht 5' 6\" (1.676 m)   Wt 174 lb 9.6 oz (79.2 kg)   SpO2 98%   BMI 28.18 kg/m²     General: NAD, awake and alert  HEENT: NC/AT, sclera anicteric, mucosa dry no lesions or patches  Neck supple no JVD  Heart:  RRR, pacemaker, no murmur  Lungs:  respirations easy and not labored, diminished breath sound, expiratory wheezing today, O2 via NC  Abd: BS+, soft nondistended  Extrem:  1+ edema of feet and trace LE bilaterally  Skin: red patches on arms, pressure points    CBC with Differential:    Lab Results   Component Value Date/Time    WBC 2.2 01/19/2023 02:30 AM    RBC 2.62 01/19/2023 02:30 AM    HGB 7.5 01/19/2023 02:30 AM    HCT 22.8 01/19/2023 02:30 AM    PLT 22 01/19/2023 02:30 AM    MCV 87.0 01/19/2023 02:30 AM    MCH 28.6 01/19/2023 02:30 AM    MCHC 32.9 01/19/2023 02:30 AM    RDW 20.4 01/19/2023 02:30 AM    NRBC 0.9 01/18/2023 12:17 PM    BLASTSPCT 0.9 08/24/2022 05:22 AM    METASPCT 3.5 08/30/2022 07:03 AM    LYMPHOPCT 7.0 01/18/2023 12:17 PM    PROMYELOPCT 1.7 09/01/2022 03:30 AM    MONOPCT 2.1 01/18/2023 12:17 PM    MYELOPCT 0.9 12/29/2022 05:28 PM    BASOPCT 0.0 01/18/2023 12:17 PM    MONOSABS 0.00 01/18/2023 12:17 PM    LYMPHSABS 0.20 01/18/2023 12:17 PM    EOSABS 0.00 01/18/2023 12:17 PM    BASOSABS 0.00 01/18/2023 12:17 PM     CMP:    Lab Results   Component Value Date/Time     01/19/2023 02:30 AM    K 3.9 01/19/2023 02:30 AM    K 4.2 01/18/2023 12:17 PM     01/19/2023 02:30 AM    CO2 31 01/19/2023 02:30 AM    BUN 19 01/19/2023 02:30 AM    CREATININE 0.8 01/19/2023 02:30 AM    GFRAA >60 10/13/2022 12:28 PM    LABGLOM >60 01/19/2023 02:30 AM    GLUCOSE 110 01/19/2023 02:30 AM    PROT 5.9 12/26/2022 11:04 AM    LABALBU 4.0 12/26/2022 11:04 AM    CALCIUM 9.1 01/19/2023 02:30 AM    BILITOT 0.5 12/26/2022 11:04 AM    ALKPHOS 101 12/26/2022 11:04 AM    AST 25 12/26/2022 11:04 AM    ALT 25 12/26/2022 11:04 AM          Current Facility-Administered Medications:     0.9 % sodium chloride infusion, , IntraVENous, PRN, April Lyly, APRN - CNP    albuterol (ACCUNEB) nebulizer solution 0.63 mg, 1 ampule, Nebulization, Q6H PRN, Melquiades Portillo DO    [Held by provider] apixaban (ELIQUIS) tablet 5 mg, 5 mg, Oral, BID, Joseph Yvan Face, DO    atorvastatin (LIPITOR) tablet 20 mg, 20 mg, Oral, Daily, Joseph George Face, DO, 20 mg at 01/19/23 0810    gabapentin (NEURONTIN) capsule 100 mg, 100 mg, Oral, BID, Joseph Yvan Face, DO, 100 mg at 01/19/23 0810    melatonin tablet 3 mg, 3 mg, Oral, Nightly, Joseph Yvan Face, DO, 3 mg at 01/18/23 2147    metoprolol succinate (TOPROL XL) extended release tablet 25 mg, 25 mg, Oral, BID, Joseph George Face, DO, 25 mg at 01/19/23 0810    predniSONE (DELTASONE) tablet 5 mg, 5 mg, Oral, Daily, Joseph Yvan Face, DO, 5 mg at 01/19/23 0810    sucralfate (CARAFATE) tablet 1 g, 1 g, Oral, 4x Daily AC & HS, Joseph George Face, DO, 1 g at 01/19/23 1113    ondansetron (ZOFRAN) injection 4 mg, 4 mg, IntraVENous, Q6H PRN, Melquiades Portillo DO    dextrose 5 % and 0.9 % sodium chloride infusion, , IntraVENous, Continuous, Joseph Yvan Face, DO, Last Rate: 100 mL/hr at 01/19/23 0230, New Bag at 01/19/23 0230    budesonide (PULMICORT) nebulizer suspension 500 mcg, 0.5 mg, Nebulization, BID, 500 mcg at 01/19/23 0929 **AND** arformoterol tartrate (BROVANA) nebulizer solution 15 mcg, 15 mcg, Nebulization, BID, 15 mcg at 01/19/23 0929 **AND** ipratropium (ATROVENT) 0.02 % nebulizer solution 0.5 mg, 0.5 mg, Nebulization, 4x daily, Joseph Yvan Face, DO, 0.5 mg at 01/19/23 1608    pantoprazole (PROTONIX) 40 mg in sodium chloride (PF) 0.9 % 10 mL injection, 40 mg, IntraVENous, Q12H, Eduar Yeager, DO, 40 mg at 01/19/23 1241    XR CHEST PORTABLE   Final Result   Left pleural effusion with probable left upper lobe lingular segment   atelectasis. Assessment:    Principal Problem:    GIB (gastrointestinal bleeding)  Resolved Problems:    * No resolved hospital problems. 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 Treated with concurrent chemoRT fb Imfinzi from 11/2/21. Progression on PET from 6/24/22. Started Mary Sera and Keytruda on 6/30/22. 1/4/2023 seen by Dr. David Campa and treated Dose reduce Gemzar to improve hematological toxicity. Proceed with C6D1 of Carboplatin AUC 2 with Gemzar 1000 mg/m2 IV on D1,8 and Keytruda IV on D1 on a 21 day cycle. RLL PE, Indefinite Eliquis 5 mg BID, okay to continue as long as platelets > 37H. Recommend second line Monoferric 1000 today. He has failed Ferrous sulfate 325 mg every other day. Discussed some of the potential adverse effects including risk of infusion reaction, pruritus, rashes, taste alterations, skin irritation, allergic reactions and  even anaphylaxis. The patient is agreeable to proceed with IV therapy to help with her symptomatically anemia. Once iron stores are improved and if he still remains anemic then will consider GT therapy. A/P:  Anemia multifactorial, chemotherapy induced and GIB, recheck iron  Continued improvement of Leukopenia/thrombocytopenia  Transfusion support to maintain hgb > 7.5 g/dL with underlying cardiac issues  Hold anticoagulation until platelet count >84P  CBC diff plt daily  Dopplers ordered of LE NEGATIVE  SOB- chronic with underlying emphysema, pulmonology following no thoracentesis recommended  Morphine 2.5 mg po every 4 hours as needed for SOB in the interim    Patient ok for discharge from oncology standpoint.   He should follow-up as schedule outpatient to discuss continued treatment.       Electronically signed by MATTHEW Gomes on 1/19/2023 at 5:00 PM

## 2023-01-25 NOTE — PROGRESS NOTES
Wound care: Order for tubi- for bilateral lower legs, after legs measured by nursing size E supplied for nursing to apply.  Nicole Murdock RN

## 2023-01-25 NOTE — PROGRESS NOTES
Comprehensive Nutrition Assessment    Type and Reason for Visit:  Initial, RD Nutrition Re-Screen/LOS (LOS 7)    Nutrition Recommendations/Plan:   Continue current diet as tolerated. No nutrition intervention indicated at this time as pt. Stable from a nutritional standpoint. Will continue to monitor. Consult RD as needed. Malnutrition Assessment:  Malnutrition Status:  No malnutrition (01/25/23 1355)    Context:  Chronic Illness     Findings of the 6 clinical characteristics of malnutrition:  Energy Intake:  No significant decrease in energy intake  Weight Loss:  Mild weight loss (specify amount and time period) (6.9% x7 months)     Body Fat Loss:  No significant body fat loss     Muscle Mass Loss:  No significant muscle mass loss    Fluid Accumulation:  Unable to assess (multifactorial)     Strength:  Not Performed    Nutrition Assessment:    Pt. admit for SOB and possible GI bleed. Noted B/L Pleural Effusions-Pulmonary following and rec no thoracentesis at this time. Noted possible plans for EGD to assess etiology of bleed (timingTBD). Hx of lung CA on chemo; hx of COPD and CHB. Noted PO intake x1 of %. Pt. reports good appetite and intake. Will continue to monitor. Nutrition Related Findings:    A&OX4, +I/O, Hypernatremia, +BS, +2 edema, Pt. reports good intake/appetite current and pta. Pt. appears nourished w/ no significant muscle/fat wasting at this time. Wound Type: None       Current Nutrition Intake & Therapies:    Average Meal Intake: % (x1)  Average Supplements Intake: None Ordered  ADULT DIET; Regular; Low Fat/Low Chol/High Fiber/2 gm Na    Anthropometric Measures:  Height: 5' 6\" (167.6 cm)  Ideal Body Weight (IBW): 142 lbs (65 kg)    Admission Body Weight: 174 lb 9.6 oz (79.2 kg) (1/18 no method)  Current Body Weight: 174 lb 9.6 oz (79.2 kg) (1/18 no method), 123 % IBW.  Weight Source: Not Specified  Current BMI (kg/m2): 28.2  Usual Body Weight: 187 lb 10 oz (85.1 kg) (8/19/22)  % Weight Change (Calculated): -6.9  Weight Adjustment For: No Adjustment                 BMI Categories: Overweight (BMI 25.0-29. 9)    Estimated Daily Nutrient Needs:  Energy Requirements Based On: Formula  Weight Used for Energy Requirements: Current  Energy (kcal/day): 1800-2000kcal (MSJ x1.2-1. 3SF)  Weight Used for Protein Requirements: Ideal  Protein (g/day): 84-97g (1.3-1.5g/kgIBW)  Method Used for Fluid Requirements: 1 ml/kcal  Fluid (ml/day): 2966-1888    Nutrition Diagnosis:   No nutrition diagnosis at this time  Nutrition Interventions:   Food and/or Nutrient Delivery: Continue Current Diet  Nutrition Education/Counseling: No recommendation at this time  Coordination of Nutrition Care: Continue to monitor while inpatient       Goals:     Goals: PO intake 75% or greater, by next RD assessment (to continue)       Nutrition Monitoring and Evaluation:   Behavioral-Environmental Outcomes: None Identified  Food/Nutrient Intake Outcomes: Food and Nutrient Intake, Supplement Intake  Physical Signs/Symptoms Outcomes: Biochemical Data, GI Status, Fluid Status or Edema, Nutrition Focused Physical Findings, Skin, Weight    Discharge Planning:     Too soon to determine     Mai Alvarenga RD  Contact: ext [phone]

## 2023-01-25 NOTE — CARE COORDINATION
Per internal medicine, PULM NOT RECOMMENDING THORACENTESIS.  DR. Dea Favre SAYS HE HAS BEEN FOLLOWING IT FOR SEVERAL MONTHS. Per pulmonology today, breathing better but still edematous. Going to continue steroids and diurese once with iv Lasix for leg swelling. Platelets per oncology. Again do not feel tap will improve dyspnea as effusion is not that big. Plan is home with wife when medically ready. PT/OT evaluation ordered to assess patient safety prior to discharge. Wife to transport home after discharge.   Electronically signed by Freddy Schmidt RN on 1/25/2023 at 11:46 AM

## 2023-01-25 NOTE — PROGRESS NOTES
[facility] Pulmonary Progress Note  [place]    Admit Date: 2023                            PCP: Jerman Amezcua MD  Principal Problem:    GIB (gastrointestinal bleeding)  Resolved Problems:    * No resolved hospital problems. *      Subjective:  Resting on 4lnc- sats 96%  States he is feeling a little bit better today still with shortness of breath with activity, bruising  No cough or wz     Medications:   sodium chloride      sodium chloride      sodium chloride          methylPREDNISolone  40 mg IntraVENous Q8H    fluticasone  1 spray Each Nostril Daily    [Held by provider] apixaban  5 mg Oral BID    atorvastatin  20 mg Oral Daily    gabapentin  100 mg Oral BID    melatonin  3 mg Oral Nightly    metoprolol succinate  25 mg Oral BID    sucralfate  1 g Oral 4x Daily AC & HS    budesonide  0.5 mg Nebulization BID    And    arformoterol tartrate  15 mcg Nebulization BID    And    ipratropium  0.5 mg Nebulization 4x daily    pantoprazole (PROTONIX) 40 mg injection  40 mg IntraVENous Q12H       Vitals:  VITALS:  /61   Pulse 51   Temp 97.6 °F (36.4 °C) (Oral)   Resp 24   Ht 5' 6\" (1.676 m)   Wt 174 lb 9.6 oz (79.2 kg)   SpO2 96%   BMI 28.18 kg/m²   24HR INTAKE/OUTPUT:    Intake/Output Summary (Last 24 hours) at 2023 0749  Last data filed at 2023 1820  Gross per 24 hour   Intake 720 ml   Output --   Net 720 ml       CURRENT PULSE OXIMETRY:  SpO2: 96 %  24HR PULSE OXIMETRY RANGE:  SpO2  Av.7 %  Min: 96 %  Max: 98 %  CVP:    VENT SETTINGS:      Additional Respiratory Assessments  Heart Rate: 51  Resp: 24  SpO2: 96 %      EXAM:  General: No distress. Alert. On 4lnc  Neck: Trachea midline. Resp: No accessory muscle use. No crackles. No wheezing. No rhonchi. Diminished in  bases  CV: Regular rate. Regular rhythm. No mumur or rub. 1+ BLE edema. ABD: Non-tender. Non-distended. No masses. No organmegaly. Normal bowel sounds. obese  Skin: Warm and dry. Bruising. Mediport   M/S: No cyanosis.  No joint deformity. No clubbing. Neuro: Aox4    I/O: I/O last 3 completed shifts: In: 720 [P.O.:720]  Out: -   No intake/output data recorded. Results:  CBC:   Recent Labs     01/22/23  1830 01/23/23  0629 01/24/23  0515   WBC  --  3.0* 3.2*   HGB 8.6* 7.8* 7.8*   HCT 26.1* 24.4* 23.9*   MCV  --  92.4 89.5   PLT  --  22* 24*       BMP:   Recent Labs     01/23/23  0629 01/24/23  0515    147*   K 3.4* 3.7    106   CO2 29 33*   BUN 8 9   CREATININE 0.9 0.8       LFT: No results for input(s): ALKPHOS, ALT, AST, PROT, BILITOT, BILIDIR, LABALBU in the last 72 hours. PT/INR:   Recent Labs     01/23/23  0900   PROTIME 12.7*   INR 1.2       Cultures:  No results for input(s): CULTRESP in the last 72 hours. ABG:   No results for input(s): PH, PO2, PCO2, HCO3, BE, O2SAT in the last 72 hours. Films:  XR CHEST PORTABLE    Result Date: 1/18/2023  EXAMINATION: ONE XRAY VIEW OF THE CHEST 1/18/2023 1:02 pm COMPARISON: None. HISTORY: ORDERING SYSTEM PROVIDED HISTORY: sob TECHNOLOGIST PROVIDED HISTORY: Reason for exam:->sob What reading provider will be dictating this exam?->CRC FINDINGS: The lungs are without acute focal process. Probable left upper lobe lingular segment atelectasis. Left pleural effusion. No pneumothorax. The cardiomediastinal silhouette is without acute process. The osseous structures are without acute process. Right-sided port a catheter tip in the mid SVC. Left pleural effusion with probable left upper lobe lingular segment atelectasis. XR CHEST PORTABLE   Final Result   Cardiomegaly with basilar subsegmental atelectasis and probable left pleural   effusion. Consider mild volume overload/CHF. XR CHEST PORTABLE   Final Result   Left pleural effusion with probable left upper lobe lingular segment   atelectasis.            IR INTERVENTIONAL RADIOLOGY PROCEDURE REQUEST    (Results Pending)   CT CHEST WO CONTRAST    (Results Pending)     XR CHEST PORTABLE 8/16/2022  EXAMINATION: ONE XRAY VIEW OF THE CHEST 8/16/2022 3:49 pm COMPARISON: May 24, 2022 HISTORY: ORDERING SYSTEM PROVIDED HISTORY: cough, lung cancer TECHNOLOGIST PROVIDED HISTORY: Reason for exam:->cough, lung cancer What reading provider will be dictating this exam?->CRC FINDINGS: There is opacity in the left lower lung. There are chronic markings at the right lung base. Emphysematous changes in the upper lungs. The heart is not enlarged. There is no pneumothorax. There is mild blunting of the left costophrenic angle. Pacemaker present. Continued opacity in the left lower lung and linear scarring in the left perihilar region. COPD.   8/27 8/30  CXR 8/27: left pleural effusion  2 VW CXR: 8/30: left pleural effusion, unchanged. Atelectasis improved     8/18/22 CT chest w/o                                      5/24/2022 CTA chest   5/24/2022 CTA chest: 3.4 x 5.4 cm left hilar and perihilar mass extending to LLL encircling the pulmonary artery and the bronchi consistent with progressive malignancy with occlusion of left lower lobe bronchi with postobstructive pneumonitis  8/18 CT chest:   A tiny pericardial effusion is noted. There is   coronary artery calcification. Left subclavian pacemaker is noted. The   previously noted infiltrative left hilar mass extending to left upper lobe   and left lower lobe surrounding the broncho pulmonary vasculature is noted   currently measuring 4.6 x 4.3 cm with progression. There is some associated   atelectasis. The right lung is clear. There is no pleural effusion         08/19/22 ECHO:  Normal left ventricular chamber size. Normal left ventricular systolic function. Visually estimated LVEF is 55-60 %. No wall motion abnormalities. Normal diastolic function. Normal left atrial pressure. Normal right ventricle structure and function. Normal left atrial size.    Normal right atrial size   Likely normal estimated PA pressure. Pacer/ ICD wire present in the right heart. No gross evidence of infective endocarditis. Consider SHIRA for better   accuracy if clinically indicated. Compared to prior echo from 2021, no significant changes noted. SHIRA on 8/22/22   No SHIRA indication of Endocarditis. Normal left ventricular chamber size and systolic function. Interatrial septum appears intact. Normal right ventricle size and function. Pacemaker leads noted in right atrium and right ventricle. Normal aortic root size. Moderate atherosclerosis in the descending thoracic aorta. Epicardial fat vs. small pericardial effusion. No intra cardiac mass or thrombus. Technically sub-optimal images. Compared to prior sub-optimal TTE from 8/19/22. Assessment/plan:  68 y/o  male known to [facility] with past medical history noted for COPD, cardiac arrhythmia (previously wore event monitor per EP), left hilar lymphadenopathy, squamous cell carcinoma diagnosed by EBUS 5/27/21, COVID 5/2022, pulmonary embolism (on eliquis), pacemaker placement (7/2021) presented to [facility] 1/18/23 with weakness, cough, shortness of breath, fatigue.     In ER, cxr with left effuision  1/18 platelet transfusion  1/22 cxr with chf; 1 unit packed red blood cell  1/23 Pulm consulted  1/24 on 4lnc; platelets 24  9/36 on 4 L nasal cannula         B/L Pleural Effusions   As seen on CT   IR consulted for possible thoracentesis   Check fluid for analysis  COPD exacerbation  On prednisone 5mg at home and Trelegy  Continue brovana, budesonide, Solu-Medrol 40 mg every 8 hours  Multi-Factorial Anemia   Stage IIIB T2aN3 Squamous Cell Carcinoma of Lung  diagnosis 5/27/2021, PD-L1 0% ( 7/6/21-9/7/21 treatment with concurrent chemotherapy/radiation therapy; 11/2/21 consolidated with Imfinzi (Durvalumab) per Oncology IV every 4 weeks; 6/30/22 PET showing progression and was started on Carbo, Gemzar and Keytruda on 6/30/22; 8/9 /22 r ceived C2D8 of Carboplatin AUC 2 with Gemzar 1000 mg/m2 IV on D1,8 and Keytruda IV on D1 on a 21 day cycle)  History of Enterococcus faecalis bacteremia/septicemia, bacteremia cultures + on 8/16, 8/17, 8/20 s/p mediport removal    h/o wide-complex tachycardia   s/p pacer out 8/25/2022;  leadless pacer implantation 8/29/2022  H/O SARS-CoV 2 Pneumonitis 5/24/2022  Chronic hypoxic resp failure  Continue o2  Wean O2   Probable Obstructive Sleep Apnea  Persistent pancytopenia secondary to chemotherapy  1/24 platelets 24  Heme-onc following  B/l Lower Extremity Swelling and numbness  H/O RLL subsegmental PE  Eliquis on hold for possible procedure   Radiation pneumonitis/fibrosis left lung     Electronically signed by LELO Gonzalez - CNP on 1/25/2023 at 7:49 AM

## 2023-01-25 NOTE — PROGRESS NOTES
Patient requested that medication be administered later in the morning.   Medication will be administered later in the A.M.

## 2023-01-25 NOTE — PROGRESS NOTES
Occupational Therapy  OT BEDSIDE TREATMENT NOTE   [facility] [address] [facility]  [address] [place] [address]      Date:2023  Patient Name: Tito Mcadams  MRN: [id number]  : 1950  Room: [address] [place] [address]     Per OT eval  Evaluating OT: ANDRÉS Gee OTR/L; [id number]       Referring Provider: Jai Hua DO    Specific Provider Orders/Date: OT Eval and Treat 23       Diagnosis:  GIB (gastrointestinal bleeding)    Surgery: none this admission     Pertinent Medical History:  has a past medical history of CHB (complete heart block) ([facility] [address] COPD (chronic obstructive pulmonary disease) ([facility] [address] Hypertension, NSCLC of left lung ([facility] [address] Pulmonary emboli ([facility] [address] Syncope, and Syncope.        Reason for Admission: SOB and fatigue     Recommended Adaptive Equipment:  own necessary equipment at this time      Precautions:  Fall Risk, IV, O2, Pacemaker, Active Chemotherapy (Lung CA), Dyspnea with exertion      Assessment of current deficits:    [x] Functional mobility            [x]ADLs           [x] Strength                  [x]Cognition    [x] Functional transfers          [x] IADLs         [x] Safety Awareness   [x]Endurance    [x] Fine Coordination                         [x] Balance      [] Vision/perception   []Sensation      []Gross Motor Coordination             [] ROM           [] Delirium                   [] Motor Control      OT PLAN OF CARE   OT POC based on physician orders, patient diagnosis and results of clinical assessment     Frequency/Duration: 1-3 days/wk for 2 weeks PRN   Specific OT Treatment Interventions to include:   * Instruction/training on adapted ADL techniques and AE recommendations to increase functional independence within precautions       * Training on energy conservation strategies, correct breathing pattern and techniques to improve independence/tolerance for self-care routine  * Functional transfer/mobility training/DME recommendations for increased independence, safety, and fall prevention  * Patient/Family education to increase follow through with safety techniques and functional independence  * Recommendation of environmental modifications for increased safety with functional transfers/mobility and ADLs  * Therapeutic exercise to improve motor endurance, ROM, and functional strength for ADLs/functional transfers  * Therapeutic activities to facilitate/challenge dynamic balance, stand tolerance for increased safety and independence with ADLs        Home Living: Pt lives with wife in 1 story home with 2 steps and [facility] to enter.  Bathroom located in basement with full flight of steps and BHR to access (wife present when pt manipulating steps)    Bathroom setup: tub/shower unit with shower chair and grab bar outside of tub   Equipment owned: shower chair, ww, cane     Prior Level of Function: Mod Ind <> Min A with ADLs - bathing tasks    Dependent on wife with IADLs - pt does not accompany wife to grocery store very often  ambulated with no AD prior  Driving: yes - however wife does [de-identified] of driving  Occupation: retired     Pain Level: 0/10  Cognition: A&O: 4/4  Follows multi step directions              Memory:  good              Sequencing:  fair+              Problem solving:  fair+              Judgement/safety:  fair+                Functional Assessment:  AM-PAC Daily Activity Raw Score: 18/24    Initial Eval Status  Date: 1/19/23 Treatment Status  Date:1/25/23 STGs = LTGs  Time frame: 10-14 days   Feeding Set Up   Tray set up Set up  Independent    Grooming Stand by Assist   Simulated functional reach Sup  Standing at sink  Independent    UB Dressing Stand by Assist  Set up  Independent    LB Dressing Stand by Assist  Glen socks using figure 4 technique seated EOB Sup   Sitting EOB using cross leg tech to don socks Independent    Bathing Stand by Assist Sup  simulated  Independent    Toileting Stand by Assist   Lower surface transfer Sup  For clothing management/hygiene   Independent    Bed Mobility  Log Roll: IND  Supine to sit: Sup   Sit to supine: Sup  Log Roll: IND  Supine to sit: Sup   Sit to supine: NT  Supine to sit: Independent   Sit to supine: Independent    Functional Transfers Sit to stand: Sup   Stand to sit:Sup  Stand pivot: Sup  Commode: Sup Sit to stand: Sup   Stand to sit:Sup  Stand pivot: Sup  Commode: Sup  Sit to stand:IND   Stand to sit:IND  Stand pivot: IND  Commode: IND    Functional Mobility Sup with no AD   within household distance Sup with no AD   within household distance in room  IND    Balance Sitting:     Static - IND     Dynamic - IND  Standing: Sup Sitting:     Static - IND     Dynamic - IND  Standing: Sup  Standing: IND   Activity Tolerance FAIR  Rest breaks d/t dyspnea with exertion educated on task modifications  FAIR  Rest breaks d/t dyspnea with exertion educated on task modifications and pacing  GOOD   Visual/  Perceptual Glasses:  yes          Vitals    SpO2 at rest on 4L NC: 98%  HR: 44-52  SpO2 following functional mobility on 4L NC: 97%  HR:54  SpO2 at rest on 4L NC: 96%  HR: 47  SpO2 following functional mobility on 4L NC: 95%  HR:52           BUE  ROM/Strength/  Fine motor Coordination Hand dominance: Right     RUE: ROM WFL     Strength: 4/5      Strength: FAIR     Coordination:  FAIR     LUE: ROM WFL     Strength: 4/5      Strength: FAIR     Coordination:  FAIR   increase BUE muscle strength for improved indep with functional transfers            Hearing: WFL   Sensation:  Bilateral feet - chronic neuropathy   Tone: WFL   Edema: unremarkable    Comments: Upon arrival pt supine in bed. ADL retraining to increase safety and indep in dressing and toileting tasks, balance and trf training to increase participation in functional mobility and standing aspects of ADLs with increased safety.  Pt educated on energy conservation techniques to increase independence and safety during ADL's, bed mobility, and functional transfers. At end of session pt left seated EOB, call light within reach. Pt has made fair progress towards set goals.      Continue with current plan of care    Treatment Time [id number]            Treatment Time Out: 10:01             Treatment Charges: Mins Units   Ther Ex  [id number]     Manual Therapy [id number]     Thera Activities [id number]     ADL/Home Mgt [id number] 10 1   Neuro Re-ed [id number]     Group Therapy      Orthotic manage/training  83086     Non-Billable Time     Total Timed Treatment 10 Al 27 SKAGGS/L [id number]

## 2023-01-25 NOTE — PROCEDURES
[address] with Joseph Paniagua RN. Pts platlet count this am is 24. Informed that Dr Awilda Lopez cannot attempt procedure when pts platelets are below 50. Requested to notify ordering physician.

## 2023-01-26 VITALS
OXYGEN SATURATION: 97 % | SYSTOLIC BLOOD PRESSURE: 145 MMHG | DIASTOLIC BLOOD PRESSURE: 60 MMHG | RESPIRATION RATE: 20 BRPM | HEIGHT: 66 IN | BODY MASS INDEX: 28.06 KG/M2 | TEMPERATURE: 97.4 F | HEART RATE: 52 BPM | WEIGHT: 174.6 LBS

## 2023-01-26 LAB
ANISOCYTOSIS: ABNORMAL
BASOPHILS ABSOLUTE: 0 E9/L (ref 0–0.2)
BASOPHILS RELATIVE PERCENT: 0 % (ref 0–2)
EOSINOPHILS ABSOLUTE: 0 E9/L (ref 0.05–0.5)
EOSINOPHILS RELATIVE PERCENT: 0 % (ref 0–6)
HCT VFR BLD CALC: 24.1 % (ref 37–54)
HEMOGLOBIN: 8 G/DL (ref 12.5–16.5)
HYPOCHROMIA: ABNORMAL
LYMPHOCYTES ABSOLUTE: 0.22 E9/L (ref 1.5–4)
LYMPHOCYTES RELATIVE PERCENT: 7 % (ref 20–42)
MCH RBC QN AUTO: 30 PG (ref 26–35)
MCHC RBC AUTO-ENTMCNC: 33.2 % (ref 32–34.5)
MCV RBC AUTO: 90.3 FL (ref 80–99.9)
MONOCYTES ABSOLUTE: 0.13 E9/L (ref 0.1–0.95)
MONOCYTES RELATIVE PERCENT: 4.4 % (ref 2–12)
MYELOCYTE PERCENT: 0.9 % (ref 0–0)
NEUTROPHILS ABSOLUTE: 2.85 E9/L (ref 1.8–7.3)
NEUTROPHILS RELATIVE PERCENT: 87.7 % (ref 43–80)
NUCLEATED RED BLOOD CELLS: 3.5 /100 WBC
OVALOCYTES: ABNORMAL
PDW BLD-RTO: 18.2 FL (ref 11.5–15)
PLATELET # BLD: 39 E9/L (ref 130–450)
PLATELET CONFIRMATION: NORMAL
PMV BLD AUTO: ABNORMAL FL (ref 7–12)
POIKILOCYTES: ABNORMAL
POLYCHROMASIA: ABNORMAL
PRO-BNP: 2889 PG/ML (ref 0–125)
RBC # BLD: 2.67 E12/L (ref 3.8–5.8)
TEAR DROP CELLS: ABNORMAL
WBC # BLD: 3.2 E9/L (ref 4.5–11.5)

## 2023-01-26 PROCEDURE — C9113 INJ PANTOPRAZOLE SODIUM, VIA: HCPCS

## 2023-01-26 PROCEDURE — 6370000000 HC RX 637 (ALT 250 FOR IP): Performed by: INTERNAL MEDICINE

## 2023-01-26 PROCEDURE — A4216 STERILE WATER/SALINE, 10 ML: HCPCS

## 2023-01-26 PROCEDURE — 6360000002 HC RX W HCPCS: Performed by: INTERNAL MEDICINE

## 2023-01-26 PROCEDURE — 36415 COLL VENOUS BLD VENIPUNCTURE: CPT

## 2023-01-26 PROCEDURE — 6360000002 HC RX W HCPCS: Performed by: NURSE PRACTITIONER

## 2023-01-26 PROCEDURE — 6360000002 HC RX W HCPCS

## 2023-01-26 PROCEDURE — 2580000003 HC RX 258

## 2023-01-26 PROCEDURE — 94640 AIRWAY INHALATION TREATMENT: CPT

## 2023-01-26 PROCEDURE — 97535 SELF CARE MNGMENT TRAINING: CPT

## 2023-01-26 PROCEDURE — 85025 COMPLETE CBC W/AUTO DIFF WBC: CPT

## 2023-01-26 PROCEDURE — 6370000000 HC RX 637 (ALT 250 FOR IP): Performed by: PHYSICIAN ASSISTANT

## 2023-01-26 PROCEDURE — 6370000000 HC RX 637 (ALT 250 FOR IP): Performed by: NURSE PRACTITIONER

## 2023-01-26 PROCEDURE — 2700000000 HC OXYGEN THERAPY PER DAY

## 2023-01-26 RX ORDER — METHYLPREDNISOLONE SODIUM SUCCINATE 40 MG/ML
40 INJECTION, POWDER, LYOPHILIZED, FOR SOLUTION INTRAMUSCULAR; INTRAVENOUS EVERY 12 HOURS
Status: DISCONTINUED | OUTPATIENT
Start: 2023-01-26 | End: 2023-01-26 | Stop reason: HOSPADM

## 2023-01-26 RX ORDER — FLUTICASONE PROPIONATE 50 MCG
1 SPRAY, SUSPENSION (ML) NASAL DAILY
Qty: 16 G | Refills: 3 | Status: SHIPPED | OUTPATIENT
Start: 2023-01-27

## 2023-01-26 RX ORDER — FUROSEMIDE 10 MG/ML
40 INJECTION INTRAMUSCULAR; INTRAVENOUS ONCE
Status: COMPLETED | OUTPATIENT
Start: 2023-01-26 | End: 2023-01-26

## 2023-01-26 RX ORDER — PREDNISONE 10 MG/1
TABLET ORAL
Qty: 30 TABLET | Refills: 0 | Status: SHIPPED | OUTPATIENT
Start: 2023-01-26

## 2023-01-26 RX ORDER — ALBUTEROL SULFATE 90 UG/1
2 AEROSOL, METERED RESPIRATORY (INHALATION) 4 TIMES DAILY PRN
Qty: 54 G | Refills: 1 | Status: SHIPPED | OUTPATIENT
Start: 2023-01-26

## 2023-01-26 RX ORDER — METOPROLOL SUCCINATE 25 MG/1
25 TABLET, EXTENDED RELEASE ORAL 2 TIMES DAILY
Qty: 60 TABLET | Refills: 0 | Status: SHIPPED | OUTPATIENT
Start: 2023-01-26

## 2023-01-26 RX ORDER — BENZONATATE 100 MG/1
100 CAPSULE ORAL 3 TIMES DAILY PRN
Qty: 30 CAPSULE | Refills: 0 | Status: SHIPPED | OUTPATIENT
Start: 2023-01-26 | End: 2023-02-05

## 2023-01-26 RX ADMIN — BENZONATATE 100 MG: 100 CAPSULE ORAL at 11:16

## 2023-01-26 RX ADMIN — METHYLPREDNISOLONE SODIUM SUCCINATE 40 MG: 40 INJECTION, POWDER, FOR SOLUTION INTRAMUSCULAR; INTRAVENOUS at 00:58

## 2023-01-26 RX ADMIN — IPRATROPIUM BROMIDE 0.5 MG: 0.5 SOLUTION RESPIRATORY (INHALATION) at 08:54

## 2023-01-26 RX ADMIN — IPRATROPIUM BROMIDE 0.5 MG: 0.5 SOLUTION RESPIRATORY (INHALATION) at 12:50

## 2023-01-26 RX ADMIN — SODIUM CHLORIDE, PRESERVATIVE FREE 40 MG: 5 INJECTION INTRAVENOUS at 00:58

## 2023-01-26 RX ADMIN — BUDESONIDE 500 MCG: 0.5 SUSPENSION RESPIRATORY (INHALATION) at 08:53

## 2023-01-26 RX ADMIN — MORPHINE SULFATE 2.5 MG: 10 SOLUTION ORAL at 11:16

## 2023-01-26 RX ADMIN — SUCRALFATE 1 G: 1 TABLET ORAL at 05:09

## 2023-01-26 RX ADMIN — ARFORMOTEROL TARTRATE 15 MCG: 15 SOLUTION RESPIRATORY (INHALATION) at 08:52

## 2023-01-26 RX ADMIN — ATORVASTATIN CALCIUM 20 MG: 20 TABLET, FILM COATED ORAL at 08:30

## 2023-01-26 RX ADMIN — SODIUM CHLORIDE, PRESERVATIVE FREE 40 MG: 5 INJECTION INTRAVENOUS at 13:37

## 2023-01-26 RX ADMIN — SUCRALFATE 1 G: 1 TABLET ORAL at 11:12

## 2023-01-26 RX ADMIN — GABAPENTIN 100 MG: 100 CAPSULE ORAL at 08:30

## 2023-01-26 RX ADMIN — METHYLPREDNISOLONE SODIUM SUCCINATE 40 MG: 40 INJECTION, POWDER, FOR SOLUTION INTRAMUSCULAR; INTRAVENOUS at 13:37

## 2023-01-26 RX ADMIN — FLUTICASONE PROPIONATE 1 SPRAY: 50 SPRAY, METERED NASAL at 08:35

## 2023-01-26 RX ADMIN — FUROSEMIDE 40 MG: 10 INJECTION, SOLUTION INTRAMUSCULAR; INTRAVENOUS at 08:30

## 2023-01-26 ASSESSMENT — PAIN SCALES - GENERAL: PAINLEVEL_OUTOF10: 0

## 2023-01-26 NOTE — PROGRESS NOTES
Occupational Therapy  OT BEDSIDE TREATMENT NOTE   [facility] [address] [facility]  [address] [place] [address]      Date:2023  Patient Name: Olga Corcoran  MRN: [id number]  : 1950  Room: [address] [place] [address]     Per OT eval  Evaluating OT: Elvira Chávez, [address] OTR/L; [id number]       Referring Provider: Maddie Dewitt DO    Specific Provider Orders/Date: OT Eval and Treat 23       Diagnosis:  GIB (gastrointestinal bleeding)    Surgery: none this admission     Pertinent Medical History:  has a past medical history of CHB (complete heart block) ([facility] [address] COPD (chronic obstructive pulmonary disease) ([facility] [address] Hypertension, NSCLC of left lung ([facility] [address] Pulmonary emboli ([facility] [address] Syncope, and Syncope.        Reason for Admission: SOB and fatigue     Recommended Adaptive Equipment:  home with no needs      Precautions:  Fall Risk, IV, O2, Pacemaker, Active Chemotherapy (Lung CA), Dyspnea with exertion      Assessment of current deficits:    [x] Functional mobility            [x]ADLs           [x] Strength                  [x]Cognition    [x] Functional transfers          [x] IADLs         [x] Safety Awareness   [x]Endurance    [x] Fine Coordination                         [x] Balance      [] Vision/perception   []Sensation      []Gross Motor Coordination             [] ROM           [] Delirium                   [] Motor Control      OT PLAN OF CARE   OT POC based on physician orders, patient diagnosis and results of clinical assessment     Frequency/Duration: 1-3 days/wk for 2 weeks PRN   Specific OT Treatment Interventions to include:   * Instruction/training on adapted ADL techniques and AE recommendations to increase functional independence within precautions       * Training on energy conservation strategies, correct breathing pattern and techniques to improve independence/tolerance for self-care routine  * Functional transfer/mobility training/DME recommendations for increased independence, safety, and fall prevention  * Patient/Family education to increase follow through with safety techniques and functional independence  * Recommendation of environmental modifications for increased safety with functional transfers/mobility and ADLs  * Therapeutic exercise to improve motor endurance, ROM, and functional strength for ADLs/functional transfers  * Therapeutic activities to facilitate/challenge dynamic balance, stand tolerance for increased safety and independence with ADLs        Home Living: Pt lives with wife in 1 story home with 2 steps and [facility] to enter.  Bathroom located in basement with full flight of steps and BHR to access (wife present when pt manipulating steps)    Bathroom setup: tub/shower unit with shower chair and grab bar outside of tub   Equipment owned: shower chair, ww, cane     Prior Level of Function: Mod Ind <> Min A with ADLs - bathing tasks    Dependent on wife with IADLs - pt does not accompany wife to grocery store very often  ambulated with no AD prior  Driving: yes - however wife does [de-identified] of driving  Occupation: retired     Pain Level: 0/10  Cognition: A&O: 4/4  Follows multi step directions              Memory:  good              Sequencing:  fair+              Problem solving:  fair+              Judgement/safety:  fair+                Functional Assessment:  AM-PAC Daily Activity Raw Score: 19/24    Initial Eval Status  Date: 1/19/23 Treatment Status  Date:1/26/23 STGs = LTGs  Time frame: 10-14 days   Feeding Set Up   Tray set up IND Independent    Grooming Stand by Assist   Simulated functional reach SUP (standing at sink) Independent    UB Dressing Stand by Assist  SUP Independent    LB Dressing Stand by Assist  Glen socks using figure 4 technique seated EOB SUP (pt used crossing of leg technique to doff/don B socks) Independent    Bathing Stand by Assist SBA (simulated, pt declined) Independent    Toileting Stand by Assist   Lower surface transfer SUP Independent    Bed Mobility  Log Roll: IND  Supine to sit: Sup   Sit to supine: Sup  NT Supine to sit: Independent   Sit to supine: Independent    Functional Transfers Sit to stand: Sup   Stand to sit:Sup  Stand pivot: Sup  Commode: Sup SUP Sit to stand:IND   Stand to sit:IND  Stand pivot: IND  Commode: IND    Functional Mobility Sup with no AD   within household distance SUP (using no AD, to/from bathroom) IND    Balance Sitting:     Static - IND     Dynamic - IND  Standing: Sup SUP Standing: IND   Activity Tolerance FAIR  Rest breaks d/t dyspnea with exertion educated on task modifications  Fair       GOOD   Visual/  Perceptual Glasses:  yes          Vitals    SpO2 at rest on 4L NC: 98%  HR: 44-52  SpO2 following functional mobility on 4L NC: 97%  HR:54             BUE  ROM/Strength/  Fine motor Coordination Hand dominance: Right     RUE: ROM WFL     Strength: 4/5      Strength: FAIR     Coordination:  FAIR     LUE: ROM WFL     Strength: 4/5      Strength: FAIR     Coordination:  FAIR   increase BUE muscle strength for improved indep with functional transfers            Hearing: WFL   Sensation:  Bilateral feet - chronic neuropathy   Tone: WFL   Edema: unremarkable    Comments: Upon arrival pt seated EOB and agreeable to OT session. Completed ADLs/functional tranfers, see above for assessment. At end of session pt left seated EOB, call light within reach. Pt has made fair progress towards set goals.    Continue with current plan of care    Treatment Time In: 0830            Treatment Time Out: 0845             Treatment Charges: Mins Units   Ther Ex  68910     Manual Therapy [id number] [facility]     Thera Activities 52675     ADL/Home Mgt [id number] 15 1   Neuro Re-ed 37330     Group Therapy      Orthotic manage/training  04556     Non-Billable Time     Total Timed Treatment       Soy Mcneal, [address] [facility], OTR/L [id number]

## 2023-01-26 NOTE — PROGRESS NOTES
LATE ENTRY,  PT WAS SEEN AND EXAMINED ON YESTERDAY    Hospitalist Progress Note      PCP: Roseann Nguyen MD    Date of Admission: 1/18/2023        Hospital Course:  67 y.o. male presented with WEAKNESS,  FOUND TO HAVE A HGB OF 6.9 AND   LOW PLATELETS OF 14.  HE WAS  TRANSFUSED PLATELETS, AND BLOOD IN THE ER.  HE HAS A KNOWN HISTORY OF NON SMALL CELL LUNG CANCER DIAGNOSED SEVERAL YEARS AGO, AND WAS ON CHEMO AND RADIATION AT THAT TIME. THE TUMOR SHRUNK , HE STOOPED THE CHEMO, AND IT CAME BACK. HE RESTARTED CHEMO , LAST RECEIVING IT 3 WEEKS AGO. HE DOES ADMIT TO SEEING BRIGHT RED RECTAL BLEEDING ** HAS PETECHIAE ON ARMS              Subjective:   NO COMPLAINTS           Medications:  Reviewed    Infusion Medications    sodium chloride      sodium chloride      sodium chloride       Scheduled Medications    methylPREDNISolone  40 mg IntraVENous Q12H    fluticasone  1 spray Each Nostril Daily    [Held by provider] apixaban  5 mg Oral BID    atorvastatin  20 mg Oral Daily    gabapentin  100 mg Oral BID    melatonin  3 mg Oral Nightly    metoprolol succinate  25 mg Oral BID    sucralfate  1 g Oral 4x Daily AC & HS    budesonide  0.5 mg Nebulization BID    And    arformoterol tartrate  15 mcg Nebulization BID    And    ipratropium  0.5 mg Nebulization 4x daily    pantoprazole (PROTONIX) 40 mg injection  40 mg IntraVENous Q12H     PRN Meds: benzonatate, diphenhydrAMINE, sodium chloride, sodium chloride, morphine, sodium chloride, sodium chloride, albuterol, ondansetron      Intake/Output Summary (Last 24 hours) at 1/26/2023 1514  Last data filed at 1/26/2023 0511  Gross per 24 hour   Intake 840 ml   Output 450 ml   Net 390 ml       Exam:    BP (!) 145/60   Pulse 52   Temp 97.4 °F (36.3 °C) (Oral)   Resp 20   Ht 5' 6\" (1.676 m)   Wt 174 lb 9.6 oz (79.2 kg)   SpO2 97%   BMI 28.18 kg/m²       General appearance:  No apparent distress, appears stated age. HEENT:  Normal cephalic, atraumatic without obvious deformity. Pupils equal, round, and reactive to light. Extra ocular muscles intact. Conjunctivae/corneas clear. Neck: Supple, with full range of motion. No jugular venous distention. Trachea midline. Respiratory:  Normal respiratory effort. Clear to auscultation,   Cardiovascular:  RRRR  Abdomen: Soft, non-tender, non-distended with normal bowel sounds. Musculoskeletal:  No clubbing, cyanosis  POS edema bilaterally. Skin: PETECHIAE ON ARMS,    Neurologic:   Cranial nerves: II-XII intact,   Psychiatric:  Alert and oriented x 3                   Labs:   Recent Labs     01/24/23  0515 01/25/23  0640 01/26/23  0504   WBC 3.2* 3.0* 3.2*   HGB 7.8* 7.4* 8.0*   HCT 23.9* 21.5* 24.1*   PLT 24* 29* 39*     Recent Labs     01/24/23  0515   *   K 3.7      CO2 33*   BUN 9   CREATININE 0.8   CALCIUM 9.1     No results for input(s): AST, ALT, BILIDIR, BILITOT, ALKPHOS in the last 72 hours. No results for input(s): INR in the last 72 hours. No results for input(s): Wayna Khat in the last 72 hours. No results for input(s): AST, ALT, ALB, BILIDIR, BILITOT, ALKPHOS in the last 72 hours. No results for input(s): LACTA in the last 72 hours. No results found for: Hazeline Hedger  No results found for: AMMONIA    Assessment:    Active Hospital Problems    Diagnosis Date Noted    GIB (gastrointestinal bleeding) [K92.2] 01/18/2023     Priority: Medium   NON SMALL CELL LUNG CANCER   H/O DVT AND PE  HLD  THROMBOCYTOPENIA     PLAN:  MONITOR LLAB   HEMATOLOGY CONSULTED( KEEP HGB 7.5   AND  PLATELETS > 83A BEFORE RESTARTING ELIQUIS    LIPITOR   US OF LEGS(NEG)     DVT Prophylaxis: ELIQUIS( ON HOLD)  Diet: ADULT DIET;  Regular; Low Fat/Low Chol/High Fiber/2 gm Na  Code Status: Full Code     PT/OT Eval Status: ORDERED     Dispo -  HOME         Electronically signed by Syeda Cueto DO on 1/26/2023 at 3:14 PM [facility]

## 2023-01-26 NOTE — PROGRESS NOTES
CLINICAL PHARMACY NOTE: MEDS TO BEDS    Total # of Prescriptions Filled: 3   The following medications were delivered to the patient:  Prednisone 10mg  Benzonatate 100mg  Albuterol inh    Additional Documentation:  Picked up

## 2023-01-26 NOTE — CARE COORDINATION
Discharge order noted. Per hem/onc note today, Patient ok for discharge from oncology standpoint. He should follow-up as schedule outpatient to discuss continued treatment. Plan is home with wife and she will transport him home today.   [facility]   [phone]

## 2023-01-26 NOTE — PROGRESS NOTES
[facility] Pulmonary Progress Note  [facility]    Admit Date: 2023                            PCP: Saima Bertrand MD  Principal Problem:    GIB (gastrointestinal bleeding)  Resolved Problems:    * No resolved hospital problems. *      Subjective:  Resting on 4lnc- sats 100%  States he is feeling a little bit better today still with shortness of breath with activity, bruising, worsening leg edema, cough with light yellow   No wz   Rn at bedside     Medications:   sodium chloride      sodium chloride      sodium chloride          methylPREDNISolone  40 mg IntraVENous Q8H    fluticasone  1 spray Each Nostril Daily    [Held by provider] apixaban  5 mg Oral BID    atorvastatin  20 mg Oral Daily    gabapentin  100 mg Oral BID    melatonin  3 mg Oral Nightly    metoprolol succinate  25 mg Oral BID    sucralfate  1 g Oral 4x Daily AC & HS    budesonide  0.5 mg Nebulization BID    And    arformoterol tartrate  15 mcg Nebulization BID    And    ipratropium  0.5 mg Nebulization 4x daily    pantoprazole (PROTONIX) 40 mg injection  40 mg IntraVENous Q12H       Vitals:  VITALS:  BP (!) 147/69   Pulse (!) 48   Temp 97.7 °F (36.5 °C) (Temporal)   Resp 20   Ht 5' 6\" (1.676 m)   Wt 174 lb 9.6 oz (79.2 kg)   SpO2 100%   BMI 28.18 kg/m²   24HR INTAKE/OUTPUT:    Intake/Output Summary (Last 24 hours) at 2023 0752  Last data filed at 2023 0511  Gross per 24 hour   Intake 840 ml   Output 450 ml   Net 390 ml       CURRENT PULSE OXIMETRY:  SpO2: 100 %  24HR PULSE OXIMETRY RANGE:  SpO2  Av.5 %  Min: 99 %  Max: 100 %  CVP:    VENT SETTINGS:      Additional Respiratory Assessments  Heart Rate: (!) 48  Resp: 20  SpO2: 100 %      EXAM:  General: No distress. Alert. On 4lnc  Neck: Trachea midline. Resp: No accessory muscle use. No crackles. No wheezing. No rhonchi. Diminished in  bases  CV: Regular rate. Regular rhythm. No mumur or rub. 2+ BLE edema. ABD: Non-tender. Non-distended. No masses. No organmegaly.  Normal bowel sounds. obese  Skin: Warm and dry. Bruising. Mediport   M/S: No cyanosis. No joint deformity. No clubbing. Neuro: Aox4    I/O: I/O last 3 completed shifts: In: 840 [P.O.:840]  Out: 450 [Urine:450]  No intake/output data recorded. Results:  CBC:   Recent Labs     01/24/23  0515 01/25/23  0640 01/26/23  0504   WBC 3.2* 3.0* 3.2*   HGB 7.8* 7.4* 8.0*   HCT 23.9* 21.5* 24.1*   MCV 89.5 86.0 90.3   PLT 24* 29* 39*       BMP:   Recent Labs     01/24/23  0515   *   K 3.7      CO2 33*   BUN 9   CREATININE 0.8       LFT: No results for input(s): ALKPHOS, ALT, AST, PROT, BILITOT, BILIDIR, LABALBU in the last 72 hours. PT/INR:   Recent Labs     01/23/23  0900   PROTIME 12.7*   INR 1.2       Cultures:  No results for input(s): CULTRESP in the last 72 hours. ABG:   No results for input(s): PH, PO2, PCO2, HCO3, BE, O2SAT in the last 72 hours. Films:  XR CHEST PORTABLE    Result Date: 1/18/2023  EXAMINATION: ONE XRAY VIEW OF THE CHEST 1/18/2023 1:02 pm COMPARISON: None. HISTORY: ORDERING SYSTEM PROVIDED HISTORY: sob TECHNOLOGIST PROVIDED HISTORY: Reason for exam:->sob What reading provider will be dictating this exam?->CRC FINDINGS: The lungs are without acute focal process. Probable left upper lobe lingular segment atelectasis. Left pleural effusion. No pneumothorax. The cardiomediastinal silhouette is without acute process. The osseous structures are without acute process. Right-sided port a catheter tip in the mid SVC. Left pleural effusion with probable left upper lobe lingular segment atelectasis. XR CHEST PORTABLE   Final Result   Cardiomegaly with basilar subsegmental atelectasis and probable left pleural   effusion. Consider mild volume overload/CHF. XR CHEST PORTABLE   Final Result   Left pleural effusion with probable left upper lobe lingular segment   atelectasis.            IR INTERVENTIONAL RADIOLOGY PROCEDURE REQUEST    (Results Pending)   CT CHEST WO CONTRAST (Results Pending)     XR CHEST PORTABLE 8/16/2022  EXAMINATION: ONE XRAY VIEW OF THE CHEST 8/16/2022 3:49 pm COMPARISON: May 24, 2022 HISTORY: ORDERING SYSTEM PROVIDED HISTORY: cough, lung cancer TECHNOLOGIST PROVIDED HISTORY: Reason for exam:->cough, lung cancer What reading provider will be dictating this exam?->CRC FINDINGS: There is opacity in the left lower lung. There are chronic markings at the right lung base. Emphysematous changes in the upper lungs. The heart is not enlarged. There is no pneumothorax. There is mild blunting of the left costophrenic angle. Pacemaker present. Continued opacity in the left lower lung and linear scarring in the left perihilar region. COPD.   8/27 8/30  CXR 8/27: left pleural effusion  2 VW CXR: 8/30: left pleural effusion, unchanged. Atelectasis improved     8/18/22 CT chest w/o                                      5/24/2022 CTA chest   5/24/2022 CTA chest: 3.4 x 5.4 cm left hilar and perihilar mass extending to LLL encircling the pulmonary artery and the bronchi consistent with progressive malignancy with occlusion of left lower lobe bronchi with postobstructive pneumonitis  8/18 CT chest:   A tiny pericardial effusion is noted. There is   coronary artery calcification. Left subclavian pacemaker is noted. The   previously noted infiltrative left hilar mass extending to left upper lobe   and left lower lobe surrounding the broncho pulmonary vasculature is noted   currently measuring 4.6 x 4.3 cm with progression. There is some associated   atelectasis. The right lung is clear. There is no pleural effusion         08/19/22 ECHO:  Normal left ventricular chamber size. Normal left ventricular systolic function. Visually estimated LVEF is 55-60 %. No wall motion abnormalities. Normal diastolic function. Normal left atrial pressure. Normal right ventricle structure and function. Normal left atrial size.    Normal right atrial size   Likely normal estimated PA pressure. Pacer/ ICD wire present in the right heart. No gross evidence of infective endocarditis. Consider SHIRA for better   accuracy if clinically indicated. Compared to prior echo from 2021, no significant changes noted. SHIRA on 8/22/22   No SHIRA indication of Endocarditis. Normal left ventricular chamber size and systolic function. Interatrial septum appears intact. Normal right ventricle size and function. Pacemaker leads noted in right atrium and right ventricle. Normal aortic root size. Moderate atherosclerosis in the descending thoracic aorta. Epicardial fat vs. small pericardial effusion. No intra cardiac mass or thrombus. Technically sub-optimal images. Compared to prior sub-optimal TTE from 8/19/22. Assessment/plan:  68 y/o  male known to [facility] with past medical history noted for COPD, cardiac arrhythmia (previously wore event monitor per EP), left hilar lymphadenopathy, squamous cell carcinoma diagnosed by EBUS 5/27/21, COVID 5/2022, pulmonary embolism (on eliquis), pacemaker placement (7/2021) presented to [facility] 1/18/23 with weakness, cough, shortness of breath, fatigue.     In ER, cxr with left effuision  1/18 platelet transfusion  1/22 cxr with chf; 1 unit packed red blood cell  1/23 Pulm consulted  1/24 on 4lnc; platelets 24  9/66 on 4 L nasal cannula  1/26 on 4lnc, worsening leg edema          B/L Pleural Effusions   As seen on CT, not enough for thoracentesis  Hold off on thoracentesis at this time   Give dose of lasix   COPD exacerbation  On prednisone 5mg at home and Trelegy  On brovana, budesonide, atrovent, Solu-Medrol 40 mg every 8 hours  Will wean solumedrol to q12hr   Multi-Factorial Anemia   Stage IIIB T2aN3 Squamous Cell Carcinoma of Lung  diagnosis 5/27/2021, PD-L1 0% ( 7/6/21-9/7/21 treatment with concurrent chemotherapy/radiation therapy; 11/2/21 consolidated with Yvonne Martin (Durvalumab) per Oncology IV every 4 weeks; 6/30/22 PET showing progression and was started on Carbo, Gemzar and Keytruda on 6/30/22; 8/9 /22 r ceived C2D8 of Carboplatin AUC 2 with Gemzar 1000 mg/m2 IV on D1,8 and Keytruda IV on D1 on a 21 day cycle)  History of Enterococcus faecalis bacteremia/septicemia, bacteremia cultures + on 8/16, 8/17, 8/20 s/p mediport removal    h/o wide-complex tachycardia   s/p pacer out 8/25/2022;  leadless pacer implantation 8/29/2022  H/O SARS-CoV 2 Pneumonitis 5/24/2022  Chronic hypoxic resp failure  Continue o2  Wean O2   Probable Obstructive Sleep Apnea  Persistent pancytopenia secondary to chemotherapy  1/26 platelets 39  Heme-onc following  H/O RLL subsegmental PE  On eliquis   Radiation pneumonitis/fibrosis left lung     Electronically signed by LELO Valladares - CNP on 1/26/2023 at 7:52 AM    Seen and evaluated, agree with above assessment and plan  FiO2 down to baseline  Unable to wean down the steroids to baseline of 5 mg p.o. daily  Hopeful to be discharged home today  Follow-up with pulmonary in 2 to 4 weeks  Discussed with patient and patient's wife at bedside

## 2023-01-26 NOTE — PROGRESS NOTES
Hospitalist Progress Note      PCP: Elysa Felty, MD    Date of Admission: 1/18/2023        Hospital Course:  67 y.o. male presented with WEAKNESS,  FOUND TO HAVE A HGB OF 6.9 AND   LOW PLATELETS OF 14.  HE WAS  TRANSFUSED PLATELETS, AND BLOOD IN THE ER.  HE HAS A KNOWN HISTORY OF NON SMALL CELL LUNG CANCER DIAGNOSED SEVERAL YEARS AGO, AND WAS ON CHEMO AND RADIATION AT THAT TIME. THE TUMOR SHRUNK , HE STOOPED THE CHEMO, AND IT CAME BACK. HE RESTARTED CHEMO , LAST RECEIVING IT 3 WEEKS AGO. HE DOES ADMIT TO SEEING BRIGHT RED RECTAL BLEEDING ** HAS PETECHIAE ON ARMS** HGB 8  AZUL LETS 39           Subjective:  WANTS AN INHALER TO GO HOME WITH           Medications:  Reviewed    Infusion Medications    sodium chloride      sodium chloride      sodium chloride       Scheduled Medications    methylPREDNISolone  40 mg IntraVENous Q12H    fluticasone  1 spray Each Nostril Daily    [Held by provider] apixaban  5 mg Oral BID    atorvastatin  20 mg Oral Daily    gabapentin  100 mg Oral BID    melatonin  3 mg Oral Nightly    metoprolol succinate  25 mg Oral BID    sucralfate  1 g Oral 4x Daily AC & HS    budesonide  0.5 mg Nebulization BID    And    arformoterol tartrate  15 mcg Nebulization BID    And    ipratropium  0.5 mg Nebulization 4x daily    pantoprazole (PROTONIX) 40 mg injection  40 mg IntraVENous Q12H     PRN Meds: benzonatate, diphenhydrAMINE, sodium chloride, sodium chloride, morphine, sodium chloride, sodium chloride, albuterol, ondansetron      Intake/Output Summary (Last 24 hours) at 1/26/2023 1518  Last data filed at 1/26/2023 0511  Gross per 24 hour   Intake 840 ml   Output 450 ml   Net 390 ml       Exam:    BP (!) 145/60   Pulse 52   Temp 97.4 °F (36.3 °C) (Oral)   Resp 20   Ht 5' 6\" (1.676 m)   Wt 174 lb 9.6 oz (79.2 kg)   SpO2 97%   BMI 28.18 kg/m²       General appearance:  No apparent distress, appears stated age. HEENT:  Normal cephalic,   Neck: Supple, with full range of motion.  No jugular venous distention. Trachea midline. Respiratory:  Normal respiratory effort. Clear to auscultation,   Cardiovascular:  RRRR  Abdomen: Soft, non-tender, non-distended with normal bowel sounds. Musculoskeletal:  No clubbing, cyanosis  POS edema bilaterally. Skin: PETECHIAE ON ARMS,    Neurologic:   Cranial nerves: II-XII intact,   Psychiatric:  Alert and oriented x 3               Labs:   Recent Labs     01/24/23  0515 01/25/23  0640 01/26/23  0504   WBC 3.2* 3.0* 3.2*   HGB 7.8* 7.4* 8.0*   HCT 23.9* 21.5* 24.1*   PLT 24* 29* 39*     Recent Labs     01/24/23  0515   *   K 3.7      CO2 33*   BUN 9   CREATININE 0.8   CALCIUM 9.1     No results for input(s): AST, ALT, BILIDIR, BILITOT, ALKPHOS in the last 72 hours. No results for input(s): INR in the last 72 hours. No results for input(s): Lizz Cornucopia in the last 72 hours. No results for input(s): AST, ALT, ALB, BILIDIR, BILITOT, ALKPHOS in the last 72 hours. No results for input(s): LACTA in the last 72 hours.   No results found for: Yariel Corral  No results found for: AMMONIA    Assessment:    Active Hospital Problems    Diagnosis Date Noted    GIB (gastrointestinal bleeding) [K92.2] 01/18/2023     Priority: Medium   NON SMALL CELL LUNG CANCER   H/O DVT AND PE  HLD  THROMBOCYTOPENIA    Plan: [id number] [address] [facility]    Electronically signed by Jese Brown DO on 1/26/2023 at 3:18 PM Halbert Scheuermann

## 2023-01-26 NOTE — PLAN OF CARE
Problem: Skin/Tissue Integrity  Goal: Absence of new skin breakdown  Description: 1. Monitor for areas of redness and/or skin breakdown  2. Assess vascular access sites hourly  3. Every 4-6 hours minimum:  Change oxygen saturation probe site  4. Every 4-6 hours:  If on nasal continuous positive airway pressure, respiratory therapy assess nares and determine need for appliance change or resting period.   1/26/2023 1230 by Tonny Echeverria  Outcome: Completed     Problem: Safety - Adult  Goal: Free from fall injury  1/26/2023 1230 by Tonny Echeverria  Outcome: Completed     Problem: Pain  Goal: Verbalizes/displays adequate comfort level or baseline comfort level  1/26/2023 1230 by Tonny Echeverria  Outcome: Completed

## 2023-01-29 NOTE — DISCHARGE SUMMARY
Hospitalist Discharge Summary    Patient ID: Kari Gill   Patient : 1950  Patient's PCP: Letitia Sanabria MD    Admit Date: 2023   Admitting Physician: Ashleigh Berumen DO    Discharge Date:  2023   Discharge Physician: Ashleigh Berumen DO   Discharge Condition: Stable  Discharge Disposition: Home      Discharge Diagnoses: Active Hospital Problems    Diagnosis Date Noted    GIB (gastrointestinal bleeding) [K92.2] 2023     Priority: Medium   NON SMALL CELL LUNG CANCER   H/O DVT AND PE  [address] course in brief:    67 y.o. male presented with WEAKNESS,  FOUND TO HAVE A HGB OF 6.9 AND   LOW PLATELETS OF 14.  HE WAS  TRANSFUSED PLATELETS, AND BLOOD IN THE ER.  HE HAS A KNOWN HISTORY OF NON SMALL CELL LUNG CANCER DIAGNOSED SEVERAL YEARS AGO, AND WAS ON CHEMO AND RADIATION AT THAT TIME. THE TUMOR SHRUNK , HE STOOPED THE CHEMO, AND IT CAME BACK. HE RESTARTED CHEMO , LAST RECEIVING IT 3 WEEKS AGO. HE DOES ADMIT TO SEEING BRIGHT RED RECTAL BLEEDING ** HAS PETECHIAE ON ARMS** HGB 8  AZUL LETS 39 TODAY          PHYSICAL EXAM:    BP (!) 145/60   Pulse 52   Temp 97.4 °F (36.3 °C) (Oral)   Resp 20   Ht 5' 6\" (1.676 m)   Wt 174 lb 9.6 oz (79.2 kg)   SpO2 97%   BMI 28.18 kg/m²     General appearance:  No apparent distress, appears stated age. HEENT:  Normal cephalic,   Neck: Supple, with full range of motion. No jugular venous distention. Trachea midline. Respiratory:  Normal respiratory effort. Clear to auscultation,   Cardiovascular:  RRRR  Abdomen: Soft, non-tender, non-distended with normal bowel sounds. Musculoskeletal:  No clubbing, cyanosis  POS edema bilaterally. Skin: PETECHIAE ON ARMS,    Neurologic:   Cranial nerves: II-XII intact,   Psychiatric:  Alert and oriented x 3             Prior to Admission medications    Medication Sig Start Date End Date Taking?  Authorizing Provider   apixaban (ELIQUIS) 5 MG TABS tablet Take 1 tablet by mouth 2 times daily DO NOT RESTART UNTIL TOLD BY ONCOLOGY 1/26/23  Yes Kinga Murcia, DO   fluticasone [facility]) 50 MCG/ACT nasal spray 1 spray by Each Nostril route daily 1/27/23  Yes Kinganikki Pedersonr, DO   benzonatate (TESSALON) 100 MG capsule Take 1 capsule by mouth 3 times daily as needed for Cough 1/26/23 2/5/23 Yes Kinga Murcia, DO   predniSONE (DELTASONE) 10 MG tablet 4 a day x 3 day, 3 a day x 3 days , 2 a day x 3 days , 1 a day x 3 days, the dc 1/26/23  Yes Girma Echols, DO   metoprolol succinate (TOPROL XL) 25 MG extended release tablet Take 1 tablet by mouth 2 times daily 1/26/23  Yes Girma Echols, DO   albuterol sulfate HFA (VENTOLIN HFA) 108 (90 Base) MCG/ACT inhaler Inhale 2 puffs into the lungs 4 times daily as needed for Wheezing 1/26/23  Yes Kinga Murcia, DO   pantoprazole (PROTONIX) 40 MG tablet Take 1 tablet by mouth in the morning and 1 tablet in the evening. 12/30/22   Jerzy Peralta MD   sucralfate (CARAFATE) 1 GM tablet Take 1 tablet by mouth 4 times daily (before meals and nightly) 12/30/22   Jerzy Peralta MD   atorvastatin (LIPITOR) 20 MG tablet Take 20 mg by mouth daily    Historical Provider, MD   Multiple Vitamin (MULTIVITAMIN ADULT PO) Take 1 tablet by mouth daily    Historical Provider, MD   Ascorbic Acid (VITAMIN C) 250 MG tablet Take 250 mg by mouth daily    Historical Provider, MD   OXYGEN Inhale 3 L/min into the lungs daily    Historical Provider, MD   Fluticasone-Umeclidin-Vilant (TRELEGY ELLIPTA) 200-62.5-25 MCG/INH AEPB Inhale 1 puff into the lungs daily 7/11/22   Historical Provider, MD   gabapentin (NEURONTIN) 100 MG capsule Take 100 mg by mouth in the morning and at bedtime.     Historical Provider, MD   melatonin 3 MG TABS tablet Take 3 mg by mouth nightly    Historical Provider, MD   potassium chloride (MICRO-K) 10 MEQ extended release capsule Take 10 mEq by mouth daily    Historical Provider, MD   furosemide (LASIX) 20 MG tablet Take 1 tablet by mouth daily 9/4/22   Vannessa Lomeli MD   albuterol (ACCUNEB) 0.63 MG/3ML nebulizer solution Take 1 ampule by nebulization every 6 hours as needed for Wheezing    Historical Provider, MD   albuterol sulfate HFA (PROVENTIL;VENTOLIN;PROAIR) 108 (90 Base) MCG/ACT inhaler Inhale 2 puffs into the lungs every 6 hours as needed for Wheezing    Historical Provider, MD   simvastatin (ZOCOR) 40 MG tablet Take 40 mg by mouth daily   9/3/22  Historical Provider, MD       Consults:   [place] CONSULT TO [address] [place] [address] TO ONCOLOGY            Discharge Instructions / Follow up:    Future Appointments   Date Time Provider Zana Muñiz   3/10/2023  1:30 PM Shawanda Ramirez MD [facility]       Continued appropriate risk factor modification of blood pressure, diabetes and serum lipids will remain essential to reducing risk of future atherosclerotic development    Activity: activity as tolerated    Significant labs:  CBC:   No results for input(s): WBC, RBC, HGB, HCT, MCV, RDW, PLT in the last 72 hours. BMP: No results for input(s): NA, K, CL, CO2, BUN, CREATININE, CA, MG, PHOS in the last 72 hours. LFT:  No results for input(s): PROT, ALB, ALKPHOS, ALT, AST, BILITOT, AMYLASE, LIPASE in the last 72 hours. PT/INR: No results for input(s): INR, APTT in the last 72 hours. BNP: No results for input(s): BNP in the last 72 hours.   Hgb A1C:   Lab Results   Component Value Date    LABA1C 6.5 (H) 09/17/2021     Folate and B12: No results found for: Zainab Dominique, No results found for: FOLATE  Thyroid Studies:   Lab Results   Component Value Date    TSH 0.771 07/21/2021       Urinalysis:    Lab Results   Component Value Date/Time    NITRU Negative 08/16/2022 05:18 PM    WBCUA 2-5 08/16/2022 05:18 PM    BACTERIA FEW 08/16/2022 05:18 PM    RBCUA >20 08/16/2022 05:18 PM    BLOODU LARGE 08/16/2022 05:18 PM    SPECGRAV <=1.005 08/16/2022 05:18 PM    GLUCOSEU Negative 08/16/2022 05:18 PM Imaging:  CT CHEST WO CONTRAST    Result Date: 1/23/2023  EXAMINATION: CT OF THE CHEST WITHOUT CONTRAST 1/23/2023 2:08 pm TECHNIQUE: CT of the chest was performed without the administration of intravenous contrast. Multiplanar reformatted images are provided for review. Automated exposure control, iterative reconstruction, and/or weight based adjustment of the mA/kV was utilized to reduce the radiation dose to as low as reasonably achievable. COMPARISON: The previous study performed 12/28/2022. Correlation is made with chest radiograph performed 01/22/2023. HISTORY: ORDERING SYSTEM PROVIDED HISTORY: complicated left pleural effusion TECHNOLOGIST PROVIDED HISTORY: Reason for exam:->complicated left pleural effusion What reading provider will be dictating this exam?->CRC FINDINGS: Mediastinum: No axillary, hilar, or mediastinal lymphadenopathy is identified. The thoracic aorta is again atherosclerotic. The remainder of the visualized pulmonary vasculature is unremarkable for a non IV contrast study. The heart is within normal limits in size. A cardiac monitoring device is again seen in place. The thyroid gland, esophagus, and trachea are unremarkable. A right-sided MediPort catheter is again noted, with the tip in the SVC. Lungs/pleura: There has been an interval increase in the left pleural effusion, which is still small in size. A very small right pleural effusion is now noted. There is no evidence of acute consolidation or infiltrate. Centrilobular emphysema is again appreciated. There is again noted to be a left hilar/infrahilar mass. It's size is difficult to assess since no intravenous contrast was administered. No further evaluation about the mass can be made at this time. There is again narrowing of the left lung bronchi due to the presence of the mass. Mild postobstructive pneumonitis/atelectasis is again seen in the lingula.   There has been no significant interval change in other linear opacities in the lingula and left lower lobe. There was previously reported to be a nodule in the right middle lobe is less significant and appears more like focal scar formation. No new pulmonary parenchymal nodule or mass is appreciated. Upper Abdomen: Cholelithiasis is again noted. Soft Tissues/Bones: Osteo-degenerative changes are again noted involving the visualized thoracolumbar spine. 1.  Interval increase in the left pleural effusion, when compared to the previous study performed 12/28/2022. It is still small in size. 2.  Very small right pleural effusion now noted. 3.  Centrilobular emphysema again seen. 4.  Left hilar/infrahilar mass again noted. Further evaluation is difficult without the administration of IV contrast.  There is again narrowing of the left lung bronchi due to the presence of the mass and mild postobstructive pneumonitis/atelectasis is again seen in the lingula. 5.  No significant interval change in other linear opacities in the lingula and left lower lobe. 6.  Previously reported nodule in the right middle lobe is less significant and appears more like focal scar formation. 7.  Cholelithiasis again noted. XR CHEST PORTABLE    Result Date: 1/22/2023  EXAMINATION: ONE XRAY VIEW OF THE CHEST 1/22/2023 12:14 pm COMPARISON: 01/18/2023 HISTORY: ORDERING SYSTEM PROVIDED HISTORY: SOB TECHNOLOGIST PROVIDED HISTORY: Reason for exam:->SOB What reading provider will be dictating this exam?->CRC FINDINGS: Heart appears enlarged. There is slightly increased interstitial opacity in the lung bases compared to the prior study. There is a left pleural effusion with lingular segment and medial left lower lobe subsegmental atelectasis. There is an implanted central venous aspect port on the right with its tip in the SVC in good position. No pneumothorax. The exam was obtained with mild lordotic projection.      Cardiomegaly with basilar subsegmental atelectasis and probable left pleural effusion. Consider mild volume overload/CHF. XR CHEST PORTABLE    Result Date: 1/18/2023  EXAMINATION: ONE XRAY VIEW OF THE CHEST 1/18/2023 1:02 pm COMPARISON: None. HISTORY: ORDERING SYSTEM PROVIDED HISTORY: sob TECHNOLOGIST PROVIDED HISTORY: Reason for exam:->sob What reading provider will be dictating this exam?->CRC FINDINGS: The lungs are without acute focal process. Probable left upper lobe lingular segment atelectasis. Left pleural effusion. No pneumothorax. The cardiomediastinal silhouette is without acute process. The osseous structures are without acute process. Right-sided port a catheter tip in the mid SVC. Left pleural effusion with probable left upper lobe lingular segment atelectasis. US DUP LOWER EXTREMITIES BILATERAL VENOUS    Result Date: 1/23/2023  EXAMINATION: DUPLEX VENOUS ULTRASOUND OF THE BILATERAL LOWER EXTREMITIES1/23/2023 7:38 pm TECHNIQUE: Duplex ultrasound using B-mode/gray scaled imaging, Doppler spectral analysis and color flow Doppler was obtained of the deep venous structures of the lower bilateral extremities. COMPARISON: CT chest from the same day HISTORY: ORDERING SYSTEM PROVIDED HISTORY: edema of  legs TECHNOLOGIST PROVIDED HISTORY: Reason for exam:->edema of  legs FINDINGS: The visualized veins of the bilateral lower extremities are patent and free of echogenic thrombus. The veins demonstrate good compressibility with normal color flow study and spectral analysis. No evidence of DVT in either lower extremity.        Discharge Medications:      Medication List        START taking these medications      benzonatate 100 MG capsule  Commonly known as: TESSALON  Take 1 capsule by mouth 3 times daily as needed for Cough     fluticasone 50 MCG/ACT nasal spray  Commonly known as: FLONASE  1 spray by Each Nostril route daily            CHANGE how you take these medications      * albuterol sulfate  (90 Base) MCG/ACT inhaler  Commonly known as: PROVENTIL;VENTOLIN;PROAIR  What changed: Another medication with the same name was added. Make sure you understand how and when to take each. * albuterol 0.63 MG/3ML nebulizer solution  Commonly known as: ACCUNEB  What changed: Another medication with the same name was added. Make sure you understand how and when to take each. * albuterol sulfate  (90 Base) MCG/ACT inhaler  Commonly known as: Ventolin HFA  Inhale 2 puffs into the lungs 4 times daily as needed for Wheezing  What changed: You were already taking a medication with the same name, and this prescription was added. Make sure you understand how and when to take each. apixaban 5 MG Tabs tablet  Commonly known as: ELIQUIS  Take 1 tablet by mouth 2 times daily DO NOT RESTART UNTIL TOLD BY ONCOLOGY  What changed: additional instructions     metoprolol succinate 25 MG extended release tablet  Commonly known as: TOPROL XL  Take 1 tablet by mouth 2 times daily  What changed: medication strength     predniSONE 10 MG tablet  Commonly known as: DELTASONE  4 a day x 3 day, 3 a day x 3 days , 2 a day x 3 days , 1 a day x 3 days, the HI  What changed:   medication strength  how much to take  how to take this  when to take this  additional instructions           * This list has 3 medication(s) that are the same as other medications prescribed for you. Read the directions carefully, and ask your doctor or other care provider to review them with you. CONTINUE taking these medications      atorvastatin 20 MG tablet  Commonly known as: LIPITOR     furosemide 20 MG tablet  Commonly known as: LASIX  Take 1 tablet by mouth daily     gabapentin 100 MG capsule  Commonly known as: NEURONTIN     melatonin 3 MG Tabs tablet     MULTIVITAMIN ADULT PO     OXYGEN     pantoprazole 40 MG tablet  Commonly known as: PROTONIX  Take 1 tablet by mouth in the morning and 1 tablet in the evening.      potassium chloride 10 MEQ extended release capsule  Commonly known as: MICRO-K     sucralfate 1 GM tablet  Commonly known as: CARAFATE  Take 1 tablet by mouth 4 times daily (before meals and nightly)     Trelegy Ellipta 200-62.5-25 MCG/ACT Aepb inhaler  Generic drug: fluticasone-umeclidin-vilant     vitamin C 250 MG tablet            STOP taking these medications      OXYGEN     simvastatin 40 MG tablet  Commonly known as: ZOCOR               Where to Get Your Medications        These medications were sent to [facility] [address] [address]      Phone: [phone]   albuterol sulfate  (90 Base) MCG/ACT inhaler  benzonatate 100 MG capsule  fluticasone 50 MCG/ACT nasal spray  metoprolol succinate 25 MG extended release tablet  predniSONE 10 MG tablet       You can get these medications from any pharmacy    Bring a paper prescription for each of these medications  apixaban 5 MG Tabs tablet         Time Spent on discharge is 45 minutes in the review of medications and discharge plan  and EXAM.    +++++++++++++++++++++++++++++++++++++++++++++++++  Joseph mE, DO  [address], [place]  +++++++++++++++++++++++++++++++++++++++++++++++++  NOTE: This report was transcribed using voice recognition software. Every effort was made to ensure accuracy; however, inadvertent computerized transcription errors may be present.

## 2023-02-27 PROBLEM — J44.1 ACUTE EXACERBATION OF CHRONIC OBSTRUCTIVE PULMONARY DISEASE (COPD) (HCC): Status: ACTIVE | Noted: 2023-01-01

## 2023-02-27 NOTE — ED NOTES
The following labs were labeled with appropriate pt sticker and tubed to lab:     [] Blue     [] Lavender   [] on ice  [x] Green/yellow  [] Green/black [] on ice  [] Erroll Hoe  [] on ice  [] Yellow  [] Red  [] Type/ Screen  [] ABG  [] VBG    [] COVID-19 swab    [] Rapid  [] PCR  [] Flu swab  [] Peds Viral Panel     [] Urine Sample  [] Fecal Sample  [] Pelvic Cultures  [] Blood Cultures  [] X 2  [] STREP Cultures         [place], RN  02/27/23 [id number]

## 2023-02-27 NOTE — ED NOTES
Patient is reporting moderate anxiety and is having bouts of diarrhea. Dr. Rosita Nath notified view Perfect serve at this time.       Vanessa Hernandez RN  02/27/23 [id number]

## 2023-02-27 NOTE — ED NOTES
66 y/o male to the ed with a c/c of shortness of breath. Patient states that he has stage 4 lung cancer and on 3-4L nasal cannula at baseline. pt is currently getting chemo weekly. Pt also reports having a pacemaker and has a hx of needing blood transfusions. Patient denies chest pain. Patient denies ABD pain, n/v/d or fever. Patient noted with + msp x 4, pupils PERRLA @ 3. Patient placed on telemetry, BP and pulse ox. 12-lead EKG performed. Vitals noted as recorded. PIV placed with labs drawn and sent. Call light placed within patient's reach, and bed in lowest position with side rails up x 2 for safety. Provider at the bedside for assessment.              James Lopez RN  02/27/23 0821

## 2023-02-27 NOTE — ED NOTES
The following labs were labeled with appropriate pt sticker and tubed to lab:     [] Blue     [] Lavender   [] on ice  [] Green/yellow  [] Green/black [] on ice  [] Tretha Shark  [] on ice  [] Yellow  [] Red  [] Type/ Screen  [] ABG  [] VBG    [] COVID-19 swab    [] Rapid  [] PCR  [] Flu swab  [] Peds Viral Panel     [] Urine Sample  [x] Fecal Sample  [] Pelvic Cultures  [] Blood Cultures  [] X 2  [] STREP Cultures         [place], RN  02/27/23 [id number]

## 2023-02-27 NOTE — ED NOTES
Patient states that he does not want the cefapime at this time.  He states that maybe he will want it later but states that he feels to anxious \"to be hooked up to another cord right now\"     Pily Peters RN  02/27/23 2427

## 2023-02-27 NOTE — ED PROVIDER NOTES
[facility] ENCOUNTER        Pt Name: Yaz Meléndez  MRN: [id number]  [place] 1950  Date of evaluation: 2/27/2023  Provider: Samantha Ruelas MD  PCP: Harpal Esquivel MD  Note Started: 10:06 AM EST 2/27/23    CHIEF COMPLAINT       Chief Complaint   Patient presents with    Shortness of Breath     Increasing sob, has stage 4 lung ca       HISTORY OF PRESENT ILLNESS: 1 or more Elements        Limitations to history : None    Yaz Meléndez is a 67 y.o. male who presents for increasing shortness of breath with fatigue and exertional dyspnea. Patient has history of metastatic lung cancer, on chemotherapy, on chronic oxygen, follows with Dr. Marjorie Jackson and the [facility], reports increasing fatigue and dyspnea for several days. He says this happens about once a month and his blood counts are low. He denies chest pain. He reports cough that is productive. He says he has a history of pneumonia as well and is worried he could have it again. He denies fever. He says this feels mostly like when he is anemic. He denies bleeding this time. He is on Eliquis for history of DVT and PE but was off for 2 weeks with his rectal bleeding episode on his last admission. Nursing Notes were all reviewed and agreed with or any disagreements were addressed in the HPI. REVIEW OF EXTERNAL NOTE :         Discharge summary from January 20, 2023 by internal medicine  Admitted for hemoglobin 6.9 and platelet count 14, he was transfused blood and platelets. He has non-small cell lung cancer on chemo. He was admitted a few weeks ago as he also had rectal bleeding. Controlled Substance Monitoring:    Acute and Chronic Pain Monitoring:   No flowsheet data found. REVIEW OF SYSTEMS :      Positives and Pertinent negatives as per HPI.      SURGICAL HISTORY     Past Surgical History:   Procedure Laterality Date    BRONCHOSCOPY N/A 05/27/2021 BRONCHOSCOPY W/EBUS FNA performed by Baruch Leyden, MD at [facility] N/A 05/27/2021    BRONCHOSCOPY DIAGNOSTIC OR CELL 8 Merissa Berry Labidi ONLY performed by Baruch Leyden, MD at [address] [facility] N/A 05/27/2021    BRONCHOSCOPY ADD ON COMPUTER ASSISTED performed by Baruch Leyden, MD at [address] N/A 08/25/2022    CARDIAC LASER LEAD EXCHANGE performed by Pineda Willoughby MD at [address] [facility] [address] Bilateral 2011 2001    groin     PACEMAKER CHANGE N/A 08/25/2022    CARDIAC LASER LEAD EXTRACTION, LASER LEAD EXTRACTION LEFT SIDED DEVICE, PACEMAKER DEVICE EXTRACTION performed by Pineda Willoughby MD at [address]  07/20/2021    DUAL PPM  (ABDULLAHI)  DR. Alejandra Booker    PORT SURGERY Right 08/20/2022    PORT REMOVAL performed by Kati Bee MD at [address]    TRANSESOPHAGEAL ECHOCARDIOGRAM  08/22/2022    Dr Margarita Greenberg N/A 08/29/2022    Medtronic Micra AV Leadless PPM Insertion (Dr. Bronwyn Moore)       CURRENTMEDICATIONS       Previous Medications    ALBUTEROL (ACCUNEB) 0.63 MG/3ML NEBULIZER SOLUTION    Take 1 ampule by nebulization every 6 hours as needed for Wheezing    ALBUTEROL SULFATE HFA (PROVENTIL;VENTOLIN;PROAIR) 108 (90 BASE) MCG/ACT INHALER    Inhale 2 puffs into the lungs every 6 hours as needed for Wheezing    ALBUTEROL SULFATE HFA (VENTOLIN HFA) 108 (90 BASE) MCG/ACT INHALER    Inhale 2 puffs into the lungs 4 times daily as needed for Wheezing    APIXABAN (ELIQUIS) 5 MG TABS TABLET    Take 1 tablet by mouth 2 times daily DO NOT RESTART UNTIL TOLD BY ONCOLOGY    ASCORBIC ACID (VITAMIN C) 250 MG TABLET    Take 250 mg by mouth daily    ATORVASTATIN (LIPITOR) 20 MG TABLET    Take 20 mg by mouth daily    FLUTICASONE (FLONASE) 50 MCG/ACT NASAL SPRAY    1 spray by Each Nostril route daily    FLUTICASONE-UMECLIDIN-VILANT (TRELEGY ELLIPTA) 200-62.5-25 MCG/INH AEPB    Inhale 1 puff into the lungs daily    FUROSEMIDE (LASIX) 20 MG TABLET    Take 1 tablet by mouth daily    GABAPENTIN (NEURONTIN) 100 MG CAPSULE    Take 100 mg by mouth in the morning and at bedtime. MELATONIN 3 MG TABS TABLET    Take 3 mg by mouth nightly    METOPROLOL SUCCINATE (TOPROL XL) 25 MG EXTENDED RELEASE TABLET    Take 1 tablet by mouth 2 times daily    MULTIPLE VITAMIN (MULTIVITAMIN ADULT PO)    Take 1 tablet by mouth daily    OXYGEN    Inhale 3 L/min into the lungs daily    PANTOPRAZOLE (PROTONIX) 40 MG TABLET    Take 1 tablet by mouth in the morning and 1 tablet in the evening. POTASSIUM CHLORIDE (MICRO-K) 10 MEQ EXTENDED RELEASE CAPSULE    Take 10 mEq by mouth daily    PREDNISONE (DELTASONE) 10 MG TABLET    4 a day x 3 day, 3 a day x 3 days , 2 a day x 3 days , 1 a day x 3 days, the dc    SUCRALFATE (CARAFATE) 1 GM TABLET    Take 1 tablet by mouth 4 times daily (before meals and nightly)       ALLERGIES     Patient has no known allergies.     FAMILYHISTORY       Family History   Problem Relation Age of Onset    Cancer Father         prostate    Cancer Paternal Uncle         anal        SOCIAL HISTORY       Social History     Tobacco Use    Smoking status: Former     Packs/day: 2.00     Years: 25.00     Pack years: 50.00     Types: Cigarettes     Quit date: 2011     Years since quittin.1     Passive exposure: Past    Smokeless tobacco: Never   Vaping Use    Vaping Use: Never used   Substance Use Topics    Alcohol use: Not Currently     Alcohol/week: 1.0 standard drink     Types: 1 Glasses of wine per week     Comment: 1 glass of wine per day prior    Drug use: No       SCREENINGS        [place] Coma Scale  Eye Opening: Spontaneous  Best Verbal Response: Oriented  Best Motor Response: Obeys commands  [place] Coma Scale Score: 15                CIWA Assessment  BP: (!) 146/67  Heart Rate: 64           PHYSICAL EXAM  1 or more Elements     ED Triage Vitals [23 1002]   BP Temp Temp src Heart Rate Resp SpO2 Height Weight   (!) 154/55 97.8 °F (36.6 °C) -- 55 18 100 % -- 174 lb (78.9 kg)       Constitutional/General: Alert and oriented x3  Head: Normocephalic and atraumatic  Eyes: PERRL, EOMI, conjunctiva normal, sclera non icteric  ENT:  Oropharynx clear, handling secretions  Neck: Supple, full ROM, no stridor, no meningeal signs  Respiratory: Lungs with wheezes and rhonchi bilaterally. Not in respiratory distress  Cardiovascular:  Regular rate. Regular rhythm. No murmurs, no gallops, no rubs. 2+ distal pulses. Equal extremity pulses. Chest: No chest wall tenderness  GI:  Abdomen Soft, Non tender, Non distended  Musculoskeletal: Moves all extremities x 4. Warm and well perfused, no clubbing, no cyanosis, no edema. Capillary refill <3 seconds  Integument: skin warm and dry.   Bruising ecchymotic areas on both hands  Neurologic: GCS 15, no focal deficits          DIAGNOSTIC RESULTS   LABS:    Labs Reviewed   CBC WITH AUTO DIFFERENTIAL - Abnormal; Notable for the following components:       Result Value    WBC 3.6 (*)     RBC 2.30 (*)     Hemoglobin 7.2 (*)     Hematocrit 22.5 (*)     RDW 19.9 (*)     Platelets 21 (*)     Neutrophils % 81.7 (*)     Lymphocytes % 10.4 (*)     Lymphocytes Absolute 0.36 (*)     Eosinophils Absolute 0.00 (*)     All other components within normal limits   COMPREHENSIVE METABOLIC PANEL W/ REFLEX TO MG FOR LOW K - Abnormal; Notable for the following components:    Glucose 113 (*)     Total Protein 6.2 (*)     Alkaline Phosphatase 136 (*)     All other components within normal limits   TROPONIN - Abnormal; Notable for the following components:    Troponin, High Sensitivity 24 (*)     All other components within normal limits   BRAIN NATRIURETIC PEPTIDE - Abnormal; Notable for the following components:    Pro-BNP 1,089 (*)     All other components within normal limits   TROPONIN - Abnormal; Notable for the following components:    Troponin, High Sensitivity 28 (*)     All other components within normal limits   COVID-19 & INFLUENZA COMBO   CULTURE, STOOL   GIARDIA ANTIGEN   FECAL LEUKOCYTES   GRAM STAIN   LEGIONELLA ANTIGEN, URINE   STREP PNEUMONIAE ANTIGEN   PLATELET CONFIRMATION   ROTAVIRUS ANTIGEN, STOOL   BASIC METABOLIC PANEL W/ REFLEX TO MG FOR LOW K   PROCALCITONIN   TYPE AND SCREEN       As interpreted by me, the above displayed labs are abnormal. All other labs obtained during this visit were within normal range or not returned as of this dictation. RADIOLOGY:   Unless a wet read is documented in the ED course or MDM, non-plain film images such as CT, Ultrasound and MRI are read by the radiologist unless otherwise specified in the medical decision-making. Plain radiographic images are preliminarily interpreted by this ED Provider with the findings documented in the ED Course with official radiology read to follow. Interpretation per the Radiologist below, if available at the time of this note:    XR ABDOMEN (KUB) (SINGLE AP VIEW)   Final Result   No evidence of bowel obstruction. CTA PULMONARY W CONTRAST   Final Result   1. No indication for acute pulmonary emboli. 2.  No aneurysm formation or dissection of the thoracic aorta. 3.  Emphysematous changes lung parenchyma particular affecting the upper   lobes in moderate advanced degree. 4.  Malignant infiltrating lesion in the left infrahilar region towards the   root of the lingula/left lower, as previously described. 5.  Chronic areas of loss of volume in lingula and in the left lower due the   infiltration of the left hilar region by malignancies. 6.  Resolved bilateral pleural effusions since more recent examinations. 7.  Development of small multifocal patchy areas of consolidation in the left   lower lobe distal to the more proximal in case mint of the bronchovascular   structures of the left lower lobe can represent developing acute pneumonia.    Follow-up study following clinically treatment trial recommended in several   days time interval.         XR CHEST PORTABLE   Final Result   Persistent scarring and or atelectasis on the left. Obstructive airways   disease. Substantial resolution of left pleural effusion. No results found. No results found. PAST MEDICAL HISTORY/Chronic Conditions Affecting Care      has a past medical history of CHB (complete heart block) ([facility] [address] (07/2021), COPD (chronic obstructive pulmonary disease) ([facility] Hypertension, NSCLC of left lung ([facility] (05/27/2021), Pulmonary emboli ([facility] (09/17/2021), Syncope (09/2020), and Syncope (07/2021).      EMERGENCY DEPARTMENT COURSE    Vitals:    Vitals:    02/27/23 1044 02/27/23 1330 02/27/23 1344 02/27/23 1940   BP:  (!) 146/67     Pulse:       Resp:       Temp:    98.2 °F (36.8 °C)   TempSrc:    Oral   SpO2: 100%  98%    Weight:           Patient was given the following medications:  Medications   cefepime (MAXIPIME) 2,000 mg in sodium chloride 0.9 % 50 mL IVPB ([id number]) (0 mg IntraVENous Stopped 2/27/23 2025)   diphenhydrAMINE (BENADRYL) tablet 25 mg (25 mg Oral Not Given 2/27/23 1845)   albuterol (ACCUNEB) nebulizer solution 0.63 mg (has no administration in time range)   albuterol sulfate HFA (PROVENTIL;VENTOLIN;PROAIR) 108 (90 Base) MCG/ACT inhaler 2 puff (has no administration in time range)   albuterol sulfate HFA (PROVENTIL;VENTOLIN;PROAIR) 108 (90 Base) MCG/ACT inhaler 2 puff (has no administration in time range)   apixaban (ELIQUIS) tablet 5 mg (has no administration in time range)   atorvastatin (LIPITOR) tablet 20 mg (has no administration in time range)   fluticasone (FLONASE) 50 MCG/ACT nasal spray 1 spray (has no administration in time range)   fluticasone-umeclidin-vilant (TRELEGY ELLIPTA) 200-62.5-25 MCG/ACT inhaler 1 puff (has no administration in time range)   furosemide (LASIX) tablet 20 mg (has no administration in time range)   gabapentin (NEURONTIN) capsule 100 mg (has no administration in time range) melatonin tablet 3 mg (has no administration in time range)   metoprolol succinate (TOPROL XL) extended release tablet 25 mg (has no administration in time range)   pantoprazole (PROTONIX) tablet 40 mg (has no administration in time range)   potassium chloride (MICRO-K) extended release capsule 10 mEq (has no administration in time range)   sucralfate (CARAFATE) tablet 1 g (has no administration in time range)   sodium chloride flush 0.9 % injection 5-40 mL (has no administration in time range)   sodium chloride flush 0.9 % injection 10 mL (has no administration in time range)   0.9 % sodium chloride infusion (has no administration in time range)   ondansetron (ZOFRAN-ODT) disintegrating tablet 4 mg (has no administration in time range)     Or   ondansetron (ZOFRAN) injection 4 mg (has no administration in time range)   magnesium hydroxide (MILK OF MAGNESIA) 400 MG/5ML suspension 30 mL (has no administration in time range)   acetaminophen (TYLENOL) tablet 650 mg (has no administration in time range)     Or   acetaminophen (TYLENOL) suppository 650 mg (has no administration in time range)   ipratropium-albuterol (DUONEB) nebulizer solution 1 ampule (has no administration in time range)   cefTRIAXone (ROCEPHIN) 1,000 mg in sterile water 10 mL IV syringe (has no administration in time range)     And   doxycycline hyclate (VIBRA-TABS) tablet 100 mg (has no administration in time range)   ipratropium-albuterol (DUONEB) nebulizer solution 1 ampule (1 ampule Inhalation Given 2/27/23 1340)   iopamidol (ISOVUE-370) 76 % injection 75 mL (75 mLs IntraVENous Given 2/27/23 1417)   doxycycline (VIBRAMYCIN) 100 mg in sodium chloride 0.9 % 100 mL IVPB ([id number]) (0 mg IntraVENous Stopped 2/27/23 1827)   ipratropium-albuterol (DUONEB) nebulizer solution 1 ampule (1 ampule Inhalation Given 2/27/23 1559)   methylPREDNISolone sodium (SOLU-MEDROL) injection 125 mg (125 mg IntraVENous Given 2/27/23 1616)         Is this patient to be included in the SEP-1 Core Measure due to severe sepsis or septic shock? No   Exclusion criteria - the patient is NOT to be included for SEP-1 Core Measure due to:  2+ SIRS criteria are not met          Medical Decision Making/Differential Diagnosis:    CC/HPI Summary, Social Determinants of health, Records Reviewed, DDx, testing done/not done, ED Course, Reassessment, disposition considerations/shared decision making with patient, consults, disposition:        ED Course as of 02/27/23 2027 Mon Feb 27, 2023   1125 EKG Interpretation  Interpreted by emergency department physician, Dr. Sergey Coronel     2/27/23  Time: 0851    Rhythm: paced  Rate: paced  Axis: left  Conduction: nonspecific interventricular conduction block  ST Segments: no acute change  T Waves: no acute change    Clinical Impression: paced rhythm, no acute ischemic changes  Comparison to Old EKG  Stable from prior EKG       [CD]      ED Course User Index  [CD] Rocio Miller MD       Medical Decision Making   Differential Diagnosis includes but not limited to: COPD, anemia, CHF, PTX, PE, GI bleed. CXR per my wet read without pneumothorax. All labs interpreted by me. MI less likely with trop 24-28 and no ST elevation on EKG. CMP normal. Is wheezing and has productive cough. CTA neg for PE but +pneumonia. IV Antibiotics ordered. Based on worsening symptoms and increased sob will admit. Hgb 7.2, above the transfusion threshold. BNP is elevated so there may also be a component of heart failure. Internal medicine, Paty Weaver covering Dr. Rosita Nath accepts for admission. Problems Addressed:  Acute bronchospasm: acute illness or injury  Anemia, unspecified type: chronic illness or injury  Pneumonia of lower lobe due to infectious organism, unspecified laterality: acute illness or injury  Thrombocytopenia ([facility] [address]): chronic illness or injury    Amount and/or Complexity of Data Reviewed  External Data Reviewed: ECG and notes. Labs: ordered.  Decision-making details documented in ED Course. Radiology: ordered and independent interpretation performed. Decision-making details documented in ED Course. ECG/medicine tests: ordered and independent interpretation performed. Decision-making details documented in ED Course. Discussion of management or test interpretation with external provider(s): Internal Medicine    Risk  Prescription drug management. Drug therapy requiring intensive monitoring for toxicity. Decision regarding hospitalization. CONSULTS:   IP CONSULT TO INTERNAL MEDICINE        PROCEDURES   Unless otherwise noted below, none       CRITICAL CARE TIME (.cct)   none        I am the Primary Clinician of Record. FINAL IMPRESSION      1. Pneumonia of lower lobe due to infectious organism, unspecified laterality    2. Acute bronchospasm    3. Anemia, unspecified type    4. Thrombocytopenia ([facility] [address]          DISPOSITION/PLAN     DISPOSITION Admitted 02/27/2023 04:17:42 PM      PATIENT REFERRED TO:  No follow-up provider specified.     DISCHARGE MEDICATIONS:  New Prescriptions    No medications on file       DISCONTINUED MEDICATIONS:  Discontinued Medications    No medications on file              (Please note that portions of this note were completed with a voice recognition program.  Efforts were made to edit the dictations but occasionally words are mis-transcribed.)    Cara Mclain MD (electronically signed)            Roya Linn MD  02/27/23 2027

## 2023-02-27 NOTE — ED NOTES
The following labs were labeled with appropriate pt sticker and tubed to lab:     [] Blue     [x] Lavender   [] on ice  [x] Green/yellow  [] Green/black [] on ice  [] Speedy Gave  [] on ice  [] Yellow  [] Red  [x] Type/ Screen  [] ABG  [] VBG    [] COVID-19 swab    [] Rapid  [] PCR  [] Flu swab  [] Peds Viral Panel     [] Urine Sample  [] Fecal Sample  [] Pelvic Cultures  [] Blood Cultures  [] X 2  [] STREP Cultures         [place], RN  02/27/23 4375

## 2023-02-28 NOTE — H&P
Hospital Medicine History & Physical      PCP: Adriel Post MD    Date of Admission: 2/27/2023    Date of Service: . FEB 28, 2023    Chief Complaint:    COUGH (PRODUCTIVE      History Of Present Illness:     67 y.o. male presented with [address],  AND SOB, NO FEVER, NO CHILLS. HAS A KNOWN HISTORY OF LUNG CANCER, NON SMALL CELL LUNG CANCR,   AND SEES  [facility]. NO FEVER, NO CHILLS,   NO NV, JUST STARTED WITH DIARRHEA    Past Medical History:          Diagnosis Date    CHB (complete heart block) ([facility] 07/2021    COPD (chronic obstructive pulmonary disease) (HCC)     Hypertension     NSCLC of left lung ([facility] 05/27/2021    SCC per biopsy. Pulmonary emboli ([facility] 09/17/2021    Anticoagulation with Eliquis.     Syncope 09/2020    Syncope 07/2021       Past Surgical History:          Procedure Laterality Date    BRONCHOSCOPY N/A 05/27/2021    BRONCHOSCOPY W/EBUS FNA performed by Connor Pardo MD at [address] [facility] N/A 05/27/2021    BRONCHOSCOPY DIAGNOSTIC OR CELL [address] ONLY performed by Connor Pardo MD at [address] [facility] N/A 05/27/2021    BRONCHOSCOPY ADD ON COMPUTER ASSISTED performed by Connor Pardo MD at [address] [facility] N/A 08/25/2022    CARDIAC LASER LEAD EXCHANGE performed by Teri Terrell MD at [address] [facility] Bilateral 2011 2001    groin     PACEMAKER CHANGE N/A 08/25/2022    CARDIAC LASER LEAD EXTRACTION, LASER LEAD EXTRACTION LEFT SIDED DEVICE, PACEMAKER DEVICE EXTRACTION performed by Teri Terrell MD at [address]  07/20/2021    DUAL PPM  (ABDULLAHI)  DR. Villa Colder    PORT SURGERY Right 08/20/2022    PORT REMOVAL performed by Hansel Mcgraw MD at [address] [place]    TRANSESOPHAGEAL ECHOCARDIOGRAM  08/22/2022    Dr Morgan Alu PACEMAKER INSERTION N/A 08/29/2022    Medtronic Micra AV Leadless PPM Insertion (Dr. Bronwyn Moore)       Medications Prior to Admission:      Prior to Admission medications    Medication Sig Start Date End Date Taking? Authorizing Provider   apixaban (ELIQUIS) 5 MG TABS tablet Take 1 tablet by mouth 2 times daily DO NOT RESTART UNTIL TOLD BY ONCOLOGY 1/26/23   Joseph Juarez,    fluticasone [facility]) 50 MCG/ACT nasal spray 1 spray by Each Nostril route daily 1/27/23   Joseph Juarez DO   predniSONE (DELTASONE) 10 MG tablet 4 a day x 3 day, 3 a day x 3 days , 2 a day x 3 days , 1 a day x 3 days, the dc  Patient taking differently: 5 mg daily Indications: per Dr Samara Lockett 1/26/23   Cele Zhang,    metoprolol succinate (TOPROL XL) 25 MG extended release tablet Take 1 tablet by mouth 2 times daily 1/26/23   Joseph Juarez DO   albuterol sulfate HFA (VENTOLIN HFA) 108 (90 Base) MCG/ACT inhaler Inhale 2 puffs into the lungs 4 times daily as needed for Wheezing 1/26/23   Joseph Juarez DO   pantoprazole (PROTONIX) 40 MG tablet Take 1 tablet by mouth in the morning and 1 tablet in the evening. 12/30/22   Ayaz Rouse MD   sucralfate (CARAFATE) 1 GM tablet Take 1 tablet by mouth 4 times daily (before meals and nightly) 12/30/22   Ayaz Rouse MD   atorvastatin (LIPITOR) 20 MG tablet Take 20 mg by mouth daily    Historical Provider, MD   Multiple Vitamin (MULTIVITAMIN ADULT PO) Take 1 tablet by mouth daily    Historical Provider, MD   Ascorbic Acid (VITAMIN C) 250 MG tablet Take 250 mg by mouth daily    Historical Provider, MD   OXYGEN Inhale 3 L/min into the lungs daily    Historical Provider, MD   Fluticasone-Umeclidin-Vilant (TRELEGY ELLIPTA) 200-62.5-25 MCG/INH AEPB Inhale 1 puff into the lungs daily 7/11/22   Historical Provider, MD   gabapentin (NEURONTIN) 100 MG capsule Take 100 mg by mouth in the morning and at bedtime.     Historical Provider, MD   melatonin 3 MG TABS tablet Take 3 mg by mouth nightly    Historical Provider, MD   potassium chloride (MICRO-K) 10 MEQ extended release capsule Take 10 mEq by mouth daily    Historical Provider, MD   furosemide (LASIX) 20 MG tablet Take 1 tablet by mouth daily 9/4/22   Gray Trotter MD   albuterol (ACCUNEB) 0.63 MG/3ML nebulizer solution Take 1 ampule by nebulization every 6 hours as needed for Wheezing    Historical Provider, MD   albuterol sulfate HFA (PROVENTIL;VENTOLIN;PROAIR) 108 (90 Base) MCG/ACT inhaler Inhale 2 puffs into the lungs every 6 hours as needed for Wheezing    Historical Provider, MD   simvastatin (ZOCOR) 40 MG tablet Take 40 mg by mouth daily   9/3/22  Historical Provider, MD       Allergies:  Patient has no known allergies. Social History:      The patient currently lives WITH WIFE    TOBACCO:   reports that he quit smoking about 11 years ago. His smoking use included cigarettes. He has a 50.00 pack-year smoking history. He has been exposed to tobacco smoke. He has never used smokeless tobacco.  ETOH:   reports that he does not currently use alcohol after a past usage of about 1.0 standard drink per week. Family History:       Reviewed in detail and negative for DM, CAD, Cancer, CVA. Positive as follows:        Problem Relation Age of Onset    Cancer Father         prostate    Cancer Paternal Uncle         anal       REVIEW OF SYSTEMS:   Pertinent positives as noted in the HPI. All other systems reviewed and negative. PHYSICAL EXAM:    BP (!) 117/53   Pulse 62   Temp 98.6 °F (37 °C) (Temporal)   Resp 18   Ht 5' 6\" (1.676 m)   Wt 165 lb (74.8 kg)   SpO2 93%   BMI 26.63 kg/m²     General appearance:  No apparent distress, appears stated age. HEENT:  Normal cephalic, atraumatic without obvious deformity. Pupils equal, round, and reactive to light. Extra ocular muscles intact. Conjunctivae/corneas clear. Neck: Supple, with full range of motion. No jugular venous distention. Trachea midline.   Respiratory:  Normal respiratory effort. Clear to auscultation,   Cardiovascular:  RRR  Abdomen: Soft, non-tender, non-distended with normal bowel sounds. Musculoskeletal:  No clubbing, cyanosis or edema bilaterally. Skin: smooth and dry   Neurologic:   Cranial nerves: II-XII intact,   Psychiatric:  Alert and oriented x 3        Labs:     Recent Labs     02/27/23  1040   WBC 3.6*   HGB 7.2*   HCT 22.5*   PLT 21*     Recent Labs     02/27/23  1040 02/28/23  0537    141   K 4.0 4.0    103   CO2 28 29   BUN 19 17   CREATININE 0.9 0.9   CALCIUM 9.1 8.6     Recent Labs     02/27/23  1040   AST 28   ALT 27   BILITOT 0.7   ALKPHOS 136*     No results for input(s): INR in the last 72 hours. No results for input(s): Crystal Dross in the last 72 hours. Urinalysis:      Lab Results   Component Value Date/Time    NITRU Negative 08/16/2022 05:18 PM    WBCUA 2-5 08/16/2022 05:18 PM    BACTERIA FEW 08/16/2022 05:18 PM    RBCUA >20 08/16/2022 05:18 PM    BLOODU LARGE 08/16/2022 05:18 PM    SPECGRAV <=1.005 08/16/2022 05:18 PM    GLUCOSEU Negative 08/16/2022 05:18 PM       Radiology:           XR ABDOMEN (KUB) (SINGLE AP VIEW)   Final Result   No evidence of bowel obstruction. CTA PULMONARY W CONTRAST   Final Result   1. No indication for acute pulmonary emboli. 2.  No aneurysm formation or dissection of the thoracic aorta. 3.  Emphysematous changes lung parenchyma particular affecting the upper   lobes in moderate advanced degree. 4.  Malignant infiltrating lesion in the left infrahilar region towards the   root of the lingula/left lower, as previously described. 5.  Chronic areas of loss of volume in lingula and in the left lower due the   infiltration of the left hilar region by malignancies. 6.  Resolved bilateral pleural effusions since more recent examinations.       7.  Development of small multifocal patchy areas of consolidation in the left   lower lobe distal to the more proximal in case mint of the bronchovascular   structures of the left lower lobe can represent developing acute pneumonia. Follow-up study following clinically treatment trial recommended in several   days time interval.         XR CHEST PORTABLE   Final Result   Persistent scarring and or atelectasis on the left. Obstructive airways   disease. Substantial resolution of left pleural effusion. FL MODIFIED BARIUM SWALLOW W VIDEO    (Results Pending)       ASSESSMENT:    Active Hospital Problems    Diagnosis Date Noted    Acute exacerbation of chronic obstructive pulmonary disease (COPD) ([facility] [J44.1] 02/27/2023     Priority: Medium   H/O PE    HTN   NSCLC LEFT LUNG         PLAN:  ELIQUIS   PULM  PROCAL    ROCEPHIN   DOXY      DVT Prophylaxis: ELIQUIS  Diet: ADULT DIET; Regular  Code Status: Full Code    PT/OT Eval Status:  ORDERED    Dispo - HOME    Electronically signed by Reilly Cruz DO on 2/28/2023 at 3:04 PM Vandana       Thank you Di Villegas MD for the opportunity to be involved in this patient's care.  If you have any questions or concerns please feel free to contact me at [phone]

## 2023-02-28 NOTE — PROGRESS NOTES
This patient is on medication that requires renal, weight, and/or indication dose adjustment. Date Body Weight IBW  Adjusted BW SCr  CrCl Dialysis status   2/28/2023 165 lb (74.8 kg) Ideal body weight: 63.8 kg (140 lb 10.5 oz)  Adjusted ideal body weight: 68.2 kg (150 lb 6.3 oz) Serum creatinine: 0.9 mg/dL 02/28/23 0537  Estimated creatinine clearance: 66 mL/min N/a       Pharmacy has dose-adjusted the following medication(s):    Date Previous Order Adjusted Order   2/28/2023 Cefepime 2g Q12h for HAP Cefepime 2g Q8h for HAP       These changes were made per protocol according to the [address] for Pharmacists. *Please note this dose may need readjusted if patient's condition changes. Please contact pharmacy with any questions regarding these changes. Marjorie Allen, PharmD, [facility], BCPS 2/28/2023 5:20 PM

## 2023-02-28 NOTE — CONSULTS
[facility] Pulmonary Consultation  [facility]    Admit Date: 2/27/2023  Requesting Physician: Zeinab Ann MD    CC:  pneumonia     HPI:    66 y/o  male known to [facility] with past medical history noted for COPD on trelegy and prednisone 5mg, on 4 L nasal cannula at home, left hilar lymphadenopathy, squamous cell carcinoma diagnosed by EBUS 5/27/21, COVID 5/2022, pulmonary embolism (on eliquis), pacemaker placement (7/2021) presented to [facility] 2/27/23 with weakness, cough, shortness of breath, fatigue x 1 week. Wife at bedside. When seen today, complains of shortness of breath cough with yellow, diarrhea, fatigue, chills. Denies wheezing, fever, chest pain, abdominal pain, leg swelling. Denies any sick contacts, recent traveling, prolonged mobilization. In ER, hemoglobin 7.2. Influenza A and B, COVID-negative. CTA showing left lower lobe volume loss. Platelets 21  1/18 pulmonary consulted. on 4 L nasal cannula     Recent admission:  1/18-1/26/23 [facility]. Anemia. Chf    Former smoker 1.5 pack/day for 30 years. No alcohol or drug use. Worked in customer service.  with no children. From [place]. PMH:    Past Medical History:   Diagnosis Date    CHB (complete heart block) ([facility] 07/2021    COPD (chronic obstructive pulmonary disease) (HCC)     Hypertension     NSCLC of left lung ([facility] 05/27/2021    SCC per biopsy. Pulmonary emboli ([facility] 09/17/2021    Anticoagulation with Eliquis.     Syncope 09/2020    Syncope 07/2021     PSH:    Past Surgical History:   Procedure Laterality Date    BRONCHOSCOPY N/A 05/27/2021    BRONCHOSCOPY W/EBUS FNA performed by Baruch Leyden, MD at [address] [facility] N/A 05/27/2021    BRONCHOSCOPY DIAGNOSTIC OR CELL [address] ONLY performed by Baruch Leyden, MD at [address] [facility] N/A 05/27/2021    BRONCHOSCOPY ADD ON COMPUTER ASSISTED performed by Baruch Leyden, MD at [place] [address] 08/25/2022    CARDIAC LASER LEAD EXCHANGE performed by Tess Bojorquez MD at [address] [facility] [address] Bilateral 2011 2001    groin     PACEMAKER CHANGE N/A 08/25/2022    CARDIAC LASER LEAD EXTRACTION, LASER LEAD EXTRACTION LEFT SIDED DEVICE, PACEMAKER DEVICE EXTRACTION performed by Tess Bojorquez MD at [address]  07/20/2021    DUAL PPM  (ABDULLAHI)  DR. Niurka Ochoa    PORT SURGERY Right 08/20/2022    PORT REMOVAL performed by Dl Simons MD at [address]    TRANSESOPHAGEAL ECHOCARDIOGRAM  08/22/2022    Dr Kwame Rodriguez N/A 08/29/2022    Medtronic Micra AV Leadless PPM Insertion (Dr. Corey Lazcano)        FAMHX:  Mom-COPD  Mother-hypertension, prostate cancer    REVIEW OF SYSTEMS:  General ROS:     - Positive For: Chills, fatigue     - Negative For: Fever, malaise, night sweats or sleep disturbance   ENT ROS:      - Positive For:     - Negative For: epistaxis, headaches, sinus pain, sneezing or sore throat   Allergy and Immunology ROS:     - Negative For: nasal congestion, postnasal drip or seasonal allergies   Hematological and Lymphatic ROS: bruising     - Negative For: bleeding problems, , , night sweats or pallor   Respiratory ROS:      - Positive For: Dyspnea, cough     - Negative For: hemoptysis, pleuritic type chest pains  Cardiovascular ROS:      - Positive For:      - Negative For: chest pain, dyspnea on exertion, irregular heartbeat, loss of consciousness, murmur, orthopnea or palpitations   Gastrointestinal ROS:      - Positive For:diarrhea     - Negative For: abdominal pain, appetite loss, blood in stools, change in bowel habits, change in stools, constipation, , hematemesis, melena, nausea/vomiting or stool incontinence   Genito-Urinary ROS:      - Negative For: change in urinary stream, dysuria, hematuria or incontinence   Musculoskeletal ROS: : joint pain, joint stiffness,     - Negative For joint swelling or muscle pain   Neurological ROS:     - Negative For: behavioral changes, confusion, dizziness, headaches, impaired coordination/balance or memory loss   Dermatological ROS: Abrasions     - Negative For: hair changes, lumps, mole changes, nail changes or pruritus    Social History:  Social History     Socioeconomic History    Marital status:      Spouse name: Macey Godfrey    Number of children: 0    Years of education: Not on file    Highest education level: Not on file   Occupational History    Not on file   Tobacco Use    Smoking status: Former     Packs/day: 2.00     Years: 25.00     Pack years: 50.00     Types: Cigarettes     Quit date: 2011     Years since quittin.1     Passive exposure: Past    Smokeless tobacco: Never   Vaping Use    Vaping Use: Never used   Substance and Sexual Activity    Alcohol use: Not Currently     Alcohol/week: 1.0 standard drink     Types: 1 Glasses of wine per week     Comment: 1 glass of wine per day prior    Drug use: No    Sexual activity: Yes     Partners: Female   Other Topics Concern    Not on file   Social History Narrative    2 cups coffee daily.       Social Determinants of Health     Financial Resource Strain: Not on file   Food Insecurity: Not on file   Transportation Needs: Not on file   Physical Activity: Not on file   Stress: Not on file   Social Connections: Not on file   Intimate Partner Violence: Not on file   Housing Stability: Not on file       Medications:     sodium chloride        cefepime  2,000 mg IntraVENous Once    diphenhydrAMINE  25 mg Oral Once    [Held by provider] apixaban  5 mg Oral BID    atorvastatin  20 mg Oral Nightly    fluticasone  1 spray Each Nostril Daily    furosemide  20 mg Oral Daily    gabapentin  100 mg Oral BID    melatonin  3 mg Oral Nightly    metoprolol succinate  25 mg Oral BID    pantoprazole  40 mg Oral Q12H    potassium chloride  10 mEq Oral Daily    sucralfate  1 g Oral 4x Daily AC & HS    sodium chloride flush  5-40 mL IntraVENous 2 times per day    ipratropium-albuterol  1 ampule Inhalation Q4H WA    cefTRIAXone (ROCEPHIN) IV  1,000 mg IntraVENous Q24H    And    doxycycline hyclate  100 mg Oral 2 times per day    budesonide  0.25 mg Nebulization BID    And    arformoterol tartrate  15 mcg Nebulization BID         Vitals:    VITALS:  BP (!) 119/46   Pulse 64   Temp 97.9 °F (36.6 °C) (Oral)   Resp 26   Wt 174 lb (78.9 kg)   SpO2 96%   BMI 28.08 kg/m²   24HR INTAKE/OUTPUT:  No intake or output data in the 24 hours ending 23 1006  CURRENT PULSE OXIMETRY:  SpO2: 96 %  24HR PULSE OXIMETRY RANGE:  SpO2  Av.8 %  Min: 93 %  Max: 100 %      EXAM:  General: No distress. Alert. On 4 L nasal cannula. Eyes: PERRL. No sclera icterus. No conjunctival injection. ENT: No discharge. Pharynx clear. Neck: Trachea midline. Normal thyroid. Resp: No accessory muscle use. No crackles. wheezing. No rhonchi. Diminished especially left  CV: Regular rate. Regular rhythm. No mumur or rub. Pacer  ABD: Non-tender. Non-distended. No masses. No organmegaly. Normal bowel sounds. Obese  Skin: Warm and dry. No nodule on exposed extremities. No rash on exposed extremities. Lymph: No cervical LAD. No supraclavicular LAD. Ext: No joint deformity. No clubbing. No cyanosis. Trace leg edema  Neuro: Aox4     Lab Results:  CBC:   Recent Labs     23  1040   WBC 3.6*   HGB 7.2*   HCT 22.5*   MCV 97.8   PLT 21*     BMP:   Recent Labs     23  1040 23  0537    141   K 4.0 4.0    103   CO2 28 29   BUN 19 17   CREATININE 0.9 0.9     LFT:   Recent Labs     23  1040   ALKPHOS 136*   ALT 27   AST 28   PROT 6.2*   BILITOT 0.7   LABALBU 3.8     PT/INR: No results for input(s): PROTIME, INR in the last 72 hours. Cultures:  No results for input(s): CULTRESP in the last 72 hours. ABG: noted  Films:  XR ABDOMEN (KUB) (SINGLE AP VIEW)  Result Date: 2023  No evidence of bowel obstruction.      XR CHEST PORTABLE  Result Date: 2023  Persistent scarring and or atelectasis on the left. Obstructive airways disease. Substantial resolution of left pleural effusion. CTA PULMONARY W CONTRAST    Result Date: 2/27/2023 1. No indication for acute pulmonary emboli. 2.  No aneurysm formation or dissection of the thoracic aorta. 3.  Emphysematous changes lung parenchyma particular affecting the upper lobes in moderate advanced degree. 4.  Malignant infiltrating lesion in the left infrahilar region towards the root of the lingula/left lower, as previously described. 5.  Chronic areas of loss of volume in lingula and in the left lower due the infiltration of the left hilar region by malignancies. 6.  Resolved bilateral pleural effusions since more recent examinations. 7.  Development of small multifocal patchy areas of consolidation in the left lower lobe distal to the more proximal in case mint of the bronchovascular structures of the left lower lobe can represent developing acute pneumonia. Follow-up study following clinically treatment trial recommended in several days time interval.       08/19/22 ECHO:  Normal left ventricular chamber size. Normal left ventricular systolic function. Visually estimated LVEF is 55-60 %. No wall motion abnormalities. Normal diastolic function. Normal left atrial pressure. Normal right ventricle structure and function. Normal left atrial size. Normal right atrial size  Likely normal estimated PA pressure. Pacer/ ICD wire present in the right heart. No gross evidence of infective endocarditis. Consider SHIRA for better accuracy if clinically indicated. Compared to prior echo from 2021, no significant changes noted. SHIRA on 8/22/22 No SHIRA indication of Endocarditis. Normal left ventricular chamber size and systolic function. Interatrial septum appears intact. Normal right ventricle size and function. Pacemaker leads noted in right atrium and right ventricle. Normal aortic root size.    Moderate atherosclerosis in the descending thoracic aorta. Epicardial fat vs. small pericardial effusion. No intra cardiac mass or thrombus. Technically sub-optimal images. Compared to prior sub-optimal TTE from 8/19/22.         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 Effusions have resolved.   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  diagnosis 5/27/2021, PD-L1 0%   ( 7/6/21-9/7/21 treatment with concurrent chemotherapy/radiation therapy; 11/2/21 consolidated with Imfinzi (Durvalumab) per Oncology IV every 4 weeks; 6/30/22 PET showing progression and was started on Carbo, Gemzar and Keytruda on 6/30/22; 8/9/22 received C2D8 of Carboplatin AUC 2 with Gemzar 1000 mg/m2 IV on D1,8 and Keytruda IV on D1 on a 21 day cycle)  9/2/2022 shows new 9 mm spiculated nodule RLL   h/o wide-complex tachycardia with pacer out 8/25/2022;  leadless pacer implantation 8/29/2022  Chronic hypoxic resp failure On 4lnc  Probable Obstructive Sleep Apnea  Persistent pancytopenia secondary to chemotherapy  H/O RLL subsegmental PE  Eliquis is on hold with anemia  Radiation pneumonitis/fibrosis left lung   Previous admission   8/16 - 9/3/2022@ [facility] with weakness, cough, shortness of breath, fatigue, fever found to have high-grade Enterococcus faecalis bacteremia/septicemia, bacteremia cultures + on 8/16, 8/17, 8/20 s/p mediport removal and placement of new pacemaker. Patient was discharged with ampicillin and ceftriaxone until 10/13/2022  12/26 - 12/30/2022 at [facility] with acute blood loss anemia. Eliquis was held. FOBT positive was felt to be due to thrombocytopenia underlying gastritis. Empirically treated with PPI and Carafate  1/18 - 1/29/2023 at [facility] with anemia of 6.9 platelets of 14. He was transfused. CT chest showing increased left effusion left infrahilar mass with narrowing of left bronchus due to mass with postobstructive pneumonitis. 1/23 ultrasound lower extremities no DVT   Thanks for letting us see this patient in consultation. Please contact us with any questions. Electronically signed by LELO Allen CNP on 2/28/2023 at 10:06 AM     I had a face-to-face encounter with the patient at the bedside. I agree with the nurse practitioner's note and assessment and plan as detailed above. Necessary editing and changes made to the note by myself. On IV steroids  Due to recent admission and immunocompromised state will change antibiotics to cefepime and doxycycline to cover possible Pseudomonas   Check MRSA swab. This is a postobstructive process from cancer. Question if he can get further radiation at the narrowing site.

## 2023-02-28 NOTE — PROGRESS NOTES
[address]        Date:2023                                                  Patient Name: Taina Sue    MRN: [id number]    : 1950    Room: [address] [place] [address]          Evaluating OT: Shanita Flores OTR/L; [id number]       Referring Provider: LELO Allison CNP    Specific Provider Orders/Date: OT Eval and Treat 23       Diagnosis: Acute exacerbation of chronic obstructive pulmonary disease (COPD)     Surgery: None this admission     Pertinent Medical History:  has a past medical history of CHB (complete heart block) ([facility] COPD (chronic obstructive pulmonary disease) ([facility] Hypertension, NSCLC of left lung ([facility] Pulmonary emboli ([facility] Syncope, and Syncope.      Recommended Adaptive Equipment: TBD     Precautions:  Fall Risk, O2     Assessment of current deficits    [x] Functional mobility  [x]ADLs  [x] Strength               []Cognition    [x] Functional transfers   [x] IADLs         [x] Safety Awareness   [x]Endurance    [x] Fine Coordination              [x] Balance      [] Vision/perception   []Sensation     []Gross Motor Coordination  [] ROM  [] Delirium                   [] Motor Control     OT PLAN OF CARE   OT POC based on physician orders, patient diagnosis and results of clinical assessment    Frequency/Duration 1-3 days/wk for 2 weeks PRN   Specific OT Treatment Interventions to include:   * Instruction/training on adapted ADL techniques and AE recommendations to increase functional independence within precautions       * Training on energy conservation strategies, correct breathing pattern and techniques to improve independence/tolerance for self-care routine  * Functional transfer/mobility training/DME recommendations for increased independence, safety, and fall prevention  * Patient/Family education to increase follow through with safety techniques and functional independence  * Recommendation of environmental modifications for increased safety with functional transfers/mobility and ADLs  * Therapeutic exercise to improve motor endurance, ROM, and functional strength for ADLs/functional transfers  * Therapeutic activities to facilitate/challenge dynamic balance, stand tolerance for increased safety and independence with ADLs  * Positioning to improve skin integrity, interaction with environment and functional independence    Home Living: Pt lives with wife in 1 story home with 1 step & 0 handrails to enter. Shower in basement with full flight of stairs. Bathroom setup: Tub/bench with shower chair   Equipment owned: Shower chair, ww, cane, 4L O2    Prior Level of Function: IND with ADLs , IND with IADLs; engaged in functional mobility with use of  no AD  Driving: Yes  Occupation: Reading    Pain Level: Pt with no c/o pain during session. Cognition: A&O: 4/4; Follows multi step directions   Memory:  Good   Sequencing:  Fair+   Problem solving:  Fair+   Judgement/safety:  Fair+     Functional Assessment:  AM-PAC Daily Activity Raw Score: 17/24   Initial Eval Status  Date: 2/28/23 Treatment Status  Date: STGs = LTGs  Time frame: 10-14 days   Feeding Setup   Independent    Grooming Setup  To brush teeth seated in bedside chair. Modified [place]    UB Dressing Minimal Assist   Modified [place]    LB Dressing Minimal Assist   To don/doff socks seated EOB. Modified [place]    Bathing Minimal Assist  Simulated seated EOB. Modified [place]    Toileting Moderate Assist   Modified [place]    Bed Mobility  Supine to sit: NT   Sit to supine: NT   Supine to sit:  Independent   Sit to supine: Independent    Functional Transfers Sit to stand:SBA  Stand to sit:SBA  Stand pivot: SBA  Commode: NT  Sit to stand:Modified [place]   Stand to sit:Modified [place]  Stand pivot: Modified [place]  Commode: Modified [place] Functional Mobility SBA  Use of ww shorter than household distance  Modified [place] with use of Appropriate AD   Balance Sitting:     Static - Supervision     Dynamic - Supervision  Standing: SBA  Sitting:     Static:  Independent      Dynamic:  Independent   Standing: Modified [place]    Activity Tolerance Fair  Pt c/o lightheadedness with light activity. Good   Visual/  Perceptual Glasses: Yes  Appears WFL      Safety Fair+  Good  during ADL completion     Hand Dominance Right   AROM (PROM) Strength Additional Info:    RUE  WFL 4/5 grossly tested good  and wfl FMC/dexterity noted during ADL tasks     LUE WFL 4/5 grossly tested Good  and wfl FMC/dexterity noted during ADL tasks       Hearing: WFL  Sensation:  No c/o numbness or tingling BUE  Tone: WFL BUE  Edema: Unremarkable    Comment: Cleared by RN to see pt. Upon arrival patient seated EOB and agreeable to OT session. At end of session, patient seated in bedside chair with call light and phone within reach, all lines and tubes intact. Overall patient demonstrated decreased independence, activity tolerance, and safety during completion of ADL/functional transfer/mobility tasks. Therapist facilitated ADL tasks, functional transfers, functional mobility to address safety awareness, implementation of fall prevention strategies, & functional engagement throughout daily activities. Pt limited during participation in functional activities d/t c/o lightheadedness. /67, 80 HR, SpO2 95% on 4L O2 NC seated EOB after functional mobility. Pt educated on activity modifications/adaptations to promote safety awareness & functional engagement throughout daily activities. Pt would benefit from continued skilled OT to increase safety and independence with completion of ADL/IADL tasks for functional independence and quality of life.     Rehab Potential: Good for established goals     LTG: maximize independence with ADLs to return to PLOF    Patient and/or family were instructed on functional diagnosis, prognosis/goals and OT plan of care. Demonstrated fair understanding. Eval Complexity: Low  History: Expanded chart review of medical records and additional review of physical, cognitive, or psychosocial history related to current functional performance  Exam: 3+ performance deficits  Assistance/Modification: Min/mod assistance or modifications required to perform tasks. May have comorbidities that affect occupational performance. Evaluation time includes thorough review of current medical information, gathering information on past medical & social history & PLOF, completion of standardized testing, informal observation of tasks, consultation with other medical professions/disciplines, assessment of data & development of POC/goals. Time In: 2:20p  Time Out: 2:35p  Total Treatment Time: 0 minutes    Min Units   OT Eval Low 35812  x     OT Eval Medium 75911      OT Eval High 60522      OT Re-Eval [id number]       Therapeutic Ex 64229       Therapeutic Activities 05663       ADL/Self Care 90877       Orthotic Management 64751       Manual 78807     Neuro Re-Ed 38085       Non-Billable Time          Evaluation Time additionally includes thorough review of current medical information, gathering information on past medical history/social history and prior level of function, interpretation of standardized testing/informal observation of tasks, assessment of data and development of plan of care and goals.             Inna Rogers OTR/L; [id number]

## 2023-02-28 NOTE — PROGRESS NOTES
Physical Therapy  Physical Therapy Initial Assessment     Name: Macario Patterson  : 1950  MRN: [id number]      Date of Service: 2023    Evaluating PT:  Chris Suarez PT, DPT  [id number]    Room #:  5271/2379-T  Diagnosis: Thrombocytopenia (HCC) [D69.6]  Acute exacerbation of chronic obstructive pulmonary disease (COPD) (HCC) [J44.1]  Acute bronchospasm [J98.01]  Pneumonia of lower lobe due to infectious organism, unspecified laterality [J18.9]  Anemia, unspecified type [D64.9]  PMHx/PSHx:   has a past medical history of CHB (complete heart block) ([facility] [address] COPD (chronic obstructive pulmonary disease) ([facility] [address] Hypertension, NSCLC of left lung ([facility] [address] Pulmonary emboli ([facility] [address] Syncope, and Syncope. Procedure/Surgery:    Precautions:  Falls, 4 L O2  Equipment Needs:      SUBJECTIVE:    Pt lives with spouse in a 1 story home with 1 stairs to enter and no rail. Bed is on first floor and bath is on basement floor. Flight of stairs with single rail to basement. Pt ambulated with no AD independently PTA. Equipment Owned:   [facility]    OBJECTIVE:   Initial Evaluation  Date: 23 Treatment Short Term/ Long Term   Goals   AM-PAC 6 Clicks 61/00     Was pt agreeable to Eval/treatment? Yes     Does pt have pain? No c/o pain     Bed Mobility  Rolling: NT  Supine to sit: NT  Sit to supine: NT  Scooting: SBA  Rolling: Independent  Supine to sit:  Independent  Sit to supine: Independent  Scooting: Independent     Transfers Sit to stand: SBA  Stand to sit: SBA  Stand pivot: SBA no AD  Sit to stand: Modified Independent  Stand to sit: Modified Independent  Stand pivot: Modified Independent     Ambulation    8, 10 feet with SBA no AD     (Limited by lightheadedness) seated /67   HR 80 spo2 96% on 4 L O2  300 feet with Modified Independent      Stair negotiation: ascended and descended  NT  >4 steps with single rail Modified Independent     ROM BUE:  Defer to OT  BLE:  WFL     Strength BUE:  Defer to OT  BLE:  4/5   Improve 1 MMT   Balance Sitting EOB:  SBA  Dynamic Standing:  SBA no AD  Sitting EOB:  Independent    Dynamic Standing:  Modified Independent       Pt is A & O x 4  Sensation:  WNL  Edema: WNL    Vitals:    HR 80  Spo2 96%  PRE  HR 80  Spo2 96%   ACTIVITY  /67 seated  ACTIVITY    Therapeutic Exercises:  functional mobility    Patient education  Pt educated on role of PT    Patient response to education:   Pt verbalized understanding Pt demonstrated skill Pt requires further education in this area   x x x     ASSESSMENT:    Conditions Requiring Skilled Therapeutic Intervention:    [x]Decreased strength     []Decreased ROM  [x]Decreased functional mobility  [x]Decreased balance   [x]Decreased endurance   []Decreased posture  []Decreased sensation  []Decreased coordination   []Decreased vision  []Decreased safety awareness   []Increased pain       Comments:  Pt agreeable to PT evaluation. Pt performing transfers and ambulation without assist. Pt ambulation limited by lightheadedness. Vitals listed above. Patient would benefit from continued skilled PT to maximize functional mobility independence. Treatment:  Patient practiced and was instructed in the following treatment:    Functional transfers-Verbal cues for proper positioning and sequencing to perform transfers safely with maximum independence. Gait training-Verbal cues for proper positioning and sequencing using assistive device to maximize functional mobility independence    Pt's/ family goals   1. Get better    Prognosis is good for reaching above PT goals. Patient and or family understand(s) diagnosis, prognosis, and plan of care.   yes    PHYSICAL THERAPY PLAN OF CARE:    PT POC is established based on physician order and patient diagnosis     Referring provider/PT Order:    02/27/23 2030  PT evaluation and treat  Start:  02/27/23 2030,   End:  02/27/23 2030,   ONE TIME,   Standing Count:  1 Occurrences,   R         Leonarda Nettles, APRN - CNP Diagnosis: Thrombocytopenia (HCC) [D69.6]  Acute exacerbation of chronic obstructive pulmonary disease (COPD) (HCC) [J44.1]  Acute bronchospasm [J98.01]  Pneumonia of lower lobe due to infectious organism, unspecified laterality [J18.9]  Anemia, unspecified type [D64.9]  Specific instructions for next treatment:  Functional mobility    Current Treatment Recommendations:     [x] Strengthening to improve independence with functional mobility   [x] ROM to improve independence with functional mobility   [x] Balance Training to improve static/dynamic balance and to reduce fall risk  [x] Endurance Training to improve activity tolerance during functional mobility   [x] Transfer Training to improve safety and independence with all functional transfers   [x] Gait Training to improve gait mechanics, endurance and assess need for appropriate assistive device  [] Stair Training in preparation for safe discharge home and/or into the community   [x] Positioning to prevent skin breakdown and contractures  [x] Safety and Education Training   [x] Patient/Caregiver Education   [x] HEP  [] Other     PT long term treatment goals are located in above grid    Frequency of treatments: 2-5x/week x 1-2 weeks. Time in  1412  Time out  1432    Total Treatment Time  0 minutes     Evaluation Time includes thorough review of current medical information, gathering information on past medical history/social history and prior level of function, completion of standardized testing/informal observation of tasks, assessment of data and education on plan of care and goals.     CPT codes:  [x] Low Complexity PT evaluation 15464  [] Moderate Complexity PT evaluation 74632  [] High Complexity PT evaluation 01469  [] PT Re-evaluation 04877  [] Gait training 58315 0 minutes  [] Manual therapy 91737 0 minutes  [] Therapeutic activities 88483 0 minutes  [] Therapeutic exercises 36337 0 minutes  [] Neuromuscular reeducation 00736 0 minutes       Gabino Emerson [place], [place]   [id number]

## 2023-02-28 NOTE — PROGRESS NOTES
SPEECH/LANGUAGE PATHOLOGY  CLINICAL ASSESSMENT OF SWALLOWING FUNCTION   and PLAN OF CARE    PATIENT NAME:  Hernesto Box  (male)     MRN:  [id number]    :  1950  (67 y.o.)  STATUS:  Inpatient: Room 7409/7409-A    TODAY'S DATE:  2023  REFERRING PROVIDER:   Edgar Nevarez DO  SPECIFIC PROVIDER ORDER: Speech Language Pathology clinical swallowing screening Date of order:  23  REASON FOR REFERRAL: to further assess oropharyngeal function   EVALUATING THERAPIST: JASEN Mayes                 RESULTS:    DYSPHAGIA DIAGNOSIS:   Clinical indicators of moderate oropharyngeal phase dysphagia       DIET RECOMMENDATIONS:  NPO until MBSS can be completed        FEEDING RECOMMENDATIONS:     Assistance level:  Not applicable      Compensatory strategies recommended: Not applicable      Discussed recommendations with nursing and/or faxed report to referring provider: Yes    SPEECH THERAPY  PLAN OF CARE   The dysphagia POC is established based on physician order, dysphagia diagnosis and results of clinical assessment     Will establish POC once MBSS is completed. Conditions Requiring Skilled Therapeutic Intervention for dysphagia:    Patient is performing below functional baseline d/t  current acute condition, respiratory compromise, multiple medications, and/or increased dependency upon caregivers.   Underlying moist cough decreases ability to determine presence or absence of clinical indicators of dysphagia    Specific dysphagia interventions to include:     MBSS to fully assess oropharyngeal swallow function and to assist in determining the least restrictive PO diet to maintain adequate nutrition/hydration     Specific instructions for next treatment:  MBSS to be completed  Patient Treatment Goals:    Short Term Goals:  Pt will participate in MBSS to fully assess oropharyngeal swallow function and to assist in determining the least restrictive PO diet to maintain adequate nutrition/hydration Long Term Goals: A Video Swallow Study (MBSS) is recommended and requires a physician order      Patient/family Goal:    To be able to [facility] discussed with Patient and Family   The Patient and Family understand(s) the diagnosis, prognosis and plan of care     Rehabilitation Potential/Prognosis: fair                    ADMITTING DIAGNOSIS: Thrombocytopenia (HCC) [D69.6]  Acute exacerbation of chronic obstructive pulmonary disease (COPD) ([facility] [address] [J44.1]  Acute bronchospasm [J98.01]  Pneumonia of lower lobe due to infectious organism, unspecified laterality [J18.9]  Anemia, unspecified type [D64.9]    VISIT DIAGNOSIS:   Visit Diagnoses         Codes    Acute bronchospasm     J98.01    Thrombocytopenia ([facility]     D69.6             PATIENT REPORT/COMPLAINT: denies difficulty swallowing  RN cleared patient for participation in assessment     yes     PRIOR LEVEL OF SWALLOW FUNCTION:    PAST HISTORY OF DYSPHAGIA?: yes, patient reports hx of swallowing difficulty post radiation tx about a year ago and reports it has resolved. No previous hx of speech therapy services per patient/spouse report. Home diet: Regular consistency solids (IDDSI level 7) with  thin liquids (IDDSI level 0)  Current Diet Order:  ADULT DIET; Regular    PROCEDURE:  Consistencies Administered During the Evaluation   Liquids: thin liquid   Solids:  pureed foods, soft solid foods, and solid foods      Method of Intake:   cup, straw, spoon  Self fed      Position:   Seated, upright    CLINICAL ASSESSMENT:  Oral Stage:        The oral stage of swallowing was within functional limits for consistencies administered      Pharyngeal Stage:    Immediate wet cough was noted after presentation of all consistencies administered  Latent wet cough was noted after presentation of all consistencies administered    Cognition:   Within functional limits for this exam    Oral Peripheral Examination   Adequate lingual/labial strength     Current Respiratory Status    4 liters O2 via nasal cannula     Parameters of Speech Production  Respiration:  Shortness of breath  Quality:   Within functional limits  Intensity: Within functional limits    Volitional Swallow: present     Volitional Cough:   present     Pain: No pain reported. EDUCATION:   The Speech Language Pathologist (SLP) completed education regarding results of evaluation and that intervention is warranted at this time. Learner: Patient and Significant Other  Education: Reviewed results and recommendations of this evaluation  Evaluation of Education:  Verbalizes understanding    This plan may be re-evaluated and revised as warranted. Evaluation Time includes thorough review of current medical information, gathering information on past medical history/social history and prior level of function, completion of standardized testing/informal observation of tasks, assessment of data and education on plan of care and goals. [x]The admitting diagnosis and active problem list, have been reviewed prior to initiation of this evaluation.         ACTIVE PROBLEM LIST:   Patient Active Problem List   Diagnosis    Syncope and collapse    Pneumonia    COPD (chronic obstructive pulmonary disease) (HCC)    BPH (benign prostatic hyperplasia)    HTN (hypertension)    HLD (hyperlipidemia)    Intermittent complete heart block (HCC)    Cardiac pacemaker in situ    Acute on chronic respiratory failure with hypoxia (HCC)    Squamous carcinoma of lung (HCC)    History pulmonary embolism ([address] on Eliquis    Hyponatremia    Hypomagnesemia    Wide-complex tachycardia    COVID-19    Pancytopenia (HCC)    Pacemaker infection ([address]    Sepsis due to Enterococcus ([address]    Bacteremia    High-grade atrioventricular block    Second degree AV block, Mobitz type I    Hematoma    Presence of leadless cardiac pacemaker    Bacterial sepsis (HCC)    Anemia    GIB (gastrointestinal bleeding)    Acute exacerbation of chronic [facility] pulmonary disease ([facility]D) [facility]         CPT code:  55217  bedside swallow ar Velasco M.S., CCC-SLP  Speech-Language Pathologist  [phone]

## 2023-02-28 NOTE — PROGRESS NOTES
SPEECH/LANGUAGE PATHOLOGY  VIDEOFLUOROSCOPIC STUDY OF SWALLOWING (MBS)   and PLAN OF CARE    PATIENT NAME:  Andrea Spears  (male)     MRN:  [id number]    :  1950  (67 y.o.)  STATUS:  Inpatient: Room 7409/7409-A    TODAY'S DATE:  2023  REFERRING PROVIDER:     Jennifer Hart DO   SPECIFIC PROVIDER ORDER: FL modified barium swallow with video  Date of order:  23   REASON FOR REFERRAL: dysphagia   EVALUATING THERAPIST: JASEN Salas      RESULTS:      DYSPHAGIA DIAGNOSIS:  functional swallow for age/premorbid functioning. Pt coughed throughout the evaluation however this was not due laryngeal penetration or aspiration.     DIET RECOMMENDATIONS:  Regular consistency solids (IDDSI level 7) with  thin liquids (IDDSI level 0)    FEEDING RECOMMENDATIONS:    Assistance level:  No assistance needed     Compensatory strategies recommended: No strategies are recommended at this time     Discussed recommendations with nursing and/or faxed report to referring provider: No secondary to no diet/liquid change recommended     SPEECH THERAPY  PLAN OF CARE   The dysphagia POC is established based on physician order and dysphagia diagnosis    Dysphagia therapy is not recommended       Conditions Requiring Skilled Therapeutic Intervention for dysphagia:    Not applicable    SPECIFIC DYSPHAGIA INTERVENTIONS TO INCLUDE:     not applicable    Specific instructions for next treatment:  not applicable   Treatment Goals:    Short Term Goals:  Not applicable no therapy warranted     Long Term Goals:   Not applicable no therapy warranted      Patient/family Goal:    not applicable                  ADMITTING DIAGNOSIS: Thrombocytopenia (HCC) [D69.6]  Acute exacerbation of chronic obstructive pulmonary disease (COPD) ([facility] [J44.1]  Acute bronchospasm [J98.01]  Pneumonia of lower lobe due to infectious organism, unspecified laterality [J18.9]  Anemia, unspecified type [D64.9]     VISIT DIAGNOSIS:   Visit Diagnoses Codes    Acute bronchospasm     J98.01    Thrombocytopenia ([facility])     D69.6                PATIENT REPORT/COMPLAINT: denies difficulty swallowing    PRIOR LEVEL OF SWALLOW FUNCTION:    Current Diet Order: ADULT DIET;  Regular    PROCEDURE:  Consistencies Administered During the Evaluation   Liquids: thin liquid and nectar thick liquid   Solids:  pureed foods and solid foods      Method of Intake:   cup, straw, spoon  Self fed, Fed by clinician      Position:   Seated, upright, Lateral plane    INSTRUMENTAL ASSESSMENT:    ORAL PREP/ ORAL PHASE:    The oral stage of swallowing was within functional limits for consistencies administered     PHARYNGEAL PHASE:     ONSET TIME       Onset time of the pharyngeal swallow was adequate       PHARYNGEAL RESIDUALS        Vallecula/Pharyngeal Wall           No significant residuals were noted in the vallecula      Pyriform Sinuses      No significant residuals were noted in the pyriform sinuses     LARYNGEAL PENETRATION   Laryngeal penetration was not present during this evaluation    ASPIRATION  Aspiration was not present during this evaluation    PENETRATION-ASPIRATION SCALE (PAS):  THIN 1 = Material does not enter the airway  MILDLY THICK 1 = Material does not enter the airway  MODERATELY THICK item not administered  PUREE 1 = Material does not enter the airway  HARD SOLID 1 = Material does not enter the airway       COMPENSATORY STRATEGIES    Compensatory strategies were not attempted      STRUCTURAL/FUNCTIONAL ANOMALIES   No structural/functional anomalies were noted    CERVICAL ESOPHAGEAL STAGE :     The cervical esophagus appeared adequate          ___________    Cognition:   Within functional limits for this exam    Oral Peripheral Examination   Generalized oral weakness    Current Respiratory Status   4 liters O2 via nasal cannula     Parameters of Speech Production  Respiration:  Adequate for speech production  Quality:   Within functional limits  Intensity: Within functional limits    Pain: No pain reported. EDUCATION:   The Speech Language Pathologist (SLP) completed education regarding results of evaluation and that intervention is not warranted at this time. Learner: Patient  Education: Reviewed results and recommendations of this evaluation  Evaluation of Education:  [employer] understanding    This plan may be re-evaluated and revised as warranted. Evaluation Time includes thorough review of current medical information, gathering information on past medical history/social history and prior level of function, completion of standardized testing/informal observation of tasks, assessment of data and education on plan of care and goals. [x]The admitting diagnosis and active problem list, have been reviewed prior to initiation of this evaluation.     CPT Code: 31639  dysphagia study    ACTIVE PROBLEM LIST:   Patient Active Problem List   Diagnosis    Syncope and collapse    Pneumonia    COPD (chronic obstructive pulmonary disease) (HCC)    BPH (benign prostatic hyperplasia)    HTN (hypertension)    HLD (hyperlipidemia)    Intermittent complete heart block (HCC)    Cardiac pacemaker in situ    Acute on chronic respiratory failure with hypoxia (HCC)    Squamous carcinoma of lung (HCC)    History pulmonary embolism ([address] on Eliquis    Hyponatremia    Hypomagnesemia    Wide-complex tachycardia    COVID-19    Pancytopenia (HCC)    Pacemaker infection ([address]    Sepsis due to Enterococcus ([address]    Bacteremia    High-grade atrioventricular block    Second degree AV block, Mobitz type I    Hematoma    Presence of leadless cardiac pacemaker    Bacterial sepsis ([address]    Anemia    GIB (gastrointestinal bleeding)    Acute exacerbation of chronic obstructive pulmonary disease (COPD) ([address]

## 2023-03-01 PROBLEM — J16.0 CAP (COMMUNITY ACQUIRED PNEUMONIA) DUE TO CHLAMYDIA SPECIES: Status: ACTIVE | Noted: 2023-01-01

## 2023-03-01 NOTE — PROGRESS NOTES
Electrophysiology consult called to JU Landa per perfect serv patient add to census. [facility]  Vascular Neurology  Comprehensive Stroke Center  Consult Note      Consults  Subjective:     History of Present Illness:  Mr. Willie Jordan is a 69 year old man with a history of cirrhosis, HCC, HLD and HTN who is admitted for acute renal failure. A stroke code was called this morning due to altered mental status and not following commands. According to his nurse, yesterday he was alert and oriented and able to ambulate. Overnight he had worsening mental status, then during morning vitals he was not responding to commands. Nursing noticed he was trembling all over.    Upon my evaluation, the patient was non-responsive with upward gaze. Fine shaking noted, R>L with increased tone.    Vitals stable. Last BG around 110s. ABG with pH 7.312, CO2 16.8, O2 106, HCO3 8.5.      Symptom/Intervention Times  Last Known Normal Date:: 02/10/17 (02/11/17 1323)  Symptom Onset Date:: 02/11/17 (02/11/17 1323)  Symptom Onset Time:: 0500 (02/11/17 1323)  Stroke Team Called Time:: 0800 (02/11/17 1323)  Stroke Team Arrival Time:: 0803 (02/11/17 1323)    Past Medical History   Diagnosis Date    HCC (hepatocellular carcinoma) 5/17/2016    Hypertension      Past Surgical History   Procedure Laterality Date    Rotator cuff repair      Skin graft       Family History   Problem Relation Age of Onset    No Known Problems Mother     No Known Problems Father     No Known Problems Sister     No Known Problems Brother     No Known Problems Maternal Aunt     No Known Problems Maternal Uncle     No Known Problems Paternal Aunt     No Known Problems Paternal Uncle     No Known Problems Maternal Grandmother     No Known Problems Maternal Grandfather     No Known Problems Paternal Grandmother     No Known Problems Paternal Grandfather     Amblyopia Neg Hx     Blindness Neg Hx     Cancer Neg Hx     Cataracts Neg Hx     Diabetes Neg Hx     Glaucoma Neg Hx     Hypertension Neg Hx     Macular  degeneration Neg Hx     Retinal detachment Neg Hx     Strabismus Neg Hx     Stroke Neg Hx     Thyroid disease Neg Hx      Social History   Substance Use Topics    Smoking status: Former Smoker    Smokeless tobacco: None    Alcohol use Yes      Comment: socially     Review of patient's allergies indicates:  No Known Allergies  Medications: I have reviewed the current medication administration record.    Prescriptions Prior to Admission   Medication Sig Dispense Refill Last Dose    spironolactone (ALDACTONE) 25 MG tablet Take 25 mg by mouth once daily.       UNABLE TO FIND FOSFOMICINA 500 MG TAKE 2 CAPSULES BY MOUTH EVERY 8 HOURS   Unknown at Unknown time       Review of Systems   Constitutional: Negative for fever.   HENT: Negative.    Eyes: Negative for visual disturbance.   Respiratory: Negative for cough and shortness of breath.    Cardiovascular: Negative for palpitations and leg swelling.   Gastrointestinal: Negative for abdominal distention and diarrhea.   Genitourinary: Negative for hematuria.   Skin: Negative.    Neurological: Negative for speech difficulty, weakness and headaches.   Psychiatric/Behavioral: Positive for confusion. Negative for agitation.     Objective:     Vital Signs (Most Recent):  Temp: 97.6 °F (36.4 °C) (02/11/17 1215)  Pulse: 96 (02/11/17 1315)  Resp: 20 (02/11/17 1315)  BP: 118/68 (02/11/17 1315)  SpO2: 100 % (02/11/17 1315)    Vital Signs Range (Last 24H):  Temp:  [96.3 °F (35.7 °C)-97.8 °F (36.6 °C)]   Pulse:  []   Resp:  [12-22]   BP: ()/(52-95)   SpO2:  [95 %-100 %]     Physical Exam   Constitutional: He appears well-developed and well-nourished.   HENT:   Head: Normocephalic and atraumatic.   Eyes: Pupils are equal, round, and reactive to light.   Cardiovascular: Normal rate.    Pulmonary/Chest: Effort normal.   Abdominal: Soft.   Musculoskeletal: He exhibits no edema or deformity.   Neurological: GCS eye subscore is 2 - to pain. GCS verbal subscore is 1 -  none. GCS motor subscore is 4 - withdraws from pain.   Skin: Skin is warm and dry.   Nursing note and vitals reviewed.      Neurological Exam:   LOC: does not follow requests, obtunded and groans to sternal rub  Language: Mute  Speech: Mute  Orientation: Mute  Memory: Mute  Visual Fields (CN II): Unable to assess - no blink to threat  EOM (CN III, IV, VI): Initially with left gaze; then roving eye movements noted   Pupils (CN III, IV, VI): PERRL  Facial Sensation (CN V): Unable to assess  Facial Movement (CN VII): No clear asymmetry  Hearing (CN VIII): unable to test  Motor*: Moves all extremities. Flexion to pain in BUE. Flexion to pain in LLE, withdraws from pain in RLE  Cerebellar*: Not testable due to decreased LOC  Sensation: responds to pain in all 4 extremities  Tone: Arm-Left: spastic; Leg-Left: spastic; Arm-Right: spastic; Leg-Right: spastic      NIH Stroke Scale:  Interval: baseline (upon arrival/admit)  Level of Consciousness: 2 - stuporous  LOC Questions: 2 - answers none correctly  LOC Commands: 2 - performs neither correctly  Best Gaze: 1 - partial gaze palsy  Visual: 0 - no visual loss  Facial Palsy: 0 - normal  Motor Left Arm: 2 - can't resist gravity  Motor Right Arm: 2 - can't resist gravity  Motor Left Le - can't resist gravity  Motor Right Le - can't resist gravity  Limb Ataxia: 0 - absent  Sensory: 0 - normal  Best Language: 3 - mute  Dysarthria: 0 - normal articulation  Extinction and Inattention: 0 - no neglect  NIH Stroke Scale Total: 18  [place] Coma Scale:  Best Eye Response: 2 - to pain  Best Motor Response: 4 - withdraws from pain  Best Verbal Response: 1 - none  Esha Coma Scale Total: 7  Modified Iza Scale:   Timeline: Prior to symptoms onset  Modified [place] Score: 1 - no significant disability        Laboratory:  CMP:   Recent Labs  Lab 17  0439 17  1214   CALCIUM 8.5* 9.1   ALBUMIN 3.7  --    PROT 6.7  --     135*   K 4.1 4.4   CO2 7* 8*   * 113*    BUN 84* 90*   CREATININE 5.7* 6.0*   ALKPHOS 171*  --    ALT 17  --    AST 26  --    BILITOT 2.9*  --      CBC:   Recent Labs  Lab 02/11/17  0439   WBC 3.84*   RBC 2.78*   HGB 10.3*   HCT 28.9*   PLT 80*   *   MCH 37.1*   MCHC 35.6     Lipid Panel: No results for input(s): CHOL, LDLCALC, HDL, TRIG in the last 168 hours.  Coagulation:   Recent Labs  Lab 02/11/17  0845   INR 1.5*     Hgb A1C: No results for input(s): HGBA1C in the last 168 hours.  TSH: No results for input(s): TSH in the last 168 hours.    Diagnostic Results:  CT Head 2/11/17  No acute infarct or hemorrhage. Possible left MCA aneurysm with areas of calcifaction.      Assessment/Plan:     Patient is a 69 y.o. year old male with:      Altered mental status  - Not likely due to stroke  - DDx includes metabolic encephalopathy vs seizure    He has multiple medical issues including metabolic acidosis who had worsening mental status. His exam was non-focal, but he had increased tone throughout with eyes rolling up. His symptoms were concerning for a seizure, so I gave 2mg of ativan with minimal improvement (started to open eyes, but remained non-responsive). CT head was unremarkable for hemorrhage or ischemic stroke. His change in mental status is likely metabolic, rather than stroke related.     No further workup is indicated from a Vascular Neurology perspective. Recommend admit to MICU for higher level of care.    Patient was seen and examined and images were reviewed with Dr. Moreno.       Thrombolysis Candidate? No  1. Contraindications: Unclear onset of symptoms and No stroke suspected    Interventional Revascularization Candidate?  No; No large vessel occlusion    Research Candidate? No      Kenyetta Shell MD  [facility]  Department of Vascular Neurology   [facility]

## 2023-03-01 NOTE — CONSULTS
[address] Electrophysiology  Consultation Report  PATIENT: Kady Bruner RECORD NUMBER: [id number]  DATE OF SERVICE:  3/1/2023  ATTENDING ELECTROPHYSIOLOGIST: Juan Alberto Dhaliwal MD  PRIMARY ELECTROPHYSIOLOGIST: Tatum Canseco  REFERRING PHYSICIAN: No ref. provider found and Neisha Neal MD  CHIEF COMPLAINT: Progressive dyspnea and cough    HPI: This is a 67 y.o. male with a history of hypertension, hyperlipidemia, COPD related to long-term smoking habit, lung cancer (squamous cell carcinoma) for which he is undergoing chemotherapy at present, COVID-19 infection in 5/22,complete heart block documented on event monitoring followed by a transvenous pacemaker placed in July 2021. In August 2022 his pacemaker had to be removed after hospitalization with enterococcal bacteremia. He then received a leadless pacemaker implantation (Micra). He received 6 weeks of IV antibiotics during that time. His device has been checked in the office regularly and has been noted to be functioning appropriately. This hospitalization was precipitated by symptoms of progressive weakness, shortness of breath and cough productive of yellow sputum. As mentioned he received chemotherapy 1 week before this hospitalization. There have been no cardiac symptoms however. No chest pain or shortness of breath. No episodes of syncope or near syncope. Cardiac electrophysiology service is consulted to evaluate his permanent leadless pacemaker.     Patient Active Problem List    Diagnosis Date Noted    CAP (community acquired pneumonia) due to Chlamydia species 03/01/2023     Priority: Medium    Acute exacerbation of chronic obstructive pulmonary disease (COPD) ([facility] [address] 02/27/2023     Priority: Medium    GIB (gastrointestinal bleeding) 01/18/2023     Priority: Medium    Anemia 12/26/2022     Priority: Medium    Hematoma 08/30/2022     Priority: Medium    Presence of leadless cardiac pacemaker 2022     Priority: Medium    High-grade atrioventricular block 2022     Priority: Medium    Second degree AV block, Mobitz type I 2022     Priority: Medium    Bacteremia 2022     Priority: Medium    Pacemaker infection ([address] 2022     Priority: Medium    Sepsis due to Enterococcus ([address] 2022     Priority: Medium    Pancytopenia ([address] 2022     Priority: Medium    COVID-19 2022     Priority: Medium    Bacterial sepsis ([address] 10/05/2022     Overview Note:     Diagnosis updated to problem list by Edwina Donnelly, [facility], [address] 10/5/2022 2:02 PM, due to new billing requirements, based on transcribed order from Dr. Jay Torres. Squamous carcinoma of lung ([address] 2021    History pulmonary embolism ([address] on Eliquis 2021    Hyponatremia 2021    Hypomagnesemia 2021    Wide-complex tachycardia 2021    Acute on chronic respiratory failure with hypoxia ([address] 2021    Cardiac pacemaker in situ 2021    Intermittent complete heart block ([address] 2021    Pneumonia 2019    COPD (chronic obstructive pulmonary disease) ([address] 2019    BPH (benign prostatic hyperplasia) 2019    HTN (hypertension) 2019    HLD (hyperlipidemia) 2019    Syncope and collapse 2019       Past Medical History:   Diagnosis Date    CHB (complete heart block) ([address] 2021    COPD (chronic obstructive pulmonary disease) ([address]     Hypertension     NSCLC of left lung ([address] 2021    SCC per biopsy. Pulmonary emboli ([address] 2021    Anticoagulation with Eliquis.     Syncope 2020    Syncope 2021       Family History   Problem Relation Age of Onset    Cancer Father         prostate    Cancer Paternal Uncle         anal       Social History     Tobacco Use    Smoking status: Former     Packs/day: 2.00     Years: 25.00     Pack years: 50.00     Types: Cigarettes     Quit date: 2011     Years since quittin.1 Passive exposure: Past    Smokeless tobacco: Never   Substance Use Topics    Alcohol use: Not Currently     Alcohol/week: 1.0 standard drink     Types: 1 Glasses of wine per week     Comment: 1 glass of wine per day prior       Current Facility-Administered Medications   Medication Dose Route Frequency Provider Last Rate Last Admin    0.9 % sodium chloride infusion   IntraVENous PRN April POOJA DesouzaN - CNP        methylPREDNISolone sodium (SOLU-MEDROL) injection 40 mg  40 mg IntraVENous Q12H Opal Jimenez APRN - CNP   40 mg at 03/01/23 1354    guaiFENesin-dextromethorphan (ROBITUSSIN DM) 100-10 MG/5ML syrup 5 mL  5 mL Oral Q4H PRN Neldon Blackbird, DO   5 mL at 03/01/23 1357    cefepime (MAXIPIME) 2,000 mg in sodium chloride 0.9 % 50 mL IVPB ([id number])  2,000 mg IntraVENous q8h Elijah White MD   Stopped at 03/01/23 3397    diphenhydrAMINE (BENADRYL) tablet 25 mg  25 mg Oral Once April Lyly APRN - CNP        albuterol (PROVENTIL) nebulizer solution 2.5 mg  2.5 mg Nebulization 4x Daily PRN Norrine Lowing Learn, APRN - CNP        [Held by provider] apixaban (ELIQUIS) tablet 5 mg  5 mg Oral BID Norrine Lowing Learn, APRN - CNP        atorvastatin (LIPITOR) tablet 20 mg  20 mg Oral Nightly Norrine Lowing Learn, APRN - CNP   20 mg at 02/28/23 2044    fluticasone (FLONASE) 50 MCG/ACT nasal spray 1 spray  1 spray Each Nostril Daily Norrine Lowing Learn, APRN - CNP        furosemide (LASIX) tablet 20 mg  20 mg Oral Daily Norrine Lowing Learn, APRN - CNP   20 mg at 03/01/23 0459    gabapentin (NEURONTIN) capsule 100 mg  100 mg Oral BID Norrine Lowing Learn, APRN - CNP   100 mg at 03/01/23 5538    melatonin tablet 3 mg  3 mg Oral Nightly Norrine Lowing Learn, APRN - CNP   3 mg at 02/28/23 2044    metoprolol succinate (TOPROL XL) extended release tablet 25 mg  25 mg Oral BID Norrine Lowing Learn, APRN - CNP   25 mg at 02/28/23 2044    pantoprazole (PROTONIX) tablet 40 mg  40 mg Oral Q12H William Nettles APRN - RICA   40 mg at 03/01/23 0832    potassium chloride (KLOR-CON M) extended release tablet 10 mEq  10 mEq Oral Daily Isabel Nettles, APRN - CNP   10 mEq at 03/01/23 0832    sucralfate (CARAFATE) tablet 1 g  1 g Oral 4x Daily AC & HS Isabel Gamboa Learn, APRN - CNP   1 g at 03/01/23 1103    sodium chloride flush 0.9 % injection 5-40 mL  5-40 mL IntraVENous 2 times per day Isabel Gamboa Learn, APRN - CNP   10 mL at 02/28/23 2122    sodium chloride flush 0.9 % injection 10 mL  10 mL IntraVENous PRN Isabel Gamboa Learn, APRN - CNP   10 mL at 03/01/23 0833    0.9 % sodium chloride infusion   IntraVENous PRN Isabel Nettles, APRN - CNP        ondansetron (ZOFRAN-ODT) disintegrating tablet 4 mg  4 mg Oral Q8H PRN Isabel Gamboa Learn, APRN - CNP        Or    ondansetron (ZOFRAN) injection 4 mg  4 mg IntraVENous Q6H PRN Isabel Gamboa Learn, APRN - CNP        magnesium hydroxide (MILK OF MAGNESIA) 400 MG/5ML suspension 30 mL  30 mL Oral Daily PRN Isabel Nettles, APRN - CNP        acetaminophen (TYLENOL) tablet 650 mg  650 mg Oral Q6H PRN Isabel Gamboa Learn, APRN - CNP        Or    acetaminophen (TYLENOL) suppository 650 mg  650 mg Rectal Q6H PRN Isabel Nettles, APRN - CNP        ipratropium-albuterol (DUONEB) nebulizer solution 1 ampule  1 ampule Inhalation Q4H WA Isabel Nettles, APRN - CNP   1 ampule at 03/01/23 1214    doxycycline hyclate (VIBRAMYCIN) capsule 100 mg  100 mg Oral 2 times per day Isabel Nettles, APRN - CNP   100 mg at 03/01/23 0832    budesonide (PULMICORT) nebulizer suspension 250 mcg  0.25 mg Nebulization BID Isabel Nettles, APRN - CNP   250 mcg at 03/01/23 0745    And    arformoterol tartrate (BROVANA) nebulizer solution 15 mcg  15 mcg Nebulization BID Isabel Nettles, APRN - CNP   15 mcg at 03/01/23 0745        No Known Allergies    ROS:   Constitutional: Positive for fever, activity change and appetite change. HENT: Negative for epistaxis. Eyes: Negative for diploplia, blurred vision.    Respiratory: Positive for cough, shortness of breath and wheezing. Cardiovascular: pertinent positives in HPI  Gastrointestinal: Negative for abdominal pain and blood in stool. All other review of systems are negative     PHYSICAL EXAM:   Vitals:    03/01/23 0508 03/01/23 0754 03/01/23 0807 03/01/23 1427   BP: 137/80 (!) 148/70 (!) 140/63 (!) 123/58   Pulse: 74 (!) 48 98 61   Resp: 18 18 18 18   Temp: 96.8 °F (36 °C) (!) 96.7 °F (35.9 °C) 96.9 °F (36.1 °C) 97.4 °F (36.3 °C)   TempSrc:  Temporal Temporal Temporal   SpO2: 99% 99% 99% 98%   Weight:       Height:          Constitutional: Well-developed, no acute distress  Eyes: conjunctivae normal, no xanthelasma   Ears, Nose, Throat: oral mucosa moist, no cyanosis   CV: no JVD or leg edema, laterally displaced PMI, irregular rate and rhythm. Normal S1S2 and no S3. No murmurs appreciated  Lungs: Bilaterally reduced breath sounds  Abdomen: soft, non-tender, nondistended  Musculoskeletal: no digital clubbing, no edema   Skin: warm, no rashes     I have personally reviewed the laboratory, cardiac diagnostic and radiographic testing as outlined below:    Data:    Recent Labs     02/27/23  1040 03/01/23  0251 03/01/23  1030   WBC 3.6* 1.6*  --    HGB 7.2* 6.0* 7.2*   HCT 22.5* 18.4* 21.7*   PLT 21* 35*  --      Recent Labs     02/27/23  1040 02/28/23  0537 03/01/23  0251    141 134   K 4.0 4.0 4.0    103 98   CO2 28 29 26   BUN 19 17 17   CREATININE 0.9 0.9 0.9   CALCIUM 9.1 8.6 8.7      Lab Results   Component Value Date/Time    MG 1.2 03/01/2023 02:51 AM     No results for input(s): TSH in the last 72 hours. No results for input(s): INR in the last 72 hours. CXR: 2/27/23  FINDINGS:   Stable right chest port. Unchanged scarring and or persistent atelectasis in   the left mid lung. Obstructive airways disease as before. Normal heart and   pulmonary vascularity. A left pleural effusion has substantially resolved.            Impression   Persistent scarring and or atelectasis on the left. Obstructive airways   disease. Substantial resolution of left pleural effusion. Telemetry: Sinus rhythm with first-degree AV block, intermittent RV pacing    EK23: Sinus rhythm with complete AV block, right ventricular pacing  3/1/23: Sinus rhythm with a first-degree AV block, right bundle branch block, intermittent RV pacing    Echocardiogram: 22  Conclusions      Summary   Limited study. No evidence for hemodynamically significant pericardial effusion. Ejection fraction is visually estimated at 65%. No regional wall motion abnormalities seen. Normal right ventricle structure and function. Signature      ----------------------------------------------------------------   Electronically signed by Olman Gonzalez DO(Interpreting   physician) on 2022 10:20 AM   ----------------------------------------------------------------  Device Interrogation (3/1/2023)  Make/Model Medtronic Micra  Mode VDD 50- 120    RV R wave: 15.1 mV  Impedance: 440 ohms   Threshold: 0.5 V @0.24 ms    Pacing: AM --65.9%  17.4%    Battery Voltage/Longevity: More than 8 years  Arrhythmias: None  Reprogramming included none  Overall device function is normal  All device programmable settings were evaluated per above and in the scanned document, along with iterative adjustments (capture thresholds) to assess and select the most appropriate final programming to provide for consistent delivery of the appropriate therapy and to verify function of the device. I have independently reviewed all of the ECGs and rhythm strips per above     Assessment/Plan: This is a 67 y.o. male with a history of     1. Complete heart block with syncope  Dual-chamber pacemaker implant in   Device explanted in  with placement of leadless RV pacemaker    2. Leadless pacemaker in situ--DOI 2022  Appropriate function noted on interrogation today. Details as noted above    3. Pneumonia this admission, gram-positive cocci and gram-negative rods in the sputum  Patient on antibiotics per pulmonology    4. COPD--symptoms suggest exacerbation  Ongoing treatment per pulmonology  Chronic respiratory failure patient on 4 L of oxygen by nasal cannula at all times    5. 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 Patient undergoing chemotherapy at present    6. Pancytopenia related to chemotherapy  White count of 1.6, hemoglobin 7.2 g today, platelet count of 04,966    7. Hypertension by history    Recommendations:     No specific work-up or intervention needed from electrophysiology standpoint. Pacemaker interrogated and functioning appropriately. All of the above discussed with the patient  reviewing the above stated recommendations. And a total of >50% of that time involved face-to-face time providing counseling and or coordination of care with the other providers, reviewing records/tests, counseling/education of the patient, ordering medications/tests/procedures, coordinating care, and documenting clinical information in the EHR. Thank you for allowing me to participate in your patient's care. Please call me if there are any questions or concerns.       Nadiya Rasmussen MD  Cardiac Electrophysiology  [facility]) Physicians  [facility] [place]: [facility] Electrophysiology  4:33 PM  3/1/2023

## 2023-03-01 NOTE — PROGRESS NOTES
Hospitalist Progress Note      PCP: Adriel Post MD    Date of Admission: 2/27/2023        Hospital Course:  67 y.o. male presented with [address],  AND SOB, NO FEVER, NO CHILLS. HAS A KNOWN HISTORY OF LUNG CANCER, NON SMALL CELL LUNG CANCR,   AND SEES  [facility]. NO FEVER, NO CHILLS,   NO NV, JUST STARTED WITH DIARRHEA** HAD ECTOPIC BEATS LAST PM , PACER INTERROGATED,  FUNCTIONING WELL.   EP TO SEE        Subjective:       COUGHING       Medications:  Reviewed    Infusion Medications    sodium chloride      sodium chloride       Scheduled Medications    methylPREDNISolone  40 mg IntraVENous Q12H    cefepime  2,000 mg IntraVENous q8h    diphenhydrAMINE  25 mg Oral Once    [Held by provider] apixaban  5 mg Oral BID    atorvastatin  20 mg Oral Nightly    fluticasone  1 spray Each Nostril Daily    furosemide  20 mg Oral Daily    gabapentin  100 mg Oral BID    melatonin  3 mg Oral Nightly    metoprolol succinate  25 mg Oral BID    pantoprazole  40 mg Oral Q12H    potassium chloride  10 mEq Oral Daily    sucralfate  1 g Oral 4x Daily AC & HS    sodium chloride flush  5-40 mL IntraVENous 2 times per day    ipratropium-albuterol  1 ampule Inhalation Q4H WA    doxycycline hyclate  100 mg Oral 2 times per day    budesonide  0.25 mg Nebulization BID    And    arformoterol tartrate  15 mcg Nebulization BID     PRN Meds: sodium chloride, guaiFENesin-dextromethorphan, albuterol, sodium chloride flush, sodium chloride, ondansetron **OR** ondansetron, magnesium hydroxide, acetaminophen **OR** acetaminophen      Intake/Output Summary (Last 24 hours) at 3/1/2023 1406  Last data filed at 3/1/2023 1338  Gross per 24 hour   Intake 723.33 ml   Output --   Net 723.33 ml       Exam:    BP (!) 140/63   Pulse 98   Temp 96.9 °F (36.1 °C) (Temporal)   Resp 18   Ht 5' 6\" (1.676 m)   Wt 165 lb (74.8 kg)   SpO2 99%   BMI 26.63 kg/m²     General appearance:  No apparent distress, appears stated age. HEENT:  Normal cephalic,   Neck: Supple, with full range of motion. . Trachea midline. Respiratory:  Normal respiratory effort. RHONCHI NOTED LLE FIELD,   Cardiovascular:  RRR  Abdomen: Soft, non-tender, non-distended with normal bowel sounds. Musculoskeletal:  No clubbing, cyanosis or edema bilaterally. Skin: smooth and dry   Neurologic:   Cranial nerves: II-XII intact,   Psychiatric:  Alert and oriented x 3               Labs:   Recent Labs     02/27/23  1040 03/01/23  0251 03/01/23  1030   WBC 3.6* 1.6*  --    HGB 7.2* 6.0* 7.2*   HCT 22.5* 18.4* 21.7*   PLT 21* 35*  --      Recent Labs     02/27/23  1040 02/28/23  0537 03/01/23  0251    141 134   K 4.0 4.0 4.0    103 98   CO2 28 29 26   BUN 19 17 17   CREATININE 0.9 0.9 0.9   CALCIUM 9.1 8.6 8.7     Recent Labs     02/27/23  1040   AST 28   ALT 27   BILITOT 0.7   ALKPHOS 136*     No results for input(s): INR in the last 72 hours. No results for input(s): Tresa Height in the last 72 hours. Recent Labs     02/27/23  1040   AST 28   ALT 27   BILITOT 0.7   ALKPHOS 136*     No results for input(s): LACTA in the last 72 hours. No results found for: Emilee Mater  No results found for: AMMONIA    Assessment:    Active Hospital Problems    Diagnosis Date Noted    CAP (community acquired pneumonia) due to Chlamydia species [J16.0] 03/01/2023     Priority: Medium    Acute exacerbation of chronic obstructive pulmonary disease (COPD) ([facility] [address] [J44.1] 02/27/2023     Priority: Medium   H/O PE    HTN   NSCLC LEFT LUNG           PLAN:  ELIQUIS   PULM  PROCAL    ROCEPHIN   DOXY   ROBITUSSIN     DVT Prophylaxis: ELIQUIS  Diet: ADULT DIET;  Regular  Code Status: Full Code     PT/OT Eval Status:  ORDERED     Dispo - HOME       Electronically signed by Rosie Hill DO on 3/1/2023 at 2:06 PM Gabriel Montez

## 2023-03-01 NOTE — PLAN OF CARE
Problem: Discharge Planning  Goal: Discharge to home or other facility with appropriate resources  Outcome: Progressing     Problem: Safety - Adult  Goal: Free from fall injury  3/1/2023 0029 by Brianna Lynch RN  Outcome: Progressing  2/28/2023 1833 by Adrienne Lombardo RN  Outcome: Progressing     Problem: Pain  Goal: Verbalizes/displays adequate comfort level or baseline comfort level  3/1/2023 0029 by Brianna Lynch RN  Outcome: Progressing  2/28/2023 1833 by Adrienne Lombardo RN  Outcome: Progressing

## 2023-03-01 NOTE — PROGRESS NOTES
Date: 3/1/2023       Patient Name: Eliot Torres  : 1950      MRN: [id number]    PT held due to hgb 6.0.   Will follow up as appropriate    Barney Rey PT, DPT  [id number]

## 2023-03-01 NOTE — PROGRESS NOTES
Notified representative from [employer] to come out to interrogate pt's pacemaker. Teliks placed page out to one of representatives.

## 2023-03-01 NOTE — PROGRESS NOTES
[facility] Pulmonary Consultation  [facility]    Admit Date: 2023                            PCP: Shelly Anthony MD  Principal Problem:    Acute exacerbation of chronic obstructive pulmonary disease (COPD) ([address]  Resolved Problems:    * No resolved hospital problems. *      Subjective:  Resting on 4 L nasal cannula, oxygen saturation 99%  States his breathing is better, still coughing; no wheeze  Pacemaker is being interrogated at the bedside    Medications:   sodium chloride      sodium chloride          magnesium sulfate  2,000 mg IntraVENous Once    methylPREDNISolone  40 mg IntraVENous Q8H    cefepime  2,000 mg IntraVENous q8h    diphenhydrAMINE  25 mg Oral Once    [Held by provider] apixaban  5 mg Oral BID    atorvastatin  20 mg Oral Nightly    fluticasone  1 spray Each Nostril Daily    furosemide  20 mg Oral Daily    gabapentin  100 mg Oral BID    melatonin  3 mg Oral Nightly    metoprolol succinate  25 mg Oral BID    pantoprazole  40 mg Oral Q12H    potassium chloride  10 mEq Oral Daily    sucralfate  1 g Oral 4x Daily AC & HS    sodium chloride flush  5-40 mL IntraVENous 2 times per day    ipratropium-albuterol  1 ampule Inhalation Q4H WA    doxycycline hyclate  100 mg Oral 2 times per day    budesonide  0.25 mg Nebulization BID    And    arformoterol tartrate  15 mcg Nebulization BID       Vitals:  VITALS:  BP (!) 140/63   Pulse 98   Temp 96.9 °F (36.1 °C) (Temporal)   Resp 18   Ht 5' 6\" (1.676 m)   Wt 165 lb (74.8 kg)   SpO2 99%   BMI 26.63 kg/m²   24HR INTAKE/OUTPUT:    Intake/Output Summary (Last 24 hours) at 3/1/2023 0931  Last data filed at 3/1/2023 0754  Gross per 24 hour   Intake 303.33 ml   Output --   Net 303.33 ml     CURRENT PULSE OXIMETRY:  SpO2: 99 %  24HR PULSE OXIMETRY RANGE:  SpO2  Av.1 %  Min: 90 %  Max: 100 %  CVP:    VENT SETTINGS:      Additional Respiratory Assessments  Heart Rate: 98  Resp: 18  SpO2: 99 %      EXAM:  General: No distress. Alert.   On 4 L nasal cannula. Neck: Trachea midline. Resp: No accessory muscle use. No crackles. wheezing. No rhonchi. Diminished especially left  CV: Regular rate. Regular rhythm. No mumur or rub. Pacer  ABD: Non-tender. Non-distended. No masses. No organmegaly. Normal bowel sounds. Obese  Skin: Warm and dry. Lymph: No cervical LAD. No supraclavicular LAD. Ext:  Trace leg edema  Neuro: Aox4    I/O: No intake/output data recorded. I/O this shift:  In: 303.3 [Blood:303.3]  Out: -      Results:  CBC:   Recent Labs     02/27/23  1040 03/01/23  0251   WBC 3.6* 1.6*   HGB 7.2* 6.0*   HCT 22.5* 18.4*   MCV 97.8 100.0*   PLT 21* 35*     BMP:   Recent Labs     02/27/23  1040 02/28/23  0537 03/01/23  0251    141 134   K 4.0 4.0 4.0    103 98   CO2 28 29 26   BUN 19 17 17   CREATININE 0.9 0.9 0.9     LFT:   Recent Labs     02/27/23  1040   ALKPHOS 136*   ALT 27   AST 28   PROT 6.2*   BILITOT 0.7   LABALBU 3.8     PT/INR: No results for input(s): PROTIME, INR in the last 72 hours. Cultures:  No results for input(s): CULTRESP in the last 72 hours. ABG:   No results for input(s): PH, PO2, PCO2, HCO3, BE, O2SAT in the last 72 hours. Films:  XR ABDOMEN (KUB) (SINGLE AP VIEW)    Result Date: 2/27/2023  EXAMINATION: ONE SUPINE XRAY VIEW(S) OF THE ABDOMEN 2/27/2023 7:15 pm COMPARISON: None. HISTORY: ORDERING SYSTEM PROVIDED HISTORY: Diarrhea and abdominal pain. TECHNOLOGIST PROVIDED HISTORY: Reason for exam:->Diarrhea and abdominal pain. What reading provider will be dictating this exam?->CRC FINDINGS: Nonspecific bowel gas pattern without evidence of obstruction. No abnormal calcifications. No acute osseous abnormality. Contrast identified within the urinary bladder. Probable prostatic calcifications. Left lower lobe opacity/pleural effusion. No evidence of bowel obstruction.      XR CHEST PORTABLE    Result Date: 2/27/2023  EXAMINATION: ONE XRAY VIEW OF THE CHEST 2/27/2023 10:10 am COMPARISON: Chest CT 23 January 2023 HISTORY: ORDERING SYSTEM PROVIDED HISTORY: sob, ?pneumonia TECHNOLOGIST PROVIDED HISTORY: Reason for exam:->sob, ?pneumonia What reading provider will be dictating this exam?->CRC FINDINGS: Stable right chest port. Unchanged scarring and or persistent atelectasis in the left mid lung. Obstructive airways disease as before. Normal heart and pulmonary vascularity. A left pleural effusion has substantially resolved. Persistent scarring and or atelectasis on the left. Obstructive airways disease. Substantial resolution of left pleural effusion. CTA PULMONARY W CONTRAST    Result Date: 2/27/2023  EXAMINATION: CTA OF THE CHEST 2/27/2023 2:15 pm TECHNIQUE: CTA of the chest was performed after the administration of intravenous contrast.  Multiplanar reformatted images are provided for review. MIP images are provided for review. Automated exposure control, iterative reconstruction, and/or weight based adjustment of the mA/kV was utilized to reduce the radiation dose to as low as reasonably achievable. COMPARISON: Reviewed studies from September 17, 2021 through January 23, 2023. 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 There is no acute pulmonary embolus in the main PA, right and left main PAs, in the lobar, segmental and subsegmental branches. The despite the malignant case mint in the bifurcation of the distal left main pulmonary artery towards left lower lobe and lingular segments there is no indication for acute pulmonary embolus in the left lung pulmonary artery circulation.   There is no indication for pulmonary embolus in the right lung pulmonary artery circulation down to the 3/4 branch level in both sides. There is no aneurysm formation or dissection of the thoracic aorta. Despite the malignant infiltration from the left hilar region towards the lateral aspect of the mediastinum around the left hilar region there is no isolated the mediastinal masses or adenopathy. Upper abdominal structures not fully covered. Presence 11 mm gallstone in the dependent portion of the gallbladder. The adrenals no large size stable finding back to January 3rd 1019 study. There is no aneurysm formation or dissection of the thoracic aorta. Calcified atheromatous changes are seen in the arch of the aorta and in the descending thoracic aorta. Heart has normal size. RV inner diameter 3.4 cm. LV inner diameter 4.5 cm. Patient has cardiac monitor device in the apex region of the right ventricle projection. Emphysematous changes in advanced degree with centri lobular emphysema are seen in the upper lobes. The right and left-sided pleural effusion have been most reabsorbed with pleural residual changes in right posterior costophrenic sulcus of the Multi segmental atelectasis and loss of volume of the left lung relate with the hilar mass. However there patchy parenchymal passes in the more peripheral aspect of the left lower lobe towards the anterolateral basal segment which can be manifestation of superimposed pneumonia as these were not seen previous study of December 28, 2022. Follow-up study following a clinical treatment trial for pneumonia can be considered. Visualized bone structures demonstrate chronic findings as observed previously. 1.  No indication for acute pulmonary emboli. 2.  No aneurysm formation or dissection of the thoracic aorta. 3.  Emphysematous changes lung parenchyma particular affecting the upper lobes in moderate advanced degree.  4.  Malignant infiltrating lesion in the left infrahilar region towards the root of the lingula/left lower, as previously described. 5.  Chronic areas of loss of volume in lingula and in the left lower due the infiltration of the left hilar region by malignancies. 6.  Resolved bilateral pleural effusions since more recent examinations. 7.  Development of small multifocal patchy areas of consolidation in the left lower lobe distal to the more proximal in case mint of the bronchovascular structures of the left lower lobe can represent developing acute pneumonia. Follow-up study following clinically treatment trial recommended in several days time interval.     FL MODIFIED BARIUM SWALLOW W VIDEO    Result Date: 3/1/2023  EXAMINATION: MODIFIED BARIUM SWALLOW WAS PERFORMED IN CONJUNCTION WITH SPEECH PATHOLOGY SERVICES TECHNIQUE: Under fluoroscopic evaluation cineradiography/videoradiography recordings were performed in conjunction with the speech-language pathologist (SLP). Various liquid, solid and/or semi-solid barium preparations were used to assess swallowing function. FLUOROSCOPY DOSE AND TYPE: Radiation Exposure Index: Images: 8 series of cine fluoroscopic loops Fluoroscopic time: 1 minute Total dose: 8 mGy COMPARISON: None HISTORY: ORDERING SYSTEM PROVIDED HISTORY: aspiration rule out TECHNOLOGIST PROVIDED HISTORY: Reason for exam:->aspiration rule out What reading provider will be dictating this exam?->CRC FINDINGS: Satisfactory oral and pharyngeal phases of swallowing mechanism. No significant barium residuals and no barium aspiration. Satisfactory swallowing mechanism. No barium aspiration. Please see separate speech pathology report for full discussion of findings and recommendations. 08/19/22 ECHO:  Normal left ventricular chamber size. Normal left ventricular systolic function. Visually estimated LVEF is 55-60 %. No wall motion abnormalities. Normal diastolic function. Normal left atrial pressure. Normal right ventricle structure and function.    Normal left atrial size. Normal right atrial size  Likely normal estimated PA pressure. Pacer/ ICD wire present in the right heart. No gross evidence of infective endocarditis. Consider SHIRA for better accuracy if clinically indicated. Compared to prior echo from 2021, no significant changes noted. SHIRA on 8/22/22 No SHIRA indication of Endocarditis. Normal left ventricular chamber size and systolic function. Interatrial septum appears intact. Normal right ventricle size and function. Pacemaker leads noted in right atrium and right ventricle. Normal aortic root size. Moderate atherosclerosis in the descending thoracic aorta. Epicardial fat vs. small pericardial effusion. No intra cardiac mass or thrombus. Technically sub-optimal images. Compared to prior sub-optimal TTE from 8/19/22.        12/28 showing left lower lobe pneumonia  1/23 showing effusion and and progressive volume loss  2/27 shows effusions are improved with still the masslike infiltrate at the left side      Assessment/plan:  66 y/o  male known to [facility] with past medical history noted for COPD on trelegy and prednisone 5mg, on 4 L nasal cannula at home, left hilar lymphadenopathy, squamous cell carcinoma diagnosed by EBUS 5/27/21, COVID 5/2022, pulmonary embolism (on eliquis), pacemaker placement (7/2021) presented to [facility] 2/27/23 with weakness, cough, shortness of breath, fatigue x 1 week. In ER, hemoglobin 7.2. Influenza A and B, COVID-negative. CTA showing left lower lobe volume loss. Platelets 21 8/61 pulmonary consulted. on 4 L nasal cannula. Passed MBS  3/1 on 4 L nasal cannula     Recent admission:  1/18-1/26/23 [facility]. Anemia. Chf     Former smoker 1.5 pack/day for 30 years. No alcohol or drug use. Worked in customer service.  with no children. From [place].     Pneumonia gram-positive cocci and gram-negative rods in sputum  Likely postobstructive  Procalcitonin 1.49  Continue cefepime day 2 and doxycycline day 2  Encourage coughing, deep breathing, activity, flutter valve  COPD exacerbation  On prednisone 5mg at home and Trelegy  On brovana, budesonide, DuoNebs, Solu-Medrol 40 mg every 8 hours  Wean Solu-Medrol to every 12 hours  Multi-Factorial Anemia   Stage IIIB T2aN3 Squamous Cell Carcinoma of Lung  diagnosis 5/27/2021, PD-L1 0%   ( 7/6/21-9/7/21 treatment with concurrent chemotherapy/radiation therapy; 11/2/21 consolidated with Imfinzi (Durvalumab) per Oncology IV every 4 weeks; 6/30/22 PET showing progression and was started on Carbo, Gemzar and Keytruda on 6/30/22; 8/9/22 received C2D8 of Carboplatin AUC 2 with Gemzar 1000 mg/m2 IV on D1,8 and Keytruda IV on D1 on a 21 day cycle)  9/2/2022 shows new 9 mm spiculated nodule RLL  May benefit from future radiation for narrowing of the site   h/o wide-complex tachycardia with pacer out 8/25/2022;  leadless pacer implantation 8/29/2022  Chronic hypoxic resp failure   On 4lnc  Probable Obstructive Sleep Apnea  Persistent pancytopenia secondary to chemotherapy  H/O RLL subsegmental PE  Eliquis is on hold with anemia  Radiation pneumonitis/fibrosis left lung     Previous admission   8/16 - 9/3/2022@ [facility] with weakness, cough, shortness of breath, fatigue, fever found to have high-grade Enterococcus faecalis bacteremia/septicemia, bacteremia cultures + on 8/16, 8/17, 8/20 s/p mediport removal and placement of new pacemaker. Patient was discharged with ampicillin and ceftriaxone until 10/13/2022  12/26 - 12/30/2022 at [facility] with acute blood loss anemia. Eliquis was held. FOBT positive was felt to be due to thrombocytopenia underlying gastritis. Empirically treated with PPI and Carafate  1/18 - 1/29/2023 at [facility] with anemia of 6.9 platelets of 14. He was transfused. CT chest showing increased left effusion left infrahilar mass with narrowing of left bronchus due to mass with postobstructive pneumonitis.   1/23 ultrasound lower extremities no DVT    Electronically signed by LELO Bradley CNP on 3/1/2023 at 9:31 AM    Attending Attestation Note:    Patient seen and examined with Hospital Staff, NP. I have extensively reviewed the chart lab work and imaging. I agree with above. Modifications and amendment of note made as necessary.     In addition, the following apply:    Current Facility-Administered Medications   Medication Dose Route Frequency Provider Last Rate Last Admin    0.9 % sodium chloride infusion   IntraVENous PRN April LELO Desouza CNP        methylPREDNISolone sodium (SOLU-MEDROL) injection 40 mg  40 mg IntraVENous Q12H LELO Bradley CNP   40 mg at 03/01/23 1354    guaiFENesin-dextromethorphan (ROBITUSSIN DM) 100-10 MG/5ML syrup 5 mL  5 mL Oral Q4H PRN Veneda Bench, DO   5 mL at 03/01/23 1357    cefepime (MAXIPIME) 2,000 mg in sodium chloride 0.9 % 50 mL IVPB ([id number])  2,000 mg IntraVENous q8h Jax Dunaway MD   Stopped at 03/01/23 8983    diphenhydrAMINE (BENADRYL) tablet 25 mg  25 mg Oral Once April LELO Desouza CNP        albuterol (PROVENTIL) nebulizer solution 2.5 mg  2.5 mg Nebulization 4x Daily PRN LELO Cade CNP        [Held by provider] apixaban (ELIQUIS) tablet 5 mg  5 mg Oral BID LELO Cade CNP        atorvastatin (LIPITOR) tablet 20 mg  20 mg Oral Nightly LELO Cade - CNP   20 mg at 02/28/23 2044    fluticasone (FLONASE) 50 MCG/ACT nasal spray 1 spray  1 spray Each Nostril Daily LELO Cade CNP        furosemide (LASIX) tablet 20 mg  20 mg Oral Daily LELO Cade - CNP   20 mg at 03/01/23 3819    gabapentin (NEURONTIN) capsule 100 mg  100 mg Oral BID LELO Cade - CNP   100 mg at 03/01/23 8373    melatonin tablet 3 mg  3 mg Oral Nightly LELO Cade CNP   3 mg at 02/28/23 2044    metoprolol succinate (TOPROL XL) extended release tablet 25 mg  25 mg Oral BID LELO Cade CNP   25 mg at 02/28/23 2044 pantoprazole (PROTONIX) tablet 40 mg  40 mg Oral Q12H Danuta Nettles, APRN - CNP   40 mg at 03/01/23 3508    potassium chloride (KLOR-CON M) extended release tablet 10 mEq  10 mEq Oral Daily Danuta Nettles, APRN - CNP   10 mEq at 03/01/23 0832    sucralfate (CARAFATE) tablet 1 g  1 g Oral 4x Daily AC & HS Danuta Nettles, APRN - CNP   1 g at 03/01/23 1103    sodium chloride flush 0.9 % injection 5-40 mL  5-40 mL IntraVENous 2 times per day Danuta Nettles, APRN - CNP   10 mL at 02/28/23 2122    sodium chloride flush 0.9 % injection 10 mL  10 mL IntraVENous PRN Danuta Nettles, APRN - CNP   10 mL at 03/01/23 0833    0.9 % sodium chloride infusion   IntraVENous PRN Danuta Nettles, APRN - CNP        ondansetron (ZOFRAN-ODT) disintegrating tablet 4 mg  4 mg Oral Q8H PRN Danuta Nettles, APRN - CNP        Or    ondansetron (ZOFRAN) injection 4 mg  4 mg IntraVENous Q6H PRN Danuta Nettles, APRN - CNP        magnesium hydroxide (MILK OF MAGNESIA) 400 MG/5ML suspension 30 mL  30 mL Oral Daily PRN Danuta Nettles, APRN - CNP        acetaminophen (TYLENOL) tablet 650 mg  650 mg Oral Q6H PRN Danuta Nettles, APRN - CNP        Or    acetaminophen (TYLENOL) suppository 650 mg  650 mg Rectal Q6H PRN Danuta Nettles, APRN - CNP        ipratropium-albuterol (DUONEB) nebulizer solution 1 ampule  1 ampule Inhalation Q4H WA Danuta Nettles, APRN - CNP   1 ampule at 03/01/23 1214    doxycycline hyclate (VIBRAMYCIN) capsule 100 mg  100 mg Oral 2 times per day Danuta Nettles, APRN - CNP   100 mg at 03/01/23 0832    budesonide (PULMICORT) nebulizer suspension 250 mcg  0.25 mg Nebulization BID Danuta Nettles, APRN - CNP   250 mcg at 03/01/23 0745    And    arformoterol tartrate (BROVANA) nebulizer solution 15 mcg  15 mcg Nebulization BID LELO Bear - CNP   15 mcg at 03/01/23 0745      - O2, IS  - bronchopulmonary hygiene to continue for patient   - Doxycycline, Cefepime  - no role for thoracentesis at this time  - defer bronchoscopy   - supportive care to continue     Lux Arreaag MD  3/1/2023  4:15 PM

## 2023-03-01 NOTE — PROGRESS NOTES
Notified April misty munguia pt was having irregular ventricular rhythm so performed EKG which was undetermined and sent pts labs down pt was asymptomatic. Orders placed to have pacemaker interrogated at this time.

## 2023-03-01 NOTE — PROGRESS NOTES
Notified Carissa LIND that was unsuccessful with pacemaker interrogation and pts hemoglobin came back at 6 this am

## 2023-03-01 NOTE — CARE COORDINATION
Patient presented to [facility] with c/o increasing SOB and fatigue. Has h/o lung ca and receives chemotherapy at [facility]. Has pacemaker which is being interrogated. Pulmonology consulted. Pulse ox 99%on 4 L of oxygen. , as at home. PT [facility] [address] 16/24  ot [facility] [address] 17/24. Presently on IV Solumedrol;  IV Maxipime and oral Vibramycin. HGB 6.0 and transfuse 1unit PRBC and Hgb now 7.2. EPS andPulmonary consulted. Met wit patient at bedside to discuss transition of care, Patient lives wit spouse in a 1 story home. Patient was independent PTA. Home O2 at 4 L.from [facility] along with a nebulizer. He has a walker and cane but does not use. Patient's PCP is Ana Taylor. He uses [facility] [employer] [facility] [place]. He Goes to [employer] or therapy ad would like to continue at discharge. His spouse will transport when medically cleared. CM/SW will continue to follow  Case Management Assessment  Initial Evaluation    Date/Time of Evaluation: 3/1/2023 2:20 PM  Assessment Completed by: Tiana Howard RN    If patient is discharged prior to next notation, then this note serves as note for discharge by case management. Patient Name: Herber Montez                   YOB: 1950  Diagnosis: Thrombocytopenia (HCC) [D69.6]  Acute exacerbation of chronic obstructive pulmonary disease (COPD) (HCC) [J44.1]  Acute bronchospasm [J98.01]  Pneumonia of lower lobe due to infectious organism, unspecified laterality [J18.9]  Anemia, unspecified type [D64.9]  CAP (community acquired pneumonia) due to Chlamydia species [J16.0]                   Date / Time: 2/27/2023 10:05 AM    Patient Admission Status: Inpatient   Readmission Risk (Low < 19, Mod (19-27), High > 27): Readmission Risk Score: 26.2    Current PCP: Neisha Neal MD  PCP verified by CM?  Yes    Chart Reviewed: yes      History Provided by: Patient  Patient Orientation: Alert and Oriented, Person, Place, Situation, Self    Patient Cognition: Alert    Hospitalization in the last 30 days (Readmission):  No    If yes, Readmission Assessment in  Navigator will be completed. Advance Directives:      Code Status: Full Code   Patient's Primary Decision Maker is:        Discharge Planning:    Patient lives with: Spouse/Significant Other Type of Home: House  Primary Care Giver: Spouse  Patient Support Systems include: Spouse/Significant Other   Current Financial resources:    Current community resources:    Current services prior to admission: None            Current DME:              Type of Home Care services:  None    ADLS  Prior functional level: Housework, Cooking, Shopping, Assistance with the following: (wife does)  Current functional level: Assistance with the following:, Cooking, [address], Shopping (wife does)    PT AM-PAC: 16 /24  OT AM-PAC: 16 /24    Family can provide assistance at DC: Yes  Would you like Case Management to discuss the discharge plan with any other family members/significant others, and if so, who?  No  Plans to Return to Present Housing: Yes  Other Identified Issues/Barriers to RETURNING to current housing: yes    Potential Assistance needed at discharge: 1 Cristal Mckeon, Outpatient PT/OT            Potential DME:    Patient expects to discharge to: [address] [place] [address] for transportation at discharge:      Financial    Payor: Angelina Fees / Plan: MEDICARE PART A AND B / Product Type: *No Product type* /     Does insurance require precert for SNF: No    Potential assistance Purchasing Medications: No  Meds-to-Beds request: Yes      [id number] [place] - [address] [address]  Phone: [phone] Fax: [phone]    [facility] [address] [phone] Shania Mushtaqs [address] [facility] [address]  Phone: [phone] Fax: [phone] [facility] [address] [facility]  [phone] [address] [place] [address] ROBERTA Oconnell [address]  Phone: [phone] Fax: [phone]      Notes:    Factors facilitating achievement of predicted outcomes: Family support, Motivated, Cooperative, and Pleasant    Barriers to discharge: none      Additional Case Management Notes:   Patient wants to return home with outpatient therapy O2 fomHCS. The Plan for Transition of Care is related to the following treatment goals of Thrombocytopenia (HCC) [D69.6]  Acute exacerbation of chronic obstructive pulmonary disease (COPD) ([facility] [address] [J44.1]  Acute bronchospasm [J98.01]  Pneumonia of lower lobe due to infectious organism, unspecified laterality [J18.9]  Anemia, unspecified type [D64.9]  CAP (community acquired pneumonia) due to Chlamydia species [O99.7]    IF APPLICABLE: The Patient and/or patient representative Madiha Horvath and his family were provided with a choice of provider and agrees with the discharge plan. Freedom of choice list with basic dialogue that supports the patient's individualized plan of care/goals and shares the quality data associated with the providers was provided to:     Patient Representative Name:       The Patient and/or Patient Representative Agree with the Discharge Plan?       Sukhjinder Pacheco RN  Case Management Department  Ph: [phone] Fax: [phone]

## 2023-03-01 NOTE — PROGRESS NOTES
Comprehensive Nutrition Assessment    Type and Reason for Visit:  Initial, Positive Nutrition Screen    Nutrition Recommendations/Plan:   Continue Diet. Will Continue Current ONS and monitor. Malnutrition Assessment:  Malnutrition Status:  Insufficient data (03/01/23 1100)    Context:  Chronic Illness     Findings of the 6 clinical characteristics of malnutrition:  Energy Intake:  75% or less estimated energy requirements for 1 month or longer  Weight Loss:  Unable to assess (2/2 poor EMR wt hx since adm currently)     Body Fat Loss:  Unable to assess     Muscle Mass Loss:  Unable to assess    Fluid Accumulation:  No significant fluid accumulation     Strength:  Not Performed    Nutrition Assessment:    Pt adm w/ SOB/weakness/fatigue x ~1wk pta. PMHx COPD, Lung CA (21') on Chemo/XRT. Adm w/ CAP and COPDE. At nutritional risk d/t poor intake and subjective wt loss d/t poor appetite w/ altered GI and increased needs 2/2 Lung CA w/ Chemo/XRT and COPD. Will Continue Current ONS and monitor. Nutrition Related Findings:    A&O, dentition WNL, Abd/BS WDL, Trace edema, I/O's WNL Wound Type: None       Current Nutrition Intake & Therapies:    Average Meal Intake: 26-50%  Average Supplements Intake: Unable to assess  ADULT DIET; Regular  ADULT ORAL NUTRITION SUPPLEMENT; Breakfast, Dinner, Lunch; Standard High Calorie/High Protein Oral Supplement    Anthropometric Measures:  Height: 5' 6\" (167.6 cm)  Ideal Body Weight (IBW): 142 lbs (65 kg)    Admission Body Weight: 165 lb (74.8 kg) (unknown method 2/28)  Current Body Weight: 165 lb (74.8 kg) (unknown method 2/28),   IBW.  Weight Source: Not Specified  Current BMI (kg/m2): 26.6  Usual Body Weight: 177 lb (80.3 kg) (stand scale 10/9/22 per EMR; noted possible wt loss pta however REBECCA properly at this time d/t no actual wt's since adm as well as multiple wt fluctuations of ~170's-200's x past ~1yr pta per EMR wt hx review)  % Weight Change (Calculated): -6.8  Weight Adjustment For: No Adjustment                 BMI Categories: Overweight (BMI 25.0-29. 9)    Estimated Daily Nutrient Needs:  Energy Requirements Based On: Formula  Weight Used for Energy Requirements: Current  Energy (kcal/day): MSJx1.4SF per CBW=   Weight Used for Protein Requirements: Ideal  Protein (g/day): 1.5-1.8gm/kg IBW=   Method Used for Fluid Requirements: 1 ml/kcal  Fluid (ml/day):     Nutrition Diagnosis:   Inadequate oral intake related to catabolic illness (2/2 Lung CA on Chemo/XRT) as evidenced by intake 26-50%, poor intake prior to admission, GI abnormality, nausea, diarrhea    Nutrition Interventions:   Food and/or Nutrient Delivery: Continue Current Diet, Continue Oral Nutrition Supplement (Continue Diet.  Will Continue Current ONS and monitor.)  Nutrition Education/Counseling: Education not indicated  Coordination of Nutrition Care: Continue to monitor while inpatient       Goals:     Goals: PO intake 75% or greater, by next RD assessment       Nutrition Monitoring and Evaluation:   Behavioral-Environmental Outcomes: None Identified  Food/Nutrient Intake Outcomes: Food and Nutrient Intake, Supplement Intake  Physical Signs/Symptoms Outcomes: Biochemical Data, Diarrhea, GI Status, Nausea or Vomiting, Fluid Status or Edema, Nutrition Focused Physical Findings, Weight, Skin    Discharge Planning:    Continue current diet, Continue Oral Nutrition Supplement     Enma Coello RD, LD  Contact: ext [phone]

## 2023-03-01 NOTE — ACP (ADVANCE CARE PLANNING)
Advance Care Planning   Healthcare Decision Maker:    Primary Decision Maker: Becka  - [facility] - [phone]     Per conversation with patient at the bedside

## 2023-03-01 NOTE — PROGRESS NOTES
Notified Dr. Dnenis Dears about pending medtronic visit and that pt is requesting Dr. Any Bloom be consulted and that his mag is 1.2. via voicemail.

## 2023-03-01 NOTE — PROGRESS NOTES
Chantell Uriostegui  and sue Solares from 6 se down to interrogate pacemaker and unsuccessful no signal will continue to monitor pt at this time.

## 2023-03-02 NOTE — PROGRESS NOTES
[facility] Pulmonary Consultation  [facility]    Admit Date: 2023                            PCP: Holden Hamilton MD  Principal Problem:    Acute exacerbation of chronic obstructive pulmonary disease (COPD) ([address]  Active Problems:    CAP (community acquired pneumonia) due to Chlamydia species  Resolved Problems:    * No resolved hospital problems. *      Subjective:  Resting on 3 L nasal cannula, oxygen saturation 98%  States his breathing is better, still coughing, congestion; no wheeze    Medications:   sodium chloride      sodium chloride          methylPREDNISolone  40 mg IntraVENous Q12H    cefepime  2,000 mg IntraVENous q8h    diphenhydrAMINE  25 mg Oral Once    [Held by provider] apixaban  5 mg Oral BID    atorvastatin  20 mg Oral Nightly    fluticasone  1 spray Each Nostril Daily    furosemide  20 mg Oral Daily    gabapentin  100 mg Oral BID    melatonin  3 mg Oral Nightly    metoprolol succinate  25 mg Oral BID    pantoprazole  40 mg Oral Q12H    potassium chloride  10 mEq Oral Daily    sucralfate  1 g Oral 4x Daily AC & HS    sodium chloride flush  5-40 mL IntraVENous 2 times per day    ipratropium-albuterol  1 ampule Inhalation Q4H WA    doxycycline hyclate  100 mg Oral 2 times per day    budesonide  0.25 mg Nebulization BID    And    arformoterol tartrate  15 mcg Nebulization BID       Vitals:  VITALS:  /60   Pulse 51   Temp (!) 96.6 °F (35.9 °C) (Temporal)   Resp 18   Ht 5' 6\" (1.676 m)   Wt 165 lb (74.8 kg)   SpO2 98%   BMI 26.63 kg/m²   24HR INTAKE/OUTPUT:    Intake/Output Summary (Last 24 hours) at 3/2/2023 1008  Last data filed at 3/2/2023 0459  Gross per 24 hour   Intake 840 ml   Output --   Net 840 ml       CURRENT PULSE OXIMETRY:  SpO2: 98 %  24HR PULSE OXIMETRY RANGE:  SpO2  Av.3 %  Min: 98 %  Max: 99 %  CVP:    VENT SETTINGS:      Additional Respiratory Assessments  Heart Rate: 51  Resp: 18  SpO2: 98 %      EXAM:  General: No distress. Alert. On 3 L nasal cannula.   Neck: Trachea midline.   Resp: No accessory muscle use. No crackles. wheezing. No rhonchi.  Diminished especially left  CV: Regular rate. Regular rhythm. No mumur or rub.  Pacer  ABD: Non-tender. Non-distended. No masses. No organmegaly. Normal bowel sounds.  Obese  Skin: Warm and dry.   Lymph: No cervical LAD. No supraclavicular LAD.   Ext:  Trace leg edema  Neuro: Aox4    I/O: I/O last 3 completed shifts:  In: 1023.3 [P.O.:660; I.V.:50; Blood:303.3; Other:10]  Out: -   I/O this shift:  In: 120 [P.O.:120]  Out: -      Results:  CBC:   Recent Labs     02/27/23  1040 03/01/23  0251 03/01/23  1030 03/02/23  0625   WBC 3.6* 1.6*  --  2.8*   HGB 7.2* 6.0* 7.2* 7.4*   HCT 22.5* 18.4* 21.7* 22.6*   MCV 97.8 100.0*  --  95.0   PLT 21* 35*  --  50*       BMP:   Recent Labs     02/28/23  0537 03/01/23  0251 03/02/23  0625    134 136   K 4.0 4.0 4.8    98 98   CO2 29 26 27   BUN 17 17 24*   CREATININE 0.9 0.9 0.9       LFT:   Recent Labs     02/27/23  1040   ALKPHOS 136*   ALT 27   AST 28   PROT 6.2*   BILITOT 0.7   LABALBU 3.8       PT/INR: No results for input(s): PROTIME, INR in the last 72 hours.  Cultures:  No results for input(s): CULTRESP in the last 72 hours.    ABG:   No results for input(s): PH, PO2, PCO2, HCO3, BE, O2SAT in the last 72 hours.    Films:  XR ABDOMEN (KUB) (SINGLE AP VIEW)    Result Date: 2/27/2023  EXAMINATION: ONE SUPINE XRAY VIEW(S) OF THE ABDOMEN 2/27/2023 7:15 pm COMPARISON: None. HISTORY: ORDERING SYSTEM PROVIDED HISTORY: Diarrhea and abdominal pain. TECHNOLOGIST PROVIDED HISTORY: Reason for exam:->Diarrhea and abdominal pain. What reading provider will be dictating this exam?->CRC FINDINGS: Nonspecific bowel gas pattern without evidence of obstruction. No abnormal calcifications. No acute osseous abnormality.  Contrast identified within the urinary bladder.  Probable prostatic calcifications.  Left lower lobe opacity/pleural effusion.     No evidence of bowel obstruction.     XR CHEST  PORTABLE    Result Date: 2/27/2023  EXAMINATION: ONE XRAY VIEW OF THE CHEST 2/27/2023 10:10 am COMPARISON: Chest CT 23 January 2023 HISTORY: ORDERING SYSTEM PROVIDED HISTORY: sob, ?pneumonia TECHNOLOGIST PROVIDED HISTORY: Reason for exam:->sob, ?pneumonia What reading provider will be dictating this exam?->CRC FINDINGS: Stable right chest port. Unchanged scarring and or persistent atelectasis in the left mid lung. Obstructive airways disease as before. Normal heart and pulmonary vascularity. A left pleural effusion has substantially resolved. Persistent scarring and or atelectasis on the left. Obstructive airways disease. Substantial resolution of left pleural effusion. CTA PULMONARY W CONTRAST    Result Date: 2/27/2023  EXAMINATION: CTA OF THE CHEST 2/27/2023 2:15 pm TECHNIQUE: CTA of the chest was performed after the administration of intravenous contrast.  Multiplanar reformatted images are provided for review. MIP images are provided for review. Automated exposure control, iterative reconstruction, and/or weight based adjustment of the mA/kV was utilized to reduce the radiation dose to as low as reasonably achievable. COMPARISON: Reviewed studies from September 17, 2021 through January 23, 2023. 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 There is no acute pulmonary embolus in the main PA, right and left main PAs, in the lobar, segmental and subsegmental branches.   The despite the malignant case mint in the bifurcation of the distal left main pulmonary artery towards left lower lobe and lingular segments there is no indication for acute pulmonary embolus in the left lung pulmonary artery circulation. There is no indication for pulmonary embolus in the right lung pulmonary artery circulation down to the 3/4 branch level in both sides. There is no aneurysm formation or dissection of the thoracic aorta. Despite the malignant infiltration from the left hilar region towards the lateral aspect of the mediastinum around the left hilar region there is no isolated the mediastinal masses or adenopathy. Upper abdominal structures not fully covered. Presence 11 mm gallstone in the dependent portion of the gallbladder. The adrenals no large size stable finding back to January 3rd 1019 study. There is no aneurysm formation or dissection of the thoracic aorta. Calcified atheromatous changes are seen in the arch of the aorta and in the descending thoracic aorta. Heart has normal size. RV inner diameter 3.4 cm. LV inner diameter 4.5 cm. Patient has cardiac monitor device in the apex region of the right ventricle projection. Emphysematous changes in advanced degree with centri lobular emphysema are seen in the upper lobes. The right and left-sided pleural effusion have been most reabsorbed with pleural residual changes in right posterior costophrenic sulcus of the Multi segmental atelectasis and loss of volume of the left lung relate with the hilar mass. However there patchy parenchymal passes in the more peripheral aspect of the left lower lobe towards the anterolateral basal segment which can be manifestation of superimposed pneumonia as these were not seen previous study of December 28, 2022. Follow-up study following a clinical treatment trial for pneumonia can be considered. Visualized bone structures demonstrate chronic findings as observed previously. 1.  No indication for acute pulmonary emboli. 2.  No aneurysm formation or dissection of the thoracic aorta.  3.  Emphysematous changes lung parenchyma particular affecting the upper lobes in moderate advanced degree. 4.  Malignant infiltrating lesion in the left infrahilar region towards the root of the lingula/left lower, as previously described. 5.  Chronic areas of loss of volume in lingula and in the left lower due the infiltration of the left hilar region by malignancies. 6.  Resolved bilateral pleural effusions since more recent examinations. 7.  Development of small multifocal patchy areas of consolidation in the left lower lobe distal to the more proximal in case mint of the bronchovascular structures of the left lower lobe can represent developing acute pneumonia. Follow-up study following clinically treatment trial recommended in several days time interval.     FL MODIFIED BARIUM SWALLOW W VIDEO    Result Date: 3/1/2023  EXAMINATION: MODIFIED BARIUM SWALLOW WAS PERFORMED IN CONJUNCTION WITH SPEECH PATHOLOGY SERVICES TECHNIQUE: Under fluoroscopic evaluation cineradiography/videoradiography recordings were performed in conjunction with the speech-language pathologist (SLP). Various liquid, solid and/or semi-solid barium preparations were used to assess swallowing function. FLUOROSCOPY DOSE AND TYPE: Radiation Exposure Index: Images: 8 series of cine fluoroscopic loops Fluoroscopic time: 1 minute Total dose: 8 mGy COMPARISON: None HISTORY: ORDERING SYSTEM PROVIDED HISTORY: aspiration rule out TECHNOLOGIST PROVIDED HISTORY: Reason for exam:->aspiration rule out What reading provider will be dictating this exam?->CRC FINDINGS: Satisfactory oral and pharyngeal phases of swallowing mechanism. No significant barium residuals and no barium aspiration. Satisfactory swallowing mechanism. No barium aspiration. Please see separate speech pathology report for full discussion of findings and recommendations. 08/19/22 ECHO:  Normal left ventricular chamber size. Normal left ventricular systolic function. Visually estimated LVEF is 55-60 %.    No wall motion abnormalities. Normal diastolic function. Normal left atrial pressure. Normal right ventricle structure and function. Normal left atrial size. Normal right atrial size  Likely normal estimated PA pressure. Pacer/ ICD wire present in the right heart. No gross evidence of infective endocarditis. Consider SHIRA for better accuracy if clinically indicated. Compared to prior echo from 2021, no significant changes noted. SHIRA on 8/22/22 No SHIRA indication of Endocarditis. Normal left ventricular chamber size and systolic function. Interatrial septum appears intact. Normal right ventricle size and function. Pacemaker leads noted in right atrium and right ventricle. Normal aortic root size. Moderate atherosclerosis in the descending thoracic aorta. Epicardial fat vs. small pericardial effusion. No intra cardiac mass or thrombus. Technically sub-optimal images. Compared to prior sub-optimal TTE from 8/19/22.        12/28 showing left lower lobe pneumonia  1/23 showing effusion and and progressive volume loss  2/27 shows effusions are improved with still the masslike infiltrate at the left side      Assessment/plan:  66 y/o  male known to [facility] with past medical history noted for COPD on trelegy and prednisone 5mg, on 4 L nasal cannula at home, left hilar lymphadenopathy, squamous cell carcinoma diagnosed by EBUS 5/27/21, COVID 5/2022, pulmonary embolism (on eliquis), pacemaker placement (7/2021) presented to [facility] 2/27/23 with weakness, cough, shortness of breath, fatigue x 1 week. In ER, hemoglobin 7.2. Influenza A and B, COVID-negative. CTA showing left lower lobe volume loss. Platelets 21  6/33 pulmonary consulted. on 4 L nasal cannula. Passed MBS  3/1 on 4 L nasal cannula  3/2 on 3lnc; hgb 7.4     Recent admission:  1/18-1/26/23 [facility]. Anemia. [facility]     Former smoker 1.5 pack/day for 30 years. No alcohol or drug use. Worked in customer service.  with no children.   From [place].    Pneumonia gram-positive cocci and gram-negative rods in sputum  Likely postobstructive  Procalcitonin 1.49  Continue cefepime day 3 and doxycycline day 3  Encourage coughing, deep breathing, activity, flutter valve  COPD exacerbation  On prednisone 5mg at home and Trelegy  On brovana, budesonide, DuoNebs, Solu-Medrol 40 mg every 12 hours  Will change to po prednisone taper   Multi-Factorial Anemia   Stage IIIB T2aN3 Squamous Cell Carcinoma of Lung  diagnosis 5/27/2021, PD-L1 0%   ( 7/6/21-9/7/21 treatment with concurrent chemotherapy/radiation therapy; 11/2/21 consolidated with Imfinzi (Durvalumab) per Oncology IV every 4 weeks; 6/30/22 PET showing progression and was started on Carbo, Gemzar and Keytruda on 6/30/22; 8/9/22 received C2D8 of Carboplatin AUC 2 with Gemzar 1000 mg/m2 IV on D1,8 and Keytruda IV on D1 on a 21 day cycle)  9/2/2022 shows new 9 mm spiculated nodule RLL  May benefit from future radiation for narrowing of the site   h/o wide-complex tachycardia with pacer out 8/25/2022;  leadless pacer implantation 8/29/2022  Chronic hypoxic resp failure   On 4lnc  Probable Obstructive Sleep Apnea  Persistent pancytopenia secondary to chemotherapy  H/O RLL subsegmental PE  Eliquis is on hold with anemia  Radiation pneumonitis/fibrosis left lung     Previous admission   8/16 - 9/3/2022@ [facility] with weakness, cough, shortness of breath, fatigue, fever found to have high-grade Enterococcus faecalis bacteremia/septicemia, bacteremia cultures + on 8/16, 8/17, 8/20 s/p mediport removal and placement of new pacemaker. Patient was discharged with ampicillin and ceftriaxone until 10/13/2022  [facility] anemia. Eliquis was held. FOBT positive was felt to be due to thrombocytopenia underlying gastritis. Empirically treated with PPI and Carafate  1/18 - 1/29/2023 at [facility] with anemia of 6.9 platelets of 14. He was transfused.   CT chest showing increased left effusion left infrahilar mass with narrowing of left bronchus due to mass with postobstructive pneumonitis. 1/23 ultrasound lower extremities no DVT    Electronically signed by LELO Baron CNP on 3/2/2023 at 10:08 AM    Attending Attestation Note:    Patient seen and examined with Hospital Staff, NP. I have extensively reviewed the chart lab work and imaging. I agree with above. Modifications and amendment of note made as necessary.     In addition, the following apply:    Current Facility-Administered Medications   Medication Dose Route Frequency Provider Last Rate Last Admin    [START ON 3/3/2023] predniSONE (DELTASONE) tablet 40 mg  40 mg Oral Daily LELO Baron CNP        Followed by    Floy Mena ON 3/6/2023] predniSONE (DELTASONE) tablet 30 mg  30 mg Oral Daily LELO Baron CNP        Followed by    Floy Mena ON 3/9/2023] predniSONE (DELTASONE) tablet 20 mg  20 mg Oral Daily LELO Baron CNP        Followed by    Floy Mena ON 3/12/2023] predniSONE (DELTASONE) tablet 10 mg  10 mg Oral Daily LELO Baron CNP        dornase alpha (PULMOZYME) nebulizer solution 2.5 mg  2.5 mg Inhalation Daily Girish Shabazz MD        0.9 % sodium chloride infusion   IntraVENous PRN April LELO Desouza CNP        guaiFENesin-dextromethorphan (ROBITUSSIN DM) 100-10 MG/5ML syrup 5 mL  5 mL Oral Q4H PRN Edgar Nevarez, DO   5 mL at 03/01/23 1759    cefepime (MAXIPIME) 2,000 mg in sodium chloride 0.9 % 50 mL IVPB ([id number])  2,000 mg IntraVENous q8h Alycia Smith  mL/hr at 03/02/23 1744 2,000 mg at 03/02/23 1744    diphenhydrAMINE (BENADRYL) tablet 25 mg  25 mg Oral Once April LELO Desouza CNP        albuterol (PROVENTIL) nebulizer solution 2.5 mg  2.5 mg Nebulization 4x Daily PRN Corinne Coyer Learn, APRN - CNP        apixaban (ELIQUIS) tablet 5 mg  5 mg Oral BID Corinne Coyer Learn, APRN - CNP        atorvastatin (LIPITOR) tablet 20 mg  20 mg Oral Nightly Corinne Coyer Learn, APRN - CNP   20 mg at 03/01/23 2054    fluticasone (FLONASE) 50 MCG/ACT nasal spray 1 spray  1 spray Each Nostril Daily Kasia Nettles, APRN - CNP   1 spray at 03/02/23 0940    furosemide (LASIX) tablet 20 mg  20 mg Oral Daily Kasia Nettles, APRN - CNP   20 mg at 03/02/23 0737    gabapentin (NEURONTIN) capsule 100 mg  100 mg Oral BID Kasia Nettles, APRN - CNP   100 mg at 03/02/23 4352    melatonin tablet 3 mg  3 mg Oral Nightly Kasia Nettles, APRN - CNP   3 mg at 03/01/23 2054    metoprolol succinate (TOPROL XL) extended release tablet 25 mg  25 mg Oral BID Kasia Nettles, APRN - CNP   25 mg at 03/02/23 0925    pantoprazole (PROTONIX) tablet 40 mg  40 mg Oral Q12H Kasia Nettles, APRN - CNP   40 mg at 03/02/23 0925    potassium chloride (KLOR-CON M) extended release tablet 10 mEq  10 mEq Oral Daily Kasia Nettles, APRN - CNP   10 mEq at 03/02/23 0925    sucralfate (CARAFATE) tablet 1 g  1 g Oral 4x Daily AC & HS Kasia Nettles, APRN - CNP   1 g at 03/02/23 1752    sodium chloride flush 0.9 % injection 5-40 mL  5-40 mL IntraVENous 2 times per day Kasia Nettles, APRN - CNP   10 mL at 03/02/23 0926    sodium chloride flush 0.9 % injection 10 mL  10 mL IntraVENous PRN Kasia Nettles, APRN - CNP   10 mL at 03/02/23 0205    0.9 % sodium chloride infusion   IntraVENous PRN Kasia Nettles, APRN - CNP        ondansetron (ZOFRAN-ODT) disintegrating tablet 4 mg  4 mg Oral Q8H PRN Kasia Nettles, APRN - CNP        Or    ondansetron (ZOFRAN) injection 4 mg  4 mg IntraVENous Q6H PRN Kasia Nettles, APRN - CNP        magnesium hydroxide (MILK OF MAGNESIA) 400 MG/5ML suspension 30 mL  30 mL Oral Daily PRN Kasia Nettles, APRN - [facility]        acetaminophen (TYLENOL) tablet 650 mg  650 mg Oral Q6H PRN Kasia Nettles, APRN - [facility]        Or    acetaminophen (TYLENOL) suppository 650 mg  650 mg Rectal Q6H PRN Kasia Nettles, APRN - CNP        ipratropium-albuterol (DUONEB) nebulizer solution 1 ampule  1 ampule Inhalation Q4H WA Sallye Saris Learn, APRN - CNP   1 ampule at 03/02/23 1530    doxycycline hyclate (VIBRAMYCIN) capsule 100 mg  100 mg Oral 2 times per day Sallye Saris Learn, APRN - CNP   100 mg at 03/02/23 0925    budesonide (PULMICORT) nebulizer suspension 250 mcg  0.25 mg Nebulization BID Sallye Saris Learn, APRN - CNP   250 mcg at 03/02/23 9384    And    arformoterol tartrate (BROVANA) nebulizer solution 15 mcg  15 mcg Nebulization BID Sallye Saris Learn, APRN - CNP   15 mcg at 03/02/23 0755      - O2, IS  - bronchopulmonary hygiene to continue for patient   - Doxycycline, Cefepime  - pulmozyme, chest physiotherapy, bronchopulmonary hygiene    - no role for thoracentesis at this time  - defer bronchoscopy   - supportive care to continue     Anay Carter MD  3/2/2023  6:02 PM

## 2023-03-02 NOTE — PROGRESS NOTES
Hospitalist Progress Note      PCP: Luciana Marie MD    Date of Admission: 2/27/2023        Hospital Course:  67 y.o. male presented with [address],  AND SOB, NO FEVER, NO CHILLS. HAS A KNOWN HISTORY OF LUNG CANCER, NON SMALL CELL LUNG CANCR,   AND SEES  [facility]. NO FEVER, NO CHILLS,   NO NV, JUST STARTED WITH DIARRHEA** HAD ECTOPIC BEATS LAST PM , PACER INTERROGATED,  FUNCTIONING WELL.   EP  SAW THE PATIENT, NO FURTHER WORKUP TO BE DONE,  WIFE WANTS ONCOLOGY               Subjective:  COUGH           Medications:  Reviewed    Infusion Medications    sodium chloride      sodium chloride       Scheduled Medications    [START ON 3/3/2023] predniSONE  40 mg Oral Daily    Followed by    Khushboo Gonzales ON 3/6/2023] predniSONE  30 mg Oral Daily    Followed by    Khushboo Gonzales ON 3/9/2023] predniSONE  20 mg Oral Daily    Followed by    Khushboo Gonzales ON 3/12/2023] predniSONE  10 mg Oral Daily    methylPREDNISolone  40 mg IntraVENous Q12H    cefepime  2,000 mg IntraVENous q8h    diphenhydrAMINE  25 mg Oral Once    [Held by provider] apixaban  5 mg Oral BID    atorvastatin  20 mg Oral Nightly    fluticasone  1 spray Each Nostril Daily    furosemide  20 mg Oral Daily    gabapentin  100 mg Oral BID    melatonin  3 mg Oral Nightly    metoprolol succinate  25 mg Oral BID    pantoprazole  40 mg Oral Q12H    potassium chloride  10 mEq Oral Daily    sucralfate  1 g Oral 4x Daily AC & HS    sodium chloride flush  5-40 mL IntraVENous 2 times per day    ipratropium-albuterol  1 ampule Inhalation Q4H WA    doxycycline hyclate  100 mg Oral 2 times per day    budesonide  0.25 mg Nebulization BID    And    arformoterol tartrate  15 mcg Nebulization BID     PRN Meds: sodium chloride, guaiFENesin-dextromethorphan, albuterol, sodium chloride flush, sodium chloride, ondansetron **OR** ondansetron, magnesium hydroxide, acetaminophen **OR** acetaminophen      Intake/Output Summary (Last 24 hours) at 3/2/2023 1405  Last data filed at 3/2/2023 1326  Gross per 24 hour   Intake 660 ml   Output --   Net 660 ml       Exam:    BP (!) 150/70   Pulse 81   Temp (!) 96.7 °F (35.9 °C) (Core)   Resp 18   Ht 5' 6\" (1.676 m)   Wt 165 lb (74.8 kg)   SpO2 98%   BMI 26.63 kg/m²       General appearance:  No apparent distress, appears stated age. HEENT:  Normal cephalic,   Neck: Supple, with full range of motion. . Trachea midline. Respiratory:  Normal respiratory effort. RHONCHI NOTED LLE FIELD,   Cardiovascular:  RRR  Abdomen: Soft, non-tender, non-distended with normal bowel sounds. Musculoskeletal:  No clubbing, cyanosis or edema bilaterally. Skin: smooth and dry   Neurologic:   Cranial nerves: II-XII intact,   Psychiatric:  Alert and oriented x 3              Labs:   Recent Labs     03/01/23  0251 03/01/23  1030 03/02/23  0625   WBC 1.6*  --  2.8*   HGB 6.0* 7.2* 7.4*   HCT 18.4* 21.7* 22.6*   PLT 35*  --  50*     Recent Labs     02/28/23  0537 03/01/23  0251 03/02/23  0625    134 136   K 4.0 4.0 4.8    98 98   CO2 29 26 27   BUN 17 17 24*   CREATININE 0.9 0.9 0.9   CALCIUM 8.6 8.7 9.9     No results for input(s): AST, ALT, BILIDIR, BILITOT, ALKPHOS in the last 72 hours. No results for input(s): INR in the last 72 hours. No results for input(s): Patrisha Meigs in the last 72 hours. No results for input(s): AST, ALT, ALB, BILIDIR, BILITOT, ALKPHOS in the last 72 hours. No results for input(s): LACTA in the last 72 hours.   No results found for: Shiloh Form  No results found for: AMMONIA    Assessment:    Active Hospital Problems    Diagnosis Date Noted    CAP (community acquired pneumonia) due to Chlamydia species [J16.0] 03/01/2023     Priority: Medium    Acute exacerbation of chronic obstructive pulmonary disease (COPD) ([address] [J44.1] 02/27/2023     Priority: Medium   H/O PE    HTN   NSCLC LEFT LUNG           PLAN:  ELIQUIS   PULM  PROCAL    ROCEPHIN   DOXY   ROBITUSSIN     DVT Prophylaxis: ELIQUIS  Diet: ADULT DIET;  Regular  Code Status: Full Code     PT/OT Eval Status:  ORDERED     Dispo - HOME          Electronically signed by Susanne Wilhelm DO on 3/2/2023 at 2:05 PM [facility]

## 2023-03-02 NOTE — PROGRESS NOTES
Subjective:  Chart reviewed  Patient feeling better than on admission but with dyspnea on exertion  This has been an issue for an extended time with underlying lung disease and cancer  He denies pain, N/V, worsening swelling of his legs, he has some swelling in his feet    Objective:    BP (!) 141/63   Pulse 55   Temp 97.9 °F (36.6 °C) (Oral)   Resp 28   Ht 5' 6\" (1.676 m)   Wt 174 lb 9.6 oz (79.2 kg)   SpO2 98%   BMI 28.18 kg/m²     General: NAD, awake and alert  HEENT: NC/AT, sclera anicteric, mucosa dry no lesions or patches  Neck supple no JVD  Heart:  RRR, pacemaker, no murmur  Lungs:  breath sounds diminished with coarse breath sounds on left anteriorly, expiratory wheezing posteriorly bilaterally more significant on left than right.  O2 via NC  Abd: BS+, soft nondistended  Extrem:  1+ edema of feet bilaterally  Skin: good color, no rash    CBC with Differential:    Lab Results   Component Value Date/Time    WBC 2.2 01/19/2023 02:30 AM    RBC 2.62 01/19/2023 02:30 AM    HGB 7.5 01/19/2023 02:30 AM    HCT 22.8 01/19/2023 02:30 AM    PLT 22 01/19/2023 02:30 AM    MCV 87.0 01/19/2023 02:30 AM    MCH 28.6 01/19/2023 02:30 AM    MCHC 32.9 01/19/2023 02:30 AM    RDW 20.4 01/19/2023 02:30 AM    NRBC 0.9 01/18/2023 12:17 PM    BLASTSPCT 0.9 08/24/2022 05:22 AM    METASPCT 3.5 08/30/2022 07:03 AM    LYMPHOPCT 7.0 01/18/2023 12:17 PM    PROMYELOPCT 1.7 09/01/2022 03:30 AM    MONOPCT 2.1 01/18/2023 12:17 PM    MYELOPCT 0.9 12/29/2022 05:28 PM    BASOPCT 0.0 01/18/2023 12:17 PM    MONOSABS 0.00 01/18/2023 12:17 PM    LYMPHSABS 0.20 01/18/2023 12:17 PM    EOSABS 0.00 01/18/2023 12:17 PM    BASOSABS 0.00 01/18/2023 12:17 PM     CMP:    Lab Results   Component Value Date/Time     01/19/2023 02:30 AM    K 3.9 01/19/2023 02:30 AM    K 4.2 01/18/2023 12:17 PM     01/19/2023 02:30 AM    CO2 31 01/19/2023 02:30 AM    BUN 19 01/19/2023 02:30 AM    CREATININE 0.8 01/19/2023 02:30 AM    GFRAA >60 10/13/2022 12:28 PM    LABGLOM >60 01/19/2023 02:30 AM    GLUCOSE 110 01/19/2023 02:30 AM    PROT 5.9 12/26/2022 11:04 AM    LABALBU 4.0 12/26/2022 11:04 AM    CALCIUM 9.1 01/19/2023 02:30 AM    BILITOT 0.5 12/26/2022 11:04 AM    ALKPHOS 101 12/26/2022 11:04 AM    AST 25 12/26/2022 11:04 AM    ALT 25 12/26/2022 11:04 AM          Current Facility-Administered Medications:     0.9 % sodium chloride infusion, , IntraVENous, PRN, April Jon-Jairo, APRN - CNP    albuterol (ACCUNEB) nebulizer solution 0.63 mg, 1 ampule, Nebulization, Q6H PRN, Mary Espinoza DO    [Held by provider] apixaban (ELIQUIS) tablet 5 mg, 5 mg, Oral, BID, Joseph Hutton DO    atorvastatin (LIPITOR) tablet 20 mg, 20 mg, Oral, Daily, Joseph Hutton DO, 20 mg at 01/19/23 0810    gabapentin (NEURONTIN) capsule 100 mg, 100 mg, Oral, BID, Joseph Hutton DO, 100 mg at 01/19/23 0810    melatonin tablet 3 mg, 3 mg, Oral, Nightly, Joseph Hutton DO, 3 mg at 01/18/23 2147    metoprolol succinate (TOPROL XL) extended release tablet 25 mg, 25 mg, Oral, BID, Joseph Hutton DO, 25 mg at 01/19/23 0810    predniSONE (DELTASONE) tablet 5 mg, 5 mg, Oral, Daily, Joseph Hutton DO, 5 mg at 01/19/23 0810    sucralfate (CARAFATE) tablet 1 g, 1 g, Oral, 4x Daily AC & HS, Joseph Hutton DO, 1 g at 01/19/23 1113    ondansetron (ZOFRAN) injection 4 mg, 4 mg, IntraVENous, Q6H PRN, Mary Espinoza DO    dextrose 5 % and 0.9 % sodium chloride infusion, , IntraVENous, Continuous, Joseph Basil [place], DO, Last Rate: 100 mL/hr at 01/19/23 0230, New Bag at 01/19/23 0230    budesonide (PULMICORT) nebulizer suspension 500 mcg, 0.5 mg, Nebulization, BID, 500 mcg at 01/19/23 0929 **AND** arformoterol tartrate (BROVANA) nebulizer solution 15 mcg, 15 mcg, Nebulization, BID, 15 mcg at 01/19/23 0929 **AND** ipratropium (ATROVENT) 0.02 % nebulizer solution 0.5 mg, 0.5 mg, Nebulization, 4x daily, Joseph Hutton DO, 0.5 mg at 01/19/23 1608    pantoprazole (PROTONIX) 40 mg in sodium chloride (PF) 0.9 % 10 mL injection, 40 mg, IntraVENous, Q12H, Eduar Yeager DO, 40 mg at 01/19/23 1241    XR CHEST PORTABLE   Final Result   Left pleural effusion with probable left upper lobe lingular segment   atelectasis. Assessment:    Principal Problem:    GIB (gastrointestinal bleeding)  Resolved Problems:    * No resolved hospital problems. *    66 yo known to Dr. 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 Treated with concurrent chemoRT fb Imfinzi from 11/2/21. Progression on PET from 6/24/22. Started Valentin Busing and Keytruda on 6/30/22. Patient's most recent treatment of carbo/gemzar received 2/8 and 2/15 and was due to begin his next cycle 3/1 but he is hospitalized. He was due to begin aranesp also. Admitted with PNA, anemia, thrombocytopenia and acute bronchospasm. A/P:  -PNA, antibiotics and supportive care per pulmonary  -pancytopenia, likely secondary to recent chemotherapy and infection  -anemia, multifactorial was due to begin aranesp outpatient and can begin next week  -treat supportively in the interim and maintain Hgb > 7  -thrombocytopenia treat supportively to maintain plt > 10k unless evidence of active bleeding >30k  -continue eliquis as long as platelets remain 25L or above  -check CBC with diff tomorrow am    Patient can follow-up next week to have bloodwork checked and resume treatment. Patient ok for discharge from oncology standpoint. He should follow-up as schedule outpatient to discuss continued treatment.       Electronically signed by MATTHEW Bradley on 1/19/2023 at 5:00 PM

## 2023-03-02 NOTE — PLAN OF CARE
Problem: Discharge Planning  Goal: Discharge to home or other facility with appropriate resources  Outcome: Progressing  Flowsheets (Taken 3/2/2023 5835)  Discharge to home or other facility with appropriate resources: Identify barriers to discharge with patient and caregiver

## 2023-03-03 NOTE — CONSULTS
[facility]  Internal Medicine Residency Program  Consult  MICU    Patient:  Leyla Abdi 67 y.o. male MRN: [id number]     Date of Service: 3/3/2023    Hospital Day: 5    History of Present Illness   Leyla Abdi is a 67 y.o. male with a PMHx of COPD, chronic hypoxic resp failure on 4L at home, anemia, squamous cell carcinoma of lung, wide-complex tachycardia w/ leadless pacer, pancytopenia 2/2 chemo, Hx subsegmental PE on Eliquis came to the hospital on 2/27 SOB and exacerbation of cough and to the ICU on 3/3 for acute respiratory failure and bradycardia. Patient came in on 2/27 for productive cough of yellow sputum and SOB. Patient denied any fever, chills at the time. He did have some mild diarrhea. Vitals were significant for /54, HR 50's, afebrile. Labs were significant for procal 1.49, proBNP 1,000. WBC 3.6, Hb 7.2 > 6.0 and got 1 unit of pRBC, platelets 21 >> 79. Respiratory cultures were obtained which showed abundant GNR, covid and flu were negative. For the bradycardia EP was consulted and the pacer was interrogated, no problems were seen. Patient was RRTed on 3/3 for worsening SOB and bradycardia and patient was transferred to the ICU for further management. Upon arrival patient was tripoding and tachypneic. He was placed on AVAPS and his respiratory status improved. Past Medical History:      Diagnosis Date    CHB (complete heart block) ([address] 07/2021    COPD (chronic obstructive pulmonary disease) (HCC)     Hypertension     NSCLC of left lung ([address] 05/27/2021    SCC per biopsy. Pulmonary emboli ([address] 09/17/2021    Anticoagulation with Eliquis.     Syncope 09/2020    Syncope 07/2021       Past Surgical History:        Procedure Laterality Date    BRONCHOSCOPY N/A 05/27/2021    BRONCHOSCOPY W/EBUS FNA performed by Brain Allan MD at [facility] N/A 05/27/2021    BRONCHOSCOPY DIAGNOSTIC OR CELL [address] ONLY performed by Brain Allan MD at [facility]    BRONCHOSCOPY N/A 05/27/2021    BRONCHOSCOPY ADD ON COMPUTER ASSISTED performed by Triny Bergman MD at [address] N/A 08/25/2022    CARDIAC LASER LEAD EXCHANGE performed by Nahun Giang MD at [address] Bilateral 2011 2001    groin     PACEMAKER CHANGE N/A 08/25/2022    CARDIAC LASER LEAD EXTRACTION, LASER LEAD EXTRACTION LEFT SIDED DEVICE, PACEMAKER DEVICE EXTRACTION performed by Nahun Giang MD at [address]  07/20/2021    DUAL PPM  ([facility])  DR. Michel Lingo    PORT SURGERY Right 08/20/2022    PORT REMOVAL performed by Scott Murillo MD at [address]    TRANSESOPHAGEAL ECHOCARDIOGRAM  08/22/2022    Dr Jalen Rodriguez N/A 08/29/2022    [employer] Micra AV Leadless PPM Insertion (Dr. Ida Reynolds)       Medications Prior to Admission:    Prior to Admission medications    Medication Sig Start Date End Date Taking? Authorizing Provider   apixaban (ELIQUIS) 5 MG TABS tablet Take 1 tablet by mouth 2 times daily DO NOT RESTART UNTIL TOLD BY ONCOLOGY 1/26/23   Joseph Wallace DO   fluticasone Lawrence Hurst) 50 MCG/ACT nasal spray 1 spray by Each Nostril route daily 1/27/23   Joseph Wallace DO   predniSONE (DELTASONE) 10 MG tablet 4 a day x 3 day, 3 a day x 3 days , 2 a day x 3 days , 1 a day x 3 days, the dc  Patient taking differently: 5 mg daily Indications: per Dr Carey Beck 1/26/23   Hernan Dennis,    metoprolol succinate (TOPROL XL) 25 MG extended release tablet Take 1 tablet by mouth 2 times daily 1/26/23   Joseph Wallace DO   albuterol sulfate HFA (VENTOLIN HFA) 108 (90 Base) MCG/ACT inhaler Inhale 2 puffs into the lungs 4 times daily as needed for Wheezing 1/26/23   Joseph Wallace DO   pantoprazole (PROTONIX) 40 MG tablet Take 1 tablet by mouth in the morning and 1 tablet in the evening.  12/30/22   Marigene Kehr, MD   sucralfate (CARAFATE) 1 GM tablet Take 1 tablet by mouth 4 times daily (before meals and nightly) 12/30/22   Mau Kwok MD   atorvastatin (LIPITOR) 20 MG tablet Take 20 mg by mouth daily    Historical Provider, MD   Multiple Vitamin (MULTIVITAMIN ADULT PO) Take 1 tablet by mouth daily    Historical Provider, MD   Ascorbic Acid (VITAMIN C) 250 MG tablet Take 250 mg by mouth daily    Historical Provider, MD   OXYGEN Inhale 3 L/min into the lungs daily    Historical Provider, MD   Fluticasone-Umeclidin-Vilant (TRELEGY ELLIPTA) 200-62.5-25 MCG/INH AEPB Inhale 1 puff into the lungs daily 7/11/22   Historical Provider, MD   gabapentin (NEURONTIN) 100 MG capsule Take 100 mg by mouth in the morning and at bedtime. Historical Provider, MD   melatonin 3 MG TABS tablet Take 3 mg by mouth nightly    Historical Provider, MD   potassium chloride (MICRO-K) 10 MEQ extended release capsule Take 10 mEq by mouth daily    Historical Provider, MD   furosemide (LASIX) 20 MG tablet Take 1 tablet by mouth daily 9/4/22   Mau Kwok MD   albuterol (ACCUNEB) 0.63 MG/3ML nebulizer solution Take 1 ampule by nebulization every 6 hours as needed for Wheezing    Historical Provider, MD   albuterol sulfate HFA (PROVENTIL;VENTOLIN;PROAIR) 108 (90 Base) MCG/ACT inhaler Inhale 2 puffs into the lungs every 6 hours as needed for Wheezing    Historical Provider, MD   simvastatin (ZOCOR) 40 MG tablet Take 40 mg by mouth daily   9/3/22  Historical Provider, MD       Allergies:  Patient has no known allergies. Social History:   TOBACCO:   reports that he quit smoking about 11 years ago. His smoking use included cigarettes. He has a 50.00 pack-year smoking history. He has been exposed to tobacco smoke. He has never used smokeless tobacco.  ETOH:   reports that he does not currently use alcohol after a past usage of about 1.0 standard drink per week.   OCCUPATION:      Family History:       Problem Relation Age of Onset    Cancer Father         prostate    Cancer Paternal Uncle         anal REVIEW OF SYSTEMS:  Review of Systems   Constitutional:  Negative for appetite change, chills, fatigue, fever and unexpected weight change. HENT:  Negative for sneezing and sore throat. Eyes:  Negative for discharge and redness. Respiratory:  Positive for cough and shortness of breath. Negative for chest tightness. Cardiovascular:  Negative for chest pain, palpitations and leg swelling. Gastrointestinal:  Negative for abdominal distention, abdominal pain, constipation, diarrhea, nausea and vomiting. Genitourinary:  Negative for dysuria and frequency. Neurological:  Negative for dizziness, weakness, light-headedness and headaches. Physical Exam     VITAL SIGNS:  /68   Pulse (!) 49   Temp 97.1 °F (36.2 °C) (Temporal)   Resp 20   Ht 5' 6\" (1.676 m)   Wt 165 lb (74.8 kg)   SpO2 98%   BMI 26.63 kg/m²   Tmax over 24 hours:  Temp (24hrs), Av °F (36.1 °C), Min:96.8 °F (36 °C), Max:97.1 °F (36.2 °C)        Intake/Output Summary (Last 24 hours) at 3/3/2023 1449  Last data filed at 3/3/2023 0858  Gross per 24 hour   Intake 300 ml   Output --   Net 300 ml     Wt Readings from Last 2 Encounters:   23 165 lb (74.8 kg)   23 174 lb 9.6 oz (79.2 kg)     Body mass index is 26.63 kg/m².       Pressors: none    Sedatives: none    Oxygen: AVAPS, and NC    ABG:     Lab Results   Component Value Date/Time    PH 7.471 2023 11:56 AM    PCO2 35.1 2023 11:56 AM    PO2 116.2 2023 11:56 AM    HCO3 25.0 2023 11:56 AM    BE 1.5 2023 11:56 AM    THB 8.7 2023 11:56 AM    O2SAT 97.9 2023 11:56 AM       Physical Exam:  General Appearance: alert, appears stated age, and cooperative  Lung: coarse breath sounds heard bilaterally, no wheezing appreciated, no cough at this time  Heart: regular rate and rhythm, S1, S2 normal, no murmur, click, rub or gallop  Abdomen: soft, non-tender; bowel sounds normal; no masses,  no organomegaly  Extremities:  extremities normal, atraumatic, no cyanosis or edema  Musculoskeletal: No joint swelling, no muscle tenderness. ROM normal in all joints of extremities. Neurologic: Mental status: Alert, oriented, thought content appropriate  Other:     Lines:   Single Lumen Implantable Port 01/18/23 Right Chest 01/18/23        Urinary catheter:     Labs and Imaging Studies     Basic Labs    Complete Blood Count:   Recent Labs     03/02/23  0625 03/03/23  0630 03/03/23  0844   WBC 2.8* 3.8* 6.8   HGB 7.4* 7.4* 8.5*   HCT 22.6* 22.7* 26.4*   PLT 50* 68* 91*        Last 3 Blood Glucose:   Recent Labs     03/02/23  0625 03/03/23  0630 03/03/23  0844   GLUCOSE 153* 141* 252*        PT/INR:    Lab Results   Component Value Date/Time    PROTIME 14.0 03/03/2023 08:44 AM    INR 1.3 03/03/2023 08:44 AM     PTT:    Lab Results   Component Value Date/Time    APTT 25.8 12/27/2022 05:06 AM       Comprehensive Metabolic Profile:   Recent Labs     03/02/23  0625 03/03/23  0630 03/03/23  0840 03/03/23  0844    141  --  141   K 4.8 4.9 5.77* 6.0*   CL 98 103  --  102   CO2 27 27  --  19*   BUN 24* 28*  --  29*   CREATININE 0.9 0.9  --  1.0   GLUCOSE 153* 141*  --  252*   CALCIUM 9.9 9.6  --  9.9      Magnesium:   Lab Results   Component Value Date/Time    MG 1.2 03/01/2023 02:51 AM     Phosphorus: No results found for: PHOS  Ionized Calcium: No results found for: CAION   Troponin: No results for input(s): TROPONINI in the last 72 hours. Imaging Studies:    XR ABDOMEN (KUB) (SINGLE AP VIEW)    Result Date: 2/27/2023  EXAMINATION: ONE SUPINE XRAY VIEW(S) OF THE ABDOMEN 2/27/2023 7:15 pm COMPARISON: None. HISTORY: ORDERING SYSTEM PROVIDED HISTORY: Diarrhea and abdominal pain. TECHNOLOGIST PROVIDED HISTORY: Reason for exam:->Diarrhea and abdominal pain. What reading provider will be dictating this exam?->CRC FINDINGS: Nonspecific bowel gas pattern without evidence of obstruction. No abnormal calcifications. No acute osseous abnormality.   Contrast identified within the urinary bladder. Probable prostatic calcifications. Left lower lobe opacity/pleural effusion. No evidence of bowel obstruction. XR CHEST PORTABLE    Result Date: 3/3/2023  EXAMINATION: ONE XRAY VIEW OF THE CHEST 3/3/2023 10:05 am COMPARISON: 02/27/2023 HISTORY: ORDERING SYSTEM PROVIDED HISTORY: respiratory distress TECHNOLOGIST PROVIDED HISTORY: Reason for exam:->respiratory distress What reading provider will be dictating this exam?->CRC FINDINGS: There is an Poxmgx-L-Rywk on the right. A loop recorder is noted. The cardiac silhouette is enlarged. The lungs demonstrate emphysematous changes. There is redemonstration of pleural and parenchymal disease in the base of the left hemithorax. Relatively mild parenchymal changes are seen in the right lung base. No pneumothorax is visualized. Enlarged cardiac silhouette. Redemonstration of pleural and parenchymal disease in the base of the left hemithorax. Relatively mild parenchymal changes in the right lung base. XR CHEST PORTABLE    Result Date: 2/27/2023  EXAMINATION: ONE XRAY VIEW OF THE CHEST 2/27/2023 10:10 am COMPARISON: Chest CT 23 January 2023 HISTORY: ORDERING SYSTEM PROVIDED HISTORY: sob, ?pneumonia TECHNOLOGIST PROVIDED HISTORY: Reason for exam:->sob, ?pneumonia What reading provider will be dictating this exam?->CRC FINDINGS: Stable right chest port. Unchanged scarring and or persistent atelectasis in the left mid lung. Obstructive airways disease as before. Normal heart and pulmonary vascularity. A left pleural effusion has substantially resolved. Persistent scarring and or atelectasis on the left. Obstructive airways disease. Substantial resolution of left pleural effusion.      CTA PULMONARY W CONTRAST    Result Date: 2/27/2023  EXAMINATION: CTA OF THE CHEST 2/27/2023 2:15 pm TECHNIQUE: CTA of the chest was performed after the administration of intravenous contrast.  Multiplanar reformatted images are provided for review. MIP images are provided for review. Automated exposure control, iterative reconstruction, and/or weight based adjustment of the mA/kV was utilized to reduce the radiation dose to as low as reasonably achievable. COMPARISON: Reviewed studies from September 17, 2021 through January 23, 2023. 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 There is no acute pulmonary embolus in the main PA, right and left main PAs, in the lobar, segmental and subsegmental branches. The despite the malignant case mint in the bifurcation of the distal left main pulmonary artery towards left lower lobe and lingular segments there is no indication for acute pulmonary embolus in the left lung pulmonary artery circulation. There is no indication for pulmonary embolus in the right lung pulmonary artery circulation down to the 3/4 branch level in both sides. There is no aneurysm formation or dissection of the thoracic aorta. Despite the malignant infiltration from the left hilar region towards the lateral aspect of the mediastinum around the left hilar region there is no isolated the mediastinal masses or adenopathy. Upper abdominal structures not fully covered. Presence 11 mm gallstone in the dependent portion of the gallbladder. The adrenals no large size stable finding back to January 3rd 1019 study. There is no aneurysm formation or dissection of the thoracic aorta. Calcified atheromatous changes are seen in the arch of the aorta and in the descending thoracic aorta. Heart has normal size. RV inner diameter 3.4 cm.   LV inner diameter 4.5 cm. Patient has cardiac monitor device in the apex region of the right ventricle projection. Emphysematous changes in advanced degree with centri lobular emphysema are seen in the upper lobes. The right and left-sided pleural effusion have been most reabsorbed with pleural residual changes in right posterior costophrenic sulcus of the Multi segmental atelectasis and loss of volume of the left lung relate with the hilar mass. However there patchy parenchymal passes in the more peripheral aspect of the left lower lobe towards the anterolateral basal segment which can be manifestation of superimposed pneumonia as these were not seen previous study of December 28, 2022. Follow-up study following a clinical treatment trial for pneumonia can be considered. Visualized bone structures demonstrate chronic findings as observed previously. 1.  No indication for acute pulmonary emboli. 2.  No aneurysm formation or dissection of the thoracic aorta. 3.  Emphysematous changes lung parenchyma particular affecting the upper lobes in moderate advanced degree. 4.  Malignant infiltrating lesion in the left infrahilar region towards the root of the lingula/left lower, as previously described. 5.  Chronic areas of loss of volume in lingula and in the left lower due the infiltration of the left hilar region by malignancies. 6.  Resolved bilateral pleural effusions since more recent examinations. 7.  Development of small multifocal patchy areas of consolidation in the left lower lobe distal to the more proximal in case mint of the bronchovascular structures of the left lower lobe can represent developing acute pneumonia.  Follow-up study following clinically treatment trial recommended in several days time interval.     FL MODIFIED BARIUM SWALLOW W VIDEO    Result Date: 3/1/2023  EXAMINATION: MODIFIED BARIUM SWALLOW WAS PERFORMED IN CONJUNCTION WITH SPEECH PATHOLOGY SERVICES TECHNIQUE: Under fluoroscopic evaluation cineradiography/videoradiography recordings were performed in conjunction with the speech-language pathologist (SLP). Various liquid, solid and/or semi-solid barium preparations were used to assess swallowing function. FLUOROSCOPY DOSE AND TYPE: Radiation Exposure Index: Images: 8 series of cine fluoroscopic loops Fluoroscopic time: 1 minute Total dose: 8 mGy COMPARISON: None HISTORY: ORDERING SYSTEM PROVIDED HISTORY: aspiration rule out TECHNOLOGIST PROVIDED HISTORY: Reason for exam:->aspiration rule out What reading provider will be dictating this exam?->CRC FINDINGS: Satisfactory oral and pharyngeal phases of swallowing mechanism. No significant barium residuals and no barium aspiration. Satisfactory swallowing mechanism. No barium aspiration. Please see separate speech pathology report for full discussion of findings and recommendations. Resident's Assessment and Plan     SUMMARY OF HOSPITAL STAY:   Yaz Meléndez is a 67 y.o. male with a PMHx of COPD, chronic hypoxic resp failure on 4L at home, anemia, squamous cell carcinoma of lung, wide-complex tachycardia w/ leadless pacer, pancytopenia 2/2 chemo, Hx subsegmental PE on Eliquis came to the hospital on 2/27 SOB and exacerbation of cough and to the ICU on 3/3 for acute respiratory failure and bradycardia. Patient came in on 2/27 for productive cough of yellow sputum and SOB. Patient denied any fever, chills at the time. He did have some mild diarrhea. Vitals were significant for /54, HR 50's, afebrile. Labs were significant for procal 1.49, proBNP 1,000. WBC 3.6, Hb 7.2 > 6.0 and got 1 unit of pRBC, platelets 21 >> 79. Respiratory cultures were obtained which showed abundant GNR, covid and flu were negative. For the bradycardia EP was consulted and the pacer was interrogated, no problems were seen. Patient was RRTed on 3/3 for worsening SOB and bradycardia and patient was transferred to the ICU for further management.  Upon arrival patient was tripoding and tachypneic. He was placed on AVAPS and his respiratory status improved.       Consults:   Critical care  Pulmonology  EP  Cardio  Palliative   Heme/onc     Hx wide-complex tachycardia, pacer removed 8/2022 w/ leadless pacer put in 8/2022  Elevated troponin, likely 2/2 demand ischemia 28 > 200   Trend troponin   If increasing get EKG   Elevated proBNP 1,000 > 5,500   Lasix 40   Monitor I/O's   Bradycardia during RRT, EP has already seen pt and cleared device   COPD exacerbation 2/2 CAP, 45 pack year hx, on 4L NC baseline, on prednisone 5 and telegy at home  G+ cocci and GNR in sputum, procal 1.49 > 0.76   Cont flutter, brovana, budenoside, duoneb, solu-medtrol 40 BID   Follow pulm rec  Cont AVAPS, can give a break if needed   ABG in the morning   Cont cefepime and doxy  Daily CXR  Stage 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 Moncho Elias and [facility], diagnosed 5/2021, s/p chemo/radiation w/ Imfinzi, progression seen 6/2022 started Carbo, Gemzar and Keytruda  Carbo/gemzar 2/8 and 2/15, was scheduled next cycle 3/1   Probable FIONA  Hx RLL subsegmental PE, cont home Eliquis   Radiation pneumonitis/fibrosis of left lung   HAGMA 2/2 LA 5.0   Trend LA q6   Hyperglycemia 2/2 steroids, no history of diabetes   POCT BG q4, LDSS   Hx pancytopenia 2/2 chemo   aCont Eliquis as long as plt > 50K   Plt goal >10 K unless active bleeding then 30K is the goal   Anemia, likely 2/2 aranesp outpt, s/p 1 pRBC this admission   Follow heme onc rec   Check FOBT, consider holding anticoagulation if Hb continues to drop, hx of LESLIE s/p IV replacement   Follow peripheral smear and reticulocyte count  Goal Hb > 7      DVT prophylaxis: Eliquis 5 BID   GI prophylaxis: protonix 40 BID PO   Diet: NPO  Bowel regimen: scheduled, LBM   Pain management: morphine sulfate 4mg IV   Mouth hygiene:   Code status: Limited, no to everything   Disposition: Admitted to ICU   Family: updated at bedside    Ashley Corcoran MD, PGY-1  Attending physician: Dr. Elham Hensley    I personally saw, examined and provided care for the patient. Radiographs, labs and medication list were reviewed by me independently. Review of Residents documentation was conducted and revisions were made as appropriate. I agree with the above documented exam, problem list and plan of care.         CCT excluding procedures 39 minutes    Jose Angel Eisenberg, DO

## 2023-03-03 NOTE — PROGRESS NOTES
[facility] Pulmonary Consultation  [facility]    Admit Date: 2/27/2023                            PCP: Neisha Neal MD  Principal Problem:    Acute exacerbation of chronic obstructive pulmonary disease (COPD) ([address]  Active Problems:    CAP (community acquired pneumonia) due to Chlamydia species  Resolved Problems:    * No resolved hospital problems.  *      Subjective:   RRT for michael cardia and dyspnea, transferred to ICU   Resting on 15 HF   + dyspnea, labored breathing     Medications:   dextrose      sodium chloride      sodium chloride          insulin regular  10 Units SubCUTAneous Once    predniSONE  40 mg Oral Daily    Followed by    Veronica Meier ON 3/6/2023] predniSONE  30 mg Oral Daily    Followed by    Veronica Meier ON 3/9/2023] predniSONE  20 mg Oral Daily    Followed by    Veronica Meier ON 3/12/2023] predniSONE  10 mg Oral Daily    dornase alpha  2.5 mg Inhalation Daily    cefepime  2,000 mg IntraVENous q8h    diphenhydrAMINE  25 mg Oral Once    apixaban  5 mg Oral BID    atorvastatin  20 mg Oral Nightly    fluticasone  1 spray Each Nostril Daily    furosemide  20 mg Oral Daily    gabapentin  100 mg Oral BID    melatonin  3 mg Oral Nightly    [Held by provider] metoprolol succinate  25 mg Oral BID    pantoprazole  40 mg Oral Q12H    potassium chloride  10 mEq Oral Daily    sucralfate  1 g Oral 4x Daily AC & HS    sodium chloride flush  5-40 mL IntraVENous 2 times per day    ipratropium-albuterol  1 ampule Inhalation Q4H WA    doxycycline hyclate  100 mg Oral 2 times per day    budesonide  0.25 mg Nebulization BID    And    arformoterol tartrate  15 mcg Nebulization BID       Vitals:  VITALS:  BP (!) 179/99   Pulse 51   Temp 97.1 °F (36.2 °C) (Temporal)   Resp (!) 42   Ht 5' 6\" (1.676 m)   Wt 165 lb (74.8 kg)   SpO2 100%   BMI 26.63 kg/m²   24HR INTAKE/OUTPUT:    Intake/Output Summary (Last 24 hours) at 3/3/2023 1014  Last data filed at 3/3/2023 0858  Gross per 24 hour   Intake 540 ml   Output --   Net 540 ml CURRENT PULSE OXIMETRY:  SpO2: 100 %  24HR PULSE OXIMETRY RANGE:  SpO2  Av.6 %  Min: 87 %  Max: 100 %  CVP:    VENT SETTINGS:      Additional Respiratory Assessments  Heart Rate: 51  Resp: (!) 42  SpO2: 100 %      EXAM:  General: No distress. Alert. On 15LHF  Neck: Trachea midline. Resp: No accessory muscle use. No crackles. wheezing. No rhonchi. Diminished especially left, coarse  CV: Regular rate. Regular rhythm. No mumur or rub. Pacer  ABD: Non-tender. Non-distended. No masses. No organmegaly. Normal bowel sounds. Obese  Skin: Warm and dry. Lymph: No cervical LAD. No supraclavicular LAD. Ext:  Trace leg edema  Neuro: Aox4    I/O: I/O last 3 completed shifts: In: 960 [P.O.:840; I.V.:100; Other:20]  Out: -   No intake/output data recorded. Results:  CBC:   Recent Labs     23  0623  0844   WBC 2.8* 3.8* 6.8   HGB 7.4* 7.4* 8.5*   HCT 22.6* 22.7* 26.4*   MCV 95.0 95.8 96.4   PLT 50* 68* 91*       BMP:   Recent Labs     23  0625 23  0630 23  0840 23  0844    141  --  141   K 4.8 4.9 5.77* 6.0*   CL 98 103  --  102   CO2 27 27  --  19*   BUN 24* 28*  --  29*   CREATININE 0.9 0.9  --  1.0       LFT:   No results for input(s): ALKPHOS, ALT, AST, PROT, BILITOT, BILIDIR, LABALBU in the last 72 hours. PT/INR:   Recent Labs     23  0844   PROTIME 14.0*   INR 1.3     Cultures:  No results for input(s): CULTRESP in the last 72 hours. ABG:   Recent Labs     23  0840   PH 7.221*   PO2 80.4   PCO2 43.9   HCO3 17.6*   BE -9.6*   O2SAT 91.5*       Films:  XR ABDOMEN (KUB) (SINGLE AP VIEW)    Result Date: 2023  EXAMINATION: ONE SUPINE XRAY VIEW(S) OF THE ABDOMEN 2023 7:15 pm COMPARISON: None. HISTORY: ORDERING SYSTEM PROVIDED HISTORY: Diarrhea and abdominal pain. TECHNOLOGIST PROVIDED HISTORY: Reason for exam:->Diarrhea and abdominal pain.  What reading provider will be dictating this exam?->CRC FINDINGS: Nonspecific bowel gas pattern without evidence of obstruction. No abnormal calcifications. No acute osseous abnormality. Contrast identified within the urinary bladder. Probable prostatic calcifications. Left lower lobe opacity/pleural effusion. No evidence of bowel obstruction. XR CHEST PORTABLE    Result Date: 2/27/2023  EXAMINATION: ONE XRAY VIEW OF THE CHEST 2/27/2023 10:10 am COMPARISON: Chest CT 23 January 2023 HISTORY: ORDERING SYSTEM PROVIDED HISTORY: sob, ?pneumonia TECHNOLOGIST PROVIDED HISTORY: Reason for exam:->sob, ?pneumonia What reading provider will be dictating this exam?->CRC FINDINGS: Stable right chest port. Unchanged scarring and or persistent atelectasis in the left mid lung. Obstructive airways disease as before. Normal heart and pulmonary vascularity. A left pleural effusion has substantially resolved. Persistent scarring and or atelectasis on the left. Obstructive airways disease. Substantial resolution of left pleural effusion. CTA PULMONARY W CONTRAST    Result Date: 2/27/2023  EXAMINATION: CTA OF THE CHEST 2/27/2023 2:15 pm TECHNIQUE: CTA of the chest was performed after the administration of intravenous contrast.  Multiplanar reformatted images are provided for review. MIP images are provided for review. Automated exposure control, iterative reconstruction, and/or weight based adjustment of the mA/kV was utilized to reduce the radiation dose to as low as reasonably achievable. COMPARISON: Reviewed studies from September 17, 2021 through January 23, 2023.  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 There is no acute pulmonary embolus in the main PA, right and left main PAs, in the lobar, segmental and subsegmental branches. The despite the malignant case mint in the bifurcation of the distal left main pulmonary artery towards left lower lobe and lingular segments there is no indication for acute pulmonary embolus in the left lung pulmonary artery circulation. There is no indication for pulmonary embolus in the right lung pulmonary artery circulation down to the 3/4 branch level in both sides. There is no aneurysm formation or dissection of the thoracic aorta. Despite the malignant infiltration from the left hilar region towards the lateral aspect of the mediastinum around the left hilar region there is no isolated the mediastinal masses or adenopathy. Upper abdominal structures not fully covered. Presence 11 mm gallstone in the dependent portion of the gallbladder. The adrenals no large size stable finding back to January 3rd 1019 study. There is no aneurysm formation or dissection of the thoracic aorta. Calcified atheromatous changes are seen in the arch of the aorta and in the descending thoracic aorta. Heart has normal size. RV inner diameter 3.4 cm. LV inner diameter 4.5 cm. Patient has cardiac monitor device in the apex region of the right ventricle projection. Emphysematous changes in advanced degree with centri lobular emphysema are seen in the upper lobes. The right and left-sided pleural effusion have been most reabsorbed with pleural residual changes in right posterior costophrenic sulcus of the Multi segmental atelectasis and loss of volume of the left lung relate with the hilar mass. However there patchy parenchymal passes in the more peripheral aspect of the left lower lobe towards the anterolateral basal segment which can be manifestation of superimposed pneumonia as these were not seen previous study of December 28, 2022.   Follow-up study following a clinical treatment trial for pneumonia can be considered. Visualized bone structures demonstrate chronic findings as observed previously. 1.  No indication for acute pulmonary emboli. 2.  No aneurysm formation or dissection of the thoracic aorta. 3.  Emphysematous changes lung parenchyma particular affecting the upper lobes in moderate advanced degree. 4.  Malignant infiltrating lesion in the left infrahilar region towards the root of the lingula/left lower, as previously described. 5.  Chronic areas of loss of volume in lingula and in the left lower due the infiltration of the left hilar region by malignancies. 6.  Resolved bilateral pleural effusions since more recent examinations. 7.  Development of small multifocal patchy areas of consolidation in the left lower lobe distal to the more proximal in case mint of the bronchovascular structures of the left lower lobe can represent developing acute pneumonia. Follow-up study following clinically treatment trial recommended in several days time interval.     FL MODIFIED BARIUM SWALLOW W VIDEO    Result Date: 3/1/2023  EXAMINATION: MODIFIED BARIUM SWALLOW WAS PERFORMED IN CONJUNCTION WITH SPEECH PATHOLOGY SERVICES TECHNIQUE: Under fluoroscopic evaluation cineradiography/videoradiography recordings were performed in conjunction with the speech-language pathologist (SLP). Various liquid, solid and/or semi-solid barium preparations were used to assess swallowing function. FLUOROSCOPY DOSE AND TYPE: Radiation Exposure Index: Images: 8 series of cine fluoroscopic loops Fluoroscopic time: 1 minute Total dose: 8 mGy COMPARISON: None HISTORY: ORDERING SYSTEM PROVIDED HISTORY: aspiration rule out TECHNOLOGIST PROVIDED HISTORY: Reason for exam:->aspiration rule out What reading provider will be dictating this exam?->CRC FINDINGS: Satisfactory oral and pharyngeal phases of swallowing mechanism. No significant barium residuals and no barium aspiration.      Satisfactory swallowing mechanism. No barium aspiration. Please see separate speech pathology report for full discussion of findings and recommendations. 08/19/22 ECHO:  Normal left ventricular chamber size. Normal left ventricular systolic function. Visually estimated LVEF is 55-60 %. No wall motion abnormalities. Normal diastolic function. Normal left atrial pressure. Normal right ventricle structure and function. Normal left atrial size. Normal right atrial size  Likely normal estimated PA pressure. Pacer/ ICD wire present in the right heart. No gross evidence of infective endocarditis. Consider SHIRA for better accuracy if clinically indicated. Compared to prior echo from 2021, no significant changes noted. SHIRA on 8/22/22 No SHIRA indication of Endocarditis. Normal left ventricular chamber size and systolic function. Interatrial septum appears intact. Normal right ventricle size and function. Pacemaker leads noted in right atrium and right ventricle. Normal aortic root size. Moderate atherosclerosis in the descending thoracic aorta. Epicardial fat vs. small pericardial effusion. No intra cardiac mass or thrombus. Technically sub-optimal images. Compared to prior sub-optimal TTE from 8/19/22.        12/28 showing left lower lobe pneumonia  1/23 showing effusion and and progressive volume loss  2/27 shows effusions are improved with still the masslike infiltrate at the left side      Assessment/plan:  66 y/o  male known to [facility] with past medical history noted for COPD on trelegy and prednisone 5mg, on 4 L nasal cannula at home, left hilar lymphadenopathy, squamous cell carcinoma diagnosed by EBUS 5/27/21, COVID 5/2022, pulmonary embolism (on eliquis), pacemaker placement (7/2021) presented to [facility] 2/27/23 with weakness, cough, shortness of breath, fatigue x 1 week. In ER, hemoglobin 7.2. Influenza A and B, COVID-negative. CTA showing left lower lobe volume loss.   Platelets 21 1/86 pulmonary consulted. on 4 L nasal cannula. Passed MBS  3/1 on 4 L nasal cannula  3/2 on 3lnc; hgb 7.4  3/3 RRT for bradycardia- transferred to ICU; cxr increased RLL opacities      Recent admission:  1/18-1/26/23 [facility]. Anemia. Chf     Former smoker 1.5 pack/day for 30 years. No alcohol or drug use. Worked in customer service.  with no children. From [place].     Pneumonia gram-positive cocci and gram-negative rods in sputum  Likely postobstructive  Procalcitonin 1.49  On cefepime day 4 and doxycycline day 4  Will repeat procalcitonin and consider change antibiotics with worsening CXR   Encourage coughing, deep breathing, activity, flutter valve  COPD exacerbation  On prednisone 5mg at home and Trelegy  On brovana, budesonide, DuoNebs, prednisone  Change prednisone to solumedrol with RRT  Bradycardia  RRT this am, transferred to ICU   Chronic hypoxic resp failure   Continue o2 and avaps PRN   Multi-Factorial Anemia   Stage IIIB T2aN3 Squamous Cell Carcinoma of Lung  diagnosis 5/27/2021, PD-L1 0%   ( 7/6/21-9/7/21 treatment with concurrent chemotherapy/radiation therapy; 11/2/21 consolidated with Imfinzi (Durvalumab) per Oncology IV every 4 weeks; 6/30/22 PET showing progression and was started on Carbo, Gemzar and Keytruda on 6/30/22; 8/9/22 received C2D8 of Carboplatin AUC 2 with Gemzar 1000 mg/m2 IV on D1,8 and Keytruda IV on D1 on a 21 day cycle)  9/2/2022 shows new 9 mm spiculated nodule RLL  May benefit from future radiation for narrowing of the site   h/o wide-complex tachycardia with pacer out 8/25/2022;  leadless pacer implantation 8/29/2022  Probable Obstructive Sleep Apnea  Persistent pancytopenia secondary to chemotherapy  H/O RLL subsegmental PE  Eliquis is on hold with anemia  Radiation pneumonitis/fibrosis left lung   Hyperkalemia     Previous admission   8/16 - 9/3/2022@ [facility] with weakness, cough, shortness of breath, fatigue, fever found to have high-grade Enterococcus faecalis bacteremia/septicemia, bacteremia cultures + on 8/16, 8/17, 8/20 s/p mediport removal and placement of new pacemaker. Patient was discharged with ampicillin and ceftriaxone until 10/13/2022  12/26 - 12/30/2022 at [facility] with acute blood loss anemia. Eliquis was held. FOBT positive was felt to be due to thrombocytopenia underlying gastritis. Empirically treated with PPI and Carafate  1/18 - 1/29/2023 at [facility] with anemia of 6.9 platelets of 14. He was transfused. CT chest showing increased left effusion left infrahilar mass with narrowing of left bronchus due to mass with postobstructive pneumonitis. 1/23 ultrasound lower extremities no DVT    Electronically signed by LELO Burnham CNP on 3/3/2023 at 10:14 AM     Attending Attestation Note:    Patient seen and examined with Hospital Staff, NP. I have extensively reviewed the chart lab work and imaging. I agree with above. Modifications and amendment of note made as necessary.     In addition, the following apply:    Current Facility-Administered Medications   Medication Dose Route Frequency Provider Last Rate Last Admin    morphine sulfate (PF) injection 4 mg  4 mg IntraVENous Q4H PRN Marzena Thorpe MD   4 mg at 03/03/23 0851    dextrose 50 % IV solution  25 g IntraVENous PRN Aimee Chi MD   25 g at 03/03/23 1016    glucose chewable tablet 16 g  4 tablet Oral PRN Aimee Chi MD        dextrose bolus 10% 125 mL  125 mL IntraVENous PRN Aimee Chi MD        Or    dextrose bolus 10% 250 mL  250 mL IntraVENous PRN Aimee Chi MD        glucagon injection 1 mg  1 mg SubCUTAneous PRN Aimee Chi MD        dextrose 10 % infusion   IntraVENous Continuous PRN Aimee Chi MD        methylPREDNISolone sodium (SOLU-MEDROL) injection 60 mg  60 mg IntraVENous Q6H LELO Burnham CNP   60 mg at 03/03/23 1109    furosemide (LASIX) tablet 40 mg  40 mg Oral Daily Rosana Buitrago MD   40 mg at 03/03/23 1249    furosemide (LASIX) 10 MG/ML injection             insulin lispro (HUMALOG) injection vial 0-4 Units  0-4 Units SubCUTAneous 4x Daily AC & HS Rosana Buitrago MD   1 Units at 03/03/23 1301    insulin lispro (HUMALOG) injection vial 0-4 Units  0-4 Units SubCUTAneous Nightly Rosana Buitrago MD        0.9 % sodium chloride bolus  250 mL IntraVENous Once Rosana Buitrago  mL/hr at 03/03/23 1400 250 mL at 03/03/23 1400    sodium chloride flush 0.9 % injection 5-40 mL  5-40 mL IntraVENous 2 times per day Caitlyn Manzano MD        sodium chloride flush 0.9 % injection 5-40 mL  5-40 mL IntraVENous PRN Caitlyn Manzano MD        0.9 % sodium chloride infusion   IntraVENous PRN Caitlyn Manzano MD        polyethylene glycol (GLYCOLAX) packet 17 g  17 g Oral Daily Caitlyn Manzano MD        senna (SENOKOT) tablet 17.2 mg  2 tablet Oral Nightly Caitlyn Manzano MD        dornase alpha (PULMOZYME) nebulizer solution 2.5 mg  2.5 mg Inhalation Daily Baruch Leyden, MD        albuterol-ipratropium (COMBIVENT RESPIMAT)  MCG/ACT inhaler 2 puff  2 puff Inhalation Q6H PRN ELLO Stein CNP        0.9 % sodium chloride infusion   IntraVENous PRN April POOJA DesouzaN - [facility]        guaiFENesin-dextromethorphan (ROBITUSSIN DM) 100-10 MG/5ML syrup 5 mL  5 mL Oral Q4H PRN Cele Zhang, DO   5 mL at 03/01/23 1759    cefepime (MAXIPIME) 2,000 mg in sodium chloride 0.9 % 50 mL IVPB (Radb8Mei)  2,000 mg IntraVENous q8h Ming Mistry MD   Stopped at 03/03/23 1111    diphenhydrAMINE (BENADRYL) tablet 25 mg  25 mg Oral Once April POOJA DesouzaN - CNP        apixaban Mary Beth Kapil) tablet 5 mg  5 mg Oral BID LELO Agosto - CNP   5 mg at 03/02/23 2038    atorvastatin (LIPITOR) tablet 20 mg  20 mg Oral Nightly Rae Nettles, APRN - [facility]   20 mg at 03/02/23 2038 fluticasone (FLONASE) 50 MCG/ACT nasal spray 1 spray  1 spray Each Nostril Daily Aleda Poag Learn, APRN - CNP   1 spray at 03/02/23 0940    gabapentin (NEURONTIN) capsule 100 mg  100 mg Oral BID Aleda Poag Learn, APRN - CNP   100 mg at 03/02/23 2038    melatonin tablet 3 mg  3 mg Oral Nightly Aleda Poag Learn, APRN - CNP   3 mg at 03/02/23 2037    [Held by provider] metoprolol succinate (TOPROL XL) extended release tablet 25 mg  25 mg Oral BID Aleda Poag Learn, APRN - CNP   25 mg at 03/02/23 2037    pantoprazole (PROTONIX) tablet 40 mg  40 mg Oral Q12H Aleda Poag Learn, APRN - CNP   40 mg at 03/02/23 2037    potassium chloride (KLOR-CON M) extended release tablet 10 mEq  10 mEq Oral Daily Aleda Poag Learn, APRN - CNP   10 mEq at 03/02/23 0925    sucralfate (CARAFATE) tablet 1 g  1 g Oral 4x Daily AC & HS Aleda Poag Learn, APRN - CNP   1 g at 03/03/23 0513    sodium chloride flush 0.9 % injection 5-40 mL  5-40 mL IntraVENous 2 times per day Aleda Poag Learn, APRN - CNP   10 mL at 03/03/23 1109    sodium chloride flush 0.9 % injection 10 mL  10 mL IntraVENous PRN Aleda Poag Learn, APRN - CNP   10 mL at 03/03/23 0206    0.9 % sodium chloride infusion   IntraVENous PRN Aleda Poag Learn, APRN - CNP        ondansetron (ZOFRAN-ODT) disintegrating tablet 4 mg  4 mg Oral Q8H PRN Aleda Poag Learn, APRN - CNP        Or    ondansetron (ZOFRAN) injection 4 mg  4 mg IntraVENous Q6H PRN Aleda Poag Learn, APRN - CNP        magnesium hydroxide (MILK OF MAGNESIA) 400 MG/5ML suspension 30 mL  30 mL Oral Daily PRN Aleda Poag Learn, APRN - CNP        acetaminophen (TYLENOL) tablet 650 mg  650 mg Oral Q6H PRN Aleda Poag Learn, APRN - CNP        Or    acetaminophen (TYLENOL) suppository 650 mg  650 mg Rectal Q6H PRN Aleda Poag Learn, APRN - CNP        ipratropium-albuterol (DUONEB) nebulizer solution 1 ampule  1 ampule Inhalation Q4H ANA Nettles APRN - CNP   1 ampule at 03/03/23 1318    doxycycline hyclate (VIBRAMYCIN) capsule 100 mg  100 mg Oral 2 times per day Thom Nettles APRN - CNP   100 mg at 03/02/23 2037    budesonide (PULMICORT) nebulizer suspension 250 mcg  0.25 mg Nebulization BID Thom Nettles APRN - CNP   250 mcg at 03/03/23 9523    And    arformoterol tartrate (BROVANA) nebulizer solution 15 mcg  15 mcg Nebulization BID Thom Nettles APRN - CNP   15 mcg at 03/03/23 0806      - O2, IS  - bronchopulmonary hygiene to continue for patient   - Doxycycline, Cefepime  - pulmozyme, chest physiotherapy, bronchopulmonary hygiene    - no role for thoracentesis at this time  - defer bronchoscopy   - limited code status  - supportive care to continue   - AVAPS, repeat ABG  - patient direction of care discussed with Dr. Johnnie Mejía MD  3/3/2023  3:15 PM

## 2023-03-03 NOTE — PROGRESS NOTES
Hospitalist Progress Note      PCP: Holden Hamilton MD    Date of Admission: 2/27/2023        Hospital Course:  67 y.o. male presented with [address],  AND SOB, NO FEVER, NO CHILLS. HAS A KNOWN HISTORY OF LUNG CANCER, NON SMALL CELL LUNG CANCR,   AND SEES  [facility]. NO FEVER, NO CHILLS,   NO NV, JUST STARTED WITH DIARRHEA** HAD ECTOPIC BEATS LAST PM , PACER INTERROGATED,  FUNCTIONING WELL. EP  SAW THE PATIENT, NO FURTHER WORKUP TO BE DONE** WENT INTO ARF THIS AM, RRT WAS CALLED.   TRANSFERRED TO MICU AND PUT ON BIPAP         Subjective: SOB           Medications:  Reviewed    Infusion Medications    dextrose      sodium chloride      sodium chloride      sodium chloride       Scheduled Medications    methylPREDNISolone  60 mg IntraVENous Q6H    furosemide  40 mg Oral Daily    furosemide        insulin lispro  0-4 Units SubCUTAneous 4x Daily AC & HS    insulin lispro  0-4 Units SubCUTAneous Nightly    sodium chloride  250 mL IntraVENous Once    sodium chloride flush  5-40 mL IntraVENous 2 times per day    polyethylene glycol  17 g Oral Daily    senna  2 tablet Oral Nightly    dornase alpha  2.5 mg Inhalation Daily    cefepime  2,000 mg IntraVENous q8h    diphenhydrAMINE  25 mg Oral Once    apixaban  5 mg Oral BID    atorvastatin  20 mg Oral Nightly    fluticasone  1 spray Each Nostril Daily    gabapentin  100 mg Oral BID    melatonin  3 mg Oral Nightly    [Held by provider] metoprolol succinate  25 mg Oral BID    pantoprazole  40 mg Oral Q12H    potassium chloride  10 mEq Oral Daily    sucralfate  1 g Oral 4x Daily AC & HS    sodium chloride flush  5-40 mL IntraVENous 2 times per day    ipratropium-albuterol  1 ampule Inhalation Q4H WA    doxycycline hyclate  100 mg Oral 2 times per day    budesonide  0.25 mg Nebulization BID    And    arformoterol tartrate  15 mcg Nebulization BID     PRN Meds: morphine, [COMPLETED] insulin regular **AND** dextrose, glucose, dextrose bolus **OR** dextrose bolus, glucagon (rDNA), dextrose, sodium chloride flush, sodium chloride, albuterol-ipratropium, sodium chloride, guaiFENesin-dextromethorphan, sodium chloride flush, sodium chloride, ondansetron **OR** ondansetron, magnesium hydroxide, acetaminophen **OR** acetaminophen      Intake/Output Summary (Last 24 hours) at 3/3/2023 1541  Last data filed at 3/3/2023 0858  Gross per 24 hour   Intake 300 ml   Output --   Net 300 ml       Exam:    /68   Pulse (!) 49   Temp 97.1 °F (36.2 °C) (Temporal)   Resp 20   Ht 5' 6\" (1.676 m)   Wt 165 lb (74.8 kg)   SpO2 98%   BMI 26.63 kg/m²         General appearance:  No apparent distress, appears stated age. HEENT:  Normal cephalic,   Neck: Supple, with full range of motion. . Trachea midline. Respiratory:  Normal respiratory effort. RHONCHI NOTED LLE FIELD,   Cardiovascular:  RRR  Abdomen: Soft, non-tender, non-distended with normal bowel sounds. Musculoskeletal:  No clubbing, cyanosis or edema bilaterally. Skin: smooth and dry   Neurologic:   Cranial nerves: II-XII intact,   Psychiatric:  Alert and oriented x 3             Labs:   Recent Labs     03/02/23  0625 03/03/23  0630 03/03/23  0844   WBC 2.8* 3.8* 6.8   HGB 7.4* 7.4* 8.5*   HCT 22.6* 22.7* 26.4*   PLT 50* 68* 91*     Recent Labs     03/02/23  0625 03/03/23  0630 03/03/23  0840 03/03/23  0844    141  --  141   K 4.8 4.9 5.77* 6.0*   CL 98 103  --  102   CO2 27 27  --  19*   BUN 24* 28*  --  29*   CREATININE 0.9 0.9  --  1.0   CALCIUM 9.9 9.6  --  9.9     No results for input(s): AST, ALT, BILIDIR, BILITOT, ALKPHOS in the last 72 hours. Recent Labs     03/03/23  0844   INR 1.3     No results for input(s): Larry Cortes in the last 72 hours. No results for input(s): AST, ALT, ALB, BILIDIR, BILITOT, ALKPHOS in the last 72 hours.   Recent Labs     03/03/23  1106   LACTA 5.0*     No results found for: Tamara Call  No results found for: AMMONIA    Assessment:    Active Hospital Problems    Diagnosis Date Noted    CAP (community acquired pneumonia) due to Chlamydia species [J16.0] 03/01/2023     Priority: Medium    [facility] [J44.1] 02/27/2023     Priority: Medium   ACUTE RESP FAILURE WITH HYPOXIA AND HYPOXEMIA  H/O PE    HTN   NSCLC LEFT LUNG           PLAN:  ELIQUIS   PULM  PROCAL    ROCEPHIN   DOXY   ROBITUSSIN     DVT Prophylaxis: ELIQUIS  Diet: ADULT DIET;  Regular  Code Status: Full Code     PT/OT Eval Status:  ORDERED     Dispo - HOME  Electronically signed by Yadi Sewell DO on 3/3/2023 at 3:41 PM [facility]

## 2023-03-03 NOTE — PROGRESS NOTES
Hospitalist progress note    Patient:  Tyra Cook  YOB: 1950  Date of Service: 3/3/23  MRN: [id number]   Acct:  [de-identified]   Primary Care Physician: Zainab Fernandes MD  2/27/2023        Rapid response note    Patient had shortness of breath and tachypnea, rapid response started I order EKG chest x-ray blood gas CBC and BMP patient has history of COPD emphysema and lung cancer. Potassium came back high with metabolic acidosis I give the patient bicarb.   Patient was bradycardic so I ordered EKG then the nurse practitioner of the patient came and case was transferred to the primary      Review of systems:    All 10 review of system were negative unless what is mentioned in the present history               PHYSICAL EXAM:  BP (!) 148/101   Pulse 61   Temp 97.1 °F (36.2 °C) (Temporal)   Resp (!) 36   Ht 5' 6\" (1.676 m)   Wt 165 lb (74.8 kg)   SpO2 93%   BMI 26.63 kg/m²     General appearance - alert, ill appearing,   Head: atraumatic   Pupil: equal, round reactive to light   Neck: Supple, trachea is midline   Chest -decreased breath sounds all over  Distant Breath Sounds: yes   Heart - S1 and S2 normal       Electronically signed by Jenni Klein MD on 3/3/2023 at 8:37 AM

## 2023-03-03 NOTE — PROGRESS NOTES
Subjective:  Chart reviewed  Patient hypoxic, bradycardic and RRT called patient transferred to ICU  Patient seen at bedside  SOB improved with Bipap, warming blanket  Denies additional complaints including pain, N/V    Objective:    BP (!) 141/63   Pulse 55   Temp 97.9 °F (36.6 °C) (Oral)   Resp 28   Ht 5' 6\" (1.676 m)   Wt 174 lb 9.6 oz (79.2 kg)   SpO2 98%   BMI 28.18 kg/m²     General: NAD, awake and alert  HEENT: NC/AT, sclera anicteric, Bipap  Neck supple no JVD  Heart:  RRR, pacemaker, no murmur  Lungs:  breath sounds diminished bilaterally throughout lung fields requiring Biapa  Abd: BS+, soft nondistended  Extrem:  1+ edema of feet bilaterally  Skin: good color, no rash    CBC with Differential:    Lab Results   Component Value Date/Time    WBC 2.2 01/19/2023 02:30 AM    RBC 2.62 01/19/2023 02:30 AM    HGB 7.5 01/19/2023 02:30 AM    HCT 22.8 01/19/2023 02:30 AM    PLT 22 01/19/2023 02:30 AM    MCV 87.0 01/19/2023 02:30 AM    MCH 28.6 01/19/2023 02:30 AM    MCHC 32.9 01/19/2023 02:30 AM    RDW 20.4 01/19/2023 02:30 AM    NRBC 0.9 01/18/2023 12:17 PM    BLASTSPCT 0.9 08/24/2022 05:22 AM    METASPCT 3.5 08/30/2022 07:03 AM    LYMPHOPCT 7.0 01/18/2023 12:17 PM    PROMYELOPCT 1.7 09/01/2022 03:30 AM    MONOPCT 2.1 01/18/2023 12:17 PM    MYELOPCT 0.9 12/29/2022 05:28 PM    BASOPCT 0.0 01/18/2023 12:17 PM    MONOSABS 0.00 01/18/2023 12:17 PM    LYMPHSABS 0.20 01/18/2023 12:17 PM    EOSABS 0.00 01/18/2023 12:17 PM    BASOSABS 0.00 01/18/2023 12:17 PM     CMP:    Lab Results   Component Value Date/Time     01/19/2023 02:30 AM    K 3.9 01/19/2023 02:30 AM    K 4.2 01/18/2023 12:17 PM     01/19/2023 02:30 AM    CO2 31 01/19/2023 02:30 AM    BUN 19 01/19/2023 02:30 AM    CREATININE 0.8 01/19/2023 02:30 AM    GFRAA >60 10/13/2022 12:28 PM    LABGLOM >60 01/19/2023 02:30 AM    GLUCOSE 110 01/19/2023 02:30 AM    PROT 5.9 12/26/2022 11:04 AM    LABALBU 4.0 12/26/2022 11:04 AM    CALCIUM 9.1 01/19/2023 02:30 AM    BILITOT 0.5 12/26/2022 11:04 AM    ALKPHOS 101 12/26/2022 11:04 AM    AST 25 12/26/2022 11:04 AM    ALT 25 12/26/2022 11:04 AM          Current Facility-Administered Medications:     0.9 % sodium chloride infusion, , IntraVENous, PRN, April Jon-Jairo, APRN - CNP    albuterol (ACCUNEB) nebulizer solution 0.63 mg, 1 ampule, Nebulization, Q6H PRN, Blessing Romero DO    [Held by provider] apixaban (ELIQUIS) tablet 5 mg, 5 mg, Oral, BID, Joseph Rashid DO    atorvastatin (LIPITOR) tablet 20 mg, 20 mg, Oral, Daily, Joseph Rashid DO, 20 mg at 01/19/23 0810    gabapentin (NEURONTIN) capsule 100 mg, 100 mg, Oral, BID, Joseph Rashid DO, 100 mg at 01/19/23 0810    melatonin tablet 3 mg, 3 mg, Oral, Nightly, Joseph Rashid DO, 3 mg at 01/18/23 2147    metoprolol succinate (TOPROL XL) extended release tablet 25 mg, 25 mg, Oral, BID, Joseph Rashid DO, 25 mg at 01/19/23 0810    predniSONE (DELTASONE) tablet 5 mg, 5 mg, Oral, Daily, Joseph Rashid DO, 5 mg at 01/19/23 0810    sucralfate (CARAFATE) tablet 1 g, 1 g, Oral, 4x Daily AC & HS, Joseph Rashid DO, 1 g at 01/19/23 1113    ondansetron (ZOFRAN) injection 4 mg, 4 mg, IntraVENous, Q6H PRN, Blessing Romero DO    dextrose 5 % and 0.9 % sodium chloride infusion, , IntraVENous, Continuous, Joseph Rashid DO, Last Rate: 100 mL/hr at 01/19/23 0230, New Bag at 01/19/23 0230    budesonide (PULMICORT) nebulizer suspension 500 mcg, 0.5 mg, Nebulization, BID, 500 mcg at 01/19/23 0929 **AND** arformoterol tartrate (BROVANA) nebulizer solution 15 mcg, 15 mcg, Nebulization, BID, 15 mcg at 01/19/23 0929 **AND** ipratropium (ATROVENT) 0.02 % nebulizer solution 0.5 mg, 0.5 mg, Nebulization, 4x daily, Joseph Rashid, , 0.5 mg at 01/19/23 1608    pantoprazole (PROTONIX) 40 mg in sodium chloride (PF) 0.9 % 10 mL injection, 40 mg, IntraVENous, Q12H, Eduar Yeager DO, 40 mg at 01/19/23 1241    XR CHEST PORTABLE   Final Result Left pleural effusion with probable left upper lobe lingular segment   atelectasis. Assessment:    Principal Problem:    GIB (gastrointestinal bleeding)  Resolved Problems:    * No resolved hospital problems. *    66 yo known to Dr. 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 Treated with concurrent chemoRT fb Imfinzi from 11/2/21. Progression on PET from 6/24/22. Started Candida Sandifer and Keytruda on 6/30/22. Patient's most recent treatment of carbo/gemzar received 2/8 and 2/15 and was due to begin his next cycle 3/1 but he is hospitalized. He was due to begin aranesp also. Admitted with PNA, anemia, thrombocytopenia and acute bronchospasm. Had RRT and now transferred to MICU. A/P:  -PNA, antibiotics and supportive care per pulmonary  -WBC improved, ? infection  -anemia, multifactorial was due to begin aranesp outpatient and can begin next week  -treat supportively in the interim and maintain Hgb > 7  -thrombocytopenia treat supportively to maintain plt > 10k unless evidence of active bleeding >30k  -continue eliquis as long as platelets remain 82Y or above  -check CBC with diff daily  -check FOBT, patient has had concerns for GI blood loss and if H/H drops would have to consider continuation of anticoagulation  -history of iron deficiency s/p parental replacement  -Patient's differential with blasts, likely reactive will check retic count and peripheral smear      Electronically signed by MATTHEW Reis on 1/19/2023 at 5:00 PM   Attending Addendum:     Patient seen and examined personally on 3/3/23. Reviewed pertinent labs and imaging reports. Progress note by MATTHEW Carrasco has been updated to reflect my changes. Agree with the note above.       Sunita Pereyra MD  Medical Oncologist/Hematologist  [facility] for [address] [facility]

## 2023-03-03 NOTE — PROGRESS NOTES
Palliative Medicine Social Work     Patient Name: Gisela Chacon    Age: 67 y.o. Marital Status:      Status: no    Next of Kin: sameer Sherwood [phone]               Additional Support: none per wife    Minor Children: no children    Advanced Directives: HCPOA, wife to provide copy    Confirm Code Status: full code, wife states he would not want to live permanently on machines. Current Goals of Care: live longer, improve or maintain function/ quality of life, remain at home and continue current management    Mental Health History: no    Substance Abuse:no    Indications of Abuse/Neglect: no    Financial Concerns: no    Living Situation: lives with wife, uses home o2 and [facility] which wife states was helping him improve since coming home from LTAC this fall/winter. Physical Care Needs Met: yes    Emotional Needs Met: time spent exploring pts thoughts and feelings, providing support through active listening and validation of feelings. Assessment: 68 yo  male seen in icu along with his wife. He is alert, on bipap after an rrt. History of lung carcinoma, copd, and per wife a bacterial infection last year. She states that while in LTAC they discussed that neither would want to use permanent life support. She states he was recovering well from the [facility] stay and is hopeful he can improve. She will bring in copy of his HCPOA.

## 2023-03-03 NOTE — CONSULTS
Palliative Care Department  [phone]  Palliative Care Initial Consult  Provider LELO Murcia CNP     Faizan Guerra  [id number]  Hospital Day: 5  Date of Initial Consult: 3/3/23  Referring Provider: Dr. Laurie Dover was consulted for assistance with: Goals of care, CODE STATUS discussion    HPI:   Faizan Guerra is a 67 y.o. with a medical history of complete heart block with pacemaker, COPD, HTN, non-small cell lung cancer, pulmonary emboli on Eliquis who was admitted on 2/27/2023 from home with a CHIEF COMPLAINT of productive cough. ED work-up significant for:    ASSESSMENT/PLAN:     Pertinent Hospital Diagnoses     COPD with acute exacerbation    Palliative Care Encounter / Counseling Regarding Goals of Care  Please see detailed goals of care discussion as below  At this time, Faizan Guerra, Does have capacity for medical decision-making.   Capacity is time limited and situation/question specific  During encounter no one was surrogate medical decision-maker  Outcome of goals of care meeting:   Continue current medical management  Change CODE STATUS to limited, no to all options  Okay with NIV therapy  Wife to bring in living will and healthcare power of  paperwork  Code status Limited no shock, no medications, no compressions, no intubation  Advanced Directives: no POA or living will in epic  Surrogate/Legal NOK:  Olivier Lutz (spouse): [phone] (H); [phone] (M)    Spiritual assessment: no spiritual distress identified  Bereavement and grief: to be determined  Referrals to: none today  SUBJECTIVE:     Current medical issues leading to Palliative Medicine involvement include   Active Hospital Problems    Diagnosis Date Noted    CAP (community acquired pneumonia) due to Chlamydia species [J16.0] 03/01/2023     Priority: Medium    Acute exacerbation of chronic obstructive pulmonary disease (COPD) ([facility] [J44.1] 02/27/2023     Priority: Medium       Details of Conversation:    Chart reviewed. Patient seen resting in bed on NIV in no acute distress. Patient is alert and oriented and able to hold meaningful conversation. Palliative medicine role and self introduced. Krishna has a living will and healthcare power of . He states his wife is bringing in his paperwork. He states his wife, Rochelle Constantino is his healthcare power of . Patient's goal is to return home. Discussed CODE STATUS. I reviewed with the Patient that given multiple medical co-morbidities, advanced disease process, and current severe acute illness, in the event of cardiac arrest, the chances of surviving may likely be low. It was also reviewed that if they survived a cardiac arrest, a return to prior level of function also may be low. Patient states understanding and wishes to change his CODE STATUS to limited, no to all options. He states he would not want any resuscitative interventions performed but he okay with continuing the NIV. All questions answered. Case discussed with pulmonologist and oncology physician assistant. There are no further PM needs at this time. PM will now sign off. If new PM needs arise or patient's condition deteriorates, please re-consult. Thank you. OBJECTIVE:   Prognosis: Guarded    ROS: UNLESS STATED ABOVE PATIENT DENIES:  CONSTITUTIONAL:  fever, chills, fatigue. HEENT: hearing problem, tinnitus, hoarseness, dysphagia  RESPIRATORY: cough, sputum expectoration.   CARDIOVASCULAR:  Chest pain/pressure, palpitation, syncope, irregular beats  GASTROINTESTINAL:  abdominal or rectal pain, diarrhea, constipation  GENITOURINARY:  Burning, frequency, urgency, incontinence  INTEGUMENTARY: rash, wound, pruritis  HEMATOLOGIC/LYMPHATIC:  Swelling, sores, gum bleeding, easy bruising  MUSCULOSKELETAL:  pain, joint swelling or redness  NEUROLOGICAL:  light headed, dizziness, loss of consciousness, weakness, change in memory, seizures, tremors    Physical Exam:  BP (!) 179/99   Pulse 71   Temp 97.1 °F (36.2 °C) (Temporal)   Resp (!) 33   Ht 5' 6\" (1.676 m)   Wt 165 lb (74.8 kg)   SpO2 90%   BMI 26.63 kg/m²   Constitutional:  NAD, ill-appearing  Lungs: Diminished bilaterally, coarse, no audible rhonchi or wheezes noted, respirations unlabored, no retractions + NIV therapy  Heart:  paced  Abd:  Soft, non tender, non distended, bowel sounds present  Ext:  Moving all extremities, + edema, pulses present  Skin:  Warm and dry, no rashes on visible skin  Psych: non-anxious affect  Neuro:  Alert, oriented x4; following commands    Objective data reviewed: labs, images, records, medication use, vitals, and chart    Discussed patient and the plan of care with the other IDT members: Palliative Medicine IDT Team and Patient, pulmonologist, oncology physician assistant    Time/Communication  Greater than 50% of time spent, total 75 minutes in counseling and coordination of care at the bedside regarding  CODE STATUS discussion, goals of care, and diagnosis and prognosis. Thank you for allowing Palliative Medicine to participate in the care of Sam Garrett.

## 2023-03-03 NOTE — PLAN OF CARE
[facility] cardiology consulted for patient in RRT with heart rate in the 50s. After speaking with nurse patient is in respiratory distress and was transferred to ICU. Patient was seen by electrophysiology 2 days prior and has a PPM.  Nurse instructed to speak with attending and if consult remains for bradycardia to reconsult or call electrophysiology at this time. If any changes occur from a cardiology standpoint that we would need consulted please reconsult at that time. Please call with any questions/concerns. Thank you.     Electronically signed by LELO Esteban CNP on 3/3/2023 at 9:26 AM

## 2023-03-03 NOTE — PLAN OF CARE
Problem: Discharge Planning  Goal: Discharge to home or other facility with appropriate resources  3/2/2023 2155 by January Lopez RN  Outcome: Progressing  3/2/2023 1312 by Latoya Ross RN  Outcome: Progressing  Flowsheets (Taken 3/2/2023 9166)  Discharge to home or other facility with appropriate resources: Identify barriers to discharge with patient and caregiver     Problem: Safety - Adult  Goal: Free from fall injury  3/2/2023 2155 by January Lopez RN  Outcome: Progressing  3/2/2023 1312 by Latoya Ross RN  Outcome: Progressing     Problem: ABCDS Injury Assessment  Goal: Absence of physical injury  3/2/2023 2155 by January Lopez RN  Outcome: Progressing  3/2/2023 1312 by Latoya Ross RN  Outcome: Progressing     Problem: Pain  Goal: Verbalizes/displays adequate comfort level or baseline comfort level  3/2/2023 2155 by January Lopez RN  Outcome: Progressing  3/2/2023 1312 by Latoya Ross RN  Outcome: Progressing  Flowsheets (Taken 3/2/2023 0751)  Verbalizes/displays adequate comfort level or baseline comfort level: Encourage patient to monitor pain and request assistance     Problem: Nutrition Deficit:  Goal: Optimize nutritional status  3/2/2023 2155 by January Lopez RN  Outcome: Progressing  3/2/2023 1312 by Latoya Ross RN  Outcome: Progressing

## 2023-03-04 NOTE — PROGRESS NOTES
Subjective:  Remains in MICU after RRT. Wife is at bedside.   His breathing has improved  Continues to have SOB improved with Bipap,  Denies additional complaints including pain, N/V    Objective:    BP (!) 141/63   Pulse 55   Temp 97.9 °F (36.6 °C) (Oral)   Resp 28   Ht 5' 6\" (1.676 m)   Wt 174 lb 9.6 oz (79.2 kg)   SpO2 98%   BMI 28.18 kg/m²     General: NAD, awake and alert  HEENT: NC/AT, sclera anicteric, Bipap  Neck supple no JVD  Heart:  RRR, pacemaker, no murmur  Lungs:  breath sounds diminished bilaterally throughout lung fields requiring Biapa  Abd: BS+, soft nondistended  Extrem: BLE 1+ edema of feet   Skin: good color, no rash    CBC with Differential:    Lab Results   Component Value Date/Time    WBC 2.2 01/19/2023 02:30 AM    RBC 2.62 01/19/2023 02:30 AM    HGB 7.5 01/19/2023 02:30 AM    HCT 22.8 01/19/2023 02:30 AM    PLT 22 01/19/2023 02:30 AM    MCV 87.0 01/19/2023 02:30 AM    MCH 28.6 01/19/2023 02:30 AM    MCHC 32.9 01/19/2023 02:30 AM    RDW 20.4 01/19/2023 02:30 AM    NRBC 0.9 01/18/2023 12:17 PM    BLASTSPCT 0.9 08/24/2022 05:22 AM    METASPCT 3.5 08/30/2022 07:03 AM    LYMPHOPCT 7.0 01/18/2023 12:17 PM    PROMYELOPCT 1.7 09/01/2022 03:30 AM    MONOPCT 2.1 01/18/2023 12:17 PM    MYELOPCT 0.9 12/29/2022 05:28 PM    BASOPCT 0.0 01/18/2023 12:17 PM    MONOSABS 0.00 01/18/2023 12:17 PM    LYMPHSABS 0.20 01/18/2023 12:17 PM    EOSABS 0.00 01/18/2023 12:17 PM    BASOSABS 0.00 01/18/2023 12:17 PM     CMP:    Lab Results   Component Value Date/Time     01/19/2023 02:30 AM    K 3.9 01/19/2023 02:30 AM    K 4.2 01/18/2023 12:17 PM     01/19/2023 02:30 AM    CO2 31 01/19/2023 02:30 AM    BUN 19 01/19/2023 02:30 AM    CREATININE 0.8 01/19/2023 02:30 AM    GFRAA >60 10/13/2022 12:28 PM    LABGLOM >60 01/19/2023 02:30 AM    GLUCOSE 110 01/19/2023 02:30 AM    PROT 5.9 12/26/2022 11:04 AM    LABALBU 4.0 12/26/2022 11:04 AM    CALCIUM 9.1 01/19/2023 02:30 AM    BILITOT 0.5 12/26/2022 11:04 AM    ALKPHOS 101 12/26/2022 11:04 AM    AST 25 12/26/2022 11:04 AM    ALT 25 12/26/2022 11:04 AM          Current Facility-Administered Medications:     0.9 % sodium chloride infusion, , IntraVENous, PRN, Carissa Desouza, APRN - CNP    albuterol (ACCUNEB) nebulizer solution 0.63 mg, 1 ampule, Nebulization, Q6H PRN, Georgia Trevino DO    [Held by provider] apixaban (ELIQUIS) tablet 5 mg, 5 mg, Oral, BID, Joseph Zamudio DO    atorvastatin (LIPITOR) tablet 20 mg, 20 mg, Oral, Daily, Joseph Zamudio DO, 20 mg at 01/19/23 0810    gabapentin (NEURONTIN) capsule 100 mg, 100 mg, Oral, BID, Joseph Zamudio DO, 100 mg at 01/19/23 0810    melatonin tablet 3 mg, 3 mg, Oral, Nightly, Joseph Zamudio DO, 3 mg at 01/18/23 2147    metoprolol succinate (TOPROL XL) extended release tablet 25 mg, 25 mg, Oral, BID, Joseph Zamudio DO, 25 mg at 01/19/23 0810    predniSONE (DELTASONE) tablet 5 mg, 5 mg, Oral, Daily, Joseph Zamudio DO, 5 mg at 01/19/23 0810    sucralfate (CARAFATE) tablet 1 g, 1 g, Oral, 4x Daily AC & HS, Joseph Zamudio DO, 1 g at 01/19/23 1113    ondansetron (ZOFRAN) injection 4 mg, 4 mg, IntraVENous, Q6H PRN, Georgia Trevino DO    dextrose 5 % and 0.9 % sodium chloride infusion, , IntraVENous, Continuous, Joseph Zamudio DO, Last Rate: 100 mL/hr at 01/19/23 0230, New Bag at 01/19/23 0230    budesonide (PULMICORT) nebulizer suspension 500 mcg, 0.5 mg, Nebulization, BID, 500 mcg at 01/19/23 0929 **AND** arformoterol tartrate (BROVANA) nebulizer solution 15 mcg, 15 mcg, Nebulization, BID, 15 mcg at 01/19/23 0929 **AND** ipratropium (ATROVENT) 0.02 % nebulizer solution 0.5 mg, 0.5 mg, Nebulization, 4x daily, Joseph Zamudio DO, 0.5 mg at 01/19/23 1608    pantoprazole (PROTONIX) 40 mg in sodium chloride (PF) 0.9 % 10 mL injection, 40 mg, IntraVENous, Q12H, Eduar Yeager DO, 40 mg at 01/19/23 1241    XR CHEST PORTABLE   Final Result   Left pleural effusion with probable left upper lobe lingular segment   atelectasis. Assessment:    Principal Problem:    GIB (gastrointestinal bleeding)  Resolved Problems:    * No resolved hospital problems. 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 Also history of RLE in the PE on chronic Eliquis. Treated with concurrent chemoRT fb Imfinzi from 11/2/21. Progression on PET from 6/24/22. Started Lalit Box and Keytruda on 6/30/22. Remains on Carbo/Gemzar, last on 2/15 and was due to begin his next cycle 3/1 but he is hospitalized. -Admitted with PNA, anemia, thrombocytopenia and acute bronchospasm. Had RRT and now transferred to MICU. CTA chest, no evidence of PE. Emphysema changes. Malignancy with infiltration from left hilar region towards the lateral mediastinum. A/P:  Pancytopenia due to recent chemotherapy and now also with recent infection/pneumonia and is on antibiotics treatments. WBC is trending down but ANC remains above 1500. If decreases then will give G-CSF support. Transfusion support as needed to maintain hemoglobin greater than 7 g/dL  He is on chronic anticoagulation with Eliquis 5 mg BID, continue as long as platelets remain above 50k.     Antibiotics for gram-positive cocci and GNR in sputum

## 2023-03-04 NOTE — PROGRESS NOTES
Labored and dyspneic on 7 liters. Coughing with clear and having chest pain from coughing. Wore AVAPS last night.   Vent settings:   Additional Respiratory Assessments  Heart Rate: 63  Resp: 15  SpO2: 100 %      Current Facility-Administered Medications:     0.9 % sodium chloride infusion, , IntraVENous, Continuous, Dayton Buchanan MD, Last Rate: 125 mL/hr at 03/04/23 1314, New Bag at 03/04/23 1314    [COMPLETED] insulin regular (HUMULIN R;NOVOLIN R) injection 10 Units, 10 Units, SubCUTAneous, Once, 10 Units at 03/03/23 1014 **AND** dextrose 50 % IV solution, 25 g, IntraVENous, PRN, Connie Kam MD, 25 g at 03/03/23 1016    glucose chewable tablet 16 g, 4 tablet, Oral, PRN, Connie Kam MD    dextrose bolus 10% 125 mL, 125 mL, IntraVENous, PRN **OR** dextrose bolus 10% 250 mL, 250 mL, IntraVENous, PRN, Connie Kam MD    glucagon injection 1 mg, 1 mg, SubCUTAneous, PRN, Connie Kam MD    dextrose 10 % infusion, , IntraVENous, Continuous PRN, Connie Kam MD    [facility] [place] by provider] furosemide (LASIX) tablet 40 mg, 40 mg, Oral, Daily, Connie Kam MD, 40 mg at 03/04/23 0902    insulin lispro (HUMALOG) injection vial 0-4 Units, 0-4 Units, SubCUTAneous, 4x Daily AC & HS, Connie Kam MD, 1 Units at 03/03/23 1301    insulin lispro (HUMALOG) injection vial 0-4 Units, 0-4 Units, SubCUTAneous, Nightly, Connie Kam MD    polyethylene glycol [facility]) packet 17 g, 17 g, Oral, Daily, Dayton Buchanan MD    senna (SENOKOT) tablet 17.2 mg, 2 tablet, Oral, Nightly, Dayton Buchanan MD, 17.2 mg at 03/03/23 2057    dornase alpha (PULMOZYME) nebulizer solution 2.5 mg, 2.5 mg, Inhalation, Daily, Jannett Rubinstein, MD, 2.5 mg at 03/04/23 0736    albuterol-ipratropium (COMBIVENT RESPIMAT)  MCG/ACT inhaler 2 puff, 2 puff, Inhalation, Q6H PRN, Khoa Lovett, APRN - CNP guaiFENesin-dextromethorphan (ROBITUSSIN DM) 100-10 MG/5ML syrup 5 mL, 5 mL, Oral, Q4H PRN, Jonel Santos DO, 5 mL at 03/01/23 1759    cefepime (MAXIPIME) 2,000 mg in sodium chloride 0.9 % 50 mL IVPB ([id number]), 2,000 mg, IntraVENous, q8h, Terri Kimble MD, Stopped at 03/04/23 1034    diphenhydrAMINE (BENADRYL) tablet 25 mg, 25 mg, Oral, Once, April JonJairo, APRN - CNP    apixaban (ELIQUIS) tablet 5 mg, 5 mg, Oral, BID, Kenbryce Hutchins Learn, APRN - CNP, 5 mg at 03/04/23 0902    atorvastatin (LIPITOR) tablet 20 mg, 20 mg, Oral, Nightly, Kennieth Comment Learn, APRN - CNP, 20 mg at 03/03/23 2057    fluticasone (FLONASE) 50 MCG/ACT nasal spray 1 spray, 1 spray, Each Nostril, Daily, Kennieth Comment Learn, APRN - CNP, 1 spray at 03/02/23 0940    gabapentin (NEURONTIN) capsule 100 mg, 100 mg, Oral, BID, Kennieth Comment Learn, APRN - CNP, 100 mg at 03/04/23 0902    melatonin tablet 3 mg, 3 mg, Oral, Nightly, Kennieth Comment Learn, APRN - CNP, 3 mg at 03/03/23 2057    [Held by provider] metoprolol succinate (TOPROL XL) extended release tablet 25 mg, 25 mg, Oral, BID, Kennieth Comment Learn, APRN - CNP, 25 mg at 03/02/23 2037    pantoprazole (PROTONIX) tablet 40 mg, 40 mg, Oral, Q12H, Kennianca Hutchins Learn, APRN - CNP, 40 mg at 03/04/23 0902    sucralfate (CARAFATE) tablet 1 g, 1 g, Oral, 4x Daily AC & HS, Kenbryce Comment Learn, APRN - CNP, 1 g at 03/04/23 1149    sodium chloride flush 0.9 % injection 5-40 mL, 5-40 mL, IntraVENous, 2 times per day, Sergio Hutchins Learn, APRN - CNP, 10 mL at 03/03/23 2058    sodium chloride flush 0.9 % injection 10 mL, 10 mL, IntraVENous, PRN, Sergio Comment Learn, APRN - CNP, 10 mL at 03/03/23 0206    0.9 % sodium chloride infusion, , IntraVENous, PRN, Sergio Comment Learn, APRN - CNP, Last Rate: 10 mL/hr at 03/04/23 1043, Rate Verify at 03/04/23 1043    magnesium hydroxide (MILK OF MAGNESIA) 400 MG/5ML suspension 30 mL, 30 mL, Oral, Daily PRN, Sergio Hutchins Learn, APRN - CNP    acetaminophen (TYLENOL) tablet 650 mg, 650 mg, Oral, Q6H PRN **OR** acetaminophen (TYLENOL) suppository 650 mg, 650 mg, Rectal, Q6H PRN, Aleda Poag Learn, APRN - CNP    ipratropium-albuterol (DUONEB) nebulizer solution 1 ampule, 1 ampule, Inhalation, Q4H WA, Aleda Poag Learn, APRN - CNP, 1 ampule at 03/04/23 1258    [DISCONTINUED] cefTRIAXone (ROCEPHIN) 1,000 mg in sterile water 10 mL IV syringe, 1,000 mg, IntraVENous, Q24H, 1,000 mg at 02/28/23 9186 **AND** doxycycline hyclate (VIBRAMYCIN) capsule 100 mg, 100 mg, Oral, 2 times per day, Aleda Poag Learn, APRN - CNP, 100 mg at 03/04/23 0902    budesonide (PULMICORT) nebulizer suspension 250 mcg, 0.25 mg, Nebulization, BID, 250 mcg at 03/04/23 4039 **AND** arformoterol tartrate (BROVANA) nebulizer solution 15 mcg, 15 mcg, Nebulization, BID, Aleda Poag Learn, APRN - CNP, 15 mcg at 03/04/23 0922  BP (!) 144/58   Pulse 63   Temp 98.7 °F (37.1 °C) (Axillary)   Resp 15   Ht 5' 6\" (1.676 m)   Wt 177 lb 11.1 oz (80.6 kg)   SpO2 100%   BMI 28.68 kg/m²     General: Dyspneic at rest  Chest: Mediport right chest, sym.  Expansion without accessory use  Heart: RRR  Lungs: Diminished left base  Abdomen: Distended, soft, nt. + umbilical hernia  Extremities: No edema    Intake/Output Summary (Last 24 hours) at 3/4/2023 1426  Last data filed at 3/4/2023 1314  Gross per 24 hour   Intake 634.78 ml   Output 1245 ml   Net -610.22 ml     CBC with Differential:    Lab Results   Component Value Date/Time    WBC 2.7 03/04/2023 04:21 AM    RBC 2.30 03/04/2023 04:21 AM    HGB 7.1 03/04/2023 04:21 AM    HCT 21.9 03/04/2023 04:21 AM    PLT 72 03/04/2023 04:21 AM    MCV 95.2 03/04/2023 04:21 AM    MCH 30.9 03/04/2023 04:21 AM    MCHC 32.4 03/04/2023 04:21 AM    RDW 21.3 03/04/2023 04:21 AM    NRBC 18.6 03/04/2023 04:21 AM    BLASTSPCT 1.7 03/03/2023 08:44 AM    METASPCT 9.7 03/04/2023 04:21 AM    LYMPHOPCT 10.6 03/04/2023 04:21 AM    PROMYELOPCT 0.9 03/03/2023 08:44 AM    MONOPCT 10.6 03/04/2023 04:21 AM    MYELOPCT 0.9 03/04/2023 04:21 AM    BASOPCT 0.9 03/04/2023 04:21 AM    MONOSABS 0.30 03/04/2023 04:21 AM    LYMPHSABS 0.30 03/04/2023 04:21 AM    EOSABS 0.00 03/04/2023 04:21 AM    BASOSABS 0.02 03/04/2023 04:21 AM     BMP:    Lab Results   Component Value Date/Time     03/04/2023 04:21 AM    K 4.6 03/04/2023 04:21 AM    K 4.0 02/28/2023 05:37 AM     03/04/2023 04:21 AM    CO2 27 03/04/2023 04:21 AM    BUN 43 03/04/2023 04:21 AM    LABALBU 3.7 03/03/2023 04:09 PM    CREATININE 1.7 03/04/2023 04:21 AM    CALCIUM 9.0 03/04/2023 04:21 AM    GFRAA >60 10/13/2022 12:28 PM    LABGLOM 42 03/04/2023 04:21 AM    GLUCOSE 171 03/04/2023 04:21 AM     ABG:    Lab Results   Component Value Date/Time    PH 7.419 03/04/2023 04:48 AM    PCO2 39.2 03/04/2023 04:48 AM    PO2 67.8 03/04/2023 04:48 AM    HCO3 24.8 03/04/2023 04:48 AM    BE 0.3 03/04/2023 04:48 AM    O2SAT 90.9 03/04/2023 04:48 AM   CXR viewed worsening infiltrate vs effusion left base    IMPRESSION:   Patient Active Problem List   Diagnosis    Syncope and collapse    Pneumonia    COPD (chronic obstructive pulmonary disease) (HCC)    BPH (benign prostatic hyperplasia)    HTN (hypertension)    HLD (hyperlipidemia)    Intermittent complete heart block (HCC)    Cardiac pacemaker in situ    Acute on chronic respiratory failure with hypoxia (HCC)    Squamous carcinoma of lung (HCC)    History pulmonary embolism (HCC), on Eliquis    Hyponatremia    Hypomagnesemia    Wide-complex tachycardia    COVID-19    Pancytopenia (HCC)    Pacemaker infection ([facility]    Sepsis due to Enterococcus ([facility] [address]    Bacteremia    High-grade atrioventricular block    Second degree AV block, Mobitz type I    Hematoma    Presence of leadless cardiac pacemaker    Bacterial sepsis (HCC)    Anemia    GIB (gastrointestinal bleeding)    Acute exacerbation of chronic obstructive pulmonary disease (COPD) ([facility] [address]    CAP (community acquired pneumonia) due to Chlamydia species   Probably has increased left effusion probably malignant and may finally need and benefit from thoracentesis. Would need to hold apixaban to accomplish. LLL pneumonia on doxy, cefepime. Copd on nebs and prn Combivent. HO PE on apixaban.   Nonsmall cell lung ca      Judd Peña MD  3/4/2023  2:26 PM

## 2023-03-04 NOTE — PROGRESS NOTES
Date: 3/3/2023    Time: 8:23 PM    Patient Placed On BIPAP/CPAP/ Non-Invasive Ventilation? Remains on from previous shift    If no must comment. Facial area red/color change? No           If YES are Blister/Lesion present? No   If yes must notify nursing staff  BIPAP/CPAP skin barrier?   Yes    Skin barrier type:mepilexlite       Comments:        Leslie Peña RCP

## 2023-03-04 NOTE — PROGRESS NOTES
[facility]  Department of Internal Medicine   Internal Medicine Residency   MICU Progress Note    Patient:  Macario Patterson 67 y.o. male  MRN: [id number]     Date of Service: 3/4/2023    Allergy: Patient has no known allergies. Overnight Events: no acute events occurred overnight    Subjective     Patient seen at bedside this morning in no apparent distress. Patient said he was doing so well last night he needed AVAPS. Was just on 8L HFNC. Patient's only complaint is he hopes he can get up and go to the bathroom today and he wants a diet. He does say he had a little bit of a cough this morning with yellow sputum production but he said this is his baseline. Otherwise he is feeling very well.     ROS: Denies Fever/chills/CP/SOB/N/V/D/C/Dysuria/Blood in stool or urine    Objective     VS: BP (!) 147/55   Pulse 62   Temp 98.7 °F (37.1 °C) (Axillary)   Resp 20   Ht 5' 6\" (1.676 m)   Wt 177 lb 11.1 oz (80.6 kg)   SpO2 100%   BMI 28.68 kg/m²           I & O - 24hr:   Intake/Output Summary (Last 24 hours) at 3/4/2023 1225  Last data filed at 3/4/2023 1200  Gross per 24 hour   Intake 634.78 ml   Output 1120 ml   Net -485.22 ml       Pressors: None    Sedatives: None    Oxygen: 8L HFNC and was on AVAPS, can go back on AVAPS at night if needed    ABG:     Lab Results   Component Value Date/Time    PH 7.419 03/04/2023 04:48 AM    PCO2 39.2 03/04/2023 04:48 AM    PO2 67.8 03/04/2023 04:48 AM    HCO3 24.8 03/04/2023 04:48 AM    BE 0.3 03/04/2023 04:48 AM    THB 8.5 03/04/2023 04:48 AM    O2SAT 90.9 03/04/2023 04:48 AM       Physical Exam:  General Appearance: alert, appears stated age, and cooperative  Lung: Wheezing heard bilaterally on auscultation  Heart: regular rate and rhythm, S1, S2 normal, no murmur, click, rub or gallop  Abdomen: soft, non-tender; bowel sounds normal; no masses,  no organomegaly  Extremities:  extremities normal, atraumatic, no cyanosis or edema  Musculoskeletal: No joint swelling, no muscle tenderness. ROM normal in all joints of extremities. Neurologic: Mental status: Alert, oriented, thought content appropriate  Other:      Lines:   Single Lumen Implantable Port 01/18/23 Right Chest 01/18/23      Urinary catheter: none     Labs     Basic Labs    Complete Blood Count:   Recent Labs     03/03/23  0844 03/03/23  1609 03/04/23  0421   WBC 6.8 4.9 2.7*   HGB 8.5* 7.8* 7.1*   HCT 26.4* 24.1* 21.9*   PLT 91* 79* 72*        Last 3 Blood Glucose:   Recent Labs     03/03/23  1106 03/03/23  1609 03/04/23  0421   GLUCOSE 323* 107* 171*        PT/INR:    Lab Results   Component Value Date/Time    PROTIME 14.0 03/03/2023 08:44 AM    INR 1.3 03/03/2023 08:44 AM     PTT:    Lab Results   Component Value Date/Time    APTT 25.8 12/27/2022 05:06 AM       Comprehensive Metabolic Profile:   Recent Labs     03/03/23  0844 03/03/23  1106 03/03/23  1609 03/04/23  0421     --  142 139   K 6.0*  --  4.9 4.6     --  104 100   CO2 19*  --  23 27   BUN 29*  --  35* 43*   CREATININE 1.0  --  1.5* 1.7*   GLUCOSE 252* 323* 107* 171*   CALCIUM 9.9  --  9.5 9.0   PROT  --   --  5.9*  --    LABALBU  --   --  3.7  --    BILITOT  --   --  0.6  --    ALKPHOS  --   --  105  --    AST  --   --  248*  --    ALT  --   --  201*  --       Magnesium:   Lab Results   Component Value Date/Time    MG 2.1 03/04/2023 04:21 AM     Phosphorus:   Lab Results   Component Value Date/Time    PHOS 3.1 03/03/2023 04:09 PM     Ionized Calcium: No results found for: CAION   Troponin: No results for input(s): TROPONINI in the last 72 hours.     Imaging Studies:  CXR:   Consolidation in left lung        Resident's Assessment and Plan      SUMMARY OF HOSPITAL STAY:   Kenny Baker is a 67 y.o. male with a PMHx of COPD, chronic hypoxic resp failure on 4L at home, anemia, squamous cell carcinoma of lung, wide-complex tachycardia w/ leadless pacer, pancytopenia 2/2 chemo, Hx subsegmental PE on Eliquis came to the hospital on 2/27 SOB and exacerbation of cough and to the ICU on 3/3 for acute respiratory failure and bradycardia. Patient came in on 2/27 for productive cough of yellow sputum and SOB. Patient denied any fever, chills at the time. He did have some mild diarrhea. Vitals were significant for /54, HR 50's, afebrile. Labs were significant for procal 1.49, proBNP 1,000. WBC 3.6, Hb 7.2 > 6.0 and got 1 unit of pRBC, platelets 21 >> 79. Respiratory cultures were obtained which showed abundant GNR, covid and flu were negative. For the bradycardia EP was consulted and the pacer was interrogated, no problems were seen. Patient was RRTed on 3/3 for worsening SOB and bradycardia and patient was transferred to the ICU for further management. Upon arrival patient was tripoding and tachypneic. He was placed on AVAPS and his respiratory status improved.       Consults:   Critical care  Pulmonology  EP  Cardio  Palliative   Heme/onc     Hx wide-complex tachycardia, pacer removed 8/2022 w/ leadless pacer put in 8/2022  Elevated troponin, likely 2/2 demand ischemia 28 > 200   Trend troponin   If increasing get EKG   Elevated proBNP 1,000 > 5,500   Hold lasix for now given rising Cr   Monitor I/O's   Bradycardia during RRT, EP has already seen pt and cleared device, blood pressure has remained stable despite HR 50's and 60's    COPD exacerbation 2/2 CAP, 45 pack year hx, on 4L NC baseline, on prednisone 5 and telegy at home  G+ cocci and GNR in sputum, procal 1.49 > 0.76   Cont flutter, brovana, budenoside, duoneb, solu-medtrol 40 BID   Follow pulm rec  Cont AVAPS at night, pt tolerated just HF NC last night  Cont cefepime and doxy  Daily CXR  Coronavirus HKU1 (non-SARS-CoV-2) positive   Stage 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 Jarret Newton and [facility], diagnosed 5/2021, s/p chemo/radiation w/ Imfinzi, progression seen 6/2022 started Carbo, Gemzar and Keytruda  Carbo/gemzar 2/8 and 2/15, was scheduled next cycle 3/1 Probable FIONA  Hx RLL subsegmental PE, cont home Eliquis   Radiation pneumonitis/fibrosis of left lung   BHARATI 2/2 likely prerenal, hold lasix, give fluids if prerenal   Follow urine lytes   HAGMA 2/2 LA 5.0, resolved  Hyperglycemia 2/2 steroids, no history of diabetes   POCT BG AC/HS, LDSS   Hx pancytopenia 2/2 chemo   aCont Eliquis as long as plt > 50K   Plt goal >10 K unless active bleeding then 30K is the goal   Anemia, likely 2/2 aranesp outpt, s/p 1 pRBC this admission   Follow heme onc rec   Check FOBT, consider holding anticoagulation if Hb continues to drop, hx of LESLIE s/p IV replacement   Reticulocyte count 3.1%  Follow peripheral smear and reticulocyte count  Goal Hb > 7      DVT prophylaxis: Eliquis 5 BID   GI prophylaxis: protonix 40 BID PO   Diet: ADULT DIET; Regular  Bowel regimen: scheduled, LBM   Pain management: morphine sulfate 4mg IV as needed  Mouth hygiene:   Code status: Limited, no to everything   Disposition: continue current care / likely transfer out of ICU within next 24-48 hours   Family: updated at bedside    Silvio Estrada MD, PGY-1  Attending physician: Dr. Leixs Ziegler    I personally saw, examined and provided care for the patient. Radiographs, labs and medication list were reviewed by me independently. Review of Residents documentation was conducted and revisions were made as appropriate. I agree with the above documented exam, problem list and plan of care.         CCT excluding procedures 33 minutes    Ryder Stratton DO

## 2023-03-04 NOTE — PLAN OF CARE
Problem: Respiratory - Adult  Goal: Achieves optimal ventilation and oxygenation  Outcome: Progressing  Flowsheets (Taken 3/4/2023 0707)  Achieves optimal ventilation and oxygenation:   Assess for changes in respiratory status   Assess for changes in mentation and behavior     Problem: Cardiovascular - Adult  Goal: Maintains optimal cardiac output and hemodynamic stability  Outcome: Progressing  Flowsheets (Taken 3/4/2023 0707)  Maintains optimal cardiac output and hemodynamic stability:   Monitor blood pressure and heart rate   Monitor urine output and notify Licensed Independent Practitioner for values outside of normal range   Assess for signs of decreased cardiac output     Problem: Neurosensory - Adult  Goal: Achieves stable or improved neurological status  Outcome: Progressing  Flowsheets (Taken 3/4/2023 0707)  Achieves stable or improved neurological status: Assess for and report changes in neurological status

## 2023-03-04 NOTE — PROGRESS NOTES
Internal Medicine   Progress Note    Admit Date: 2/27/2023  Hospital day:    Hospital Day: 6  SUBJECTIVE:    Mr Washington De León is a 50-year-old gentleman who was admitted with productive cough and shortness of breath. He has history of lung cancer and follows with pulmonary and oncology. He states he is doing okay and has no complaints this morning. No chest pain. Shortness of breath on and off. He is on nasal cannula. No nausea or vomiting. No fever. No abdominal pain.   OBJECTIVE:     /60   Pulse 54   Temp 98.8 °F (37.1 °C) (Axillary)   Resp 17   Ht 5' 6\" (1.676 m)   Wt 177 lb 11.1 oz (80.6 kg)   SpO2 99%   BMI 28.68 kg/m²   Patient Vitals for the past 24 hrs:   BP Temp Temp src Pulse Resp SpO2 Weight   03/04/23 0900 133/60 -- -- 54 17 99 % --   03/04/23 0800 (!) 110/59 98.8 °F (37.1 °C) Axillary 54 19 99 % --   03/04/23 0700 (!) 120/59 -- -- 55 18 97 % --   03/04/23 0600 116/62 -- -- (!) 49 20 96 % 177 lb 11.1 oz (80.6 kg)   03/04/23 0500 123/62 -- -- 57 16 99 % --   03/04/23 0400 (!) 110/45 99 °F (37.2 °C) Axillary 54 13 100 % --   03/04/23 0300 120/68 -- -- 56 15 100 % --   03/04/23 0200 126/66 -- -- 57 21 98 % --   03/04/23 0100 (!) 117/54 -- -- 56 17 97 % --   03/04/23 0002 -- -- -- 55 13 99 % --   03/04/23 0000 94/63 98.5 °F (36.9 °C) Axillary 54 15 99 % --   03/03/23 2336 -- -- -- 55 16 98 % --   03/03/23 2300 (!) 111/53 -- -- 55 12 100 % --   03/03/23 2200 (!) 109/49 -- -- 56 14 100 % --   03/03/23 2100 (!) 115/47 -- -- 59 20 100 % --   03/03/23 2055 -- -- -- 60 22 96 % --   03/03/23 2016 -- -- -- 58 20 95 % --   03/03/23 2000 (!) 114/55 98.8 °F (37.1 °C) Axillary 58 20 96 % --   03/03/23 1900 -- -- -- 70 28 93 % --   03/03/23 1830 122/61 97 °F (36.1 °C) Temporal 53 14 98 % --   03/03/23 1800 127/61 -- -- 53 16 98 % --   03/03/23 1720 -- -- -- 52 21 100 % --   03/03/23 1700 134/76 -- -- 58 21 100 % --   03/03/23 1600 109/69 -- -- 59 20 98 % --   03/03/23 1500 114/61 -- -- 54 20 98 % -- 03/03/23 1400 127/60 -- -- (!) 48 19 97 % --   03/03/23 1313 -- -- -- (!) 49 20 98 % --   03/03/23 1300 123/65 -- -- 53 27 97 % --   03/03/23 1220 109/68 -- -- 52 17 99 % --   03/03/23 1200 -- 97 °F (36.1 °C) Temporal 59 19 100 % --   03/03/23 1150 -- (!) 96.5 °F (35.8 °C) Temporal 58 24 100 % --   03/03/23 1100 -- -- -- -- -- 99 % --     24HR INTAKE/OUTPUT:    Intake/Output Summary (Last 24 hours) at 3/4/2023 1020  Last data filed at 3/4/2023 0900  Gross per 24 hour   Intake 432.14 ml   Output 950 ml   Net -517.86 ml      GENERAL:  Alert, on nasal cannula, not in apparent acute distress. Speech unlabore  ENT:Oral mucosa moist. Neck supple, JVD not appreciated  LUNGS: Breath sounds decreased, no wheezing heard this morning  CARDIOVASCULAR:  S1 and S2 regular to auscultate. ABDOMEN:  Soft, non-tender. Bowel sounds present  NEUROLOGIC:  Alert, and oriented. No gross focal deficit    Data    Recent Labs     03/03/23  0844 03/03/23  1609 03/04/23  0421   WBC 6.8 4.9 2.7*   HGB 8.5* 7.8* 7.1*   PLT 91* 79* 72*     Recent Labs     03/03/23  0844 03/03/23  1106 03/03/23  1609 03/04/23  0421     --  142 139   K 6.0*  --  4.9 4.6     --  104 100   CO2 19*  --  23 27   PHOS  --   --  3.1  --    BUN 29*  --  35* 43*   CREATININE 1.0  --  1.5* 1.7*   GLUCOSE 252* 323* 107* 171*     Recent Labs     03/03/23  1609   *   *   BILITOT 0.6   ALKPHOS 105     No results for input(s): CKTOTAL, CKMB, CKMBINDEX, TROPONINI in the last 72 hours. No results for input(s): BNP in the last 72 hours. Recent Labs     03/03/23  0844 03/03/23  1609   PROT  --  5.9*   INR 1.3  --        XR ABDOMEN (KUB) (SINGLE AP VIEW)    Result Date: 2/27/2023  EXAMINATION: ONE SUPINE XRAY VIEW(S) OF THE ABDOMEN 2/27/2023 7:15 pm COMPARISON: None. HISTORY: ORDERING SYSTEM PROVIDED HISTORY: Diarrhea and abdominal pain. TECHNOLOGIST PROVIDED HISTORY: Reason for exam:->Diarrhea and abdominal pain.  What reading provider will be dictating this exam?->CRC FINDINGS: Nonspecific bowel gas pattern without evidence of obstruction. No abnormal calcifications. No acute osseous abnormality. Contrast identified within the urinary bladder. Probable prostatic calcifications. Left lower lobe opacity/pleural effusion. No evidence of bowel obstruction. XR CHEST PORTABLE    Result Date: 2/27/2023  EXAMINATION: ONE XRAY VIEW OF THE CHEST 2/27/2023 10:10 am COMPARISON: Chest CT 23 January 2023 HISTORY: ORDERING SYSTEM PROVIDED HISTORY: sob, ?pneumonia TECHNOLOGIST PROVIDED HISTORY: Reason for exam:->sob, ?pneumonia What reading provider will be dictating this exam?->CRC FINDINGS: Stable right chest port. Unchanged scarring and or persistent atelectasis in the left mid lung. Obstructive airways disease as before. Normal heart and pulmonary vascularity. A left pleural effusion has substantially resolved. Persistent scarring and or atelectasis on the left. Obstructive airways disease. Substantial resolution of left pleural effusion. CTA PULMONARY W CONTRAST    Result Date: 2/27/2023  EXAMINATION: CTA OF THE CHEST 2/27/2023 2:15 pm TECHNIQUE: CTA of the chest was performed after the administration of intravenous contrast.  Multiplanar reformatted images are provided for review. MIP images are provided for review. Automated exposure control, iterative reconstruction, and/or weight based adjustment of the mA/kV was utilized to reduce the radiation dose to as low as reasonably achievable. COMPARISON: Reviewed studies from September 17, 2021 through January 23, 2023.  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 There is no acute pulmonary embolus in the main PA, right and left main PAs, in the lobar, segmental and subsegmental branches. The despite the malignant case mint in the bifurcation of the distal left main pulmonary artery towards left lower lobe and lingular segments there is no indication for acute pulmonary embolus in the left lung pulmonary artery circulation. There is no indication for pulmonary embolus in the right lung pulmonary artery circulation down to the 3/4 branch level in both sides. There is no aneurysm formation or dissection of the thoracic aorta. Despite the malignant infiltration from the left hilar region towards the lateral aspect of the mediastinum around the left hilar region there is no isolated the mediastinal masses or adenopathy. Upper abdominal structures not fully covered. Presence 11 mm gallstone in the dependent portion of the gallbladder. The adrenals no large size stable finding back to January 3rd 1019 study. There is no aneurysm formation or dissection of the thoracic aorta. Calcified atheromatous changes are seen in the arch of the aorta and in the descending thoracic aorta. Heart has normal size. RV inner diameter 3.4 cm. LV inner diameter 4.5 cm. Patient has cardiac monitor device in the apex region of the right ventricle projection. Emphysematous changes in advanced degree with centri lobular emphysema are seen in the upper lobes. The right and left-sided pleural effusion have been most reabsorbed with pleural residual changes in right posterior costophrenic sulcus of the Multi segmental atelectasis and loss of volume of the left lung relate with the hilar mass. However there patchy parenchymal passes in the more peripheral aspect of the left lower lobe towards the anterolateral basal segment which can be manifestation of superimposed pneumonia as these were not seen previous study of December 28, 2022. Follow-up study following a clinical treatment trial for pneumonia can be considered. Visualized bone structures demonstrate chronic findings as observed previously. 1.  No indication for acute pulmonary emboli. 2.  No aneurysm formation or dissection of the thoracic aorta. 3.  Emphysematous changes lung parenchyma particular affecting the upper lobes in moderate advanced degree. 4.  Malignant infiltrating lesion in the left infrahilar region towards the root of the lingula/left lower, as previously described. 5.  Chronic areas of loss of volume in lingula and in the left lower due the infiltration of the left hilar region by malignancies. 6.  Resolved bilateral pleural effusions since more recent examinations. 7.  Development of small multifocal patchy areas of consolidation in the left lower lobe distal to the more proximal in case mint of the bronchovascular structures of the left lower lobe can represent developing acute pneumonia. Follow-up study following clinically treatment trial recommended in several days time interval.     FL MODIFIED BARIUM SWALLOW W VIDEO    Result Date: 3/1/2023  EXAMINATION: MODIFIED BARIUM SWALLOW WAS PERFORMED IN CONJUNCTION WITH SPEECH PATHOLOGY SERVICES TECHNIQUE: Under fluoroscopic evaluation cineradiography/videoradiography recordings were performed in conjunction with the speech-language pathologist (SLP). Various liquid, solid and/or semi-solid barium preparations were used to assess swallowing function. FLUOROSCOPY DOSE AND TYPE: Radiation Exposure Index: Images: 8 series of cine fluoroscopic loops Fluoroscopic time: 1 minute Total dose: 8 mGy COMPARISON: None HISTORY: ORDERING SYSTEM PROVIDED HISTORY: aspiration rule out TECHNOLOGIST PROVIDED HISTORY: Reason for exam:->aspiration rule out What reading provider will be dictating this exam?->CRC FINDINGS: Satisfactory oral and pharyngeal phases of swallowing mechanism.   No significant barium residuals and no barium aspiration. Satisfactory swallowing mechanism. No barium aspiration. Please see separate speech pathology report for full discussion of findings and recommendations. Medications     dextrose      sodium chloride 10 mL/hr at 03/04/23 0657      furosemide  40 mg Oral Daily    insulin lispro  0-4 Units SubCUTAneous 4x Daily AC & HS    insulin lispro  0-4 Units SubCUTAneous Nightly    polyethylene glycol  17 g Oral Daily    senna  2 tablet Oral Nightly    dornase alpha  2.5 mg Inhalation Daily    cefepime  2,000 mg IntraVENous q8h    diphenhydrAMINE  25 mg Oral Once    apixaban  5 mg Oral BID    atorvastatin  20 mg Oral Nightly    fluticasone  1 spray Each Nostril Daily    gabapentin  100 mg Oral BID    melatonin  3 mg Oral Nightly    [Held by provider] metoprolol succinate  25 mg Oral BID    pantoprazole  40 mg Oral Q12H    sucralfate  1 g Oral 4x Daily AC & HS    sodium chloride flush  5-40 mL IntraVENous 2 times per day    ipratropium-albuterol  1 ampule Inhalation Q4H WA    doxycycline hyclate  100 mg Oral 2 times per day    budesonide  0.25 mg Nebulization BID    And    arformoterol tartrate  15 mcg Nebulization BID      ADULT DIET; Regular    ASSESSMENT/PLAN:  *Pancytopenia in the settings of lung cancer. Heme-onc is already following. Reviewed ICU note  *Anemia, received 1 unit this admission  *Acute respiratory failure with hypoxia, currently on nasal cannula. Doing okay. *Acute exacerbation of COPD  *Community-acquired pneumonia due to chlamydia  *History of PE  *History of lung cancer/NSC LC  *Hypertension, blood pressure stable  *Hyperlipidemia  *Diabetes mellitus on sliding scale insulin    PLAN:  As above. Continue antibiotics, is on cefepime and Doxy  Monitor blood work and vitals. ICU care  DVT  prophylaxis on Eliquis at this time.       Electronically signed by Danii Kaufman MD on 3/4/2023 at 10:20 AM

## 2023-03-05 NOTE — PROGRESS NOTES
Internal Medicine   Progress Note    Admit Date: 2/27/2023  Hospital day:    Hospital Day: 7  SUBJECTIVE:     admitted with productive cough and shortness of breath. He states he is doing okay. Still short of breath on and off. No chest pain. No nausea or vomiting. Mild fever.     OBJECTIVE:     BP (!) 129/56   Pulse 60   Temp 98.5 °F (36.9 °C) (Axillary)   Resp 16   Ht 5' 6\" (1.676 m)   Wt 178 lb (80.7 kg)   SpO2 97%   BMI 28.73 kg/m²   Patient Vitals for the past 24 hrs:   BP Temp Temp src Pulse Resp SpO2 Weight   03/05/23 0600 (!) 129/56 -- -- 60 16 97 % 178 lb (80.7 kg)   03/05/23 0500 (!) 139/46 -- -- 61 18 97 % --   03/05/23 0400 (!) 132/51 98.5 °F (36.9 °C) Axillary 63 20 97 % --   03/05/23 0300 (!) 113/51 -- -- 60 16 97 % --   03/05/23 0200 (!) 110/46 -- -- 58 16 -- --   03/05/23 0100 (!) 129/57 -- -- 61 14 94 % --   03/05/23 0005 -- -- -- 60 20 97 % --   03/05/23 0004 -- -- -- 53 20 97 % --   03/05/23 0003 -- -- -- 54 20 96 % --   03/05/23 0002 -- -- -- 59 20 93 % --   03/05/23 0001 (!) 115/52 -- -- 57 20 97 % --   03/05/23 0000 (!) 115/52 99 °F (37.2 °C) Axillary 56 20 96 % --   03/04/23 2307 -- -- -- 58 20 95 % --   03/04/23 2306 -- -- -- (!) 117 18 93 % --   03/04/23 2305 -- -- -- (!) 221 20 94 % --   03/04/23 2300 (!) 126/48 -- -- 58 20 95 % --   03/04/23 2200 116/64 -- -- 64 18 96 % --   03/04/23 2100 102/61 -- -- 61 18 100 % --   03/04/23 2000 (!) 114/54 98.7 °F (37.1 °C) Axillary 57 16 99 % --   03/04/23 1900 (!) 112/43 -- -- 61 19 95 % --   03/04/23 1800 (!) 96/59 99 °F (37.2 °C) Axillary 60 17 99 % --   03/04/23 1700 (!) 105/45 -- -- 63 20 99 % --   03/04/23 1629 -- -- -- 59 17 100 % --   03/04/23 1626 -- -- -- 60 24 94 % --   03/04/23 1600 109/61 98.5 °F (36.9 °C) Axillary 63 15 99 % --   03/04/23 1500 (!) 103/45 -- -- 57 20 100 % --   03/04/23 1400 (!) 118/39 98.8 °F (37.1 °C) Axillary 54 12 98 % --   03/04/23 1300 (!) 144/58 -- -- 63 15 100 % --   03/04/23 1200 (!) 147/55 98.7 °F (37.1 °C) Axillary 62 20 100 % --   03/04/23 1100 (!) 122/54 -- -- 58 17 96 % --   03/04/23 1000 124/63 98.6 °F (37 °C) Axillary 53 21 95 % --   03/04/23 0900 133/60 -- -- 54 17 99 % --     24HR INTAKE/OUTPUT:    Intake/Output Summary (Last 24 hours) at 3/5/2023 2558  Last data filed at 3/5/2023 7234  Gross per 24 hour   Intake 2432.52 ml   Output 2145 ml   Net 287.52 ml      GENERAL: Awake and alert and not in apparent acute distress  LUNGS:  No obvious increased work of breathing, coarse breath sounds. CARDIOVASCULAR:  S1 and S2 regular to auscultate. ABDOMEN:  Soft, non-tender. Bowel sounds present    Data    Recent Labs     03/03/23  1609 03/04/23  0421 03/05/23  0443   WBC 4.9 2.7* 4.4*   HGB 7.8* 7.1* 7.3*   PLT 79* 72* 78*     Recent Labs     03/03/23  1609 03/04/23  0421 03/04/23  2315 03/05/23  0443    139 136 138   K 4.9 4.6 3.2* 3.8    100 98 103   CO2 23 27 28 24   PHOS 3.1  --  2.8  --    BUN 35* 43* 38* 33*   CREATININE 1.5* 1.7* 1.5* 1.3*   GLUCOSE 107* 171* 185* 155*     Recent Labs     03/03/23  1609   *   *   BILITOT 0.6   ALKPHOS 105     No results for input(s): CKTOTAL, CKMB, CKMBINDEX, TROPONINI in the last 72 hours. No results for input(s): BNP in the last 72 hours. Recent Labs     03/03/23  0844 03/03/23  1609   PROT  --  5.9*   INR 1.3  --      XR ABDOMEN (KUB) (SINGLE AP VIEW)    Result Date: 2/27/2023  EXAMINATION: ONE SUPINE XRAY VIEW(S) OF THE ABDOMEN 2/27/2023 7:15 pm COMPARISON: None. HISTORY: ORDERING SYSTEM PROVIDED HISTORY: Diarrhea and abdominal pain. TECHNOLOGIST PROVIDED HISTORY: Reason for exam:->Diarrhea and abdominal pain. What reading provider will be dictating this exam?->CRC FINDINGS: Nonspecific bowel gas pattern without evidence of obstruction. No abnormal calcifications. No acute osseous abnormality. Contrast identified within the urinary bladder. Probable prostatic calcifications. Left lower lobe opacity/pleural effusion.      No evidence of bowel obstruction. XR CHEST PORTABLE    Result Date: 2/27/2023  EXAMINATION: ONE XRAY VIEW OF THE CHEST 2/27/2023 10:10 am COMPARISON: Chest CT 23 January 2023 HISTORY: ORDERING SYSTEM PROVIDED HISTORY: sob, ?pneumonia TECHNOLOGIST PROVIDED HISTORY: Reason for exam:->sob, ?pneumonia What reading provider will be dictating this exam?->CRC FINDINGS: Stable right chest port. Unchanged scarring and or persistent atelectasis in the left mid lung. Obstructive airways disease as before. Normal heart and pulmonary vascularity. A left pleural effusion has substantially resolved. Persistent scarring and or atelectasis on the left. Obstructive airways disease. Substantial resolution of left pleural effusion. CTA PULMONARY W CONTRAST    Result Date: 2/27/2023  EXAMINATION: CTA OF THE CHEST 2/27/2023 2:15 pm TECHNIQUE: CTA of the chest was performed after the administration of intravenous contrast.  Multiplanar reformatted images are provided for review. MIP images are provided for review. Automated exposure control, iterative reconstruction, and/or weight based adjustment of the mA/kV was utilized to reduce the radiation dose to as low as reasonably achievable. COMPARISON: Reviewed studies from September 17, 2021 through January 23, 2023. 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 There is no acute pulmonary embolus in the main PA, right and left main PAs, in the lobar, segmental and subsegmental branches.   The despite the malignant case mint in the bifurcation of the distal left main pulmonary artery towards left lower lobe and lingular segments there is no indication for acute pulmonary embolus in the left lung pulmonary artery circulation. There is no indication for pulmonary embolus in the right lung pulmonary artery circulation down to the 3/4 branch level in both sides. There is no aneurysm formation or dissection of the thoracic aorta. Despite the malignant infiltration from the left hilar region towards the lateral aspect of the mediastinum around the left hilar region there is no isolated the mediastinal masses or adenopathy. Upper abdominal structures not fully covered. Presence 11 mm gallstone in the dependent portion of the gallbladder. The adrenals no large size stable finding back to January 3rd 1019 study. There is no aneurysm formation or dissection of the thoracic aorta. Calcified atheromatous changes are seen in the arch of the aorta and in the descending thoracic aorta. Heart has normal size. RV inner diameter 3.4 cm. LV inner diameter 4.5 cm. Patient has cardiac monitor device in the apex region of the right ventricle projection. Emphysematous changes in advanced degree with centri lobular emphysema are seen in the upper lobes. The right and left-sided pleural effusion have been most reabsorbed with pleural residual changes in right posterior costophrenic sulcus of the Multi segmental atelectasis and loss of volume of the left lung relate with the hilar mass. However there patchy parenchymal passes in the more peripheral aspect of the left lower lobe towards the anterolateral basal segment which can be manifestation of superimposed pneumonia as these were not seen previous study of December 28, 2022. Follow-up study following a clinical treatment trial for pneumonia can be considered. Visualized bone structures demonstrate chronic findings as observed previously. 1.  No indication for acute pulmonary emboli. 2.  No aneurysm formation or dissection of the thoracic aorta.  3. Emphysematous changes lung parenchyma particular affecting the upper lobes in moderate advanced degree. 4.  Malignant infiltrating lesion in the left infrahilar region towards the root of the lingula/left lower, as previously described. 5.  Chronic areas of loss of volume in lingula and in the left lower due the infiltration of the left hilar region by malignancies. 6.  Resolved bilateral pleural effusions since more recent examinations. 7.  Development of small multifocal patchy areas of consolidation in the left lower lobe distal to the more proximal in case mint of the bronchovascular structures of the left lower lobe can represent developing acute pneumonia. Follow-up study following clinically treatment trial recommended in several days time interval.     FL MODIFIED BARIUM SWALLOW W VIDEO    Result Date: 3/1/2023  EXAMINATION: MODIFIED BARIUM SWALLOW WAS PERFORMED IN CONJUNCTION WITH SPEECH PATHOLOGY SERVICES TECHNIQUE: Under fluoroscopic evaluation cineradiography/videoradiography recordings were performed in conjunction with the speech-language pathologist (SLP). Various liquid, solid and/or semi-solid barium preparations were used to assess swallowing function. FLUOROSCOPY DOSE AND TYPE: Radiation Exposure Index: Images: 8 series of cine fluoroscopic loops Fluoroscopic time: 1 minute Total dose: 8 mGy COMPARISON: None HISTORY: ORDERING SYSTEM PROVIDED HISTORY: aspiration rule out TECHNOLOGIST PROVIDED HISTORY: Reason for exam:->aspiration rule out What reading provider will be dictating this exam?->CRC FINDINGS: Satisfactory oral and pharyngeal phases of swallowing mechanism. No significant barium residuals and no barium aspiration. Satisfactory swallowing mechanism. No barium aspiration. Please see separate speech pathology report for full discussion of findings and recommendations.        Medications     dextrose      sodium chloride Stopped (03/04/23 1314)      [Held by provider] furosemide  40 mg Oral Daily    insulin lispro  0-4 Units SubCUTAneous 4x Daily AC & HS    insulin lispro  0-4 Units SubCUTAneous Nightly    polyethylene glycol  17 g Oral Daily    senna  2 tablet Oral Nightly    dornase alpha  2.5 mg Inhalation Daily    cefepime  2,000 mg IntraVENous q8h    diphenhydrAMINE  25 mg Oral Once    apixaban  5 mg Oral BID    atorvastatin  20 mg Oral Nightly    fluticasone  1 spray Each Nostril Daily    gabapentin  100 mg Oral BID    melatonin  3 mg Oral Nightly    [Held by provider] metoprolol succinate  25 mg Oral BID    pantoprazole  40 mg Oral Q12H    sucralfate  1 g Oral 4x Daily AC & HS    sodium chloride flush  5-40 mL IntraVENous 2 times per day    ipratropium-albuterol  1 ampule Inhalation Q4H WA    doxycycline hyclate  100 mg Oral 2 times per day    budesonide  0.25 mg Nebulization BID    And    arformoterol tartrate  15 mcg Nebulization BID      ADULT DIET; Regular    ASSESSMENT/PLAN:  *Acute respiratory failure with hypoxia on nasal oxygen. *Pancytopenia in the setting of lung cancer heme-onc following. *Community-acquired pneumonia due to chlamydia. *History of lung cancer/NSCLC  *History of PE  *Hypertension blood pressure stable  *Hyperlipidemia  *Diabetes mellitus  *Episodes of multiple beats of V. tach yesterday, monitoring at the ICU  *Increase liver enzymes, monitor    PLAN:  Monitor labs. Sliding scale insulin for blood sugar.   Cefepime and doxycycline  On apixaban  ICU care  PT OT when stable and discharge planning    Electronically signed by Catina Swan MD on 3/5/2023 at 8:33 AM

## 2023-03-05 NOTE — PROGRESS NOTES
Patient had multiple beats of vtach. Rate sustaining in 200s. Resident at bedside. STAT labs and EKG completed. Patient is resting comfortably now with a HR of 57. Continuing to monitor.

## 2023-03-05 NOTE — PROGRESS NOTES
[address] [facility]  Department of Internal Medicine   Internal Medicine Residency   MICU Progress Note    Patient:  Chelita Dickson 67 y.o. male  MRN: [id number]     Date of Service: 3/5/2023    Allergy: Patient has no known allergies. Subjective     Patient stated this AM that he felt well and was having decreased work of breathing. He states he is tolerating his diet well and is not having issues with bleeding (no hematochezia/melena/hematuria). 24h change:  Patient was mildly anxious overnight and was given 0.5 mg Ativan. The patient had multiple episodes of nonsustained Vtach that resolved spontaneously. The patient remained asymptomatic during these episodes. Electrolytes were replaced. At 1500- nursing reported that patient was unable to tolerate being off BiPAP. CXR showed mildly worsening R lung infiltrate. ABG showed more acidotic pH   ROS: Denies Fever/chills/CP/SOB/N/V/D/C/Dysuria/Blood in stool or urine  Objective     VS: BP (!) 167/108   Pulse 55   Temp 97.9 °F (36.6 °C) (Temporal)   Resp 24   Ht 5' 6\" (1.676 m)   Wt 178 lb (80.7 kg)   SpO2 94%   BMI 28.73 kg/m²           I & O - 24hr:   Intake/Output Summary (Last 24 hours) at 3/5/2023 1303  Last data filed at 3/5/2023 2912  Gross per 24 hour   Intake 2229.88 ml   Output 1825 ml   Net 404.88 ml       Physical Exam:  General Appearance: alert, appears stated age, and cooperative  Neck: no adenopathy, no carotid bruit, no JVD, supple, symmetrical, trachea midline, and thyroid not enlarged, symmetric, no tenderness/mass/nodules  Lung:  coarse breath sounds bilaterally; no rhonchi/crackles  Heart: regular rate and rhythm, S1, S2 normal, no murmur, click, rub or gallop  Abdomen: soft, non-tender; bowel sounds normal; no masses,  no organomegaly  Extremities:   1+ peripheral edema bilateral lower extremities  Musculoskeletal: No joint swelling, no muscle tenderness. ROM normal in all joints of extremities. ; 5/5  strength and 4/5 strength in lower extremities  Neurologic: Mental status: Alert, oriented, thought content appropriate    Lines     site day    Art line   None    TLC None    PICC None    Hemoaccess None      Patient has single lumen implantable port present in the right chest    Oxygen:    Patient was on 7L O2 NC overnight  Began desaturating and could not tolerate being off BiPAP- now on 100% SpO2 through BiPAP    ABG:     Lab Results   Component Value Date/Time    PH 7.454 03/05/2023 05:24 AM    PCO2 38.6 03/05/2023 05:24 AM    PO2 86.6 03/05/2023 05:24 AM    HCO3 26.5 03/05/2023 05:24 AM    BE 2.4 03/05/2023 05:24 AM    THB 8.1 03/05/2023 05:24 AM    O2SAT 95.7 03/05/2023 05:24 AM        Medications     Infusions: (Fluid, Sedation, Vasopressors)  IVF:   NaCl 0.9%: 125 ml/hr    Vasopressors  None  Sedation  Lorazepam 0.5 mg q4 PRN PO    Nutrition:   Regular diet      ATB:   Antibiotics  Days   Doxycycline 6   Cefepime 6           Patient currently has   Urinary cath  Isolation  DVT prophylaxis/ GI prophylaxis,    Labs   CBC with Differential:    Lab Results   Component Value Date/Time    WBC 4.4 03/05/2023 04:43 AM    RBC 2.27 03/05/2023 04:43 AM    HGB 7.3 03/05/2023 04:43 AM    HCT 22.5 03/05/2023 04:43 AM    PLT 78 03/05/2023 04:43 AM    MCV 99.1 03/05/2023 04:43 AM    MCH 32.2 03/05/2023 04:43 AM    MCHC 32.4 03/05/2023 04:43 AM    RDW 21.2 03/05/2023 04:43 AM    NRBC 12.3 03/05/2023 04:43 AM    BLASTSPCT 1.7 03/03/2023 08:44 AM    METASPCT 3.8 03/05/2023 04:43 AM    LYMPHOPCT 8.5 03/05/2023 04:43 AM    PROMYELOPCT 1.9 03/05/2023 04:43 AM    MONOPCT 8.5 03/05/2023 04:43 AM    MYELOPCT 2.8 03/05/2023 04:43 AM    BASOPCT 1.6 03/05/2023 04:43 AM    MONOSABS 0.40 03/05/2023 04:43 AM    LYMPHSABS 0.40 03/05/2023 04:43 AM    EOSABS 0.00 03/05/2023 04:43 AM    BASOSABS 0.00 03/05/2023 04:43 AM     CMP:    Lab Results   Component Value Date/Time     03/05/2023 04:43 AM    K 3.8 03/05/2023 04:43 AM    K 3.2 03/04/2023 11:15 PM  03/05/2023 04:43 AM    CO2 24 03/05/2023 04:43 AM    BUN 33 03/05/2023 04:43 AM    CREATININE 1.3 03/05/2023 04:43 AM    GFRAA >60 10/13/2022 12:28 PM    LABGLOM 58 03/05/2023 04:43 AM    GLUCOSE 155 03/05/2023 04:43 AM    PROT 5.9 03/03/2023 04:09 PM    LABALBU 3.7 03/03/2023 04:09 PM    CALCIUM 8.7 03/05/2023 04:43 AM    BILITOT 0.6 03/03/2023 04:09 PM    ALKPHOS 105 03/03/2023 04:09 PM     03/03/2023 04:09 PM     03/03/2023 04:09 PM     Magnesium:    Lab Results   Component Value Date/Time    MG 2.1 03/05/2023 04:43 AM     Phosphorus:    Lab Results   Component Value Date/Time    PHOS 2.8 03/04/2023 11:15 PM       Imaging Studies:  CXR (3/5/2023):  Emphysematous changes throughout lung parenchyma. Recurrent bi basilar infiltrates. CT scan:   1. No indication for acute pulmonary emboli. 2.  No aneurysm formation or dissection of the thoracic aorta. 3.  Emphysematous changes lung parenchyma particular affecting the upper   lobes in moderate advanced degree. 4.  Malignant infiltrating lesion in the left infrahilar region towards the   root of the lingula/left lower, as previously described. 5.  Chronic areas of loss of volume in lingula and in the left lower due the   infiltration of the left hilar region by malignancies. 6.  Resolved bilateral pleural effusions since more recent examinations. 7.  Development of small multifocal patchy areas of consolidation in the left   lower lobe distal to the more proximal in case mint of the bronchovascular   structures of the left lower lobe can represent developing acute pneumonia. Follow-up study following clinically treatment trial recommended in several   days time interval.       Resident's Assessment and Plan     Cardio   Elevated troponin  Repeat troponin 3/4/23 WNL  Was likely 2/2 demand ischemia  Fluid overload- elevated BNP   Patient's lasix were on hold d/t rising Cr.  Cr downtrending now and patient is developing peripheral edema- restart Lasix 40 mg daily  Continue to monitor I/O  Bradycardia  Patient has pacemaker set at 55- seen and cleared by EP  Toprol originally on hold d/t bradycardia- will restart Toprol given multiple episodes of Vtach overnight   Pulmonary   COPD exacerbation  2/2 CAP- patient had GPC and GNR in sputum  Procal was elevated but downtrending now  Patient was tolerating just HFNC but now desaturating off BiPAP  Repeat ABG showed mild acidosis w/o abnormal CO2/O2, CXR showed bibasilar infiltrates  Continue cefepime/doxycycline, Airvo ordered  Continue Brovana, Budesonide, DuoNeb  Hx subsegmental pulmonary embolism of RLL  Continue Eliquis 5 mg    Renal   Prerenal BHARATI  Creatinine 1.7>1.5>1. 3 (baseline approx 0.8)  Lasix previously held, now restart 40 mg daily  Endocrine   Hyperglycemia 2/2 steroids  Patient on low dose sliding scale ACHS  Infection   Community Acquired Pneumonia  Continue cefepime and doxycycline  Patient afebrile overnight, procal trending downward  Heme/Onc   Stage IV squamous cell lung cancer of left hilum dx 05/2021  Patient follows with Dr. Gerardo Aquino  Patient started Manda Kalee, and Fabiana Hazard on 06/2022, was scheduled for next cycle on 3/1  Onc seeing patient- continue to monitor Hb and maintain >7, Eliquis ok to continue as long as platelets remain >08O  Psych   Agitation  Patient frequently becomes anxious  Ativan 0.5 mg q4 PRN ordered for agitation    DVT ppx: Eliquis 5 mg BID  GI ppx: Protonix 40 mg BID  Code status: Limited: no intubation, defibrillation/cardioversion, chest compressions, resuscitative medications    Disposition: Patient should remain in ICU d/t being critically ill and requiring higher level of care    Cheo White MD, PGY-1    Attending physician: Dr. Mateo Anand      I personally saw, examined and provided care for the patient. Radiographs, labs and medication list were reviewed by me independently.  Review of Residents documentation was conducted and revisions were made as appropriate. I agree with the above documented exam, problem list and plan of care. Respiratory status remains tenuous. Continue NIV as able. Encouraged to be more compliant with use. May alternate with HHFNC. Continue antibiotics  Start Solu-Medrol  Bronchopulm hygiene, flutter, pulmozyme, mucinex.  Unable to do IPP given isolation  Check ultrasound to evaluate left pleural space to assess if significant of fluid for thoracentesis  Hold Eliquis for possible thoracentesis    CCT excluding procedures 36 minutes    Kathia Prescott, DO

## 2023-03-05 NOTE — PROGRESS NOTES
Date: 3/4/2023    Time: 10:52 PM    Patient Placed On BIPAP/CPAP/ Non-Invasive Ventilation? Pt already on bipap    If no must comment. Facial area red/color change? No           If YES are Blister/Lesion present? No   If yes must notify nursing staff  BIPAP/CPAP skin barrier?   Yes    Skin barrier type:mepilexlite       Comments:        Valentine Bernard RCP

## 2023-03-06 NOTE — PROGRESS NOTES
Patient continues to take off airvo as well as BIPAP and desaturates to low 80s rapidly. Education is provided to patient and patient verbalizes understanding. Patient is alert and oriented x 4. Continuing to monitor patient.

## 2023-03-06 NOTE — PROGRESS NOTES
Patient continuing to take off airvo. Patient saturating 78% with slow recovery. Patient is now saturating 87%, labored breathing. Patient started on Precedex to help with anxiety. Patient verbalized understanding to teaching but continues to take off all equipment.

## 2023-03-06 NOTE — PROGRESS NOTES
home requested per wife is [facility]   Address: [facility] [address], [facility]        [phone]

## 2023-03-06 NOTE — PROGRESS NOTES
All of patient's belongings were sent home with Hershall Epifanio, patient's wife. See flowsheets for items sent home.

## 2023-03-06 NOTE — PROGRESS NOTES
Dr Pilo [place] notified via answering services at [phone]. Dr. Willard Marrufo notified via perfect serve.

## 2023-03-06 NOTE — PLAN OF CARE
Problem: Safety - Adult  Goal: Free from fall injury  Outcome: Progressing     Problem: ABCDS Injury Assessment  Goal: Absence of physical injury  Outcome: Progressing     Problem: Pain  Goal: Verbalizes/displays adequate comfort level or baseline comfort level  Outcome: Progressing     Problem: Cardiovascular - Adult  Goal: Maintains optimal cardiac output and hemodynamic stability  Outcome: Progressing  Flowsheets (Taken 3/5/2023 2000)  Maintains optimal cardiac output and hemodynamic stability: Monitor blood pressure and heart rate     Problem: Nutrition Deficit:  Goal: Optimize nutritional status  Outcome: Progressing     Problem: Skin/Tissue Integrity  Goal: Absence of new skin breakdown  Description: 1. Monitor for areas of redness and/or skin breakdown  2. Assess vascular access sites hourly  3. Every 4-6 hours minimum:  Change oxygen saturation probe site  4. Every 4-6 hours:  If on nasal continuous positive airway pressure, respiratory therapy assess nares and determine need for appliance change or resting period.   Outcome: Progressing     Problem: Neurosensory - Adult  Goal: Achieves stable or improved neurological status  Outcome: Progressing     Problem: Discharge Planning  Goal: Discharge to home or other facility with appropriate resources  Outcome: Not Progressing  Flowsheets (Taken 3/5/2023 2000)  Discharge to home or other facility with appropriate resources: Identify barriers to discharge with patient and caregiver     Problem: Respiratory - Adult  Goal: Achieves optimal ventilation and oxygenation  Outcome: Not Progressing  Flowsheets (Taken 3/5/2023 2000)  Achieves optimal ventilation and oxygenation: Assess for changes in respiratory status

## 2023-03-06 NOTE — PLAN OF CARE
Patient was saturating 70% on airvo, previously refused putting the avaps back on. Upon evaluation patient wants his wife to come in. He is agreeable to the avaps for now saturating mid 70's to low 80's. Call has been made to wife, she is coming in now. Electronically signed by Lloyd Duverney, MD on 3/6/2023 at 3:13 AM    Wife has come in and after discussion the decision has been made to make the patient comfort care. Patient does not want to wear the oxygen masks anymore. Both patient and wife agree to make the patient as comfortable as possible.      Electronically signed by Lloyd Duverney, MD on 3/6/2023 at 4:04 AM

## 2023-03-06 NOTE — DEATH NOTES
Death Note    Called to bedside to pronounce death after patient was noted to be in asystole at 5:04 am 3/6/2023. NO pupillary, corneal, doll's eye or gag reflex noted. NO heart sounds or pulse noted. NO Chest rise or Lung sounds noted.    Time of death declared at 5:04 am.  Nikko Allan MD MD PGY-1  3/6/2023  5:11 AM

## 2023-03-07 NOTE — DISCHARGE SUMMARY
Hospitalist Discharge Summary    Patient ID: Leavy Apgar   Patient : 1950  Patient's PCP: Roseann Nguyen MD    Admit Date: 2023   Admitting Physician: Erika London DO    Discharge Date:  3/7/2023   Discharge Physician: Erika London DO   Discharge Condition:   Discharge Disposition:       Discharge Diagnoses: Active Hospital Problems    Diagnosis Date Noted    CAP (community acquired pneumonia) due to Chlamydia species [J16.0] 2023     Priority: Medium    Acute exacerbation of chronic obstructive pulmonary disease (COPD) ([facility] [address] [J44.1] 2023     Priority: Medium   H/O PE    HTN   NSCLC LEFT LUNG           Hospital course in brief:72 y.o. male presented with PRODUCTIVE COUGH OF YELLOW MUCUS,  AND SOB, NO FEVER, NO CHILLS. HAS A KNOWN HISTORY OF LUNG CANCER, NON SMALL CELL LUNG CANCR,   AND SEES  [facility]. NO FEVER, NO CHILLS,   NO NV, JUST STARTED WITH DIARRHEA** HAD ECTOPIC BEATS LAST PM , PACER INTERROGATED,  FUNCTIONING WELL. EP  SAW THE PATIENT, NO FURTHER WORKUP TO BE DONE** WENT INTO ARF THIS AM, RRT WAS CALLED. TRANSFERRED TO MICU AND PUT ON BIPAP  ON Friday, ON YESTERDAY HIS RESPIRATORY STATUS STARTED TO DECLINE AND HE ASKED FOR HIS WIFE TO COME IN AND HE WAS MADE A DNR CC .   HE  WENT INTO [place],Time of death declared at 5:04 am             Prior to Admission medications    Medication Sig Start Date End Date Taking?  Authorizing Provider   apixaban (ELIQUIS) 5 MG TABS tablet Take 1 tablet by mouth 2 times daily DO NOT RESTART UNTIL TOLD BY ONCOLOGY 23   Joseph Krueger DO   fluticasone [facility]) 50 MCG/ACT nasal spray 1 spray by Each Nostril route daily 23   Joseph Krueger DO   predniSONE (DELTASONE) 10 MG tablet 4 a day x 3 day, 3 a day x 3 days , 2 a day x 3 days , 1 a day x 3 days, the dc  Patient taking differently: 5 mg daily Indications: per Dr Ilya Linares 23   Author Cindy Lev Jett,    metoprolol succinate (TOPROL XL) 25 MG extended release tablet Take 1 tablet by mouth 2 times daily 1/26/23   Joseph Perdomo DO   albuterol sulfate HFA (VENTOLIN HFA) 108 (90 Base) MCG/ACT inhaler Inhale 2 puffs into the lungs 4 times daily as needed for Wheezing 1/26/23   Joseph Perdomo DO   pantoprazole (PROTONIX) 40 MG tablet Take 1 tablet by mouth in the morning and 1 tablet in the evening. 12/30/22   Vincent Vera MD   sucralfate (CARAFATE) 1 GM tablet Take 1 tablet by mouth 4 times daily (before meals and nightly) 12/30/22   Vincent Vera MD   atorvastatin (LIPITOR) 20 MG tablet Take 20 mg by mouth daily    Historical Provider, MD   Multiple Vitamin (MULTIVITAMIN ADULT PO) Take 1 tablet by mouth daily    Historical Provider, MD   Ascorbic Acid (VITAMIN C) 250 MG tablet Take 250 mg by mouth daily    Historical Provider, MD   OXYGEN Inhale 3 L/min into the lungs daily    Historical Provider, MD   Fluticasone-Umeclidin-Vilant (TRELEGY ELLIPTA) 200-62.5-25 MCG/INH AEPB Inhale 1 puff into the lungs daily 7/11/22   Historical Provider, MD   gabapentin (NEURONTIN) 100 MG capsule Take 100 mg by mouth in the morning and at bedtime.     Historical Provider, MD   melatonin 3 MG TABS tablet Take 3 mg by mouth nightly    Historical Provider, MD   potassium chloride (MICRO-K) 10 MEQ extended release capsule Take 10 mEq by mouth daily    Historical Provider, MD   furosemide (LASIX) 20 MG tablet Take 1 tablet by mouth daily 9/4/22   Vincent Vera MD   albuterol (ACCUNEB) 0.63 MG/3ML nebulizer solution Take 1 ampule by nebulization every 6 hours as needed for Wheezing    Historical Provider, MD   albuterol sulfate HFA (PROVENTIL;VENTOLIN;PROAIR) 108 (90 Base) MCG/ACT inhaler Inhale 2 puffs into the lungs every 6 hours as needed for Wheezing    Historical Provider, MD   simvastatin (ZOCOR) 40 MG tablet Take 40 mg by mouth daily   9/3/22  Historical Provider, MD       Consults:   IP CONSULT TO INTERNAL MEDICINE  IP CONSULT TO PULMONOLOGY  IP CONSULT TO SOCIAL WORK  IP CONSULT TO ELECTROPHYSIOLOGY  IP CONSULT TO ONCOLOGY  IP CONSULT TO PALLIATIVE CARE            Discharge Instructions / Follow up:    No future appointments. Continued appropriate risk factor modification of blood pressure, diabetes and serum lipids will remain essential to reducing risk of future atherosclerotic development    Activity: activity as tolerated    Significant labs:  CBC:   Recent Labs     03/05/23 0443   WBC 4.4*   RBC 2.27*   HGB 7.3*   HCT 22.5*   MCV 99.1   RDW 21.2*   PLT 78*     BMP:   Recent Labs     03/04/23  2315 03/05/23 0443    138   K 3.2* 3.8   CL 98 103   CO2 28 24   BUN 38* 33*   CREATININE 1.5* 1.3*   MG 1.6 2.1   PHOS 2.8  --      LFT:  No results for input(s): PROT, ALB, ALKPHOS, ALT, AST, BILITOT, AMYLASE, LIPASE in the last 72 hours. PT/INR: No results for input(s): INR, APTT in the last 72 hours. BNP: No results for input(s): BNP in the last 72 hours. Hgb A1C:   Lab Results   Component Value Date    LABA1C 6.5 (H) 09/17/2021     Folate and B12: No results found for: Bobby Church, No results found for: FOLATE  Thyroid Studies:   Lab Results   Component Value Date    TSH 0.771 07/21/2021       Urinalysis:    Lab Results   Component Value Date/Time    NITRU Negative 08/16/2022 05:18 PM    WBCUA 2-5 08/16/2022 05:18 PM    BACTERIA FEW 08/16/2022 05:18 PM    RBCUA >20 08/16/2022 05:18 PM    BLOODU LARGE 08/16/2022 05:18 PM    SPECGRAV <=1.005 08/16/2022 05:18 PM    GLUCOSEU Negative 08/16/2022 05:18 PM       Imaging:  XR ABDOMEN (KUB) (SINGLE AP VIEW)    Result Date: 2/27/2023  EXAMINATION: ONE SUPINE XRAY VIEW(S) OF THE ABDOMEN 2/27/2023 7:15 pm COMPARISON: None. HISTORY: ORDERING SYSTEM PROVIDED HISTORY: Diarrhea and abdominal pain. TECHNOLOGIST PROVIDED HISTORY: Reason for exam:->Diarrhea and abdominal pain.  What reading provider will be dictating this exam?->CRC FINDINGS: Nonspecific bowel gas pattern without evidence of obstruction. No abnormal calcifications. No acute osseous abnormality. Contrast identified within the urinary bladder. Probable prostatic calcifications. Left lower lobe opacity/pleural effusion. No evidence of bowel obstruction. XR CHEST PORTABLE    Result Date: 3/5/2023  EXAMINATION: ONE XRAY VIEW OF THE CHEST 3/5/2023 12:21 pm COMPARISON: January 22nd through March 4 Reviewed the CT chest of February 27. HISTORY: ORDERING SYSTEM PROVIDED HISTORY: fever TECHNOLOGIST PROVIDED HISTORY: Reason for exam:->fever What reading provider will be dictating this exam?->CRC FINDINGS: Advanced emphysematous changes in the upper lobes. Presence of a bi basilar parenchymal increased densities more on the left. The appear to be recurrent the since the CT chest February 27 where the lower lung bases were relative clear. A component of left-sided pleural effusion be excluded. Port catheter placed to the right subclavian vein has tip in the SVC. No conspicuous pneumothorax on the right or left. Heart is upper normal size. Cardiac monitor device is seen in the subcutaneous soft tissues of the left anterior chest. Mediastinum appears unremarkable. .     Emphysematous changes throughout lung parenchyma. Recurrent bi basilar infiltrates. XR CHEST PORTABLE    Result Date: 3/4/2023  EXAMINATION: ONE XRAY VIEW OF THE CHEST 3/4/2023 7:53 am COMPARISON: 03/03/2023 HISTORY: ORDERING SYSTEM PROVIDED HISTORY: shortness of breath TECHNOLOGIST PROVIDED HISTORY: Reason for exam:->shortness of breath What reading provider will be dictating this exam?->CRC FINDINGS: Significant left lower lobe infiltrate has mildly progressed. There is some pleural thickening. Right lung is clear.   Heart size normal.     Mild worsening of left lower lobe infiltrate     XR CHEST PORTABLE    Result Date: 3/3/2023  EXAMINATION: ONE XRAY VIEW OF THE CHEST 3/3/2023 10:05 am COMPARISON: 02/27/2023 HISTORY: ORDERING SYSTEM PROVIDED HISTORY: respiratory distress TECHNOLOGIST PROVIDED HISTORY: Reason for exam:->respiratory distress What reading provider will be dictating this exam?->CRC FINDINGS: There is an Jjrfuv-W-Oxys on the right. A loop recorder is noted. The cardiac silhouette is enlarged. The lungs demonstrate emphysematous changes. There is redemonstration of pleural and parenchymal disease in the base of the left hemithorax. Relatively mild parenchymal changes are seen in the right lung base. No pneumothorax is visualized. Enlarged cardiac silhouette. Redemonstration of pleural and parenchymal disease in the base of the left hemithorax. Relatively mild parenchymal changes in the right lung base. XR CHEST PORTABLE    Result Date: 2/27/2023  EXAMINATION: ONE XRAY VIEW OF THE CHEST 2/27/2023 10:10 am COMPARISON: Chest CT 23 January 2023 HISTORY: ORDERING SYSTEM PROVIDED HISTORY: sob, ?pneumonia TECHNOLOGIST PROVIDED HISTORY: Reason for exam:->sob, ?pneumonia What reading provider will be dictating this exam?->CRC FINDINGS: Stable right chest port. Unchanged scarring and or persistent atelectasis in the left mid lung. Obstructive airways disease as before. Normal heart and pulmonary vascularity. A left pleural effusion has substantially resolved. Persistent scarring and or atelectasis on the left. Obstructive airways disease. Substantial resolution of left pleural effusion. CTA PULMONARY W CONTRAST    Result Date: 2/27/2023  EXAMINATION: CTA OF THE CHEST 2/27/2023 2:15 pm TECHNIQUE: CTA of the chest was performed after the administration of intravenous contrast.  Multiplanar reformatted images are provided for review. MIP images are provided for review. Automated exposure control, iterative reconstruction, and/or weight based adjustment of the mA/kV was utilized to reduce the radiation dose to as low as reasonably achievable. COMPARISON: Reviewed studies from September 17, 2021 through January 23, 2023. 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 There is no acute pulmonary embolus in the main PA, right and left main PAs, in the lobar, segmental and subsegmental branches. The despite the malignant case mint in the bifurcation of the distal left main pulmonary artery towards left lower lobe and lingular segments there is no indication for acute pulmonary embolus in the left lung pulmonary artery circulation. There is no indication for pulmonary embolus in the right lung pulmonary artery circulation down to the 3/4 branch level in both sides. There is no aneurysm formation or dissection of the thoracic aorta. Despite the malignant infiltration from the left hilar region towards the lateral aspect of the mediastinum around the left hilar region there is no isolated the mediastinal masses or adenopathy. Upper abdominal structures not fully covered. Presence 11 mm gallstone in the dependent portion of the gallbladder. The adrenals no large size stable finding back to January 3rd 1019 study. There is no aneurysm formation or dissection of the thoracic aorta. Calcified atheromatous changes are seen in the arch of the aorta and in the descending thoracic aorta. Heart has normal size. RV inner diameter 3.4 cm. LV inner diameter 4.5 cm. Patient has cardiac monitor device in the apex region of the right ventricle projection. Emphysematous changes in advanced degree with centri lobular emphysema are seen in the upper lobes.  The right and left-sided pleural effusion have been most reabsorbed with pleural residual changes in right posterior costophrenic sulcus of the Multi segmental atelectasis and loss of volume of the left lung relate with the hilar mass. However there patchy parenchymal passes in the more peripheral aspect of the left lower lobe towards the anterolateral basal segment which can be manifestation of superimposed pneumonia as these were not seen previous study of December 28, 2022. Follow-up study following a clinical treatment trial for pneumonia can be considered. Visualized bone structures demonstrate chronic findings as observed previously. 1.  No indication for acute pulmonary emboli. 2.  No aneurysm formation or dissection of the thoracic aorta. 3.  Emphysematous changes lung parenchyma particular affecting the upper lobes in moderate advanced degree. 4.  Malignant infiltrating lesion in the left infrahilar region towards the root of the lingula/left lower, as previously described. 5.  Chronic areas of loss of volume in lingula and in the left lower due the infiltration of the left hilar region by malignancies. 6.  Resolved bilateral pleural effusions since more recent examinations. 7.  Development of small multifocal patchy areas of consolidation in the left lower lobe distal to the more proximal in case mint of the bronchovascular structures of the left lower lobe can represent developing acute pneumonia. Follow-up study following clinically treatment trial recommended in several days time interval.     FL MODIFIED BARIUM SWALLOW W VIDEO    Result Date: 3/1/2023  EXAMINATION: MODIFIED BARIUM SWALLOW WAS PERFORMED IN CONJUNCTION WITH SPEECH PATHOLOGY SERVICES TECHNIQUE: Under fluoroscopic evaluation cineradiography/videoradiography recordings were performed in conjunction with the speech-language pathologist (SLP). Various liquid, solid and/or semi-solid barium preparations were used to assess swallowing function.  FLUOROSCOPY DOSE AND TYPE: Radiation Exposure Index: Images: 8 series of cine fluoroscopic loops Fluoroscopic time: 1 minute Total dose: 8 mGy COMPARISON: None HISTORY: ORDERING SYSTEM PROVIDED HISTORY: aspiration rule out TECHNOLOGIST PROVIDED HISTORY: Reason for exam:->aspiration rule out What reading provider will be dictating this exam?->CRC FINDINGS: Satisfactory oral and pharyngeal phases of swallowing mechanism. No significant barium residuals and no barium aspiration. Satisfactory swallowing mechanism. No barium aspiration. Please see separate speech pathology report for full discussion of findings and recommendations. Discharge Medications:   67 y.o. male presented with [address],  AND SOB, NO FEVER, NO CHILLS. HAS A KNOWN HISTORY OF LUNG CANCER, NON SMALL CELL LUNG CANCR,   AND SEES  [facility]. NO FEVER, NO CHILLS,   NO NV, JUST STARTED WITH DIARRHEA** HAD ECTOPIC BEATS LAST PM , PACER INTERROGATED,  FUNCTIONING WELL. EP  SAW THE PATIENT, NO FURTHER WORKUP TO BE DONE** WENT INTO ARF THIS AM, RRT WAS CALLED.   TRANSFERRED TO MICU AND PUT ON BIPAP  ON Friday, ON YESTERDAY HIS RESPIRATORY STATUS STARTED TO DECLINE AND HE ASKED FOR HIS WIFE TO COME IN AND HE WAS MADE A DNR CC .   HE  WENT INTO [place],Time of death declared at 5:04 am           Plan:   TO [facility]   Electronically signed by Melquiades Portillo DO on 3/7/2023 at 7:58 AM Marzena Broderick

## 2023-03-07 NOTE — PROGRESS NOTES
Hospitalist Progress Note      PCP: Clarita Mckeon MD    Date of Admission: 2/27/2023        Hospital Course:   67 y.o. male presented with [address],  AND SOB, NO FEVER, NO CHILLS. HAS A KNOWN HISTORY OF LUNG CANCER, NON SMALL CELL LUNG CANCR,   AND SEES  [facility]. NO FEVER, NO CHILLS,   NO NV, JUST STARTED WITH DIARRHEA** HAD ECTOPIC BEATS LAST PM , PACER INTERROGATED,  FUNCTIONING WELL. EP  SAW THE PATIENT, NO FURTHER WORKUP TO BE DONE** WENT INTO ARF THIS AM, RRT WAS CALLED. TRANSFERRED TO MICU AND PUT ON BIPAP  ON Friday, ON YESTERDAY HIS RESPIRATORY STATUS STARTED TO DECLINE AND HE ASKED FOR HIS WIFE TO COME IN AND HE WAS MADE A DNR CC .   HE  WENT INTO [place],Time of death declared at 5:04 am           Medications:  Reviewed    Infusion Medications   Scheduled Medications   PRN Meds:     No intake or output data in the 24 hours ending 03/07/23 0758              Labs:   Recent Labs     03/05/23  0443   WBC 4.4*   HGB 7.3*   HCT 22.5*   PLT 78*     Recent Labs     03/04/23  2315 03/05/23  0443    138   K 3.2* 3.8   CL 98 103   CO2 28 24   BUN 38* 33*   CREATININE 1.5* 1.3*   CALCIUM 8.7 8.7   PHOS 2.8  --      No results for input(s): AST, ALT, BILIDIR, BILITOT, ALKPHOS in the last 72 hours. No results for input(s): INR in the last 72 hours. No results for input(s): Mirna Risk in the last 72 hours. No results for input(s): AST, ALT, ALB, BILIDIR, BILITOT, ALKPHOS in the last 72 hours.   Recent Labs     03/04/23  2316   LACTA 1.6     No results found for: Shankar Magana  No results found for: AMMONIA    Assessment:    Active Hospital Problems    Diagnosis Date Noted    CAP (community acquired pneumonia) due to Chlamydia species [J16.0] 03/01/2023     Priority: Medium    Acute exacerbation of chronic obstructive pulmonary disease (COPD) ([facility] [address] [J44.1] 02/27/2023     Priority: Medium   H/O PE    HTN   NSCLC LEFT LUNG       Plan:   TO [address]   Electronically signed by Alejandra Smith DO on 3/7/2023 at 7:58 AM [facility]

## (undated) DEVICE — SURGICAL PROCEDURE PACK BRONCH

## (undated) DEVICE — SOLUTION IV IRRIG 500ML 0.9% SODIUM CHL 2F7123

## (undated) DEVICE — TRAP,MUCUS SPECIMEN,40CC: Brand: MEDLINE

## (undated) DEVICE — GLOVE SURG SZ 65 THK91MIL LTX FREE SYN POLYISOPRENE

## (undated) DEVICE — DRESSING TRNSPAR W4XL55IN ACRYL SUP FLM W ADH WTRPRF OPSITE

## (undated) DEVICE — Device

## (undated) DEVICE — TAPE ADH W2INXL10YD PLAS TRNSPAR H2O RESIST HYPOALRG CURAD

## (undated) DEVICE — DRAPE SHEET: Brand: UNBRANDED

## (undated) DEVICE — STANDARD HYPODERMIC NEEDLE,ALUMINUM HUB: Brand: MONOJECT

## (undated) DEVICE — MEDI-TRACE CADENCE ADULT, PRE-CONNECT  DEFIBRILLATION ELECTRODE (10 PR/PK) - PHYSIO-CONTROL: Brand: MEDI-TRACE CADENCE

## (undated) DEVICE — DOUBLE BASIN SET: Brand: MEDLINE INDUSTRIES, INC.

## (undated) DEVICE — 3M™ STERI-DRAPE™ CARDIOVASCULAR SHEET WITH IOBAN™ 2 INCISE FILM 6677: Brand: STERI-DRAPE™ IOBAN™ 2

## (undated) DEVICE — C-ARM: Brand: UNBRANDED

## (undated) DEVICE — CLEANER,CAUTERY TIP,2X2",STERILE: Brand: MEDLINE

## (undated) DEVICE — UNIVERSAL DRAPE: Brand: MEDLINE INDUSTRIES, INC.

## (undated) DEVICE — BLADE ES L2.44IN S STL HEX LOK DISP VALLEYLAB

## (undated) DEVICE — PICO 7 10CM X 30CM: Brand: PICO™ 7

## (undated) DEVICE — APPLICATOR MEDICATED 26 CC SOLUTION HI LT ORNG CHLORAPREP

## (undated) DEVICE — ADAPTER TBNG DIA15MM SWVL FBROPT BRONCHSCP TERM 2 AXIS PEEP

## (undated) DEVICE — NEEDLE HYPO 25GA L0.625IN BLU POLYPR HUB S STL REG BVL STR

## (undated) DEVICE — GLOVE ORANGE PI 7 1/2   MSG9075

## (undated) DEVICE — GLOVE SURG SZ 75 L12IN FNGR THK94MIL TRNSLUC YEL LTX

## (undated) DEVICE — SHEET, T, LAPAROTOMY, STERILE: Brand: MEDLINE

## (undated) DEVICE — TAPE ADH W4INXL5YD WHT COT E BNDG RUB BASE CURAD

## (undated) DEVICE — GLOVE SURG SZ 7.5 L11.73IN FNGR THK9.8MIL STRW LTX POLYMER

## (undated) DEVICE — BASIC: Brand: MEDLINE INDUSTRIES, INC.

## (undated) DEVICE — NEEDLE HYPO 21GA L1.5IN GRN POLYPR HUB S STL REG BVL STR

## (undated) DEVICE — E-Z CLEAN, NON-STICK, PTFE COATED, ELECTROSURGICAL BLADE ELECTRODE, MODIFIED EXTENDED INSULATION, 2.5 INCH (6.35 CM): Brand: MEGADYNE

## (undated) DEVICE — SYRINGE MED 10ML LUERLOCK TIP W/O SFTY DISP

## (undated) DEVICE — STAPLER SKIN L39MM DIA0.53MM CRWN 5.7MM S STL FIX HD PROX

## (undated) DEVICE — SYRINGE 20ML LL S/C 50

## (undated) DEVICE — INTENDED FOR TISSUE SEPARATION, AND OTHER PROCEDURES THAT REQUIRE A SHARP SURGICAL BLADE TO PUNCTURE OR CUT.: Brand: BARD-PARKER ® STAINLESS STEEL BLADES

## (undated) DEVICE — GOWN,SIRUS,FABRNF,L,20/CS: Brand: MEDLINE

## (undated) DEVICE — SINGLE USE SUCTION VALVE MAJ-209: Brand: SINGLE USE SUCTION VALVE (STERILE)

## (undated) DEVICE — GARMENT,MEDLINE,DVT,INT,CALF,MED, GEN2: Brand: MEDLINE

## (undated) DEVICE — GAUZE,SPONGE,AVANT,4"X4",4PLY,STRL,10/TR: Brand: MEDLINE

## (undated) DEVICE — SYRINGE MED 50ML LUERLOCK TIP

## (undated) DEVICE — TUBING, SUCTION, 3/16" X 12', STRAIGHT: Brand: MEDLINE

## (undated) DEVICE — INTRODUCER SHTH 0.035 IN 4 FRX11 CM W/ GUIDEWIRE SUPER SHTH

## (undated) DEVICE — ALCOHOL RUBBING ISO 16OZ 70%

## (undated) DEVICE — GLOVE ORANGE PI 7   MSG9070

## (undated) DEVICE — SINGLE USE BIOPSY VALVE MAJ-210: Brand: SINGLE USE BIOPSY VALVE (STERILE)

## (undated) DEVICE — SYRINGE MED 10ML TRNSLUC BRL PLUNG BLK MRK POLYPR CTRL

## (undated) DEVICE — Device: Brand: LLD EZ LEAD LOCKING DEVICE

## (undated) DEVICE — TTL1LYR 16FR10ML 100%SIL TMPST TR: Brand: MEDLINE

## (undated) DEVICE — RADIFOCUS GLIDEWIRE ADVANTAGE GUIDEWIRE: Brand: GLIDEWIRE ADVANTAGE

## (undated) DEVICE — TOWEL,OR,DSP,ST,BLUE,DLX,10/PK,8PK/CS: Brand: MEDLINE

## (undated) DEVICE — GAUZE,SPONGE,4"X4",16PLY,XRAY,STRL,LF: Brand: MEDLINE

## (undated) DEVICE — DRAPE EQUIP BANDED BG 36X28 IN W/ROUNDED CORNER SNAPKOVER

## (undated) DEVICE — SOLUTION IV 1000ML 0.9% SOD CHL PH 5 INJ USP VIAFLX PLAS

## (undated) DEVICE — MPS® PRESSURE LINE W/TRANSDUCER: Brand: MPS

## (undated) DEVICE — ECHOTIP PROCORE, ENDOBRONCHIAL HD ULTRASOUND BIOPSY NEEDLE FOR OLYMPUS SCOPES: Brand: ECHOTIP PROCORE

## (undated) DEVICE — PACK,LAPAROTOMY,NO GOWNS: Brand: MEDLINE

## (undated) DEVICE — COVER PRB W14XL147CM TELESCOPICALLY FLD EXT LEN CIV-FLEX

## (undated) DEVICE — Device: Brand: GLIDELIGHT LASER SHEATH

## (undated) DEVICE — PERCUTANEOUS ENTRY THINWALL NEEDLE  ONE-PART: Brand: COOK

## (undated) DEVICE — ELECTRODE PT RET AD L9FT HI MOIST COND ADH HYDRGEL CORDED

## (undated) DEVICE — CLOTH SURG PREP PREOPERATIVE CHLORHEXIDINE GLUC 2% READYPREP

## (undated) DEVICE — GEL US 20GM NONIRRITATING OVERWRAPPED FILE PCH TRNSMIT

## (undated) DEVICE — ADAPTER BRONCHSCP FOR USE W/ OLY EDGE

## (undated) DEVICE — 3M™ IOBAN™ 2 ANTIMICROBIAL INCISE DRAPE 6650EZ: Brand: IOBAN™ 2

## (undated) DEVICE — GOWN,SIRUS,FABRNF,XL,20/CS: Brand: MEDLINE

## (undated) DEVICE — AIR SHEET,LAT,COMFORT GLIDE, BLEND 40X80: Brand: MEDLINE

## (undated) DEVICE — DRAPE, MULTI FENESTRATED, HYBRID OR, STE: Brand: MEDLINE